# Patient Record
Sex: MALE | Race: WHITE | NOT HISPANIC OR LATINO | URBAN - METROPOLITAN AREA
[De-identification: names, ages, dates, MRNs, and addresses within clinical notes are randomized per-mention and may not be internally consistent; named-entity substitution may affect disease eponyms.]

---

## 2017-05-15 ENCOUNTER — RECORDS - HEALTHEAST (OUTPATIENT)
Dept: LAB | Facility: CLINIC | Age: 67
End: 2017-05-15

## 2017-05-15 LAB
CHOLEST SERPL-MCNC: 109 MG/DL
FASTING STATUS PATIENT QL REPORTED: ABNORMAL
HDLC SERPL-MCNC: 31 MG/DL
LDLC SERPL CALC-MCNC: 46 MG/DL
TRIGL SERPL-MCNC: 158 MG/DL

## 2018-08-27 ENCOUNTER — RECORDS - HEALTHEAST (OUTPATIENT)
Dept: LAB | Facility: CLINIC | Age: 68
End: 2018-08-27

## 2018-08-27 LAB
ALBUMIN SERPL-MCNC: 3.6 G/DL (ref 3.5–5)
ALP SERPL-CCNC: 75 U/L (ref 45–120)
ALT SERPL W P-5'-P-CCNC: 19 U/L (ref 0–45)
ANION GAP SERPL CALCULATED.3IONS-SCNC: 9 MMOL/L (ref 5–18)
AST SERPL W P-5'-P-CCNC: 19 U/L (ref 0–40)
BASOPHILS # BLD AUTO: 0.1 THOU/UL (ref 0–0.2)
BASOPHILS NFR BLD AUTO: 1 % (ref 0–2)
BILIRUB SERPL-MCNC: 0.6 MG/DL (ref 0–1)
BNP SERPL-MCNC: 1313 PG/ML (ref 0–65)
BUN SERPL-MCNC: 14 MG/DL (ref 8–22)
CALCIUM SERPL-MCNC: 10 MG/DL (ref 8.5–10.5)
CHLORIDE BLD-SCNC: 106 MMOL/L (ref 98–107)
CHOLEST SERPL-MCNC: 103 MG/DL
CO2 SERPL-SCNC: 25 MMOL/L (ref 22–31)
CREAT SERPL-MCNC: 0.85 MG/DL (ref 0.7–1.3)
EOSINOPHIL # BLD AUTO: 0.2 THOU/UL (ref 0–0.4)
EOSINOPHIL NFR BLD AUTO: 2 % (ref 0–6)
ERYTHROCYTE [DISTWIDTH] IN BLOOD BY AUTOMATED COUNT: 13.9 % (ref 11–14.5)
FASTING STATUS PATIENT QL REPORTED: ABNORMAL
GFR SERPL CREATININE-BSD FRML MDRD: >60 ML/MIN/1.73M2
GLUCOSE BLD-MCNC: 149 MG/DL (ref 70–125)
HCT VFR BLD AUTO: 41.4 % (ref 40–54)
HDLC SERPL-MCNC: 29 MG/DL
HGB BLD-MCNC: 13.6 G/DL (ref 14–18)
LDLC SERPL CALC-MCNC: 60 MG/DL
LYMPHOCYTES # BLD AUTO: 1.7 THOU/UL (ref 0.8–4.4)
LYMPHOCYTES NFR BLD AUTO: 24 % (ref 20–40)
MCH RBC QN AUTO: 30.9 PG (ref 27–34)
MCHC RBC AUTO-ENTMCNC: 32.9 G/DL (ref 32–36)
MCV RBC AUTO: 94 FL (ref 80–100)
MONOCYTES # BLD AUTO: 0.7 THOU/UL (ref 0–0.9)
MONOCYTES NFR BLD AUTO: 9 % (ref 2–10)
NEUTROPHILS # BLD AUTO: 4.7 THOU/UL (ref 2–7.7)
NEUTROPHILS NFR BLD AUTO: 64 % (ref 50–70)
PLATELET # BLD AUTO: 169 THOU/UL (ref 140–440)
PMV BLD AUTO: 14 FL (ref 8.5–12.5)
POTASSIUM BLD-SCNC: 4.7 MMOL/L (ref 3.5–5)
PROT SERPL-MCNC: 6.4 G/DL (ref 6–8)
RBC # BLD AUTO: 4.4 MILL/UL (ref 4.4–6.2)
SODIUM SERPL-SCNC: 140 MMOL/L (ref 136–145)
TRIGL SERPL-MCNC: 72 MG/DL
TSH SERPL DL<=0.005 MIU/L-ACNC: 2.41 UIU/ML (ref 0.3–5)
WBC: 7.3 THOU/UL (ref 4–11)

## 2018-10-05 ENCOUNTER — RECORDS - HEALTHEAST (OUTPATIENT)
Dept: LAB | Facility: CLINIC | Age: 68
End: 2018-10-05

## 2018-10-05 LAB
ANION GAP SERPL CALCULATED.3IONS-SCNC: 11 MMOL/L (ref 5–18)
BUN SERPL-MCNC: 8 MG/DL (ref 8–22)
CALCIUM SERPL-MCNC: 9.8 MG/DL (ref 8.5–10.5)
CHLORIDE BLD-SCNC: 102 MMOL/L (ref 98–107)
CO2 SERPL-SCNC: 26 MMOL/L (ref 22–31)
CREAT SERPL-MCNC: 0.86 MG/DL (ref 0.7–1.3)
GFR SERPL CREATININE-BSD FRML MDRD: >60 ML/MIN/1.73M2
GLUCOSE BLD-MCNC: 126 MG/DL (ref 70–125)
POTASSIUM BLD-SCNC: 4.6 MMOL/L (ref 3.5–5)
SODIUM SERPL-SCNC: 139 MMOL/L (ref 136–145)

## 2019-12-13 ENCOUNTER — RECORDS - HEALTHEAST (OUTPATIENT)
Dept: LAB | Facility: CLINIC | Age: 69
End: 2019-12-13

## 2019-12-13 LAB
ANION GAP SERPL CALCULATED.3IONS-SCNC: 10 MMOL/L (ref 5–18)
BUN SERPL-MCNC: 14 MG/DL (ref 8–28)
CALCIUM SERPL-MCNC: 9.7 MG/DL (ref 8.5–10.5)
CHLORIDE BLD-SCNC: 103 MMOL/L (ref 98–107)
CHOLEST SERPL-MCNC: 117 MG/DL
CO2 SERPL-SCNC: 25 MMOL/L (ref 22–31)
CREAT SERPL-MCNC: 1.22 MG/DL (ref 0.7–1.3)
FASTING STATUS PATIENT QL REPORTED: ABNORMAL
GFR SERPL CREATININE-BSD FRML MDRD: 59 ML/MIN/1.73M2
GLUCOSE BLD-MCNC: 209 MG/DL (ref 70–125)
HDLC SERPL-MCNC: 32 MG/DL
LDLC SERPL CALC-MCNC: 55 MG/DL
POTASSIUM BLD-SCNC: 5 MMOL/L (ref 3.5–5)
SODIUM SERPL-SCNC: 138 MMOL/L (ref 136–145)
TRIGL SERPL-MCNC: 148 MG/DL

## 2021-02-01 ENCOUNTER — RECORDS - HEALTHEAST (OUTPATIENT)
Dept: LAB | Facility: CLINIC | Age: 71
End: 2021-02-01

## 2021-02-01 LAB
ALBUMIN SERPL-MCNC: 3.9 G/DL (ref 3.5–5)
ALP SERPL-CCNC: 61 U/L (ref 45–120)
ALT SERPL W P-5'-P-CCNC: 14 U/L (ref 0–45)
ANION GAP SERPL CALCULATED.3IONS-SCNC: 10 MMOL/L (ref 5–18)
AST SERPL W P-5'-P-CCNC: 15 U/L (ref 0–40)
BILIRUB SERPL-MCNC: 0.4 MG/DL (ref 0–1)
BUN SERPL-MCNC: 17 MG/DL (ref 8–28)
CALCIUM SERPL-MCNC: 8.8 MG/DL (ref 8.5–10.5)
CHLORIDE BLD-SCNC: 103 MMOL/L (ref 98–107)
CHOLEST SERPL-MCNC: 104 MG/DL
CO2 SERPL-SCNC: 27 MMOL/L (ref 22–31)
CREAT SERPL-MCNC: 1.79 MG/DL (ref 0.7–1.3)
ERYTHROCYTE [DISTWIDTH] IN BLOOD BY AUTOMATED COUNT: 12.7 % (ref 11–14.5)
FASTING STATUS PATIENT QL REPORTED: ABNORMAL
GFR SERPL CREATININE-BSD FRML MDRD: 38 ML/MIN/1.73M2
GLUCOSE BLD-MCNC: 171 MG/DL (ref 70–125)
HCT VFR BLD AUTO: 34.3 % (ref 40–54)
HDLC SERPL-MCNC: 31 MG/DL
HGB BLD-MCNC: 11.8 G/DL (ref 14–18)
LDLC SERPL CALC-MCNC: 44 MG/DL
MCH RBC QN AUTO: 32.2 PG (ref 27–34)
MCHC RBC AUTO-ENTMCNC: 34.4 G/DL (ref 32–36)
MCV RBC AUTO: 94 FL (ref 80–100)
PLATELET # BLD AUTO: 152 THOU/UL (ref 140–440)
PMV BLD AUTO: 12.5 FL (ref 8.5–12.5)
POTASSIUM BLD-SCNC: 4.8 MMOL/L (ref 3.5–5)
PROT SERPL-MCNC: 6.7 G/DL (ref 6–8)
RBC # BLD AUTO: 3.66 MILL/UL (ref 4.4–6.2)
SODIUM SERPL-SCNC: 140 MMOL/L (ref 136–145)
TRIGL SERPL-MCNC: 144 MG/DL
WBC: 5.6 THOU/UL (ref 4–11)

## 2021-05-29 ENCOUNTER — RECORDS - HEALTHEAST (OUTPATIENT)
Dept: ADMINISTRATIVE | Facility: CLINIC | Age: 71
End: 2021-05-29

## 2021-05-30 ENCOUNTER — RECORDS - HEALTHEAST (OUTPATIENT)
Dept: ADMINISTRATIVE | Facility: CLINIC | Age: 71
End: 2021-05-30

## 2021-06-05 ENCOUNTER — RECORDS - HEALTHEAST (OUTPATIENT)
Dept: SCHEDULING | Facility: CLINIC | Age: 71
End: 2021-06-05

## 2021-06-05 DIAGNOSIS — R29.898 OTHER MUSCULOSKELETAL SYMPTOMS REFERABLE TO LIMBS: ICD-10-CM

## 2022-04-25 ENCOUNTER — LAB REQUISITION (OUTPATIENT)
Dept: LAB | Facility: CLINIC | Age: 72
End: 2022-04-25
Payer: COMMERCIAL

## 2022-04-25 DIAGNOSIS — E78.5 HYPERLIPIDEMIA, UNSPECIFIED: ICD-10-CM

## 2022-04-25 LAB
ALBUMIN SERPL-MCNC: 3.8 G/DL (ref 3.5–5)
ALP SERPL-CCNC: 62 U/L (ref 45–120)
ALT SERPL W P-5'-P-CCNC: 17 U/L (ref 0–45)
ANION GAP SERPL CALCULATED.3IONS-SCNC: 11 MMOL/L (ref 5–18)
AST SERPL W P-5'-P-CCNC: 17 U/L (ref 0–40)
BILIRUB SERPL-MCNC: 0.4 MG/DL (ref 0–1)
BUN SERPL-MCNC: 18 MG/DL (ref 8–28)
CALCIUM SERPL-MCNC: 8.9 MG/DL (ref 8.5–10.5)
CHLORIDE BLD-SCNC: 102 MMOL/L (ref 98–107)
CHOLEST SERPL-MCNC: 114 MG/DL
CO2 SERPL-SCNC: 25 MMOL/L (ref 22–31)
CREAT SERPL-MCNC: 2.07 MG/DL (ref 0.7–1.3)
GFR SERPL CREATININE-BSD FRML MDRD: 33 ML/MIN/1.73M2
GLUCOSE BLD-MCNC: 159 MG/DL (ref 70–125)
HDLC SERPL-MCNC: 29 MG/DL
LDLC SERPL CALC-MCNC: 45 MG/DL
POTASSIUM BLD-SCNC: 4.5 MMOL/L (ref 3.5–5)
PROT SERPL-MCNC: 6.5 G/DL (ref 6–8)
SODIUM SERPL-SCNC: 138 MMOL/L (ref 136–145)
TRIGL SERPL-MCNC: 201 MG/DL

## 2022-04-25 PROCEDURE — 80061 LIPID PANEL: CPT | Mod: ORL | Performed by: FAMILY MEDICINE

## 2022-04-25 PROCEDURE — 80053 COMPREHEN METABOLIC PANEL: CPT | Mod: ORL | Performed by: FAMILY MEDICINE

## 2023-07-21 ENCOUNTER — LAB REQUISITION (OUTPATIENT)
Dept: LAB | Facility: CLINIC | Age: 73
End: 2023-07-21
Payer: COMMERCIAL

## 2023-07-21 DIAGNOSIS — E78.5 HYPERLIPIDEMIA, UNSPECIFIED: ICD-10-CM

## 2023-07-21 DIAGNOSIS — E11.40 TYPE 2 DIABETES MELLITUS WITH DIABETIC NEUROPATHY, UNSPECIFIED (H): ICD-10-CM

## 2023-07-21 LAB
ALBUMIN SERPL BCG-MCNC: 4.2 G/DL (ref 3.5–5.2)
ALP SERPL-CCNC: 69 U/L (ref 40–129)
ALT SERPL W P-5'-P-CCNC: 16 U/L (ref 0–70)
ANION GAP SERPL CALCULATED.3IONS-SCNC: 15 MMOL/L (ref 7–15)
AST SERPL W P-5'-P-CCNC: 15 U/L (ref 0–45)
BILIRUB SERPL-MCNC: 0.3 MG/DL
BUN SERPL-MCNC: 22.1 MG/DL (ref 8–23)
CALCIUM SERPL-MCNC: 9.3 MG/DL (ref 8.8–10.2)
CHLORIDE SERPL-SCNC: 99 MMOL/L (ref 98–107)
CHOLEST SERPL-MCNC: 102 MG/DL
CREAT SERPL-MCNC: 1.88 MG/DL (ref 0.67–1.17)
CREAT UR-MCNC: 35.7 MG/DL
DEPRECATED HCO3 PLAS-SCNC: 21 MMOL/L (ref 22–29)
ERYTHROCYTE [DISTWIDTH] IN BLOOD BY AUTOMATED COUNT: 12.6 % (ref 10–15)
GFR SERPL CREATININE-BSD FRML MDRD: 37 ML/MIN/1.73M2
GLUCOSE SERPL-MCNC: 267 MG/DL (ref 70–99)
HCT VFR BLD AUTO: 37.1 % (ref 40–53)
HDLC SERPL-MCNC: 26 MG/DL
HGB BLD-MCNC: 12 G/DL (ref 13.3–17.7)
LDLC SERPL CALC-MCNC: 36 MG/DL
MCH RBC QN AUTO: 32.4 PG (ref 26.5–33)
MCHC RBC AUTO-ENTMCNC: 32.3 G/DL (ref 31.5–36.5)
MCV RBC AUTO: 100 FL (ref 78–100)
MICROALBUMIN UR-MCNC: <12 MG/L
MICROALBUMIN/CREAT UR: NORMAL MG/G{CREAT}
NONHDLC SERPL-MCNC: 76 MG/DL
PLATELET # BLD AUTO: 217 10E3/UL (ref 150–450)
POTASSIUM SERPL-SCNC: 5.4 MMOL/L (ref 3.4–5.3)
PROT SERPL-MCNC: 6.5 G/DL (ref 6.4–8.3)
RBC # BLD AUTO: 3.7 10E6/UL (ref 4.4–5.9)
SODIUM SERPL-SCNC: 135 MMOL/L (ref 136–145)
TRIGL SERPL-MCNC: 201 MG/DL
WBC # BLD AUTO: 7.6 10E3/UL (ref 4–11)

## 2023-07-21 PROCEDURE — 80053 COMPREHEN METABOLIC PANEL: CPT | Mod: ORL | Performed by: FAMILY MEDICINE

## 2023-07-21 PROCEDURE — 85027 COMPLETE CBC AUTOMATED: CPT | Mod: ORL | Performed by: FAMILY MEDICINE

## 2023-07-21 PROCEDURE — 80061 LIPID PANEL: CPT | Mod: ORL | Performed by: FAMILY MEDICINE

## 2023-07-21 PROCEDURE — 82570 ASSAY OF URINE CREATININE: CPT | Mod: ORL | Performed by: FAMILY MEDICINE

## 2024-07-18 ENCOUNTER — LAB REQUISITION (OUTPATIENT)
Dept: LAB | Facility: CLINIC | Age: 74
End: 2024-07-18
Payer: COMMERCIAL

## 2024-07-18 DIAGNOSIS — N18.32 CHRONIC KIDNEY DISEASE, STAGE 3B (H): ICD-10-CM

## 2024-07-18 DIAGNOSIS — E78.5 HYPERLIPIDEMIA, UNSPECIFIED: ICD-10-CM

## 2024-07-18 DIAGNOSIS — E11.42 TYPE 2 DIABETES MELLITUS WITH DIABETIC POLYNEUROPATHY (H): ICD-10-CM

## 2024-07-18 LAB
ALBUMIN SERPL BCG-MCNC: 3.9 G/DL (ref 3.5–5.2)
ALP SERPL-CCNC: 79 U/L (ref 40–150)
ALT SERPL W P-5'-P-CCNC: 15 U/L (ref 0–70)
ANION GAP SERPL CALCULATED.3IONS-SCNC: 11 MMOL/L (ref 7–15)
AST SERPL W P-5'-P-CCNC: 18 U/L (ref 0–45)
BILIRUB SERPL-MCNC: 0.4 MG/DL
BUN SERPL-MCNC: 23.2 MG/DL (ref 8–23)
CALCIUM SERPL-MCNC: 9 MG/DL (ref 8.8–10.4)
CHLORIDE SERPL-SCNC: 99 MMOL/L (ref 98–107)
CHOLEST SERPL-MCNC: 115 MG/DL
CREAT SERPL-MCNC: 2.4 MG/DL (ref 0.67–1.17)
CREAT UR-MCNC: 57.6 MG/DL
EGFRCR SERPLBLD CKD-EPI 2021: 27 ML/MIN/1.73M2
ERYTHROCYTE [DISTWIDTH] IN BLOOD BY AUTOMATED COUNT: 12.9 % (ref 10–15)
FASTING STATUS PATIENT QL REPORTED: ABNORMAL
FASTING STATUS PATIENT QL REPORTED: ABNORMAL
GLUCOSE SERPL-MCNC: 338 MG/DL (ref 70–99)
HCO3 SERPL-SCNC: 24 MMOL/L (ref 22–29)
HCT VFR BLD AUTO: 37.9 % (ref 40–53)
HDLC SERPL-MCNC: 26 MG/DL
HGB BLD-MCNC: 12.9 G/DL (ref 13.3–17.7)
LDLC SERPL CALC-MCNC: 51 MG/DL
MCH RBC QN AUTO: 32.3 PG (ref 26.5–33)
MCHC RBC AUTO-ENTMCNC: 34 G/DL (ref 31.5–36.5)
MCV RBC AUTO: 95 FL (ref 78–100)
MICROALBUMIN UR-MCNC: <12 MG/L
MICROALBUMIN/CREAT UR: NORMAL MG/G{CREAT}
NONHDLC SERPL-MCNC: 89 MG/DL
PLATELET # BLD AUTO: 200 10E3/UL (ref 150–450)
POTASSIUM SERPL-SCNC: 4.7 MMOL/L (ref 3.4–5.3)
PROT SERPL-MCNC: 6.8 G/DL (ref 6.4–8.3)
RBC # BLD AUTO: 3.99 10E6/UL (ref 4.4–5.9)
SODIUM SERPL-SCNC: 134 MMOL/L (ref 135–145)
TRIGL SERPL-MCNC: 188 MG/DL
WBC # BLD AUTO: 5.1 10E3/UL (ref 4–11)

## 2024-07-18 PROCEDURE — 85027 COMPLETE CBC AUTOMATED: CPT | Mod: ORL | Performed by: FAMILY MEDICINE

## 2024-07-18 PROCEDURE — 80061 LIPID PANEL: CPT | Mod: ORL | Performed by: FAMILY MEDICINE

## 2024-07-18 PROCEDURE — 80053 COMPREHEN METABOLIC PANEL: CPT | Mod: ORL | Performed by: FAMILY MEDICINE

## 2024-07-18 PROCEDURE — 82043 UR ALBUMIN QUANTITATIVE: CPT | Mod: ORL | Performed by: FAMILY MEDICINE

## 2024-08-05 ENCOUNTER — OFFICE VISIT (OUTPATIENT)
Dept: PHARMACY | Facility: PHYSICIAN GROUP | Age: 74
End: 2024-08-05
Payer: COMMERCIAL

## 2024-08-05 DIAGNOSIS — Z79.4 TYPE 2 DIABETES MELLITUS WITH HYPERGLYCEMIA, WITH LONG-TERM CURRENT USE OF INSULIN (H): Primary | ICD-10-CM

## 2024-08-05 DIAGNOSIS — E11.65 TYPE 2 DIABETES MELLITUS WITH HYPERGLYCEMIA, WITH LONG-TERM CURRENT USE OF INSULIN (H): Primary | ICD-10-CM

## 2024-08-05 DIAGNOSIS — I50.32 CHRONIC DIASTOLIC HEART FAILURE (H): ICD-10-CM

## 2024-08-05 DIAGNOSIS — I10 ESSENTIAL HYPERTENSION: ICD-10-CM

## 2024-08-05 DIAGNOSIS — E78.2 MIXED HYPERLIPIDEMIA: ICD-10-CM

## 2024-08-05 DIAGNOSIS — N18.32 STAGE 3B CHRONIC KIDNEY DISEASE (H): ICD-10-CM

## 2024-08-05 DIAGNOSIS — G62.9 NEUROPATHY: ICD-10-CM

## 2024-08-05 PROCEDURE — 99607 MTMS BY PHARM ADDL 15 MIN: CPT | Performed by: PHARMACIST

## 2024-08-05 PROCEDURE — 99605 MTMS BY PHARM NP 15 MIN: CPT | Performed by: PHARMACIST

## 2024-08-05 NOTE — PATIENT INSTRUCTIONS
Recommendations from today's MTM visit:                                                       Continue Lantus insulin 50 units once a day in the evening  Continue taking Januvia (pink pill) 25 mg 1 tablet daily for now. We will stop this when you start the new once a week shot.   Sent order for once a week shot, Trulicity 0.75 mg injection once a week to your pharmacy. Wait to start this until our next visit.     This medication helps decreases appetite and slows how you absorb sugar into your blood.  It is generally very effective in lowering blood sugar and helping with weight loss. There are some heart and kidney protective benefits long-term.     Your insurance may require a prior authorization so check in with your pharmacy in about a week to see if it has been approved.     Tips to help it work best for you:  - Eat slow  - Start with 1/2 of what you normally eat and see how you feel, if still hungry after 20 minutes go back for more  - Try to avoid high fat, fried, greasy foods  - Drink plenty of water and get fiber in your diet  - Do injection at night before bedtime  - Make sure to eat protein    Some details I like to point out:   - Take once a week, on the same day  - You can take with or without food. Recommend taking before bedtime.  - If you miss a dose, take the missed dose as soon as possible within 4 days (96 hours) after the missed dose. If more than 4 days have passed, skip the missed dose and administer the next dose on the regularly scheduled day.  - Can be injected under the skin of your abdomen, thigh, or upper arm. Most people use their abdomen.   - Keep unused pens in the refrigerator. If needed, each single-dose pen can be stored unrefrigerated at temperatures not to exceed 30 C (86 F) for up to 21 days. I recommend taking pen out of the refrigerator 15-30 minutes before giving the injection.   - Most common side effects are include nausea, vomiting, diarrhea, stomach (abdominal) pain, and  constipation. Theses side effects usually improve after your body gets used to the medicine in a couple weeks. But if you experience abdominal pain that is severe and will not go away please call your doctor or go to Urgent Care/ Emergency Room.       Follow-up: Return in 2 weeks (on 8/19/2024) for with me, in person at 10:00 AM.    Diabetes Goals:    Home Monitoring of Blood Sugars:  Morning before eating  mg/dL   2 hours after a meal less than 180 mg/dL.    Hemoglobin A1C: Less than 8%. Yours is 12.1%    Blood Pressure: Less than 130/80mmHg.     Cholesterol: You are taking simvastatin to help decrease the risk of heart disease.    Things you can do to help lower your blood sugars:    Diet: 3 meals and 1-2 snacks per day with 45-60 grams of carbohydrates per meal and 15-30 grams of carbohydrates per snack. Try to fill your plate at least half-full with vegetables, fill one-quarter full with lean meats or protein, and also make sure you get at least some carbohydrate with every meal.    Exercise: 30 minutes per day of anything that will increase your heart rate and make you break a sweat! Gardening, walking, cleaning the house, changing the oil in your car, etc. If you feel like 30 minutes per day is too much, start small. Even lifting canned foods or working your arms with a resistance band in front of the TV can help.    It was great speaking with you today.  I value your experience and would be very thankful for your time in providing feedback in our clinic survey. In the next few days, you may receive an email or text message from [employer] with a link to a survey related to your clinical pharmacist.    To schedule another MTM appointment, please call [phone].    My Clinical Pharmacist's contact information:                                                      Please feel free to contact me with any questions or concerns you have.      Ivette Rodriguez, PharmD, BCACP  Medication Therapy Management  Pharmacist  [facility]  [phone]

## 2024-08-05 NOTE — LETTER
August 9, 2024  Eric Cordoba  [address] [address]    Dear Mr. Cordoba, [facility]     Thank you for talking with me on Aug 5, 2024 about your health and medications. As a follow-up to our conversation, I have included two documents:      Your Recommended To-Do List has steps you should take to get the best results from your medications.  Your Medication List will help you keep track of your medications and how to take them.    If you want to talk about these documents, please call Ivette Rodriguez PharmD at phone: [phone], Monday-Friday 8-4:30pm.    I look forward to working with you and your doctors to make sure your medications work well for you.    Sincerely,  Ivette Rodriguez, Abelardo  [facility] Pharmacist, [facility]

## 2024-08-05 NOTE — PROGRESS NOTES
Medication Therapy Management (MTM) Encounter    ASSESSMENT:                            Medication Adherence/Access:   May have difficulty with adherence to multiple daily injections. Encouraged adherence to daily Lantus injection.   _  Diabetes   A1c is not at goal of <8%.   Home blood sugars are not at goal of post-prandial <180 mg/dL. He would benefit from continuous glucose monitoring to get a better understanding of his fasting and post-prandial readings to help adjust medications and diet changes.   Given renal function, medication options are limited. Discussed starting weekly GLP-1 agonist vs mealtime insulin. May have difficulty with frequent daily injections and weekly GLP-1 may be a good initial option to help with post-prandial readings. Januvia should be stopped when GLP-1 agonist started. Likely will need to increase basal insulin dose as well but prefer to have fasting glucose numbers before making this change. Will have close follow-up to monitor glucose and adjust regimen. SGLT-2 inhibitor would be beneficial given ckd and heart failure but not advised to start at current renal function and likely wouldn't have much benefit for blood sugar lowering at current eGFR.   Education provided today on:  Healthy Eating, Monitoring, and Taking Medication  _  Hypertension , Chronic Kidney Disease  Blood pressure is at goal of <130/80 mmHg. Medications appropriately adjusted for renal function.    _  Heart Failure   Stable.  Patient is on appropriate ACE/ARB. Patient is on appropriate beta blocker: Carvedilol (Coreg). Patient pulse is > 50.. Current weight is stable and diuretic dose seems to be appropriate.   _  Hyperlipidemia   Stable. LDL is <70 mg/dL.   _  Neuropathy:   Stable.     PLAN:                            Start using Freestyle Manuel 3 continuous glucose monitor to check your blood sugar. Change sensor every 14 days.   Continue Lantus insulin 50 units once a day in the evening  Continue taking  Januvia (pink pill) 25 mg 1 tablet daily for now. We will stop this when you start the new once a week shot.   Sent order for once a week shot, Trulicity 0.75 mg injection once a week to your pharmacy. Wait to start this until our next visit.     This medication helps decreases appetite and slows how you absorb sugar into your blood.  It is generally very effective in lowering blood sugar and helping with weight loss. There are some heart and kidney protective benefits long-term.     Your insurance may require a prior authorization so check in with your pharmacy in about a week to see if it has been approved.     Tips to help it work best for you:  - Eat slow  - Start with 1/2 of what you normally eat and see how you feel, if still hungry after 20 minutes go back for more  - Try to avoid high fat, fried, greasy foods  - Drink plenty of water and get fiber in your diet  - Do injection at night before bedtime  - Make sure to eat protein    Some details I like to point out:   - Take once a week, on the same day  - You can take with or without food. Recommend taking before bedtime.  - If you miss a dose, take the missed dose as soon as possible within 4 days (96 hours) after the missed dose. If more than 4 days have passed, skip the missed dose and administer the next dose on the regularly scheduled day.  - Can be injected under the skin of your abdomen, thigh, or upper arm. Most people use their abdomen.   - Keep unused pens in the refrigerator. If needed, each single-dose pen can be stored unrefrigerated at temperatures not to exceed 30 C (86 F) for up to 21 days. I recommend taking pen out of the refrigerator 15-30 minutes before giving the injection.   - Most common side effects are include nausea, vomiting, diarrhea, stomach (abdominal) pain, and constipation. Theses side effects usually improve after your body gets used to the medicine in a couple weeks. But if you experience abdominal pain that is severe and will  not go away please call your doctor or go to Urgent Care/ Emergency Room.       Follow-up: Return in 2 weeks (on 2024) for with me, in person at 10:00 AM.    SUBJECTIVE/OBJECTIVE:                          Eric Cordoba is a 74 year old male seen for an initial visit. He was referred to me from Dr. Elmore. Patient was accompanied by friend.     Reason for visit: Uncontrolled diabetes.    Allergies/ADRs: Reviewed in chart  Past Medical History: Reviewed in chart  Tobacco: He reports that he has quit smoking. His smoking use included cigarettes. He has never used smokeless tobacco.  Alcohol: none    Medication Adherence/Access:   Patient uses pill box(es).  Patient takes medications 2 time(s) per day.   Per patient, he is good about remembering to take his pills everyday but admits he misses insuln dose maybe 1 time per week.     Diabetes     Lantus 50 units once daily in the evening  Januvia 25 mg 1 tablet once daily  Aspirin 81mg daily  Patient is not experiencing side effects. His blood sugars have been higher since stopping metformin. He does forget to take insulin dose maybe once a week. He does think a once a week medicine would be easier for him to use than trying to do an insulin shot before each meal. He would prefer to try this first.   Medication History:  - metformin- stopped due to kidney function  Current diabetes symptoms: numbness/tingling    Diet/Exercise: he is not watching carbs but does avoid sugar, he does think something that would help appetite may be helpful for him  Blood sugar monitorin time(s) daily, after eating; He is interested in using continuous glucose monitor  After dinner- [phone]   A1c: 2024- 12.1%     Eye exam is not discussed today  Foot exam is up to date    Hypertension , Chronic Kidney Disease    Losartan 50 mg 1 tablet once daily  Carvedilol 12.5 mg 1 tablet twice a day   Furosemide 20 mg 2 tablets daily  Patient reports no current medication side  effects  Patient does not self-monitor blood pressure.         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     Patient reports these HF symptoms: none, he denies any edema. His did stop potassium supplement after his last visit with provider because he has been taking less furosemide.     Patient does not self-monitor blood pressure.   Patient is mostly following a low sodium diet and is avoiding alcohol.       Hyperlipidemia     Simvastatin 40 mg daily  Patient reports no significant myalgias or other side effects.       Neuropathy:   Gabapentin 300 mg 2 capsules morning and 1 capsule evening  He does feel like this is helpful. No issues with grogginess.     Today's Vitals: /61 (BP Location: Left arm, Patient Position: Sitting, Cuff Size: Adult Large)   ----------------      I spent 60 minutes with this patient today. All changes were made via collaborative practice agreement with Abner Elmore MD. A copy of the visit note was provided to the patient's provider(s).    A summary of these recommendations was given to the patient and was mailed to the patient.    Ivette Rodriguez, PharmD, BCACP  Medication Therapy Management Pharmacist  [facility]  [phone]           Medication Therapy Recommendations  Type 2 diabetes mellitus with hyperglycemia, with long-term current use of insulin (H)    Current Medication: JANUVIA 25 MG tablet   Rationale: More effective medication available - Ineffective medication - Effectiveness   Recommendation: Change Medication - Trulicity 0.75 MG/0.5ML Sopn   Status: Accepted per CPA          Current Medication: LANTUS SOLOSTAR 100 UNIT/ML soln   Rationale: Medication requires monitoring - Needs additional monitoring   Recommendation: Self-Monitoring - FreeStyle Manuel 3  Sensor Misc   Status: Accepted per CPA

## 2024-08-05 NOTE — LETTER
"Recommended To-Do List      Prepared on: Aug 5, 2024       You can get the best results from your medications by completing the items on this \"To-Do List.\"      Bring your To-Do List when you go to your doctor. And, share it with your family or caregivers.    My To-Do List:  What we talked about: What I should do:   A medication that is not working    Change the medication you are taking from Januvia to Trulicity.  Continue taking Januvia (pink pill) 25 mg 1 tablet daily for now. We will stop this when you start the new once a week shot.   Sent order for once a week shot, Trulicity 0.75 mg injection once a week to your pharmacy. Wait to start this until our next visit.           What we talked about: What I should do:   Needing additional monitoring    Start using Freestyle Manuel 3 continuous glucose monitor to check your blood sugar. Change sensor every 14 days.           What we talked about: What I should do:                     "

## 2024-08-05 NOTE — LETTER
_  Medication List        Prepared on: Aug 5, 2024     Bring your Medication List when you go to the doctor, hospital, or   emergency room. And, share it with your family or caregivers.     Note any changes to how you take your medications.  Cross out medications when you no longer use them.    Medication How I take it Why I use it Prescriber   aspirin (ASA) 81 MG chewable tablet Take 81 mg by mouth daily Reduce Risk of Heart Event or Stroke Abner Elmore MD   carvedilol (COREG) 12.5 MG tablet Take 12.5 mg by mouth 2 times daily Cardiac Failure; High Blood Pressure Disorder Abner Elmore MD   dulaglutide (TRULICITY) 0.75 MG/0.5ML pen [START AFTER 8/19/2024] Inject 0.75 mg subcutaneously every 7 days Type 2 Diabetes Abner Elmore MD   furosemide (LASIX) 20 MG tablet Take 40 mg by mouth daily Edema; Cardiac Failure Abner Elmore MD   gabapentin (NEURONTIN) 300 MG capsule Take 600 mg by mouth every morning And 300 mg evening Neuropathic Pain Abner Elmore MD   JANUVIA 25 MG tablet  [STOP AFTER 8/19/2024] Take 25 mg by mouth daily Type 2 Diabetes Abner Elmore MD   LANTUS SOLOSTAR 100 UNIT/ML soln Inject 50 Units subcutaneously daily Type 2 Diabetes Abner Elmore MD   losartan (COZAAR) 50 MG tablet Take 25 mg by mouth daily High Blood Pressure Disorder; Heart Failure Abner Elmore MD   simvastatin (ZOCOR) 40 MG tablet Take 40 mg by mouth daily High Cholesterol, Lower Risk of Heart Event or Stroke Abner Elmore MD         Add new medications, over-the-counter drugs, herbals, vitamins, or  minerals in the blank rows below.    Medication How I take it Why I use it Prescriber                                      Allergies:      - Lisinopril - Cough  - Propoxyphene - Unknown, Hives        Side effects I have had:      Not on File        Other Information:              My notes and questions:

## 2024-08-07 VITALS — DIASTOLIC BLOOD PRESSURE: 61 MMHG | SYSTOLIC BLOOD PRESSURE: 129 MMHG

## 2024-08-09 RX ORDER — SIMVASTATIN 40 MG
40 TABLET ORAL DAILY
COMMUNITY
Start: 2024-03-15

## 2024-08-09 RX ORDER — LOSARTAN POTASSIUM 50 MG/1
25 TABLET ORAL DAILY
COMMUNITY
Start: 2024-04-01

## 2024-08-09 RX ORDER — GABAPENTIN 300 MG/1
600 CAPSULE ORAL EVERY MORNING
COMMUNITY
Start: 2024-05-02

## 2024-08-09 RX ORDER — INSULIN GLARGINE 100 [IU]/ML
48 INJECTION, SOLUTION SUBCUTANEOUS DAILY
COMMUNITY
Start: 2024-04-26

## 2024-08-09 RX ORDER — FUROSEMIDE 20 MG
40 TABLET ORAL DAILY
COMMUNITY
Start: 2024-03-30

## 2024-08-09 RX ORDER — SITAGLIPTIN 25 MG/1
25 TABLET, FILM COATED ORAL DAILY
COMMUNITY
Start: 2024-04-15 | End: 2024-08-19

## 2024-08-09 RX ORDER — CARVEDILOL 12.5 MG/1
12.5 TABLET ORAL 2 TIMES DAILY
COMMUNITY
Start: 2024-05-09

## 2024-08-09 RX ORDER — ASPIRIN 81 MG/1
81 TABLET, CHEWABLE ORAL DAILY
COMMUNITY

## 2024-08-19 ENCOUNTER — OFFICE VISIT (OUTPATIENT)
Dept: PHARMACY | Facility: PHYSICIAN GROUP | Age: 74
End: 2024-08-19
Payer: COMMERCIAL

## 2024-08-19 DIAGNOSIS — Z79.4 TYPE 2 DIABETES MELLITUS WITH HYPERGLYCEMIA, WITH LONG-TERM CURRENT USE OF INSULIN (H): Primary | ICD-10-CM

## 2024-08-19 DIAGNOSIS — E11.65 TYPE 2 DIABETES MELLITUS WITH HYPERGLYCEMIA, WITH LONG-TERM CURRENT USE OF INSULIN (H): Primary | ICD-10-CM

## 2024-08-19 PROCEDURE — 99606 MTMS BY PHARM EST 15 MIN: CPT | Performed by: PHARMACIST

## 2024-08-19 PROCEDURE — 99607 MTMS BY PHARM ADDL 15 MIN: CPT | Performed by: PHARMACIST

## 2024-08-19 NOTE — PATIENT INSTRUCTIONS
Recommendations from today's MTM visit:                                                       Stop Januvia (pink pill) starting tomorrow  Take first Trulicity shot tomorrow at bedtime. Use this once a week on Tuesdays.   Decrease Lantus insulin to 45 units in the evening.     Follow-up: Return in 3 weeks (on 9/9/2024) for with me, using a phone visit at 11:30 AM .    It was great speaking with you today.  I value your experience and would be very thankful for your time in providing feedback in our clinic survey. In the next few days, you may receive an email or text message from [employer] with a link to a survey related to your clinical pharmacist.    To schedule another MTM appointment, please call [phone].    My Clinical Pharmacist's contact information:                                                      Please feel free to contact me with any questions or concerns you have.      Ivette Rodriguez, PharmD, BCACP  Medication Therapy Management Pharmacist  [facility]  [phone]

## 2024-08-19 NOTE — PROGRESS NOTES
Medication Therapy Management (MTM) Encounter    ASSESSMENT:                            Medication Adherence/Access: No issues identified  _  Diabetes:   A1c is not at goal of <8%.   Patient is not meeting goal of > 50% time in target with continuous glucose monitoring.   He would benefit from stopping Januvia and starting Trulicity.   Given a couple of lows overnight will decrease basal insulin dose while starting Trulicity to reduce risk of lows and will follow closely to adjust based on glucose readings.   ________________________  PLAN:                            Stop Januvia (pink pill) starting tomorrow  Take first Trulicity shot tomorrow at bedtime. Use this once a week on Tuesdays.   Decrease Lantus insulin to 45 units in the evening.     Follow-up: Return in 3 weeks (on 9/9/2024) for with me, using a phone visit at 11:30 AM .    SUBJECTIVE/OBJECTIVE:                          Eric Cordoba is a 74 year old male seen for a follow-up visit.     Reason for visit: Diabetes follow-up.    Allergies/ADRs: Reviewed in chart  Past Medical History: Reviewed in chart  Tobacco: He reports that he has quit smoking. His smoking use included cigarettes. He has never used smokeless tobacco.  Alcohol: none    Medication Adherence/Access:   Patient uses pill box(es).  Patient takes medications 2 time(s) per day.   Per patient, he is good about remembering to take his pills everyday but admits he misses insuln dose maybe 1 time per week.     Diabetes   Lantus 50 units once daily in the evening  Januvia 25 mg 1 tablet once daily  Trulicity 0.75 mg once a week- NOT STARTED YET  Aspirin 81mg daily  Patient is not experiencing side effects. He has been using Manuel 3 CGM the past 2 weeks and it has been going well. He did have alarm go off 2 times overnight due to being too low. He still has highs during the day after eating. He did get the Trulicity pen and brings it with to visit. He is ready to start this. He did take his Januvia  dose today already.   Medication History:  - metformin- stopped due to kidney function  Current diabetes symptoms: numbness/tingling    Diet/Exercise: he is not watching carbs but does avoid sugar, he does think something that would help appetite may be helpful for him, he has been better about getting more protein  Blood sugar monitoring: continuous glucose monitor- in [facility] uli Jackson 3 (phone) Continuous Glucose Monitoring Results:   Report Period: 08/06/2024 - 08/19/2024 (14 days)  Generated: 08/19/2024  % Time CGM Active: 96%  Glucose Statistics and Targets  Average Glucose: 208 mg/dL  Glucose Management Indicator (GMI): 8.3%  Glucose Variability (%CV): 27.3%  Target Range: 70 - 180 mg/dL  Time in Ranges  Very High: >250 mg/dL --- 21%  High: 181 - 250 mg/dL --- 47%  Target Range: 70 - 180 mg/dL --- 32%  Low: 54 - 69 mg/dL --- 0%  Very Low: <54 mg/dL --- 0%    A1c: 7/18/2024- 12.1%     Eye exam in the last 12 months? No  Foot exam: up to date      Today's Vitals: There were no vitals taken for this visit.  ----------------      I spent 25 minutes with this patient today. All changes were made via collaborative practice agreement with Abner Elmore MD. A copy of the visit note was provided to the patient's provider(s).    A summary of these recommendations was given to the patient.    Ivette Rodriguez, PharmD, BCACP  Medication Therapy Management Pharmacist  [facility]  [phone]           Medication Therapy Recommendations  No medication therapy recommendations to display

## 2024-09-09 ENCOUNTER — VIRTUAL VISIT (OUTPATIENT)
Dept: PHARMACY | Facility: PHYSICIAN GROUP | Age: 74
End: 2024-09-09
Payer: COMMERCIAL

## 2024-09-09 DIAGNOSIS — Z79.4 TYPE 2 DIABETES MELLITUS WITH HYPERGLYCEMIA, WITH LONG-TERM CURRENT USE OF INSULIN (H): Primary | ICD-10-CM

## 2024-09-09 DIAGNOSIS — E11.65 TYPE 2 DIABETES MELLITUS WITH HYPERGLYCEMIA, WITH LONG-TERM CURRENT USE OF INSULIN (H): Primary | ICD-10-CM

## 2024-09-09 PROCEDURE — 99606 MTMS BY PHARM EST 15 MIN: CPT | Mod: 93 | Performed by: PHARMACIST

## 2024-09-09 NOTE — PROGRESS NOTES
Medication Therapy Management (MTM) Encounter    ASSESSMENT:                            Medication Adherence/Access: No issues identified  _  Diabetes:   A1c is not at goal of <8%.   Patient is not meeting goal of > 50% time in target with continuous glucose monitoring.   He would benefit from continuing to titrate Trulicity dose.     PLAN:                            Finish last dose of Trulicity 0.75 mg tomorrow. Then, next Tuesday increase to Trulicity 1.5 mg once a week.   Continue Lantus 45 units once a day at bedtime    Follow-up: Return in 4 weeks (on 10/7/2024) for with me, using a phone visit at 11:30 AM .    SUBJECTIVE/OBJECTIVE:                          Eric Cordoba is a 74 year old male seen for a follow-up visit.       Reason for visit: Diabetes follow-up.    Allergies/ADRs: Reviewed in chart  Past Medical History: Reviewed in chart  Tobacco: He reports that he has quit smoking. His smoking use included cigarettes. He has never used smokeless tobacco.  Alcohol: none    Medication Adherence/Access:   Patient uses pill box(es).  Patient takes medications 2 time(s) per day.   Per patient, he is good about remembering to take his pills everyday but admits he misses insuln dose maybe 1 time per week.     Diabetes   Lantus 45 units once daily in the evening  Trulicity 0.75 mg once a week (Tuesdays)   Aspirin 81mg daily  Patient is not experiencing side effects. He has done 3 doses of 0.75 mg Trulicity so far. He did decrease Lantus dose slightly when starting it. He thinks he maybe missed Lantus 1 day a week. He has not missed any Trulicity doses. He has not noticed any GI issues or feeling full faster. He has one more pen of current dose and is open to increasing dose after this because his daytime blood sugars are still high.   Medication History:  - metformin- stopped due to kidney function  Current diabetes symptoms: numbness/tingling    Diet/Exercise: he is not watching carbs but does avoid sugar, he does  think something that would help appetite may be helpful for him, he has been better about getting more protein  Blood sugar monitoring: continuous glucose monitor- in [place] as Italo Jackson 3 (phone) Continuous Glucose Monitoring Results:   Name: Italo López  YOB: 1950  Report Period: 08/13/2024 - 09/09/2024 (28 days)  Generated: 09/09/2024  % Time CGM Active: 99%  Glucose Statistics and Targets  Average Glucose: 239 mg/dL  Glucose Management Indicator (GMI): 9.0%  Glucose Variability (%CV): 27.9%  Target Range: 70 - 180 mg/dL  Time in Ranges  Very High: >250 mg/dL --- 44%  High: 181 - 250 mg/dL --- 36%  Target Range: 70 - 180 mg/dL --- 20%  Low: 54 - 69 mg/dL --- 0%  Very Low: <54 mg/dL --- 0%        A1c: 7/18/2024- 12.1%     Eye exam in the last 12 months? No  Foot exam: up to date      Today's Vitals: There were no vitals taken for this visit.  ----------------      I spent 5 minutes with this patient today. All changes were made via collaborative practice agreement with Abner Elmore MD. A copy of the visit note was provided to the patient's provider(s).    A summary of these recommendations was declined by the patient.    Ivette Rordiguez, PharmD, BCACP  Medication Therapy Management Pharmacist  [facility]  [phone]      Telemedicine Visit Details  Type of service:  Telephone visit  Start Time: 11:32 AM  End Time:  11:37 AM     Medication Therapy Recommendations  Type 2 diabetes mellitus with hyperglycemia, with long-term current use of insulin (H)    Current Medication: dulaglutide (TRULICITY) 0.75 MG/0.5ML pen (Discontinued)   Rationale: Dose too low - Dosage too low - Effectiveness   Recommendation: Increase Dose - Trulicity 1.5 MG/0.5ML Sopn   Status: Accepted per CPA

## 2024-09-09 NOTE — PATIENT INSTRUCTIONS
Recommendations from today's MTM visit:                                                       Finish last dose of Trulicity 0.75 mg tomorrow. Then, next Tuesday increase to Trulicity 1.5 mg once a week.   Continue Lantus 45 units once a day at bedtime    Follow-up: Return in 4 weeks (on 10/7/2024) for with me, using a phone visit at 11:30 AM .    It was great speaking with you today.  I value your experience and would be very thankful for your time in providing feedback in our clinic survey. In the next few days, you may receive an email or text message from [employer] with a link to a survey related to your clinical pharmacist.    To schedule another MTM appointment, please call [phone].    My Clinical Pharmacist's contact information:                                                      Please feel free to contact me with any questions or concerns you have.      Ivette Rodriguez, PharmD, BCACP  Medication Therapy Management Pharmacist  [facility]  [phone]

## 2024-10-07 ENCOUNTER — VIRTUAL VISIT (OUTPATIENT)
Dept: PHARMACY | Facility: PHYSICIAN GROUP | Age: 74
End: 2024-10-07
Payer: COMMERCIAL

## 2024-10-07 DIAGNOSIS — E11.65 TYPE 2 DIABETES MELLITUS WITH HYPERGLYCEMIA, WITH LONG-TERM CURRENT USE OF INSULIN (H): Primary | ICD-10-CM

## 2024-10-07 DIAGNOSIS — Z79.4 TYPE 2 DIABETES MELLITUS WITH HYPERGLYCEMIA, WITH LONG-TERM CURRENT USE OF INSULIN (H): Primary | ICD-10-CM

## 2024-10-07 PROCEDURE — 99606 MTMS BY PHARM EST 15 MIN: CPT | Mod: 93 | Performed by: PHARMACIST

## 2024-10-07 NOTE — PATIENT INSTRUCTIONS
Recommendations from today's MTM visit:                                                       Increase Trulicity to 3 mg once a week.  Increase Lantus insulin to 48 units every night.    Follow-up: Return in 24 days (on 10/31/2024) for with me, using a phone visit at 11:30 AM.    It was great speaking with you today.  I value your experience and would be very thankful for your time in providing feedback in our clinic survey. In the next few days, you may receive an email or text message from [employer] with a link to a survey related to your clinical pharmacist.    To schedule another MTM appointment, please call [phone].    My Clinical Pharmacist's contact information:                                                      Please feel free to contact me with any questions or concerns you have.      Ivette Rodriguez, PharmD, BCACP  Medication Therapy Management Pharmacist  [facility]  [phone]

## 2024-10-07 NOTE — PROGRESS NOTES
Medication Therapy Management (MTM) Encounter    ASSESSMENT:                            Medication Adherence/Access: No issues identified  _  Diabetes:   A1c is not at goal of <8%.   Patient is not meeting goal of > 50% time in target with continuous glucose monitoring.   He would benefit from increasing Trulicity and Lantus doses.   ______________  PLAN:                            Increase Trulicity to 3 mg once a week.  Increase Lantus insulin to 48 units every night.    Follow-up: Return in 24 days (on 10/31/2024) for with me, using a phone visit at 11:30 AM.    SUBJECTIVE/OBJECTIVE:                          Eric Cordoba is a 74 year old male seen for a follow-up visit.       Reason for visit: Diabetes follow-up.    Allergies/ADRs: Reviewed in chart  Past Medical History: Reviewed in chart  Tobacco: He reports that he has quit smoking. His smoking use included cigarettes. He has never used smokeless tobacco.  Alcohol: none    Medication Adherence/Access:   Patient uses pill box(es).  Patient takes medications 2 time(s) per day.   Per patient, he is good about remembering to take his pills everyday but admits he misses insuln dose maybe 1 time per week.     Diabetes   Lantus 45 units once daily in the evening  Trulicity 1.5 mg once a week (Tuesdays)   Aspirin 81mg daily  Patient is not experiencing side effects. He has done 4 weeks of 1.5 mg Trulicity dose. He continues on 45 units of Lantus at night. He continues to have high blood sugar readings in the morning and after he eats. He is open to increasing his Trulicity and Lantus doses today.   Medication History:  - metformin- stopped due to kidney function  Current diabetes symptoms: numbness/tingling    Diet/Exercise: he is not watching carbs but does avoid sugar, he does think something that would help appetite may be helpful for him, he has been better about getting more protein    A1c: 7/18/2024- 12.1%     Blood sugar monitoring: Continuous Glucose Monitor      Manuel 3 Continuous Glucose Monitoring Results:   Name: Italo López  YOB: 1950  Report Period: 09/10/2024 - 10/07/2024 (28 days)  Generated: 10/07/2024  % Time CGM Active: 98%  Glucose Statistics and Targets  Average Glucose: 257 mg/dL  Glucose Management Indicator (GMI): 9.5%  Glucose Variability (%CV): 25.8%  Target Range: 70 - 180 mg/dL  Time in Ranges  Very High: >250 mg/dL --- 55%  High: 181 - 250 mg/dL --- 32%  Target Range: 70 - 180 mg/dL --- 13%  Low: 54 - 69 mg/dL --- 0%  Very Low: <54 mg/dL --- 0%      Eye exam in the last 12 months? No  Foot exam: up to date    Today's Vitals: There were no vitals taken for this visit.  ----------------      I spent 6 minutes with this patient today. All changes were made via collaborative practice agreement with Abner Elmore MD. A copy of the visit note was provided to the patient's provider(s).    A summary of these recommendations was declined by the patient.    Ivette Rodriguez, PharmD, BCACP  Medication Therapy Management Pharmacist  [facility]  [phone]      Telemedicine Visit Details  The patient's medications can be safely assessed via a telemedicine encounter.  Type of service:  Telephone visit  Originating Location (pt. Location): Home    Distant Location (provider location):  On-site  Start Time:  11:36 AM  End Time: 11:42 AM     Medication Therapy Recommendations  Type 2 diabetes mellitus with hyperglycemia, with long-term current use of insulin (H)    Current Medication: LANTUS SOLOSTAR 100 UNIT/ML soln   Rationale: Dose too low - Dosage too low - Effectiveness   Recommendation: Increase Dose   Status: Accepted per CPA          Current Medication: dulaglutide (TRULICITY) 1.5 MG/0.5ML pen (Discontinued)   Rationale: Dose too low - Dosage too low - Effectiveness   Recommendation: Increase Dose - Trulicity 3 MG/0.5ML Sopn   Status: Accepted per CPA

## 2024-10-31 ENCOUNTER — VIRTUAL VISIT (OUTPATIENT)
Dept: PHARMACY | Facility: PHYSICIAN GROUP | Age: 74
End: 2024-10-31
Payer: COMMERCIAL

## 2024-10-31 DIAGNOSIS — Z79.4 TYPE 2 DIABETES MELLITUS WITH HYPERGLYCEMIA, WITH LONG-TERM CURRENT USE OF INSULIN (H): Primary | ICD-10-CM

## 2024-10-31 DIAGNOSIS — E11.65 TYPE 2 DIABETES MELLITUS WITH HYPERGLYCEMIA, WITH LONG-TERM CURRENT USE OF INSULIN (H): Primary | ICD-10-CM

## 2024-10-31 PROCEDURE — 99606 MTMS BY PHARM EST 15 MIN: CPT | Mod: 93 | Performed by: PHARMACIST

## 2024-10-31 NOTE — PATIENT INSTRUCTIONS
Recommendations from today's MTM visit:                                                         Next Tuesday (11/5) use TWO Trulicity 1.5 mg pens on the same day. Then  and switch to the 3 mg Trulicity pen once a week.   Continue Lantus 48 units every evening    Follow-up: Return in 3 weeks (on 11/21/2024) for diabetes medication management, with me, using a phone visit at 11:30 AM .    It was great speaking with you today.  I value your experience and would be very thankful for your time in providing feedback in our clinic survey. In the next few days, you may receive an email or text message from [employer] with a link to a survey related to your clinical pharmacist.    To schedule another MTM appointment, please call [phone].    My Clinical Pharmacist's contact information:                                                      Please feel free to contact me with any questions or concerns you have.      Ivette Rodriguez, PharmD, BCACP  Medication Therapy Management Pharmacist  [facility]  [phone]

## 2024-10-31 NOTE — PROGRESS NOTES
Medication Therapy Management (MTM) Encounter    ASSESSMENT:                            Medication Adherence/Access: No issues identified.  _  Diabetes  A1c is not at goal of <8% but it did improve by 2% from previous.   Patient is not meeting goal of > 50% time in target with continuous glucose monitoring.   He would benefit from increasing Trulicity as previously recommended. He could give two injections of 1.5 mg pens to use up current supply then start using 3 mg pens. Continue current basal insulin dose for now.  __________________________  PLAN:                            Next Tuesday (11/5) use TWO Trulicity 1.5 mg pens on the same day. Then  and switch to the 3 mg Trulicity pen once a week.   Continue Lantus 48 units every evening    Follow-up: Return in 3 weeks (on 11/21/2024) for diabetes medication management, with me, using a phone visit at 11:30 AM .    SUBJECTIVE/OBJECTIVE:                          Eric Cordoba is a 74 year old male seen for a follow-up visit.       Reason for visit: Diabetes follow-up.    Allergies/ADRs: Reviewed in chart  Past Medical History: Reviewed in chart  Tobacco: He reports that he has quit smoking. His smoking use included cigarettes. He has never used smokeless tobacco.  Alcohol: none    Medication Adherence/Access:   Patient uses pill box(es).  Patient takes medications 2 time(s) per day.   Per patient, he is good about remembering to take his pills everyday but admits he misses insuln dose maybe 1 time per week. Denies missing any insulin doses this week.     Diabetes   Lantus 48 units once daily in the evening  Trulicity 1.5 mg once a week (Tuesdays) - not increased yet  Aspirin 81mg daily  He has not increased Trulicity to 3 mg yet because he still had 1.5 mg pens at home. He has 2 of the 1.5 mg pens left. He is okay with giving two shots of this on the same day next week. He has increased Lantus to 48 units daily.   Patient is not experiencing side effects.    Medication History:  - metformin- stopped due to kidney function  Current diabetes symptoms: numbness/tingling    Diet/Exercise: he is not watching carbs but does avoid sugar, he does think something that would help appetite may be helpful for him, he has been better about getting more protein    A1c: 10/21/2024- 10% (down from 12.1%)     Blood sugar monitoring: Continuous Glucose Monitor   Manuel 3 Continuous Glucose Monitoring Results:   Name: Italo López  YOB: 1950  Report Period: 10/04/2024 - 10/31/2024 (28 days)  Generated: 10/31/2024  % Time CGM Active: 98%  Glucose Statistics and Targets  Average Glucose: 291 mg/dL  Glucose Management Indicator (GMI): 10.3%  Glucose Variability (%CV): 23.3%  Target Range: 70 - 180 mg/dL  Time in Ranges  Very High: >250 mg/dL --- 71%  High: 181 - 250 mg/dL --- 23%  Target Range: 70 - 180 mg/dL --- 6%  Low: 54 - 69 mg/dL --- 0%  Very Low: <54 mg/dL --- 0%    Eye exam in the last 12 months? No  Foot exam: up to date    Today's Vitals: There were no vitals taken for this visit.  ----------------      I spent 5 minutes with this patient today. All changes were made via collaborative practice agreement with Abner Elmore MD. A copy of the visit note was provided to the patient's provider(s).    A summary of these recommendations was mailed to the patient.    Ivette Rodriguez, PharmD, BCACP  Medication Therapy Management Pharmacist  [facility]  [phone]      Telemedicine Visit Details  The patient's medications can be safely assessed via a telemedicine encounter.  Type of service:  Telephone visit  Originating Location (pt. Location): Home    Distant Location (provider location):  On-site  Start Time:  11:43 PM   End Time:  11:48 PM     Medication Therapy Recommendations  Type 2 diabetes mellitus with hyperglycemia, with long-term current use of insulin (H)   1 Current Medication: Dulaglutide (TRULICITY) 3 MG/0.5ML SOPN   Current Medication Sig:  Inject 3 mg subcutaneously every 7 days.   Rationale: Does not understand instructions - Adherence - Adherence   Recommendation: Provide Education   Status: Patient Agreed - Adherence/Education   Identified Date: 10/31/2024 Completed Date: 10/31/2024

## 2024-11-21 ENCOUNTER — VIRTUAL VISIT (OUTPATIENT)
Dept: PHARMACY | Facility: PHYSICIAN GROUP | Age: 74
End: 2024-11-21
Payer: COMMERCIAL

## 2024-11-21 DIAGNOSIS — Z79.4 TYPE 2 DIABETES MELLITUS WITH HYPERGLYCEMIA, WITH LONG-TERM CURRENT USE OF INSULIN (H): Primary | ICD-10-CM

## 2024-11-21 DIAGNOSIS — E11.65 TYPE 2 DIABETES MELLITUS WITH HYPERGLYCEMIA, WITH LONG-TERM CURRENT USE OF INSULIN (H): Primary | ICD-10-CM

## 2024-11-21 RX ORDER — DULAGLUTIDE 4.5 MG/.5ML
4.5 INJECTION, SOLUTION SUBCUTANEOUS
COMMUNITY

## 2024-11-21 NOTE — PATIENT INSTRUCTIONS
Recommendations from today's MTM visit:                                                       Trulicity- continue 3 mg weekly for 2 more weeks, then increase to 4.5 mg once a week. Sent order for 4.5 mg pens to pharmacy.     Lantus insulin- continue 48 units every evening. Put your needle in your pillbox to help you remember and keep track of what days you miss the dose.     Sent refill of Manuel sensors to pharmacy    Try to have just 1 pumpkin bar for breakfast and add something with protein    Follow-up: Return in 4 weeks (on 12/19/2024) for diabetes medication management, with me, using a phone visit at 1:00 pm .    It was great speaking with you today.  I value your experience and would be very thankful for your time in providing feedback in our clinic survey. In the next few days, you may receive an email or text message from [employer] with a link to a survey related to your clinical pharmacist.    To schedule another MTM appointment, please call [phone].    My Clinical Pharmacist's contact information:                                                      Please feel free to contact me with any questions or concerns you have.      Ivette Rodriguez, PharmD, BCACP  Medication Therapy Management Pharmacist  [facility]  [phone]

## 2024-11-21 NOTE — PROGRESS NOTES
Medication Therapy Management (MTM) Encounter    ASSESSMENT:                            Medication Adherence/Access:   Encouraged patient to put insulin needle into his pillbox to help him remember to take dose and keep track of how many doses he misses.   _  Diabetes  A1c is not at goal of <8%.   Patient is not meeting goal of > 50% time in target with continuous glucose monitoring but is slowly improving.  Tolerating Trulicity dose increase well and he would benefit from continuing to increase dose further the next month. Encouraged him to put insulin needle in pillbox to help with remembering dose. May need to decrease Lantus dose or move dose to morning if he starts having more low readings overnight.   ____________________  PLAN:                            Trulicity- continue 3 mg weekly for 2 more weeks, then increase to 4.5 mg once a week. Sent order for 4.5 mg pens to pharmacy.   Lantus insulin- continue 48 units every evening. Put your needle in your pillbox to help you remember and keep track of what days you miss the dose.   Sent refill of Manuel sensors to pharmacy  Try to have just 1 pumpkin bar for breakfast and add something with protein    Follow-up: Return in 4 weeks (on 12/19/2024) for diabetes medication management, with me, using a phone visit at 1:00 pm .    SUBJECTIVE/OBJECTIVE:                          Eric Cordoba is a 74 year old male seen for a follow-up visit.       Reason for visit: Diabetes follow-up.    Allergies/ADRs: Reviewed in chart  Past Medical History: Reviewed in chart  Tobacco: He reports that he has quit smoking. His smoking use included cigarettes. He has never used smokeless tobacco.  Alcohol: none    Medication Adherence/Access:   Patient uses pill box(es).  Patient takes medications 2 time(s) per day.   Per patient, he is good about remembering to take his pills everyday but misses insulin doses at times. Estimates missing 2 doses the last week and 1 dose last  week.    Diabetes   Lantus 48 units once daily in the evening  Trulicity 3 mg once a week (Tuesdays)  Aspirin 81mg daily  He has done 3 weeks of the 3 mg Trulicity dose- he used two of 1.5 mg pens first week then started using 3 mg pens. No issues with GI or other side effects. He does notice that he is not as hungry which is a good thing for him. He estimates he missed 2 doses of insulin this week and 1 dose last week. He does a pillbox for his other medications and is open to putting pen needle in there. His blood sugars last week were much better and the only thing he can think of that was different was having an imitation crab salad every day for lunch. He did have one night of low blood sugar and CGM alarm woke him up. This has not happened since. He has 2 pens of 3 mg Trulicity left. He also needs refill of Manuel sensors.  Medication History:  - metformin- stopped due to kidney function  Current diabetes symptoms: numbness/tingling    Diet/Exercise: less appetite with higher Trulicity dose, he is not watching carbs but tries to avoid a lot of excess sugar, his breakfast routine is not the best- always has 2 pumpkin bars and coffee, he is not willing to give this up completely, he is open to trying to cut back to 1 bar and adding something else, he has been better about getting more protein  A1c: 10/21/2024- 10% (down from 12.1%)     Blood sugar monitoring: Continuous Glucose Monitor   Manuel 3 Continuous Glucose Monitoring Results:   Name: Italo López  YOB: 1950  Report Period: 11/08/2024 - 11/21/2024 (14 days)  Generated: 11/21/2024  % Time CGM Active: 97%  Glucose Statistics and Targets  Average Glucose: 244 mg/dL  Glucose Management Indicator (GMI): 9.1%  Glucose Variability (%CV): 29.3%  Target Range: 70 - 180 mg/dL  Time in Ranges  Very High: >250 mg/dL --- 47%  High: 181 - 250 mg/dL --- 33%  Target Range: 70 - 180 mg/dL --- 20%  Low: 54 - 69 mg/dL --- 0%  Very Low: <54 mg/dL --- 0%    Eye  exam in the last 12 months? No  Foot exam: up to date        Today's Vitals: There were no vitals taken for this visit.  ----------------      I spent 11 minutes with this patient today. All changes were made via collaborative practice agreement with Abner Elmore MD. A copy of the visit note was provided to the patient's provider(s).    A summary of these recommendations was mailed to the patient.    Ivette Rodriguez, PharmD, BCACP  Medication Therapy Management Pharmacist  [facility]  [phone]      Telemedicine Visit Details  The patient's medications can be safely assessed via a telemedicine encounter.  Type of service:  Telephone visit  Originating Location (pt. Location): Home    Distant Location (provider location):  On-site  Start Time:  11:42 AM  End Time:  11:53 AM     Medication Therapy Recommendations  Type 2 diabetes mellitus with hyperglycemia, with long-term current use of insulin (H)   1 Current Medication: Dulaglutide (TRULICITY) 3 MG/0.5ML SOPN   Current Medication Sig: Inject 3 mg subcutaneously every 7 days.   Rationale: Dose too low - Dosage too low - Effectiveness   Recommendation: Increase Dose - Trulicity 4.5 MG/0.5ML Soaj   Status: Accepted per CPA   Identified Date: 11/21/2024 Completed Date: 11/21/2024

## 2024-12-19 ENCOUNTER — VIRTUAL VISIT (OUTPATIENT)
Dept: PHARMACY | Facility: PHYSICIAN GROUP | Age: 74
End: 2024-12-19
Payer: COMMERCIAL

## 2024-12-19 DIAGNOSIS — Z79.4 TYPE 2 DIABETES MELLITUS WITH HYPERGLYCEMIA, WITH LONG-TERM CURRENT USE OF INSULIN (H): Primary | ICD-10-CM

## 2024-12-19 DIAGNOSIS — E11.65 TYPE 2 DIABETES MELLITUS WITH HYPERGLYCEMIA, WITH LONG-TERM CURRENT USE OF INSULIN (H): Primary | ICD-10-CM

## 2024-12-19 NOTE — PROGRESS NOTES
Medication Therapy Management (MTM) Encounter    ASSESSMENT:                            Medication Adherence/Access  See below regarding Trulicity doses provided by pharmacy. May be an issue with supply of 4.5 mg Trulicity pens and will verify this with pharmacy before due for next refill.  _  Diabetes  A1c is not at goal of <8%.   Patient is not meeting goal of > 50% time in target with continuous glucose monitoring but is improving.  Blood sugar increased this week due to using lower Trulicity dose than he should be taking. Pharmacy provided him with separate 3 mg and 1.5 mg Trulicity strengths for unknown reason. Provided education to use one pen of each once a week for full 4.5 mg dose.   ______________________  PLAN:                            Today (12/19) give dose of Trulicity 3 mg pen  Next Tuesday, take one shot of Trulicity 3 mg pen and one shot of Trulicity 1.5 mg pen on the same day for the next 3 weeks  Then, pharmacy should fill the Trulicity 4.5 mg pen for you to use 1 pen once a week  Continue same dose of Lantus insulin 48 units once daily     Follow-up: Return in 19 days (on 1/7/2025) for diabetes medication management, with me, using a phone visit at 2:30 PM .    SUBJECTIVE/OBJECTIVE:                          Eric Cordoba is a 74 year old male seen for a follow-up visit.       Reason for visit: Diabetes follow-up.    Allergies/ADRs: Reviewed in chart  Past Medical History: Reviewed in chart  Tobacco: He reports that he has quit smoking. His smoking use included cigarettes. He has never used smokeless tobacco.  Alcohol: none    Medication Adherence/Access:   Patient uses pill box(es).  Patient takes medications 2 time(s) per day.   Per patient, he is good about remembering to take his pills everyday but misses insulin doses at times.     Diabetes   Lantus 48 units once daily in the evening  Trulicity 4.5 mg once a week (Tuesdays)- took only 1.5 mg this past week  Aspirin 81mg daily    His readings  were improving until the past week when they spiked. The patient has been on Trulicity, a once-a-week for managing blood sugar levels. There seems to have been a mix-up at the pharmacy with the dosage of Trulicity and he received both a box of 3 mg pens and a box of 1.5 mg pens. Verified today that the dose he gave this past Tuesday was just one of the 1.5 mg pens. Prior to this he had been taking 3 mg weekly and the plan was for him to increase to 4.5 mg weekly when he was out of the 3 mg pens.   He has not reported any side effects from the medication. The patient also takes insulin, with a current dosage of 48 units.     Medication History:  - metformin- stopped due to kidney function  Current diabetes symptoms: numbness/tingling    Diet/Exercise: less appetite with higher Trulicity dose, he is not watching carbs but tries to avoid a lot of excess sugar, his breakfast routine is not the best- always has 2 pumpkin bars and coffee, he is not willing to give this up completely, he is open to trying to cut back to 1 bar and adding something else, he has been better about getting more protein  A1c: 10/21/2024- 10% (down from 12.1%)     Blood sugar monitoring: Continuous Glucose Monitor   Manuel 3 Continuous Glucose Monitoring Results:   Name: Italo López  YOB: 1950  Report Period: 11/22/2024 - 12/19/2024 (28 days)  Generated: 12/19/2024  % Time CGM Active: 99%  Glucose Statistics and Targets  Average Glucose: 228 mg/dL  Glucose Management Indicator (GMI): 8.8%  Glucose Variability (%CV): 31.3%  Target Range: 70 - 180 mg/dL  Time in Ranges  Very High: >250 mg/dL --- 34%  High: 181 - 250 mg/dL --- 41%  Target Range: 70 - 180 mg/dL --- 25%  Low: 54 - 69 mg/dL --- 0%  Very Low: <54 mg/dL --- 0%    Eye exam in the last 12 months? No  Foot exam: up to date        Today's Vitals: There were no vitals taken for this visit.  ----------------      I spent 8 minutes with this patient today. All changes were made  via collaborative practice agreement with Abner Elmore MD.     A summary of these recommendations was mailed to the patient.    Ivette Rodriguez, PharmD, BCACP  Medication Therapy Management Pharmacist  [facility]  [phone]      Telemedicine Visit Details  The patient's medications can be safely assessed via a telemedicine encounter.  Type of service:  Telephone visit  Originating Location (pt. Location): Home    Distant Location (provider location):  On-site  Start Time:  1:02 PM  End Time:  1:10 PM      Medication Therapy Recommendations  Type 2 diabetes mellitus with hyperglycemia, with long-term current use of insulin (H)   1 Current Medication: Dulaglutide (TRULICITY) 4.5 MG/0.5ML SOAJ   Current Medication Sig: Inject 4.5 mg subcutaneously every 7 days.   Rationale: Does not understand instructions - Adherence - Adherence   Recommendation: Provide Education   Status: Patient Agreed - Adherence/Education   Identified Date: 12/19/2024 Completed Date: 12/19/2024

## 2024-12-19 NOTE — PATIENT INSTRUCTIONS
Recommendations from today's MTM visit:                                                       Today (12/19) give dose of Trulicity 3 mg pen    Next Tuesday, take one shot of Trulicity 3 mg pen and one shot of Trulicity 1.5 mg pen on the same day for the next 3 weeks    Then, pharmacy should fill the Trulicity 4.5 mg pen for you to use 1 pen once a week    Continue same dose of Lantus insulin 48 units once daily     Follow-up: Return in 19 days (on 1/7/2025) for diabetes medication management, with me, using a phone visit at 2:30 PM .    It was great speaking with you today.  I value your experience and would be very thankful for your time in providing feedback in our clinic survey. In the next few days, you may receive an email or text message from [employer] with a link to a survey related to your clinical pharmacist.    To schedule another MTM appointment, please call [phone].    My Clinical Pharmacist's contact information:                                                      Please feel free to contact me with any questions or concerns you have.      Ivette Rodriguez, PharmD, BCACP  Medication Therapy Management Pharmacist  [facility]  [phone]

## 2024-12-31 ENCOUNTER — LAB REQUISITION (OUTPATIENT)
Dept: LAB | Facility: CLINIC | Age: 74
End: 2024-12-31
Payer: COMMERCIAL

## 2024-12-31 DIAGNOSIS — R55 SYNCOPE AND COLLAPSE: ICD-10-CM

## 2024-12-31 LAB
ALBUMIN SERPL BCG-MCNC: 3.9 G/DL (ref 3.5–5.2)
ALP SERPL-CCNC: 99 U/L (ref 40–150)
ALT SERPL W P-5'-P-CCNC: 17 U/L (ref 0–70)
ANION GAP SERPL CALCULATED.3IONS-SCNC: 14 MMOL/L (ref 7–15)
AST SERPL W P-5'-P-CCNC: 18 U/L (ref 0–45)
BILIRUB SERPL-MCNC: 0.4 MG/DL
BUN SERPL-MCNC: 20.7 MG/DL (ref 8–23)
CALCIUM SERPL-MCNC: 9.2 MG/DL (ref 8.8–10.4)
CHLORIDE SERPL-SCNC: 100 MMOL/L (ref 98–107)
CREAT SERPL-MCNC: 2.08 MG/DL (ref 0.67–1.17)
EGFRCR SERPLBLD CKD-EPI 2021: 33 ML/MIN/1.73M2
GLUCOSE SERPL-MCNC: 179 MG/DL (ref 70–99)
HCO3 SERPL-SCNC: 26 MMOL/L (ref 22–29)
POTASSIUM SERPL-SCNC: 3.8 MMOL/L (ref 3.4–5.3)
PROT SERPL-MCNC: 6.7 G/DL (ref 6.4–8.3)
SODIUM SERPL-SCNC: 140 MMOL/L (ref 135–145)

## 2025-01-07 ENCOUNTER — HOSPITAL ENCOUNTER (EMERGENCY)
Facility: HOSPITAL | Age: 75
Discharge: HOME | End: 2025-01-07
Attending: EMERGENCY MEDICINE
Payer: COMMERCIAL

## 2025-01-07 ENCOUNTER — APPOINTMENT (OUTPATIENT)
Dept: RADIOLOGY | Facility: HOSPITAL | Age: 75
End: 2025-01-07
Attending: EMERGENCY MEDICINE
Payer: COMMERCIAL

## 2025-01-07 VITALS
BODY MASS INDEX: 31.67 KG/M2 | DIASTOLIC BLOOD PRESSURE: 79 MMHG | TEMPERATURE: 97.8 F | WEIGHT: 209 LBS | OXYGEN SATURATION: 98 % | HEART RATE: 76 BPM | RESPIRATION RATE: 16 BRPM | HEIGHT: 68 IN | SYSTOLIC BLOOD PRESSURE: 161 MMHG

## 2025-01-07 DIAGNOSIS — I51.3 MURAL THROMBUS OF LEFT VENTRICLE: ICD-10-CM

## 2025-01-07 LAB
ANION GAP SERPL CALCULATED.3IONS-SCNC: 10 MMOL/L (ref 7–15)
APTT PPP: 26 SECONDS (ref 22–38)
BASOPHILS # BLD AUTO: 0.1 10E3/UL (ref 0–0.2)
BASOPHILS NFR BLD AUTO: 1 %
BUN SERPL-MCNC: 25.2 MG/DL (ref 8–23)
CALCIUM SERPL-MCNC: 9.1 MG/DL (ref 8.8–10.4)
CHLORIDE SERPL-SCNC: 104 MMOL/L (ref 98–107)
CREAT SERPL-MCNC: 2.05 MG/DL (ref 0.67–1.17)
EGFRCR SERPLBLD CKD-EPI 2021: 33 ML/MIN/1.73M2
EOSINOPHIL # BLD AUTO: 0.3 10E3/UL (ref 0–0.7)
EOSINOPHIL NFR BLD AUTO: 4 %
ERYTHROCYTE [DISTWIDTH] IN BLOOD BY AUTOMATED COUNT: 12.6 % (ref 10–15)
GLUCOSE SERPL-MCNC: 161 MG/DL (ref 70–99)
HCO3 SERPL-SCNC: 26 MMOL/L (ref 22–29)
HCT VFR BLD AUTO: 40.4 % (ref 40–53)
HGB BLD-MCNC: 14 G/DL (ref 13.3–17.7)
HOLD SPECIMEN: NORMAL
IMM GRANULOCYTES # BLD: 0 10E3/UL
IMM GRANULOCYTES NFR BLD: 1 %
INR PPP: 1.02 (ref 0.85–1.15)
LYMPHOCYTES # BLD AUTO: 2.2 10E3/UL (ref 0.8–5.3)
LYMPHOCYTES NFR BLD AUTO: 27 %
MAGNESIUM SERPL-MCNC: 2.2 MG/DL (ref 1.7–2.3)
MCH RBC QN AUTO: 32.4 PG (ref 26.5–33)
MCHC RBC AUTO-ENTMCNC: 34.7 G/DL (ref 31.5–36.5)
MCV RBC AUTO: 94 FL (ref 78–100)
MONOCYTES # BLD AUTO: 0.7 10E3/UL (ref 0–1.3)
MONOCYTES NFR BLD AUTO: 9 %
NEUTROPHILS # BLD AUTO: 4.8 10E3/UL (ref 1.6–8.3)
NEUTROPHILS NFR BLD AUTO: 59 %
NRBC # BLD AUTO: 0 10E3/UL
NRBC BLD AUTO-RTO: 0 /100
NT-PROBNP SERPL-MCNC: 2144 PG/ML (ref 0–900)
PLATELET # BLD AUTO: 177 10E3/UL (ref 150–450)
POTASSIUM SERPL-SCNC: 4.4 MMOL/L (ref 3.4–5.3)
RBC # BLD AUTO: 4.32 10E6/UL (ref 4.4–5.9)
SODIUM SERPL-SCNC: 140 MMOL/L (ref 135–145)
WBC # BLD AUTO: 8.2 10E3/UL (ref 4–11)

## 2025-01-07 PROCEDURE — 93005 ELECTROCARDIOGRAM TRACING: CPT | Performed by: EMERGENCY MEDICINE

## 2025-01-07 PROCEDURE — 71046 X-RAY EXAM CHEST 2 VIEWS: CPT

## 2025-01-07 PROCEDURE — 83880 ASSAY OF NATRIURETIC PEPTIDE: CPT | Performed by: EMERGENCY MEDICINE

## 2025-01-07 PROCEDURE — 85610 PROTHROMBIN TIME: CPT | Performed by: EMERGENCY MEDICINE

## 2025-01-07 PROCEDURE — 99285 EMERGENCY DEPT VISIT HI MDM: CPT | Mod: 25

## 2025-01-07 PROCEDURE — 85014 HEMATOCRIT: CPT | Performed by: EMERGENCY MEDICINE

## 2025-01-07 PROCEDURE — 83735 ASSAY OF MAGNESIUM: CPT | Performed by: EMERGENCY MEDICINE

## 2025-01-07 PROCEDURE — 82310 ASSAY OF CALCIUM: CPT | Performed by: EMERGENCY MEDICINE

## 2025-01-07 PROCEDURE — 36415 COLL VENOUS BLD VENIPUNCTURE: CPT | Performed by: EMERGENCY MEDICINE

## 2025-01-07 PROCEDURE — 85004 AUTOMATED DIFF WBC COUNT: CPT | Performed by: EMERGENCY MEDICINE

## 2025-01-07 PROCEDURE — 80048 BASIC METABOLIC PNL TOTAL CA: CPT | Performed by: EMERGENCY MEDICINE

## 2025-01-07 PROCEDURE — 85730 THROMBOPLASTIN TIME PARTIAL: CPT | Performed by: EMERGENCY MEDICINE

## 2025-01-07 ASSESSMENT — COLUMBIA-SUICIDE SEVERITY RATING SCALE - C-SSRS
1. IN THE PAST MONTH, HAVE YOU WISHED YOU WERE DEAD OR WISHED YOU COULD GO TO SLEEP AND NOT WAKE UP?: NO
6. HAVE YOU EVER DONE ANYTHING, STARTED TO DO ANYTHING, OR PREPARED TO DO ANYTHING TO END YOUR LIFE?: NO
2. HAVE YOU ACTUALLY HAD ANY THOUGHTS OF KILLING YOURSELF IN THE PAST MONTH?: NO

## 2025-01-07 ASSESSMENT — ACTIVITIES OF DAILY LIVING (ADL)
ADLS_ACUITY_SCORE: 41
ADLS_ACUITY_SCORE: 41

## 2025-01-07 NOTE — ED TRIAGE NOTES
"Patient was told to come to the ED today \" stated was at [facility] \" and did echocardiogram and was told \" mobile clot left ventricle \" .  Patient stated fainted last week. HX:  mini stroke      Triage Assessment (Adult)       Row Name 01/07/25 1413          Triage Assessment    Airway WDL WDL        Respiratory WDL    Respiratory WDL WDL        Skin Circulation/Temperature WDL    Skin Circulation/Temperature WDL WDL        Cardiac WDL    Cardiac WDL WDL        Peripheral/Neurovascular WDL    Peripheral Neurovascular WDL WDL        Cognitive/Neuro/Behavioral WDL    Cognitive/Neuro/Behavioral WDL WDL                     "

## 2025-01-07 NOTE — ED NOTES
Pt instructed to come in by PMD , had ECHO that showed possible blood clot in heart. Denies sx, states had fainting episode last week.

## 2025-01-07 NOTE — ED PROVIDER NOTES
EMERGENCY DEPARTMENT ENCOUNTER      NAME: Eric Cordoba  AGE: 74 year old male  YOB: 1950  MRN: [id number]  EVALUATION DATE & TIME: No admission date for patient encounter.    PCP: Abner Elmore    ED PROVIDER: Moni Chauhan M.D.      Chief Complaint   Patient presents with    posible clot    cardiac evaluation     FINAL IMPRESSION:  1. Mural thrombus of left ventricle      ED COURSE & MEDICAL DECISION MAKING:    Pertinent Labs & Imaging studies reviewed. (See chart for details)  ED Course as of 01/07/25 2134 Tue Jan 07, 2025   1278 Patient is a very pleasant 74-year-old male who comes in today for a evaluation after he had a syncopal episode 9 days ago.  He denies passing out but does not really remember what happened9.  As part of the workup after that Dr. Thompson within [facility] at [place] ordered an echocardiogram and that was read today to find a probable remote clot in the left ventricle with an EF of 35 to 40% and a wall motion abnormality patient has a history of a mini stroke a few years ago but denies any recent strokelike symptoms.  He does report fatigue but that is nothing new.  His daughter says his blood pressure was low the day he passed out and now that he is been drinking more fluids he has had no further issues.  He is not on any blood thinners.  We will check blood work and get a chest x-ray on the patient.  I am going to see if we can get a copy of the echocardiogram and I will discuss the case with cardiology for further recommendations.  Certainly with lower EF and syncopal episode and possible clot in the left ventricle that can be quite concerning but patient is otherwise asymptomatic at this time.   1528 I discussed the case with Dr. Del Cid with our cardiology group.  He was able to look back and see echocardiogram from over 10 years ago where he had the wall motion abnormality and the reduced ejection fraction.  He says the thrombus is probably old but  at this point we would just anticoagulate the patient and he can follow-up with cardiology either through [facility] or through our clinic.  I will discuss this with the patient and we can get him discharged.   1529 I discussed the results with the patient and Family.  I think if his labs look okay he can discharge home.  My concern with the labs is pretty low at this point since he is not having any acute symptoms and we have found evidence that a lot of this is probably old.  I will send a prescription to his pharmacy for Eliquis.   1614 Creatinine is at the patient's baseline.  BNP is elevated at 2009 on an old 1 to compare to.  He is in a follow-up in a few days and has no symptoms of congestive heart failure at this time.  Given his known ejection fraction is no surprise that his BNP was little bit elevated.  I think we can work getting the patient discharged home.       Medical Decision Making    History:  Supplemental history from: Patient's daughter  External Record(s) reviewed: I reviewed patient's visit from 8/5/2024 with the pharmacist.  At that time his A1c was not at goal and his home blood sugars are not at goal.  They recommended continuous glucose monitoring to get a better understanding of his fasting and postprandial readings to help adjust medications and diet changes.  Patient has a history of heart failure and is on appropriate ACE/ARB and on appropriate beta-blocker.  Patient was started on Trulicity 0.75 mg injection once a week and continued Januvia until they started Trulicity.  They were continuing Lantus insulin 50 units once a day in the evening.  See echocardiogram read in picture form at the bottom of this note.    Work Up:  Emergent/Severe conditions considered and evaluated for: ACS, pericarditis, myocarditis, pneumothorax, pneumonia, esophageal rupture, Electrolyte abnormality, hyperglycemia, hypoglycemia, acidosis, anemia, thrombocytopenia, renal failure, dehydration  I independently  "reviewed and interpreted EKG and chest x-ray which showed no pneumothorax. See full radiology report for all details.  In addition to work up documented, I considered the following work up: None  Medications given that require intensive monitoring for toxicity: None    External consultation:  Discussion of management with another provider: Cardiology    Complicating factors:  Care impacted by chronic illness: Hypertension, chronic kidney disease, heart failure, hyperlipidemia, neuropathy, diabetes    Disposition considerations: Consider admission to the hospital but after discussion with cardiology we felt the patient could successfully discharge home with anticoagulation  Prescriptions considered/prescribed: Eliquis        At the conclusion of the encounter I discussed  the results of all of the tests and the disposition with patient.   All questions were answered.  The patient acknowledged understanding and was involved in the decision making regarding the overall care plan.      I discussed with patient the utility, limitations and findings of the exam/interventions/studies done during this visit as well as the list of differential diagnosis and symptoms to monitor/return to ER for.  Additional verbal discharge instructions were provided.     MEDICATIONS GIVEN IN THE EMERGENCY:  Medications - No data to display    NEW PRESCRIPTIONS STARTED AT TODAY'S ER VISIT  Discharge Medication List as of 1/7/2025  4:57 PM        START taking these medications    Details   apixaban ANTICOAGULANT (ELIQUIS ANTICOAGULANT) 5 MG tablet Take 1 tablet (5 mg) by mouth 2 times daily., Disp-60 tablet, R-0, E-Prescribe                =================================================================    HPI    Triage Note: Patient was told to come to the ED today \" stated was at [facility] \" and did echocardiogram and was told \" mobile clot left ventricle \" .  Patient stated fainted last week. HX:  mini stroke      Triage Assessment " (Adult)       Row Name 01/07/25 1413          Triage Assessment    Airway WDL WDL        Respiratory WDL    Respiratory WDL WDL        Skin Circulation/Temperature WDL    Skin Circulation/Temperature WDL WDL        Cardiac WDL    Cardiac WDL WDL        Peripheral/Neurovascular WDL    Peripheral Neurovascular WDL WDL        Cognitive/Neuro/Behavioral WDL    Cognitive/Neuro/Behavioral WDL WDL                       Eric Cordoba is a 74 year old male who presents for evaluation of an abnormal echocardiogram that showed a probable remote clot of the left ventricle with an EF of 35 to 40% and a resting wall motion abnormality in the left ventricle.    PAST MEDICAL HISTORY:  No past medical history on file.    PAST SURGICAL HISTORY:  No past surgical history on file.    CURRENT MEDICATIONS:    No current facility-administered medications for this encounter.    Current Outpatient Medications:     apixaban ANTICOAGULANT (ELIQUIS ANTICOAGULANT) 5 MG tablet, Take 1 tablet (5 mg) by mouth 2 times daily., Disp: 60 tablet, Rfl: 0    aspirin (ASA) 81 MG chewable tablet, Take 81 mg by mouth daily, Disp: , Rfl:     carvedilol (COREG) 12.5 MG tablet, Take 12.5 mg by mouth 2 times daily, Disp: , Rfl:     Dulaglutide (TRULICITY) 4.5 MG/0.5ML SOAJ, Inject 4.5 mg subcutaneously every 7 days., Disp: , Rfl:     furosemide (LASIX) 20 MG tablet, Take 40 mg by mouth daily, Disp: , Rfl:     gabapentin (NEURONTIN) 300 MG capsule, Take 600 mg by mouth every morning And 300 mg evening, Disp: , Rfl:     LANTUS SOLOSTAR 100 UNIT/ML soln, Inject 48 Units subcutaneously daily., Disp: , Rfl:     losartan (COZAAR) 50 MG tablet, Take 25 mg by mouth daily, Disp: , Rfl:     simvastatin (ZOCOR) 40 MG tablet, Take 40 mg by mouth daily, Disp: , Rfl:     ALLERGIES:  Allergies   Allergen Reactions    Lisinopril Cough    Propoxyphene Unknown and Hives       FAMILY HISTORY:  No family history on file.    SOCIAL HISTORY:   Social History     Socioeconomic  "History    Marital status:    Tobacco Use    Smoking status: Former     Types: Cigarettes    Smokeless tobacco: Never   Substance and Sexual Activity    Alcohol use: Not Currently     Social Drivers of Health      Received from [facility]    Financial Resource Strain    Received from [facility]    Social Connections       PHYSICAL EXAM    VITAL SIGNS: BP (!) 161/79   Pulse 76   Temp 97.8  F (36.6  C) (Oral)   Resp 16   Ht 1.727 m (5' 8\")   Wt 94.8 kg (209 lb)   SpO2 98%   BMI 31.78 kg/m     GENERAL: Awake, alert, answering questions appropriately, no acute distress  SPEECH:  Easy to understand speech, Normal volume and hugh  PULMONARY: No respiratory distress, Lungs clear to auscultation bilaterally  CARDIOVASCULAR: Regular rate and rhythm, Distal pulses present and normal.  ABDOMINAL: Soft, Nondistended, Nontender, No rebound or guarding, No palpable masses  EXTREMITIES: No lower extremity edema.  PSYCH: Normal mood and affect     LAB:  All pertinent labs reviewed and interpreted.  Results for orders placed or performed during the hospital encounter of 01/07/25   XR Chest 2 Views    Impression    IMPRESSION:     Lungs are clear. No evidence of pneumonia. No pleural effusions or pneumothorax. Normal pulmonary vascularity. Nonenlarged cardiac silhouette.   Result Value Ref Range    INR 1.02 0.85 - 1.15   Partial thromboplastin time   Result Value Ref Range    aPTT 26 22 - 38 Seconds   Basic metabolic panel   Result Value Ref Range    Sodium 140 135 - 145 mmol/L    Potassium 4.4 3.4 - 5.3 mmol/L    Chloride 104 98 - 107 mmol/L    Carbon Dioxide (CO2) 26 22 - 29 mmol/L    Anion Gap 10 7 - 15 mmol/L    Urea Nitrogen 25.2 (H) 8.0 - 23.0 mg/dL    Creatinine 2.05 (H) 0.67 - 1.17 mg/dL    GFR Estimate 33 (L) >60 mL/min/1.73m2    Calcium 9.1 8.8 - 10.4 mg/dL    Glucose 161 (H) 70 - 99 mg/dL   Result Value Ref Range    Magnesium 2.2 1.7 - 2.3 " mg/dL   Nt probnp inpatient (BNP)   Result Value Ref Range    N terminal Pro BNP Inpatient 2,144 (H) 0 - 900 pg/mL   Extra Red Top Tube   Result Value Ref Range    Hold Specimen JIC    CBC with platelets and differential   Result Value Ref Range    WBC Count 8.2 4.0 - 11.0 10e3/uL    RBC Count 4.32 (L) 4.40 - 5.90 10e6/uL    Hemoglobin 14.0 13.3 - 17.7 g/dL    Hematocrit 40.4 40.0 - 53.0 %    MCV 94 78 - 100 fL    MCH 32.4 26.5 - 33.0 pg    MCHC 34.7 31.5 - 36.5 g/dL    RDW 12.6 10.0 - 15.0 %    Platelet Count 177 150 - 450 10e3/uL    % Neutrophils 59 %    % Lymphocytes 27 %    % Monocytes 9 %    % Eosinophils 4 %    % Basophils 1 %    % Immature Granulocytes 1 %    NRBCs per 100 WBC 0 <1 /100    Absolute Neutrophils 4.8 1.6 - 8.3 10e3/uL    Absolute Lymphocytes 2.2 0.8 - 5.3 10e3/uL    Absolute Monocytes 0.7 0.0 - 1.3 10e3/uL    Absolute Eosinophils 0.3 0.0 - 0.7 10e3/uL    Absolute Basophils 0.1 0.0 - 0.2 10e3/uL    Absolute Immature Granulocytes 0.0 <=0.4 10e3/uL    Absolute NRBCs 0.0 10e3/uL       RADIOLOGY:  XR Chest 2 Views   Final Result   IMPRESSION:       Lungs are clear. No evidence of pneumonia. No pleural effusions or pneumothorax. Normal pulmonary vascularity. Nonenlarged cardiac silhouette.            EKG:    Date and time: January 7, 2025 at 1417  Rate: 80 bpm  Rhythm: Normal sinus rhythm  SD interval: 194 ms  QRS interval: 118 ms  QT/QTc: 424/489 ms  ST changes or T wave changes: No acute ST or T wave abnormalities  Change from prior ECG: No significant change from prior  I have independently reviewed and interpreted this EKG.     Moni Chauhan M.D.  Emergency Medicine  [facility] EMERGENCY DEPARTMENT  [address] [id number] [phone]  Dept: [phone]           Moni Chauhan MD  01/07/25 6843

## 2025-01-07 NOTE — DISCHARGE INSTRUCTIONS
You were seen in the Emergency Department today for evaluation of an abnormal echocardiogram.  Your lab work showed no cause of your symptoms. Your imaging studies showed no significant cause of your symptoms.  We were able to find an old echocardiogram that shows that the decreased ejection fraction and wall motion abnormalities are not new.  The thrombus is likely also old but because you have it we should put you on blood thinners.  You were prescribed Eliquis. You should take all medications as prescribed.  Follow up with cardiology as an outpatient for further recommendations and management.  Return if you have new or worsening symptoms.

## 2025-01-08 LAB
ATRIAL RATE - MUSE: 80 BPM
DIASTOLIC BLOOD PRESSURE - MUSE: NORMAL MMHG
INTERPRETATION ECG - MUSE: NORMAL
P AXIS - MUSE: 71 DEGREES
PR INTERVAL - MUSE: 194 MS
QRS DURATION - MUSE: 118 MS
QT - MUSE: 424 MS
QTC - MUSE: 489 MS
R AXIS - MUSE: 69 DEGREES
SYSTOLIC BLOOD PRESSURE - MUSE: NORMAL MMHG
T AXIS - MUSE: 103 DEGREES
VENTRICULAR RATE- MUSE: 80 BPM

## 2025-01-13 ENCOUNTER — OFFICE VISIT (OUTPATIENT)
Dept: CARDIOLOGY | Facility: CLINIC | Age: 75
End: 2025-01-13
Attending: EMERGENCY MEDICINE
Payer: COMMERCIAL

## 2025-01-13 VITALS
RESPIRATION RATE: 16 BRPM | WEIGHT: 208 LBS | DIASTOLIC BLOOD PRESSURE: 80 MMHG | HEIGHT: 69 IN | SYSTOLIC BLOOD PRESSURE: 148 MMHG | BODY MASS INDEX: 30.81 KG/M2 | HEART RATE: 64 BPM

## 2025-01-13 DIAGNOSIS — I25.118 CORONARY ARTERY DISEASE OF NATIVE ARTERY OF NATIVE HEART WITH STABLE ANGINA PECTORIS: ICD-10-CM

## 2025-01-13 DIAGNOSIS — I51.3 MURAL THROMBUS OF LEFT VENTRICLE: ICD-10-CM

## 2025-01-13 DIAGNOSIS — I42.9 SECONDARY CARDIOMYOPATHY (H): Primary | ICD-10-CM

## 2025-01-13 DIAGNOSIS — I10 BENIGN ESSENTIAL HYPERTENSION: ICD-10-CM

## 2025-01-13 PROCEDURE — 99204 OFFICE O/P NEW MOD 45 MIN: CPT | Performed by: INTERNAL MEDICINE

## 2025-01-13 PROCEDURE — G2211 COMPLEX E/M VISIT ADD ON: HCPCS | Performed by: INTERNAL MEDICINE

## 2025-01-13 NOTE — LETTER
1/13/2025    Abner Elmore MD  [address] [place]  [place] [address]    RE: Eric Cordoba       Dear Colleague,     I had the pleasure of seeing Eric Cordoba in the [facility].    [facility]   [address]  [place] [address]   [email or web address]   OFFICE: [phone]     CARDIOLOGY CLINIC NOTE     Thank you, Abner Mishra, for asking the [facility] team to see Mr. 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 LVEF recently 35-40% with anterior and apical infarct in LAD distribution.   Coronary artery disease with history of a seemingly large, but silent, MI involving the LAD. Currently with stable angina (VALENZUELA).   Left ventricular apical thrombus - recently started on eliquis.  History of CVA - with residual memory impairment (no recollection of his heart history and no memory of discussing his heart history with Dr. Alves in 2021 as documented in Dr. Alves's note).  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 Very poorly controlled.  Overall he admits to little to no motivation to reduce consumption of sweets.     Plan:  Continue eliquis. Can discontinue aspirin  Continue carvedilol, losartan, and simvastatin. With home blood pressure around 100 mmHg, I will not increase his GDMT.   Cardiac MRI with contrast and stress to evaluate infarct vs ischemia and size of thrombus.   Follow up in about 3 months.    The longitudinal plan of care for the diagnosis(es)/condition(s) as documented were addressed during this visit. Due to the added complexity in care, I will continue to support Eric in the subsequent management and with ongoing continuity of care.         History of Present Illness    Mr. Eric Cordoba is a 74 year old male who presents for evaluation of a newly identified left ventricular apical thrombus.     Mr. Cordoba has had an ischemic cardiomyopathy that was first identified in 2017 that was due presumably to a silent MI. His LVEF on diagnosis was 25%. Due to presence of infarct without ischemia on stress testing and lack of anginal symptoms revascularization was not performed. His EF increased to 45-50% by 2020. He was last seen by cardiology in 2021. He recently had an echo performed that showed a decline in his LVEF and an apical thrombus was identified.     He reports mild VALENZUELA but most of his activity is limited by neuropathy in his feet and bilateral knee pain. He denies chest pain. His diet is loaded with sweets and his diabetes is poorly controlled. He has no recollection or knowledge of his heart history and does not recall any prior visits with cardiology in the past.       Other than noted above, Mr. Cordoba denies any chest pain/pressure/tightness, shortness of breath at rest or with exertion, light headedness/dizziness, pre-syncope, syncope, lower extremity swelling, palpitations, paroxysmal nocturnal dyspnea (PND), or orthopnea.     Cardiac Problems and Cardiac Diagnostics     Most Recent Cardiac testing:  ECG dated 1/7/25 (personaly reviewed and interpreted): sinus rhythm with anteroseptal infarct    ECHO (report reviewed):   TTE at [facility] 1/7/25  Technically difficult study due to patient imaging characteristics.  Normal resting left ventricular size, LV systolic function is moderately reduced with an estimated EF 35 to 40%.  Resting regional wall motion abnormalities as outlined.  Grade 1 diastolic dysfunction.  Increased E/E' suggesting increased LV filling pressures  Mild left atrial dilation.  Aortic valve sclerosis.  No evidence of aortic stenosis or insufficiency.  Mitral annular calcification.  Thickened mitral leaflets.  Mild to moderate mitral stenosis; mean  transvalvular gradient 5.0 mmHg.  Mild mitral regurgitation.  Structurally and functionally unremarkable tricuspid and pulmonic valves.  IVC findings suggest right atrial pressure of 0 to 5 mmHg  Probable mobile thrombus identified in left ventricular apex not fully visualized.  Occasional ectopic beats incidentally noted during echocardiographic imaging.  Results called to the ordering provider    TTE 10/19/2020  CONCLUSIONS:   1. Normal left ventricular size with mildly reduced systolic performance with visually estimated ejection fraction of 45-50%.   2.  Old anterior apical infarction with thinning is present, consistent with LAD disease.   3.  Normal right ventricular size and systolic performance.   4.  Mild senile aortic valve sclerosis without stenosis.   5.  Mild mitral annular calcification with nonspecific leaflet thickening with no stenosis and trace insufficiency.   6.  Mild diastolic dysfunction.   7.  Mild left atrial enlargement.        Medications  Allergies   Current Outpatient Medications   Medication Sig Dispense Refill     apixaban ANTICOAGULANT (ELIQUIS ANTICOAGULANT) 5 MG tablet Take 1 tablet (5 mg) by mouth 2 times daily. 180 tablet 3     carvedilol (COREG) 12.5 MG tablet Take 12.5 mg by mouth 2 times daily       Dulaglutide (TRULICITY) 4.5 MG/0.5ML SOAJ Inject 4.5 mg subcutaneously every 7 days.       furosemide (LASIX) 20 MG tablet Take 40 mg by mouth daily       gabapentin (NEURONTIN) 300 MG capsule Take 600 mg by mouth every morning And 300 mg evening       LANTUS SOLOSTAR 100 UNIT/ML soln Inject 48 Units subcutaneously daily.       losartan (COZAAR) 50 MG tablet Take 50 mg by mouth daily.       simvastatin (ZOCOR) 40 MG tablet Take 40 mg by mouth daily        Allergies   Allergen Reactions     Lisinopril Cough     Propoxyphene Unknown and Hives        Physical Examination Review of Systems   Vitals: BP (!) 148/80 (BP Location: Left arm, Patient Position: Sitting, Cuff Size: Adult Large)   " Pulse 64   Resp 16   Ht 1.753 m (5' 9\")   Wt 94.3 kg (208 lb)   BMI 30.72 kg/m    BMI= Body mass index is 30.72 kg/m .  Wt Readings from Last 3 Encounters:   01/13/25 94.3 kg (208 lb)   01/07/25 94.8 kg (209 lb)     General: pleasant male. No acute distress.   Neck: No JVD  Lungs: clear to auscultation  COR: normal rate, regular rhythm, No murmurs, rubs, or gallops  Extrem: No edema        Please refer above for cardiac ROS details.       Past History     Past Medical History  CAD  DMII  CKD  Hypertension  CVA    Past Surgical History  History reviewed. No pertinent surgical history.    Family History: father had an MI at age 51.    Social History:   Social History     Socioeconomic History     Marital status:      Spouse name: Not on file     Number of children: Not on file     Years of education: Not on file     Highest education level: Not on file   Occupational History     Not on file   Tobacco Use     Smoking status: Former     Types: Cigarettes     Smokeless tobacco: Never   Substance and Sexual Activity     Alcohol use: Not Currently     Drug use: Not on file     Sexual activity: Not on file   Other Topics Concern     Not on file   Social History Narrative     Not on file     Social Drivers of Health     Financial Resource Strain: High Risk (12/30/2021)    Received from [email or web address]    Financial Resource Strain      Difficulty of Paying Living Expenses: Not on file      Difficulty of Paying Living Expenses: Not on file   Food Insecurity: Not on file   Transportation Needs: Not on file   Physical Activity: Not on file   Stress: Not on file   Social Connections: Unknown (12/30/2021)    Received from [email or web address]    Social Connections      Frequency of Communication with Friends and Family: Not on file   Interpersonal Safety: Not on file   Housing Stability: Not on file            Lab Results    Chemistry/lipid CBC Cardiac " Enzymes/BNP/TSH/INR   Lab Results   Component Value Date    CHOL 115 07/18/2024    HDL 26 (L) 07/18/2024    TRIG 188 (H) 07/18/2024    BUN 25.2 (H) 01/07/2025     01/07/2025    CO2 26 01/07/2025    Lab Results   Component Value Date    WBC 8.2 01/07/2025    HGB 14.0 01/07/2025    HCT 40.4 01/07/2025    MCV 94 01/07/2025     01/07/2025    Lab Results   Component Value Date    BNP 1,313 (H) 08/27/2018    TSH 2.41 08/27/2018    INR 1.02 01/07/2025                Thank you for allowing me to participate in the care of your patient.      Sincerely,     Omega Sumner MD     [facility]  cc:   Moni Chauhan MD  [address] [place] [address]

## 2025-01-13 NOTE — PROGRESS NOTES
[facility]   [address]  [place] [address]   [email or web address]   OFFICE: [phone]     CARDIOLOGY CLINIC NOTE     Thank you, Abner Mishra, for asking the [facility] team to see Mr. 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 LVEF recently 35-40% with anterior and apical infarct in LAD distribution.   Coronary artery disease with history of a seemingly large, but silent, MI involving the LAD. Currently with stable angina (VALENZUELA).   Left ventricular apical thrombus - recently started on eliquis.  History of CVA - with residual memory impairment (no recollection of his heart history and no memory of discussing his heart history with Dr. Alves in 2021 as documented in Dr. Alves's note).  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 Very poorly controlled.  Overall he admits to little to no motivation to reduce consumption of sweets.     Plan:  Continue eliquis. Can discontinue aspirin  Continue carvedilol, losartan, and simvastatin. With home blood pressure around 100 mmHg, I will not increase his GDMT.   Cardiac MRI with contrast and stress to evaluate infarct vs ischemia and size of thrombus.   Follow up in about 3 months.    The longitudinal plan of care for the diagnosis(es)/condition(s) as documented were addressed during this visit. Due to the added complexity in care, I will continue to support Eric in the subsequent management and with ongoing continuity of care.         History of Present Illness   Mr. Eric Cordoba is a 74 year old male who presents for evaluation of a newly identified left ventricular apical thrombus.     Mr. Cordoba has had an ischemic cardiomyopathy that was first identified in 2017 that was  due presumably to a silent MI. His LVEF on diagnosis was 25%. Due to presence of infarct without ischemia on stress testing and lack of anginal symptoms revascularization was not performed. His EF increased to 45-50% by 2020. He was last seen by cardiology in 2021. He recently had an echo performed that showed a decline in his LVEF and an apical thrombus was identified.     He reports mild VALENZUELA but most of his activity is limited by neuropathy in his feet and bilateral knee pain. He denies chest pain. His diet is loaded with sweets and his diabetes is poorly controlled. He has no recollection or knowledge of his heart history and does not recall any prior visits with cardiology in the past.       Other than noted above, Mr. Cordoba denies any chest pain/pressure/tightness, shortness of breath at rest or with exertion, light headedness/dizziness, pre-syncope, syncope, lower extremity swelling, palpitations, paroxysmal nocturnal dyspnea (PND), or orthopnea.     Cardiac Problems and Cardiac Diagnostics     Most Recent Cardiac testing:  ECG dated 1/7/25 (personaly reviewed and interpreted): sinus rhythm with anteroseptal infarct    ECHO (report reviewed):   TTE at [facility] 1/7/25  Technically difficult study due to patient imaging characteristics.  Normal resting left ventricular size, LV systolic function is moderately reduced with an estimated EF 35 to 40%.  Resting regional wall motion abnormalities as outlined.  Grade 1 diastolic dysfunction.  Increased E/E' suggesting increased LV filling pressures  Mild left atrial dilation.  Aortic valve sclerosis.  No evidence of aortic stenosis or insufficiency.  Mitral annular calcification.  Thickened mitral leaflets.  Mild to moderate mitral stenosis; mean transvalvular gradient 5.0 mmHg.  Mild mitral regurgitation.  Structurally and functionally unremarkable tricuspid and pulmonic valves.  IVC findings suggest right atrial pressure of 0 to 5 mmHg  Probable mobile thrombus  "identified in left ventricular apex not fully visualized.  Occasional ectopic beats incidentally noted during echocardiographic imaging.  Results called to the ordering provider    TTE 10/19/2020  CONCLUSIONS:   1. Normal left ventricular size with mildly reduced systolic performance with visually estimated ejection fraction of 45-50%.   2.  Old anterior apical infarction with thinning is present, consistent with LAD disease.   3.  Normal right ventricular size and systolic performance.   4.  Mild senile aortic valve sclerosis without stenosis.   5.  Mild mitral annular calcification with nonspecific leaflet thickening with no stenosis and trace insufficiency.   6.  Mild diastolic dysfunction.   7.  Mild left atrial enlargement.        Medications  Allergies   Current Outpatient Medications   Medication Sig Dispense Refill    apixaban ANTICOAGULANT (ELIQUIS ANTICOAGULANT) 5 MG tablet Take 1 tablet (5 mg) by mouth 2 times daily. 180 tablet 3    carvedilol (COREG) 12.5 MG tablet Take 12.5 mg by mouth 2 times daily      Dulaglutide (TRULICITY) 4.5 MG/0.5ML SOAJ Inject 4.5 mg subcutaneously every 7 days.      furosemide (LASIX) 20 MG tablet Take 40 mg by mouth daily      gabapentin (NEURONTIN) 300 MG capsule Take 600 mg by mouth every morning And 300 mg evening      LANTUS SOLOSTAR 100 UNIT/ML soln Inject 48 Units subcutaneously daily.      losartan (COZAAR) 50 MG tablet Take 50 mg by mouth daily.      simvastatin (ZOCOR) 40 MG tablet Take 40 mg by mouth daily        Allergies   Allergen Reactions    Lisinopril Cough    Propoxyphene Unknown and Hives        Physical Examination Review of Systems   Vitals: BP (!) 148/80 (BP Location: Left arm, Patient Position: Sitting, Cuff Size: Adult Large)   Pulse 64   Resp 16   Ht 1.753 m (5' 9\")   Wt 94.3 kg (208 lb)   BMI 30.72 kg/m    BMI= Body mass index is 30.72 kg/m .  Wt Readings from Last 3 Encounters:   01/13/25 94.3 kg (208 lb)   01/07/25 94.8 kg (209 lb)     General: " pleasant male. No acute distress.   Neck: No JVD  Lungs: clear to auscultation  COR: normal rate, regular rhythm, No murmurs, rubs, or gallops  Extrem: No edema        Please refer above for cardiac ROS details.       Past History     Past Medical History  CAD  DMII  CKD  Hypertension  CVA    Past Surgical History  History reviewed. No pertinent surgical history.    Family History: father had an MI at age 51.    Social History:   Social History     Socioeconomic History    Marital status:      Spouse name: Not on file    Number of children: Not on file    Years of education: Not on file    Highest education level: Not on file   Occupational History    Not on file   Tobacco Use    Smoking status: Former     Types: Cigarettes    Smokeless tobacco: Never   Substance and Sexual Activity    Alcohol use: Not Currently    Drug use: Not on file    Sexual activity: Not on file   Other Topics Concern    Not on file   Social History Narrative    Not on file     Social Drivers of Health     Financial Resource Strain: High Risk (12/30/2021)    Received from [facility]    Financial Resource Strain     Difficulty of Paying Living Expenses: Not on file     Difficulty of Paying Living Expenses: Not on file   Food Insecurity: Not on file   Transportation Needs: Not on file   Physical Activity: Not on file   Stress: Not on file   Social Connections: Unknown (12/30/2021)    Received from [facility]    Social Connections     Frequency of Communication with Friends and Family: Not on file   Interpersonal Safety: Not on file   Housing Stability: Not on file            Lab Results    Chemistry/lipid CBC Cardiac Enzymes/BNP/TSH/INR   Lab Results   Component Value Date    CHOL 115 07/18/2024    HDL 26 (L) 07/18/2024    TRIG 188 (H) 07/18/2024    BUN 25.2 (H) 01/07/2025     01/07/2025    CO2 26 01/07/2025    Lab Results   Component Value Date    WBC 8.2  01/07/2025    HGB 14.0 01/07/2025    HCT 40.4 01/07/2025    MCV 94 01/07/2025     01/07/2025    Lab Results   Component Value Date    BNP 1,313 (H) 08/27/2018    TSH 2.41 08/27/2018    INR 1.02 01/07/2025

## 2025-01-13 NOTE — PATIENT INSTRUCTIONS
It was a pleasure to meet with you today.      Below is a summary of your visit.   Continue to take the eliquis.  You can stop aspirin  Schedule an MRI of your heart to look at the heart function.  Follow up in about 3 months.    We will call you to inform you of your test or procedure results within 3 business days of the test being performed.  If you do not hear from our office with the test results within 1 week please do not hesitate to call asking for these results.     Please do not hesitate to call the [facility] with any questions or concerns at [phone]. You can also reach my nurse, Pretty, at [phone].    Sincerely,

## 2025-02-11 ENCOUNTER — HOSPITAL ENCOUNTER (OUTPATIENT)
Dept: MRI IMAGING | Facility: HOSPITAL | Age: 75
Discharge: HOME | End: 2025-02-11
Attending: INTERNAL MEDICINE
Payer: COMMERCIAL

## 2025-02-11 VITALS — HEART RATE: 88 BPM | DIASTOLIC BLOOD PRESSURE: 73 MMHG | SYSTOLIC BLOOD PRESSURE: 148 MMHG | OXYGEN SATURATION: 92 %

## 2025-02-11 DIAGNOSIS — I51.3 MURAL THROMBUS OF LEFT VENTRICLE: ICD-10-CM

## 2025-02-11 DIAGNOSIS — I42.9 SECONDARY CARDIOMYOPATHY (H): ICD-10-CM

## 2025-02-11 LAB
ATRIAL RATE - MUSE: 81 BPM
ATRIAL RATE - MUSE: 82 BPM
DIASTOLIC BLOOD PRESSURE - MUSE: NORMAL MMHG
DIASTOLIC BLOOD PRESSURE - MUSE: NORMAL MMHG
INTERPRETATION ECG - MUSE: NORMAL
INTERPRETATION ECG - MUSE: NORMAL
P AXIS - MUSE: 64 DEGREES
P AXIS - MUSE: 75 DEGREES
PR INTERVAL - MUSE: 180 MS
PR INTERVAL - MUSE: 182 MS
QRS DURATION - MUSE: 120 MS
QRS DURATION - MUSE: 120 MS
QT - MUSE: 410 MS
QT - MUSE: 428 MS
QTC - MUSE: 479 MS
QTC - MUSE: 497 MS
R AXIS - MUSE: 60 DEGREES
R AXIS - MUSE: 70 DEGREES
SYSTOLIC BLOOD PRESSURE - MUSE: NORMAL MMHG
SYSTOLIC BLOOD PRESSURE - MUSE: NORMAL MMHG
T AXIS - MUSE: 95 DEGREES
T AXIS - MUSE: 99 DEGREES
VENTRICULAR RATE- MUSE: 81 BPM
VENTRICULAR RATE- MUSE: 82 BPM

## 2025-02-11 PROCEDURE — 75563 CARD MRI W/STRESS IMG & DYE: CPT

## 2025-02-11 PROCEDURE — 250N000011 HC RX IP 250 OP 636: Performed by: INTERNAL MEDICINE

## 2025-02-11 PROCEDURE — 93005 ELECTROCARDIOGRAM TRACING: CPT

## 2025-02-11 PROCEDURE — 255N000002 HC RX 255 OP 636: Performed by: INTERNAL MEDICINE

## 2025-02-11 PROCEDURE — 93016 CV STRESS TEST SUPVJ ONLY: CPT | Performed by: INTERNAL MEDICINE

## 2025-02-11 PROCEDURE — 75563 CARD MRI W/STRESS IMG & DYE: CPT | Mod: 26 | Performed by: INTERNAL MEDICINE

## 2025-02-11 PROCEDURE — 999N000122 MR MYOCARDIUM  OVERREAD

## 2025-02-11 PROCEDURE — 93018 CV STRESS TEST I&R ONLY: CPT | Performed by: INTERNAL MEDICINE

## 2025-02-11 PROCEDURE — A9585 GADOBUTROL INJECTION: HCPCS | Performed by: INTERNAL MEDICINE

## 2025-02-11 RX ORDER — ALBUTEROL SULFATE 0.83 MG/ML
2.5 SOLUTION RESPIRATORY (INHALATION)
Status: DISCONTINUED | OUTPATIENT
Start: 2025-02-11 | End: 2025-02-11 | Stop reason: HOSPADM

## 2025-02-11 RX ORDER — REGADENOSON 0.08 MG/ML
0.4 INJECTION, SOLUTION INTRAVENOUS ONCE
Status: COMPLETED | OUTPATIENT
Start: 2025-02-11 | End: 2025-02-11

## 2025-02-11 RX ORDER — AMINOPHYLLINE 25 MG/ML
50-100 INJECTION, SOLUTION INTRAVENOUS
Status: DISCONTINUED | OUTPATIENT
Start: 2025-02-11 | End: 2025-02-12 | Stop reason: HOSPADM

## 2025-02-11 RX ORDER — CAFFEINE 200 MG
200 TABLET ORAL
Status: DISCONTINUED | OUTPATIENT
Start: 2025-02-11 | End: 2025-02-11 | Stop reason: HOSPADM

## 2025-02-11 RX ORDER — GADOBUTROL 604.72 MG/ML
20 INJECTION INTRAVENOUS ONCE
Status: COMPLETED | OUTPATIENT
Start: 2025-02-11 | End: 2025-02-11

## 2025-02-11 RX ORDER — CAFFEINE CITRATE 20 MG/ML
60 SOLUTION INTRAVENOUS
Status: DISCONTINUED | OUTPATIENT
Start: 2025-02-11 | End: 2025-02-11 | Stop reason: HOSPADM

## 2025-02-11 RX ADMIN — GADOBUTROL 20 ML: 604.72 INJECTION INTRAVENOUS at 10:52

## 2025-02-11 RX ADMIN — REGADENOSON 0.4 MG: 0.08 INJECTION, SOLUTION INTRAVENOUS at 10:45

## 2025-02-13 ENCOUNTER — VIRTUAL VISIT (OUTPATIENT)
Dept: PHARMACY | Facility: PHYSICIAN GROUP | Age: 75
End: 2025-02-13
Payer: COMMERCIAL

## 2025-02-13 DIAGNOSIS — E11.65 TYPE 2 DIABETES MELLITUS WITH HYPERGLYCEMIA, WITH LONG-TERM CURRENT USE OF INSULIN (H): Primary | ICD-10-CM

## 2025-02-13 DIAGNOSIS — I51.3 LEFT VENTRICULAR THROMBOSIS: ICD-10-CM

## 2025-02-13 DIAGNOSIS — N18.32 STAGE 3B CHRONIC KIDNEY DISEASE (H): ICD-10-CM

## 2025-02-13 DIAGNOSIS — I50.32 CHRONIC DIASTOLIC HEART FAILURE (H): ICD-10-CM

## 2025-02-13 DIAGNOSIS — E78.2 MIXED HYPERLIPIDEMIA: ICD-10-CM

## 2025-02-13 DIAGNOSIS — G62.9 NEUROPATHY: ICD-10-CM

## 2025-02-13 DIAGNOSIS — I10 ESSENTIAL HYPERTENSION: ICD-10-CM

## 2025-02-13 DIAGNOSIS — Z79.4 TYPE 2 DIABETES MELLITUS WITH HYPERGLYCEMIA, WITH LONG-TERM CURRENT USE OF INSULIN (H): Primary | ICD-10-CM

## 2025-02-13 PROCEDURE — 99605 MTMS BY PHARM NP 15 MIN: CPT | Mod: 93 | Performed by: PHARMACIST

## 2025-02-13 NOTE — LETTER
"Recommended To-Do List      Prepared on: Feb 13, 2025       You can get the best results from your medications by completing the items on this \"To-Do List.\"      Bring your To-Do List when you go to your doctor. And, share it with your family or caregivers.    My To-Do List:  What we talked about: What I should do:   A medication that is not working    Change the medication you are taking from Trulicity to Mounjaro      - Finish one more week of Trulicity 4.5 mg.     - Then, switch to Mounjaro 5 mg once a week.           What we talked about: What I should do:    What my medicines are for, how to know if my medicines are working, made sure my medicines are safe for me and reviewed how to take my medicines.      Take my medicines every day                "

## 2025-02-13 NOTE — LETTER
February 18, 2025  Eric Cordoba  [address] [address]    Dear Mr. Cordoba, [facility]     Thank you for talking with me on Feb 13, 2025 about your health and medications. As a follow-up to our conversation, I have included two documents:      Your Recommended To-Do List has steps you should take to get the best results from your medications.  Your Medication List will help you keep track of your medications and how to take them.    If you want to talk about these documents, please call Ivette Rodriguez PharmD at phone: [phone], Monday-Friday 8-4:30pm.    I look forward to working with you and your doctors to make sure your medications work well for you.    Sincerely,  Ivette Rodriguez, Abelardo  [facility] Pharmacist, [facility]

## 2025-02-13 NOTE — LETTER
_  Medication List        Prepared on: Feb 13, 2025     Bring your Medication List when you go to the doctor, hospital, or   emergency room. And, share it with your family or caregivers.     Note any changes to how you take your medications.  Cross out medications when you no longer use them.    Medication How I take it Why I use it Prescriber   apixaban ANTICOAGULANT (ELIQUIS ANTICOAGULANT) 5 MG tablet Take 1 tablet (5 mg) by mouth 2 times daily. [place]ural thrombus of left ventricle (H) Omega Sumner MD   carvedilol (COREG) 12.5 MG tablet Take 12.5 mg by mouth 2 times daily Cardiac Failure; High Blood Pressure Abner Elmore MD   furosemide (LASIX) 20 MG tablet Take 40 mg by mouth daily Edema; Cardiac Failure Abner Elmore MD   gabapentin (NEURONTIN) 300 MG capsule Take 600 mg by mouth every morning And 300 mg evening Neuropathic Pain Abner Elmore MD   LANTUS SOLOSTAR 100 UNIT/ML soln Inject 48 Units subcutaneously daily. Type 2 Diabetes Abner Elmore MD   losartan (COZAAR) 50 MG tablet Take 50 mg by mouth daily. High Blood Pressure; Heart Failure Abner Elmore MD   MOUNJARO 5 MG/0.5ML SOAJ Inject 5 mg subcutaneously every 7 days. Type 2 Diabetes Abner Elmore MD   simvastatin (ZOCOR) 40 MG tablet Take 40 mg by mouth daily High Cholesterol, Lower Risk of Heart Event or Stroke Abner Elmore MD         Add new medications, over-the-counter drugs, herbals, vitamins, or  minerals in the blank rows below.    Medication How I take it Why I use it Prescriber                                      Allergies:      - Lisinopril - Cough  - Propoxyphene - Unknown, Hives        Side effects I have had:      Not on File        Other Information:              My notes and questions:

## 2025-02-13 NOTE — PROGRESS NOTES
Medication Therapy Management (MTM) Encounter    ASSESSMENT:                            Medication Adherence/Access:   Good adherence to oral medications. Encouraged adherence to daily insulin injection.   _  Diabetes  A1c is not at goal of <8%.   Home blood sugar not at goal of time in range >50% with continuous glucose monitoring.   Patient missed one insulin dose. Tolerating current Trulicity dose without much impact on diet control. More potent GLP-1 agonist may be more effective.   Patient would benefit from:  - Discontinue Trulicity 4.5 mg weekly  - Start Mounjaro 5 mg weekly  - Continue Lantus 48 units daily  - Contact pharmacy to cancel Trulicity, submit Mounjaro prescription  - Educated patient on medication change and administration  _  Left ventricular thrombus  Tolerating Eliquis start. Stopped aspirin as directed.   Patient would benefit from:  - Continue Eliquis 5 mg twice daily  - Await MRI results  - Follow up with cardiologist  _  Hypertension  Heart Failure  Chronic Kidney Disease  Stable. Blood pressure at last clinic visit was not at goal of <130/80 mmHg.   Patient would benefit from:  - Home blood pressure monitoring  - Consider increasing losartan dose if remains elevated  _  Neuropathy  Stable. Current dose is appropriate for renal function. Continue to monitor and if fall risk increases consider decreasing dose.   _____________________  PLAN:                            - Finish one more week of Trulicity 4.5 mg.     - Then, switch to Mounjaro 5 mg once a week.     - Continue Lantus 48 units once daily     - I will handle cancellation of current Trulicity order and replace with Mounjaro at your pharmacy.    Follow-up: Return in 19 days (on 3/4/2025) for diabetes medication management, with me, using a phone visit at 2:00 PM .    SUBJECTIVE/OBJECTIVE:                          Eric Cordoba is a 74 year old male seen for a follow-up visit.       Reason for visit: Diabetes follow-up, medication  review.    Allergies/ADRs: Reviewed in chart  Past Medical History: Reviewed in chart  Tobacco: He reports that he has quit smoking. His smoking use included cigarettes. He has never used smokeless tobacco.  Alcohol: none    Medication Adherence/Access:   Patient uses pill box(es).  Patient takes medications 2 time(s) per day.   Per patient, he is good about remembering to take his pills everyday but misses insulin doses at times.     Diabetes   Lantus 48 units once daily  Trulicity 4.5 mg once weekly   Patient may have missed one dose of Trulicity last week. He has not noticed any side effects with higher Trulicity doses. He hasn't had much change in his appetite with this.   Diet/Exercise: less meat, more crab salad and fish. He still consumes sweets. He has not made any big diet changes and isn't motivated to do so.   Medication History:  - metformin- stopped due to kidney function  Manuel 3 Continuous Glucose Monitoring Results:   Name: Italo López  YOB: 1950  Report Period: 01/31/2025 - 02/13/2025 (14 days)  Generated: 02/13/2025  Time CGM Active: 93%  Glucose Statistics and Targets  Average Glucose: 202 mg/dL  Glucose Management Indicator (GMI): 8.1%  Glucose Variability (%CV): 28.1%  Target Range: 70 - 180 mg/dL  Time in Ranges  Very High: >250 mg/dL --- 20%  High: 181 - 250 mg/dL --- 42%  Target Range: 70 - 180 mg/dL --- 38%  Low: 54 - 69 mg/dL --- 0%  Very Low: <54 mg/dL --- 0%    Left ventricular thrombus  Eliquis 5 mg twice a day  Recently diagnosed with blood clot in left ventricle and started on Eliquis.   Aspirin discontinued due to starting Eliquis.  He denies any signs or symptoms of significant bruising or bleeding. MRI performed Tuesday, results pending.     Hypertension  Heart Failure  Chronic Kidney Disease  Beta Blocker: Carvedilol 12.5 mg 1 tablet twice a day   ACEi/ARB/ARNI: Losartan 50 mg 1 tablet once daily  Diuretic(s): Furosemide 20 mg 2 tablets daily  Follows with  cardiology. Not experiencing any medication side effect. He does not self-monitor blood pressure. He is not certain if he is taking a potassium supplement.     Hyperlipidemia  Simvastatin 40 mg daily  Patient reports no significant myalgias or other side effects.    Neuropathy  Gabapentin 300 mg 2 capsules morning and 1 capsule evening  No issues reported. He does feel like it is helpful.       Today's Vitals: There were no vitals taken for this visit.  ----------------  Post Discharge Medication Reconciliation Status: discharge medications reconciled and changed, per note/orders.    I spent 9 minutes with this patient today. All changes were made via collaborative practice agreement with Abner Elmore MD.       Patient consented to the use of [employer] to record and transcribe notes during this visit.    A summary of these recommendations was mailed to the patient.    Ivette Rodriguez, PharmD, [facility]  Medication Therapy Management Pharmacist  [facility] [place]  [phone]      Telemedicine Visit Details  The patient's medications can be safely assessed via a telemedicine encounter.  Type of service:  Telephone visit  Originating Location (pt. Location): Home    Distant Location (provider location):  On-site  Start Time:  1:02 PM  End Time:  1:11 PM     Medication Therapy Recommendations  Type 2 diabetes mellitus with hyperglycemia, with long-term current use of insulin (H)   1 Current Medication: Dulaglutide (TRULICITY) 4.5 MG/0.5ML SOAJ (Discontinued)   Current Medication Sig: Inject 4.5 mg subcutaneously every 7 days.   Rationale: More effective medication available - Ineffective medication - Effectiveness   Recommendation: Change Medication - Mounjaro 5 MG/0.5ML Soaj   Status: Accepted per CPA   Identified Date: 2/13/2025 Completed Date: 2/13/2025

## 2025-02-14 ENCOUNTER — TELEPHONE (OUTPATIENT)
Dept: CARDIOLOGY | Facility: CLINIC | Age: 75
End: 2025-02-14
Payer: COMMERCIAL

## 2025-02-14 NOTE — TELEPHONE ENCOUNTER
[facility]    Phone Message    May a detailed message be left on voicemail: yes     Reason for Call: Requesting Results     Name/type of test: MR Myocardium Overread  Date of test: 02/11/2025  Was test done at a location other than [facility] (Please fill in the location if not [facility])?: No    Patient and friend Veronique called wanting to speak with someone on the care team to go over results from  again. Please call Veronique back at [phone] to further discuss.    Action Taken: Other: Cardiology    Travel Screening: Not Applicable     Thank you!  [facility]

## 2025-02-15 ENCOUNTER — HEALTH MAINTENANCE LETTER (OUTPATIENT)
Age: 75
End: 2025-02-15

## 2025-02-17 ENCOUNTER — PREP FOR PROCEDURE (OUTPATIENT)
Dept: CARDIOLOGY | Facility: CLINIC | Age: 75
End: 2025-02-17
Payer: COMMERCIAL

## 2025-02-17 ENCOUNTER — MYC MEDICAL ADVICE (OUTPATIENT)
Dept: CARDIOLOGY | Facility: CLINIC | Age: 75
End: 2025-02-17
Payer: COMMERCIAL

## 2025-02-17 DIAGNOSIS — R94.39 ABNORMAL STRESS TEST: Primary | ICD-10-CM

## 2025-02-17 DIAGNOSIS — I51.9 DECREASED LEFT VENTRICULAR FUNCTION: ICD-10-CM

## 2025-02-17 DIAGNOSIS — I42.9 SECONDARY CARDIOMYOPATHY (H): ICD-10-CM

## 2025-02-17 DIAGNOSIS — I51.3 MURAL THROMBUS OF LEFT VENTRICLE: ICD-10-CM

## 2025-02-17 DIAGNOSIS — I51.9 DECREASED LEFT VENTRICULAR SYSTOLIC FUNCTION: ICD-10-CM

## 2025-02-17 DIAGNOSIS — I25.118 CORONARY ARTERY DISEASE OF NATIVE ARTERY OF NATIVE HEART WITH STABLE ANGINA PECTORIS: ICD-10-CM

## 2025-02-17 RX ORDER — DEXTROSE MONOHYDRATE 25 G/50ML
25-50 INJECTION, SOLUTION INTRAVENOUS
OUTPATIENT
Start: 2025-02-17

## 2025-02-17 RX ORDER — SODIUM CHLORIDE 9 MG/ML
INJECTION, SOLUTION INTRAVENOUS CONTINUOUS
OUTPATIENT
Start: 2025-02-17

## 2025-02-17 RX ORDER — LIDOCAINE 40 MG/G
CREAM TOPICAL
OUTPATIENT
Start: 2025-02-17

## 2025-02-17 RX ORDER — ASPIRIN 325 MG
325 TABLET ORAL ONCE
OUTPATIENT
Start: 2025-02-17 | End: 2025-02-17

## 2025-02-17 RX ORDER — FENTANYL CITRATE 50 UG/ML
25 INJECTION, SOLUTION INTRAMUSCULAR; INTRAVENOUS
OUTPATIENT
Start: 2025-02-17

## 2025-02-17 RX ORDER — ASPIRIN 81 MG/1
243 TABLET, CHEWABLE ORAL ONCE
OUTPATIENT
Start: 2025-02-17

## 2025-02-17 RX ORDER — NICOTINE POLACRILEX 4 MG
15-30 LOZENGE BUCCAL
OUTPATIENT
Start: 2025-02-17

## 2025-02-18 ENCOUNTER — LAB REQUISITION (OUTPATIENT)
Dept: LAB | Facility: CLINIC | Age: 75
End: 2025-02-18
Payer: COMMERCIAL

## 2025-02-18 DIAGNOSIS — Z01.818 ENCOUNTER FOR OTHER PREPROCEDURAL EXAMINATION: ICD-10-CM

## 2025-02-18 LAB
ANION GAP SERPL CALCULATED.3IONS-SCNC: 14 MMOL/L (ref 7–15)
BUN SERPL-MCNC: 23.8 MG/DL (ref 8–23)
CALCIUM SERPL-MCNC: 9.2 MG/DL (ref 8.8–10.4)
CHLORIDE SERPL-SCNC: 99 MMOL/L (ref 98–107)
CREAT SERPL-MCNC: 1.89 MG/DL (ref 0.67–1.17)
EGFRCR SERPLBLD CKD-EPI 2021: 37 ML/MIN/1.73M2
GLUCOSE SERPL-MCNC: 360 MG/DL (ref 70–99)
HCO3 SERPL-SCNC: 25 MMOL/L (ref 22–29)
POTASSIUM SERPL-SCNC: 3.8 MMOL/L (ref 3.4–5.3)
SODIUM SERPL-SCNC: 138 MMOL/L (ref 135–145)

## 2025-02-18 RX ORDER — TIRZEPATIDE 5 MG/.5ML
5 INJECTION, SOLUTION SUBCUTANEOUS
COMMUNITY
Start: 2025-02-18

## 2025-02-18 NOTE — PATIENT INSTRUCTIONS
Recommendations from today's MTM visit:                                                       - Finish one more week of Trulicity 4.5 mg.     - Then, switch to Mounjaro 5 mg once a week.     - Continue Lantus insulin 48 units once daily     - I will handle cancellation of current Trulicity order and replace with Mounjaro at your pharmacy.    Follow-up: Return in 19 days (on 3/4/2025) for diabetes medication management, with me, using a phone visit at 2:00 PM .    It was great speaking with you today.  I value your experience and would be very thankful for your time in providing feedback in our clinic survey. In the next few days, you may receive an email or text message from [employer] with a link to a survey related to your clinical pharmacist.    To schedule another MTM appointment, please call [phone].    My Clinical Pharmacist's contact information:                                                      Please feel free to contact me with any questions or concerns you have.      Ivette Rodriguez, PharmD, BCACP  Medication Therapy Management Pharmacist  [facility]  [phone]

## 2025-02-24 ENCOUNTER — APPOINTMENT (OUTPATIENT)
Dept: ULTRASOUND IMAGING | Facility: HOSPITAL | Age: 75
End: 2025-02-24
Attending: STUDENT IN AN ORGANIZED HEALTH CARE EDUCATION/TRAINING PROGRAM
Payer: COMMERCIAL

## 2025-02-24 ENCOUNTER — APPOINTMENT (OUTPATIENT)
Dept: CT IMAGING | Facility: HOSPITAL | Age: 75
End: 2025-02-24
Attending: STUDENT IN AN ORGANIZED HEALTH CARE EDUCATION/TRAINING PROGRAM
Payer: COMMERCIAL

## 2025-02-24 ENCOUNTER — HOSPITAL ENCOUNTER (OUTPATIENT)
Facility: HOSPITAL | Age: 75
Discharge: HOME | End: 2025-02-24
Attending: INTERNAL MEDICINE | Admitting: INTERNAL MEDICINE
Payer: COMMERCIAL

## 2025-02-24 VITALS
RESPIRATION RATE: 14 BRPM | WEIGHT: 209 LBS | HEART RATE: 89 BPM | BODY MASS INDEX: 30.96 KG/M2 | OXYGEN SATURATION: 95 % | TEMPERATURE: 97.9 F | SYSTOLIC BLOOD PRESSURE: 174 MMHG | HEIGHT: 69 IN | DIASTOLIC BLOOD PRESSURE: 77 MMHG

## 2025-02-24 DIAGNOSIS — I25.118 CORONARY ARTERY DISEASE OF NATIVE ARTERY OF NATIVE HEART WITH STABLE ANGINA PECTORIS: ICD-10-CM

## 2025-02-24 DIAGNOSIS — I42.9 SECONDARY CARDIOMYOPATHY (H): ICD-10-CM

## 2025-02-24 DIAGNOSIS — I51.3 MURAL THROMBUS OF LEFT VENTRICLE: ICD-10-CM

## 2025-02-24 DIAGNOSIS — I51.9 DECREASED LEFT VENTRICULAR FUNCTION: ICD-10-CM

## 2025-02-24 DIAGNOSIS — N18.32 STAGE 3B CHRONIC KIDNEY DISEASE (H): Primary | ICD-10-CM

## 2025-02-24 DIAGNOSIS — R94.39 ABNORMAL STRESS TEST: ICD-10-CM

## 2025-02-24 PROBLEM — Z98.890 STATUS POST CORONARY ANGIOGRAM: Status: ACTIVE | Noted: 2025-02-24

## 2025-02-24 LAB
ABO + RH BLD: NORMAL
ANION GAP SERPL CALCULATED.3IONS-SCNC: 11 MMOL/L (ref 7–15)
ATRIAL RATE - MUSE: 95 BPM
BLD GP AB SCN SERPL QL: NEGATIVE
BUN SERPL-MCNC: 24.6 MG/DL (ref 8–23)
CALCIUM SERPL-MCNC: 9.5 MG/DL (ref 8.8–10.4)
CHLORIDE SERPL-SCNC: 102 MMOL/L (ref 98–107)
CHOLEST SERPL-MCNC: 99 MG/DL
CREAT SERPL-MCNC: 1.83 MG/DL (ref 0.67–1.17)
DIASTOLIC BLOOD PRESSURE - MUSE: NORMAL MMHG
EGFRCR SERPLBLD CKD-EPI 2021: 38 ML/MIN/1.73M2
ERYTHROCYTE [DISTWIDTH] IN BLOOD BY AUTOMATED COUNT: 12.7 % (ref 10–15)
FASTING STATUS PATIENT QL REPORTED: YES
FASTING STATUS PATIENT QL REPORTED: YES
GLUCOSE SERPL-MCNC: 215 MG/DL (ref 70–99)
HCO3 SERPL-SCNC: 26 MMOL/L (ref 22–29)
HCT VFR BLD AUTO: 37.9 % (ref 40–53)
HDLC SERPL-MCNC: 32 MG/DL
HGB BLD-MCNC: 13.4 G/DL (ref 13.3–17.7)
INTERPRETATION ECG - MUSE: NORMAL
LDLC SERPL CALC-MCNC: 46 MG/DL
MCH RBC QN AUTO: 31.8 PG (ref 26.5–33)
MCHC RBC AUTO-ENTMCNC: 35.4 G/DL (ref 31.5–36.5)
MCV RBC AUTO: 90 FL (ref 78–100)
NONHDLC SERPL-MCNC: 67 MG/DL
P AXIS - MUSE: 64 DEGREES
PLATELET # BLD AUTO: 208 10E3/UL (ref 150–450)
POTASSIUM SERPL-SCNC: 4.2 MMOL/L (ref 3.4–5.3)
PR INTERVAL - MUSE: 156 MS
QRS DURATION - MUSE: 118 MS
QT - MUSE: 408 MS
QTC - MUSE: 512 MS
R AXIS - MUSE: 55 DEGREES
RBC # BLD AUTO: 4.22 10E6/UL (ref 4.4–5.9)
SODIUM SERPL-SCNC: 139 MMOL/L (ref 135–145)
SPECIMEN EXP DATE BLD: NORMAL
SYSTOLIC BLOOD PRESSURE - MUSE: NORMAL MMHG
T AXIS - MUSE: 102 DEGREES
TRIGL SERPL-MCNC: 103 MG/DL
VENTRICULAR RATE- MUSE: 95 BPM
WBC # BLD AUTO: 8.3 10E3/UL (ref 4–11)

## 2025-02-24 PROCEDURE — C1894 INTRO/SHEATH, NON-LASER: HCPCS | Performed by: INTERNAL MEDICINE

## 2025-02-24 PROCEDURE — 999N000054 HC STATISTIC EKG NON-CHARGEABLE

## 2025-02-24 PROCEDURE — 93010 ELECTROCARDIOGRAM REPORT: CPT | Performed by: INTERNAL MEDICINE

## 2025-02-24 PROCEDURE — 36415 COLL VENOUS BLD VENIPUNCTURE: CPT | Performed by: INTERNAL MEDICINE

## 2025-02-24 PROCEDURE — 272N000001 HC OR GENERAL SUPPLY STERILE: Performed by: INTERNAL MEDICINE

## 2025-02-24 PROCEDURE — 85048 AUTOMATED LEUKOCYTE COUNT: CPT | Performed by: INTERNAL MEDICINE

## 2025-02-24 PROCEDURE — 93005 ELECTROCARDIOGRAM TRACING: CPT

## 2025-02-24 PROCEDURE — 99152 MOD SED SAME PHYS/QHP 5/>YRS: CPT | Performed by: INTERNAL MEDICINE

## 2025-02-24 PROCEDURE — 250N000013 HC RX MED GY IP 250 OP 250 PS 637: Performed by: INTERNAL MEDICINE

## 2025-02-24 PROCEDURE — 86900 BLOOD TYPING SEROLOGIC ABO: CPT | Performed by: INTERNAL MEDICINE

## 2025-02-24 PROCEDURE — 93458 L HRT ARTERY/VENTRICLE ANGIO: CPT | Mod: 26 | Performed by: INTERNAL MEDICINE

## 2025-02-24 PROCEDURE — 93880 EXTRACRANIAL BILAT STUDY: CPT

## 2025-02-24 PROCEDURE — 250N000013 HC RX MED GY IP 250 OP 250 PS 637: Performed by: NURSE PRACTITIONER

## 2025-02-24 PROCEDURE — 255N000002 HC RX 255 OP 636: Performed by: INTERNAL MEDICINE

## 2025-02-24 PROCEDURE — 93458 L HRT ARTERY/VENTRICLE ANGIO: CPT | Performed by: INTERNAL MEDICINE

## 2025-02-24 PROCEDURE — 80048 BASIC METABOLIC PNL TOTAL CA: CPT | Performed by: INTERNAL MEDICINE

## 2025-02-24 PROCEDURE — 82435 ASSAY OF BLOOD CHLORIDE: CPT | Performed by: INTERNAL MEDICINE

## 2025-02-24 PROCEDURE — 258N000003 HC RX IP 258 OP 636: Performed by: INTERNAL MEDICINE

## 2025-02-24 PROCEDURE — C1769 GUIDE WIRE: HCPCS | Performed by: INTERNAL MEDICINE

## 2025-02-24 PROCEDURE — 82565 ASSAY OF CREATININE: CPT | Performed by: INTERNAL MEDICINE

## 2025-02-24 PROCEDURE — 85014 HEMATOCRIT: CPT | Performed by: INTERNAL MEDICINE

## 2025-02-24 PROCEDURE — 71250 CT THORAX DX C-: CPT

## 2025-02-24 PROCEDURE — 80061 LIPID PANEL: CPT | Performed by: INTERNAL MEDICINE

## 2025-02-24 PROCEDURE — 250N000011 HC RX IP 250 OP 636: Performed by: INTERNAL MEDICINE

## 2025-02-24 RX ORDER — NICOTINE POLACRILEX 4 MG
15-30 LOZENGE BUCCAL
Status: DISCONTINUED | OUTPATIENT
Start: 2025-02-24 | End: 2025-02-24 | Stop reason: HOSPADM

## 2025-02-24 RX ORDER — LIDOCAINE 40 MG/G
CREAM TOPICAL
Status: DISCONTINUED | OUTPATIENT
Start: 2025-02-24 | End: 2025-02-24 | Stop reason: HOSPADM

## 2025-02-24 RX ORDER — SODIUM CHLORIDE 9 MG/ML
INJECTION, SOLUTION INTRAVENOUS CONTINUOUS
Status: DISCONTINUED | OUTPATIENT
Start: 2025-02-24 | End: 2025-02-24 | Stop reason: HOSPADM

## 2025-02-24 RX ORDER — HEPARIN SODIUM 1000 [USP'U]/ML
INJECTION, SOLUTION INTRAVENOUS; SUBCUTANEOUS
Status: DISCONTINUED | OUTPATIENT
Start: 2025-02-24 | End: 2025-02-24 | Stop reason: HOSPADM

## 2025-02-24 RX ORDER — ASPIRIN 81 MG/1
243 TABLET, CHEWABLE ORAL ONCE
Status: COMPLETED | OUTPATIENT
Start: 2025-02-24 | End: 2025-02-24

## 2025-02-24 RX ORDER — OXYCODONE HYDROCHLORIDE 5 MG/1
5 TABLET ORAL EVERY 4 HOURS PRN
Status: DISCONTINUED | OUTPATIENT
Start: 2025-02-24 | End: 2025-02-24 | Stop reason: HOSPADM

## 2025-02-24 RX ORDER — ATROPINE SULFATE 0.1 MG/ML
0.5 INJECTION INTRAVENOUS
Status: DISCONTINUED | OUTPATIENT
Start: 2025-02-24 | End: 2025-02-24 | Stop reason: HOSPADM

## 2025-02-24 RX ORDER — NALOXONE HYDROCHLORIDE 0.4 MG/ML
0.2 INJECTION, SOLUTION INTRAMUSCULAR; INTRAVENOUS; SUBCUTANEOUS
Status: DISCONTINUED | OUTPATIENT
Start: 2025-02-24 | End: 2025-02-24 | Stop reason: HOSPADM

## 2025-02-24 RX ORDER — FENTANYL CITRATE 50 UG/ML
INJECTION, SOLUTION INTRAMUSCULAR; INTRAVENOUS
Status: DISCONTINUED | OUTPATIENT
Start: 2025-02-24 | End: 2025-02-24 | Stop reason: HOSPADM

## 2025-02-24 RX ORDER — DIAZEPAM 5 MG/1
5 TABLET ORAL ONCE
Status: COMPLETED | OUTPATIENT
Start: 2025-02-24 | End: 2025-02-24

## 2025-02-24 RX ORDER — FENTANYL CITRATE 50 UG/ML
25 INJECTION, SOLUTION INTRAMUSCULAR; INTRAVENOUS
Status: DISCONTINUED | OUTPATIENT
Start: 2025-02-24 | End: 2025-02-24 | Stop reason: HOSPADM

## 2025-02-24 RX ORDER — NITROGLYCERIN 5 MG/ML
VIAL (ML) INTRAVENOUS
Status: DISCONTINUED | OUTPATIENT
Start: 2025-02-24 | End: 2025-02-24 | Stop reason: HOSPADM

## 2025-02-24 RX ORDER — IODIXANOL 320 MG/ML
INJECTION, SOLUTION INTRAVASCULAR
Status: DISCONTINUED | OUTPATIENT
Start: 2025-02-24 | End: 2025-02-24 | Stop reason: HOSPADM

## 2025-02-24 RX ORDER — NALOXONE HYDROCHLORIDE 0.4 MG/ML
0.4 INJECTION, SOLUTION INTRAMUSCULAR; INTRAVENOUS; SUBCUTANEOUS
Status: DISCONTINUED | OUTPATIENT
Start: 2025-02-24 | End: 2025-02-24 | Stop reason: HOSPADM

## 2025-02-24 RX ORDER — ACETAMINOPHEN 325 MG/1
650 TABLET ORAL EVERY 4 HOURS PRN
Status: DISCONTINUED | OUTPATIENT
Start: 2025-02-24 | End: 2025-02-24 | Stop reason: HOSPADM

## 2025-02-24 RX ORDER — DEXTROSE MONOHYDRATE 25 G/50ML
25-50 INJECTION, SOLUTION INTRAVENOUS
Status: DISCONTINUED | OUTPATIENT
Start: 2025-02-24 | End: 2025-02-24 | Stop reason: HOSPADM

## 2025-02-24 RX ORDER — FLUMAZENIL 0.1 MG/ML
0.2 INJECTION, SOLUTION INTRAVENOUS
Status: DISCONTINUED | OUTPATIENT
Start: 2025-02-24 | End: 2025-02-24 | Stop reason: HOSPADM

## 2025-02-24 RX ORDER — ASPIRIN 325 MG
325 TABLET ORAL ONCE
Status: COMPLETED | OUTPATIENT
Start: 2025-02-24 | End: 2025-02-24

## 2025-02-24 RX ORDER — OXYCODONE HYDROCHLORIDE 5 MG/1
10 TABLET ORAL EVERY 4 HOURS PRN
Status: DISCONTINUED | OUTPATIENT
Start: 2025-02-24 | End: 2025-02-24 | Stop reason: HOSPADM

## 2025-02-24 RX ADMIN — DIAZEPAM 5 MG: 5 TABLET ORAL at 10:09

## 2025-02-24 RX ADMIN — ASPIRIN 325 MG: 325 TABLET ORAL at 10:09

## 2025-02-24 RX ADMIN — SODIUM CHLORIDE: 0.9 INJECTION, SOLUTION INTRAVENOUS at 09:48

## 2025-02-24 ASSESSMENT — ACTIVITIES OF DAILY LIVING (ADL)
ADLS_ACUITY_SCORE: 41

## 2025-02-24 ASSESSMENT — EJECTION FRACTION: EF_VALUE: .21

## 2025-02-24 NOTE — PROGRESS NOTES
Patient discharged home with his daughter and friend in stable condition. Patient, daughter and friend verbalize understanding of discharge instructions and follow up appointment. No questions or concerns at time of discharge.

## 2025-02-24 NOTE — INTERVAL H&P NOTE
"I have reviewed the surgical (or preoperative) H&P that is linked to this encounter, and examined the patient. There are no significant changes    Clinical Conditions Present on Arrival:  Clinically Significant Risk Factors Present on Admission                 # Drug Induced Coagulation Defect: home medication list includes an anticoagulant medication       # Obesity: Estimated body mass index is 30.86 kg/m  as calculated from the following:    Height as of this encounter: 1.753 m (5' 9\").    Weight as of this encounter: 94.8 kg (209 lb).       "

## 2025-02-24 NOTE — DISCHARGE INSTRUCTIONS

## 2025-02-24 NOTE — PRE-PROCEDURE
GENERAL PRE-PROCEDURE:   Procedure:  Coronary angiogram with possible PCI, left heart catheterization  Date/Time:  2/24/2025 9:34 AM    Written consent obtained?: Yes    Risks and benefits: Risks, benefits and alternatives were discussed    Consent given by:  Patient  Patient states understanding of procedure being performed: Yes    Patient's understanding of procedure matches consent: Yes    Procedure consent matches procedure scheduled: Yes    Expected level of sedation:  Moderate  Appropriately NPO:  Yes  ASA Class:  3 (CAD with hx of MI, LV apical thrombus; on NOAC, 2/2 cardiomyopathy, ICM; EF 35-40%, HTN, CKD Stage IIIb, DM Type II, hx of CVA)  Mallampati  :  Grade 2- soft palate, base of uvula, tonsillar pillars, and portion of posterior pharyngeal wall visible  Lungs:  Lungs clear with good breath sounds bilaterally  Heart:  Other (comment)  Heart exam comment:  Occasional extra-systoles  History & Physical reviewed:  History and physical reviewed and updates made (see comment)  H&P Comments:  Clinically Significant Risk Factors Present on Admission    Cardiovascular : CAD with hx of MI, LV apical thrombus; on NOAC, 2/2 cardiomyopathy, ICM; EF 35-40%, HTN, DM Type II    Fluid & Electrolyte Disorders : Not present on admission    Gastroenterology : Not present on admission    Hematology/Oncology : Not present on admission    Nephrology : CKD Stage IIIb; stable, will minimize contrast, IV hydration per protocol    Neurology : hx of CVA    Pulmonology : Not present on admission    Systemic : Not present on admission    Statement of review:  I have reviewed the lab findings, diagnostic data, medications, and the plan for sedation

## 2025-02-25 ENCOUNTER — TELEPHONE (OUTPATIENT)
Dept: CARDIOLOGY | Facility: CLINIC | Age: 75
End: 2025-02-25
Payer: COMMERCIAL

## 2025-02-27 ENCOUNTER — TELEPHONE (OUTPATIENT)
Dept: CARDIOLOGY | Facility: CLINIC | Age: 75
End: 2025-02-27
Payer: COMMERCIAL

## 2025-02-27 NOTE — TELEPHONE ENCOUNTER
Requested images x 2. Spoke with someone and they transferred me to another person, where I had to leave message

## 2025-03-03 ENCOUNTER — OFFICE VISIT (OUTPATIENT)
Dept: CARDIOLOGY | Facility: CLINIC | Age: 75
End: 2025-03-03
Payer: COMMERCIAL

## 2025-03-03 VITALS
WEIGHT: 207 LBS | SYSTOLIC BLOOD PRESSURE: 134 MMHG | DIASTOLIC BLOOD PRESSURE: 77 MMHG | BODY MASS INDEX: 30.57 KG/M2 | RESPIRATION RATE: 14 BRPM | HEART RATE: 75 BPM

## 2025-03-03 DIAGNOSIS — R06.02 SHORTNESS OF BREATH: ICD-10-CM

## 2025-03-03 DIAGNOSIS — I25.118 CORONARY ARTERY DISEASE OF NATIVE ARTERY OF NATIVE HEART WITH STABLE ANGINA PECTORIS: ICD-10-CM

## 2025-03-03 PROCEDURE — 99204 OFFICE O/P NEW MOD 45 MIN: CPT | Performed by: STUDENT IN AN ORGANIZED HEALTH CARE EDUCATION/TRAINING PROGRAM

## 2025-03-03 PROCEDURE — 3078F DIAST BP <80 MM HG: CPT | Performed by: STUDENT IN AN ORGANIZED HEALTH CARE EDUCATION/TRAINING PROGRAM

## 2025-03-03 PROCEDURE — 3075F SYST BP GE 130 - 139MM HG: CPT | Performed by: STUDENT IN AN ORGANIZED HEALTH CARE EDUCATION/TRAINING PROGRAM

## 2025-03-03 NOTE — LETTER
3/3/2025    Abner Elmore MD  [address] [place]  [place] [address]    RE: Eric Cordoba       Dear Colleague,     I had the pleasure of seeing Eric Cordoba in the [facility].  Dear Colleagues,     We had the pleasure of seeing Eric Cordoba today in our Cardiac Surgery Clinic at [facility]. As you know he is a pleasant 74-year-old man with a history of CKD III (Cr 1.8), DM2 (A1c 11-12, poorly controlled, on insulin with polyneuropathy), CVA (memory impairment), HTN, HFrEF (EF 35-40%), MI, and LV apical thrombus (on Eliquis) who presents for evaluation for severe multivessel coronary artery disease.    In terms of symptoms he reports shortness of breath with exertion; however his activity is severely limited by neuropathy. We also discussed his poor diabetes control and he acknowledged that he is unable to reduce his intake of sweets.     Coronary angiogram revealed severe multivessel coronary artery disease notable for chronic occlusion of the proximal to mid LAD, subtotal occlusion of the proximal to mid left circumflex, and chronic occlusion of the proximal right coronary artery.     Of note TTE on 1/7/25 revealed EF 35-40%, mild-moderate mitral stenosis, mild mitral regurgitation, and mobile thrombus in the LV. He was then started on Eliquis for the LV thrombus. Cardiac MRI on 2/11/25 revealed viable myocardium with LVEF 31%, RVEF 68%, and no evidence of apical thrombus.      ROS: 10 point review of systems was performed and negative except as described above.     Past Medical History:   Past Medical History:   Diagnosis Date     Diabetes mellitus, type 2 (H)      History of CVA (cerebrovascular accident)      Hypertension      Nutritional and metabolic cardiomyopathies (H)         Past Surgical History:  Past Surgical History:   Procedure Laterality Date     CV CORONARY ANGIOGRAM N/A 2/24/2025    Procedure: Coronary Angiogram;  Surgeon: Bautista Durand MD;   Location: [facility] CV     CV LEFT HEART CATH N/A 2/24/2025    Procedure: Left Heart Catheterization;  Surgeon: Bautista Durand MD;  Location: [facility] CV         Family History: Father - MI     Social History: Remote smoking, denies alcohol. Retired.      Medications:   Current Outpatient Medications   Medication Sig Dispense Refill     apixaban ANTICOAGULANT (ELIQUIS ANTICOAGULANT) 5 MG tablet Take 1 tablet (5 mg) by mouth 2 times daily. 180 tablet 3     carvedilol (COREG) 12.5 MG tablet Take 12.5 mg by mouth 2 times daily       furosemide (LASIX) 20 MG tablet Take 40 mg by mouth daily       gabapentin (NEURONTIN) 300 MG capsule Take 600 mg by mouth every morning And 300 mg evening       LANTUS SOLOSTAR 100 UNIT/ML soln Inject 48 Units subcutaneously daily.       losartan (COZAAR) 50 MG tablet Take 50 mg by mouth daily.       MOUNJARO 5 MG/0.5ML SOAJ Inject 5 mg subcutaneously every 7 days.       simvastatin (ZOCOR) 40 MG tablet Take 40 mg by mouth daily       No current facility-administered medications for this visit.        Allergies:   Allergies   Allergen Reactions     Lisinopril Cough     Propoxyphene Unknown and Hives        Physical Exam:  /77 (BP Location: Right arm, Patient Position: Sitting, Cuff Size: Adult Large)   Pulse 75   Resp 14   Wt 93.9 kg (207 lb)   BMI 30.57 kg/m    Alert, pleasant, in no acute distress  Sclera anicteric  Neck supple JVD flat  Trachea midline, breathing comfortably on room air  No prior chest incisions  Breathing unlabored on room air  Regular rate and rhythm  Abdomen soft, non-tender  Extremities warm, no edema      Labs  CBC RESULTS:   Recent Labs   Lab Test 02/24/25  0930   WBC 8.3   RBC 4.22*   HGB 13.4   HCT 37.9*   MCV 90   MCH 31.8   MCHC 35.4   RDW 12.7         Last Comprehensive Metabolic Panel:  Lab Results   Component Value Date     02/24/2025    POTASSIUM 4.2 02/24/2025    CHLORIDE 102 02/24/2025    CO2 26 02/24/2025     ANIONGAP 11 02/24/2025     (H) 02/24/2025    BUN 24.6 (H) 02/24/2025    CR 1.83 (H) 02/24/2025    GFRESTIMATED 38 (L) 02/24/2025    GAYATHRI 9.5 02/24/2025        Imaging: All imaging studies were reviewed and interpreted by me.    TTE 1/7/25:  EF 35-40%, mild-moderate mitral stenosis with MG 5, mitral annular calcification, mild mitral regurgitation, mobile thrombus in the left ventricle.     Cardiac MRI 2/11/25:  1.  Pharmacological Regadenoson stress cardiac MRI is positive for inducible diffuse severe myocardial  ischemia (nearly entire viable myocardium).  Likely representing multi-vessel obstructive CAD.   2.  No Lexiscan induced ECG changes.   3.  Moderately enlarged left ventricular size with severe reduction in systolic function. The quantified  left ventricular ejection fraction is 31%.  There is severe hypokinesis to akinesis of the basal to apical  inferior and lateral wall segments of the left ventricle.  There is akinesis of the entire LV apex.  Severe  hypokinesis of the mid to distal anterior and mid to distal septal LV wall segments.    4.  There is non-transmural infarction of the basal to apical inferior and basal to apical lateral wall  segments of the left ventricle representing likely viable myocardium.  There transmural infarction of the  entire LV apex representing non-viable myocardium.   Non-transmural mid to distal anterior and mid to  distal septal infarction <25% of myocardial thickness representing viable myocardium.   5.  No evidence of LV apical thrombus.     6.  Normal right ventricular size and systolic function.  RVEF: 68%.  7.  Mitral valve is mild-moderately thickened. There is trivial mitral regurgitation.  No significant  mitral stenosis (MVAp: 2.6 cm2).     Cardiac catheterization 2/24/25:  Coronary Angiography:  LEFT MAIN: Short vessel with mild to moderate disease.  LEFT ANTERIOR DESCENDING: Normal caliber vessel that gives rise to a large diagonal branch in the proximal  portion is chronically occluded in the proximal to midportion.  The LAD fills distally via left to left collaterals.  Ostial to proximal diagonal branch has a 70% stenosis followed by moderate disease distally.  LEFT CIRCUMFLEX: Normal caliber vessel that gives rise to a large OM1 branch and terminates into a medium OM 2 branch distally.  Proximal to mid left circumflex subtotally occluded after the takeoff of the large OM branch and fills distally via left to left collaterals.  The large OM branch has a 70 to 80% calcific proximal stenosis followed by mild disease distally.  RIGHT CORONARY ARTERY: Dominant vessel that is occluded proximally and fills distally via left-to-right collaterals.    Carotid US 2/24/25:  1.  On the right there is a mild amount of atheromatous plaque.  Peak systolic velocities in the ICA are 77 cm/s which correspond to the less than 50% stenosis range based on NASCET criteria.  2.  On the left there is a moderate amount of atheromatous plaque.  Peak systolic velocities in the ICA are 195 cm/s which correspond to the 50-69% stenosis range based on NASCET criteria.  3.  Antegrade flow within the vertebral arteries bilaterally.      In summary Eric Cordoba is a 74-year-old man with a history of CKD III (Cr 1.8), DM2 (A1c 11-12, poorly controlled, on insulin with polyneuropathy), CVA (memory impairment), HTN, HFrEF (EF 35-40%), MI, and LV apical thrombus (on Eliquis, may have resolved) who presents for evaluation for severe multivessel coronary artery disease. Given his comorbidities his STS calculated risk score for isolated CABG is 3.9% risk of mortality and 18.6% risk of major morbidity and mortality.      I recommend surgical revascularization with coronary artery bypass grafting for symptomatic and survival benefit with LIMA and SVG for conduit. Given his chronic total occlusion of LAD, Lcx, and RCA along with reduced EF 30-35% I recommend IABP placement prior to surgery. Of note he also  has mild-moderate mitral stenosis and mild mitral regurgitation. I will most likely plan to leave the mitral valve alone but should the mitral stenosis worsen on intraoperative MANUEL then we may also need to replace the mitral valve (tissue). We will also use intraoperative MANUEL to evaluate for presence of left ventricular thrombus. If there is significant thrombus present we may need to delay surgery due to risk of embolization. I described the technical details of the operation, as well as the expected postoperative course and recovery to the patient. I also discussed the risks, benefits, and alternatives to the procedure. The risks include but are not limited to bleeding, infection, stroke, heart or graft failure with myocardial infarction, dysrhythmia, need for pacemaker, respiratory failure, kidney or liver injury, bowel or limb ischemia, need for reoperation, and death. The patient understands these risks and would like to proceed with surgery.      The following preoperative workup should be completed prior to surgery: lower extremity vein mapping      Thank you very much for this referral,    Alice Shahid MD       Thank you for allowing me to participate in the care of your patient.      Sincerely,     Alice Shahid MD     [facility]  cc:   Bautista Durand MD  [address] [address]

## 2025-03-03 NOTE — PATIENT INSTRUCTIONS
You were seen today in the [facility] Cardiovascular Surgery Clinic    Surgery with Dr. Shahid will be scheduled and as well as pre-admission testing and vein mapping.    Please call NEFTALI Gusman surgery coordinator with any questions.  Thank you.    Uzma Dwyer RN  Cardiovascular Surgery  Phone [phone]  Fax [phone]

## 2025-03-04 ENCOUNTER — VIRTUAL VISIT (OUTPATIENT)
Dept: PHARMACY | Facility: PHYSICIAN GROUP | Age: 75
End: 2025-03-04
Payer: COMMERCIAL

## 2025-03-04 ENCOUNTER — PREP FOR PROCEDURE (OUTPATIENT)
Dept: CARDIOLOGY | Facility: CLINIC | Age: 75
End: 2025-03-04
Payer: COMMERCIAL

## 2025-03-04 ENCOUNTER — TELEPHONE (OUTPATIENT)
Dept: CARDIOLOGY | Facility: CLINIC | Age: 75
End: 2025-03-04
Payer: COMMERCIAL

## 2025-03-04 DIAGNOSIS — Z79.4 TYPE 2 DIABETES MELLITUS WITH HYPERGLYCEMIA, WITH LONG-TERM CURRENT USE OF INSULIN (H): Primary | ICD-10-CM

## 2025-03-04 DIAGNOSIS — E11.65 TYPE 2 DIABETES MELLITUS WITH HYPERGLYCEMIA, WITH LONG-TERM CURRENT USE OF INSULIN (H): Primary | ICD-10-CM

## 2025-03-04 DIAGNOSIS — I25.10 CAD (CORONARY ARTERY DISEASE): Primary | ICD-10-CM

## 2025-03-04 PROCEDURE — 1111F DSCHRG MED/CURRENT MED MERGE: CPT | Mod: 93 | Performed by: PHARMACIST

## 2025-03-04 PROCEDURE — 99606 MTMS BY PHARM EST 15 MIN: CPT | Mod: 93 | Performed by: PHARMACIST

## 2025-03-04 NOTE — PROGRESS NOTES
Medication Therapy Management (MTM) Encounter    ASSESSMENT:                            Medication Adherence/Access: No issues identified.  _  Diabetes  A1c is not at goal of <8%.   Time in range is not at goal of >50% with continuous glucose monitoring.   Poorly controlled with elevated morning blood glucose. Post-prandial readings do appear improved since switching to Mounjaro.   Current regimen: Lantus 48 units daily, Mounjaro 5 mg weekly.  Patient reports reduced appetite but struggles with sweet cravings.  Patient would benefit from:  - Increase Lantus to 50 units daily  - Continue Mounjaro 5 mg weekly. Likely will increase dose after upcoming procedure.   - Hold Mounjaro one week prior to procedure (March 17th)  - Resume Mounjaro day after procedure  - Encouraged to focus on better diet choices prior to upcoming procedure  _____________________  PLAN:                            - Increase your daily Lantus insulin to 50 units daily    - Continue using Mounjaro 5 mg once a week until the week before your procedure. Hold off on taking it the week before and resume the day after the procedure.    - Try to avoid eating sweets before your procedure.     Follow-Up: Phone visit on March 25th at 2:00 PM to check on your post-procedure status and discuss increasing Mounjaro dose    SUBJECTIVE/OBJECTIVE:                          Eric Cordoba is a 74 year old male seen for a follow-up visit.       Reason for visit: Diabetes follow-up.    Allergies/ADRs: Reviewed in chart  Past Medical History: Reviewed in chart  Tobacco: He reports that he has quit smoking. His smoking use included cigarettes. He has never used smokeless tobacco.  Alcohol: none    Medication Adherence/Access:   Patient uses pill box(es).  Patient takes medications 2 time(s) per day.   Per patient, he is good about remembering to take his pills everyday but misses insulin doses at times. Denies missing any doses since last visit.     Diabetes  - Mounjaro  5 mg weekly (taken twice so far)  - Lantus 48 units daily  Switched from Trulicity to Mounjaro after last visit.   Patient scheduled for CABG procedure on March 17th. Instructed to lower blood glucose levels pre-procedure.  Effects noted since starting Mounjaro:  - Decreased appetite and portion sizes  - No significant reduction in sweet cravings  Blood glucose: Morning levels inconsistent, alternating between acceptable and elevated.  Diet:  Smaller portions; sometimes only 2-3 portions of imitation crab salad per meal  Difficulty avoiding sweets  Manuel 3 Continuous Glucose Monitor Report:   Name: Italo López  YOB: 1950  Report Period: 02/26/2025 - 03/04/2025 (7 days)  Generated: 03/04/2025  Time CGM Active: 65%  Glucose Statistics and Targets  Average Glucose: 194 mg/dL  Glucose Management Indicator (GMI): N/A  Glucose Variability (%CV): 25.9%  Target Range: 70 - 180 mg/dL  Time in Ranges  Very High: >250 mg/dL --- 13%  High: 181 - 250 mg/dL --- 47%  Target Range: 70 - 180 mg/dL --- 40%  Low: 54 - 69 mg/dL --- 0%  Very Low: <54 mg/dL --- 0%    Today's Vitals: There were no vitals taken for this visit.  ----------------  Post Discharge Medication Reconciliation Status: discharge medications reconciled and changed, per note/orders.    I spent 6 minutes with this patient today. All changes were made via collaborative practice agreement with Abner Elmore MD.     A summary of these recommendations was sent via [employer].    Ivette Rodriguez, PharmD, BCACP  Medication Therapy Management Pharmacist  [facility]  [phone]      Telemedicine Visit Details  The patient's medications can be safely assessed via a telemedicine encounter.  Type of service:  Telephone visit  Originating Location (pt. Location): Home    Distant Location (provider location):  On-site  Start Time:  2:10 PM  End Time:  2:17 P     Medication Therapy Recommendations  Type 2 diabetes mellitus with hyperglycemia, with  long-term current use of insulin (H)   1 Current Medication: LANTUS SOLOSTAR 100 UNIT/ML soln   Current Medication Sig: Inject 50 Units subcutaneously daily.   Rationale: Dose too low - Dosage too low - Effectiveness   Recommendation: Increase Dose   Status: Accepted per CPA   Identified Date: 3/4/2025 Completed Date: 3/4/2025

## 2025-03-04 NOTE — TELEPHONE ENCOUNTER
Per task, pt needs to schedule surgery with Dr. Shahid. Talked with pt and pt would like to schedule something after 3/16. Scheduled pt for 3/17. Will call if anything changes

## 2025-03-05 ENCOUNTER — TELEPHONE (OUTPATIENT)
Dept: CARDIOLOGY | Facility: CLINIC | Age: 75
End: 2025-03-05
Payer: COMMERCIAL

## 2025-03-05 DIAGNOSIS — R79.89 OTHER SPECIFIED ABNORMAL FINDINGS OF BLOOD CHEMISTRY: ICD-10-CM

## 2025-03-05 DIAGNOSIS — I99.8 OTHER DISORDER OF CIRCULATORY SYSTEM: ICD-10-CM

## 2025-03-05 DIAGNOSIS — I25.118 CORONARY ARTERY DISEASE OF NATIVE ARTERY OF NATIVE HEART WITH STABLE ANGINA PECTORIS: Primary | ICD-10-CM

## 2025-03-05 RX ORDER — PHENYLEPHRINE HCL IN 0.9% NACL 50MG/250ML
.1-6 PLASTIC BAG, INJECTION (ML) INTRAVENOUS CONTINUOUS
OUTPATIENT
Start: 2025-03-05

## 2025-03-05 RX ORDER — CEFAZOLIN SODIUM 2 G/50ML
2 SOLUTION INTRAVENOUS
OUTPATIENT
Start: 2025-03-05

## 2025-03-05 RX ORDER — CHLORHEXIDINE GLUCONATE ORAL RINSE 1.2 MG/ML
10 SOLUTION DENTAL ONCE
OUTPATIENT
Start: 2025-03-05 | End: 2025-03-05

## 2025-03-05 RX ORDER — CEFAZOLIN SODIUM 2 G/50ML
2 SOLUTION INTRAVENOUS SEE ADMIN INSTRUCTIONS
OUTPATIENT
Start: 2025-03-05

## 2025-03-05 RX ORDER — SODIUM CHLORIDE, SODIUM GLUCONATE, SODIUM ACETATE, POTASSIUM CHLORIDE AND MAGNESIUM CHLORIDE 526; 502; 368; 37; 30 MG/100ML; MG/100ML; MG/100ML; MG/100ML; MG/100ML
1000 INJECTION, SOLUTION INTRAVENOUS
OUTPATIENT
Start: 2025-03-05 | End: 2025-03-05

## 2025-03-05 RX ORDER — ASPIRIN 81 MG/1
162 TABLET, CHEWABLE ORAL
OUTPATIENT
Start: 2025-03-05

## 2025-03-05 RX ORDER — ASPIRIN 81 MG/1
81 TABLET, CHEWABLE ORAL
OUTPATIENT
Start: 2025-03-05

## 2025-03-05 RX ORDER — DEXMEDETOMIDINE HYDROCHLORIDE 4 UG/ML
0.2-1.2 INJECTION, SOLUTION INTRAVENOUS CONTINUOUS
OUTPATIENT
Start: 2025-03-05

## 2025-03-05 RX ORDER — LIDOCAINE 40 MG/G
CREAM TOPICAL
OUTPATIENT
Start: 2025-03-05

## 2025-03-05 RX ORDER — EPINEPHRINE IN 0.9 % SOD CHLOR 5 MG/250ML
.01-.3 PLASTIC BAG, INJECTION (ML) INTRAVENOUS CONTINUOUS
OUTPATIENT
Start: 2025-03-05

## 2025-03-05 RX ORDER — HEPARIN SOD,PORCINE/0.9 % NACL 30K/1000ML
INTRAVENOUS SOLUTION INTRAVENOUS
OUTPATIENT
Start: 2025-03-05

## 2025-03-05 NOTE — PROGRESS NOTES
Dear Colleagues,     We had the pleasure of seeing Eric Cordoba today in our Cardiac Surgery Clinic at [facility]. As you know he is a pleasant 74-year-old man with a history of CKD III (Cr 1.8), DM2 (A1c 11-12, poorly controlled, on insulin with polyneuropathy), CVA (memory impairment), HTN, HFrEF (EF 35-40%), MI, and LV apical thrombus (on Eliquis) who presents for evaluation for severe multivessel coronary artery disease.    In terms of symptoms he reports shortness of breath with exertion; however his activity is severely limited by neuropathy. We also discussed his poor diabetes control and he acknowledged that he is unable to reduce his intake of sweets.     Coronary angiogram revealed severe multivessel coronary artery disease notable for chronic occlusion of the proximal to mid LAD, subtotal occlusion of the proximal to mid left circumflex, and chronic occlusion of the proximal right coronary artery.     Of note TTE on 1/7/25 revealed EF 35-40%, mild-moderate mitral stenosis, mild mitral regurgitation, and mobile thrombus in the LV. He was then started on Eliquis for the LV thrombus. Cardiac MRI on 2/11/25 revealed viable myocardium with LVEF 31%, RVEF 68%, and no evidence of apical thrombus.      ROS: 10 point review of systems was performed and negative except as described above.     Past Medical History:   Past Medical History:   Diagnosis Date    Diabetes mellitus, type 2 (H)     History of CVA (cerebrovascular accident)     Hypertension     Nutritional and metabolic cardiomyopathies (H)         Past Surgical History:  Past Surgical History:   Procedure Laterality Date    CV CORONARY ANGIOGRAM N/A 2/24/2025    Procedure: Coronary Angiogram;  Surgeon: Bautista Durand MD;  Location: [facility] CATH LAB CV    CV LEFT HEART CATH N/A 2/24/2025    Procedure: Left Heart Catheterization;  Surgeon: Bautista Durand MD;  Location: [facility] CATH LAB CV         Family History: Father - MI     Social  History: Remote smoking, denies alcohol. Retired.      Medications:   Current Outpatient Medications   Medication Sig Dispense Refill    apixaban ANTICOAGULANT (ELIQUIS ANTICOAGULANT) 5 MG tablet Take 1 tablet (5 mg) by mouth 2 times daily. 180 tablet 3    carvedilol (COREG) 12.5 MG tablet Take 12.5 mg by mouth 2 times daily      furosemide (LASIX) 20 MG tablet Take 40 mg by mouth daily      gabapentin (NEURONTIN) 300 MG capsule Take 600 mg by mouth every morning And 300 mg evening      LANTUS SOLOSTAR 100 UNIT/ML soln Inject 48 Units subcutaneously daily.      losartan (COZAAR) 50 MG tablet Take 50 mg by mouth daily.      MOUNJARO 5 MG/0.5ML SOAJ Inject 5 mg subcutaneously every 7 days.      simvastatin (ZOCOR) 40 MG tablet Take 40 mg by mouth daily       No current facility-administered medications for this visit.        Allergies:   Allergies   Allergen Reactions    Lisinopril Cough    Propoxyphene Unknown and Hives        Physical Exam:  /77 (BP Location: Right arm, Patient Position: Sitting, Cuff Size: Adult Large)   Pulse 75   Resp 14   Wt 93.9 kg (207 lb)   BMI 30.57 kg/m    Alert, pleasant, in no acute distress  Sclera anicteric  Neck supple JVD flat  Trachea midline, breathing comfortably on room air  No prior chest incisions  Breathing unlabored on room air  Regular rate and rhythm  Abdomen soft, non-tender  Extremities warm, no edema      Labs  CBC RESULTS:   Recent Labs   Lab Test 02/24/25  0930   WBC 8.3   RBC 4.22*   HGB 13.4   HCT 37.9*   MCV 90   MCH 31.8   MCHC 35.4   RDW 12.7         Last Comprehensive Metabolic Panel:  Lab Results   Component Value Date     02/24/2025    POTASSIUM 4.2 02/24/2025    CHLORIDE 102 02/24/2025    CO2 26 02/24/2025    ANIONGAP 11 02/24/2025     (H) 02/24/2025    BUN 24.6 (H) 02/24/2025    CR 1.83 (H) 02/24/2025    GFRESTIMATED 38 (L) 02/24/2025    GAYATHRI 9.5 02/24/2025        Imaging: All imaging studies were reviewed and interpreted by  me.    TTE 1/7/25:  EF 35-40%, mild-moderate mitral stenosis with MG 5, mitral annular calcification, mild mitral regurgitation, mobile thrombus in the left ventricle.     Cardiac MRI 2/11/25:  1.  Pharmacological Regadenoson stress cardiac MRI is positive for inducible diffuse severe myocardial  ischemia (nearly entire viable myocardium).  Likely representing multi-vessel obstructive CAD.   2.  No Lexiscan induced ECG changes.   3.  Moderately enlarged left ventricular size with severe reduction in systolic function. The quantified  left ventricular ejection fraction is 31%.  There is severe hypokinesis to akinesis of the basal to apical  inferior and lateral wall segments of the left ventricle.  There is akinesis of the entire LV apex.  Severe  hypokinesis of the mid to distal anterior and mid to distal septal LV wall segments.    4.  There is non-transmural infarction of the basal to apical inferior and basal to apical lateral wall  segments of the left ventricle representing likely viable myocardium.  There transmural infarction of the  entire LV apex representing non-viable myocardium.   Non-transmural mid to distal anterior and mid to  distal septal infarction <25% of myocardial thickness representing viable myocardium.   5.  No evidence of LV apical thrombus.     6.  Normal right ventricular size and systolic function.  RVEF: 68%.  7.  Mitral valve is mild-moderately thickened. There is trivial mitral regurgitation.  No significant  mitral stenosis (MVAp: 2.6 cm2).     Cardiac catheterization 2/24/25:  Coronary Angiography:  LEFT MAIN: Short vessel with mild to moderate disease.  LEFT ANTERIOR DESCENDING: Normal caliber vessel that gives rise to a large diagonal branch in the proximal portion is chronically occluded in the proximal to midportion.  The LAD fills distally via left to left collaterals.  Ostial to proximal diagonal branch has a 70% stenosis followed by moderate disease distally.  LEFT CIRCUMFLEX:  Normal caliber vessel that gives rise to a large OM1 branch and terminates into a medium OM 2 branch distally.  Proximal to mid left circumflex subtotally occluded after the takeoff of the large OM branch and fills distally via left to left collaterals.  The large OM branch has a 70 to 80% calcific proximal stenosis followed by mild disease distally.  RIGHT CORONARY ARTERY: Dominant vessel that is occluded proximally and fills distally via left-to-right collaterals.    Carotid US 2/24/25:  1.  On the right there is a mild amount of atheromatous plaque.  Peak systolic velocities in the ICA are 77 cm/s which correspond to the less than 50% stenosis range based on NASCET criteria.  2.  On the left there is a moderate amount of atheromatous plaque.  Peak systolic velocities in the ICA are 195 cm/s which correspond to the 50-69% stenosis range based on NASCET criteria.  3.  Antegrade flow within the vertebral arteries bilaterally.      In summary Eric Cordoba is a 74-year-old man with a history of CKD III (Cr 1.8), DM2 (A1c 11-12, poorly controlled, on insulin with polyneuropathy), CVA (memory impairment), HTN, HFrEF (EF 35-40%), MI, and LV apical thrombus (on Eliquis, may have resolved) who presents for evaluation for severe multivessel coronary artery disease. Given his comorbidities his STS calculated risk score for isolated CABG is 3.9% risk of mortality and 18.6% risk of major morbidity and mortality.      I recommend surgical revascularization with coronary artery bypass grafting for symptomatic and survival benefit with LIMA and SVG for conduit. Given his chronic total occlusion of LAD, Lcx, and RCA along with reduced EF 30-35% I recommend IABP placement prior to surgery. Of note he also has mild-moderate mitral stenosis and mild mitral regurgitation. I will most likely plan to leave the mitral valve alone but should the mitral stenosis worsen on intraoperative MANUEL then we may also need to replace the mitral valve  (tissue). We will also use intraoperative MANUEL to evaluate for presence of left ventricular thrombus. If there is significant thrombus present we may need to delay surgery due to risk of embolization. I described the technical details of the operation, as well as the expected postoperative course and recovery to the patient. I also discussed the risks, benefits, and alternatives to the procedure. The risks include but are not limited to bleeding, infection, stroke, heart or graft failure with myocardial infarction, dysrhythmia, need for pacemaker, respiratory failure, kidney or liver injury, bowel or limb ischemia, need for reoperation, and death. The patient understands these risks and would like to proceed with surgery.      The following preoperative workup should be completed prior to surgery: lower extremity vein mapping      Thank you very much for this referral,    Alice Shahid MD

## 2025-03-05 NOTE — H&P (VIEW-ONLY)
Dear Colleagues,     We had the pleasure of seeing Eric Cordoba today in our Cardiac Surgery Clinic at [facility]. As you know he is a pleasant 74-year-old man with a history of CKD III (Cr 1.8), DM2 (A1c 11-12, poorly controlled, on insulin with polyneuropathy), CVA (memory impairment), HTN, HFrEF (EF 35-40%), MI, and LV apical thrombus (on Eliquis) who presents for evaluation for severe multivessel coronary artery disease.    In terms of symptoms he reports shortness of breath with exertion; however his activity is severely limited by neuropathy. We also discussed his poor diabetes control and he acknowledged that he is unable to reduce his intake of sweets.     Coronary angiogram revealed severe multivessel coronary artery disease notable for chronic occlusion of the proximal to mid LAD, subtotal occlusion of the proximal to mid left circumflex, and chronic occlusion of the proximal right coronary artery.     Of note TTE on 1/7/25 revealed EF 35-40%, mild-moderate mitral stenosis, mild mitral regurgitation, and mobile thrombus in the LV. He was then started on Eliquis for the LV thrombus. Cardiac MRI on 2/11/25 revealed viable myocardium with LVEF 31%, RVEF 68%, and no evidence of apical thrombus.      ROS: 10 point review of systems was performed and negative except as described above.     Past Medical History:   Past Medical History:   Diagnosis Date    Diabetes mellitus, type 2 (H)     History of CVA (cerebrovascular accident)     Hypertension     Nutritional and metabolic cardiomyopathies (H)         Past Surgical History:  Past Surgical History:   Procedure Laterality Date    CV CORONARY ANGIOGRAM N/A 2/24/2025    Procedure: Coronary Angiogram;  Surgeon: Bautista Durand MD;  Location: [facility] CATH LAB CV    CV LEFT HEART CATH N/A 2/24/2025    Procedure: Left Heart Catheterization;  Surgeon: Bautista Durand MD;  Location: [facility] CATH LAB CV         Family History: Father - MI     Social  History: Remote smoking, denies alcohol. Retired.      Medications:   Current Outpatient Medications   Medication Sig Dispense Refill    apixaban ANTICOAGULANT (ELIQUIS ANTICOAGULANT) 5 MG tablet Take 1 tablet (5 mg) by mouth 2 times daily. 180 tablet 3    carvedilol (COREG) 12.5 MG tablet Take 12.5 mg by mouth 2 times daily      furosemide (LASIX) 20 MG tablet Take 40 mg by mouth daily      gabapentin (NEURONTIN) 300 MG capsule Take 600 mg by mouth every morning And 300 mg evening      LANTUS SOLOSTAR 100 UNIT/ML soln Inject 48 Units subcutaneously daily.      losartan (COZAAR) 50 MG tablet Take 50 mg by mouth daily.      MOUNJARO 5 MG/0.5ML SOAJ Inject 5 mg subcutaneously every 7 days.      simvastatin (ZOCOR) 40 MG tablet Take 40 mg by mouth daily       No current facility-administered medications for this visit.        Allergies:   Allergies   Allergen Reactions    Lisinopril Cough    Propoxyphene Unknown and Hives        Physical Exam:  /77 (BP Location: Right arm, Patient Position: Sitting, Cuff Size: Adult Large)   Pulse 75   Resp 14   Wt 93.9 kg (207 lb)   BMI 30.57 kg/m    Alert, pleasant, in no acute distress  Sclera anicteric  Neck supple JVD flat  Trachea midline, breathing comfortably on room air  No prior chest incisions  Breathing unlabored on room air  Regular rate and rhythm  Abdomen soft, non-tender  Extremities warm, no edema      Labs  CBC RESULTS:   Recent Labs   Lab Test 02/24/25  0930   WBC 8.3   RBC 4.22*   HGB 13.4   HCT 37.9*   MCV 90   MCH 31.8   MCHC 35.4   RDW 12.7         Last Comprehensive Metabolic Panel:  Lab Results   Component Value Date     02/24/2025    POTASSIUM 4.2 02/24/2025    CHLORIDE 102 02/24/2025    CO2 26 02/24/2025    ANIONGAP 11 02/24/2025     (H) 02/24/2025    BUN 24.6 (H) 02/24/2025    CR 1.83 (H) 02/24/2025    GFRESTIMATED 38 (L) 02/24/2025    GAYATHRI 9.5 02/24/2025        Imaging: All imaging studies were reviewed and interpreted by  me.    TTE 1/7/25:  EF 35-40%, mild-moderate mitral stenosis with MG 5, mitral annular calcification, mild mitral regurgitation, mobile thrombus in the left ventricle.     Cardiac MRI 2/11/25:  1.  Pharmacological Regadenoson stress cardiac MRI is positive for inducible diffuse severe myocardial  ischemia (nearly entire viable myocardium).  Likely representing multi-vessel obstructive CAD.   2.  No Lexiscan induced ECG changes.   3.  Moderately enlarged left ventricular size with severe reduction in systolic function. The quantified  left ventricular ejection fraction is 31%.  There is severe hypokinesis to akinesis of the basal to apical  inferior and lateral wall segments of the left ventricle.  There is akinesis of the entire LV apex.  Severe  hypokinesis of the mid to distal anterior and mid to distal septal LV wall segments.    4.  There is non-transmural infarction of the basal to apical inferior and basal to apical lateral wall  segments of the left ventricle representing likely viable myocardium.  There transmural infarction of the  entire LV apex representing non-viable myocardium.   Non-transmural mid to distal anterior and mid to  distal septal infarction <25% of myocardial thickness representing viable myocardium.   5.  No evidence of LV apical thrombus.     6.  Normal right ventricular size and systolic function.  RVEF: 68%.  7.  Mitral valve is mild-moderately thickened. There is trivial mitral regurgitation.  No significant  mitral stenosis (MVAp: 2.6 cm2).     Cardiac catheterization 2/24/25:  Coronary Angiography:  LEFT MAIN: Short vessel with mild to moderate disease.  LEFT ANTERIOR DESCENDING: Normal caliber vessel that gives rise to a large diagonal branch in the proximal portion is chronically occluded in the proximal to midportion.  The LAD fills distally via left to left collaterals.  Ostial to proximal diagonal branch has a 70% stenosis followed by moderate disease distally.  LEFT CIRCUMFLEX:  Normal caliber vessel that gives rise to a large OM1 branch and terminates into a medium OM 2 branch distally.  Proximal to mid left circumflex subtotally occluded after the takeoff of the large OM branch and fills distally via left to left collaterals.  The large OM branch has a 70 to 80% calcific proximal stenosis followed by mild disease distally.  RIGHT CORONARY ARTERY: Dominant vessel that is occluded proximally and fills distally via left-to-right collaterals.    Carotid US 2/24/25:  1.  On the right there is a mild amount of atheromatous plaque.  Peak systolic velocities in the ICA are 77 cm/s which correspond to the less than 50% stenosis range based on NASCET criteria.  2.  On the left there is a moderate amount of atheromatous plaque.  Peak systolic velocities in the ICA are 195 cm/s which correspond to the 50-69% stenosis range based on NASCET criteria.  3.  Antegrade flow within the vertebral arteries bilaterally.      In summary Eric Cordoba is a 74-year-old man with a history of CKD III (Cr 1.8), DM2 (A1c 11-12, poorly controlled, on insulin with polyneuropathy), CVA (memory impairment), HTN, HFrEF (EF 35-40%), MI, and LV apical thrombus (on Eliquis, may have resolved) who presents for evaluation for severe multivessel coronary artery disease. Given his comorbidities his STS calculated risk score for isolated CABG is 3.9% risk of mortality and 18.6% risk of major morbidity and mortality.      I recommend surgical revascularization with coronary artery bypass grafting for symptomatic and survival benefit with LIMA and SVG for conduit. Given his chronic total occlusion of LAD, Lcx, and RCA along with reduced EF 30-35% I recommend IABP placement prior to surgery. Of note he also has mild-moderate mitral stenosis and mild mitral regurgitation. I will most likely plan to leave the mitral valve alone but should the mitral stenosis worsen on intraoperative MANUEL then we may also need to replace the mitral valve  (tissue). We will also use intraoperative MANUEL to evaluate for presence of left ventricular thrombus. If there is significant thrombus present we may need to delay surgery due to risk of embolization. I described the technical details of the operation, as well as the expected postoperative course and recovery to the patient. I also discussed the risks, benefits, and alternatives to the procedure. The risks include but are not limited to bleeding, infection, stroke, heart or graft failure with myocardial infarction, dysrhythmia, need for pacemaker, respiratory failure, kidney or liver injury, bowel or limb ischemia, need for reoperation, and death. The patient understands these risks and would like to proceed with surgery.      The following preoperative workup should be completed prior to surgery: lower extremity vein mapping      Thank you very much for this referral,    Alice Shahid MD

## 2025-03-12 NOTE — PATIENT INSTRUCTIONS
Recommendations from today's MTM visit:                                                       - Increase your daily Lantus insulin to 50 units daily     - Continue using Mounjaro 5 mg once a week until the week before your procedure. Hold off on taking it the week before and resume the day after the procedure.     - Try to avoid eating sweets before your procedure.      Follow-Up: Phone visit on March 25th at 2:00 PM to check on your post-procedure status and discuss increasing Mounjaro dose    It was great speaking with you today.  I value your experience and would be very thankful for your time in providing feedback in our clinic survey. In the next few days, you may receive an email or text message from [employer] with a link to a survey related to your clinical pharmacist.    To schedule another MTM appointment, please call [phone].    My Clinical Pharmacist's contact information:                                                      Please feel free to contact me with any questions or concerns you have.      Ivette Rodriguez, PharmD, BCACP  Medication Therapy Management Pharmacist  [facility]  [phone]

## 2025-03-13 ENCOUNTER — HOSPITAL ENCOUNTER (OUTPATIENT)
Dept: SURGERY | Facility: HOSPITAL | Age: 75
Discharge: HOME | End: 2025-03-13
Attending: STUDENT IN AN ORGANIZED HEALTH CARE EDUCATION/TRAINING PROGRAM
Payer: COMMERCIAL

## 2025-03-13 ENCOUNTER — HOSPITAL ENCOUNTER (OUTPATIENT)
Dept: ULTRASOUND IMAGING | Facility: HOSPITAL | Age: 75
Discharge: HOME | End: 2025-03-13
Attending: STUDENT IN AN ORGANIZED HEALTH CARE EDUCATION/TRAINING PROGRAM
Payer: COMMERCIAL

## 2025-03-13 VITALS
BODY MASS INDEX: 30.13 KG/M2 | SYSTOLIC BLOOD PRESSURE: 117 MMHG | WEIGHT: 204 LBS | TEMPERATURE: 97.7 F | OXYGEN SATURATION: 98 % | HEART RATE: 70 BPM | DIASTOLIC BLOOD PRESSURE: 63 MMHG

## 2025-03-13 DIAGNOSIS — R06.02 SHORTNESS OF BREATH: ICD-10-CM

## 2025-03-13 DIAGNOSIS — R79.89 OTHER SPECIFIED ABNORMAL FINDINGS OF BLOOD CHEMISTRY: ICD-10-CM

## 2025-03-13 DIAGNOSIS — I25.118 CORONARY ARTERY DISEASE OF NATIVE ARTERY OF NATIVE HEART WITH STABLE ANGINA PECTORIS: ICD-10-CM

## 2025-03-13 DIAGNOSIS — I99.8 OTHER DISORDER OF CIRCULATORY SYSTEM: ICD-10-CM

## 2025-03-13 LAB
ABO + RH BLD: NORMAL
ALBUMIN UR-MCNC: NEGATIVE MG/DL
APPEARANCE UR: CLEAR
APTT PPP: 28 SECONDS (ref 22–38)
BILIRUB UR QL STRIP: NEGATIVE
BLD GP AB SCN SERPL QL: NEGATIVE
COLOR UR AUTO: ABNORMAL
EST. AVERAGE GLUCOSE BLD GHB EST-MCNC: 203 MG/DL
GLUCOSE UR STRIP-MCNC: NEGATIVE MG/DL
HBA1C MFR BLD: 8.7 %
HGB UR QL STRIP: NEGATIVE
HYALINE CASTS: 12 /LPF
INR PPP: 1.07 (ref 0.85–1.15)
KETONES UR STRIP-MCNC: NEGATIVE MG/DL
LEUKOCYTE ESTERASE UR QL STRIP: NEGATIVE
MAGNESIUM SERPL-MCNC: 2.3 MG/DL (ref 1.7–2.3)
NITRATE UR QL: NEGATIVE
PH UR STRIP: 5 [PH] (ref 5–7)
PREALB SERPL-MCNC: 21.6 MG/DL (ref 20–40)
RBC URINE: <1 /HPF
SP GR UR STRIP: 1.01 (ref 1–1.03)
SPECIMEN EXP DATE BLD: NORMAL
SQUAMOUS EPITHELIAL: <1 /HPF
UROBILINOGEN UR STRIP-MCNC: <2 MG/DL
WBC URINE: 1 /HPF

## 2025-03-13 PROCEDURE — 93970 EXTREMITY STUDY: CPT

## 2025-03-13 PROCEDURE — 81001 URINALYSIS AUTO W/SCOPE: CPT | Performed by: STUDENT IN AN ORGANIZED HEALTH CARE EDUCATION/TRAINING PROGRAM

## 2025-03-13 PROCEDURE — 36415 COLL VENOUS BLD VENIPUNCTURE: CPT | Performed by: STUDENT IN AN ORGANIZED HEALTH CARE EDUCATION/TRAINING PROGRAM

## 2025-03-13 PROCEDURE — 85610 PROTHROMBIN TIME: CPT | Performed by: STUDENT IN AN ORGANIZED HEALTH CARE EDUCATION/TRAINING PROGRAM

## 2025-03-13 PROCEDURE — 85730 THROMBOPLASTIN TIME PARTIAL: CPT | Performed by: STUDENT IN AN ORGANIZED HEALTH CARE EDUCATION/TRAINING PROGRAM

## 2025-03-13 PROCEDURE — 84134 ASSAY OF PREALBUMIN: CPT | Performed by: STUDENT IN AN ORGANIZED HEALTH CARE EDUCATION/TRAINING PROGRAM

## 2025-03-13 PROCEDURE — 83735 ASSAY OF MAGNESIUM: CPT | Performed by: STUDENT IN AN ORGANIZED HEALTH CARE EDUCATION/TRAINING PROGRAM

## 2025-03-13 PROCEDURE — 86850 RBC ANTIBODY SCREEN: CPT | Performed by: STUDENT IN AN ORGANIZED HEALTH CARE EDUCATION/TRAINING PROGRAM

## 2025-03-13 PROCEDURE — 86900 BLOOD TYPING SEROLOGIC ABO: CPT | Performed by: STUDENT IN AN ORGANIZED HEALTH CARE EDUCATION/TRAINING PROGRAM

## 2025-03-13 PROCEDURE — 83036 HEMOGLOBIN GLYCOSYLATED A1C: CPT | Performed by: STUDENT IN AN ORGANIZED HEALTH CARE EDUCATION/TRAINING PROGRAM

## 2025-03-13 RX ORDER — DIPHENHYDRAMINE HCL, IBUPROFEN 25; 200 MG/1; MG/1
3 CAPSULE, LIQUID FILLED ORAL AT BEDTIME
Status: ON HOLD | COMMUNITY
End: 2025-03-26

## 2025-03-13 RX ORDER — GABAPENTIN 300 MG/1
300 CAPSULE ORAL AT BEDTIME
Status: ON HOLD | COMMUNITY
End: 2025-03-26

## 2025-03-13 NOTE — PROGRESS NOTES
SPIRITUAL HEALTH SERVICES Progress Note  [facility], [facility]    Saw pt Eric RAMSES Beryl per patient request at his pre-surgical appointment as he anticipates heart surgery next week.     Patient/Family Understanding of Illness and Goals of Care - Eric shared about the events leading up to this time and anticipating the surgery in the coming days.     Distress and Loss - Ongoing health challenges and anticipating surgery.     Strengths, Coping, and Resources - Friend Veronique present offering support.     Meaning, Beliefs, and Spirituality - He shared about his Faith background; no current louann community identified at this time. He welcomed a prayer of blessing and follow up visits after his surgery and his recovery time.       Yovani Loyola MDiv, [facility]  /Manager Spiritual Health Services  [phone]       Spiritual Health Services is available 24/7 for emergent requests and consults, either by paging the on-call  or by entering an ASAP/STAT consult in [facility], which will also page the on-call .

## 2025-03-13 NOTE — PHARMACY-ADMISSION MEDICATION HISTORY
Pharmacist Admission Medication History    Admission medication history is complete. The information provided in this note is only as accurate as the sources available at the time of the update.    Information Source(s): Patient via in-person    Pertinent Information: Patient has been holding mounjaro since Tuesday 3/4.     Changes made to PTA medication list:  Added: Advil PM   Deleted: None  Changed: Gabapentin 600 mg in AM and 300 mg in PM to 300 mg in AM and 600 mg in PM     Allergies reviewed with patient and updates made in EHR: yes    Medication History Completed By: HAYDEN BARFIELD RPH 3/13/2025 9:32 AM    PTA Med List   Medication Sig Last Dose/Taking    apixaban ANTICOAGULANT (ELIQUIS ANTICOAGULANT) 5 MG tablet Take 1 tablet (5 mg) by mouth 2 times daily. 3/13/2025 Morning    carvedilol (COREG) 12.5 MG tablet Take 12.5 mg by mouth 2 times daily 3/13/2025 Morning    furosemide (LASIX) 20 MG tablet Take 40 mg by mouth daily 3/13/2025 Morning    gabapentin (NEURONTIN) 300 MG capsule Take 300 mg by mouth every morning. Along with a 600 mg dose every evening 3/13/2025 Morning    Ibuprofen-diphenhydrAMINE HCl (ADVIL PM) 200-25 MG CAPS Take 3 capsules by mouth at bedtime. 3/12/2025 Evening    LANTUS SOLOSTAR 100 UNIT/ML soln Inject 50 Units subcutaneously every evening. 3/12/2025 Evening    losartan (COZAAR) 50 MG tablet Take 50 mg by mouth daily. 3/13/2025 Morning    MOUNJARO 5 MG/0.5ML SOAJ Inject 5 mg subcutaneously every 7 days. Past Month    simvastatin (ZOCOR) 40 MG tablet Take 40 mg by mouth daily 3/13/2025 Morning

## 2025-03-14 ENCOUNTER — ANESTHESIA EVENT (OUTPATIENT)
Dept: SURGERY | Facility: HOSPITAL | Age: 75
End: 2025-03-14
Payer: COMMERCIAL

## 2025-03-14 NOTE — ANESTHESIA PREPROCEDURE EVALUATION
Anesthesia Pre-Procedure Evaluation    Patient: Eric Cordoba   MRN: [id number] : 1950        Procedure : Procedure(s):  CORONARY ARTERY BYPASS GRAFT, INTERNAL MAMMARY ARTERY HARVEST, ENDOSCOPIC VESSEL PROCUREMENT  ECHOCARDIOGRAM, TRANSESOPHAGEAL, INTRAOPERATIVE          Past Medical History:   Diagnosis Date     Diabetes mellitus, type 2 (H)      History of CVA (cerebrovascular accident)      Hypertension      Nutritional and metabolic cardiomyopathies (H)       Past Surgical History:   Procedure Laterality Date     CV CORONARY ANGIOGRAM N/A 2025    Procedure: Coronary Angiogram;  Surgeon: Bautista Durand MD;  Location: [facility] CV     CV LEFT HEART CATH N/A 2025    Procedure: Left Heart Catheterization;  Surgeon: Bautista Durand MD;  Location: [facility] CV      Allergies   Allergen Reactions     Lisinopril Cough     Propoxyphene Unknown and Hives      Social History     Tobacco Use     Smoking status: Former     Types: Cigarettes     Smokeless tobacco: Never   Substance Use Topics     Alcohol use: Not Currently      Wt Readings from Last 1 Encounters:   25 92.5 kg (204 lb)        Anesthesia Evaluation            ROS/MED HX  ENT/Pulmonary:       Neurologic:     (+)          CVA,    TIA,                  Cardiovascular:     (+) Dyslipidemia hypertension- -  CAD angina-  - -                                      METS/Exercise Tolerance:     Hematologic:       Musculoskeletal:       GI/Hepatic:       Renal/Genitourinary:     (+) renal disease, type: CRI,            Endo:     (+)  type II DM,                    Psychiatric/Substance Use:       Infectious Disease:       Malignancy:       Other:            Physical Exam    Airway        Mallampati: III   TM distance: < 3 FB   Neck ROM: full     Respiratory Devices and Support         Dental       (+) Modest Abnormalities - crowns, retainers, 1 or 2 missing teeth      Cardiovascular   cardiovascular exam normal       "    Pulmonary   pulmonary exam normal            OUTSIDE LABS:  CBC:   Lab Results   Component Value Date    WBC 8.3 02/24/2025    WBC 8.2 01/07/2025    HGB 13.4 02/24/2025    HGB 14.0 01/07/2025    HCT 37.9 (L) 02/24/2025    HCT 40.4 01/07/2025     02/24/2025     01/07/2025     BMP:   Lab Results   Component Value Date     02/24/2025     02/18/2025    POTASSIUM 4.2 02/24/2025    POTASSIUM 3.8 02/18/2025    CHLORIDE 102 02/24/2025    CHLORIDE 99 02/18/2025    CO2 26 02/24/2025    CO2 25 02/18/2025    BUN 24.6 (H) 02/24/2025    BUN 23.8 (H) 02/18/2025    CR 1.83 (H) 02/24/2025    CR 1.89 (H) 02/18/2025     (H) 02/24/2025     (H) 02/18/2025     COAGS:   Lab Results   Component Value Date    PTT 28 03/13/2025    INR 1.07 03/13/2025     POC: No results found for: \"BGM\", \"HCG\", \"HCGS\"  HEPATIC:   Lab Results   Component Value Date    ALBUMIN 3.9 12/31/2024    PROTTOTAL 6.7 12/31/2024    ALT 17 12/31/2024    AST 18 12/31/2024    ALKPHOS 99 12/31/2024    BILITOTAL 0.4 12/31/2024     OTHER:   Lab Results   Component Value Date    A1C 8.7 (H) 03/13/2025    GAYATHRI 9.5 02/24/2025    MAG 2.3 03/13/2025    TSH 2.41 08/27/2018       Anesthesia Plan    ASA Status:  4       Anesthesia Type: General.     - Airway: ETT   Induction: Intravenous, RSI.   Maintenance: Balanced.   Techniques and Equipment:     - Lines/Monitors: Arterial Line, Central Line, PAC, MANUEL     Consents    Anesthesia Plan(s) and associated risks, benefits, and realistic alternatives discussed. Questions answered and patient/representative(s) expressed understanding.     - Discussed: Risks, Benefits and Alternatives for BOTH SEDATION and the PROCEDURE were discussed     - Discussed with:  Patient      - Extended Intubation/Ventilatory Support Discussed: Yes.      - Patient is DNR/DNI Status: No     Use of blood products discussed: Yes.     - Discussed with: Patient.     - Consented: consented to blood products     Postoperative " "Care       PONV prophylaxis: Ondansetron (or other 5HT-3), Promethazine or metoclopramide     Comments:               Scooby Anderson MD    I have reviewed the pertinent notes and labs in the chart from the past 30 days and (re)examined the patient.  Any updates or changes from those notes are reflected in this note.    Clinically Significant Risk Factors Present on Admission                            # DMII: A1C = 8.7 % (Ref range: <5.7 %) within past 6 months    # Obesity: Estimated body mass index is 30.13 kg/m  as calculated from the following:    Height as of 2/24/25: 1.753 m (5' 9\").    Weight as of 3/13/25: 92.5 kg (204 lb).                "

## 2025-03-15 ENCOUNTER — HEALTH MAINTENANCE LETTER (OUTPATIENT)
Age: 75
End: 2025-03-15

## 2025-03-17 ENCOUNTER — ANESTHESIA (OUTPATIENT)
Dept: SURGERY | Facility: HOSPITAL | Age: 75
End: 2025-03-17
Payer: COMMERCIAL

## 2025-03-17 ENCOUNTER — HOSPITAL ENCOUNTER (INPATIENT)
Facility: HOSPITAL | Age: 75
DRG: 235 | End: 2025-03-17
Attending: INTERNAL MEDICINE | Admitting: STUDENT IN AN ORGANIZED HEALTH CARE EDUCATION/TRAINING PROGRAM
Payer: COMMERCIAL

## 2025-03-17 ENCOUNTER — APPOINTMENT (OUTPATIENT)
Dept: RADIOLOGY | Facility: HOSPITAL | Age: 75
DRG: 235 | End: 2025-03-17
Attending: PHYSICIAN ASSISTANT
Payer: COMMERCIAL

## 2025-03-17 DIAGNOSIS — Z79.4 TYPE 2 DIABETES MELLITUS WITH KETOACIDOSIS WITHOUT COMA, WITH LONG-TERM CURRENT USE OF INSULIN (H): ICD-10-CM

## 2025-03-17 DIAGNOSIS — E11.10 TYPE 2 DIABETES MELLITUS WITH KETOACIDOSIS WITHOUT COMA, WITH LONG-TERM CURRENT USE OF INSULIN (H): ICD-10-CM

## 2025-03-17 DIAGNOSIS — I25.10 CAD (CORONARY ARTERY DISEASE): ICD-10-CM

## 2025-03-17 DIAGNOSIS — I25.118 CORONARY ARTERY DISEASE OF NATIVE ARTERY OF NATIVE HEART WITH STABLE ANGINA PECTORIS: ICD-10-CM

## 2025-03-17 DIAGNOSIS — R41.0 DELIRIUM: ICD-10-CM

## 2025-03-17 DIAGNOSIS — Z95.1 S/P CABG (CORONARY ARTERY BYPASS GRAFT): Primary | ICD-10-CM

## 2025-03-17 DIAGNOSIS — G31.84 MCI (MILD COGNITIVE IMPAIRMENT): ICD-10-CM

## 2025-03-17 DIAGNOSIS — N18.32 STAGE 3B CHRONIC KIDNEY DISEASE (H): ICD-10-CM

## 2025-03-17 DIAGNOSIS — I42.9 SECONDARY CARDIOMYOPATHY (H): ICD-10-CM

## 2025-03-17 DIAGNOSIS — I25.10 CORONARY ARTERY DISEASE INVOLVING NATIVE CORONARY ARTERY OF NATIVE HEART, UNSPECIFIED WHETHER ANGINA PRESENT: ICD-10-CM

## 2025-03-17 LAB
ALBUMIN SERPL BCG-MCNC: 3.6 G/DL (ref 3.5–5.2)
ALP SERPL-CCNC: 68 U/L (ref 40–150)
ALT SERPL W P-5'-P-CCNC: 13 U/L (ref 0–70)
ANION GAP SERPL CALCULATED.3IONS-SCNC: 11 MMOL/L (ref 7–15)
APTT PPP: 43 SECONDS (ref 22–38)
APTT PPP: 48 SECONDS (ref 22–38)
AST SERPL W P-5'-P-CCNC: 38 U/L (ref 0–45)
ATRIAL RATE - MUSE: 86 BPM
BASE EXCESS BLDA CALC-SCNC: -0.8 MMOL/L (ref -3–3)
BASE EXCESS BLDA CALC-SCNC: -1.1 MMOL/L (ref -3–3)
BASE EXCESS BLDA CALC-SCNC: -1.4 MMOL/L (ref -3–3)
BASE EXCESS BLDA CALC-SCNC: -2 MMOL/L (ref -3–3)
BASE EXCESS BLDA CALC-SCNC: -2.2 MMOL/L (ref -3–3)
BASE EXCESS BLDA CALC-SCNC: -2.4 MMOL/L (ref -3–3)
BASE EXCESS BLDA CALC-SCNC: -3 MMOL/L (ref -3–3)
BASE EXCESS BLDA CALC-SCNC: -4 MMOL/L (ref -3–3)
BASE EXCESS BLDV CALC-SCNC: -0.9 MMOL/L (ref -3–3)
BILIRUB SERPL-MCNC: 0.6 MG/DL
BLD PROD TYP BPU: NORMAL
BLD PROD TYP BPU: NORMAL
BLOOD COMPONENT TYPE: NORMAL
BLOOD COMPONENT TYPE: NORMAL
BUN SERPL-MCNC: 25.9 MG/DL (ref 8–23)
CA-I BLD-MCNC: 4.1 MG/DL (ref 4.4–5.2)
CA-I BLD-MCNC: 4.1 MG/DL (ref 4.4–5.2)
CA-I BLD-MCNC: 4.2 MG/DL (ref 4.4–5.2)
CA-I BLD-MCNC: 4.4 MG/DL (ref 4.4–5.2)
CA-I BLD-MCNC: 4.4 MG/DL (ref 4.4–5.2)
CA-I BLD-MCNC: 4.5 MG/DL (ref 4.4–5.2)
CA-I BLD-MCNC: 4.5 MG/DL (ref 4.4–5.2)
CA-I BLD-MCNC: 5 MG/DL (ref 4.4–5.2)
CALCIUM SERPL-MCNC: 8.5 MG/DL (ref 8.8–10.4)
CHLORIDE SERPL-SCNC: 111 MMOL/L (ref 98–107)
CODING SYSTEM: NORMAL
CODING SYSTEM: NORMAL
CPB POCT: NO
CREAT SERPL-MCNC: 1.94 MG/DL (ref 0.67–1.17)
CROSSMATCH: NORMAL
CROSSMATCH: NORMAL
DIASTOLIC BLOOD PRESSURE - MUSE: NORMAL MMHG
EGFRCR SERPLBLD CKD-EPI 2021: 35 ML/MIN/1.73M2
ERYTHROCYTE [DISTWIDTH] IN BLOOD BY AUTOMATED COUNT: 12.3 % (ref 10–15)
ERYTHROCYTE [DISTWIDTH] IN BLOOD BY AUTOMATED COUNT: 12.4 % (ref 10–15)
FIBRINOGEN PPP-MCNC: 241 MG/DL (ref 170–510)
GLUCOSE BLD-MCNC: 113 MG/DL (ref 70–99)
GLUCOSE BLD-MCNC: 115 MG/DL (ref 70–99)
GLUCOSE BLD-MCNC: 119 MG/DL (ref 70–99)
GLUCOSE BLD-MCNC: 121 MG/DL (ref 70–99)
GLUCOSE BLD-MCNC: 121 MG/DL (ref 70–99)
GLUCOSE BLD-MCNC: 137 MG/DL (ref 70–99)
GLUCOSE BLD-MCNC: 140 MG/DL (ref 70–99)
GLUCOSE BLDC GLUCOMTR-MCNC: 136 MG/DL (ref 70–99)
GLUCOSE BLDC GLUCOMTR-MCNC: 137 MG/DL (ref 70–99)
GLUCOSE BLDC GLUCOMTR-MCNC: 140 MG/DL (ref 70–99)
GLUCOSE BLDC GLUCOMTR-MCNC: 143 MG/DL (ref 70–99)
GLUCOSE BLDC GLUCOMTR-MCNC: 167 MG/DL (ref 70–99)
GLUCOSE BLDC GLUCOMTR-MCNC: 172 MG/DL (ref 70–99)
GLUCOSE BLDC GLUCOMTR-MCNC: 174 MG/DL (ref 70–99)
GLUCOSE BLDC GLUCOMTR-MCNC: 177 MG/DL (ref 70–99)
GLUCOSE SERPL-MCNC: 149 MG/DL (ref 70–99)
HCO3 BLD-SCNC: 21 MMOL/L (ref 21–28)
HCO3 BLDA-SCNC: 21 MMOL/L (ref 21–28)
HCO3 BLDA-SCNC: 22 MMOL/L (ref 21–28)
HCO3 BLDA-SCNC: 22 MMOL/L (ref 21–28)
HCO3 BLDA-SCNC: 23 MMOL/L (ref 21–28)
HCO3 BLDA-SCNC: 23 MMOL/L (ref 21–28)
HCO3 BLDA-SCNC: 24 MMOL/L (ref 21–28)
HCO3 BLDA-SCNC: 24 MMOL/L (ref 21–28)
HCO3 BLDV-SCNC: 24 MMOL/L (ref 21–28)
HCO3 SERPL-SCNC: 22 MMOL/L (ref 22–29)
HCT VFR BLD AUTO: 27.8 % (ref 40–53)
HCT VFR BLD AUTO: 31.3 % (ref 40–53)
HCT VFR BLD CALC: 29 %
HGB BLD-MCNC: 10.1 G/DL (ref 13.3–17.7)
HGB BLD-MCNC: 10.1 G/DL (ref 13.3–17.7)
HGB BLD-MCNC: 10.2 G/DL (ref 13.3–17.7)
HGB BLD-MCNC: 10.9 G/DL (ref 13.3–17.7)
HGB BLD-MCNC: 11.9 G/DL (ref 13.3–17.7)
HGB BLD-MCNC: 12.3 G/DL (ref 13.3–17.7)
HGB BLD-MCNC: 9.6 G/DL (ref 13.3–17.7)
HGB BLD-MCNC: 9.8 G/DL (ref 13.3–17.7)
HGB BLD-MCNC: 9.8 G/DL (ref 13.3–17.7)
HGB BLD-MCNC: 9.9 G/DL (ref 13.3–17.7)
INR PPP: 1.31 (ref 0.85–1.15)
INR PPP: 1.65 (ref 0.85–1.15)
INTERPRETATION ECG - MUSE: NORMAL
ISSUE DATE AND TIME: NORMAL
ISSUE DATE AND TIME: NORMAL
LACTATE BLD-SCNC: 0.6 MMOL/L (ref 0.7–2)
LACTATE BLD-SCNC: 0.6 MMOL/L (ref 0.7–2)
LACTATE BLD-SCNC: 0.7 MMOL/L (ref 0.7–2)
LACTATE BLD-SCNC: 0.9 MMOL/L (ref 0.7–2)
LACTATE BLD-SCNC: 0.9 MMOL/L (ref 0.7–2)
LACTATE BLD-SCNC: 1.4 MMOL/L (ref 0.7–2)
LACTATE BLD-SCNC: 1.4 MMOL/L (ref 0.7–2)
LACTATE SERPL-SCNC: 1.4 MMOL/L (ref 0.7–2)
MAGNESIUM SERPL-MCNC: 2.8 MG/DL (ref 1.7–2.3)
MCH RBC QN AUTO: 31.8 PG (ref 26.5–33)
MCH RBC QN AUTO: 32.6 PG (ref 26.5–33)
MCHC RBC AUTO-ENTMCNC: 34.8 G/DL (ref 31.5–36.5)
MCHC RBC AUTO-ENTMCNC: 35.3 G/DL (ref 31.5–36.5)
MCV RBC AUTO: 91 FL (ref 78–100)
MCV RBC AUTO: 92 FL (ref 78–100)
O2/TOTAL GAS SETTING VFR VENT: 30 %
OXYHGB MFR BLDA: 97 % (ref 92–100)
OXYHGB MFR BLDA: 99 % (ref 92–100)
OXYHGB MFR BLDV: 85 % (ref 70–75)
P AXIS - MUSE: 112 DEGREES
PCO2 BLD: 33 MM HG (ref 35–45)
PCO2 BLDA: 32 MM HG (ref 35–45)
PCO2 BLDA: 32 MM HG (ref 35–45)
PCO2 BLDA: 37 MM HG (ref 35–45)
PCO2 BLDA: 38 MM HG (ref 35–45)
PCO2 BLDA: 39 MM HG (ref 35–45)
PCO2 BLDA: 39 MM HG (ref 35–45)
PCO2 BLDA: 47 MM HG (ref 35–45)
PCO2 BLDV: 51 MM HG (ref 40–50)
PEEP: 5 CM H2O
PH BLD: 7.42 [PH] (ref 7.35–7.45)
PH BLDA: 7.29 [PH] (ref 7.35–7.45)
PH BLDA: 7.38 [PH] (ref 7.35–7.45)
PH BLDA: 7.4 [PH] (ref 7.35–7.45)
PH BLDA: 7.43 [PH] (ref 7.35–7.45)
PH BLDA: 7.44 [PH] (ref 7.35–7.45)
PH BLDV: 7.31 [PH] (ref 7.32–7.43)
PHOSPHATE SERPL-MCNC: 2.2 MG/DL (ref 2.5–4.5)
PLATELET # BLD AUTO: 110 10E3/UL (ref 150–450)
PLATELET # BLD AUTO: 132 10E3/UL (ref 150–450)
PO2 BLD: 100 MM HG (ref 80–105)
PO2 BLDA: 100 MM HG (ref 80–105)
PO2 BLDA: 151 MM HG (ref 80–105)
PO2 BLDA: 221 MM HG (ref 80–105)
PO2 BLDA: 247 MM HG (ref 80–105)
PO2 BLDA: 293 MM HG (ref 80–105)
PO2 BLDA: 378 MM HG (ref 80–105)
PO2 BLDA: 98 MM HG (ref 80–105)
PO2 BLDV: 55 MM HG (ref 25–47)
POTASSIUM BLD-SCNC: 3.7 MMOL/L (ref 3.4–5.3)
POTASSIUM BLD-SCNC: 3.8 MMOL/L (ref 3.4–5.3)
POTASSIUM BLD-SCNC: 3.9 MMOL/L (ref 3.4–5.3)
POTASSIUM BLD-SCNC: 4 MMOL/L (ref 3.4–5.3)
POTASSIUM BLD-SCNC: 4.3 MMOL/L (ref 3.4–5.3)
POTASSIUM BLD-SCNC: 4.4 MMOL/L (ref 3.4–5.3)
POTASSIUM BLD-SCNC: 5 MMOL/L (ref 3.4–5.3)
POTASSIUM BLD-SCNC: 5.1 MMOL/L (ref 3.4–5.3)
POTASSIUM SERPL-SCNC: 4.2 MMOL/L (ref 3.4–5.3)
POTASSIUM SERPL-SCNC: 4.2 MMOL/L (ref 3.4–5.3)
PR INTERVAL - MUSE: 148 MS
PROT SERPL-MCNC: 5.3 G/DL (ref 6.4–8.3)
QRS DURATION - MUSE: 124 MS
QT - MUSE: 418 MS
QTC - MUSE: 500 MS
R AXIS - MUSE: 67 DEGREES
RBC # BLD AUTO: 3.01 10E6/UL (ref 4.4–5.9)
RBC # BLD AUTO: 3.43 10E6/UL (ref 4.4–5.9)
SAO2 % BLDA: 100 % (ref 95–96)
SAO2 % BLDA: 98 % (ref 95–96)
SAO2 % BLDA: 98.8 % (ref 95–96)
SAO2 % BLDA: 99 % (ref 95–96)
SAO2 % BLDA: 99 % (ref 95–96)
SAO2 % BLDV: 87 % (ref 70–75)
SODIUM BLD-SCNC: 139 MMOL/L (ref 135–145)
SODIUM BLD-SCNC: 140 MMOL/L (ref 135–145)
SODIUM BLD-SCNC: 141 MMOL/L (ref 135–145)
SODIUM BLD-SCNC: 142 MMOL/L (ref 135–145)
SODIUM BLD-SCNC: 143 MMOL/L (ref 135–145)
SODIUM BLD-SCNC: 144 MMOL/L (ref 135–145)
SODIUM SERPL-SCNC: 144 MMOL/L (ref 135–145)
SYSTOLIC BLOOD PRESSURE - MUSE: NORMAL MMHG
T AXIS - MUSE: 90 DEGREES
UNIT ABO/RH: NORMAL
UNIT ABO/RH: NORMAL
UNIT NUMBER: NORMAL
UNIT NUMBER: NORMAL
UNIT STATUS: NORMAL
UNIT STATUS: NORMAL
UNIT TYPE ISBT: 600
UNIT TYPE ISBT: 600
VENTRICULAR RATE- MUSE: 86 BPM
WBC # BLD AUTO: 12.6 10E3/UL (ref 4–11)
WBC # BLD AUTO: 9.3 10E3/UL (ref 4–11)

## 2025-03-17 PROCEDURE — 250N000013 HC RX MED GY IP 250 OP 250 PS 637: Performed by: NURSE PRACTITIONER

## 2025-03-17 PROCEDURE — 250N000011 HC RX IP 250 OP 636: Performed by: NURSE ANESTHETIST, CERTIFIED REGISTERED

## 2025-03-17 PROCEDURE — 272N000004 HC RX 272: Performed by: STUDENT IN AN ORGANIZED HEALTH CARE EDUCATION/TRAINING PROGRAM

## 2025-03-17 PROCEDURE — 82805 BLOOD GASES W/O2 SATURATION: CPT | Performed by: PHYSICIAN ASSISTANT

## 2025-03-17 PROCEDURE — 83605 ASSAY OF LACTIC ACID: CPT | Performed by: PHYSICIAN ASSISTANT

## 2025-03-17 PROCEDURE — 02100Z9 BYPASS CORONARY ARTERY, ONE ARTERY FROM LEFT INTERNAL MAMMARY, OPEN APPROACH: ICD-10-PCS | Performed by: STUDENT IN AN ORGANIZED HEALTH CARE EDUCATION/TRAINING PROGRAM

## 2025-03-17 PROCEDURE — 99291 CRITICAL CARE FIRST HOUR: CPT | Performed by: NURSE PRACTITIONER

## 2025-03-17 PROCEDURE — C1898 LEAD, PMKR, OTHER THAN TRANS: HCPCS | Performed by: STUDENT IN AN ORGANIZED HEALTH CARE EDUCATION/TRAINING PROGRAM

## 2025-03-17 PROCEDURE — 272N000001 HC OR GENERAL SUPPLY STERILE: Performed by: INTERNAL MEDICINE

## 2025-03-17 PROCEDURE — 33533 CABG ARTERIAL SINGLE: CPT | Performed by: STUDENT IN AN ORGANIZED HEALTH CARE EDUCATION/TRAINING PROGRAM

## 2025-03-17 PROCEDURE — 82330 ASSAY OF CALCIUM: CPT | Performed by: PHYSICIAN ASSISTANT

## 2025-03-17 PROCEDURE — 250N000009 HC RX 250: Performed by: INTERNAL MEDICINE

## 2025-03-17 PROCEDURE — P9045 ALBUMIN (HUMAN), 5%, 250 ML: HCPCS | Performed by: NURSE ANESTHETIST, CERTIFIED REGISTERED

## 2025-03-17 PROCEDURE — 250N000012 HC RX MED GY IP 250 OP 636 PS 637: Performed by: STUDENT IN AN ORGANIZED HEALTH CARE EDUCATION/TRAINING PROGRAM

## 2025-03-17 PROCEDURE — 250N000009 HC RX 250: Performed by: PHYSICIAN ASSISTANT

## 2025-03-17 PROCEDURE — 85730 THROMBOPLASTIN TIME PARTIAL: CPT | Performed by: NURSE ANESTHETIST, CERTIFIED REGISTERED

## 2025-03-17 PROCEDURE — 258N000003 HC RX IP 258 OP 636: Performed by: STUDENT IN AN ORGANIZED HEALTH CARE EDUCATION/TRAINING PROGRAM

## 2025-03-17 PROCEDURE — 86923 COMPATIBILITY TEST ELECTRIC: CPT | Performed by: STUDENT IN AN ORGANIZED HEALTH CARE EDUCATION/TRAINING PROGRAM

## 2025-03-17 PROCEDURE — 272N000002 HC OR SUPPLY OTHER OPNP: Performed by: STUDENT IN AN ORGANIZED HEALTH CARE EDUCATION/TRAINING PROGRAM

## 2025-03-17 PROCEDURE — 33508 ENDOSCOPIC VEIN HARVEST: CPT | Mod: XU | Performed by: STUDENT IN AN ORGANIZED HEALTH CARE EDUCATION/TRAINING PROGRAM

## 2025-03-17 PROCEDURE — 85730 THROMBOPLASTIN TIME PARTIAL: CPT | Performed by: PHYSICIAN ASSISTANT

## 2025-03-17 PROCEDURE — 5A1221Z PERFORMANCE OF CARDIAC OUTPUT, CONTINUOUS: ICD-10-PCS | Performed by: STUDENT IN AN ORGANIZED HEALTH CARE EDUCATION/TRAINING PROGRAM

## 2025-03-17 PROCEDURE — P9045 ALBUMIN (HUMAN), 5%, 250 ML: HCPCS | Mod: JZ | Performed by: STUDENT IN AN ORGANIZED HEALTH CARE EDUCATION/TRAINING PROGRAM

## 2025-03-17 PROCEDURE — P9016 RBC LEUKOCYTES REDUCED: HCPCS | Performed by: STUDENT IN AN ORGANIZED HEALTH CARE EDUCATION/TRAINING PROGRAM

## 2025-03-17 PROCEDURE — 250N000013 HC RX MED GY IP 250 OP 250 PS 637: Performed by: STUDENT IN AN ORGANIZED HEALTH CARE EDUCATION/TRAINING PROGRAM

## 2025-03-17 PROCEDURE — 85027 COMPLETE CBC AUTOMATED: CPT | Performed by: NURSE ANESTHETIST, CERTIFIED REGISTERED

## 2025-03-17 PROCEDURE — 84132 ASSAY OF SERUM POTASSIUM: CPT

## 2025-03-17 PROCEDURE — 94002 VENT MGMT INPAT INIT DAY: CPT

## 2025-03-17 PROCEDURE — 5A02210 ASSISTANCE WITH CARDIAC OUTPUT USING BALLOON PUMP, CONTINUOUS: ICD-10-PCS | Performed by: INTERNAL MEDICINE

## 2025-03-17 PROCEDURE — 258N000003 HC RX IP 258 OP 636: Performed by: PHYSICIAN ASSISTANT

## 2025-03-17 PROCEDURE — C1725 CATH, TRANSLUMIN NON-LASER: HCPCS | Performed by: INTERNAL MEDICINE

## 2025-03-17 PROCEDURE — 200N000001 HC R&B ICU

## 2025-03-17 PROCEDURE — 999N000055 HC STATISTIC END TITIAL CO2 MONITORING

## 2025-03-17 PROCEDURE — 33967 INSERT I-AORT PERCUT DEVICE: CPT | Performed by: INTERNAL MEDICINE

## 2025-03-17 PROCEDURE — 99152 MOD SED SAME PHYS/QHP 5/>YRS: CPT | Performed by: INTERNAL MEDICINE

## 2025-03-17 PROCEDURE — 250N000013 HC RX MED GY IP 250 OP 250 PS 637: Performed by: INTERNAL MEDICINE

## 2025-03-17 PROCEDURE — 85610 PROTHROMBIN TIME: CPT | Performed by: NURSE ANESTHETIST, CERTIFIED REGISTERED

## 2025-03-17 PROCEDURE — 258N000003 HC RX IP 258 OP 636: Performed by: ANESTHESIOLOGY

## 2025-03-17 PROCEDURE — 021209W BYPASS CORONARY ARTERY, THREE ARTERIES FROM AORTA WITH AUTOLOGOUS VENOUS TISSUE, OPEN APPROACH: ICD-10-PCS | Performed by: STUDENT IN AN ORGANIZED HEALTH CARE EDUCATION/TRAINING PROGRAM

## 2025-03-17 PROCEDURE — 83735 ASSAY OF MAGNESIUM: CPT | Performed by: PHYSICIAN ASSISTANT

## 2025-03-17 PROCEDURE — 999N000141 HC STATISTIC PRE-PROCEDURE NURSING ASSESSMENT: Performed by: STUDENT IN AN ORGANIZED HEALTH CARE EDUCATION/TRAINING PROGRAM

## 2025-03-17 PROCEDURE — 84132 ASSAY OF SERUM POTASSIUM: CPT | Performed by: PHYSICIAN ASSISTANT

## 2025-03-17 PROCEDURE — C1729 CATH, DRAINAGE: HCPCS | Performed by: STUDENT IN AN ORGANIZED HEALTH CARE EDUCATION/TRAINING PROGRAM

## 2025-03-17 PROCEDURE — 84100 ASSAY OF PHOSPHORUS: CPT | Performed by: PHYSICIAN ASSISTANT

## 2025-03-17 PROCEDURE — 999N000009 HC STATISTIC AIRWAY CARE

## 2025-03-17 PROCEDURE — 999N000156 HC STATISTIC RCP CONSULT EA 30 MIN

## 2025-03-17 PROCEDURE — 93005 ELECTROCARDIOGRAM TRACING: CPT

## 2025-03-17 PROCEDURE — 76984 DX INTRAOP THORACIC AORTA US: CPT | Mod: 26 | Performed by: STUDENT IN AN ORGANIZED HEALTH CARE EDUCATION/TRAINING PROGRAM

## 2025-03-17 PROCEDURE — 999N000065 XR CHEST PORT 1 VIEW

## 2025-03-17 PROCEDURE — 82803 BLOOD GASES ANY COMBINATION: CPT

## 2025-03-17 PROCEDURE — 999N000157 HC STATISTIC RCP TIME EA 10 MIN

## 2025-03-17 PROCEDURE — 82330 ASSAY OF CALCIUM: CPT

## 2025-03-17 PROCEDURE — 250N000011 HC RX IP 250 OP 636: Mod: JZ | Performed by: STUDENT IN AN ORGANIZED HEALTH CARE EDUCATION/TRAINING PROGRAM

## 2025-03-17 PROCEDURE — 250N000013 HC RX MED GY IP 250 OP 250 PS 637: Performed by: PHYSICIAN ASSISTANT

## 2025-03-17 PROCEDURE — C1894 INTRO/SHEATH, NON-LASER: HCPCS | Performed by: INTERNAL MEDICINE

## 2025-03-17 PROCEDURE — 272N000202 HC AEROBIKA WITH MANOMETER

## 2025-03-17 PROCEDURE — 272N000001 HC OR GENERAL SUPPLY STERILE: Performed by: STUDENT IN AN ORGANIZED HEALTH CARE EDUCATION/TRAINING PROGRAM

## 2025-03-17 PROCEDURE — 82040 ASSAY OF SERUM ALBUMIN: CPT | Performed by: PHYSICIAN ASSISTANT

## 2025-03-17 PROCEDURE — 370N000017 HC ANESTHESIA TECHNICAL FEE, PER MIN: Performed by: STUDENT IN AN ORGANIZED HEALTH CARE EDUCATION/TRAINING PROGRAM

## 2025-03-17 PROCEDURE — 360N000079 HC SURGERY LEVEL 6, PER MIN: Performed by: STUDENT IN AN ORGANIZED HEALTH CARE EDUCATION/TRAINING PROGRAM

## 2025-03-17 PROCEDURE — 85384 FIBRINOGEN ACTIVITY: CPT | Performed by: NURSE ANESTHETIST, CERTIFIED REGISTERED

## 2025-03-17 PROCEDURE — 85014 HEMATOCRIT: CPT | Performed by: PHYSICIAN ASSISTANT

## 2025-03-17 PROCEDURE — 84295 ASSAY OF SERUM SODIUM: CPT

## 2025-03-17 PROCEDURE — 06BQ4ZZ EXCISION OF LEFT SAPHENOUS VEIN, PERCUTANEOUS ENDOSCOPIC APPROACH: ICD-10-PCS | Performed by: STUDENT IN AN ORGANIZED HEALTH CARE EDUCATION/TRAINING PROGRAM

## 2025-03-17 PROCEDURE — 250N000009 HC RX 250: Performed by: STUDENT IN AN ORGANIZED HEALTH CARE EDUCATION/TRAINING PROGRAM

## 2025-03-17 PROCEDURE — 250N000011 HC RX IP 250 OP 636: Performed by: INTERNAL MEDICINE

## 2025-03-17 PROCEDURE — 410N000004: Performed by: STUDENT IN AN ORGANIZED HEALTH CARE EDUCATION/TRAINING PROGRAM

## 2025-03-17 PROCEDURE — 999N000253 HC STATISTIC WEANING TRIALS

## 2025-03-17 PROCEDURE — 250N000011 HC RX IP 250 OP 636: Performed by: STUDENT IN AN ORGANIZED HEALTH CARE EDUCATION/TRAINING PROGRAM

## 2025-03-17 PROCEDURE — 410N000003 HC PER-PERFUSION 1ST 30 MIN: Performed by: STUDENT IN AN ORGANIZED HEALTH CARE EDUCATION/TRAINING PROGRAM

## 2025-03-17 PROCEDURE — 999N000259 HC STATISTIC EXTUBATION

## 2025-03-17 PROCEDURE — 85610 PROTHROMBIN TIME: CPT | Performed by: PHYSICIAN ASSISTANT

## 2025-03-17 PROCEDURE — 250N000011 HC RX IP 250 OP 636: Performed by: PHYSICIAN ASSISTANT

## 2025-03-17 PROCEDURE — 250N000025 HC SEVOFLURANE, PER MIN: Performed by: STUDENT IN AN ORGANIZED HEALTH CARE EDUCATION/TRAINING PROGRAM

## 2025-03-17 PROCEDURE — 33519 CABG ARTERY-VEIN THREE: CPT | Performed by: STUDENT IN AN ORGANIZED HEALTH CARE EDUCATION/TRAINING PROGRAM

## 2025-03-17 PROCEDURE — 250N000009 HC RX 250: Performed by: NURSE ANESTHETIST, CERTIFIED REGISTERED

## 2025-03-17 PROCEDURE — 258N000003 HC RX IP 258 OP 636: Performed by: NURSE ANESTHETIST, CERTIFIED REGISTERED

## 2025-03-17 RX ORDER — LIDOCAINE HYDROCHLORIDE 10 MG/ML
INJECTION, SOLUTION INFILTRATION; PERINEURAL PRN
Status: DISCONTINUED | OUTPATIENT
Start: 2025-03-17 | End: 2025-03-17

## 2025-03-17 RX ORDER — PROCHLORPERAZINE MALEATE 5 MG/1
5 TABLET ORAL EVERY 6 HOURS PRN
Status: DISCONTINUED | OUTPATIENT
Start: 2025-03-17 | End: 2025-03-26 | Stop reason: HOSPADM

## 2025-03-17 RX ORDER — CHLORHEXIDINE GLUCONATE ORAL RINSE 1.2 MG/ML
15 SOLUTION DENTAL EVERY 12 HOURS
Status: DISCONTINUED | OUTPATIENT
Start: 2025-03-17 | End: 2025-03-18

## 2025-03-17 RX ORDER — PANTOPRAZOLE SODIUM 40 MG/1
40 TABLET, DELAYED RELEASE ORAL
Status: DISCONTINUED | OUTPATIENT
Start: 2025-03-18 | End: 2025-03-26 | Stop reason: HOSPADM

## 2025-03-17 RX ORDER — LIDOCAINE 40 MG/G
CREAM TOPICAL
Status: DISCONTINUED | OUTPATIENT
Start: 2025-03-17 | End: 2025-03-17 | Stop reason: HOSPADM

## 2025-03-17 RX ORDER — CEFAZOLIN SODIUM/WATER 2 G/20 ML
2 SYRINGE (ML) INTRAVENOUS EVERY 8 HOURS
Status: DISCONTINUED | OUTPATIENT
Start: 2025-03-17 | End: 2025-03-17

## 2025-03-17 RX ORDER — DEXMEDETOMIDINE HYDROCHLORIDE 4 UG/ML
.2-1.2 INJECTION, SOLUTION INTRAVENOUS CONTINUOUS
Status: DISCONTINUED | OUTPATIENT
Start: 2025-03-17 | End: 2025-03-17

## 2025-03-17 RX ORDER — DEXTROSE MONOHYDRATE 25 G/50ML
25-50 INJECTION, SOLUTION INTRAVENOUS
Status: DISCONTINUED | OUTPATIENT
Start: 2025-03-17 | End: 2025-03-23

## 2025-03-17 RX ORDER — CALCIUM GLUCONATE 20 MG/ML
2 INJECTION, SOLUTION INTRAVENOUS
Status: ACTIVE | OUTPATIENT
Start: 2025-03-17 | End: 2025-03-25

## 2025-03-17 RX ORDER — POLYETHYLENE GLYCOL 3350 17 G/17G
17 POWDER, FOR SOLUTION ORAL DAILY
Status: DISCONTINUED | OUTPATIENT
Start: 2025-03-18 | End: 2025-03-20

## 2025-03-17 RX ORDER — PROPOFOL 10 MG/ML
INJECTION, EMULSION INTRAVENOUS PRN
Status: DISCONTINUED | OUTPATIENT
Start: 2025-03-17 | End: 2025-03-17

## 2025-03-17 RX ORDER — FENTANYL CITRATE 50 UG/ML
INJECTION, SOLUTION INTRAMUSCULAR; INTRAVENOUS
Status: DISCONTINUED | OUTPATIENT
Start: 2025-03-17 | End: 2025-03-17 | Stop reason: HOSPADM

## 2025-03-17 RX ORDER — ASPIRIN 81 MG/1
162 TABLET, CHEWABLE ORAL DAILY
Status: DISCONTINUED | OUTPATIENT
Start: 2025-03-18 | End: 2025-03-18

## 2025-03-17 RX ORDER — NICOTINE POLACRILEX 4 MG
15-30 LOZENGE BUCCAL
Status: DISCONTINUED | OUTPATIENT
Start: 2025-03-17 | End: 2025-03-17

## 2025-03-17 RX ORDER — CEFAZOLIN SODIUM/WATER 2 G/20 ML
SYRINGE (ML) INTRAVENOUS
Status: DISCONTINUED
Start: 2025-03-17 | End: 2025-03-17 | Stop reason: HOSPADM

## 2025-03-17 RX ORDER — HEPARIN SOD,PORCINE/0.9 % NACL 30K/1000ML
INTRAVENOUS SOLUTION INTRAVENOUS
Status: DISCONTINUED | OUTPATIENT
Start: 2025-03-17 | End: 2025-03-17 | Stop reason: HOSPADM

## 2025-03-17 RX ORDER — LIDOCAINE 4 G/G
1-2 PATCH TOPICAL EVERY 24 HOURS
Status: DISCONTINUED | OUTPATIENT
Start: 2025-03-17 | End: 2025-03-26 | Stop reason: HOSPADM

## 2025-03-17 RX ORDER — CEFAZOLIN SODIUM/WATER 2 G/20 ML
2 SYRINGE (ML) INTRAVENOUS
Status: DISCONTINUED | OUTPATIENT
Start: 2025-03-17 | End: 2025-03-17 | Stop reason: HOSPADM

## 2025-03-17 RX ORDER — SODIUM CHLORIDE, SODIUM GLUCONATE, SODIUM ACETATE, POTASSIUM CHLORIDE AND MAGNESIUM CHLORIDE 526; 502; 368; 37; 30 MG/100ML; MG/100ML; MG/100ML; MG/100ML; MG/100ML
1000 INJECTION, SOLUTION INTRAVENOUS
Status: DISCONTINUED | OUTPATIENT
Start: 2025-03-17 | End: 2025-03-17

## 2025-03-17 RX ORDER — ASPIRIN 81 MG/1
162 TABLET, CHEWABLE ORAL ONCE
Status: COMPLETED | OUTPATIENT
Start: 2025-03-17 | End: 2025-03-17

## 2025-03-17 RX ORDER — SODIUM CHLORIDE 9 MG/ML
INJECTION, SOLUTION INTRAVENOUS CONTINUOUS
Status: DISCONTINUED | OUTPATIENT
Start: 2025-03-17 | End: 2025-03-18

## 2025-03-17 RX ORDER — ASPIRIN 300 MG/1
300 SUPPOSITORY RECTAL DAILY
Status: DISCONTINUED | OUTPATIENT
Start: 2025-03-18 | End: 2025-03-18

## 2025-03-17 RX ORDER — HEPARIN SOD,PORCINE/0.9 % NACL 30K/1000ML
INTRAVENOUS SOLUTION INTRAVENOUS
Status: DISCONTINUED | OUTPATIENT
Start: 2025-03-17 | End: 2025-03-17

## 2025-03-17 RX ORDER — HEPARIN SODIUM 1000 [USP'U]/ML
INJECTION, SOLUTION INTRAVENOUS; SUBCUTANEOUS PRN
Status: DISCONTINUED | OUTPATIENT
Start: 2025-03-17 | End: 2025-03-17

## 2025-03-17 RX ORDER — SODIUM CHLORIDE, SODIUM LACTATE, POTASSIUM CHLORIDE, CALCIUM CHLORIDE 600; 310; 30; 20 MG/100ML; MG/100ML; MG/100ML; MG/100ML
INJECTION, SOLUTION INTRAVENOUS CONTINUOUS PRN
Status: DISCONTINUED | OUTPATIENT
Start: 2025-03-17 | End: 2025-03-17

## 2025-03-17 RX ORDER — PHENYLEPHRINE HCL IN 0.9% NACL 50MG/250ML
.1-4 PLASTIC BAG, INJECTION (ML) INTRAVENOUS CONTINUOUS
Status: DISCONTINUED | OUTPATIENT
Start: 2025-03-17 | End: 2025-03-18

## 2025-03-17 RX ORDER — NALOXONE HYDROCHLORIDE 0.4 MG/ML
0.2 INJECTION, SOLUTION INTRAMUSCULAR; INTRAVENOUS; SUBCUTANEOUS
Status: DISCONTINUED | OUTPATIENT
Start: 2025-03-17 | End: 2025-03-26 | Stop reason: HOSPADM

## 2025-03-17 RX ORDER — ONDANSETRON 2 MG/ML
4 INJECTION INTRAMUSCULAR; INTRAVENOUS EVERY 6 HOURS PRN
Status: DISCONTINUED | OUTPATIENT
Start: 2025-03-17 | End: 2025-03-26 | Stop reason: HOSPADM

## 2025-03-17 RX ORDER — ASPIRIN 81 MG/1
162 TABLET, CHEWABLE ORAL
Status: COMPLETED | OUTPATIENT
Start: 2025-03-17 | End: 2025-03-17

## 2025-03-17 RX ORDER — CALCIUM GLUCONATE 20 MG/ML
1 INJECTION, SOLUTION INTRAVENOUS
Status: DISPENSED | OUTPATIENT
Start: 2025-03-17 | End: 2025-03-25

## 2025-03-17 RX ORDER — ACETAMINOPHEN 325 MG/1
975 TABLET ORAL EVERY 8 HOURS
Status: DISCONTINUED | OUTPATIENT
Start: 2025-03-17 | End: 2025-03-26 | Stop reason: HOSPADM

## 2025-03-17 RX ORDER — MAGNESIUM HYDROXIDE 1200 MG/15ML
LIQUID ORAL PRN
Status: DISCONTINUED | OUTPATIENT
Start: 2025-03-17 | End: 2025-03-17

## 2025-03-17 RX ORDER — SODIUM CHLORIDE 9 MG/ML
INJECTION, SOLUTION INTRAVENOUS CONTINUOUS
Status: DISCONTINUED | OUTPATIENT
Start: 2025-03-17 | End: 2025-03-17

## 2025-03-17 RX ORDER — ACETAMINOPHEN 325 MG/1
975 TABLET ORAL EVERY 8 HOURS
Status: DISCONTINUED | OUTPATIENT
Start: 2025-03-17 | End: 2025-03-17

## 2025-03-17 RX ORDER — HEPARIN SODIUM 5000 [USP'U]/.5ML
5000 INJECTION, SOLUTION INTRAVENOUS; SUBCUTANEOUS EVERY 8 HOURS
Status: DISCONTINUED | OUTPATIENT
Start: 2025-03-18 | End: 2025-03-26 | Stop reason: HOSPADM

## 2025-03-17 RX ORDER — ONDANSETRON 2 MG/ML
INJECTION INTRAMUSCULAR; INTRAVENOUS PRN
Status: DISCONTINUED | OUTPATIENT
Start: 2025-03-17 | End: 2025-03-17

## 2025-03-17 RX ORDER — DEXTROSE MONOHYDRATE 25 G/50ML
25-50 INJECTION, SOLUTION INTRAVENOUS
Status: DISCONTINUED | OUTPATIENT
Start: 2025-03-17 | End: 2025-03-17

## 2025-03-17 RX ORDER — PROTAMINE SULFATE 10 MG/ML
INJECTION, SOLUTION INTRAVENOUS PRN
Status: DISCONTINUED | OUTPATIENT
Start: 2025-03-17 | End: 2025-03-17

## 2025-03-17 RX ORDER — SODIUM CHLORIDE 9 MG/ML
INJECTION, SOLUTION INTRAVENOUS CONTINUOUS PRN
Status: DISCONTINUED | OUTPATIENT
Start: 2025-03-17 | End: 2025-03-17

## 2025-03-17 RX ORDER — DIAZEPAM 5 MG/1
5 TABLET ORAL ONCE
Status: DISCONTINUED | OUTPATIENT
Start: 2025-03-17 | End: 2025-03-18

## 2025-03-17 RX ORDER — GABAPENTIN 300 MG/1
300 CAPSULE ORAL AT BEDTIME
Status: DISCONTINUED | OUTPATIENT
Start: 2025-03-17 | End: 2025-03-24

## 2025-03-17 RX ORDER — SIMVASTATIN 40 MG
40 TABLET ORAL DAILY
Status: DISCONTINUED | OUTPATIENT
Start: 2025-03-18 | End: 2025-03-20

## 2025-03-17 RX ORDER — HYDRALAZINE HYDROCHLORIDE 20 MG/ML
10 INJECTION INTRAMUSCULAR; INTRAVENOUS EVERY 30 MIN PRN
Status: DISCONTINUED | OUTPATIENT
Start: 2025-03-17 | End: 2025-03-26 | Stop reason: HOSPADM

## 2025-03-17 RX ORDER — NOREPINEPHRINE BITARTRATE 0.02 MG/ML
INJECTION, SOLUTION INTRAVENOUS CONTINUOUS PRN
Status: DISCONTINUED | OUTPATIENT
Start: 2025-03-17 | End: 2025-03-17

## 2025-03-17 RX ORDER — CEFAZOLIN SODIUM/WATER 2 G/20 ML
2 SYRINGE (ML) INTRAVENOUS SEE ADMIN INSTRUCTIONS
Status: DISCONTINUED | OUTPATIENT
Start: 2025-03-17 | End: 2025-03-17 | Stop reason: HOSPADM

## 2025-03-17 RX ORDER — ASPIRIN 300 MG/1
300 SUPPOSITORY RECTAL ONCE
Status: COMPLETED | OUTPATIENT
Start: 2025-03-17 | End: 2025-03-17

## 2025-03-17 RX ORDER — ONDANSETRON 4 MG/1
4 TABLET, ORALLY DISINTEGRATING ORAL EVERY 6 HOURS PRN
Status: DISCONTINUED | OUTPATIENT
Start: 2025-03-17 | End: 2025-03-26 | Stop reason: HOSPADM

## 2025-03-17 RX ORDER — ACETAMINOPHEN 325 MG/1
650 TABLET ORAL EVERY 4 HOURS PRN
Status: DISCONTINUED | OUTPATIENT
Start: 2025-03-17 | End: 2025-03-26 | Stop reason: HOSPADM

## 2025-03-17 RX ORDER — NICOTINE POLACRILEX 4 MG
15-30 LOZENGE BUCCAL
Status: DISCONTINUED | OUTPATIENT
Start: 2025-03-17 | End: 2025-03-23

## 2025-03-17 RX ORDER — PHENYLEPHRINE HCL IN 0.9% NACL 50MG/250ML
.1-6 PLASTIC BAG, INJECTION (ML) INTRAVENOUS CONTINUOUS
Status: DISCONTINUED | OUTPATIENT
Start: 2025-03-17 | End: 2025-03-17 | Stop reason: HOSPADM

## 2025-03-17 RX ORDER — OXYCODONE HYDROCHLORIDE 5 MG/1
5 TABLET ORAL EVERY 4 HOURS PRN
Status: DISCONTINUED | OUTPATIENT
Start: 2025-03-17 | End: 2025-03-21

## 2025-03-17 RX ORDER — VANCOMYCIN HYDROCHLORIDE 1 G/20ML
INJECTION, POWDER, LYOPHILIZED, FOR SOLUTION INTRAVENOUS PRN
Status: DISCONTINUED | OUTPATIENT
Start: 2025-03-17 | End: 2025-03-17 | Stop reason: HOSPADM

## 2025-03-17 RX ORDER — SODIUM CHLORIDE, SODIUM LACTATE, POTASSIUM CHLORIDE, CALCIUM CHLORIDE 600; 310; 30; 20 MG/100ML; MG/100ML; MG/100ML; MG/100ML
INJECTION, SOLUTION INTRAVENOUS CONTINUOUS
Status: DISCONTINUED | OUTPATIENT
Start: 2025-03-17 | End: 2025-03-17 | Stop reason: HOSPADM

## 2025-03-17 RX ORDER — METHADONE HYDROCHLORIDE 10 MG/ML
INJECTION, SOLUTION INTRAMUSCULAR; INTRAVENOUS; SUBCUTANEOUS
Status: DISCONTINUED
Start: 2025-03-17 | End: 2025-03-17 | Stop reason: HOSPADM

## 2025-03-17 RX ORDER — ASPIRIN 81 MG/1
81 TABLET, CHEWABLE ORAL
Status: COMPLETED | OUTPATIENT
Start: 2025-03-17 | End: 2025-03-17

## 2025-03-17 RX ORDER — INDOMETHACIN 25 MG/1
CAPSULE ORAL PRN
Status: DISCONTINUED | OUTPATIENT
Start: 2025-03-17 | End: 2025-03-17

## 2025-03-17 RX ORDER — GABAPENTIN 300 MG/1
600 CAPSULE ORAL EVERY MORNING
Status: DISCONTINUED | OUTPATIENT
Start: 2025-03-18 | End: 2025-03-24

## 2025-03-17 RX ORDER — CEFAZOLIN SODIUM 2 G/50ML
2 SOLUTION INTRAVENOUS EVERY 8 HOURS
Status: COMPLETED | OUTPATIENT
Start: 2025-03-17 | End: 2025-03-18

## 2025-03-17 RX ORDER — AMOXICILLIN 250 MG
1 CAPSULE ORAL 2 TIMES DAILY
Status: DISCONTINUED | OUTPATIENT
Start: 2025-03-17 | End: 2025-03-26 | Stop reason: HOSPADM

## 2025-03-17 RX ORDER — PHENYLEPHRINE HCL IN 0.9% NACL 50MG/250ML
.1-6 PLASTIC BAG, INJECTION (ML) INTRAVENOUS CONTINUOUS
Status: DISCONTINUED | OUTPATIENT
Start: 2025-03-17 | End: 2025-03-17

## 2025-03-17 RX ORDER — METHADONE HYDROCHLORIDE 10 MG/ML
INJECTION, SOLUTION INTRAMUSCULAR; INTRAVENOUS; SUBCUTANEOUS PRN
Status: DISCONTINUED | OUTPATIENT
Start: 2025-03-17 | End: 2025-03-17

## 2025-03-17 RX ORDER — DEXMEDETOMIDINE HYDROCHLORIDE 4 UG/ML
.2-1.2 INJECTION, SOLUTION INTRAVENOUS CONTINUOUS
Status: DISCONTINUED | OUTPATIENT
Start: 2025-03-17 | End: 2025-03-17 | Stop reason: HOSPADM

## 2025-03-17 RX ORDER — ACETAMINOPHEN 650 MG/1
650 SUPPOSITORY RECTAL EVERY 8 HOURS
Status: DISCONTINUED | OUTPATIENT
Start: 2025-03-17 | End: 2025-03-18

## 2025-03-17 RX ORDER — NALOXONE HYDROCHLORIDE 0.4 MG/ML
0.4 INJECTION, SOLUTION INTRAMUSCULAR; INTRAVENOUS; SUBCUTANEOUS
Status: DISCONTINUED | OUTPATIENT
Start: 2025-03-17 | End: 2025-03-26 | Stop reason: HOSPADM

## 2025-03-17 RX ORDER — FENTANYL CITRATE 50 UG/ML
INJECTION, SOLUTION INTRAMUSCULAR; INTRAVENOUS PRN
Status: DISCONTINUED | OUTPATIENT
Start: 2025-03-17 | End: 2025-03-17

## 2025-03-17 RX ORDER — SODIUM CHLORIDE, SODIUM GLUCONATE, SODIUM ACETATE, POTASSIUM CHLORIDE AND MAGNESIUM CHLORIDE 526; 502; 368; 37; 30 MG/100ML; MG/100ML; MG/100ML; MG/100ML; MG/100ML
1000 INJECTION, SOLUTION INTRAVENOUS
Status: ACTIVE | OUTPATIENT
Start: 2025-03-17 | End: 2025-03-17

## 2025-03-17 RX ORDER — BISACODYL 10 MG
10 SUPPOSITORY, RECTAL RECTAL DAILY PRN
Status: DISCONTINUED | OUTPATIENT
Start: 2025-03-17 | End: 2025-03-24

## 2025-03-17 RX ORDER — NITROGLYCERIN 10 MG/100ML
INJECTION INTRAVENOUS PRN
Status: DISCONTINUED | OUTPATIENT
Start: 2025-03-17 | End: 2025-03-17

## 2025-03-17 RX ORDER — CEFAZOLIN SODIUM/WATER 2 G/20 ML
2 SYRINGE (ML) INTRAVENOUS
Status: COMPLETED | OUTPATIENT
Start: 2025-03-17 | End: 2025-03-17

## 2025-03-17 RX ORDER — CHLORHEXIDINE GLUCONATE ORAL RINSE 1.2 MG/ML
10 SOLUTION DENTAL ONCE
Status: COMPLETED | OUTPATIENT
Start: 2025-03-17 | End: 2025-03-17

## 2025-03-17 RX ORDER — DEXMEDETOMIDINE HYDROCHLORIDE 4 UG/ML
.1-1.2 INJECTION, SOLUTION INTRAVENOUS CONTINUOUS
Status: DISCONTINUED | OUTPATIENT
Start: 2025-03-17 | End: 2025-03-18

## 2025-03-17 RX ADMIN — DEXMEDETOMIDINE HYDROCHLORIDE 0.2 MCG/KG/HR: 400 INJECTION INTRAVENOUS at 09:14

## 2025-03-17 RX ADMIN — CHLORHEXIDINE GLUCONATE 15 ML: 1.2 SOLUTION ORAL at 20:05

## 2025-03-17 RX ADMIN — HYDROMORPHONE HYDROCHLORIDE 0.2 MG: 1 INJECTION, SOLUTION INTRAMUSCULAR; INTRAVENOUS; SUBCUTANEOUS at 15:40

## 2025-03-17 RX ADMIN — PHENYLEPHRINE HYDROCHLORIDE 100 MCG: 10 INJECTION INTRAVENOUS at 10:42

## 2025-03-17 RX ADMIN — CALCIUM GLUCONATE 1 G: 20 INJECTION, SOLUTION INTRAVENOUS at 20:05

## 2025-03-17 RX ADMIN — POTASSIUM PHOSPHATE, MONOBASIC POTASSIUM PHOSPHATE, DIBASIC 15 MMOL: 224; 236 INJECTION, SOLUTION, CONCENTRATE INTRAVENOUS at 17:19

## 2025-03-17 RX ADMIN — ALBUMIN HUMAN: 0.05 INJECTION, SOLUTION INTRAVENOUS at 13:15

## 2025-03-17 RX ADMIN — ASPIRIN 81 MG CHEWABLE TABLET 162 MG: 81 TABLET CHEWABLE at 06:30

## 2025-03-17 RX ADMIN — NITROGLYCERIN 20 MCG: 10 INJECTION INTRAVENOUS at 13:10

## 2025-03-17 RX ADMIN — NOREPINEPHRINE BITARTRATE 0.02 MCG/KG/MIN: 0.02 INJECTION, SOLUTION INTRAVENOUS at 08:43

## 2025-03-17 RX ADMIN — PHENYLEPHRINE HYDROCHLORIDE 100 MCG: 10 INJECTION INTRAVENOUS at 10:36

## 2025-03-17 RX ADMIN — SENNOSIDES AND DOCUSATE SODIUM 1 TABLET: 50; 8.6 TABLET ORAL at 22:32

## 2025-03-17 RX ADMIN — ALBUMIN HUMAN 12.5 G: 0.05 INJECTION, SOLUTION INTRAVENOUS at 21:46

## 2025-03-17 RX ADMIN — HYDROMORPHONE HYDROCHLORIDE 0.2 MG: 1 INJECTION, SOLUTION INTRAMUSCULAR; INTRAVENOUS; SUBCUTANEOUS at 20:43

## 2025-03-17 RX ADMIN — NOREPINEPHRINE BITARTRATE 4 MCG: 1 INJECTION, SOLUTION, CONCENTRATE INTRAVENOUS at 10:30

## 2025-03-17 RX ADMIN — EPINEPHRINE 0.05 MCG/KG/MIN: 1 INJECTION INTRAMUSCULAR; INTRAVENOUS; SUBCUTANEOUS at 12:30

## 2025-03-17 RX ADMIN — PROTAMINE SULFATE 25 MG: 10 INJECTION, SOLUTION INTRAVENOUS at 13:42

## 2025-03-17 RX ADMIN — ACETAMINOPHEN 975 MG: 325 TABLET ORAL at 22:31

## 2025-03-17 RX ADMIN — SODIUM CHLORIDE, SODIUM LACTATE, POTASSIUM CHLORIDE, AND CALCIUM CHLORIDE: .6; .31; .03; .02 INJECTION, SOLUTION INTRAVENOUS at 08:14

## 2025-03-17 RX ADMIN — METOPROLOL TARTRATE 12.5 MG: 25 TABLET, FILM COATED ORAL at 06:30

## 2025-03-17 RX ADMIN — SODIUM BICARBONATE 25 MEQ: 84 INJECTION, SOLUTION INTRAVENOUS at 13:15

## 2025-03-17 RX ADMIN — NITROGLYCERIN 20 MCG: 10 INJECTION INTRAVENOUS at 10:35

## 2025-03-17 RX ADMIN — NOREPINEPHRINE BITARTRATE 4 MCG: 1 INJECTION, SOLUTION, CONCENTRATE INTRAVENOUS at 10:03

## 2025-03-17 RX ADMIN — ROCURONIUM 10 MG: 50 INJECTION, SOLUTION INTRAVENOUS at 12:47

## 2025-03-17 RX ADMIN — INSULIN HUMAN 2 UNITS/HR: 1 INJECTION, SOLUTION INTRAVENOUS at 12:46

## 2025-03-17 RX ADMIN — SODIUM BICARBONATE 25 MEQ: 84 INJECTION, SOLUTION INTRAVENOUS at 13:27

## 2025-03-17 RX ADMIN — ONDANSETRON 4 MG: 2 INJECTION INTRAMUSCULAR; INTRAVENOUS at 14:47

## 2025-03-17 RX ADMIN — NITROGLYCERIN 60 MCG: 10 INJECTION INTRAVENOUS at 12:58

## 2025-03-17 RX ADMIN — PROPOFOL 50 MG: 10 INJECTION, EMULSION INTRAVENOUS at 08:22

## 2025-03-17 RX ADMIN — HYDROMORPHONE HYDROCHLORIDE 1 MG: 1 INJECTION, SOLUTION INTRAMUSCULAR; INTRAVENOUS; SUBCUTANEOUS at 13:29

## 2025-03-17 RX ADMIN — DEXMEDETOMIDINE HYDROCHLORIDE 0.6 MCG/KG/HR: 400 INJECTION INTRAVENOUS at 16:58

## 2025-03-17 RX ADMIN — PROTAMINE SULFATE 250 MG: 10 INJECTION, SOLUTION INTRAVENOUS at 13:02

## 2025-03-17 RX ADMIN — AMINOCAPROIC ACID 5 G: 250 INJECTION, SOLUTION INTRAVENOUS at 08:47

## 2025-03-17 RX ADMIN — Medication 2 G: at 12:35

## 2025-03-17 RX ADMIN — PHENYLEPHRINE HYDROCHLORIDE 200 MCG: 10 INJECTION INTRAVENOUS at 08:16

## 2025-03-17 RX ADMIN — ROCURONIUM 50 MG: 50 INJECTION, SOLUTION INTRAVENOUS at 09:07

## 2025-03-17 RX ADMIN — ALBUMIN HUMAN 12.5 G: 0.05 INJECTION, SOLUTION INTRAVENOUS at 20:26

## 2025-03-17 RX ADMIN — SODIUM CHLORIDE, SODIUM LACTATE, POTASSIUM CHLORIDE, AND CALCIUM CHLORIDE 250 ML: .6; .31; .03; .02 INJECTION, SOLUTION INTRAVENOUS at 17:26

## 2025-03-17 RX ADMIN — Medication 10 MG: at 09:15

## 2025-03-17 RX ADMIN — PROPOFOL 150 MG: 10 INJECTION, EMULSION INTRAVENOUS at 08:19

## 2025-03-17 RX ADMIN — ALBUMIN HUMAN: 0.05 INJECTION, SOLUTION INTRAVENOUS at 14:06

## 2025-03-17 RX ADMIN — LIDOCAINE HYDROCHLORIDE 5 ML: 10 INJECTION, SOLUTION INFILTRATION; PERINEURAL at 08:18

## 2025-03-17 RX ADMIN — FENTANYL CITRATE 100 MCG: 50 INJECTION, SOLUTION INTRAMUSCULAR; INTRAVENOUS at 08:16

## 2025-03-17 RX ADMIN — PHENYLEPHRINE HYDROCHLORIDE 100 MCG: 10 INJECTION INTRAVENOUS at 10:38

## 2025-03-17 RX ADMIN — AMINOCAPROIC ACID 1 G/HR: 250 INJECTION, SOLUTION INTRAVENOUS at 09:36

## 2025-03-17 RX ADMIN — CEFAZOLIN SODIUM 2 G: 2 SOLUTION INTRAVENOUS at 21:30

## 2025-03-17 RX ADMIN — SODIUM CHLORIDE: 9 INJECTION, SOLUTION INTRAVENOUS at 08:47

## 2025-03-17 RX ADMIN — ROCURONIUM 30 MG: 50 INJECTION, SOLUTION INTRAVENOUS at 10:47

## 2025-03-17 RX ADMIN — SODIUM CHLORIDE, SODIUM LACTATE, POTASSIUM CHLORIDE, AND CALCIUM CHLORIDE 250 ML: .6; .31; .03; .02 INJECTION, SOLUTION INTRAVENOUS at 15:33

## 2025-03-17 RX ADMIN — NOREPINEPHRINE BITARTRATE 2 MCG: 1 INJECTION, SOLUTION, CONCENTRATE INTRAVENOUS at 10:20

## 2025-03-17 RX ADMIN — NOREPINEPHRINE BITARTRATE 4 MCG: 1 INJECTION, SOLUTION, CONCENTRATE INTRAVENOUS at 09:59

## 2025-03-17 RX ADMIN — NICARDIPINE HYDROCHLORIDE 2.5 MG/HR: 0.2 INJECTION, SOLUTION INTRAVENOUS at 13:23

## 2025-03-17 RX ADMIN — NOREPINEPHRINE BITARTRATE 4 MCG: 1 INJECTION, SOLUTION, CONCENTRATE INTRAVENOUS at 08:42

## 2025-03-17 RX ADMIN — NITROGLYCERIN 20 MCG: 10 INJECTION INTRAVENOUS at 12:59

## 2025-03-17 RX ADMIN — DESMOPRESSIN ACETATE 28.44 MCG: 4 INJECTION, SOLUTION INTRAVENOUS; SUBCUTANEOUS at 13:53

## 2025-03-17 RX ADMIN — SUGAMMADEX 400 MG: 100 INJECTION, SOLUTION INTRAVENOUS at 14:47

## 2025-03-17 RX ADMIN — Medication 2 G: at 08:45

## 2025-03-17 RX ADMIN — Medication 10 MG: at 08:22

## 2025-03-17 RX ADMIN — PHENYLEPHRINE HYDROCHLORIDE 200 MCG: 10 INJECTION INTRAVENOUS at 08:19

## 2025-03-17 RX ADMIN — LIDOCAINE 1 PATCH: 4 PATCH TOPICAL at 20:05

## 2025-03-17 RX ADMIN — CHLORHEXIDINE GLUCONATE 10 ML: 1.2 SOLUTION ORAL at 06:30

## 2025-03-17 RX ADMIN — NOREPINEPHRINE BITARTRATE 4 MCG: 1 INJECTION, SOLUTION, CONCENTRATE INTRAVENOUS at 10:32

## 2025-03-17 RX ADMIN — PHENYLEPHRINE HYDROCHLORIDE 100 MCG: 10 INJECTION INTRAVENOUS at 10:40

## 2025-03-17 RX ADMIN — SODIUM CHLORIDE, SODIUM LACTATE, POTASSIUM CHLORIDE, AND CALCIUM CHLORIDE 250 ML: .6; .31; .03; .02 INJECTION, SOLUTION INTRAVENOUS at 16:02

## 2025-03-17 RX ADMIN — SODIUM CHLORIDE, SODIUM LACTATE, POTASSIUM CHLORIDE, AND CALCIUM CHLORIDE 250 ML: .6; .31; .03; .02 INJECTION, SOLUTION INTRAVENOUS at 15:42

## 2025-03-17 RX ADMIN — SODIUM CHLORIDE: 9 INJECTION, SOLUTION INTRAVENOUS at 08:14

## 2025-03-17 RX ADMIN — SODIUM CHLORIDE: 0.9 INJECTION, SOLUTION INTRAVENOUS at 16:01

## 2025-03-17 RX ADMIN — SODIUM CHLORIDE: 9 INJECTION, SOLUTION INTRAVENOUS at 08:55

## 2025-03-17 RX ADMIN — ROCURONIUM 100 MG: 50 INJECTION, SOLUTION INTRAVENOUS at 08:18

## 2025-03-17 RX ADMIN — ASPIRIN 300 MG: 300 SUPPOSITORY RECTAL at 15:55

## 2025-03-17 RX ADMIN — SODIUM CHLORIDE, SODIUM LACTATE, POTASSIUM CHLORIDE, AND CALCIUM CHLORIDE: .6; .31; .03; .02 INJECTION, SOLUTION INTRAVENOUS at 06:50

## 2025-03-17 RX ADMIN — HEPARIN SODIUM 32000 UNITS: 1000 INJECTION, SOLUTION INTRAVENOUS; SUBCUTANEOUS at 10:17

## 2025-03-17 RX ADMIN — NOREPINEPHRINE BITARTRATE 4 MCG: 1 INJECTION, SOLUTION, CONCENTRATE INTRAVENOUS at 08:32

## 2025-03-17 RX ADMIN — GABAPENTIN 300 MG: 300 CAPSULE ORAL at 22:31

## 2025-03-17 ASSESSMENT — ACTIVITIES OF DAILY LIVING (ADL)
ADLS_ACUITY_SCORE: 42
ADLS_ACUITY_SCORE: 42
ADLS_ACUITY_SCORE: 59
ADLS_ACUITY_SCORE: 59
ADLS_ACUITY_SCORE: 42
ADLS_ACUITY_SCORE: 55
ADLS_ACUITY_SCORE: 42
ADLS_ACUITY_SCORE: 55
ADLS_ACUITY_SCORE: 42
ADLS_ACUITY_SCORE: 42
ADLS_ACUITY_SCORE: 55
ADLS_ACUITY_SCORE: 42
ADLS_ACUITY_SCORE: 55
ADLS_ACUITY_SCORE: 59

## 2025-03-17 NOTE — ANESTHESIA PROCEDURE NOTES
Arterial Line Procedure Note    Pre-Procedure   Staff -        Anesthesiologist:  Scooby Anderson MD       Performed By: anesthesiologist       Location: pre-op       Pre-Anesthestic Checklist: patient identified, IV checked, risks and benefits discussed, informed consent, monitors and equipment checked, pre-op evaluation and at physician/surgeon's request  Timeout:       Correct Patient: Yes        Correct Procedure: Yes        Correct Site: Yes        Correct Position: Yes   Line Placement:   This line was placed Post Induction  Procedure   Procedure: arterial line       Laterality: right       Insertion Site: radial.  Sterile Prep        Standard elements of sterile barrier followed       Skin prep: Chloraprep  Insertion/Injection        Technique: Seldinger Technique        Catheter Type/Size: 20 G, 12 cm  Narrative         Secured by: other       Tegaderm dressing used.       Complications: None apparent,        Arterial waveform: Yes        IBP within 10% of NIBP: Yes

## 2025-03-17 NOTE — PROGRESS NOTES
SPIRITUAL HEALTH SERVICES Progress Note  [facility], ICU    Saw pt Eric's friend, Veronique, in ICU waiting room. She shared update on Eric's surgery, stating that it went very well. She was very happy and wanted to share the update. She welcomes follow up visits and is appreciative of care received.     Plan of Care - A  will continue to visit as able or per request by patient/family/staff.      Yovani Loyola MDiv, [facility]  /Manager [facility]  [phone]       Spiritual Health Services is available 24/7 for emergent requests and consults, either by paging the on-call  or by entering an ASAP/STAT consult in [employer], which will also page the on-call .

## 2025-03-17 NOTE — ANESTHESIA CARE TRANSFER NOTE
Patient: Eric Cordoba    Procedure: Procedure(s):  CORONARY ARTERY BYPASS GRAFT TIMES FOUR, LEFT INTERNAL MAMMARY ARTERY HARVEST, LEFT LEG ENDOSCOPIC SAPHENOUS VEIN PROCUREMENT, EPIAORTIC ULTRASOUND, ANESTHESIA TRANSESOPHAGEAL ECHOCARDIOGRAM       Diagnosis: CAD (coronary artery disease) [I25.10]  Diagnosis Additional Information: No value filed.    Anesthesia Type:   General     Note:    Oropharynx: endotracheal tube in place and oral airway in place  Level of consciousness: intubated/sedated/ on ventilator.  Patient oxygen source: intubated/sedated/ on ventilator.    Independent Airway: airway patency satisfactory and stable (intubated/sedated/ on ventilator)  Dentition: dentition unchanged  Vital Signs Stable: post-procedure vital signs reviewed and stable  Report to RN Given: handoff report given  Patient transferred to: ICU    ICU Handoff: Call for PAUSE to initiate/utilize ICU HANDOFF, Identified Patient, Identified Responsible Provider, Reviewed the Pertinent Medical History, Discussed Surgical Course, Reviewed Intra-OP Anesthesia Management and Issues during Anesthesia, Set Expectations for Post Procedure Period and Allowed Opportunity for Questions and Acknowledgement of Understanding      Vitals:  Vitals Value Taken Time   /42    Temp     Pulse 84 03/17/25 1447   Resp 16    SpO2 100 % 03/17/25 1447   Vitals shown include unfiled device data.    Electronically Signed By: KYMBERLY Rosales CRNA  March 17, 2025  2:48 PM

## 2025-03-17 NOTE — PRE-PROCEDURE
GENERAL PRE-PROCEDURE:   Procedure:  IABP insertion  Date/Time:  3/17/2025 7:10 AM    Written consent obtained?: Yes    Risks and benefits: Risks, benefits and alternatives were discussed    Consent given by:  Patient  Patient states understanding of procedure being performed: Yes    Patient's understanding of procedure matches consent: Yes    Procedure consent matches procedure scheduled: Yes    Expected level of sedation:  Moderate  Appropriately NPO:  Yes  ASA Class:  4 (severe multi-vessel CAD, hx of MI, LV apical thrombus; on NOAC, 2/2 cardiomyopathy, ICM; EF 35-40%, HTN, CKD Stage IIIb, DM Type II, hx of CVA)  Mallampati  :  Grade 1- soft palate, uvula, tonsillar pillars, and posterior pharyngeal wall visible  Lungs:  Lungs clear with good breath sounds bilaterally  Heart:  Normal heart sounds and rate  History & Physical reviewed:  History and physical reviewed and updates made (see comment)  H&P Comments:  Clinically Significant Risk Factors Present on Admission    Cardiovascular : severe multi-vessel CAD, hx of MI, LV apical thrombus; on NOAC, 2/2 cardiomyopathy, ICM; EF 35-40%, HTN, DM Type II    Fluid & Electrolyte Disorders : Not present on admission    Gastroenterology : Not present on admission    Hematology/Oncology : Not present on admission    Nephrology : CKD Stage IIIb; stable, will minimize contrast administration, IV fluids per protocol    Neurology : hx of CVA    Pulmonology : Not present on admission    Systemic : Not present on admission  Statement of review:  I have reviewed the lab findings, diagnostic data, medications, and the plan for sedation

## 2025-03-17 NOTE — ANESTHESIA PROCEDURE NOTES
Airway       Patient location during procedure: OR       Procedure Start/Stop Times: 3/17/2025 8:21 AM  Staff -        Anesthesiologist:  Scooby Anderson MD       CRNA: Telma Corrales APRN CRNA       Performed By: CRNAIndications and Patient Condition       Indications for airway management: rashaad-procedural       Induction type:intravenous       Mask difficulty assessment: 2 - vent by mask + OA or adjuvant +/- NMBA    Final Airway Details       Final airway type: endotracheal airway       Successful airway: Single subglottic suction  Endotracheal Airway Details        ETT size (mm): 8.0       Cuffed: yes       Cuff volume (mL): 7       Successful intubation technique: direct laryngoscopy       DL Blade Type: Mack 2       Grade View of Cords: 1       Adjucts: stylet       Position: Right       Measured from: gums/teeth       Secured at (cm): 22       Bite block used: None    Post intubation assessment        Placement verified by: capnometry, equal breath sounds and chest rise        Number of attempts at approach: 1       Secured with: tape       Ease of procedure: easy       Dentition: Intact and Unchanged    Medication(s) Administered   Medication Administration Time: 3/17/2025 8:21 AM

## 2025-03-17 NOTE — ANESTHESIA PROCEDURE NOTES
Central Line/PA Catheter Placement    Pre-Procedure   Staff -        Anesthesiologist:  Scooby Anderson MD       Performed By: anesthesiologist       Location: OR       Pre-Anesthestic Checklist: patient identified, IV checked, site marked, risks and benefits discussed, informed consent, monitors and equipment checked, pre-op evaluation and at physician/surgeon's request  Timeout:       Correct Patient: Yes        Correct Procedure: Yes        Correct Site: Yes        Correct Position: Yes        Correct Laterality: Yes   Line Placement:   This line was placed Post Induction    Procedure   Procedure: central line       Laterality: right       Insertion Site: internal jugular.       Patient Position: Trendelenburg  Sterile Prep        All elements of maximal sterile barrier technique followed       Patient Prep/Sterile Barriers: draped, hand hygiene, gloves , hat , mask , draped, gown, sterile gel and probe cover       Skin prep: Chloraprep  Insertion/Injection        1. Ultrasound was used to evaluate the access site.       2. Vein evaluated via ultrasound for patency/adequacy.       3. Using real-time ultrasound the needle/catheter was observed entering the artery/vein.       4. Permanent image was captured and entered into the patient's record.       5. The visualized structures were anatomically normal.       6. There were no apparent abnormal pathologic findings.       Introducer Type: 9 Fr, 2-lumen MAC        Type: PA/CVC with Introducer       Number of Lumens: quad lumen       PA Catheter Type: CCO         Appropriate RV, RA and PA waveforms noted:  Yes  Narrative         Secured by: suture       Tegaderm and Biopatch dressing used.       Complications: None apparent,        blood aspirated from all lumens,        Verification method: Placement to be verified post-op       Tip termination: The catheter tip was threaded to reside in the superior vena cava subject to post surgical x-ray

## 2025-03-17 NOTE — ANESTHESIA PROCEDURE NOTES
Perioperative MANUEL Procedure Note    Staff -        Anesthesiologist:  Scooby Anderson MD       Performed By: anesthesiologist      MANUEL Probe Insertion    Probe Status PRE Insertion: NO obvious damage  Probe type:  Adult 3D  Bite block used:   Molar  Insertion Technique: Easy, no oropharyngeal manipulation  Insertion complications: None obvious  Billing Report:MANUEL report by Anesthesiologist (See Separate Report note)  Probe Status POST Removal: NO obvious damage    MANUEL Report  General Procedure Information  Images for this study have been archived.    Post Intervention Findings  Procedure(s) performed:  CABG.  Regional wall motion:. Surgeon(s) notified of all postintervention findings: Yes.             Post Intervention comments: Improved LV contractility,  Ef 50-55%. Rest of examination unchanged.    Echocardiogram Comments  Echocardiogram comments: Multiplane MANUEL probe introduced per surgeon's request to rule out findings that could change the course of the operation. Hypokinetic dilated LV, EF 25-30% with multiple RWMA, severe MAC, MS with mean gradient of 2mm HG at the HR 68 at SR and IABP, mild to moderate MR at . Tricuspid AV wit aortic sclerosis no aortic stenosis, mild eccentric AI, between the base of the NCC and LCC. No clot in LV apex, LILLY or LA. NO ASD or PFO, lipomatous hypertrophy and anerysm of the IAS. Atherosclerotic plaques in descneding thoracic, distal arch and ascending aorta. IABP in the descending thoracic aorta, tip 1 cm distal to the LSA take off..

## 2025-03-17 NOTE — CONSULTS
PULMONARY / CRITICAL CARE CONSULTATION NOTE      Consultation - Pulmonary/Critical Care Medicine  Eric Cordoba,  1950, MRN [id number]  Date / Time of Admission:  3/17/2025  5:10 AM    Admitting Dx: CAD (coronary artery disease) [I25.10]  CAD (coronary artery disease) [I25.10]  Coronary artery disease of native artery of native heart with stable angina pectoris [I25.118]    PCP: Abner Elmore, [phone]  Consulting physician:  John Sen MD   Code status:  Full Code       Extended Emergency Contact Information  Primary Emergency Contact: Veronique Owens  Address:  [address] [place], [place]  Mobile Phone: [phone]  Relation: Friend  Secondary Emergency Contact: Vincent Tay  Address: [address], [place]  Mobile Phone: [phone]  Relation: Friend       ID:  Eric Cordoba is a 74 year old male with CAD, ICM with EF 35-40%, htn. Underwent CABG x 4 and placement of IABP today.         ASSESSMENT & PLAN BY SYSTEM   Systems to Assess:   Pulmonary: Post op vent management; no underlying lung disease documented.   Wean supplemental O2 as tolerated; goal O2 sat > 92%.  HOB > 30 degrees to limit aspiration risk.    Check ABG and adjust support as indicated; avoid acidotic state.   Check CXR  Once hemodynamically stable, plan to proceed to wake, wean, and extubate with goal < 6-hours.  Goal extubation by   Cardiovascular: CAD s/p CABG x 4; ICM with EF ~ 40%, preop IABP placed this morning. Hx htn.   Cardiac monitoring.   SBP goal > 90 mmHg, MAP goal > 65 mmHg.    Goal CI 2-3   IABP @ 1:1. Plan to wean and remove tomorrow AM.   Vasoactive gtts per CV-surgery.   Temporary pacing wires present; will use in setting of symptomatic arrhythmia.   Currently paced @ back up  Underlying rhythm: Sinus  Neurological: sedation for vent synchrony and comfort. Previous CVA, now with memory loss  Neuro checks per ICU protocol.   Sedate with precedex until  ready to wean and extubate.   Pain control: PRN  Goal RASS 0 to -1   GI/: no acute issues   NPO until extubated and fully awake.   Riojas catheter in place for accurate measurement of I/O.   GI prophylaxis:  Omeprazole  Renal: no acute issues; hx CKD   Monitor I/O's.  Electrolyte repletion PRN.  Avoid/limit nephrotoxic agents.   IVFs: per CV-surgery  Heme/Coag: Acute blood loss anemia; coagulopathy secondary to pump run.   Monitor H/H.   Transfuse per CV-surgery.   Monitor chest tube output hourly; notify CV-surgery for excessive output or abrupt stop in output.   DVT prophylaxis:  SubQ heparin  Infectious disease:   General precautions.   Remove lines and drains once no longer required.   Endocrine: hyperglycemia   RHI gtt per ICU protocol.   Musculoskeletal:   Bedrest; initiate mobility protocol.     Lines: A-line, Day# 1, Central line, Day# 1 or [place]oni, Day# 1      Code Status:  FULL CODE     Thank you for this consultation.  Please call if any questions or concerns.        This patient is considered critically ill and requires ICU level of care due to immediate post CV-surgical procedure. High risk for acute hemodynamic collapse and death.     Total Critical Care time, not including separate billable procedure time:  31 minutes.    Rocio Jaimes, CNP  [facility] Pulmonary/Critical Care      Chief Complaint chief complaint       HPI    Eric Cordoba is a 74 year old male with CAD, htn, ICM 35-40%, CKD3, DM2, Neuropathy, previous CVA with memory loss. Underwent CABG x 4 with Dr. Shahid. Preop EF showed decrease to 25-30%, so sent for IABP placement prior to OR. Surgery reported as straight forward. Easy intubation and no difficulty ventilating during case. Received 20mg IV methadone. No difficulty going on pump nor coming off. Came off pump on epi; improved EF at end of case to 50-55%. EBL 225mL; transfused with 170mL Cell Saver and received DDAVP and additional protamine. Arrived to ICU on small dose  of cardene and epi; sedated on precedex and on full vent support.   Direct sign out received at the bedside from Dr. Anderson.          Review of Systems   Review of systems not obtained due to patient factors.     Active Problem List   Patient Active Problem List    Diagnosis Date Noted    CAD (coronary artery disease) 03/17/2025     Priority: Medium    Coronary artery disease of native artery of native heart with stable angina pectoris 03/17/2025     Priority: Medium    Secondary cardiomyopathy (H) 02/24/2025     Priority: Medium    Abnormal stress test 02/24/2025     Priority: Medium    Status post coronary angiogram 02/24/2025     Priority: Medium    Decreased left ventricular function 02/24/2025     Priority: Medium    Mural thrombus of left ventricle 02/24/2025     Priority: Medium    Coronary artery disease involving native coronary artery of native heart, unspecified whether angina present 02/24/2025     Priority: Medium    Stage 3b chronic kidney disease (H) 02/24/2025     Priority: Medium         Medical/Surgical History   Past Medical History:   Diagnosis Date    Diabetes mellitus, type 2 (H)     History of CVA (cerebrovascular accident)     Hypertension     Nutritional and metabolic cardiomyopathies (H)      Past Surgical History:   Procedure Laterality Date    CV CORONARY ANGIOGRAM N/A 2/24/2025    Procedure: Coronary Angiogram;  Surgeon: Bautista Durand MD;  Location: [facility] CATH LAB CV    CV LEFT HEART CATH N/A 2/24/2025    Procedure: Left Heart Catheterization;  Surgeon: Bautista Durand MD;  Location: [facility] CATH LAB CV         Allergies   Allergies   Allergen Reactions    Lisinopril Cough    Propoxyphene Unknown and Hives         Medications:  OUTpatient medications   Prior to Admission medications    Medication Sig Start Date End Date Taking? Authorizing Provider   apixaban ANTICOAGULANT (ELIQUIS ANTICOAGULANT) 5 MG tablet Take 1 tablet (5 mg) by mouth 2 times daily. 1/13/25  Yes Taisha  MD Omega   carvedilol (COREG) 12.5 MG tablet Take 12.5 mg by mouth 2 times daily 5/9/24  Yes Abner Elmore MD   furosemide (LASIX) 20 MG tablet Take 40 mg by mouth daily 3/30/24  Yes Abner Elmore MD   gabapentin (NEURONTIN) 300 MG capsule Take 300 mg by mouth at bedtime.   Yes Unknown, Entered By History   gabapentin (NEURONTIN) 300 MG capsule Take 600 mg by mouth every morning. 5/2/24  Yes Abner Elmore MD   Ibuprofen-diphenhydrAMINE HCl (ADVIL PM) 200-25 MG CAPS Take 3 capsules by mouth at bedtime.   Yes Unknown, Entered By History   LANTUS SOLOSTAR 100 UNIT/ML soln Inject 50 Units subcutaneously every evening. 4/26/24  Yes Abner Elmore MD   losartan (COZAAR) 50 MG tablet Take 50 mg by mouth daily. 4/1/24  Yes Abner Elmore MD   MOUNJARO 5 MG/0.5ML SOAJ Inject 5 mg subcutaneously every 7 days. 2/18/25  Yes Abner Elmore MD   simvastatin (ZOCOR) 40 MG tablet Take 40 mg by mouth daily 3/15/24  Yes Abner Elmore MD           Medications:  INpatient medications   Current Facility-Administered Medications   Medication Dose Route Frequency Provider Last Rate Last Admin    acetaminophen (TYLENOL) Suppository 650 mg  650 mg Rectal Q8H Zayra Raya PA-C        acetaminophen (TYLENOL) tablet 975 mg  975 mg Oral Q8H John Sen MD        [Held by provider] apixaban ANTICOAGULANT (ELIQUIS) tablet 5 mg  5 mg Oral BID Zayra Raya PA-C        aspirin (ASA) chewable tablet 162 mg  162 mg Oral or NG Tube Once Zayra Raya PA-C        Or    aspirin (ASA) Suppository 300 mg  300 mg Rectal Once Zayra Raya PA-C        [START ON 3/18/2025] aspirin (ASA) chewable tablet 162 mg  162 mg Oral or NG Tube Daily Zayra Raya PA-C        Or    [START ON 3/18/2025] aspirin (ASA) Suppository 300 mg  300 mg Rectal Daily Zayra Raya PA-C        ceFAZolin (ANCEF) 2 g in dextrose 50 mL intermittent infusion  2 g Intravenous Q8H Zayra Raya  MARIE Dobbs        chlorhexidine (PERIDEX) 0.12 % solution 15 mL  15 mL Mouth/Throat Q12H Rocio Jaimes APRN CNP        diazepam (VALIUM) tablet 5 mg  5 mg Oral Once Nabila Gates CNP        gabapentin (NEURONTIN) capsule 300 mg  300 mg Oral At Bedtime Zayra Raya PA-C        [START ON 3/18/2025] gabapentin (NEURONTIN) capsule 600 mg  600 mg Oral QAM Zayra Raya PA-C        [START ON 3/18/2025] heparin ANTICOAGULANT injection 5,000 Units  5,000 Units Subcutaneous Q8H Zayra Raya PA-C        [Held by provider] insulin glargine (LANTUS PEN) injection 50 Units  50 Units Subcutaneous QPM Zayra Raya PA-C        Lidocaine (LIDOCARE) 4 % Patch 1-2 patch  1-2 patch Transdermal Q24H Zayra Raya PA-C        [START ON 3/18/2025] pantoprazole (PROTONIX) 2 mg/mL suspension 40 mg  40 mg Oral or NG Tube QAM AC Zayra Raya PA-C        Or    [START ON 3/18/2025] pantoprazole (PROTONIX) EC tablet 40 mg  40 mg Oral QAM AC Zayra Raya PA-C        [START ON 3/18/2025] polyethylene glycol (MIRALAX) Packet 17 g  17 g Oral or Feeding Tube Daily Zayra Raya PA-C        senna-docusate (SENOKOT-S/PERICOLACE) 8.6-50 MG per tablet 1 tablet  1 tablet Oral BID Zayra Raya PA-C        [START ON 3/18/2025] simvastatin (ZOCOR) tablet 40 mg  40 mg Oral or Feeding Tube Daily Zayra Raya PA-C               Family History  Social History   Reviewed  History reviewed. No pertinent family history.  Reviewed  Social History     Socioeconomic History    Marital status:      Spouse name: Not on file    Number of children: Not on file    Years of education: Not on file    Highest education level: Not on file   Occupational History    Not on file   Tobacco Use    Smoking status: Former     Types: Cigarettes    Smokeless tobacco: Never   Substance and Sexual Activity    Alcohol use: Not Currently    Drug use: Not on  file    Sexual activity: Not on file   Other Topics Concern    Not on file   Social History Narrative    Not on file     Social Drivers of Health     Financial Resource Strain: High Risk (12/30/2021)    [facility]    Financial Resource Strain     Difficulty of Paying Living Expenses: Not on file     Difficulty of Paying Living Expenses: Not on file   Food Insecurity: Not on file   Transportation Needs: Not on file   Physical Activity: Not on file   Stress: Not on file   Social Connections: Unknown (12/30/2021)    Received from [facility]    Social Connections     Frequency of Communication with Friends and Family: Not on file   Interpersonal Safety: Low Risk  (3/17/2025)    Interpersonal Safety     Do you feel physically and emotionally safe where you currently live?: Yes     Within the past 12 months, have you been hit, slapped, kicked or otherwise physically hurt by someone?: No     Within the past 12 months, have you been humiliated or emotionally abused in other ways by your partner or ex-partner?: No   Housing Stability: Not on file      Psychosocial Needs  Social History     Social History Narrative    Not on file     Additional psychosocial needs reviewed per nursing assessment.      Physical Exam   VITALS:  BP (!) 144/69 (BP Location: Right arm)   Pulse 79   Temp 98  F (36.7  C)   Resp 14   Wt 94.8 kg (209 lb)   SpO2 100%   BMI 30.86 kg/m    [unfilled]    PHYSICAL EXAM:   GEN: intubated and sedated  HEENT:  OETT in place; AT/NC  NECK: Supple.  RIJ introducer with PA-C in place.   PULM:  unlabored on full vent; lungs are clear and equal. No wheeze.   CVS:   RRR S1S2; IABP at 1:1. Chest tubes x 3 with sanguinous output.   ABDOMEN:   soft ntnd  EXTREMITIES/SKIN:    visible skin intact.   NEURO:  .  sedated    I&O:    Intake/Output Summary (Last 24 hours) at 3/17/2025 1515  Last data filed at 3/17/2025 1447  Gross per 24  hour   Intake 2440 ml   Output 700 ml   Net 1740 ml        Pertinent Labs   Lab Results: personally reviewed.      CBC:  Recent Labs   Lab 03/17/25  1502 03/17/25  1340 03/17/25  1309 03/17/25  1305   WBC 12.6*  --   --  9.3   HGB 10.9*  9.9* 10.2*   < > 9.8*   HCT 31.3*  29*  --   --  27.8*   *  --   --  110*    < > = values in this interval not displayed.     Chemistry:  Recent Labs   Lab 03/17/25  1502 03/17/25  1340 03/17/25  1309    144 142     Coags:  Lab Results   Component Value Date    INR 1.65 (H) 03/17/2025    INR 1.07 03/13/2025    INR 1.02 01/07/2025        Microbiology:  none       Radiology:  Chest X-Ray: post op film pending

## 2025-03-17 NOTE — PHARMACY-ADMISSION MEDICATION HISTORY
Pharmacist Admission Medication History    Admission medication history is complete. The information provided in this note is only as accurate as the sources available at the time of the update.    Information Source(s): Patient, Clinic records, and CareEverywhere/SureScripts via in-person    Changes made to PTA medication list:  Added: None  Deleted: None  Changed: gabapentin     Allergies reviewed with patient and updates made in EHR: yes    Medication History Completed By: Lauren Marks RPH 3/17/2025 7:29 AM    PTA Med List   Medication Sig Note Last Dose/Taking    apixaban ANTICOAGULANT (ELIQUIS ANTICOAGULANT) 5 MG tablet Take 1 tablet (5 mg) by mouth 2 times daily.  3/13/2025 Morning    carvedilol (COREG) 12.5 MG tablet Take 12.5 mg by mouth 2 times daily  3/16/2025 Evening    furosemide (LASIX) 20 MG tablet Take 40 mg by mouth daily  3/16/2025 Morning    gabapentin (NEURONTIN) 300 MG capsule Take 300 mg by mouth at bedtime.  3/16/2025 Bedtime    gabapentin (NEURONTIN) 300 MG capsule Take 600 mg by mouth every morning.  3/16/2025 Morning    Ibuprofen-diphenhydrAMINE HCl (ADVIL PM) 200-25 MG CAPS Take 3 capsules by mouth at bedtime.  3/16/2025 Bedtime    LANTUS SOLOSTAR 100 UNIT/ML soln Inject 50 Units subcutaneously every evening. 3/17/2025: 24 units evening prior to surgery 3/16/2025 Bedtime    losartan (COZAAR) 50 MG tablet Take 50 mg by mouth daily.  3/16/2025 Morning    MOUNJARO 5 MG/0.5ML SOAJ Inject 5 mg subcutaneously every 7 days.  3/4/2025 Morning    simvastatin (ZOCOR) 40 MG tablet Take 40 mg by mouth daily  3/16/2025 Morning

## 2025-03-17 NOTE — INTERVAL H&P NOTE
"I have reviewed the surgical (or preoperative) H&P that is linked to this encounter, and examined the patient. There are no significant changes    Clinical Conditions Present on Arrival:  Clinically Significant Risk Factors Present on Admission                 # Drug Induced Coagulation Defect: home medication list includes an anticoagulant medication       # DMII: A1C = 8.7 % (Ref range: <5.7 %) within past 6 months  # Obesity: Estimated body mass index is 30.86 kg/m  as calculated from the following:    Height as of 2/24/25: 1.753 m (5' 9\").    Weight as of this encounter: 94.8 kg (209 lb).       "

## 2025-03-17 NOTE — PROGRESS NOTES
[facility] - ICU Admission Note       Dual Skin Assessment:     Patient transferred from OR/PACU to ICU.      Comprehensive skin inspection completed by myself and Janie PURDY RN.      Abnormal skin assessment findings: No      If yes above, LDA initiated for skin breakdown/non-blanchable erythema:  No     Provider notified: N/A     WOC consult order obtained: N/A

## 2025-03-17 NOTE — PROGRESS NOTES
The patient was seen and examined by me. There have been no major interval changes to his health since he was last seen. We plan to perform CABG after IABP placement.  The risks and benefits of surgery were discussed and the patient understands that the risks for this procedure include: bleeding, infection, stroke, sternal dehiscence, myocardial infarction, arrhythmias, the need for a pacemaker, prolonged ventilation, pneumonia, liver/renal failure, aortic dissection, and death. They accept these risks and are agreeable with the plan of proceeding with surgery.

## 2025-03-18 ENCOUNTER — APPOINTMENT (OUTPATIENT)
Dept: RADIOLOGY | Facility: HOSPITAL | Age: 75
DRG: 235 | End: 2025-03-18
Attending: PHYSICIAN ASSISTANT
Payer: COMMERCIAL

## 2025-03-18 DIAGNOSIS — Z95.1 S/P CABG (CORONARY ARTERY BYPASS GRAFT): Primary | ICD-10-CM

## 2025-03-18 LAB
ALBUMIN SERPL BCG-MCNC: 3.7 G/DL (ref 3.5–5.2)
ALBUMIN SERPL BCG-MCNC: 3.9 G/DL (ref 3.5–5.2)
ALP SERPL-CCNC: 56 U/L (ref 40–150)
ALP SERPL-CCNC: 59 U/L (ref 40–150)
ALT SERPL W P-5'-P-CCNC: 11 U/L (ref 0–70)
ALT SERPL W P-5'-P-CCNC: 9 U/L (ref 0–70)
ANION GAP SERPL CALCULATED.3IONS-SCNC: 8 MMOL/L (ref 7–15)
ANION GAP SERPL CALCULATED.3IONS-SCNC: 8 MMOL/L (ref 7–15)
APTT PPP: 34 SECONDS (ref 22–38)
AST SERPL W P-5'-P-CCNC: 33 U/L (ref 0–45)
AST SERPL W P-5'-P-CCNC: 35 U/L (ref 0–45)
ATRIAL RATE - MUSE: 83 BPM
BASE EXCESS BLDV CALC-SCNC: -1 MMOL/L (ref -3–3)
BILIRUB SERPL-MCNC: 0.5 MG/DL
BILIRUB SERPL-MCNC: 0.6 MG/DL
BUN SERPL-MCNC: 26.6 MG/DL (ref 8–23)
BUN SERPL-MCNC: 26.6 MG/DL (ref 8–23)
CA-I BLD-MCNC: 4.6 MG/DL (ref 4.4–5.2)
CA-I BLD-MCNC: 4.6 MG/DL (ref 4.4–5.2)
CALCIUM SERPL-MCNC: 8.4 MG/DL (ref 8.8–10.4)
CALCIUM SERPL-MCNC: 8.5 MG/DL (ref 8.8–10.4)
CHLORIDE SERPL-SCNC: 113 MMOL/L (ref 98–107)
CHLORIDE SERPL-SCNC: 115 MMOL/L (ref 98–107)
CREAT SERPL-MCNC: 1.99 MG/DL (ref 0.67–1.17)
CREAT SERPL-MCNC: 2 MG/DL (ref 0.67–1.17)
DIASTOLIC BLOOD PRESSURE - MUSE: NORMAL MMHG
EGFRCR SERPLBLD CKD-EPI 2021: 34 ML/MIN/1.73M2
EGFRCR SERPLBLD CKD-EPI 2021: 34 ML/MIN/1.73M2
ERYTHROCYTE [DISTWIDTH] IN BLOOD BY AUTOMATED COUNT: 12.7 % (ref 10–15)
ERYTHROCYTE [DISTWIDTH] IN BLOOD BY AUTOMATED COUNT: 13.2 % (ref 10–15)
FIBRINOGEN PPP-MCNC: 294 MG/DL (ref 170–510)
GLUCOSE BLDC GLUCOMTR-MCNC: 106 MG/DL (ref 70–99)
GLUCOSE BLDC GLUCOMTR-MCNC: 107 MG/DL (ref 70–99)
GLUCOSE BLDC GLUCOMTR-MCNC: 109 MG/DL (ref 70–99)
GLUCOSE BLDC GLUCOMTR-MCNC: 116 MG/DL (ref 70–99)
GLUCOSE BLDC GLUCOMTR-MCNC: 120 MG/DL (ref 70–99)
GLUCOSE BLDC GLUCOMTR-MCNC: 129 MG/DL (ref 70–99)
GLUCOSE BLDC GLUCOMTR-MCNC: 130 MG/DL (ref 70–99)
GLUCOSE BLDC GLUCOMTR-MCNC: 152 MG/DL (ref 70–99)
GLUCOSE BLDC GLUCOMTR-MCNC: 195 MG/DL (ref 70–99)
GLUCOSE BLDC GLUCOMTR-MCNC: 269 MG/DL (ref 70–99)
GLUCOSE BLDC GLUCOMTR-MCNC: 82 MG/DL (ref 70–99)
GLUCOSE SERPL-MCNC: 122 MG/DL (ref 70–99)
GLUCOSE SERPL-MCNC: 149 MG/DL (ref 70–99)
HCO3 BLDV-SCNC: 25 MMOL/L (ref 21–28)
HCO3 SERPL-SCNC: 23 MMOL/L (ref 22–29)
HCO3 SERPL-SCNC: 23 MMOL/L (ref 22–29)
HCT VFR BLD AUTO: 27.5 % (ref 40–53)
HCT VFR BLD AUTO: 29.3 % (ref 40–53)
HGB BLD-MCNC: 10 G/DL (ref 13.3–17.7)
HGB BLD-MCNC: 9.3 G/DL (ref 13.3–17.7)
INR PPP: 1.15 (ref 0.85–1.15)
INTERPRETATION ECG - MUSE: NORMAL
MAGNESIUM SERPL-MCNC: 2.3 MG/DL (ref 1.7–2.3)
MCH RBC QN AUTO: 32.1 PG (ref 26.5–33)
MCH RBC QN AUTO: 32.4 PG (ref 26.5–33)
MCHC RBC AUTO-ENTMCNC: 33.8 G/DL (ref 31.5–36.5)
MCHC RBC AUTO-ENTMCNC: 34.1 G/DL (ref 31.5–36.5)
MCV RBC AUTO: 95 FL (ref 78–100)
MCV RBC AUTO: 95 FL (ref 78–100)
O2/TOTAL GAS SETTING VFR VENT: 28 %
OXYHGB MFR BLDV: 61 % (ref 70–75)
P AXIS - MUSE: 18 DEGREES
PCO2 BLDV: 47 MM HG (ref 40–50)
PH BLDV: 7.33 [PH] (ref 7.32–7.43)
PHOSPHATE SERPL-MCNC: 3.3 MG/DL (ref 2.5–4.5)
PLATELET # BLD AUTO: 95 10E3/UL (ref 150–450)
PLATELET # BLD AUTO: 96 10E3/UL (ref 150–450)
PO2 BLDV: 33 MM HG (ref 25–47)
POTASSIUM SERPL-SCNC: 4.9 MMOL/L (ref 3.4–5.3)
POTASSIUM SERPL-SCNC: 5 MMOL/L (ref 3.4–5.3)
PR INTERVAL - MUSE: 146 MS
PROT SERPL-MCNC: 5.3 G/DL (ref 6.4–8.3)
PROT SERPL-MCNC: 5.5 G/DL (ref 6.4–8.3)
QRS DURATION - MUSE: 140 MS
QT - MUSE: 412 MS
QTC - MUSE: 484 MS
R AXIS - MUSE: 41 DEGREES
RBC # BLD AUTO: 2.9 10E6/UL (ref 4.4–5.9)
RBC # BLD AUTO: 3.09 10E6/UL (ref 4.4–5.9)
SAO2 % BLDV: 62.8 % (ref 70–75)
SODIUM SERPL-SCNC: 144 MMOL/L (ref 135–145)
SODIUM SERPL-SCNC: 146 MMOL/L (ref 135–145)
SYSTOLIC BLOOD PRESSURE - MUSE: NORMAL MMHG
T AXIS - MUSE: 77 DEGREES
VENTRICULAR RATE- MUSE: 83 BPM
WBC # BLD AUTO: 11.2 10E3/UL (ref 4–11)
WBC # BLD AUTO: 16.2 10E3/UL (ref 4–11)

## 2025-03-18 PROCEDURE — 71045 X-RAY EXAM CHEST 1 VIEW: CPT

## 2025-03-18 PROCEDURE — 85730 THROMBOPLASTIN TIME PARTIAL: CPT | Performed by: STUDENT IN AN ORGANIZED HEALTH CARE EDUCATION/TRAINING PROGRAM

## 2025-03-18 PROCEDURE — 82330 ASSAY OF CALCIUM: CPT | Performed by: PHYSICIAN ASSISTANT

## 2025-03-18 PROCEDURE — 85027 COMPLETE CBC AUTOMATED: CPT

## 2025-03-18 PROCEDURE — 93005 ELECTROCARDIOGRAM TRACING: CPT

## 2025-03-18 PROCEDURE — 94799 UNLISTED PULMONARY SVC/PX: CPT

## 2025-03-18 PROCEDURE — 250N000012 HC RX MED GY IP 250 OP 636 PS 637

## 2025-03-18 PROCEDURE — 85610 PROTHROMBIN TIME: CPT | Performed by: STUDENT IN AN ORGANIZED HEALTH CARE EDUCATION/TRAINING PROGRAM

## 2025-03-18 PROCEDURE — 83735 ASSAY OF MAGNESIUM: CPT | Performed by: PHYSICIAN ASSISTANT

## 2025-03-18 PROCEDURE — 250N000013 HC RX MED GY IP 250 OP 250 PS 637: Performed by: PHYSICIAN ASSISTANT

## 2025-03-18 PROCEDURE — 250N000011 HC RX IP 250 OP 636

## 2025-03-18 PROCEDURE — P9045 ALBUMIN (HUMAN), 5%, 250 ML: HCPCS | Mod: JZ | Performed by: STUDENT IN AN ORGANIZED HEALTH CARE EDUCATION/TRAINING PROGRAM

## 2025-03-18 PROCEDURE — 250N000011 HC RX IP 250 OP 636: Performed by: PHYSICIAN ASSISTANT

## 2025-03-18 PROCEDURE — 85384 FIBRINOGEN ACTIVITY: CPT | Performed by: STUDENT IN AN ORGANIZED HEALTH CARE EDUCATION/TRAINING PROGRAM

## 2025-03-18 PROCEDURE — 250N000013 HC RX MED GY IP 250 OP 250 PS 637: Performed by: INTERNAL MEDICINE

## 2025-03-18 PROCEDURE — 999N000157 HC STATISTIC RCP TIME EA 10 MIN

## 2025-03-18 PROCEDURE — 84155 ASSAY OF PROTEIN SERUM: CPT | Performed by: STUDENT IN AN ORGANIZED HEALTH CARE EDUCATION/TRAINING PROGRAM

## 2025-03-18 PROCEDURE — 82330 ASSAY OF CALCIUM: CPT | Performed by: STUDENT IN AN ORGANIZED HEALTH CARE EDUCATION/TRAINING PROGRAM

## 2025-03-18 PROCEDURE — 250N000013 HC RX MED GY IP 250 OP 250 PS 637

## 2025-03-18 PROCEDURE — 82805 BLOOD GASES W/O2 SATURATION: CPT | Performed by: STUDENT IN AN ORGANIZED HEALTH CARE EDUCATION/TRAINING PROGRAM

## 2025-03-18 PROCEDURE — 84155 ASSAY OF PROTEIN SERUM: CPT | Performed by: PHYSICIAN ASSISTANT

## 2025-03-18 PROCEDURE — 84100 ASSAY OF PHOSPHORUS: CPT | Performed by: PHYSICIAN ASSISTANT

## 2025-03-18 PROCEDURE — 200N000001 HC R&B ICU

## 2025-03-18 PROCEDURE — 82310 ASSAY OF CALCIUM: CPT | Performed by: PHYSICIAN ASSISTANT

## 2025-03-18 PROCEDURE — 84460 ALANINE AMINO (ALT) (SGPT): CPT | Performed by: STUDENT IN AN ORGANIZED HEALTH CARE EDUCATION/TRAINING PROGRAM

## 2025-03-18 PROCEDURE — 93005 ELECTROCARDIOGRAM TRACING: CPT | Performed by: STUDENT IN AN ORGANIZED HEALTH CARE EDUCATION/TRAINING PROGRAM

## 2025-03-18 PROCEDURE — 250N000011 HC RX IP 250 OP 636: Mod: JZ | Performed by: STUDENT IN AN ORGANIZED HEALTH CARE EDUCATION/TRAINING PROGRAM

## 2025-03-18 PROCEDURE — 85014 HEMATOCRIT: CPT | Performed by: PHYSICIAN ASSISTANT

## 2025-03-18 RX ORDER — ASPIRIN 81 MG/1
81 TABLET, CHEWABLE ORAL DAILY
Status: DISCONTINUED | OUTPATIENT
Start: 2025-03-19 | End: 2025-03-20

## 2025-03-18 RX ORDER — CEFAZOLIN SODIUM 2 G/50ML
2 SOLUTION INTRAVENOUS EVERY 8 HOURS
Status: CANCELLED | OUTPATIENT
Start: 2025-03-18 | End: 2025-03-19

## 2025-03-18 RX ORDER — FUROSEMIDE 10 MG/ML
20 INJECTION INTRAMUSCULAR; INTRAVENOUS ONCE
Status: COMPLETED | OUTPATIENT
Start: 2025-03-18 | End: 2025-03-18

## 2025-03-18 RX ORDER — CLOPIDOGREL BISULFATE 75 MG/1
75 TABLET ORAL DAILY
Status: DISCONTINUED | OUTPATIENT
Start: 2025-03-18 | End: 2025-03-26 | Stop reason: HOSPADM

## 2025-03-18 RX ORDER — HYDROMORPHONE HCL IN WATER/PF 6 MG/30 ML
0.2 PATIENT CONTROLLED ANALGESIA SYRINGE INTRAVENOUS EVERY 4 HOURS PRN
Status: DISCONTINUED | OUTPATIENT
Start: 2025-03-18 | End: 2025-03-20

## 2025-03-18 RX ORDER — METOPROLOL TARTRATE 25 MG/1
25 TABLET, FILM COATED ORAL 2 TIMES DAILY
Status: DISCONTINUED | OUTPATIENT
Start: 2025-03-18 | End: 2025-03-19

## 2025-03-18 RX ORDER — ASPIRIN 300 MG/1
300 SUPPOSITORY RECTAL DAILY
Status: DISCONTINUED | OUTPATIENT
Start: 2025-03-19 | End: 2025-03-18

## 2025-03-18 RX ORDER — HYDROMORPHONE HCL IN WATER/PF 6 MG/30 ML
0.1 PATIENT CONTROLLED ANALGESIA SYRINGE INTRAVENOUS EVERY 4 HOURS PRN
Status: DISCONTINUED | OUTPATIENT
Start: 2025-03-18 | End: 2025-03-20

## 2025-03-18 RX ADMIN — HEPARIN SODIUM 5000 UNITS: 5000 INJECTION, SOLUTION INTRAVENOUS; SUBCUTANEOUS at 21:08

## 2025-03-18 RX ADMIN — GABAPENTIN 300 MG: 300 CAPSULE ORAL at 21:08

## 2025-03-18 RX ADMIN — METOPROLOL TARTRATE 25 MG: 25 TABLET, FILM COATED ORAL at 20:13

## 2025-03-18 RX ADMIN — INSULIN ASPART 2 UNITS: 100 INJECTION, SOLUTION INTRAVENOUS; SUBCUTANEOUS at 16:47

## 2025-03-18 RX ADMIN — HYDROMORPHONE HYDROCHLORIDE 0.2 MG: 0.2 INJECTION, SOLUTION INTRAMUSCULAR; INTRAVENOUS; SUBCUTANEOUS at 16:25

## 2025-03-18 RX ADMIN — GABAPENTIN 600 MG: 300 CAPSULE ORAL at 10:44

## 2025-03-18 RX ADMIN — POLYETHYLENE GLYCOL 3350 17 G: 17 POWDER, FOR SOLUTION ORAL at 09:35

## 2025-03-18 RX ADMIN — HYDRALAZINE HYDROCHLORIDE 10 MG: 20 INJECTION INTRAMUSCULAR; INTRAVENOUS at 18:26

## 2025-03-18 RX ADMIN — HYDROMORPHONE HYDROCHLORIDE 0.2 MG: 1 INJECTION, SOLUTION INTRAMUSCULAR; INTRAVENOUS; SUBCUTANEOUS at 05:19

## 2025-03-18 RX ADMIN — SENNOSIDES AND DOCUSATE SODIUM 1 TABLET: 50; 8.6 TABLET ORAL at 10:44

## 2025-03-18 RX ADMIN — METOPROLOL TARTRATE 12.5 MG: 25 TABLET, FILM COATED ORAL at 16:49

## 2025-03-18 RX ADMIN — ONDANSETRON 4 MG: 2 INJECTION, SOLUTION INTRAMUSCULAR; INTRAVENOUS at 14:07

## 2025-03-18 RX ADMIN — CLOPIDOGREL BISULFATE 75 MG: 75 TABLET, FILM COATED ORAL at 14:10

## 2025-03-18 RX ADMIN — HYDRALAZINE HYDROCHLORIDE 10 MG: 20 INJECTION INTRAMUSCULAR; INTRAVENOUS at 20:12

## 2025-03-18 RX ADMIN — PANTOPRAZOLE SODIUM 40 MG: 40 TABLET, DELAYED RELEASE ORAL at 09:34

## 2025-03-18 RX ADMIN — ACETAMINOPHEN 975 MG: 325 TABLET ORAL at 14:10

## 2025-03-18 RX ADMIN — SIMVASTATIN 40 MG: 40 TABLET, FILM COATED ORAL at 10:44

## 2025-03-18 RX ADMIN — FUROSEMIDE 20 MG: 10 INJECTION, SOLUTION INTRAMUSCULAR; INTRAVENOUS at 12:23

## 2025-03-18 RX ADMIN — LIDOCAINE 1 PATCH: 4 PATCH TOPICAL at 20:13

## 2025-03-18 RX ADMIN — PROCHLORPERAZINE EDISYLATE 5 MG: 5 INJECTION INTRAMUSCULAR; INTRAVENOUS at 21:36

## 2025-03-18 RX ADMIN — CEFAZOLIN SODIUM 2 G: 2 SOLUTION INTRAVENOUS at 04:30

## 2025-03-18 RX ADMIN — HYDROMORPHONE HYDROCHLORIDE 0.2 MG: 1 INJECTION, SOLUTION INTRAMUSCULAR; INTRAVENOUS; SUBCUTANEOUS at 08:53

## 2025-03-18 RX ADMIN — HYDRALAZINE HYDROCHLORIDE 10 MG: 20 INJECTION INTRAMUSCULAR; INTRAVENOUS at 13:28

## 2025-03-18 RX ADMIN — ONDANSETRON 4 MG: 2 INJECTION, SOLUTION INTRAMUSCULAR; INTRAVENOUS at 20:13

## 2025-03-18 RX ADMIN — HYDRALAZINE HYDROCHLORIDE 10 MG: 20 INJECTION INTRAMUSCULAR; INTRAVENOUS at 21:40

## 2025-03-18 RX ADMIN — CEFAZOLIN SODIUM 2 G: 2 SOLUTION INTRAVENOUS at 12:01

## 2025-03-18 RX ADMIN — HYDROMORPHONE HYDROCHLORIDE 0.2 MG: 0.2 INJECTION, SOLUTION INTRAMUSCULAR; INTRAVENOUS; SUBCUTANEOUS at 21:08

## 2025-03-18 RX ADMIN — ASPIRIN 81 MG CHEWABLE TABLET 162 MG: 81 TABLET CHEWABLE at 09:34

## 2025-03-18 RX ADMIN — HYDRALAZINE HYDROCHLORIDE 10 MG: 20 INJECTION INTRAMUSCULAR; INTRAVENOUS at 22:33

## 2025-03-18 RX ADMIN — HEPARIN SODIUM 5000 UNITS: 5000 INJECTION, SOLUTION INTRAVENOUS; SUBCUTANEOUS at 14:10

## 2025-03-18 RX ADMIN — ACETAMINOPHEN 975 MG: 325 TABLET ORAL at 06:33

## 2025-03-18 RX ADMIN — FUROSEMIDE 20 MG: 10 INJECTION, SOLUTION INTRAMUSCULAR; INTRAVENOUS at 18:36

## 2025-03-18 RX ADMIN — ALBUMIN HUMAN 12.5 G: 0.05 INJECTION, SOLUTION INTRAVENOUS at 04:30

## 2025-03-18 RX ADMIN — OXYCODONE HYDROCHLORIDE 5 MG: 5 TABLET ORAL at 18:42

## 2025-03-18 RX ADMIN — OXYCODONE HYDROCHLORIDE 5 MG: 5 TABLET ORAL at 14:05

## 2025-03-18 RX ADMIN — HYDRALAZINE HYDROCHLORIDE 10 MG: 20 INJECTION INTRAMUSCULAR; INTRAVENOUS at 14:07

## 2025-03-18 RX ADMIN — HYDROMORPHONE HYDROCHLORIDE 0.2 MG: 0.2 INJECTION, SOLUTION INTRAMUSCULAR; INTRAVENOUS; SUBCUTANEOUS at 12:12

## 2025-03-18 ASSESSMENT — ACTIVITIES OF DAILY LIVING (ADL)
ADLS_ACUITY_SCORE: 61
ADLS_ACUITY_SCORE: 61
ADLS_ACUITY_SCORE: 63
ADLS_ACUITY_SCORE: 61
ADLS_ACUITY_SCORE: 71
ADLS_ACUITY_SCORE: 61
ADLS_ACUITY_SCORE: 63
ADLS_ACUITY_SCORE: 61
ADLS_ACUITY_SCORE: 67
DEPENDENT_IADLS:: INDEPENDENT
ADLS_ACUITY_SCORE: 67
ADLS_ACUITY_SCORE: 63
ADLS_ACUITY_SCORE: 61
ADLS_ACUITY_SCORE: 67
ADLS_ACUITY_SCORE: 61
ADLS_ACUITY_SCORE: 67
ADLS_ACUITY_SCORE: 61
ADLS_ACUITY_SCORE: 67
ADLS_ACUITY_SCORE: 61

## 2025-03-18 NOTE — ADDENDUM NOTE
Addendum  created 03/18/25 1304 by Scooby Anderson MD    Clinical Note Signed, Intraprocedure Blocks edited

## 2025-03-18 NOTE — TREATMENT PLAN
RCAT Treatment Plan    Patient Score: 10   Patient Acuity: 4    Clinical Indication for Therapy: atelectasis    Therapy Ordered: Flutter valve , I.S.     Assessment Summary: Patient seen on 2L 97% spo2. Patient achieved 1000 on IS and good effort on flutter valve. RT will continue to follow.    Nabila Shannon, RT  3/17/2025

## 2025-03-18 NOTE — PROGRESS NOTES
Patient placed on mechanical ventilator in ICU at 14:40. Settings: VC/AC 18/530/+5/100%. RR adjusted to 16 after ABG results.  8.0 cuffed ETT located 22 at teeth/gums.     Pt weaned 5/+5 30% x 3. With the final trial lasting 67 minutes. Cuff leak present prior to extubation. Extubated at 21:32 to 2L NC with capnography per MD request.  B/s coarse + diminished. Cough moderately strong and productive. Pt able to verbalize name after extubation.     Yahir Jones, RT

## 2025-03-18 NOTE — ANESTHESIA POSTPROCEDURE EVALUATION
Patient: Eric Cordoba    Procedure: Procedure(s):  CORONARY ARTERY BYPASS GRAFT TIMES FOUR, LEFT INTERNAL MAMMARY ARTERY HARVEST, LEFT LEG ENDOSCOPIC SAPHENOUS VEIN PROCUREMENT, EPIAORTIC ULTRASOUND, ANESTHESIA TRANSESOPHAGEAL ECHOCARDIOGRAM       Anesthesia Type:  General    Note:  Disposition: ICU            ICU Sign Out: Anesthesiologist/ICU physician sign out WAS performed   Postop Pain Control: Uneventful            Sign Out: Well controlled pain   PONV: No   Neuro/Psych: Uneventful            Sign Out: Acceptable/Baseline neuro status   Airway/Respiratory: Uneventful            Sign Out: Acceptable/Baseline resp. status   CV/Hemodynamics: Uneventful            Sign Out: Acceptable CV status; No obvious hypovolemia; No obvious fluid overload   Other NRE: NONE   DID A NON-ROUTINE EVENT OCCUR? No           Last vitals:  Vitals:    03/18/25 0800 03/18/25 0815 03/18/25 0830   BP:      Pulse: 79 79 78   Resp:      Temp: 37  C (98.6  F)     SpO2: 96% 95% 97%       Electronically Signed By: Scooby Anderson MD  March 18, 2025  9:04 AM

## 2025-03-18 NOTE — PLAN OF CARE
Goal Outcome Evaluation:      Plan of Care Reviewed With: patient, family    Overall Patient Progress: improvingOverall Patient Progress: improving    Outcome Evaluation: Pt very anxious.  C/o not being able to breath.  Lungs clear, takes shallow breaths.  states painful to breath.  Oxygen @1L sats 94-95%. given hydralazine,zofran, and oxycodone.  Pt repositioned and appears to be resting more comfortable at this time.  right groin intact with bandaid.  no bruising or swelling

## 2025-03-18 NOTE — CONSULTS
Care Management Initial Consult    General Information  Assessment completed with: Patient,    Type of CM/SW Visit: Initial Assessment    Primary Care Provider verified and updated as needed: Yes   Readmission within the last 30 days: no previous admission in last 30 days         Advance Care Planning: Advance Care Planning Reviewed:  (no HCD)          Communication Assessment  Patient's communication style: spoken language (English or Bilingual)             Cognitive  Cognitive/Neuro/Behavioral: .WDL except, orientation  Level of Consciousness: alert  Arousal Level: opens eyes spontaneously  Orientation: disoriented to, time (says he doesnt keep track of month/date)  Mood/Behavior: calm, cooperative  Best Language: 0 - No aphasia  Speech: clear    Living Environment:   People in home: friend(s)  Veronique  Current living Arrangements: house      Able to return to prior arrangements: yes       Family/Social Support:  Care provided by: self  Provides care for: no one     Support system: Friend          Description of Support System: Supportive, Involved         Current Resources:   Patient receiving home care services: No        Community Resources: None  Equipment currently used at home: cane, straight  Supplies currently used at home: None    Employment/Financial:  Employment Status: retired        Financial Concerns:             Does the patient's insurance plan have a 3 day qualifying hospital stay waiver?  Yes     Which insurance plan 3 day waiver is available? Alternative insurance waiver    Will the waiver be used for post-acute placement? Undetermined at this time    Lifestyle & Psychosocial Needs:  Social Drivers of Health     Food Insecurity: Not on file   Depression: Not at risk (1/13/2025)    PHQ-2     PHQ-2 Score: 0   Housing Stability: Not on file   Tobacco Use: Medium Risk (3/14/2025)    Patient History     Smoking Tobacco Use: Former     Smokeless Tobacco Use: Never     Passive Exposure: Not on file    Financial Resource Strain: High Risk (12/30/2021)    Received from [facility]    Financial Resource Strain     Difficulty of Paying Living Expenses: Not on file     Difficulty of Paying Living Expenses: Not on file   Alcohol Use: Not on file   Transportation Needs: Not on file   Physical Activity: Not on file   Interpersonal Safety: Low Risk  (3/17/2025)    Interpersonal Safety     Do you feel physically and emotionally safe where you currently live?: Yes     Within the past 12 months, have you been hit, slapped, kicked or otherwise physically hurt by someone?: No     Within the past 12 months, have you been humiliated or emotionally abused in other ways by your partner or ex-partner?: No   Stress: Not on file   Social Connections: Unknown (12/30/2021)    Received from [facility]    Social Connections     Frequency of Communication with Friends and Family: Not on file   Health Literacy: Not on file       Functional Status:  Prior to admission patient needed assistance:   Dependent ADLs:: Ambulation-cane  Dependent IADLs:: Independent        Discussed  Partnership in Safe Discharge Planning  document with patient/family: No    Additional Information:    Assessment completed with patient. Patient reports he lives in his house with his friend Veronique. He is independent with ADLs/IADLs, ambulates with a cane and has no services in community.  He states Veronique is primary contact. Transportation at discharge TBD.      Next Steps:     Follow for progression and recommendations.    Lien Tapia RN

## 2025-03-18 NOTE — PROCEDURES
Brief CV Surgery Note        IABP removed at 0905. Confirmed helium tubing was disconnected prior to removal per IABP tech. Brief free bleeding allowed, followed by 20 minutes of manual pressure. No increase in pressor needs following removal. Posterior tibial and dorsalis pedis pulses intact by doppler and obliterated w/ pressure. Femstop applied w/ 112 mmHg pressure and should be allowed to deflate on its own. Flat bedrest x6 hours post removal (approx 1505), discussed w/ RN. CBC recheck this PM (ordered). No immediate complications.     Audra Giang PA-C  [facility] Cardiothoracic Surgery  Pager: [phone]  March 18, 2025

## 2025-03-18 NOTE — PROGRESS NOTES
"CVTS Daily Progress Note   POD#1  s/p CABG x4 (LIMA>LAD, rSVG>RCA, rSVG>OM1, rSVG>diag), LLE EVH, and preop IABP placement  Attending: Kleber  LOS: 1    SUBJECTIVE/INTERVAL EVENTS:    Patient arrived to ICU from OR yesterday afternoon. He was subsequently extubated and is weaning from pressors. CVP 12, CI 2.5, . No acute events overnight.. Patient progressing well. Maintaining oxygen saturations on nasal cannula. Normotensive off pressors at this time, IABP removed this AM without complication . Ambulating with therapy  deferred for now d/t bedrest afterIABP removal . Pain well controlled. - BM / - flatus. Tolerating diet without nausea although does endorse a globus sensation. UOP adequate. Chest tube output appropriate. Hgb 9.3. Patient denies new chest pain, shortness of breath, abdominal pain, calf pain, nausea. All questions answered to patient's satisfaction..     OBJECTIVE:  Temp:  [96.1  F (35.6  C)-99.1  F (37.3  C)] 98.8  F (37.1  C)  Pulse:  [68-91] 76  Resp:  [12-18] 16  BP: (124-132)/(71-97) 124/71  MAP:  [52 mmHg-102 mmHg] 73 mmHg  Arterial Line BP: ()/(33-57) 119/46  FiO2 (%):  [30 %-100 %] 30 %  SpO2:  [91 %-100 %] 96 %  Vitals:    03/17/25 0515 03/18/25 0620   Weight: 94.8 kg (209 lb) 98.8 kg (217 lb 14.4 oz)       Clinically Significant Risk Factors         # Hypernatremia: Highest Na = 146 mmol/L in last 2 days, will monitor as appropriate  # Hyperchloremia: Highest Cl = 115 mmol/L in last 2 days, will monitor as appropriate      # Hypocalcemia: Lowest Ca = 8.4 mg/dL in last 2 days, will monitor and replace as appropriate       # Thrombocytopenia: Lowest platelets = 95 in last 2 days, will monitor for bleeding             # DMII: A1C = 8.7 % (Ref range: <5.7 %) within past 6 months, PRESENT ON ADMISSION  # Obesity: Estimated body mass index is 32.18 kg/m  as calculated from the following:    Height as of 2/24/25: 1.753 m (5' 9\").    Weight as of this encounter: 98.8 kg (217 lb 14.4 " oz)., PRESENT ON ADMISSION      # History of CABG: noted on surgical history               Current Medications:    Scheduled Meds:  Current Facility-Administered Medications   Medication Dose Route Frequency Provider Last Rate Last Admin    acetaminophen (TYLENOL) Suppository 650 mg  650 mg Rectal Q8H Zayra Raya PA-C        acetaminophen (TYLENOL) tablet 975 mg  975 mg Oral Q8H John Sen MD   975 mg at 03/18/25 0633    [Held by provider] apixaban ANTICOAGULANT (ELIQUIS) tablet 5 mg  5 mg Oral BID Zayra Raya PA-C        aspirin (ASA) chewable tablet 162 mg  162 mg Oral or NG Tube Daily Zayra Raya PA-C   162 mg at 03/18/25 0934    Or    aspirin (ASA) Suppository 300 mg  300 mg Rectal Daily Zayra Raya PA-C        ceFAZolin (ANCEF) 2 g in dextrose 50 mL intermittent infusion  2 g Intravenous Q8H Zayra Raya PA-C   2 g at 03/18/25 0430    diazepam (VALIUM) tablet 5 mg  5 mg Oral Once Nabila Gates, CNP        gabapentin (NEURONTIN) capsule 300 mg  300 mg Oral At Bedtime Zayra Raya PA-C   300 mg at 03/17/25 2231    gabapentin (NEURONTIN) capsule 600 mg  600 mg Oral QAM Zayra Raya PA-C   600 mg at 03/18/25 1044    heparin ANTICOAGULANT injection 5,000 Units  5,000 Units Subcutaneous Q8H Zayra Raya PA-C        insulin aspart (NovoLOG) injection (RAPID ACTING)  1-7 Units Subcutaneous TID AC Prabha Giang PA-C        insulin aspart (NovoLOG) injection (RAPID ACTING)  1-5 Units Subcutaneous At Bedtime Prabha Giang PA-C        [Held by provider] insulin glargine (LANTUS PEN) injection 50 Units  50 Units Subcutaneous QPM Zayra Raya PA-C        Lidocaine (LIDOCARE) 4 % Patch 1-2 patch  1-2 patch Transdermal Q24H Zayra Raya PA-C   1 patch at 03/17/25 2005    pantoprazole (PROTONIX) 2 mg/mL suspension 40 mg  40 mg Oral or NG Tube QA AC Zayra Raya PA-C         Or    pantoprazole (PROTONIX) EC tablet 40 mg  40 mg Oral QAM AC Zayra Raya PA-C   40 mg at 03/18/25 0934    polyethylene glycol (MIRALAX) Packet 17 g  17 g Oral or Feeding Tube Daily Zayra Raya PA-C   17 g at 03/18/25 0935    senna-docusate (SENOKOT-S/PERICOLACE) 8.6-50 MG per tablet 1 tablet  1 tablet Oral BID Zayra aRya PA-C   1 tablet at 03/18/25 1044    simvastatin (ZOCOR) tablet 40 mg  40 mg Oral or Feeding Tube Daily Zayra Raya PA-C   40 mg at 03/18/25 1044     Continuous Infusions:  Current Facility-Administered Medications   Medication Dose Route Frequency Provider Last Rate Last Admin    EPINEPHrine (ADRENALIN) 5 mg in sodium chloride 0.9 % 250 mL infusion CENTRAL  0.01-0.1 mcg/kg/min Intravenous Continuous Zayra Raya PA-C   Paused at 03/18/25 0957     PRN Meds:.  Current Facility-Administered Medications   Medication Dose Route Frequency Provider Last Rate Last Admin    acetaminophen (TYLENOL) tablet 650 mg  650 mg Oral Q4H PRN Nabila Gates CNP        albumin human 5 % injection 12.5 g  12.5 g Intravenous 4x Daily PRN Alice Shahid MD   12.5 g at 03/18/25 0430    bisacodyl (DULCOLAX) suppository 10 mg  10 mg Rectal Daily PRN Zayra Raya PA-C        calcium gluconate 1 g in 50 mL in sodium chloride intermittent infusion  1 g Intravenous Once PRN Zayra Raya PA-C   1 g at 03/17/25 2005    calcium gluconate 2 g in  mL intermittent infusion  2 g Intravenous Once PRN Zayra Raya PA-C        calcium gluconate 3 g in sodium chloride 0.9 % 100 mL intermittent infusion  3 g Intravenous Once PRN Zayra Raya PA-C        glucose gel 15-30 g  15-30 g Oral Q15 Min PRN Zayra Raya PA-C        Or    dextrose 50 % injection 25-50 mL  25-50 mL Intravenous Q15 Min PRN Zayra Raya PA-C        Or    glucagon injection 1 mg  1 mg Subcutaneous Q15 Min PRN Zayra Raya  MARIE Dobbs        hydrALAZINE (APRESOLINE) injection 10 mg  10 mg Intravenous Q30 Min PRN Zayra Raya PA-C        HYDROmorphone (DILAUDID) injection 0.1 mg  0.1 mg Intravenous Q4H PRN Alice Shahid MD        Or    HYDROmorphone (DILAUDID) injection 0.2 mg  0.2 mg Intravenous Q4H PRN Alice Shahid MD        lactated ringers BOLUS 250 mL  250 mL Intravenous Q15 Min PRN Zayra Raya PA-C   250 mL at 03/17/25 1726    magnesium hydroxide (MILK OF MAGNESIA) suspension 30 mL  30 mL Oral Daily PRN Zayra Raya PA-C        naloxone (NARCAN) injection 0.2 mg  0.2 mg Intravenous Q2 Min PRN John Sen MD        Or    naloxone (NARCAN) injection 0.4 mg  0.4 mg Intravenous Q2 Min PRN John Sen MD        Or    naloxone (NARCAN) injection 0.2 mg  0.2 mg Intramuscular Q2 Min PRN John Sen MD        Or    naloxone (NARCAN) injection 0.4 mg  0.4 mg Intramuscular Q2 Min PRN John Sen MD        ondansetron (ZOFRAN ODT) ODT tab 4 mg  4 mg Oral Q6H PRN Zayra Raya PA-C        Or    ondansetron (ZOFRAN) injection 4 mg  4 mg Intravenous Q6H PRN Zayra Raya PA-C        oxyCODONE IR (ROXICODONE) half-tab 2.5 mg  2.5 mg Oral Q4H PRN Zayra Raya PA-C        Or    oxyCODONE (ROXICODONE) tablet 5 mg  5 mg Oral Q4H PRN Zayra Raya PA-C        prochlorperazine (COMPAZINE) injection 5 mg  5 mg Intravenous Q6H PRN Zayra Raya PA-C        Or    prochlorperazine (COMPAZINE) tablet 5 mg  5 mg Oral Q6H PRN Zayra Raya PA-C           Cardiographics:    Telemetry monitoring demonstrates sinus rhythm with rates in the 70s per my personal review.    Imaging:  Results for orders placed or performed during the hospital encounter of 03/17/25   XR Chest Port 1 View    Impression    IMPRESSION: Endotracheal tube terminates 3.5 cm above the hakan. Right heart catheter terminates over the central right pulmonary artery.  Bilateral chest tubes and mediastinal drains. Clip versus balloon pump marker at the level of the AP window. Lung   volumes are low with bibasilar discoid atelectasis. No CHF or pneumothorax. No acute bony abnormalities..   XR Chest Port 1 View    Impression    IMPRESSION:   1.  Bilateral pleural-based drains with trace pleural effusions and no discernible pneumothorax.  2.  Minimal bibasilar atelectasis, otherwise clear lungs.  3.  Cardiomegaly with likely CABG related surgical clips.  4.  Right IJ [place]-Toni catheter tip in proximal right pulmonary artery.       Labs, personally reviewed.  Hemoglobin   Date Value Ref Range Status   03/18/2025 9.3 (L) 13.3 - 17.7 g/dL Final   03/17/2025 10.9 (L) 13.3 - 17.7 g/dL Final   03/17/2025 9.8 (L) 13.3 - 17.7 g/dL Final     Hemoglobin POCT   Date Value Ref Range Status   03/17/2025 9.9 (L) 13.3 - 17.7 g/dL Final   03/17/2025 10.2 (L) 13.3 - 17.7 g/dL Final   03/17/2025 10.1 (L) 13.3 - 17.7 g/dL Final     WBC Count   Date Value Ref Range Status   03/18/2025 11.2 (H) 4.0 - 11.0 10e3/uL Final   03/17/2025 12.6 (H) 4.0 - 11.0 10e3/uL Final   03/17/2025 9.3 4.0 - 11.0 10e3/uL Final     Platelet Count   Date Value Ref Range Status   03/18/2025 95 (L) 150 - 450 10e3/uL Final   03/17/2025 132 (L) 150 - 450 10e3/uL Final   03/17/2025 110 (L) 150 - 450 10e3/uL Final     Creatinine   Date Value Ref Range Status   03/18/2025 2.00 (H) 0.67 - 1.17 mg/dL Final   03/18/2025 1.99 (H) 0.67 - 1.17 mg/dL Final   03/17/2025 1.94 (H) 0.67 - 1.17 mg/dL Final     Potassium   Date Value Ref Range Status   03/18/2025 5.0 3.4 - 5.3 mmol/L Final   03/18/2025 4.9 3.4 - 5.3 mmol/L Final   03/17/2025 4.2 3.4 - 5.3 mmol/L Final   03/17/2025 4.2 3.4 - 5.3 mmol/L Final   04/25/2022 4.5 3.5 - 5.0 mmol/L Final   02/01/2021 4.8 3.5 - 5.0 mmol/L Final   12/13/2019 5.0 3.5 - 5.0 mmol/L Final     Potassium POCT   Date Value Ref Range Status   03/17/2025 4.0 3.4 - 5.3 mmol/L Final   03/17/2025 3.9 3.4 - 5.3 mmol/L  Final   03/17/2025 4.3 3.4 - 5.3 mmol/L Final     Magnesium   Date Value Ref Range Status   03/18/2025 2.3 1.7 - 2.3 mg/dL Final   03/17/2025 2.8 (H) 1.7 - 2.3 mg/dL Final   03/13/2025 2.3 1.7 - 2.3 mg/dL Final          I/O:  I/O last 3 completed shifts:  In: 5969.71 [I.V.:3579.71; Other:140; IV Piggyback:1000]  Out: 2224 [Urine:1732; Chest Tube:492]       Physical Exam:    General: Patient seen in bed conversant/pleasant and NAD  HEENT: NICK, no sclera icterus, moist mucosa, nasal cannula in place  CV: RRR on monitor. 2+ peripheral pulses in all extremities. Mild edema.   Pulm: Non-labored effort on nasal cannula. Chest tubes in place, no air leak. Incision C/D/I.  Abd: Soft, NT, ND  : Riojas with lisa urine  Ext: Mild pedal edema, SCDs in place, warm, distal pulses intact, Femstop in place after IABP removal  Neuro: A&Ox3, COPELAND, and CN grossly intact      ASSESSMENT/PLAN:    Eric Cordoba is a 74 year old male with a history of CAD, LV mural thrombus, CKD 3b, DM2, HTN, h/o CVA, and ICM who is s/p CABG x4 , LLE EVH, and preop IABP placement.    Active Problems:    Coronary artery disease involving native coronary artery of native heart, unspecified whether angina present    CAD (coronary artery disease)    Coronary artery disease of native artery of native heart with stable angina pectoris        NEURO:   Acute postoperative pain  H/o CVA, PTA issue  - Scheduled Tylenol/lidocaine patches and PRN Tylenol/oxycodone/dilaudid for pain  - PTA gabapentin    CV:   HTN//HLD, PTA issues  CAD // ICM s/p CABG  Preop IABP, resolved.  - Pre-op EF 35-40%  - Normotensive  - Patient off pressors recently.  - IABP removed POD#1, see procedure note.    - Low dose coreg (PTA HF regimen) to start tonight if remaining hemodynamically stable off pressors  - ASA 81 mg  - Plavix 75 mg qday to be maintained x1 year postop per surgeon preference for graft patency. ASA should be 81 mg for this duration. When plavix is stopped, ASA should  be increased to 162 mg qday indefinitely.    -  Simvastatin 40mg daily  - Chest tubes/TPW to remain today  - New baseline echo after CT/TPW removed and prior to discharge.  - Consider HF follow up  - Hold PTA losartan    PULM:   Postoperative need for supplemental O2  - Extubated POD#0  - Maintaining oxygen saturations on nasal cannula - wean as able  - Encourage pulmonary toilet    FEN/GI:  - Diet: Clears, ADAT to Cardiac  - Continue electrolyte replacement protocol  - Bowel regimen, LBM preop    RENAL:  CKD3b, PTA issue  - borderline UOP/hr. Continue to monitor closely.  - Cr 2.00 (baseline ~ 1.8-2.0)  - Riojas to remain in for close monitoring of I/O and during period of diuresis/relative immobility - plan for removal POD2  - Diuresis PRN and pending weaning from pressors (PTA lasix 40 mg PO qd), will give 20 mg IV lasix once given borderline UOP    HEME:  Acute blood loss anemia post-op  LV mural thrombus, PTA issue  - Hold PTA eliquis (LV thrombus) for now although not visualized on cardiac MR 2/11/2025 or intra-op MANUEL so will discuss with surgeon   - Hgb 9.3, no bleeding concerns. Hep SQ, ASA  - Recheck CBC this PM after IABP removal    ID:  Reactive leukocytosis  - Shanae op ppx complete, afebrile. No concerns for infection  - WBC 11.6 and downtrending    ENDO:   - HbA1c 8.7%  - BG goal < 180 to promote optimal healing  - PTA on lantus 50u qPM and mounjaro 5mg q7 days - held  - Transition to sliding scale insulin    PPx:   - DVT: SCDs, SQ heparin TID, ambulation   - GI: Protonix 40mg PO daily    DISPO:   - Continue critical care in ICU pending remaining hemodynamically stable off pressors, consider transfer OOU later this PM if off pressors and no bleeding concerns when off bedrest  - PT/OT recs at discharge: pending  - Medically Ready for Discharge: Anticipated in 5+ Days        Patient discussed with Dr. Shahid.        Audra Giang PA-C  [facility] Cardiothoracic Surgery  Vocera or Secure Chat  March 18, 2025

## 2025-03-18 NOTE — BRIEF OP NOTE
[facility]    Brief Operative Note    Pre-operative diagnosis: CAD (coronary artery disease) [I25.10]  Post-operative diagnosis Same as pre-operative diagnosis    Procedure: CABG x4 with LIMA to LAD and rSVG to distal RCA, OM1, and diagonal, endoscopic harvest of left saphenous vein.     Surgeon: Surgeons and Role:     * Alice Shahid MD - Primary     * Prabha Giang PA-C - Assisting  - Kisha Graves NP  Anesthesia: General   Estimated Blood Loss: 225cc    Drains: 2 mediastinal chest tubes, bilateral pleural arslan drains  Specimens: * No specimens in log *  Findings:   Severely calcified coronary arteries, no OM 2 target, OM1 intramyocardial, LAD diffusely calcified throughout its length. EF improved from 25% to 50% at end of case. NSR with VVI backup .  Complications: None.  Implants: * No implants in log *

## 2025-03-18 NOTE — PLAN OF CARE
[facility] - ICU    RN Progress Note:            Pertinent Assessments:      Please refer to flowsheet rows for full assessment     Hemodynamics, IABP           Key Events - This Shift:       Uneventful overnight. Pt extubated last evening outside of fast track goal due to apnea and limited mobility due to IABP in groin. @ L nc with sats mid 90s. Lungs clear, 1000 on IS. Low dose epi  0.01 mcgs , will wean off as able. Riojas and CT outputs adequate. IABP 1:1 ratio with 100% augmentation. Doppleable pulses, Site C/D/I.     RN Managed Protocols Ordered:  Yes  Protocols:Potassium, Magnesium, and Phosphorus  PRN'S:iCal  Protocols Status: In Progress                Barriers to Discharge / Downgrade:     IABP, hemodynamic lines      CT EXTUBATION FAST TRACK:    [facility]     FastTrack Candidate: Yes, Extubation Goal Time ( 6 Hours ) 2035     Patient Status: Extubated at 2130. Not able to meet fast track goal due to apneic spells.       Problem: Comorbidity Management  Goal: Blood Glucose Levels Within Targeted Range  Outcome: Progressing  Intervention: Monitor and Manage Glycemia  Recent Flowsheet Documentation  Taken 3/18/2025 0000 by Harriett Godinez, RN  Medication Review/Management: medications reviewed    Problem: Cardiovascular Surgery  Goal: Effective Cardiac Function  Outcome: Progressing     Problem: Cardiovascular Surgery  Goal: Optimal Cerebral Tissue Perfusion  Outcome: Progressing  Intervention: Protect and Optimize Cerebral Perfusion  Recent Flowsheet Documentation  Taken 3/18/2025 0200 by Harriett Godinez, RN  Head of Bed (HOB) Positioning: HOB at 15 degrees    Problem: Cardiovascular Surgery  Goal: Fluid and Electrolyte Balance  Outcome: Progressing     Goal Outcome Evaluation:      Plan of Care Reviewed With: patient    Overall Patient Progress: improvingOverall Patient Progress: improving    Outcome Evaluation: Post op course  uneventful. IABP in R groin with plans to rmove in a.m

## 2025-03-18 NOTE — PROGRESS NOTES
Pulm/CC  3/18/2025  8:00 AM     Chart reviewed . Stable night in ICU.   Plan to remove IABP this morning per CV-surgery.   Our service will sign off.     Rocio Jaimes CNP  [facility] Pulmonary/Critical Care

## 2025-03-19 ENCOUNTER — APPOINTMENT (OUTPATIENT)
Dept: OCCUPATIONAL THERAPY | Facility: HOSPITAL | Age: 75
DRG: 235 | End: 2025-03-19
Attending: NURSE PRACTITIONER
Payer: COMMERCIAL

## 2025-03-19 ENCOUNTER — APPOINTMENT (OUTPATIENT)
Dept: SPEECH THERAPY | Facility: HOSPITAL | Age: 75
DRG: 235 | End: 2025-03-19
Attending: STUDENT IN AN ORGANIZED HEALTH CARE EDUCATION/TRAINING PROGRAM
Payer: COMMERCIAL

## 2025-03-19 PROBLEM — I25.10 CAD (CORONARY ARTERY DISEASE): Status: RESOLVED | Noted: 2025-03-17 | Resolved: 2025-03-19

## 2025-03-19 LAB
ALBUMIN SERPL BCG-MCNC: 3.9 G/DL (ref 3.5–5.2)
ALP SERPL-CCNC: 71 U/L (ref 40–150)
ALT SERPL W P-5'-P-CCNC: 9 U/L (ref 0–70)
ANION GAP SERPL CALCULATED.3IONS-SCNC: 10 MMOL/L (ref 7–15)
ANION GAP SERPL CALCULATED.3IONS-SCNC: 12 MMOL/L (ref 7–15)
ANION GAP SERPL CALCULATED.3IONS-SCNC: 12 MMOL/L (ref 7–15)
ANION GAP SERPL CALCULATED.3IONS-SCNC: 13 MMOL/L (ref 7–15)
ANION GAP SERPL CALCULATED.3IONS-SCNC: 27 MMOL/L (ref 7–15)
AST SERPL W P-5'-P-CCNC: 33 U/L (ref 0–45)
ATRIAL RATE - MUSE: 42 BPM
B-OH-BUTYR SERPL-SCNC: 0.3 MMOL/L
B-OH-BUTYR SERPL-SCNC: 3.27 MMOL/L
BASE EXCESS BLDA CALC-SCNC: -4.4 MMOL/L (ref -3–3)
BASE EXCESS BLDA CALC-SCNC: -8.7 MMOL/L (ref -3–3)
BASE EXCESS BLDV CALC-SCNC: -10 MMOL/L (ref -3–3)
BILIRUB DIRECT SERPL-MCNC: 0.37 MG/DL (ref 0–0.3)
BILIRUB SERPL-MCNC: 0.7 MG/DL
BUN SERPL-MCNC: 41.9 MG/DL (ref 8–23)
BUN SERPL-MCNC: 53.8 MG/DL (ref 8–23)
BUN SERPL-MCNC: 54.3 MG/DL (ref 8–23)
BUN SERPL-MCNC: 58 MG/DL (ref 8–23)
BUN SERPL-MCNC: 60 MG/DL (ref 8–23)
CA-I BLD-MCNC: 4.6 MG/DL (ref 4.4–5.2)
CALCIUM SERPL-MCNC: 8.4 MG/DL (ref 8.8–10.4)
CALCIUM SERPL-MCNC: 8.5 MG/DL (ref 8.8–10.4)
CALCIUM SERPL-MCNC: 8.7 MG/DL (ref 8.8–10.4)
CALCIUM SERPL-MCNC: 8.8 MG/DL (ref 8.8–10.4)
CALCIUM SERPL-MCNC: 8.9 MG/DL (ref 8.8–10.4)
CHLORIDE SERPL-SCNC: 103 MMOL/L (ref 98–107)
CHLORIDE SERPL-SCNC: 107 MMOL/L (ref 98–107)
CHLORIDE SERPL-SCNC: 108 MMOL/L (ref 98–107)
CHLORIDE SERPL-SCNC: 108 MMOL/L (ref 98–107)
CHLORIDE SERPL-SCNC: 109 MMOL/L (ref 98–107)
CREAT SERPL-MCNC: 2.19 MG/DL (ref 0.67–1.17)
CREAT SERPL-MCNC: 2.4 MG/DL (ref 0.67–1.17)
CREAT SERPL-MCNC: 2.42 MG/DL (ref 0.67–1.17)
CREAT SERPL-MCNC: 2.67 MG/DL (ref 0.67–1.17)
CREAT SERPL-MCNC: 2.69 MG/DL (ref 0.67–1.17)
DIASTOLIC BLOOD PRESSURE - MUSE: NORMAL MMHG
EGFRCR SERPLBLD CKD-EPI 2021: 24 ML/MIN/1.73M2
EGFRCR SERPLBLD CKD-EPI 2021: 24 ML/MIN/1.73M2
EGFRCR SERPLBLD CKD-EPI 2021: 27 ML/MIN/1.73M2
EGFRCR SERPLBLD CKD-EPI 2021: 27 ML/MIN/1.73M2
EGFRCR SERPLBLD CKD-EPI 2021: 31 ML/MIN/1.73M2
ERYTHROCYTE [DISTWIDTH] IN BLOOD BY AUTOMATED COUNT: 13.3 % (ref 10–15)
GLUCOSE BLDC GLUCOMTR-MCNC: 128 MG/DL (ref 70–99)
GLUCOSE BLDC GLUCOMTR-MCNC: 139 MG/DL (ref 70–99)
GLUCOSE BLDC GLUCOMTR-MCNC: 166 MG/DL (ref 70–99)
GLUCOSE BLDC GLUCOMTR-MCNC: 186 MG/DL (ref 70–99)
GLUCOSE BLDC GLUCOMTR-MCNC: 212 MG/DL (ref 70–99)
GLUCOSE BLDC GLUCOMTR-MCNC: 252 MG/DL (ref 70–99)
GLUCOSE BLDC GLUCOMTR-MCNC: 303 MG/DL (ref 70–99)
GLUCOSE BLDC GLUCOMTR-MCNC: 349 MG/DL (ref 70–99)
GLUCOSE BLDC GLUCOMTR-MCNC: 364 MG/DL (ref 70–99)
GLUCOSE BLDC GLUCOMTR-MCNC: 395 MG/DL (ref 70–99)
GLUCOSE SERPL-MCNC: 167 MG/DL (ref 70–99)
GLUCOSE SERPL-MCNC: 180 MG/DL (ref 70–99)
GLUCOSE SERPL-MCNC: 195 MG/DL (ref 70–99)
GLUCOSE SERPL-MCNC: 288 MG/DL (ref 70–99)
GLUCOSE SERPL-MCNC: 388 MG/DL (ref 70–99)
HCO3 BLD-SCNC: 17 MMOL/L (ref 21–28)
HCO3 BLD-SCNC: 21 MMOL/L (ref 21–28)
HCO3 BLDV-SCNC: 17 MMOL/L (ref 21–28)
HCO3 SERPL-SCNC: 12 MMOL/L (ref 22–29)
HCO3 SERPL-SCNC: 19 MMOL/L (ref 22–29)
HCO3 SERPL-SCNC: 20 MMOL/L (ref 22–29)
HCO3 SERPL-SCNC: 20 MMOL/L (ref 22–29)
HCO3 SERPL-SCNC: 21 MMOL/L (ref 22–29)
HCT VFR BLD AUTO: 33.7 % (ref 40–53)
HGB BLD-MCNC: 10.9 G/DL (ref 13.3–17.7)
HOLD SPECIMEN: NORMAL
HOLD SPECIMEN: NORMAL
INTERPRETATION ECG - MUSE: NORMAL
LACTATE SERPL-SCNC: 1.4 MMOL/L (ref 0.7–2)
LACTATE SERPL-SCNC: 1.9 MMOL/L (ref 0.7–2)
LACTATE SERPL-SCNC: 2.6 MMOL/L (ref 0.7–2)
LACTATE SERPL-SCNC: 2.9 MMOL/L (ref 0.7–2)
LACTATE SERPL-SCNC: 4 MMOL/L (ref 0.7–2)
LACTATE SERPL-SCNC: 4 MMOL/L (ref 0.7–2)
MAGNESIUM SERPL-MCNC: 2.3 MG/DL (ref 1.7–2.3)
MCH RBC QN AUTO: 31.6 PG (ref 26.5–33)
MCHC RBC AUTO-ENTMCNC: 32.3 G/DL (ref 31.5–36.5)
MCV RBC AUTO: 98 FL (ref 78–100)
O2/TOTAL GAS SETTING VFR VENT: 21 %
O2/TOTAL GAS SETTING VFR VENT: 21 %
O2/TOTAL GAS SETTING VFR VENT: 28 %
OXYHGB MFR BLDA: 93 % (ref 92–100)
OXYHGB MFR BLDA: 95 % (ref 92–100)
OXYHGB MFR BLDV: 68 % (ref 70–75)
P AXIS - MUSE: NORMAL DEGREES
PCO2 BLD: 34 MM HG (ref 35–45)
PCO2 BLD: 37 MM HG (ref 35–45)
PCO2 BLDV: 38 MM HG (ref 40–50)
PH BLD: 7.3 [PH] (ref 7.35–7.45)
PH BLD: 7.36 [PH] (ref 7.35–7.45)
PH BLDV: 7.25 [PH] (ref 7.32–7.43)
PHOSPHATE SERPL-MCNC: 5.3 MG/DL (ref 2.5–4.5)
PLATELET # BLD AUTO: 109 10E3/UL (ref 150–450)
PO2 BLD: 74 MM HG (ref 80–105)
PO2 BLD: 80 MM HG (ref 80–105)
PO2 BLDV: 41 MM HG (ref 25–47)
POTASSIUM SERPL-SCNC: 4.2 MMOL/L (ref 3.4–5.3)
POTASSIUM SERPL-SCNC: 4.5 MMOL/L (ref 3.4–5.3)
POTASSIUM SERPL-SCNC: 4.9 MMOL/L (ref 3.4–5.3)
POTASSIUM SERPL-SCNC: 5 MMOL/L (ref 3.4–5.3)
POTASSIUM SERPL-SCNC: 5.1 MMOL/L (ref 3.4–5.3)
POTASSIUM SERPL-SCNC: 5.1 MMOL/L (ref 3.4–5.3)
POTASSIUM SERPL-SCNC: ABNORMAL MMOL/L
PR INTERVAL - MUSE: NORMAL MS
PROT SERPL-MCNC: 6.3 G/DL (ref 6.4–8.3)
QRS DURATION - MUSE: 148 MS
QT - MUSE: 446 MS
QTC - MUSE: 495 MS
R AXIS - MUSE: 39 DEGREES
RBC # BLD AUTO: 3.45 10E6/UL (ref 4.4–5.9)
SAO2 % BLDA: 94.5 % (ref 95–96)
SAO2 % BLDA: 96.3 % (ref 95–96)
SAO2 % BLDV: 68.7 % (ref 70–75)
SODIUM SERPL-SCNC: 139 MMOL/L (ref 135–145)
SODIUM SERPL-SCNC: 140 MMOL/L (ref 135–145)
SODIUM SERPL-SCNC: 142 MMOL/L (ref 135–145)
SYSTOLIC BLOOD PRESSURE - MUSE: NORMAL MMHG
T AXIS - MUSE: 53 DEGREES
VENTRICULAR RATE- MUSE: 74 BPM
WBC # BLD AUTO: 18.5 10E3/UL (ref 4–11)

## 2025-03-19 PROCEDURE — 84132 ASSAY OF SERUM POTASSIUM: CPT | Performed by: NURSE PRACTITIONER

## 2025-03-19 PROCEDURE — 84132 ASSAY OF SERUM POTASSIUM: CPT | Performed by: STUDENT IN AN ORGANIZED HEALTH CARE EDUCATION/TRAINING PROGRAM

## 2025-03-19 PROCEDURE — 97535 SELF CARE MNGMENT TRAINING: CPT | Mod: GO

## 2025-03-19 PROCEDURE — 36415 COLL VENOUS BLD VENIPUNCTURE: CPT | Performed by: STUDENT IN AN ORGANIZED HEALTH CARE EDUCATION/TRAINING PROGRAM

## 2025-03-19 PROCEDURE — 94799 UNLISTED PULMONARY SVC/PX: CPT

## 2025-03-19 PROCEDURE — 83735 ASSAY OF MAGNESIUM: CPT | Performed by: STUDENT IN AN ORGANIZED HEALTH CARE EDUCATION/TRAINING PROGRAM

## 2025-03-19 PROCEDURE — 258N000002 HC RX IP 258 OP 250: Performed by: NURSE PRACTITIONER

## 2025-03-19 PROCEDURE — 250N000011 HC RX IP 250 OP 636: Performed by: PHYSICIAN ASSISTANT

## 2025-03-19 PROCEDURE — 97166 OT EVAL MOD COMPLEX 45 MIN: CPT | Mod: GO

## 2025-03-19 PROCEDURE — 80048 BASIC METABOLIC PNL TOTAL CA: CPT

## 2025-03-19 PROCEDURE — 250N000011 HC RX IP 250 OP 636: Mod: JZ

## 2025-03-19 PROCEDURE — 250N000009 HC RX 250

## 2025-03-19 PROCEDURE — 85027 COMPLETE CBC AUTOMATED: CPT

## 2025-03-19 PROCEDURE — 250N000013 HC RX MED GY IP 250 OP 250 PS 637: Performed by: INTERNAL MEDICINE

## 2025-03-19 PROCEDURE — 250N000011 HC RX IP 250 OP 636: Mod: JZ | Performed by: STUDENT IN AN ORGANIZED HEALTH CARE EDUCATION/TRAINING PROGRAM

## 2025-03-19 PROCEDURE — 82805 BLOOD GASES W/O2 SATURATION: CPT

## 2025-03-19 PROCEDURE — 200N000001 HC R&B ICU

## 2025-03-19 PROCEDURE — 999N000157 HC STATISTIC RCP TIME EA 10 MIN

## 2025-03-19 PROCEDURE — 999N000156 HC STATISTIC RCP CONSULT EA 30 MIN

## 2025-03-19 PROCEDURE — 36600 WITHDRAWAL OF ARTERIAL BLOOD: CPT

## 2025-03-19 PROCEDURE — 92610 EVALUATE SWALLOWING FUNCTION: CPT | Mod: GN

## 2025-03-19 PROCEDURE — 82010 KETONE BODYS QUAN: CPT | Performed by: NURSE PRACTITIONER

## 2025-03-19 PROCEDURE — 83605 ASSAY OF LACTIC ACID: CPT

## 2025-03-19 PROCEDURE — 36415 COLL VENOUS BLD VENIPUNCTURE: CPT | Performed by: NURSE PRACTITIONER

## 2025-03-19 PROCEDURE — 82310 ASSAY OF CALCIUM: CPT

## 2025-03-19 PROCEDURE — 36415 COLL VENOUS BLD VENIPUNCTURE: CPT

## 2025-03-19 PROCEDURE — 99233 SBSQ HOSP IP/OBS HIGH 50: CPT | Performed by: NURSE PRACTITIONER

## 2025-03-19 PROCEDURE — 250N000013 HC RX MED GY IP 250 OP 250 PS 637

## 2025-03-19 PROCEDURE — 250N000013 HC RX MED GY IP 250 OP 250 PS 637: Performed by: PHYSICIAN ASSISTANT

## 2025-03-19 PROCEDURE — 80048 BASIC METABOLIC PNL TOTAL CA: CPT | Performed by: NURSE PRACTITIONER

## 2025-03-19 PROCEDURE — 84100 ASSAY OF PHOSPHORUS: CPT | Performed by: STUDENT IN AN ORGANIZED HEALTH CARE EDUCATION/TRAINING PROGRAM

## 2025-03-19 PROCEDURE — 82330 ASSAY OF CALCIUM: CPT | Performed by: PHYSICIAN ASSISTANT

## 2025-03-19 PROCEDURE — 83605 ASSAY OF LACTIC ACID: CPT | Performed by: NURSE PRACTITIONER

## 2025-03-19 PROCEDURE — 272N000272 HC CONTINUOUS NEBULIZER MICRO PUMP

## 2025-03-19 PROCEDURE — 82248 BILIRUBIN DIRECT: CPT

## 2025-03-19 RX ORDER — METOPROLOL TARTRATE 25 MG/1
50 TABLET, FILM COATED ORAL 2 TIMES DAILY
Status: DISCONTINUED | OUTPATIENT
Start: 2025-03-19 | End: 2025-03-20

## 2025-03-19 RX ORDER — NICOTINE POLACRILEX 4 MG
15-30 LOZENGE BUCCAL
Status: CANCELLED | OUTPATIENT
Start: 2025-03-19

## 2025-03-19 RX ORDER — DEXTROSE MONOHYDRATE, SODIUM CHLORIDE, AND POTASSIUM CHLORIDE 50; 1.49; 4.5 G/1000ML; G/1000ML; G/1000ML
INJECTION, SOLUTION INTRAVENOUS CONTINUOUS
Status: CANCELLED | OUTPATIENT
Start: 2025-03-19

## 2025-03-19 RX ORDER — SODIUM CHLORIDE 450 MG/100ML
INJECTION, SOLUTION INTRAVENOUS CONTINUOUS
Status: DISCONTINUED | OUTPATIENT
Start: 2025-03-19 | End: 2025-03-20

## 2025-03-19 RX ORDER — DEXTROSE MONOHYDRATE 100 MG/ML
INJECTION, SOLUTION INTRAVENOUS CONTINUOUS PRN
Status: DISCONTINUED | OUTPATIENT
Start: 2025-03-19 | End: 2025-03-20

## 2025-03-19 RX ORDER — DEXTROSE MONOHYDRATE 25 G/50ML
25-50 INJECTION, SOLUTION INTRAVENOUS
Status: CANCELLED | OUTPATIENT
Start: 2025-03-19

## 2025-03-19 RX ORDER — SODIUM CHLORIDE AND POTASSIUM CHLORIDE 150; 450 MG/100ML; MG/100ML
INJECTION, SOLUTION INTRAVENOUS CONTINUOUS
Status: CANCELLED | OUTPATIENT
Start: 2025-03-19

## 2025-03-19 RX ORDER — BUMETANIDE 0.25 MG/ML
1 INJECTION, SOLUTION INTRAMUSCULAR; INTRAVENOUS
Status: DISCONTINUED | OUTPATIENT
Start: 2025-03-19 | End: 2025-03-19

## 2025-03-19 RX ADMIN — SODIUM CHLORIDE: 4.5 INJECTION, SOLUTION INTRAVENOUS at 13:18

## 2025-03-19 RX ADMIN — METOPROLOL TARTRATE 12.5 MG: 25 TABLET, FILM COATED ORAL at 02:35

## 2025-03-19 RX ADMIN — ACETAMINOPHEN 975 MG: 325 TABLET ORAL at 21:37

## 2025-03-19 RX ADMIN — HEPARIN SODIUM 5000 UNITS: 5000 INJECTION, SOLUTION INTRAVENOUS; SUBCUTANEOUS at 13:23

## 2025-03-19 RX ADMIN — INSULIN HUMAN 10 UNITS/HR: 1 INJECTION, SOLUTION INTRAVENOUS at 13:23

## 2025-03-19 RX ADMIN — METOPROLOL TARTRATE 50 MG: 25 TABLET, FILM COATED ORAL at 20:25

## 2025-03-19 RX ADMIN — PANTOPRAZOLE SODIUM 40 MG: 40 TABLET, DELAYED RELEASE ORAL at 08:14

## 2025-03-19 RX ADMIN — BUMETANIDE 1 MG: 0.25 INJECTION INTRAMUSCULAR; INTRAVENOUS at 11:03

## 2025-03-19 RX ADMIN — METOPROLOL TARTRATE 50 MG: 25 TABLET, FILM COATED ORAL at 08:13

## 2025-03-19 RX ADMIN — HEPARIN SODIUM 5000 UNITS: 5000 INJECTION, SOLUTION INTRAVENOUS; SUBCUTANEOUS at 05:23

## 2025-03-19 RX ADMIN — OXYCODONE HYDROCHLORIDE 5 MG: 5 TABLET ORAL at 19:39

## 2025-03-19 RX ADMIN — OXYCODONE HYDROCHLORIDE 5 MG: 5 TABLET ORAL at 02:35

## 2025-03-19 RX ADMIN — GABAPENTIN 300 MG: 300 CAPSULE ORAL at 21:38

## 2025-03-19 RX ADMIN — HYDRALAZINE HYDROCHLORIDE 10 MG: 20 INJECTION INTRAMUSCULAR; INTRAVENOUS at 00:27

## 2025-03-19 RX ADMIN — HYDROMORPHONE HYDROCHLORIDE 0.2 MG: 0.2 INJECTION, SOLUTION INTRAMUSCULAR; INTRAVENOUS; SUBCUTANEOUS at 00:27

## 2025-03-19 RX ADMIN — SIMVASTATIN 40 MG: 40 TABLET, FILM COATED ORAL at 08:14

## 2025-03-19 RX ADMIN — GABAPENTIN 600 MG: 300 CAPSULE ORAL at 08:12

## 2025-03-19 RX ADMIN — ASPIRIN 81 MG CHEWABLE TABLET 81 MG: 81 TABLET CHEWABLE at 08:12

## 2025-03-19 RX ADMIN — INSULIN HUMAN 5.5 UNITS/HR: 1 INJECTION, SOLUTION INTRAVENOUS at 08:33

## 2025-03-19 RX ADMIN — CLOPIDOGREL BISULFATE 75 MG: 75 TABLET, FILM COATED ORAL at 08:24

## 2025-03-19 RX ADMIN — OXYCODONE HYDROCHLORIDE 5 MG: 5 TABLET ORAL at 08:12

## 2025-03-19 RX ADMIN — HYDRALAZINE HYDROCHLORIDE 10 MG: 20 INJECTION INTRAMUSCULAR; INTRAVENOUS at 05:23

## 2025-03-19 RX ADMIN — HEPARIN SODIUM 5000 UNITS: 5000 INJECTION, SOLUTION INTRAVENOUS; SUBCUTANEOUS at 21:38

## 2025-03-19 ASSESSMENT — ACTIVITIES OF DAILY LIVING (ADL)
ADLS_ACUITY_SCORE: 79
ADLS_ACUITY_SCORE: 77
ADLS_ACUITY_SCORE: 73
ADLS_ACUITY_SCORE: 73
ADLS_ACUITY_SCORE: 77
ADLS_ACUITY_SCORE: 73
ADLS_ACUITY_SCORE: 77
ADLS_ACUITY_SCORE: 77
ADLS_ACUITY_SCORE: 75
ADLS_ACUITY_SCORE: 78
ADLS_ACUITY_SCORE: 80
ADLS_ACUITY_SCORE: 73
ADLS_ACUITY_SCORE: 75
ADLS_ACUITY_SCORE: 77
ADLS_ACUITY_SCORE: 77
ADLS_ACUITY_SCORE: 73
ADLS_ACUITY_SCORE: 80
ADLS_ACUITY_SCORE: 77
ADLS_ACUITY_SCORE: 82
ADLS_ACUITY_SCORE: 78
ADLS_ACUITY_SCORE: 78
ADLS_ACUITY_SCORE: 75
ADLS_ACUITY_SCORE: 82

## 2025-03-19 NOTE — PLAN OF CARE
Goal Outcome Evaluation:      Plan of Care Reviewed With: patient          Outcome Evaluation: Zero sleep. Had Loose BM 5x. Restless, increased activity, wanting to get out of bed multiple times. Stood and Marched twice then up on the recliner then wanted to get up almost every 30  minutes.Hard to redirect, Gets angry and impulsive. Sometimes had a hard time to express self, Aphasia? Slow to answer questions. Also refusing to take pills. Offered water, would only take two sips. Might benefit from video swallow study since he was complaining of unable to swallow yesterday per report.    [facility] - ICU    RN Progress Note:            Pertinent Assessments:      Please refer to flowsheet rows for full assessment                Key Events - This Shift:       Escalating behavior. Please refer to above notes.     RN Managed Protocols Ordered:  Yes  Protocols:Potassium, Magnesium, and Phosphorus  PRN'S:iCal  Protocols Status: Reviewed with Oncoming RN                Barriers to Discharge / Downgrade:     May downgrade with 1:1 NA.           Problem: Delirium  Goal: Improved Attention and Thought Clarity  Outcome: Progressing     Problem: Adult Inpatient Plan of Care  Goal: Readiness for Transition of Care  Outcome: Progressing     Problem: Adult Inpatient Plan of Care  Goal: Optimal Comfort and Wellbeing  Outcome: Progressing     Problem: Delirium  Goal: Improved Sleep  Outcome: Progressing     Problem: Cardiovascular Surgery  Goal: Improved Activity Tolerance  Outcome: Progressing     Problem: Cardiovascular Surgery  Goal: Absence of Bleeding  Outcome: Progressing  Intervention: Monitor and Manage Bleeding  Recent Flowsheet Documentation  Taken 3/19/2025 0400 by Radha Mitchell, RN  Bleeding Management: dressing monitored  Taken 3/19/2025 0000 by Radha Mitchell, RN  Bleeding Management: dressing monitored

## 2025-03-19 NOTE — CONSULTS
Critical Care   3/19/2025  2:25 PM       See progress note dated from today.     Rocio Jaimes, CNP  [facility] Pulmonary/Critical Care

## 2025-03-19 NOTE — PROGRESS NOTES
"Speech-Language Pathology: Clinical Swallow Evaluation     03/19/25 0800   Appointment Info   Signing Clinician's Name / Credentials (SLP) CECILY Siegel Student   Student Supervision Direct supervision provided   General Information   Onset of Illness/Injury or Date of Surgery 03/17/25   Referring Physician Alice Shahid MD   Pertinent History of Current Problem Per EMR: \"74-year-old man with a history of CKD III (Cr 1.8), DM2 (A1c 11-12, poorly controlled, on insulin with polyneuropathy), CVA (memory impairment), HTN, HFrEF (EF 35-40%), MI, and LV apical thrombus (on Eliquis) who presents for evaluation for severe multivessel coronary artery disease.     In terms of symptoms he reports shortness of breath with exertion; however his activity is severely limited by neuropathy. We also discussed his poor diabetes control and he acknowledged that he is unable to reduce his intake of sweets.      Coronary angiogram revealed severe multivessel coronary artery disease notable for chronic occlusion of the proximal to mid LAD, subtotal occlusion of the proximal to mid left circumflex, and chronic occlusion of the proximal right coronary artery.      Of note TTE on 1/7/25 revealed EF 35-40%, mild-moderate mitral stenosis, mild mitral regurgitation, and mobile thrombus in the LV. He was then started on Eliquis for the LV thrombus. Cardiac MRI on 2/11/25 revealed viable myocardium with LVEF 31%, RVEF 68%, and no evidence of apical thrombus.\"   General Observations Pt. upright sitting in chair. Low participation. Stated he did not want to try any food.   Type of Evaluation   Type of Evaluation Swallow Evaluation   Oral Motor   Oral Musculature generally intact   Structural Abnormalities none present   Mucosal Quality good   Dentition (Oral Motor)   Dentition (Oral Motor) adequate dentition   Facial Symmetry (Oral Motor)   Facial Symmetry (Oral Motor) WNL   Lip Function (Oral Motor)   Lip Range of Motion (Oral Motor) WNL "   Lip Strength (Oral Motor) WNL   Comment, Lip Function (Oral Motor) Assessed with single sips of liquid. Unable to formally assess due to limited Pt. participation during evaluation and refusal with snack.   Tongue Function (Oral Motor)   Tongue Strength (Oral Motor) WNL   Tongue Coordination/Speed (Oral Motor) WNL   Tongue ROM (Oral Motor) WNL   Comment, Tongue Function (Oral Motor) Assessed only with single sips of liquid. Unable to formally assess due to Pt. refusal of snack and low participation.   Jaw Function (Oral Motor)   Jaw Function (Oral Motor) WNL   Cough/Swallow/Gag Reflex (Oral Motor)   Volitional Throat Clear/Cough (Oral Motor) WNL   Volitional Swallow (Oral Motor) WNL   Vocal Quality/Secretion Management (Oral Motor)   Vocal Quality (Oral Motor) WFL   Secretion Management (Oral Motor) WNL   General Swallowing Observations   Past History of Dysphagia None per EMR   Current Diet/Method of Nutritional Intake (General Swallowing Observations, NIS) thin liquids (level 0);regular diet   Swallowing Evaluation Clinical swallow evaluation   Clinical Swallow Evaluation   Feeding Assistance no assistance needed   Clinical Swallow Evaluation Textures Trialed thin liquids   Clinical Swallow Eval: Thin Liquid Texture Trial   Mode of Presentation, Thin Liquids cup;straw;self-fed   Volume of Liquid or Food Presented 2 oz   Oral Phase of Swallow WFL   Pharyngeal Phase of Swallow intact   Diagnostic Statement No s/s of aspiration. Limited observations due to low Pt. participation.   Swallowing Recommendations   Diet Consistency Recommendations thin liquids (level 0);regular diet   Mode of Delivery Recommendations bolus size, small;slow rate of intake   Monitoring/Assistance Required (Eating/Swallowing) stop eating activities when fatigue is present;monitor for cough or change in vocal quality with intake   Recommended Feeding/Eating Techniques (Swallow Eval) maintain upright sitting position for eating   Comment,  Swallowing Recommendations No overt s/s of aspiration with small amount of water. Patient refused trialing solids but overall motor function appeared intact. Patient alert and cognitively intact w/ no concerns reported with current diet.   Clinical Impression   Criteria for Skilled Therapeutic Interventions Met (SLP Eval) Yes, treatment indicated   Risks & Benefits of therapy have been explained evaluation/treatment results reviewed;patient;participants included   Clinical Impression Comments Limited Clinical Swallow Evaluation completed. Patient had no s/s of aspiration with thin liquids, refused solids. Oral motor function was WFL. Mastication was not observed due to limited patient participation. Hyolaryngeal elevation appears present upon visualization and palpitation. No concerns with current diet have been reported by RN and Pt. is not showing s/s of aspiration, though he refused breakfast this morning. Due to oral motor function appearing intact, high alertness, and cognitively intact, okay to continue current diet of thin liquids and regular textures utilizing strategies of maintaining upright position, small bolus amounts, and slow rate of intake. SLP to follow for further assessment.   SLP Total Evaluation Time   Eval: oral/pharyngeal swallow function, clinical swallow Minutes (24288) 10   SLP Goals   Therapy Frequency (SLP Eval) 5 times/week   SLP Predicted Duration/Target Date for Goal Attainment 03/26/25   SLP Goals Swallow   SLP: Safely tolerate diet without signs/symptoms of aspiration Regular diet;Thin liquids;Independently   SLP Discharge Planning   SLP Plan Wed; 0/5: further assess at bedside, meal if able   SLP Rationale for DC Rec Anticipated goals met prior to DC   SLP Brief overview of current status  Recommend diet of thin liquids and okay for regular textures as tolerated with strategies of maintaining upright position, small bolus amounts, and slow rate of intake. SLP to follow for further  acute needs.   SLP Time and Intention   Total Session Time (sum of timed and untimed services) 10   Psychosocial Support   Trust Relationship/Rapport care explained;choices provided;questions answered;questions encouraged;reassurance provided;thoughts/feelings acknowledged

## 2025-03-19 NOTE — PLAN OF CARE
Goal Outcome Evaluation:      Plan of Care Reviewed With: patient    Overall Patient Progress: improvingOverall Patient Progress: improving    Outcome Evaluation: Pt up in chair, to BSC, speech eval.  Pt restless at times, Lungs decreased, refuss IS or to take deep breaths.  given oxy for pain.  sats on room air94% ABG's done and labs drawn.  Insulin gtt initiated.  Chest tubes marked, grajeda patent.1:1 sitter at bedside

## 2025-03-19 NOTE — PROGRESS NOTES
"CVTS Daily Progress Note   POD#2  s/p CABG x4 (LIMA>LAD, rSVG>RCA, rSVG>OM1, rSVG>diag), LLE EVH, and preop IABP placement  Attending: Kleber  LOS: 2    SUBJECTIVE/INTERVAL EVENTS:    Patient with intermittent agitation overnight,  this AM, blood ketone beta-hydroxybutyrate positive, lactate 6.0>>4.0 - patient in DKA . Patient progressing well. Maintaining oxygen saturations on nasal cannula. Normotensive off pressors at this time, IABP removed POD#1 . Ambulating with therapy  to chair . Pain well controlled. - BM / - flatus. Tolerating diet without nausea although no appetite and NPO now w/ DKA. UOP poor. Chest tube output appropriate. Hgb stable. Patient denies new chest pain, shortness of breath, abdominal pain, calf pain, nausea. All questions answered to patient's satisfaction.     OBJECTIVE:  Temp:  [97.6  F (36.4  C)-99.2  F (37.3  C)] 97.9  F (36.6  C)  Pulse:  [68-88] 72  Resp:  [8-28] 8  BP: ()/(52-91) 98/53  MAP:  [69 mmHg-97 mmHg] 76 mmHg  Arterial Line BP: (121-159)/(42-59) 138/45  SpO2:  [91 %-97 %] 95 %  Vitals:    03/17/25 0515 03/18/25 0620 03/19/25 0100   Weight: 94.8 kg (209 lb) 98.8 kg (217 lb 14.4 oz) 98 kg (216 lb 0.8 oz)       Clinically Significant Risk Factors         # Hypernatremia: Highest Na = 146 mmol/L in last 2 days, will monitor as appropriate  # Hyperchloremia: Highest Cl = 115 mmol/L in last 2 days, will monitor as appropriate      # Hypocalcemia: Lowest Ca = 8.4 mg/dL in last 2 days, will monitor and replace as appropriate    # Anion Gap Metabolic Acidosis: Highest Anion Gap = 27 mmol/L in last 2 days, will monitor and treat as appropriate    # Thrombocytopenia: Lowest platelets = 95 in last 2 days, will monitor for bleeding             # DMII: A1C = 8.7 % (Ref range: <5.7 %) within past 6 months, PRESENT ON ADMISSION  # Obesity: Estimated body mass index is 31.91 kg/m  as calculated from the following:    Height as of 2/24/25: 1.753 m (5' 9\").    Weight as of this " encounter: 98 kg (216 lb 0.8 oz)., PRESENT ON ADMISSION      # History of CABG: noted on surgical history               Current Medications:    Scheduled Meds:  Current Facility-Administered Medications   Medication Dose Route Frequency Provider Last Rate Last Admin    acetaminophen (TYLENOL) tablet 975 mg  975 mg Oral Q8H John Sen MD   975 mg at 03/18/25 1410    [Held by provider] apixaban ANTICOAGULANT (ELIQUIS) tablet 5 mg  5 mg Oral BID Zayra Raya PA-C        aspirin (ASA) chewable tablet 81 mg  81 mg Oral or NG Tube Daily Prabha Giang PA-C   81 mg at 03/19/25 0812    clopidogrel (PLAVIX) tablet 75 mg  75 mg Oral Daily Prabha Giang PA-C   75 mg at 03/19/25 0824    gabapentin (NEURONTIN) capsule 300 mg  300 mg Oral At Bedtime Zayra Raya PA-C   300 mg at 03/18/25 2108    gabapentin (NEURONTIN) capsule 600 mg  600 mg Oral QAM Zayra Raya PA-C   600 mg at 03/19/25 0812    heparin ANTICOAGULANT injection 5,000 Units  5,000 Units Subcutaneous Q8H Zayra Raya PA-C   5,000 Units at 03/19/25 0523    [Held by provider] insulin glargine (LANTUS PEN) injection 50 Units  50 Units Subcutaneous QPM Zayra Raya PA-C        Lidocaine (LIDOCARE) 4 % Patch 1-2 patch  1-2 patch Transdermal Q24H Zayra Raya PA-C   1 patch at 03/18/25 2013    metoprolol tartrate (LOPRESSOR) tablet 50 mg  50 mg Oral BID Prabha Giang PA-C   50 mg at 03/19/25 0813    pantoprazole (PROTONIX) 2 mg/mL suspension 40 mg  40 mg Oral or NG Tube QAM AC Zayra Raya PA-C        Or    pantoprazole (PROTONIX) EC tablet 40 mg  40 mg Oral QAM AC Zayra Raya PA-C   40 mg at 03/19/25 0814    polyethylene glycol (MIRALAX) Packet 17 g  17 g Oral or Feeding Tube Daily Zayra Raya PA-C   17 g at 03/18/25 0935    senna-docusate (SENOKOT-S/PERICOLACE) 8.6-50 MG per tablet 1 tablet  1 tablet Oral BID Zayra Raya PA-C    1 tablet at 03/18/25 1044    simvastatin (ZOCOR) tablet 40 mg  40 mg Oral or Feeding Tube Daily Zayra Raya PA-C   40 mg at 03/19/25 0814     Continuous Infusions:  Current Facility-Administered Medications   Medication Dose Route Frequency Provider Last Rate Last Admin    dextrose 10% infusion   Intravenous Continuous PRN Prabha Giang PA-C        EPINEPHrine (ADRENALIN) 5 mg in sodium chloride 0.9 % 250 mL infusion CENTRAL  0.01-0.1 mcg/kg/min Intravenous Continuous Zayra Raya PA-C   Paused at 03/18/25 0957    insulin regular (MYXREDLIN) 1 unit/mL infusion  0-24 Units/hr Intravenous Continuous Prabha Giang PA-C 13 mL/hr at 03/19/25 1138 13 Units/hr at 03/19/25 1138     PRN Meds:.  Current Facility-Administered Medications   Medication Dose Route Frequency Provider Last Rate Last Admin    acetaminophen (TYLENOL) tablet 650 mg  650 mg Oral Q4H PRN Nabila Gates CNP        albumin human 5 % injection 12.5 g  12.5 g Intravenous 4x Daily PRN Alice Shahid MD   12.5 g at 03/18/25 0430    bisacodyl (DULCOLAX) suppository 10 mg  10 mg Rectal Daily PRN Zayra Raya PA-C        calcium gluconate 1 g in 50 mL in sodium chloride intermittent infusion  1 g Intravenous Once PRN Zayra Raya PA-C   1 g at 03/17/25 2005    calcium gluconate 2 g in  mL intermittent infusion  2 g Intravenous Once PRN Zayra Raya PA-C        calcium gluconate 3 g in sodium chloride 0.9 % 100 mL intermittent infusion  3 g Intravenous Once PRN Zayra Raya PA-C        dextrose 10% infusion   Intravenous Continuous PRN Prabha Giang PA-C        glucose gel 15-30 g  15-30 g Oral Q15 Min PRN Zayra Raya PA-C        Or    dextrose 50 % injection 25-50 mL  25-50 mL Intravenous Q15 Min PRN Zayra Raya PA-C        Or    glucagon injection 1 mg  1 mg Subcutaneous Q15 Min PRN Zayra Raya PA-C        hydrALAZINE  (APRESOLINE) injection 10 mg  10 mg Intravenous Q30 Min PRN Zayra Raya PA-C   10 mg at 03/19/25 0523    HYDROmorphone (DILAUDID) injection 0.1 mg  0.1 mg Intravenous Q4H PRN Alice Shahid MD        Or    HYDROmorphone (DILAUDID) injection 0.2 mg  0.2 mg Intravenous Q4H PRN Alice Shahid MD   0.2 mg at 03/19/25 0027    lactated ringers BOLUS 250 mL  250 mL Intravenous Q15 Min PRN Zayra Raya PA-C   250 mL at 03/17/25 1726    magnesium hydroxide (MILK OF MAGNESIA) suspension 30 mL  30 mL Oral Daily PRN Zayra Raya PA-C        naloxone (NARCAN) injection 0.2 mg  0.2 mg Intravenous Q2 Min PRN John Sen MD        Or    naloxone (NARCAN) injection 0.4 mg  0.4 mg Intravenous Q2 Min PRN John Sen MD        Or    naloxone (NARCAN) injection 0.2 mg  0.2 mg Intramuscular Q2 Min PRN John Sen MD        Or    naloxone (NARCAN) injection 0.4 mg  0.4 mg Intramuscular Q2 Min PRN John Sen MD        ondansetron (ZOFRAN ODT) ODT tab 4 mg  4 mg Oral Q6H PRN Zayra Raya PA-C        Or    ondansetron (ZOFRAN) injection 4 mg  4 mg Intravenous Q6H PRN Zayra Raya PA-C   4 mg at 03/18/25 2013    oxyCODONE IR (ROXICODONE) half-tab 2.5 mg  2.5 mg Oral Q4H PRN Zayra Raya PA-C        Or    oxyCODONE (ROXICODONE) tablet 5 mg  5 mg Oral Q4H PRN Zayra Raya PA-C   5 mg at 03/19/25 0813    prochlorperazine (COMPAZINE) injection 5 mg  5 mg Intravenous Q6H PRN Zayra Raya PA-C   5 mg at 03/18/25 2136    Or    prochlorperazine (COMPAZINE) tablet 5 mg  5 mg Oral Q6H PRN Zayra Raya PA-C           Cardiographics:    Telemetry monitoring demonstrates sinus rhythm with rates in the 70s per my personal review.    Imaging:  Results for orders placed or performed during the hospital encounter of 03/17/25   XR Chest Port 1 View    Impression    IMPRESSION: Endotracheal tube terminates 3.5 cm above the hakan. Right  heart catheter terminates over the central right pulmonary artery. Bilateral chest tubes and mediastinal drains. Clip versus balloon pump marker at the level of the AP window. Lung   volumes are low with bibasilar discoid atelectasis. No CHF or pneumothorax. No acute bony abnormalities..   XR Chest Port 1 View    Impression    IMPRESSION:   1.  Bilateral pleural-based drains with trace pleural effusions and no discernible pneumothorax.  2.  Minimal bibasilar atelectasis, otherwise clear lungs.  3.  Cardiomegaly with likely CABG related surgical clips.  4.  Right IJ [place] catheter tip in proximal right pulmonary artery.       Labs, personally reviewed.  Hemoglobin   Date Value Ref Range Status   03/19/2025 10.9 (L) 13.3 - 17.7 g/dL Final   03/18/2025 10.0 (L) 13.3 - 17.7 g/dL Final   03/18/2025 9.3 (L) 13.3 - 17.7 g/dL Final     WBC Count   Date Value Ref Range Status   03/19/2025 18.5 (H) 4.0 - 11.0 10e3/uL Final   03/18/2025 16.2 (H) 4.0 - 11.0 10e3/uL Final   03/18/2025 11.2 (H) 4.0 - 11.0 10e3/uL Final     Platelet Count   Date Value Ref Range Status   03/19/2025 109 (L) 150 - 450 10e3/uL Final   03/18/2025 96 (L) 150 - 450 10e3/uL Final   03/18/2025 95 (L) 150 - 450 10e3/uL Final     Creatinine   Date Value Ref Range Status   03/19/2025 2.19 (H) 0.67 - 1.17 mg/dL Final   03/18/2025 2.00 (H) 0.67 - 1.17 mg/dL Final   03/18/2025 1.99 (H) 0.67 - 1.17 mg/dL Final     Potassium   Date Value Ref Range Status   03/19/2025 5.1 3.4 - 5.3 mmol/L Final   03/19/2025 5.1 3.4 - 5.3 mmol/L Final   03/18/2025 5.0 3.4 - 5.3 mmol/L Final   04/25/2022 4.5 3.5 - 5.0 mmol/L Final   02/01/2021 4.8 3.5 - 5.0 mmol/L Final   12/13/2019 5.0 3.5 - 5.0 mmol/L Final     Potassium POCT   Date Value Ref Range Status   03/17/2025 4.0 3.4 - 5.3 mmol/L Final   03/17/2025 3.9 3.4 - 5.3 mmol/L Final   03/17/2025 4.3 3.4 - 5.3 mmol/L Final     Magnesium   Date Value Ref Range Status   03/19/2025 2.3 1.7 - 2.3 mg/dL Final   03/18/2025 2.3 1.7 -  2.3 mg/dL Final   03/17/2025 2.8 (H) 1.7 - 2.3 mg/dL Final          I/O:  I/O last 3 completed shifts:  In: 651.01 [P.O.:300; I.V.:351.01]  Out: 2749 [Urine:2165; Chest Tube:584]       Physical Exam:    General: Patient seen up in chair conversant/pleasant and NAD  HEENT: NICK, no sclera icterus, moist mucosa, nasal cannula in place  CV: RRR on monitor. 2+ peripheral pulses in all extremities. Mild edema.   Pulm: Non-labored effort on nasal cannula. Chest tubes in place, no air leak. Incision C/D/I.  Abd: Soft, NT, ND  : Riojas with lisa urine  Ext: Mild pedal edema, SCDs in place, warm, distal pulses intact, Femstop in place after IABP removal  Neuro: A&Ox3, COPELAND, and CN grossly intact      ASSESSMENT/PLAN:    Eric Cordoba is a 74 year old male with a history of CAD, LV mural thrombus, CKD 3b, DM2, HTN, h/o CVA, and ICM who is s/p CABG x4 , LLE EVH, and preop IABP placement.    Principal Problem:    Coronary artery disease involving native coronary artery of native heart, unspecified whether angina present  Active Problems:    CAD (coronary artery disease)    Coronary artery disease of native artery of native heart with stable angina pectoris        NEURO:   Acute postoperative pain  H/o CVA, PTA issue  - Scheduled Tylenol/lidocaine patches and PRN Tylenol/oxycodone/dilaudid for pain  - PTA gabapentin    CV:   HTN//HLD, PTA issues  CAD // ICM s/p CABG  Preop IABP, resolved.  - Pre-op EF 35-40%  - Normotensive  - Patient off pressors recently.  - IABP removed POD#1, see procedure note.    - Low dose coreg (PTA HF regimen) to start tonight if remaining hemodynamically stable off pressors  - ASA 81 mg  - Plavix 75 mg qday to be maintained x1 year postop per surgeon preference for graft patency. ASA should be 81 mg for this duration. When plavix is stopped, ASA should be increased to 162 mg qday indefinitely.    -  Simvastatin 40mg daily  - Chest tubes/TPW to remain today  - New baseline echo after CT/TPW removed  and prior to discharge.  - Consider HF follow up  - Hold PTA losartan    PULM:   Postoperative need for supplemental O2  - Extubated POD#0  - Maintaining oxygen saturations on nasal cannula - wean as able  - Encourage pulmonary toilet    FEN/GI:  - Diet: Clears, ADAT to Cardiac  - Continue electrolyte replacement protocol  - Bowel regimen, LBM preop    RENAL:  CKD3b, PTA issue  - borderline UOP/hr. Continue to monitor closely.  - Cr 2.19 (2.00) (baseline ~ 1.8-2.0)  - Riojas to remain in for close monitoring of I/O and during period of diuresis/relative immobility - plan for removal POD2  - Holding diuresis given DKA (PTA lasix 40 mg PO qd)    HEME:  Acute blood loss anemia post-op  LV mural thrombus, PTA issue  - Hold PTA eliquis (LV thrombus) for now although not visualized on cardiac MR 2/11/2025 or intra-op MANUEL so will discuss with surgeon   - Hgb stable, no bleeding concerns. Hep SQ, ASA    ID:  Reactive leukocytosis  - Shanae op ppx complete, afebrile. No concerns for infection  - WBC 11.6 and downtrending    ENDO:   DM2  Diabetic Ketoacidosis, ongoing  - HbA1c 8.7%  - BG goal < 180 to promote optimal healing  - PTA on lantus 50u qPM and mounjaro 5mg q7 days - held  - Critical care consult for DKA, appreciate   - Initial blood ketones 3.27, lactic 6.0 with iCa++ this AM, now down to 4.0   - DKA protocol insulin gtt, labs, and fluid    PPx:   - DVT: SCDs, SQ heparin TID, ambulation   - GI: Protonix 40mg PO daily    DISPO:   - Continue critical care in ICU pending DKA resolution  - PT/OT recs at discharge: TCU  - Medically Ready for Discharge: Anticipated in 5+ Days        Patient discussed with Dr. Shahid and CORTNEY Devi (critical care).        Audra Giang PA-C  [facility] Cardiothoracic Surgery  Vocera or Secure Chat  March 19, 2025

## 2025-03-19 NOTE — PROGRESS NOTES
03/19/25 1015   Appointment Info   Signing Clinician's Name / Credentials (OT) Paige Frommelt OTD OTR/L   Living Environment   People in Home friend(s)   Current Living Arrangements house   Living Environment Comments Pt unable to provide home set up during this session   Self-Care   Activity/Exercise/Self-Care Comment Pt seems to be unreliable historian - reports ind with all ADLs and friend/roommate Veronique assists with household tasks and manages his medications   General Information   Onset of Illness/Injury or Date of Surgery 03/17/25   Referring Physician Alice Shahid MD   Patient/Family Therapy Goal Statement (OT) None stated   Additional Occupational Profile Info/Pertinent History of Current Problem Eric Cordoba is a 74 year old male with CAD, ICM with EF 35-40%, htn. Underwent CABG x 4 and placement of IABP   Existing Precautions/Restrictions cardiac;sternal   Limitations/Impairments safety/cognitive   Cognitive Status Examination   Orientation Status person;place   Affect/Mental Status (Cognitive) confused;agitated;anxious   Follows Commands follows one-step commands;25-49% accuracy   Memory Deficit other (see comments)  (Prior CVA with reported memory deficits)   Visual Perception   Visual Impairment/Limitations corrective lenses full-time   Posture   Posture not impaired   Range of Motion Comprehensive   General Range of Motion bilateral upper extremity ROM WNL   Comment, General Range of Motion Not formally assessed   Strength Comprehensive (MMT)   Comment, General Manual Muscle Testing (MMT) Assessment Not formally assessed   Bed Mobility   Bed Mobility supine-sit;sit-supine   Sit-Supine [place] (Bed Mobility) maximum assist (25% patient effort);2 person assist   Transfers   Transfers sit-stand transfer;toilet transfer   Sit-Stand Transfer   Sit-Stand [place] (Transfers) moderate assist (50% patient effort);2 person assist   Toilet Transfer   [place] Level (Toilet Transfer) moderate  assist (50% patient effort);2 person assist   Balance   Balance Assessment standing dynamic balance   Standing Balance: Dynamic moderate assist   Activities of Daily Living   BADL Assessment/Intervention lower body dressing;toileting   Lower Body Dressing Assessment/Training   [place] Level (Lower Body Dressing) maximum assist (25% patient effort)   Toileting   [place] Level (Toileting) maximum assist (25% patient effort)   Clinical Impression   Criteria for Skilled Therapeutic Interventions Met (OT) Yes, treatment indicated   OT Diagnosis Decreased ind with ADLs and safety   Influenced by the following impairments S/p CABG   OT Problem List-Impairments impacting ADL pain;post-surgical precautions;mobility;balance;activity tolerance impaired;cognition;strength   Assessment of Occupational Performance 3-5 Performance Deficits   Identified Performance Deficits bed mobility, transfers, ADLs, cognition, endurance   Planned Therapy Interventions (OT) ADL retraining;bed mobility training;progressive activity/exercise;risk factor education;transfer training;strengthening;home program guidelines;cognition;balance training   Clinical Decision Making Complexity (OT) detailed assessment/moderate complexity   Risk & Benefits of therapy have been explained evaluation/treatment results reviewed;care plan/treatment goals reviewed;risks/benefits reviewed;current/potential barriers reviewed;patient   OT Total Evaluation Time   OT Eval, Moderate Complexity Minutes (88953) 10   OT Goals   Therapy Frequency (OT) 2 times/day   OT Predicted Duration/Target Date for Goal Attainment 03/26/25   OT Goals Cardiac Phase 1;Cognition   OT: Cognitive Patient/caregiver will verbalize understanding of cognitive assessment results/recommendations as needed for safe discharge planning   OT: Understanding of cardiac education to maximize quality of life, condition management, and health outcomes Patient;Caregiver;Verbalize   OT: Perform  aerobic activity with stable cardiovascular response intermittent;10 minutes;ambulation   OT: Functional/aerobic ambulation tolerance with stable cardiovascular response in order to return to home and community environment Modified independent;Rolling walker;200 feet   OT: Navigation of stairs simulating home set up with stable cardiovascular response in order to return to home and community environment   (Check if stairs)   Self-Care/Home Management   Self-Care/Home Mgmt/ADL, Compensatory, Meal Prep Minutes (62210) 8   Symptoms Noted During/After Treatment (Meal Preparation/Planning Training) behavioral stress signs;fatigue   Treatment Detail/Skilled Intervention Additional STS from chair - pt agitated and declining any exercise or marching in place. Attempting to reach for rolling table to stabilize self. Increased education and cueing for sternal precautions with minimal follow through. Max A x 1 transfer to bed. Min A lateral scooting at bedside. Returned to supine max A x2.   OT Discharge Planning   OT Plan Trial 2x/day - get more clarity of PLOF, transfers and mobility as able, impulsive, cognitive assessment?   OT Discharge Recommendation (DC Rec) Transitional Care Facility   OT Rationale for DC Rec Pt currently requires Ax2 for safety, confused and agitated, unclear of level of assist at home   OT Brief overview of current status Mod A x2 transfers, not following commands well   OT Total Distance Amb During Session (feet) 3   Total Session Time   Timed Code Treatment Minutes 8   Total Session Time (sum of timed and untimed services) 18

## 2025-03-19 NOTE — PLAN OF CARE
Goal Outcome Evaluation:      Plan of Care Reviewed With: patient    Overall Patient Progress: improvingOverall Patient Progress: improving    Outcome Evaluation: Pt very anxious.  C/o not being able to breath.  Lungs clear, takes shallow breaths.  states painful to breath.  Oxygen @1L sats 94-95%. given hydralazine,zofran, and oxycodone.  Pt repositioned and appears to be resting more comfortable at this time.  right groin intact with bandaid.  no bruising or swelling

## 2025-03-19 NOTE — PROGRESS NOTES
CLINICAL NUTRITION SERVICES NOTE    Received consult to provide s/p cv surgery diet education.  Pt is POD # 4 CABG x 4  Diet is NPO now due to DKA.  Did tolerate clear liquids post extubation. Will plan to provide education when more appropriate.

## 2025-03-20 ENCOUNTER — APPOINTMENT (OUTPATIENT)
Dept: OCCUPATIONAL THERAPY | Facility: HOSPITAL | Age: 75
DRG: 235 | End: 2025-03-20
Attending: INTERNAL MEDICINE
Payer: COMMERCIAL

## 2025-03-20 ENCOUNTER — APPOINTMENT (OUTPATIENT)
Dept: RADIOLOGY | Facility: HOSPITAL | Age: 75
DRG: 235 | End: 2025-03-20
Payer: COMMERCIAL

## 2025-03-20 ENCOUNTER — APPOINTMENT (OUTPATIENT)
Dept: PHYSICAL THERAPY | Facility: HOSPITAL | Age: 75
DRG: 235 | End: 2025-03-20
Attending: PHYSICIAN ASSISTANT
Payer: COMMERCIAL

## 2025-03-20 LAB
ALBUMIN SERPL BCG-MCNC: 3.3 G/DL (ref 3.5–5.2)
ALBUMIN UR-MCNC: NEGATIVE MG/DL
ALP SERPL-CCNC: 62 U/L (ref 40–150)
ALT SERPL W P-5'-P-CCNC: <5 U/L (ref 0–70)
ANION GAP SERPL CALCULATED.3IONS-SCNC: 12 MMOL/L (ref 7–15)
ANION GAP SERPL CALCULATED.3IONS-SCNC: 14 MMOL/L (ref 7–15)
ANION GAP SERPL CALCULATED.3IONS-SCNC: 14 MMOL/L (ref 7–15)
ANION GAP SERPL CALCULATED.3IONS-SCNC: 9 MMOL/L (ref 7–15)
ANION GAP SERPL CALCULATED.3IONS-SCNC: 9 MMOL/L (ref 7–15)
APPEARANCE UR: CLEAR
AST SERPL W P-5'-P-CCNC: 20 U/L (ref 0–45)
BACTERIA #/AREA URNS HPF: ABNORMAL /HPF
BACTERIA SPT CULT: NORMAL
BILIRUB SERPL-MCNC: 0.4 MG/DL
BILIRUB UR QL STRIP: NEGATIVE
BUN SERPL-MCNC: 61.2 MG/DL (ref 8–23)
BUN SERPL-MCNC: 64.7 MG/DL (ref 8–23)
BUN SERPL-MCNC: 64.9 MG/DL (ref 8–23)
BUN SERPL-MCNC: 67.2 MG/DL (ref 8–23)
BUN SERPL-MCNC: 68.6 MG/DL (ref 8–23)
CA-I BLD-MCNC: 4.6 MG/DL (ref 4.4–5.2)
CALCIUM SERPL-MCNC: 8.3 MG/DL (ref 8.8–10.4)
CALCIUM SERPL-MCNC: 8.3 MG/DL (ref 8.8–10.4)
CALCIUM SERPL-MCNC: 8.4 MG/DL (ref 8.8–10.4)
CALCIUM SERPL-MCNC: 8.8 MG/DL (ref 8.8–10.4)
CALCIUM SERPL-MCNC: 9 MG/DL (ref 8.8–10.4)
CHLORIDE SERPL-SCNC: 106 MMOL/L (ref 98–107)
CHLORIDE SERPL-SCNC: 107 MMOL/L (ref 98–107)
CHLORIDE SERPL-SCNC: 107 MMOL/L (ref 98–107)
CHLORIDE SERPL-SCNC: 108 MMOL/L (ref 98–107)
CHLORIDE SERPL-SCNC: 108 MMOL/L (ref 98–107)
COLOR UR AUTO: ABNORMAL
CREAT SERPL-MCNC: 2.58 MG/DL (ref 0.67–1.17)
CREAT SERPL-MCNC: 2.66 MG/DL (ref 0.67–1.17)
CREAT SERPL-MCNC: 2.68 MG/DL (ref 0.67–1.17)
CREAT SERPL-MCNC: 2.78 MG/DL (ref 0.67–1.17)
CREAT SERPL-MCNC: 2.83 MG/DL (ref 0.67–1.17)
EGFRCR SERPLBLD CKD-EPI 2021: 23 ML/MIN/1.73M2
EGFRCR SERPLBLD CKD-EPI 2021: 23 ML/MIN/1.73M2
EGFRCR SERPLBLD CKD-EPI 2021: 24 ML/MIN/1.73M2
EGFRCR SERPLBLD CKD-EPI 2021: 24 ML/MIN/1.73M2
EGFRCR SERPLBLD CKD-EPI 2021: 25 ML/MIN/1.73M2
ERYTHROCYTE [DISTWIDTH] IN BLOOD BY AUTOMATED COUNT: 13.3 % (ref 10–15)
GLUCOSE BLDC GLUCOMTR-MCNC: 182 MG/DL (ref 70–99)
GLUCOSE BLDC GLUCOMTR-MCNC: 201 MG/DL (ref 70–99)
GLUCOSE BLDC GLUCOMTR-MCNC: 204 MG/DL (ref 70–99)
GLUCOSE BLDC GLUCOMTR-MCNC: 209 MG/DL (ref 70–99)
GLUCOSE BLDC GLUCOMTR-MCNC: 235 MG/DL (ref 70–99)
GLUCOSE BLDC GLUCOMTR-MCNC: 250 MG/DL (ref 70–99)
GLUCOSE SERPL-MCNC: 191 MG/DL (ref 70–99)
GLUCOSE SERPL-MCNC: 213 MG/DL (ref 70–99)
GLUCOSE SERPL-MCNC: 235 MG/DL (ref 70–99)
GLUCOSE SERPL-MCNC: 241 MG/DL (ref 70–99)
GLUCOSE SERPL-MCNC: 264 MG/DL (ref 70–99)
GLUCOSE UR STRIP-MCNC: NEGATIVE MG/DL
GRAM STAIN RESULT: NORMAL
HCO3 SERPL-SCNC: 20 MMOL/L (ref 22–29)
HCO3 SERPL-SCNC: 21 MMOL/L (ref 22–29)
HCO3 SERPL-SCNC: 21 MMOL/L (ref 22–29)
HCO3 SERPL-SCNC: 24 MMOL/L (ref 22–29)
HCO3 SERPL-SCNC: 25 MMOL/L (ref 22–29)
HCT VFR BLD AUTO: 30.2 % (ref 40–53)
HGB BLD-MCNC: 9.9 G/DL (ref 13.3–17.7)
HGB UR QL STRIP: ABNORMAL
HOLD SPECIMEN: NORMAL
KETONES UR STRIP-MCNC: NEGATIVE MG/DL
LACTATE SERPL-SCNC: 1.1 MMOL/L (ref 0.7–2)
LACTATE SERPL-SCNC: 1.1 MMOL/L (ref 0.7–2)
LACTATE SERPL-SCNC: 1.2 MMOL/L (ref 0.7–2)
LACTATE SERPL-SCNC: 1.6 MMOL/L (ref 0.7–2)
LEUKOCYTE ESTERASE UR QL STRIP: NEGATIVE
MAGNESIUM SERPL-MCNC: 2.4 MG/DL (ref 1.7–2.3)
MCH RBC QN AUTO: 31.6 PG (ref 26.5–33)
MCHC RBC AUTO-ENTMCNC: 32.8 G/DL (ref 31.5–36.5)
MCV RBC AUTO: 97 FL (ref 78–100)
NITRATE UR QL: NEGATIVE
PH UR STRIP: 5 [PH] (ref 5–7)
PHOSPHATE SERPL-MCNC: 3.6 MG/DL (ref 2.5–4.5)
PLATELET # BLD AUTO: 123 10E3/UL (ref 150–450)
POTASSIUM SERPL-SCNC: 4.3 MMOL/L (ref 3.4–5.3)
POTASSIUM SERPL-SCNC: 4.5 MMOL/L (ref 3.4–5.3)
POTASSIUM SERPL-SCNC: 4.8 MMOL/L (ref 3.4–5.3)
PROT SERPL-MCNC: 5.6 G/DL (ref 6.4–8.3)
RBC # BLD AUTO: 3.13 10E6/UL (ref 4.4–5.9)
RBC URINE: <=2 /HPF
SODIUM SERPL-SCNC: 140 MMOL/L (ref 135–145)
SODIUM SERPL-SCNC: 140 MMOL/L (ref 135–145)
SODIUM SERPL-SCNC: 141 MMOL/L (ref 135–145)
SODIUM SERPL-SCNC: 142 MMOL/L (ref 135–145)
SODIUM SERPL-SCNC: 142 MMOL/L (ref 135–145)
SP GR UR STRIP: 1.01 (ref 1–1.03)
SQUAMOUS EPITHELIAL: <=1 /HPF
UROBILINOGEN UR STRIP-MCNC: NORMAL MG/DL
WBC # BLD AUTO: 19.8 10E3/UL (ref 4–11)
WBC URINE: <=5 /HPF

## 2025-03-20 PROCEDURE — 250N000011 HC RX IP 250 OP 636: Mod: JZ | Performed by: STUDENT IN AN ORGANIZED HEALTH CARE EDUCATION/TRAINING PROGRAM

## 2025-03-20 PROCEDURE — 87040 BLOOD CULTURE FOR BACTERIA: CPT

## 2025-03-20 PROCEDURE — 272N000452 HC KIT SHRLOCK 5FR POWER PICC TRIPLE LUMEN

## 2025-03-20 PROCEDURE — 250N000011 HC RX IP 250 OP 636: Performed by: PHYSICIAN ASSISTANT

## 2025-03-20 PROCEDURE — 200N000001 HC R&B ICU

## 2025-03-20 PROCEDURE — 250N000013 HC RX MED GY IP 250 OP 250 PS 637: Performed by: PHYSICIAN ASSISTANT

## 2025-03-20 PROCEDURE — 81001 URINALYSIS AUTO W/SCOPE: CPT

## 2025-03-20 PROCEDURE — 250N000009 HC RX 250

## 2025-03-20 PROCEDURE — 84100 ASSAY OF PHOSPHORUS: CPT | Performed by: STUDENT IN AN ORGANIZED HEALTH CARE EDUCATION/TRAINING PROGRAM

## 2025-03-20 PROCEDURE — 250N000011 HC RX IP 250 OP 636: Mod: JZ

## 2025-03-20 PROCEDURE — 99222 1ST HOSP IP/OBS MODERATE 55: CPT | Performed by: STUDENT IN AN ORGANIZED HEALTH CARE EDUCATION/TRAINING PROGRAM

## 2025-03-20 PROCEDURE — 97535 SELF CARE MNGMENT TRAINING: CPT | Mod: GO

## 2025-03-20 PROCEDURE — 82330 ASSAY OF CALCIUM: CPT | Performed by: PHYSICIAN ASSISTANT

## 2025-03-20 PROCEDURE — 36415 COLL VENOUS BLD VENIPUNCTURE: CPT | Performed by: NURSE PRACTITIONER

## 2025-03-20 PROCEDURE — 250N000013 HC RX MED GY IP 250 OP 250 PS 637

## 2025-03-20 PROCEDURE — P9047 ALBUMIN (HUMAN), 25%, 50ML: HCPCS

## 2025-03-20 PROCEDURE — 999N000157 HC STATISTIC RCP TIME EA 10 MIN

## 2025-03-20 PROCEDURE — 94799 UNLISTED PULMONARY SVC/PX: CPT

## 2025-03-20 PROCEDURE — 80048 BASIC METABOLIC PNL TOTAL CA: CPT

## 2025-03-20 PROCEDURE — 97162 PT EVAL MOD COMPLEX 30 MIN: CPT | Mod: GP

## 2025-03-20 PROCEDURE — 85027 COMPLETE CBC AUTOMATED: CPT

## 2025-03-20 PROCEDURE — 80053 COMPREHEN METABOLIC PANEL: CPT | Performed by: NURSE PRACTITIONER

## 2025-03-20 PROCEDURE — 250N000013 HC RX MED GY IP 250 OP 250 PS 637: Performed by: INTERNAL MEDICINE

## 2025-03-20 PROCEDURE — 97530 THERAPEUTIC ACTIVITIES: CPT | Mod: GP

## 2025-03-20 PROCEDURE — 36569 INSJ PICC 5 YR+ W/O IMAGING: CPT

## 2025-03-20 PROCEDURE — 87070 CULTURE OTHR SPECIMN AEROBIC: CPT

## 2025-03-20 PROCEDURE — 71045 X-RAY EXAM CHEST 1 VIEW: CPT

## 2025-03-20 PROCEDURE — 83605 ASSAY OF LACTIC ACID: CPT

## 2025-03-20 PROCEDURE — 250N000012 HC RX MED GY IP 250 OP 636 PS 637

## 2025-03-20 PROCEDURE — 83735 ASSAY OF MAGNESIUM: CPT | Performed by: STUDENT IN AN ORGANIZED HEALTH CARE EDUCATION/TRAINING PROGRAM

## 2025-03-20 PROCEDURE — 272N000278 HC DEVICE 5FR SECURACATH

## 2025-03-20 PROCEDURE — 83605 ASSAY OF LACTIC ACID: CPT | Performed by: NURSE PRACTITIONER

## 2025-03-20 RX ORDER — CARVEDILOL 12.5 MG/1
12.5 TABLET ORAL 2 TIMES DAILY
Status: DISCONTINUED | OUTPATIENT
Start: 2025-03-20 | End: 2025-03-20

## 2025-03-20 RX ORDER — CARVEDILOL 3.12 MG/1
6.25 TABLET ORAL 2 TIMES DAILY
Status: DISCONTINUED | OUTPATIENT
Start: 2025-03-20 | End: 2025-03-20

## 2025-03-20 RX ORDER — ASPIRIN 81 MG/1
81 TABLET, CHEWABLE ORAL DAILY
Status: DISCONTINUED | OUTPATIENT
Start: 2025-03-21 | End: 2025-03-26 | Stop reason: HOSPADM

## 2025-03-20 RX ORDER — LIDOCAINE 40 MG/G
CREAM TOPICAL
Status: DISCONTINUED | OUTPATIENT
Start: 2025-03-20 | End: 2025-03-20

## 2025-03-20 RX ORDER — CARVEDILOL 12.5 MG/1
12.5 TABLET ORAL 2 TIMES DAILY
Status: DISCONTINUED | OUTPATIENT
Start: 2025-03-20 | End: 2025-03-21

## 2025-03-20 RX ORDER — SIMVASTATIN 10 MG
40 TABLET ORAL DAILY
Status: DISCONTINUED | OUTPATIENT
Start: 2025-03-21 | End: 2025-03-26 | Stop reason: HOSPADM

## 2025-03-20 RX ORDER — BUMETANIDE 0.25 MG/ML
1 INJECTION, SOLUTION INTRAMUSCULAR; INTRAVENOUS ONCE
Status: COMPLETED | OUTPATIENT
Start: 2025-03-20 | End: 2025-03-20

## 2025-03-20 RX ORDER — ALBUMIN (HUMAN) 12.5 G/50ML
25 SOLUTION INTRAVENOUS ONCE
Status: COMPLETED | OUTPATIENT
Start: 2025-03-20 | End: 2025-03-20

## 2025-03-20 RX ORDER — POLYETHYLENE GLYCOL 3350 17 G/17G
17 POWDER, FOR SOLUTION ORAL DAILY
Status: DISCONTINUED | OUTPATIENT
Start: 2025-03-21 | End: 2025-03-26 | Stop reason: HOSPADM

## 2025-03-20 RX ADMIN — OXYCODONE HYDROCHLORIDE 5 MG: 5 TABLET ORAL at 21:02

## 2025-03-20 RX ADMIN — ACETAMINOPHEN 975 MG: 325 TABLET ORAL at 13:48

## 2025-03-20 RX ADMIN — ASPIRIN 81 MG CHEWABLE TABLET 81 MG: 81 TABLET CHEWABLE at 07:39

## 2025-03-20 RX ADMIN — CLOPIDOGREL BISULFATE 75 MG: 75 TABLET, FILM COATED ORAL at 07:40

## 2025-03-20 RX ADMIN — HYDRALAZINE HYDROCHLORIDE 10 MG: 20 INJECTION INTRAMUSCULAR; INTRAVENOUS at 15:09

## 2025-03-20 RX ADMIN — LIDOCAINE 1 PATCH: 4 PATCH TOPICAL at 21:02

## 2025-03-20 RX ADMIN — SIMVASTATIN 40 MG: 40 TABLET, FILM COATED ORAL at 07:41

## 2025-03-20 RX ADMIN — ALBUMIN HUMAN 25 G: 0.25 SOLUTION INTRAVENOUS at 09:32

## 2025-03-20 RX ADMIN — INSULIN GLARGINE 20 UNITS: 100 INJECTION, SOLUTION SUBCUTANEOUS at 09:40

## 2025-03-20 RX ADMIN — SENNOSIDES AND DOCUSATE SODIUM 1 TABLET: 50; 8.6 TABLET ORAL at 21:01

## 2025-03-20 RX ADMIN — METOPROLOL TARTRATE 50 MG: 25 TABLET, FILM COATED ORAL at 07:37

## 2025-03-20 RX ADMIN — OXYCODONE HYDROCHLORIDE 5 MG: 5 TABLET ORAL at 07:37

## 2025-03-20 RX ADMIN — HEPARIN SODIUM 5000 UNITS: 5000 INJECTION, SOLUTION INTRAVENOUS; SUBCUTANEOUS at 13:52

## 2025-03-20 RX ADMIN — PANTOPRAZOLE SODIUM 40 MG: 40 TABLET, DELAYED RELEASE ORAL at 07:39

## 2025-03-20 RX ADMIN — GABAPENTIN 600 MG: 300 CAPSULE ORAL at 07:38

## 2025-03-20 RX ADMIN — ACETAMINOPHEN 975 MG: 325 TABLET ORAL at 21:01

## 2025-03-20 RX ADMIN — HYDROMORPHONE HYDROCHLORIDE 0.2 MG: 0.2 INJECTION, SOLUTION INTRAMUSCULAR; INTRAVENOUS; SUBCUTANEOUS at 05:46

## 2025-03-20 RX ADMIN — HEPARIN SODIUM 5000 UNITS: 5000 INJECTION, SOLUTION INTRAVENOUS; SUBCUTANEOUS at 05:39

## 2025-03-20 RX ADMIN — HEPARIN SODIUM 5000 UNITS: 5000 INJECTION, SOLUTION INTRAVENOUS; SUBCUTANEOUS at 21:02

## 2025-03-20 RX ADMIN — OXYCODONE HYDROCHLORIDE 5 MG: 5 TABLET ORAL at 16:12

## 2025-03-20 RX ADMIN — CARVEDILOL 12.5 MG: 12.5 TABLET, FILM COATED ORAL at 21:01

## 2025-03-20 RX ADMIN — GABAPENTIN 300 MG: 300 CAPSULE ORAL at 21:01

## 2025-03-20 RX ADMIN — LIDOCAINE HYDROCHLORIDE 2 ML: 10 INJECTION, SOLUTION EPIDURAL; INFILTRATION; INTRACAUDAL; PERINEURAL at 08:20

## 2025-03-20 RX ADMIN — ACETAMINOPHEN 975 MG: 325 TABLET ORAL at 05:38

## 2025-03-20 RX ADMIN — BUMETANIDE 1 MG: 0.25 INJECTION INTRAMUSCULAR; INTRAVENOUS at 09:32

## 2025-03-20 ASSESSMENT — ACTIVITIES OF DAILY LIVING (ADL)
ADLS_ACUITY_SCORE: 85
ADLS_ACUITY_SCORE: 73
ADLS_ACUITY_SCORE: 86
ADLS_ACUITY_SCORE: 73
ADLS_ACUITY_SCORE: 86
ADLS_ACUITY_SCORE: 73
ADLS_ACUITY_SCORE: 73
ADLS_ACUITY_SCORE: 86
ADLS_ACUITY_SCORE: 73
ADLS_ACUITY_SCORE: 73
ADLS_ACUITY_SCORE: 85
ADLS_ACUITY_SCORE: 73
ADLS_ACUITY_SCORE: 73
ADLS_ACUITY_SCORE: 86
ADLS_ACUITY_SCORE: 73
ADLS_ACUITY_SCORE: 78
ADLS_ACUITY_SCORE: 86
ADLS_ACUITY_SCORE: 73
ADLS_ACUITY_SCORE: 73

## 2025-03-20 NOTE — PLAN OF CARE
Goal Outcome Evaluation:      Plan of Care Reviewed With: patient    Overall Patient Progress: improvingOverall Patient Progress: improving    Outcome Evaluation: Flucuating mentation and behavior.  Cooperative at this time, forgetful to time.  1:1 nursing assistant at bedside.  PICC placed due to difficult lab draws.  Low u/o CVsurgery aware.  Chest tubes remain, vent. wire capped, wound vac patent

## 2025-03-20 NOTE — PROGRESS NOTES
Care Management Follow Up    Length of Stay (days): 3    Expected Discharge Date: 03/25/2025    Anticipated Discharge Plan:   TBD    Transportation: TBD    PT Recommendations: Transitional Care Facility  OT Recommendations:  Transitional Care Facility     Barriers to Discharge: medical stability, post procedure care and monitoring, chest tubes, mentation, IV medications    Prior Living Situation:  Patient reports he lives in his house with his friend Veronique. He is independent with ADLs/IADLs, ambulates with a cane and has no services in community. He states Veronique is primary contact. Transportation at discharge TBD.     Discussed  Partnership in Safe Discharge Planning  document with patient/family: No     Handoff Completed: No, handoff not indicated or clinically appropriate    Patient/Spokesperson Updated: 3/18/25    Additional Information:  Medical:  Patient admitted for elective CABG on 03/17/2025. Pmhx is significant for CKD stage III, type 2 diabetes mellitus, history of CVA, hypertension, HFrEF, left ventricular apical thrombus.  Patient had been in ICU recovering from procedure since 3/17.  Postoperative course was complicated by development of DKA and JESS.     CT Surgery following      3/20/25:  Patient is not medically stable to initiate discharge planning.          Next Steps:   Follow for progression and recommendations.        Lien Tapia RN

## 2025-03-20 NOTE — PLAN OF CARE
Problem: Adult Inpatient Plan of Care  Goal: Plan of Care Review  Description: The Plan of Care Review/Shift note should be completed every shift.  The Outcome Evaluation is a brief statement about your assessment that the patient is improving, declining, or no change.  This information will be displayed automatically on your shift  note.  Outcome: Progressing  Flowsheets (Taken 3/19/2025 2159)  Plan of Care Reviewed With: patient  Goal: Optimal Comfort and Wellbeing  Outcome: Progressing  Intervention: Provide Person-Centered Care  Recent Flowsheet Documentation  Taken 3/19/2025 2000 by Pia Sharpe RN  Trust Relationship/Rapport: reassurance provided  Taken 3/19/2025 1600 by Pia Sharpe RN  Trust Relationship/Rapport: reassurance provided     Problem: Delirium  Goal: Improved Attention and Thought Clarity  Outcome: Progressing     Problem: Risk for Delirium  Goal: Improved Attention and Thought Clarity  Outcome: Progressing     Problem: Adult Inpatient Plan of Care  Goal: Optimal Comfort and Wellbeing  Outcome: Progressing  Intervention: Provide Person-Centered Care  Recent Flowsheet Documentation  Taken 3/19/2025 2000 by Pia Sharpe RN  Trust Relationship/Rapport: reassurance provided  Taken 3/19/2025 1600 by Pia Sharpe RN  Trust Relationship/Rapport: reassurance provided     Problem: Delirium  Goal: Improved Attention and Thought Clarity  Outcome: Progressing   Goal Outcome Evaluation:      Plan of Care Reviewed With: patient      [facility] - ICU    RN Progress Note:            Pertinent Assessments:      Please refer to flowsheet rows for full assessment     1:1 aid           Key Events - This Shift:     Post op cab care DKA protocol , insulin gtt transitioned off .  Labs are all normalizing , pt is subdued , calm 9,800 mg (actual weight):1 aid at the bed side       RN Managed Protocols Ordered:  Yes  Protocols:Potassium  PRN'  Protocols Status: In Progress                 Barriers to Discharge / Downgrade:     DKA , labs          Point of Contact Update:  Yes  Name:daughter  Phone Number:    Summary of Conversation: at the bed side updated

## 2025-03-20 NOTE — CONSULTS
CLINICAL NUTRITION SERVICES NOTE    Received consult to provide s/p cv surgery diet education.    Diet: Clear liquids, no sugary juices.  Pt said he tried some water.  He is very drowsy - not keeping eyes open.   Pt is POD # 3 CABG x 4.  DKA is better. . FSB, 204 lactic acid 1.2 wdl  Left handout Heart Healthy Nutrition Therapy on tray table.  Will plan to follow up when appropriate for education.  Will monitor diet advancement and oral intake.

## 2025-03-20 NOTE — PLAN OF CARE
Goal Outcome Evaluation:      Plan of Care Reviewed With: patient    Overall Patient Progress: improvingOverall Patient Progress: improving    Outcome Evaluation: Fluctuating orientation and mood. VSS. Increase edema. Hard stick. Updated PRAKASH Armando. Ordered for PICC placement [facility] - ICU    RN Progress Note:            Pertinent Assessments:      Please refer to flowsheet rows for full assessment             Key Events - This Shift:            RN Managed Protocols Ordered:  Yes  Protocols:Potassium, Magnesium, and Phosphorus  PRN'S:iCal  Protocols Status: Laboratory Results Pending                Barriers to Discharge / Downgrade:     Guarded         Problem: Delirium  Goal: Improved Attention and Thought Clarity  Outcome: Progressing     Problem: Delirium  Goal: Improved Sleep  Outcome: Progressing     Problem: Comorbidity Management  Goal: Blood Glucose Levels Within Targeted Range  Outcome: Progressing  Intervention: Monitor and Manage Glycemia  Recent Flowsheet Documentation  Taken 3/20/2025 0400 by Radha Mitchell RN  Medication Review/Management:   medications reviewed   high-risk medications identified  Taken 3/20/2025 0000 by Radha Mitchell RN  Medication Review/Management:   medications reviewed   high-risk medications identified

## 2025-03-20 NOTE — PROGRESS NOTES
03/20/25 1330   Appointment Info   Signing Clinician's Name / Credentials (PT) Mari Reyes DPT   Living Environment   People in Home friend(s)   Current Living Arrangements house   Home Accessibility stairs to enter home;stairs within home   Number of Stairs, Main Entrance 3;10  (split level, 5 stairs)   Self-Care   Usual Activity Tolerance good   Current Activity Tolerance fair   Activity/Exercise/Self-Care Comment friend and dtr work daily, unable to help pt at home following d/c except evenings   General Information   Onset of Illness/Injury or Date of Surgery 03/17/25   Referring Physician Nabila Gates, CNP   Patient/Family Therapy Goals Statement (PT) none stated   Pertinent History of Current Problem (include personal factors and/or comorbidities that impact the POC) Eric Cordoba is a 74 year old male admitted on 3/17/2025. He was admitted for elective CABG on 03/17/2025. Pmhx is significant for CKD stage III, type 2 diabetes mellitus, history of CVA, hypertension, HFrEF, left ventricular apical thrombus.  Patient had been in ICU recovering from procedure since 3/17.  Postoperative course was complicated by development of DKA and JESS.  [place] consulted to assist with management of medical comorbidities.   Existing Precautions/Restrictions sternal;fall   Cognition   Cognitive Status Comments lethargic, inc confusion, slower to process   Pain Assessment   Patient Currently in Pain   (does not report pain, just fatigue)   Integumentary/Edema   Integumentary/Edema Comments wound vac over sternal incision   Range of Motion (ROM)   Range of Motion ROM is WFL   ROM Comment BLEs   Strength (Manual Muscle Testing)   Strength (Manual Muscle Testing) Deficits observed during functional mobility   Bed Mobility   Comment, (Bed Mobility) not tested, up in chair before and after PT   Transfers   Transfers sit-stand transfer   Sit-Stand Transfer   Sit-Stand [place] (Transfers) maximum assist (25%  patient effort);2 person assist   Assistive Device (Sit-Stand Transfers)   (heart pillow)   Comment, (Sit-Stand Transfer) inc assist into standing d/t weakness and poor technique   Gait/Stairs (Locomotion)   Comment, (Gait/Stairs) deferred at this time, pt unsteady with standing   Balance   Balance Comments static standing x 1 minute with modA x 2, heavy L lean   Clinical Impression   Criteria for Skilled Therapeutic Intervention Yes, treatment indicated   PT Diagnosis (PT) impaired functional mobility, gait abnormality   Influenced by the following impairments decreased strength, decreased endurance   Functional limitations due to impairments gait, transfers, bed mob   Clinical Presentation (PT Evaluation Complexity) evolving   Clinical Presentation Rationale pt presents as medically diagnosed   Clinical Decision Making (Complexity) moderate complexity   Planned Therapy Interventions (PT) balance training;bed mobility training;gait training;home exercise program;neuromuscular re-education;patient/family education;strengthening;transfer training;stair training   Risk & Benefits of therapy have been explained evaluation/treatment results reviewed;patient   PT Total Evaluation Time   PT Eval, Moderate Complexity Minutes (46451) 10   Physical Therapy Goals   PT Frequency 5x/week   PT Predicted Duration/Target Date for Goal Attainment 03/27/25   PT Goals Bed Mobility;Transfers;Gait   PT: Bed Mobility Minimal assist;Supine to/from sit;Within precautions   PT: Transfers Minimal assist;Sit to/from stand;Bed to/from chair;Assistive device;Within precautions   PT: Gait Minimal assist;Assistive device;Rolling walker;10 feet;Within precautions   Interventions   Interventions Quick Adds Therapeutic Activity   Therapeutic Activity   Therapeutic Activities: dynamic activities to improve functional performance Minutes (04062) 15   Symptoms Noted During/After Treatment Fatigue   Treatment Detail/Skilled Intervention additional sit  <> stand x 2 with mod-maxA x 2 on belt. Cues for leaning forward, tucking feet back. inc assist into first part of stand. L lateral lean and slight LOB. Static standing x 1 minute x 2 reps with mod-maxA x 1-2, heavy L lean, unable to maintain midline posture. Able to take 2 small steps to R with maxA x 1-2 for stability with inc v/c for technique and safety. Up in chair after PT, 1:1 in room. Eva sling under pt   PT Discharge Planning   PT Plan A x 2, sit <> stand, to chair as able   PT Discharge Recommendation (DC Rec) Transitional Care Facility   PT Rationale for DC Rec A x 2 for standing from recliner, heavy assist with static standing   PT Brief overview of current status sit <> stand x 3 reps with mod-maxA x 2. Static standing x 3 reps with modA x 2, heavy L lean   PT Total Distance Amb During Session (feet) 0   PT Equipment Needed at Discharge walker, rolling  (4WW)   Physical Therapy Time and Intention   Timed Code Treatment Minutes 15   Total Session Time (sum of timed and untimed services) 25

## 2025-03-20 NOTE — PROGRESS NOTES
"CVTS Daily Progress Note   POD#3  s/p CABG x4 (LIMA>LAD, rSVG>RCA, rSVG>OM1, rSVG>diag), LLE EVH, and preop IABP placement  Attending: Kleber  LOS: 3    SUBJECTIVE/INTERVAL EVENTS:    No acute events overnight. DKA resolved yesterday PM and mentation much improved today. Patient progressing well, does have JESS. Maintaining oxygen saturations on nasal cannula. Normotensive, IABP out POD#1. Ambulating with therapy  to chair . Pain well controlled. +BM. Tolerating diet without nausea although no appetite and remains NPO with DKA. UOP poor. Chest tube output appropriate. Hgb stable. Patient denies new chest pain, shortness of breath, abdominal pain, calf pain, nausea. All questions answered to patient's satisfaction.     OBJECTIVE:  Temp:  [97.4  F (36.3  C)-98.5  F (36.9  C)] 98.5  F (36.9  C)  Pulse:  [72-82] 80  Resp:  [7-26] 11  BP: ()/(51-69) 153/69  SpO2:  [88 %-99 %] 99 %  Vitals:    03/17/25 0515 03/18/25 0620 03/19/25 0100 03/20/25 0400   Weight: 94.8 kg (209 lb) 98.8 kg (217 lb 14.4 oz) 98 kg (216 lb 0.8 oz) 98.8 kg (217 lb 13 oz)       Clinically Significant Risk Factors          # Hyperchloremia: Highest Cl = 109 mmol/L in last 2 days, will monitor as appropriate      # Hypocalcemia: Lowest Ca = 8.3 mg/dL in last 2 days, will monitor and replace as appropriate    # Anion Gap Metabolic Acidosis: Highest Anion Gap = 27 mmol/L in last 2 days, will monitor and treat as appropriate    # Thrombocytopenia: Lowest platelets = 96 in last 2 days, will monitor for bleeding  # Acute Kidney Injury, unspecified: based on a >150% or 0.3 mg/dL increase in last creatinine compared to past 90 day average, will monitor renal function            # DMII: A1C = 8.7 % (Ref range: <5.7 %) within past 6 months, PRESENT ON ADMISSION  # Obesity: Estimated body mass index is 32.17 kg/m  as calculated from the following:    Height as of 2/24/25: 1.753 m (5' 9\").    Weight as of this encounter: 98.8 kg (217 lb 13 oz)., PRESENT ON " ADMISSION      # History of CABG: noted on surgical history               Current Medications:    Scheduled Meds:  Current Facility-Administered Medications   Medication Dose Route Frequency Provider Last Rate Last Admin    acetaminophen (TYLENOL) tablet 975 mg  975 mg Oral Q8H John Sen MD   975 mg at 03/20/25 0538    [Held by provider] apixaban ANTICOAGULANT (ELIQUIS) tablet 5 mg  5 mg Oral BID Zayra Raya PA-C        aspirin (ASA) chewable tablet 81 mg  81 mg Oral or NG Tube Daily Prabha Giang PA-C   81 mg at 03/20/25 0739    clopidogrel (PLAVIX) tablet 75 mg  75 mg Oral Daily Prabha Giang PA-C   75 mg at 03/20/25 0740    gabapentin (NEURONTIN) capsule 300 mg  300 mg Oral At Bedtime Zayra Raya PA-C   300 mg at 03/19/25 2138    gabapentin (NEURONTIN) capsule 600 mg  600 mg Oral QAM Zayra Raya PA-C   600 mg at 03/20/25 0738    heparin ANTICOAGULANT injection 5,000 Units  5,000 Units Subcutaneous Q8H Zayra Raya PA-C   5,000 Units at 03/20/25 0539    insulin aspart (NovoLOG) injection (RAPID ACTING)  1-12 Units Subcutaneous Q4H Rocio Jaimes APRN CNP   3 Units at 03/20/25 0743    [Held by provider] insulin glargine (LANTUS PEN) injection 50 Units  50 Units Subcutaneous QPM Zayra Raya PA-C        Lidocaine (LIDOCARE) 4 % Patch 1-2 patch  1-2 patch Transdermal Q24H Zayra Raya PA-C   1 patch at 03/18/25 2013    metoprolol tartrate (LOPRESSOR) tablet 50 mg  50 mg Oral BID Prabha Giang PA-C   50 mg at 03/20/25 0737    pantoprazole (PROTONIX) 2 mg/mL suspension 40 mg  40 mg Oral or NG Tube QAM AC Zayra Raya PA-C        Or    pantoprazole (PROTONIX) EC tablet 40 mg  40 mg Oral QAM AC Zayra Raya PA-C   40 mg at 03/20/25 0739    polyethylene glycol (MIRALAX) Packet 17 g  17 g Oral or Feeding Tube Daily Zayra Raya PA-C   17 g at 03/18/25 0935    senna-docusate  (SENOKOT-S/PERICOLACE) 8.6-50 MG per tablet 1 tablet  1 tablet Oral BID Zayra Raya PA-C   1 tablet at 03/18/25 1044    simvastatin (ZOCOR) tablet 40 mg  40 mg Oral or Feeding Tube Daily Zayra Raya PA-C   40 mg at 03/20/25 0741     Continuous Infusions:  Current Facility-Administered Medications   Medication Dose Route Frequency Provider Last Rate Last Admin    dextrose 10% infusion   Intravenous Continuous PRN Prabha Giang PA-C        EPINEPHrine (ADRENALIN) 5 mg in sodium chloride 0.9 % 250 mL infusion CENTRAL  0.01-0.1 mcg/kg/min Intravenous Continuous Zayra Raya PA-C   Paused at 03/18/25 0957    insulin regular (MYXREDLIN) 1 unit/mL infusion  0-24 Units/hr Intravenous Continuous Prabha Giang PA-C   Stopped at 03/19/25 1541    Med Instruction - Transition from IV Insulin Infusion to Sub-Q Insulin   Does not apply Continuous PRN Rocio Jaimes APRN CNP         PRN Meds:.  Current Facility-Administered Medications   Medication Dose Route Frequency Provider Last Rate Last Admin    acetaminophen (TYLENOL) tablet 650 mg  650 mg Oral Q4H PRN Nabila Gates CNP        albumin human 5 % injection 12.5 g  12.5 g Intravenous 4x Daily PRN Alice Shahid MD   12.5 g at 03/18/25 0430    bisacodyl (DULCOLAX) suppository 10 mg  10 mg Rectal Daily PRN Zayra Raya PA-C        calcium gluconate 1 g in 50 mL in sodium chloride intermittent infusion  1 g Intravenous Once PRN Zayra Raya PA-C   1 g at 03/17/25 2005    calcium gluconate 2 g in  mL intermittent infusion  2 g Intravenous Once PRN Zayra Raya PA-C        calcium gluconate 3 g in sodium chloride 0.9 % 100 mL intermittent infusion  3 g Intravenous Once PRN Zayra Raya PA-C        dextrose 10% infusion   Intravenous Continuous PRN Prabha Giang PA-C        glucose gel 15-30 g  15-30 g Oral Q15 Min PRN Zayra Raya Dilia, PA-C        Or     dextrose 50 % injection 25-50 mL  25-50 mL Intravenous Q15 Min PRN Zayra Raya PA-C        Or    glucagon injection 1 mg  1 mg Subcutaneous Q15 Min PRN Zayra Raya PA-C        hydrALAZINE (APRESOLINE) injection 10 mg  10 mg Intravenous Q30 Min PRN Zayra Raya PA-C   10 mg at 03/19/25 0523    HYDROmorphone (DILAUDID) injection 0.1 mg  0.1 mg Intravenous Q4H PRN Alice Shahid MD        Or    HYDROmorphone (DILAUDID) injection 0.2 mg  0.2 mg Intravenous Q4H PRN Alice Shahid MD   0.2 mg at 03/20/25 0546    lactated ringers BOLUS 250 mL  250 mL Intravenous Q15 Min PRN Zayra Raya PA-C   250 mL at 03/17/25 1726    lidocaine (LMX4) cream   Topical Q1H PRN Prabha Giang PA-C        lidocaine 1 % 0.1-5 mL  0.1-5 mL Other Q1H PRN Prabha Giang PA-C        magnesium hydroxide (MILK OF MAGNESIA) suspension 30 mL  30 mL Oral Daily PRN Zayra Raya PA-C        Med Instruction - Transition from IV Insulin Infusion to Sub-Q Insulin   Does not apply Continuous PRN Rocio Jaimes APRN CNP        naloxone (NARCAN) injection 0.2 mg  0.2 mg Intravenous Q2 Min PRN John Sen MD        Or    naloxone (NARCAN) injection 0.4 mg  0.4 mg Intravenous Q2 Min PRN John Sen MD        Or    naloxone (NARCAN) injection 0.2 mg  0.2 mg Intramuscular Q2 Min PRN John Sen MD        Or    naloxone (NARCAN) injection 0.4 mg  0.4 mg Intramuscular Q2 Min PRN John Sen MD        ondansetron (ZOFRAN ODT) ODT tab 4 mg  4 mg Oral Q6H PRN Zayra Raya PA-C        Or    ondansetron (ZOFRAN) injection 4 mg  4 mg Intravenous Q6H PRN Zayra Raya PA-C   4 mg at 03/18/25 2013    oxyCODONE IR (ROXICODONE) half-tab 2.5 mg  2.5 mg Oral Q4H PRN Zayra Raya PA-C        Or    oxyCODONE (ROXICODONE) tablet 5 mg  5 mg Oral Q4H PRN Zayra Raya PA-C   5 mg at 03/20/25 0737    prochlorperazine (COMPAZINE) injection 5 mg   5 mg Intravenous Q6H PRN Zayra Raya PA-C   5 mg at 03/18/25 2136    Or    prochlorperazine (COMPAZINE) tablet 5 mg  5 mg Oral Q6H PRN Zayra Raya PA-C        sodium chloride (PF) 0.9% PF flush 10-40 mL  10-40 mL Intracatheter Once PRN Prabha Giang PA-C           Cardiographics:    Telemetry monitoring demonstrates sinus rhythm with rates in the 80s per my personal review.    Imaging:  Results for orders placed or performed during the hospital encounter of 03/17/25   XR Chest Port 1 View    Impression    IMPRESSION: Endotracheal tube terminates 3.5 cm above the hakan. Right heart catheter terminates over the central right pulmonary artery. Bilateral chest tubes and mediastinal drains. Clip versus balloon pump marker at the level of the AP window. Lung   volumes are low with bibasilar discoid atelectasis. No CHF or pneumothorax. No acute bony abnormalities..   XR Chest Port 1 View    Impression    IMPRESSION:   1.  Bilateral pleural-based drains with trace pleural effusions and no discernible pneumothorax.  2.  Minimal bibasilar atelectasis, otherwise clear lungs.  3.  Cardiomegaly with likely CABG related surgical clips.  4.  Right IJ Lufkin-Toni catheter tip in proximal right pulmonary artery.       Labs, personally reviewed.  Hemoglobin   Date Value Ref Range Status   03/19/2025 10.9 (L) 13.3 - 17.7 g/dL Final   03/18/2025 10.0 (L) 13.3 - 17.7 g/dL Final   03/18/2025 9.3 (L) 13.3 - 17.7 g/dL Final     WBC Count   Date Value Ref Range Status   03/19/2025 18.5 (H) 4.0 - 11.0 10e3/uL Final   03/18/2025 16.2 (H) 4.0 - 11.0 10e3/uL Final   03/18/2025 11.2 (H) 4.0 - 11.0 10e3/uL Final     Platelet Count   Date Value Ref Range Status   03/19/2025 109 (L) 150 - 450 10e3/uL Final   03/18/2025 96 (L) 150 - 450 10e3/uL Final   03/18/2025 95 (L) 150 - 450 10e3/uL Final     Creatinine   Date Value Ref Range Status   03/20/2025 2.78 (H) 0.67 - 1.17 mg/dL Final   03/19/2025 2.69 (H) 0.67 - 1.17  mg/dL Final   03/19/2025 2.67 (H) 0.67 - 1.17 mg/dL Final     Potassium   Date Value Ref Range Status   03/20/2025 4.8 3.4 - 5.3 mmol/L Final   03/19/2025 4.9 3.4 - 5.3 mmol/L Final   03/19/2025 5.0 3.4 - 5.3 mmol/L Final   04/25/2022 4.5 3.5 - 5.0 mmol/L Final   02/01/2021 4.8 3.5 - 5.0 mmol/L Final   12/13/2019 5.0 3.5 - 5.0 mmol/L Final     Potassium POCT   Date Value Ref Range Status   03/17/2025 4.0 3.4 - 5.3 mmol/L Final   03/17/2025 3.9 3.4 - 5.3 mmol/L Final   03/17/2025 4.3 3.4 - 5.3 mmol/L Final     Magnesium   Date Value Ref Range Status   03/19/2025 2.3 1.7 - 2.3 mg/dL Final   03/18/2025 2.3 1.7 - 2.3 mg/dL Final   03/17/2025 2.8 (H) 1.7 - 2.3 mg/dL Final          I/O:  I/O last 3 completed shifts:  In: 328.01 [P.O.:270; I.V.:58.01]  Out: 1240 [Urine:775; Chest Tube:465]       Physical Exam:    General: Patient seen up in chair conversant/pleasant and NAD  HEENT: NICK, no sclera icterus, moist mucosa, nasal cannula in place  CV: RRR on monitor. 2+ peripheral pulses in all extremities. Mild edema.   Pulm: Non-labored effort on nasal cannula. Chest tubes in place, no air leak. Incision C/D/I.  Abd: Soft, NT, ND  : Riojas with lisa urine  Ext: Mild pedal edema, SCDs in place, warm, distal pulses intact, Femstop in place after IABP removal  Neuro: A&Ox3, COPELAND, and CN grossly intact      ASSESSMENT/PLAN:    Eric Cordoba is a 74 year old male with a history of CAD, LV mural thrombus, CKD 3b, DM2, HTN, h/o CVA, and ICM who is s/p CABG x4 , LLE EVH, and preop IABP placement.    Principal Problem:    Coronary artery disease involving native coronary artery of native heart, unspecified whether angina present  Active Problems:    CAD (coronary artery disease)    Coronary artery disease of native artery of native heart with stable angina pectoris      NEURO:   Acute postoperative pain  H/o CVA, PTA issue  - Scheduled Tylenol/lidocaine patches and PRN Tylenol/oxycodone for pain  - PTA gabapentin    CV:    HTN//HLD, PTA issues  CAD // ICM s/p CABG  Preop IABP, resolved.  - Pre-op EF 35-40%  - Normotensive  - Patient off pressors recently.  - IABP removed POD#1, see procedure note.    - Coreg 6.25 mg BID (PTA HF regimen)   - ASA 81 mg  - Plavix 75 mg qday to be maintained x1 year postop per surgeon preference for graft patency. ASA should be 81 mg for this duration. When plavix is stopped, ASA should be increased to 162 mg qday indefinitely.    -  Simvastatin 40mg daily  - Chest tubes/TPW to remain today  - New baseline echo after CT/TPW removed and prior to discharge.  - Consider HF follow up  - Hold PTA losartan given renal function    PULM:   Postoperative need for supplemental O2  - Extubated POD#0  - Maintaining oxygen saturations on nasal cannula - wean as able  - Encourage pulmonary toilet    FEN/GI:  - Diet: Clears, ADAT to Cardiac - NPO yesterday with DKA, advanced to clears this AM  - Continue electrolyte replacement protocol  - Bowel regimen, LBM preop    RENAL:  JESS on CKD3b, PTA issue  - borderline UOP/hr. Continue to monitor closely.  - Cr this AM pending PICC placement (2.78) (baseline ~ 1.8-2.0)  - Riojas to remain in for close monitoring of I/O given low UOP. Reevaluate for removal daily  - Will give concentrated albumin and bumex 1 mg once to balance heart failure and hypervolemia with need for hydration in s/o recent DKA and JESS     HEME:  Acute blood loss anemia post-op  LV mural thrombus, PTA issue  - Hold PTA eliquis (LV thrombus) for now although not visualized on cardiac MR 2/11/2025 or intra-op MANUEL so will discuss with surgeon   - Hgb stable, no bleeding concerns. Hep SQ, ASA    ID:  Reactive leukocytosis  - Shanae op ppx complete, afebrile. No concerns for infection  - WBC 18.5 yesterday AM, likely reactive. No evidence for infection at this time and DKA resolved. Recheck this AM pending PICC placement    ENDO:   DM2  Diabetic Ketoacidosis, resolved.  - HbA1c 8.7%  - BG goal < 180 to promote  optimal healing  - PTA on lantus 50u qPM and mounjaro 5mg q7 days - held  - Critical care consult for DKA, signed off.   - Initial blood ketones 3.27, lactic 6.0 -- blood ketones and lactic now WNL   - Transitioned off insulin gtt per ICU on 3/19 PM with high dose SSI   - [facility] consult this AM for assistance with DM management s/p DKA.   - Gave 20u lantus this AM to cover until [facility] sees. Q4 high dose SSI ordered per ICU - will monitor closely and adjust PRN until [facility] sees.    Ppx / MISC:   - DVT: SCDs, SQ heparin TID, ambulation   - GI: Protonix 40mg PO daily  - LINES: PICC placed 3/20 given phlebotomy unable to draw blood and patient refusing additional attempts    DISPO:   - Continue critical care in ICU for today  - PT/OT recs at discharge: TCU  - Medically Ready for Discharge: Anticipated in 5+ Days        Patient discussed with Dr. Silva and ICU. Will discuss with Dr. Shahid when available.        Audra Giang PA-C  [facility] Cardiothoracic Surgery  Vocera or Secure Chat  March 20, 2025

## 2025-03-20 NOTE — PROGRESS NOTES
SPIRITUAL HEALTH SERVICES Note     Saw pt Eric Cordoba and administered Shinto sacrament of anointing for the healing of the sick.    Fr. Gagandeep Cedeno

## 2025-03-20 NOTE — PLAN OF CARE
Problem: Adult Inpatient Plan of Care  Goal: Plan of Care Review  Description: The Plan of Care Review/Shift note should be completed every shift.  The Outcome Evaluation is a brief statement about your assessment that the patient is improving, declining, or no change.  This information will be displayed automatically on your shift  note.  3/20/2025 1656 by Vu Dye RN  Outcome: Progressing  3/20/2025 1656 by uV Dye RN  Outcome: Progressing   Goal Outcome Evaluation:       [facility] - ICU    RN Progress Note:            Pertinent Assessments:      Please refer to flowsheet rows for full assessment     Confused but cooperative with cares. C/o incisional chest pain PRN oxycodone was given with effective result. Poor appetite. Chest tube patent with dark red drainage and grajeda with good urine out put.            Key Events - This Shift:       Continue on 1:1 for safety. Friend at bedside attempted to feed him but very poor appetite.      RN Managed Protocols Ordered:  Yes  Protocols:Potassium, Magnesium, and Phosphorus  PRN'S:iCal  Protocols Status: In Progress                Barriers to Discharge / Downgrade:     Per provider.

## 2025-03-20 NOTE — CONSULTS
[facility]  Consult Note - Hospitalist Service  Date of Admission:  3/17/2025  Consult Requested by: Prabha Gaing PA-C   Reason for Consult: s/p CABG. DKA yesterday. Assistance with DM management now that out of DKA;     Assessment & Plan   Eric Cordoba is a 74 year old male admitted on 3/17/2025. He was admitted for elective CABG on 03/17/2025. Pmhx is significant for CKD stage III, type 2 diabetes mellitus, history of CVA, hypertension, HFrEF, left ventricular apical thrombus.  Patient had been in ICU recovering from procedure since 3/17.  Postoperative course was complicated by development of DKA and JESS.  [facility] consulted to assist with management of medical comorbidities.    Type 2 diabetes mellitus, poorly controlled  DKA  - s/p DKA protocol.  Anion gap has closed and patient started on clear liquid diet  -Did receive 20 units of Lantus subcutaneous this morning  -High intensity insulin sliding scale  -Accu-Cheks  -Hypoglycemia protocol  -Recent A1c is 8.7  -His home regimen includes Lantus 50 units subcutaneous daily and Mounjaro once weekly    JESS on CKD  -Baseline creatinine appears to be around 1.8.  Creatinine slightly trending up to 2.8  -Avoid nephrotoxic medication  -Diuretics per CV surgery team  -Keep Riojas catheter for now  -Strict input and output, monitor BMP    CAD s/p CABG on 03/17  Postop management per CV surgery team  Chest tube management per CV surgery team  Currently tolerating clear liquid diet           Clinically Significant Risk Factors          # Hyperchloremia: Highest Cl = 109 mmol/L in last 2 days, will monitor as appropriate      # Hypocalcemia: Lowest Ca = 8.3 mg/dL in last 2 days, will monitor and replace as appropriate    # Anion Gap Metabolic Acidosis: Highest Anion Gap = 27 mmol/L in last 2 days, will monitor and treat as appropriate    # Thrombocytopenia: Lowest platelets = 96 in last 2 days, will monitor for bleeding  # Acute Kidney Injury,  "unspecified: based on a >150% or 0.3 mg/dL increase in last creatinine compared to past 90 day average, will monitor renal function            # DMII: A1C = 8.7 % (Ref range: <5.7 %) within past 6 months, PRESENT ON ADMISSION  # Obesity: Estimated body mass index is 32.17 kg/m  as calculated from the following:    Height as of 2/24/25: 1.753 m (5' 9\").    Weight as of this encounter: 98.8 kg (217 lb 13 oz)., PRESENT ON ADMISSION      # History of CABG: noted on surgical history       Kev Dejesus MD  Hospitalist Service  Securely message with [employer] (more info)  Text page via [facility] Paging/Directory   ______________________________________________________________________    Past Medical History    Past Medical History:   Diagnosis Date    Diabetes mellitus, type 2 (H)     History of CVA (cerebrovascular accident)     Hypertension     Nutritional and metabolic cardiomyopathies (H)        Past Surgical History   Past Surgical History:   Procedure Laterality Date    CORONARY ARTERY BYPASS GRAFT, WITH ENDOSCOPIC VESSEL PROCUREMENT N/A 3/17/2025    Procedure: CORONARY ARTERY BYPASS GRAFT TIMES FOUR, LEFT INTERNAL MAMMARY ARTERY HARVEST, LEFT LEG ENDOSCOPIC SAPHENOUS VEIN PROCUREMENT,;  Surgeon: lAice Shahid MD;  Location: [facility] OR    CV CORONARY ANGIOGRAM N/A 2/24/2025    Procedure: Coronary Angiogram;  Surgeon: Bautista Durand MD;  Location: [facility] CATH LAB CV    CV INTRA AORTIC BALLOON N/A 3/17/2025    Procedure: Intra aortic Balloon Pump Insertion;  Surgeon: Bautista Durand MD;  Location: [facility] CATH LAB CV    CV LEFT HEART CATH N/A 2/24/2025    Procedure: Left Heart Catheterization;  Surgeon: Bautista Durand MD;  Location: [facility] LAB CV    TRANSESOPHAGEAL ECHOCARDIOGRAM INTRAOPERATIVE  3/17/2025    Procedure: EPIAORTIC ULTRASOUND, ANESTHESIA TRANSESOPHAGEAL ECHOCARDIOGRAM;  Surgeon: Alice Shahid MD;  Location: [facility] OR       Medications   I have reviewed this patient's " current medications       Review of Systems    The 10 point Review of Systems is negative other than noted in the HPI or here.      Physical Exam   Vital Signs: Temp: 98.5  F (36.9  C) Temp src: Oral BP: (!) 153/69 Pulse: 80   Resp: 11 SpO2: 99 % O2 Device: Nasal cannula Oxygen Delivery: 2 LPM  Weight: 217 lbs 13.03 oz    General Appearance: Acutely sick looking  Respiratory: Good air entry bilaterally, surgical dressing on anterior chest  Cardiovascular: S1 and S2 heard  GI: Soft abdomen, no tenderness, normoactive bowel sounds  Skin: Intact and warm      Medical Decision Making       40 MINUTES SPENT BY ME on the date of service doing chart review, history, exam, documentation & further activities per the note.      Data

## 2025-03-20 NOTE — PROCEDURES
"PICC Line Insertion Procedure Note  Pt. Name: Eric Cordoba  MRN:        [id number]    Procedure: Insertion of a  dual Lumen  5 fr  Bard SOLO (valved) Power PICC, Lot number [id number]    Indications: Vascular access    Contraindications: None    Procedure Details     Patient identified with 2 identifiers and \"Time Out\" conducted.  .     Central line insertion bundle followed: hand hygiene performed prior to procedure, site cleansed with cholraprep, hat, mask, sterile gloves, sterile gown worn, patient draped with maximum barrier head to toe drape, sterile field maintained.    The vein was assessed and found to be compressible and of adequate size.     Lidocaine 1% 2 ml administered sq to the insertion site. A 5 Fr PICC was inserted into the basilic vein of the right arm with ultrasound guidance. 1 attempt(s) required to access vein.   Catheter threaded without difficulty. Good blood return noted.    Modified Seldinger Technique used for insertion.    The 8 sharps that are included in the PICC insertion kit were accounted for and disposed of in the sharps container prior to breakdown of the sterile field.    Catheter secured with Securecath, biopatch and Tegaderm dressing applied.    Findings:    Total catheter length  42 cm, with 2 cm exposed. Mid upper arm circumference is 29 cm. Catheter was flushed with 30 cc NS. Patient  tolerated procedure well.    Tip placement verified by 3CG technology. Tip placement in the SVC/RA junction.    CLABSI prevention brochure left at bedside.    Patient's primary RN notified PICC is ready for use.      Comments:    This patient's catheter is secured with SecurAcath instead of a traditional suture or adhesive based securement. This is a subcutaneous anchor securement system (aka RODERICK or SecurAcath) that holds the catheter just below the skin with nitinol feet and a friction fit  around the catheter.  It can stay with the catheter until the catheter is removed.    Do not open, " "change or remove the SecurAcath.  SecurAcath may be disinfected during a dressing change, but do not open or remove it.  SecurAcath acts like a hinge - lift, clean 360 degrees around, let dry and apply new dressing.  SecurAcath is compatible with all dressings and antiseptic agents.  Ensure the wings of the catheter and SecurAcath are completely under the boarder of the dressing.  SecurAcath is MRI safe to 3T, see IFU for details.  A Statlock is not necessary when SecurAcath is present.   Patients can go to MRI with SecurAcath device in place  When the catheter is indicated for removal, enter \"Nursing to Consult for Vascular Access Care\" order in mVisum, watch the removal video on SharePoint, or call for training if you have not removed before. 24 hour SecurAcath Clinical Education and Support  [phone]         Deejay Goode PICC RN  Vascular Access - [facility]      "

## 2025-03-21 ENCOUNTER — APPOINTMENT (OUTPATIENT)
Dept: SPEECH THERAPY | Facility: HOSPITAL | Age: 75
DRG: 235 | End: 2025-03-21
Attending: INTERNAL MEDICINE
Payer: COMMERCIAL

## 2025-03-21 ENCOUNTER — APPOINTMENT (OUTPATIENT)
Dept: OCCUPATIONAL THERAPY | Facility: HOSPITAL | Age: 75
DRG: 235 | End: 2025-03-21
Attending: INTERNAL MEDICINE
Payer: COMMERCIAL

## 2025-03-21 ENCOUNTER — APPOINTMENT (OUTPATIENT)
Dept: RADIOLOGY | Facility: HOSPITAL | Age: 75
DRG: 235 | End: 2025-03-21
Payer: COMMERCIAL

## 2025-03-21 ENCOUNTER — APPOINTMENT (OUTPATIENT)
Dept: PHYSICAL THERAPY | Facility: HOSPITAL | Age: 75
DRG: 235 | End: 2025-03-21
Attending: INTERNAL MEDICINE
Payer: COMMERCIAL

## 2025-03-21 VITALS
WEIGHT: 217.81 LBS | SYSTOLIC BLOOD PRESSURE: 124 MMHG | BODY MASS INDEX: 32.17 KG/M2 | RESPIRATION RATE: 10 BRPM | DIASTOLIC BLOOD PRESSURE: 58 MMHG | OXYGEN SATURATION: 93 % | HEART RATE: 84 BPM | TEMPERATURE: 98.2 F

## 2025-03-21 LAB
ANION GAP SERPL CALCULATED.3IONS-SCNC: 11 MMOL/L (ref 7–15)
ANION GAP SERPL CALCULATED.3IONS-SCNC: 9 MMOL/L (ref 7–15)
BUN SERPL-MCNC: 63.4 MG/DL (ref 8–23)
BUN SERPL-MCNC: 64.3 MG/DL (ref 8–23)
CA-I BLD-MCNC: 4.9 MG/DL (ref 4.4–5.2)
CALCIUM SERPL-MCNC: 8.3 MG/DL (ref 8.8–10.4)
CALCIUM SERPL-MCNC: 8.6 MG/DL (ref 8.8–10.4)
CHLORIDE SERPL-SCNC: 108 MMOL/L (ref 98–107)
CHLORIDE SERPL-SCNC: 110 MMOL/L (ref 98–107)
CREAT SERPL-MCNC: 2.25 MG/DL (ref 0.67–1.17)
CREAT SERPL-MCNC: 2.6 MG/DL (ref 0.67–1.17)
EGFRCR SERPLBLD CKD-EPI 2021: 25 ML/MIN/1.73M2
EGFRCR SERPLBLD CKD-EPI 2021: 30 ML/MIN/1.73M2
ERYTHROCYTE [DISTWIDTH] IN BLOOD BY AUTOMATED COUNT: 13 % (ref 10–15)
GLUCOSE BLDC GLUCOMTR-MCNC: 109 MG/DL (ref 70–99)
GLUCOSE BLDC GLUCOMTR-MCNC: 125 MG/DL (ref 70–99)
GLUCOSE BLDC GLUCOMTR-MCNC: 144 MG/DL (ref 70–99)
GLUCOSE BLDC GLUCOMTR-MCNC: 145 MG/DL (ref 70–99)
GLUCOSE BLDC GLUCOMTR-MCNC: 169 MG/DL (ref 70–99)
GLUCOSE BLDC GLUCOMTR-MCNC: 169 MG/DL (ref 70–99)
GLUCOSE BLDC GLUCOMTR-MCNC: 180 MG/DL (ref 70–99)
GLUCOSE BLDC GLUCOMTR-MCNC: 186 MG/DL (ref 70–99)
GLUCOSE BLDC GLUCOMTR-MCNC: 202 MG/DL (ref 70–99)
GLUCOSE BLDC GLUCOMTR-MCNC: 214 MG/DL (ref 70–99)
GLUCOSE BLDC GLUCOMTR-MCNC: 217 MG/DL (ref 70–99)
GLUCOSE BLDC GLUCOMTR-MCNC: 225 MG/DL (ref 70–99)
GLUCOSE BLDC GLUCOMTR-MCNC: 250 MG/DL (ref 70–99)
GLUCOSE SERPL-MCNC: 217 MG/DL (ref 70–99)
GLUCOSE SERPL-MCNC: 231 MG/DL (ref 70–99)
HCO3 SERPL-SCNC: 23 MMOL/L (ref 22–29)
HCO3 SERPL-SCNC: 24 MMOL/L (ref 22–29)
HCT VFR BLD AUTO: 29.3 % (ref 40–53)
HGB BLD-MCNC: 9.9 G/DL (ref 13.3–17.7)
MAGNESIUM SERPL-MCNC: 2.3 MG/DL (ref 1.7–2.3)
MCH RBC QN AUTO: 32.1 PG (ref 26.5–33)
MCHC RBC AUTO-ENTMCNC: 33.8 G/DL (ref 31.5–36.5)
MCV RBC AUTO: 95 FL (ref 78–100)
PHOSPHATE SERPL-MCNC: 3.1 MG/DL (ref 2.5–4.5)
PLATELET # BLD AUTO: 137 10E3/UL (ref 150–450)
POTASSIUM SERPL-SCNC: 4.1 MMOL/L (ref 3.4–5.3)
POTASSIUM SERPL-SCNC: 4.3 MMOL/L (ref 3.4–5.3)
RBC # BLD AUTO: 3.08 10E6/UL (ref 4.4–5.9)
SODIUM SERPL-SCNC: 142 MMOL/L (ref 135–145)
SODIUM SERPL-SCNC: 143 MMOL/L (ref 135–145)
WBC # BLD AUTO: 12.8 10E3/UL (ref 4–11)

## 2025-03-21 PROCEDURE — 82330 ASSAY OF CALCIUM: CPT | Performed by: PHYSICIAN ASSISTANT

## 2025-03-21 PROCEDURE — 250N000009 HC RX 250

## 2025-03-21 PROCEDURE — 200N000001 HC R&B ICU

## 2025-03-21 PROCEDURE — 97535 SELF CARE MNGMENT TRAINING: CPT | Mod: GO

## 2025-03-21 PROCEDURE — 83735 ASSAY OF MAGNESIUM: CPT

## 2025-03-21 PROCEDURE — 97530 THERAPEUTIC ACTIVITIES: CPT | Mod: GP

## 2025-03-21 PROCEDURE — 71045 X-RAY EXAM CHEST 1 VIEW: CPT | Mod: 76

## 2025-03-21 PROCEDURE — 999N000157 HC STATISTIC RCP TIME EA 10 MIN

## 2025-03-21 PROCEDURE — 92526 ORAL FUNCTION THERAPY: CPT | Mod: GN

## 2025-03-21 PROCEDURE — 250N000011 HC RX IP 250 OP 636: Performed by: PHYSICIAN ASSISTANT

## 2025-03-21 PROCEDURE — 94799 UNLISTED PULMONARY SVC/PX: CPT

## 2025-03-21 PROCEDURE — 250N000013 HC RX MED GY IP 250 OP 250 PS 637

## 2025-03-21 PROCEDURE — 250N000012 HC RX MED GY IP 250 OP 636 PS 637: Performed by: STUDENT IN AN ORGANIZED HEALTH CARE EDUCATION/TRAINING PROGRAM

## 2025-03-21 PROCEDURE — 250N000013 HC RX MED GY IP 250 OP 250 PS 637: Performed by: PHYSICIAN ASSISTANT

## 2025-03-21 PROCEDURE — 80048 BASIC METABOLIC PNL TOTAL CA: CPT

## 2025-03-21 PROCEDURE — 84100 ASSAY OF PHOSPHORUS: CPT

## 2025-03-21 PROCEDURE — 85027 COMPLETE CBC AUTOMATED: CPT

## 2025-03-21 PROCEDURE — 250N000013 HC RX MED GY IP 250 OP 250 PS 637: Performed by: INTERNAL MEDICINE

## 2025-03-21 PROCEDURE — 99233 SBSQ HOSP IP/OBS HIGH 50: CPT | Performed by: STUDENT IN AN ORGANIZED HEALTH CARE EDUCATION/TRAINING PROGRAM

## 2025-03-21 PROCEDURE — 71045 X-RAY EXAM CHEST 1 VIEW: CPT

## 2025-03-21 PROCEDURE — 250N000013 HC RX MED GY IP 250 OP 250 PS 637: Performed by: NURSE PRACTITIONER

## 2025-03-21 RX ORDER — DEXTROSE MONOHYDRATE 100 MG/ML
INJECTION, SOLUTION INTRAVENOUS CONTINUOUS PRN
Status: DISCONTINUED | OUTPATIENT
Start: 2025-03-21 | End: 2025-03-26 | Stop reason: HOSPADM

## 2025-03-21 RX ORDER — CARVEDILOL 12.5 MG/1
12.5 TABLET ORAL ONCE
Status: COMPLETED | OUTPATIENT
Start: 2025-03-21 | End: 2025-03-21

## 2025-03-21 RX ORDER — CARVEDILOL 12.5 MG/1
25 TABLET ORAL 2 TIMES DAILY
Status: DISCONTINUED | OUTPATIENT
Start: 2025-03-21 | End: 2025-03-24

## 2025-03-21 RX ADMIN — GABAPENTIN 600 MG: 300 CAPSULE ORAL at 08:17

## 2025-03-21 RX ADMIN — HYDRALAZINE HYDROCHLORIDE 10 MG: 20 INJECTION INTRAMUSCULAR; INTRAVENOUS at 08:21

## 2025-03-21 RX ADMIN — OXYCODONE HYDROCHLORIDE 5 MG: 5 TABLET ORAL at 01:16

## 2025-03-21 RX ADMIN — ACETAMINOPHEN 975 MG: 325 TABLET ORAL at 22:07

## 2025-03-21 RX ADMIN — INSULIN GLARGINE 30 UNITS: 100 INJECTION, SOLUTION SUBCUTANEOUS at 08:59

## 2025-03-21 RX ADMIN — OXYCODONE HYDROCHLORIDE 5 MG: 5 TABLET ORAL at 08:17

## 2025-03-21 RX ADMIN — CLOPIDOGREL BISULFATE 75 MG: 75 TABLET, FILM COATED ORAL at 08:16

## 2025-03-21 RX ADMIN — POLYETHYLENE GLYCOL 3350 17 G: 17 POWDER, FOR SOLUTION ORAL at 08:18

## 2025-03-21 RX ADMIN — SENNOSIDES AND DOCUSATE SODIUM 1 TABLET: 50; 8.6 TABLET ORAL at 08:17

## 2025-03-21 RX ADMIN — HEPARIN SODIUM 5000 UNITS: 5000 INJECTION, SOLUTION INTRAVENOUS; SUBCUTANEOUS at 13:10

## 2025-03-21 RX ADMIN — PANTOPRAZOLE SODIUM 40 MG: 40 TABLET, DELAYED RELEASE ORAL at 08:17

## 2025-03-21 RX ADMIN — SENNOSIDES AND DOCUSATE SODIUM 1 TABLET: 50; 8.6 TABLET ORAL at 21:11

## 2025-03-21 RX ADMIN — HEPARIN SODIUM 5000 UNITS: 5000 INJECTION, SOLUTION INTRAVENOUS; SUBCUTANEOUS at 22:06

## 2025-03-21 RX ADMIN — ACETAMINOPHEN 650 MG: 325 TABLET ORAL at 03:09

## 2025-03-21 RX ADMIN — CARVEDILOL 12.5 MG: 12.5 TABLET, FILM COATED ORAL at 09:46

## 2025-03-21 RX ADMIN — INSULIN HUMAN 2 UNITS/HR: 1 INJECTION, SOLUTION INTRAVENOUS at 14:33

## 2025-03-21 RX ADMIN — HYDRALAZINE HYDROCHLORIDE 10 MG: 20 INJECTION INTRAMUSCULAR; INTRAVENOUS at 04:30

## 2025-03-21 RX ADMIN — CARVEDILOL 12.5 MG: 12.5 TABLET, FILM COATED ORAL at 08:17

## 2025-03-21 RX ADMIN — ONDANSETRON 4 MG: 2 INJECTION, SOLUTION INTRAMUSCULAR; INTRAVENOUS at 08:21

## 2025-03-21 RX ADMIN — ASPIRIN 81 MG CHEWABLE TABLET 81 MG: 81 TABLET CHEWABLE at 08:17

## 2025-03-21 RX ADMIN — CARVEDILOL 25 MG: 12.5 TABLET, FILM COATED ORAL at 21:11

## 2025-03-21 RX ADMIN — ACETAMINOPHEN 975 MG: 325 TABLET ORAL at 13:11

## 2025-03-21 RX ADMIN — HYDRALAZINE HYDROCHLORIDE 10 MG: 20 INJECTION INTRAMUSCULAR; INTRAVENOUS at 06:14

## 2025-03-21 RX ADMIN — ACETAMINOPHEN 975 MG: 325 TABLET ORAL at 05:33

## 2025-03-21 RX ADMIN — LIDOCAINE 1 PATCH: 4 PATCH TOPICAL at 22:05

## 2025-03-21 RX ADMIN — HEPARIN SODIUM 5000 UNITS: 5000 INJECTION, SOLUTION INTRAVENOUS; SUBCUTANEOUS at 05:34

## 2025-03-21 RX ADMIN — SIMVASTATIN 40 MG: 10 TABLET, FILM COATED ORAL at 08:16

## 2025-03-21 RX ADMIN — PROCHLORPERAZINE EDISYLATE 5 MG: 5 INJECTION INTRAMUSCULAR; INTRAVENOUS at 09:12

## 2025-03-21 ASSESSMENT — ACTIVITIES OF DAILY LIVING (ADL)
ADLS_ACUITY_SCORE: 80
ADLS_ACUITY_SCORE: 86
ADLS_ACUITY_SCORE: 80
ADLS_ACUITY_SCORE: 80
ADLS_ACUITY_SCORE: 86
ADLS_ACUITY_SCORE: 86
ADLS_ACUITY_SCORE: 80
ADLS_ACUITY_SCORE: 86
ADLS_ACUITY_SCORE: 86
ADLS_ACUITY_SCORE: 80
ADLS_ACUITY_SCORE: 80
ADLS_ACUITY_SCORE: 86
ADLS_ACUITY_SCORE: 80
ADLS_ACUITY_SCORE: 86

## 2025-03-21 NOTE — PROGRESS NOTES
Care Management Follow Up    Length of Stay (days): 4    Expected Discharge Date: 03/25/2025      PT Recommendations: Transitional Care Facility  OT Recommendations:  Transitional Care Facility     Barriers to Discharge: medical stability, post procedure care and monitoring,  mentation, IV medications     Prior Living Situation:  Patient reports he lives in his house with his friend Veronique. He is independent with ADLs/IADLs, ambulates with a cane and has no services in community. He states Veronique is primary contact. Transportation at discharge TBD.      Discussed  Partnership in Safe Discharge Planning  document with patient/family: No      Handoff Completed: No, handoff not indicated or clinically appropriate     Patient/Spokesperson Updated: 3/18/25     Additional Information:  Medical:  Patient admitted for elective CABG on 03/17/2025. Pmhx is significant for CKD stage III, type 2 diabetes mellitus, history of CVA, hypertension, HFrEF, left ventricular apical thrombus.  Patient had been in ICU recovering from procedure since 3/17.  Postoperative course was complicated by development of DKA and JESS.      CT Surgery following        3/21/25:  Patient is not medically stable to initiate discharge planning.    Monitoring for improvement in mentation, ability to participate with therapy post chest tube removal.            Next Steps:   Follow for progression and recommendations.      Lien Tapia RN

## 2025-03-21 NOTE — PROGRESS NOTES
CLINICAL NUTRITION SERVICES NOTE    Reviewed chart.  Diet: 45 gm cho, Low Sat Fat Ma < 2400 mg  Intake: Poor  FSB, 202, 250  Receives sliding scale insulin, Lantus 30 units before breakfast.  Intervention: Glucerna at 2 PM for pt to try.

## 2025-03-21 NOTE — PROGRESS NOTES
CVTS BRIEF PROGRESS NOTE    Ventricular TPW pulled without sustained ectopy or arrhythmia.    After a brief period of observation and no sudden increase in chest tube output or change in output character,  mediastinal, left pleural, and right pleural chest tubes pulled without immediate complication.    - Occlusive dressing must remain in place for 24 hrs, reinforce prn.   - After 24 hours, dressing should be removed and incision sites should be left open to air.   - Ok to cover with bandaid if draining.  - Post-pull CXR ordered, will follow for result    Audra Giang PA-C  [facility] Cardiothoracic Surgery  Vocera or Secure Chat  March 21, 2025

## 2025-03-21 NOTE — PLAN OF CARE
Goal Outcome Evaluation:    [facility] - ICU    RN Progress Note:            Pertinent Assessments:      Please refer to flowsheet rows for full assessment     On chart           Key Events - This Shift:       Patient is in intermittent confusion, still I pain, oxycodone given PRN, Hydralazine given for BP,      RN Managed Protocols Ordered:  Yes  Protocols:Potassium, Magnesium, and Phosphorus  PRN'S:iCal  Protocols Status: Endorsed to Oncoming RN                Barriers to Discharge / Downgrade:     Might be downgraded                 Plan of Care Reviewed With: patient    Overall Patient Progress: improvingOverall Patient Progress: improving    Outcome Evaluation: VSS, Oxycodone given for ain , BS stable,      Problem: Adult Inpatient Plan of Care  Goal: Plan of Care Review  Description: The Plan of Care Review/Shift note should be completed every shift.  The Outcome Evaluation is a brief statement about your assessment that the patient is improving, declining, or no change.  This information will be displayed automatically on your shift  note.  3/21/2025 0746 by Mireille Ramirez RN  Outcome: Progressing  3/21/2025 0745 by Mireille Ramirez RN  Outcome: Progressing  Flowsheets (Taken 3/21/2025 0745)  Plan of Care Reviewed With: patient  Overall Patient Progress: improving  3/21/2025 0743 by Mireille Ramirez RN  Outcome: Progressing  Flowsheets (Taken 3/21/2025 0743)  Outcome Evaluation: VSS, Oxycodone given for ain , BS stable,  Plan of Care Reviewed With: patient  Overall Patient Progress: improving  3/21/2025 0742 by Mireille Ramirez RN  Outcome: Progressing  3/21/2025 0742 by Mireille Ramirez RN  Flowsheets (Taken 3/21/2025 0742)  Plan of Care Reviewed With: patient  Overall Patient Progress: improving  3/21/2025 0734 by Mireille Ramirez RN  Outcome: Progressing  Flowsheets (Taken 3/21/2025 0734)  Outcome Evaluation: the patient in stable condition, VSS, on MV Vt 380, RR 16, Fio2 24%  "and peep 5, patient has good UOP, pt has coarse lung sounds  Plan of Care Reviewed With: patient  Overall Patient Progress: improving  Goal: Patient-Specific Goal (Individualized)  Description: You can add care plan individualizations to a care plan. Examples of Individualization might be:  \"Parent requests to be called daily at 9am for status\", \"I have a hard time hearing out of my right ear\", or \"Do not touch me to wake me up as it startles  me\".  3/21/2025 0746 by Mireille Ramirez RN  Outcome: Progressing  3/21/2025 0745 by Mireille Ramirez RN  Outcome: Progressing  3/21/2025 0743 by Mireille Ramirez RN  Outcome: Progressing  3/21/2025 0742 by Mireille Ramirez RN  Outcome: Progressing  3/21/2025 0734 by Mireille Ramirez RN  Outcome: Progressing  Goal: Absence of Hospital-Acquired Illness or Injury  3/21/2025 0746 by Mireille Ramirez RN  Outcome: Progressing  3/21/2025 0745 by Mireille Ramirez RN  Outcome: Progressing  3/21/2025 0743 by Mireille Ramirez RN  Outcome: Progressing  3/21/2025 0742 by Mireille Ramirez RN  Outcome: Progressing  3/21/2025 0734 by Mireille Ramirez RN  Outcome: Progressing  Intervention: Identify and Manage Fall Risk  Recent Flowsheet Documentation  Taken 3/21/2025 0000 by Mireille Ramirez RN  Safety Promotion/Fall Prevention:   activity supervised   lighting adjusted   room door open   room near nurse's station   supervised activity   safety round/check completed   room organization consistent   toileting scheduled   treat reversible contributory factors   treat underlying cause  Taken 3/20/2025 2000 by Mireille Ramirez RN  Safety Promotion/Fall Prevention:   activity supervised   lighting adjusted   room door open   room near nurse's station   supervised activity   safety round/check completed   room organization consistent   toileting scheduled   treat reversible contributory factors   treat underlying cause  Intervention: Prevent Skin Injury  Recent Flowsheet Documentation  Taken " 3/21/2025 0400 by Mireille Rmairez RN  Body Position:   turned   left  Taken 3/21/2025 0200 by Mireille Ramirez RN  Body Position:   right   turned  Taken 3/21/2025 0000 by Mireille Ramirez RN  Body Position:   turned   left  Taken 3/20/2025 2200 by Mireille Ramirez RN  Body Position:   right   turned   weight shifting  Taken 3/20/2025 2000 by Mireille Ramirez RN  Body Position:   turned   left  Intervention: Prevent and Manage VTE (Venous Thromboembolism) Risk  Recent Flowsheet Documentation  Taken 3/21/2025 0000 by Mireille Ramirez RN  VTE Prevention/Management: SCDs on (sequential compression devices)  Taken 3/20/2025 2000 by Mireille Ramirez RN  VTE Prevention/Management: SCDs on (sequential compression devices)  Goal: Optimal Comfort and Wellbeing  3/21/2025 0746 by Mireille Ramirez RN  Outcome: Progressing  3/21/2025 0745 by Mireille Ramirez RN  Outcome: Progressing  3/21/2025 0743 by Mireille Ramirez RN  Outcome: Progressing  3/21/2025 0742 by Mireille Ramirez RN  Outcome: Progressing  3/21/2025 0734 by Mireille Ramirez RN  Outcome: Progressing  Intervention: Provide Person-Centered Care  Recent Flowsheet Documentation  Taken 3/21/2025 0000 by Mireille Ramirez RN  Trust Relationship/Rapport:   care explained   choices provided   emotional support provided   empathic listening provided   questions answered   questions encouraged   reassurance provided   thoughts/feelings acknowledged  Taken 3/20/2025 2000 by Mireille Ramirez RN  Trust Relationship/Rapport:   care explained   choices provided   emotional support provided   empathic listening provided   questions answered   questions encouraged   reassurance provided   thoughts/feelings acknowledged  Goal: Readiness for Transition of Care  3/21/2025 0746 by Mireille Ramirez RN  Outcome: Progressing  3/21/2025 0745 by Mireille Ramirez RN  Outcome: Progressing  3/21/2025 0743 by Mireille Ramirez RN  Outcome: Progressing  3/21/2025 0742 by Mireille Ramirez  RN  Outcome: Progressing  3/21/2025 0734 by Mireille Ramirez, RN  Outcome: Progressing

## 2025-03-21 NOTE — PROGRESS NOTES
"CVTS Daily Progress Note   POD#4  s/p CABG x4 (LIMA>LAD, rSVG>RCA, rSVG>OM1, rSVG>diag), LLE EVH, and preop IABP placement  Attending: Kleber  LOS: 4    SUBJECTIVE/INTERVAL EVENTS:    No acute events overnight. Remains intermittently confused and agitated although improved overall, no longer requiring 1:1. Patient progressing well overall at this point. Maintaining oxygen saturations on room air. Normotensive, IABP out POD#1. Ambulating with therapy to chair. Pain well controlled. +BM. Tolerating diet without nausea although poor appetite, reports being very thirsty. UOP  improved with bumex yesterday, likely will need low dose lasix (PTA med) . Chest tube output appropriate. Hgb stable. Patient denies new chest pain, shortness of breath, abdominal pain, calf pain, nausea. All questions answered to patient's satisfaction.     OBJECTIVE:  Temp:  [97.6  F (36.4  C)-98.4  F (36.9  C)] 97.6  F (36.4  C)  Pulse:  [79-98] 98  Resp:  [7-49] 24  BP: (106-163)/(51-73) 136/66  SpO2:  [92 %-99 %] 94 %  Vitals:    03/17/25 0515 03/18/25 0620 03/19/25 0100 03/20/25 0400   Weight: 94.8 kg (209 lb) 98.8 kg (217 lb 14.4 oz) 98 kg (216 lb 0.8 oz) 98.8 kg (217 lb 13 oz)    03/21/25 0500   Weight: 94.5 kg (208 lb 5.4 oz)       Clinically Significant Risk Factors          # Hyperchloremia: Highest Cl = 110 mmol/L in last 2 days, will monitor as appropriate      # Hypocalcemia: Lowest Ca = 8.3 mg/dL in last 2 days, will monitor and replace as appropriate     # Hypoalbuminemia: Lowest albumin = 3.3 g/dL at 3/20/2025  8:58 AM, will monitor as appropriate   # Thrombocytopenia: Lowest platelets = 123 in last 2 days, will monitor for bleeding             # DMII: A1C = 8.7 % (Ref range: <5.7 %) within past 6 months   # Obesity: Estimated body mass index is 30.77 kg/m  as calculated from the following:    Height as of 2/24/25: 1.753 m (5' 9\").    Weight as of this encounter: 94.5 kg (208 lb 5.4 oz).       # History of CABG: noted on surgical " history               Current Medications:    Scheduled Meds:  Current Facility-Administered Medications   Medication Dose Route Frequency Provider Last Rate Last Admin    acetaminophen (TYLENOL) tablet 975 mg  975 mg Oral Q8H John Sen MD   975 mg at 03/21/25 0533    [Held by provider] apixaban ANTICOAGULANT (ELIQUIS) tablet 5 mg  5 mg Oral BID Zayra Raya PA-C        aspirin (ASA) chewable tablet 81 mg  81 mg Oral Daily Prabha Giang PA-C   81 mg at 03/21/25 0817    carvedilol (COREG) tablet 12.5 mg  12.5 mg Oral BID Prabha Giang PA-C   12.5 mg at 03/21/25 0817    clopidogrel (PLAVIX) tablet 75 mg  75 mg Oral Daily Prabha Giang PA-C   75 mg at 03/21/25 0816    gabapentin (NEURONTIN) capsule 300 mg  300 mg Oral At Bedtime Zayra Raya PA-C   300 mg at 03/20/25 2101    gabapentin (NEURONTIN) capsule 600 mg  600 mg Oral QAM Zayra Raya PA-C   600 mg at 03/21/25 0817    heparin ANTICOAGULANT injection 5,000 Units  5,000 Units Subcutaneous Q8H Zayra Raya PA-C   5,000 Units at 03/21/25 0534    insulin aspart (NovoLOG) injection (RAPID ACTING)  1-12 Units Subcutaneous Q4H Rocio Jaimes APRN CNP   3 Units at 03/21/25 0817    insulin glargine (LANTUS PEN) injection 30 Units  30 Units Subcutaneous QAM AC Kev Dejesus MD   30 Units at 03/21/25 0859    [Held by provider] insulin glargine (LANTUS PEN) injection 50 Units  50 Units Subcutaneous QPM Zayra Raya PA-C        Lidocaine (LIDOCARE) 4 % Patch 1-2 patch  1-2 patch Transdermal Q24H Zayra Raya PA-C   1 patch at 03/20/25 2102    pantoprazole (PROTONIX) EC tablet 40 mg  40 mg Oral QAM AC Zayra Raya PA-C   40 mg at 03/21/25 0817    polyethylene glycol (MIRALAX) Packet 17 g  17 g Oral Daily Prabha Giang PA-C   17 g at 03/21/25 0818    senna-docusate (SENOKOT-S/PERICOLACE) 8.6-50 MG per tablet 1 tablet  1 tablet Oral BID Dorota  Zayra Dobbs PA-C   1 tablet at 03/21/25 0817    simvastatin (ZOCOR) tablet 40 mg  40 mg Oral Daily Prabha Giang PA-C   40 mg at 03/21/25 0816    sodium chloride (PF) 0.9% PF flush 10-40 mL  10-40 mL Intracatheter Q7 Days Prabha Giang PA-C   10 mL at 03/20/25 0933    sodium chloride (PF) 0.9% PF flush 10-40 mL  10-40 mL Intracatheter Daily Prabha Giang PA-C   10 mL at 03/21/25 0818     Continuous Infusions:  Current Facility-Administered Medications   Medication Dose Route Frequency Provider Last Rate Last Admin     PRN Meds:.  Current Facility-Administered Medications   Medication Dose Route Frequency Provider Last Rate Last Admin    acetaminophen (TYLENOL) tablet 650 mg  650 mg Oral Q4H PRN Nabila Gates, CNP   650 mg at 03/21/25 0309    bisacodyl (DULCOLAX) suppository 10 mg  10 mg Rectal Daily PRN Zayra Raya PA-C        calcium gluconate 1 g in 50 mL in sodium chloride intermittent infusion  1 g Intravenous Once PRN Zayra Raya PA-C   1 g at 03/17/25 2005    calcium gluconate 2 g in  mL intermittent infusion  2 g Intravenous Once PRN Zayra Raya PA-C        calcium gluconate 3 g in sodium chloride 0.9 % 100 mL intermittent infusion  3 g Intravenous Once PRN Zayra Raya PA-C        glucose gel 15-30 g  15-30 g Oral Q15 Min PRN Zayra Raya PA-C        Or    dextrose 50 % injection 25-50 mL  25-50 mL Intravenous Q15 Min PRN Zayra Raya PA-C        Or    glucagon injection 1 mg  1 mg Subcutaneous Q15 Min PRN Zayra Raya PA-C        hydrALAZINE (APRESOLINE) injection 10 mg  10 mg Intravenous Q30 Min PRN Zayra Raya PA-C   10 mg at 03/21/25 0821    magnesium hydroxide (MILK OF MAGNESIA) suspension 30 mL  30 mL Oral Daily PRN Zayra Raya PA-C        naloxone (NARCAN) injection 0.2 mg  0.2 mg Intravenous Q2 Min CECILN John Sen MD        Or    naloxone (NARCAN)  injection 0.4 mg  0.4 mg Intravenous Q2 Min PRN John Sen MD        Or    naloxone (NARCAN) injection 0.2 mg  0.2 mg Intramuscular Q2 Min PRN John Sen MD        Or    naloxone (NARCAN) injection 0.4 mg  0.4 mg Intramuscular Q2 Min PRN John Sen MD        ondansetron (ZOFRAN ODT) ODT tab 4 mg  4 mg Oral Q6H PRN Zayra Raya PA-C        Or    ondansetron (ZOFRAN) injection 4 mg  4 mg Intravenous Q6H PRN Zayra Raya PA-C   4 mg at 03/21/25 0821    oxyCODONE IR (ROXICODONE) half-tab 2.5 mg  2.5 mg Oral Q4H PRN Zayra Raya PA-C        Or    oxyCODONE (ROXICODONE) tablet 5 mg  5 mg Oral Q4H PRN Zayra Raya PA-C   5 mg at 03/21/25 0817    prochlorperazine (COMPAZINE) injection 5 mg  5 mg Intravenous Q6H PRN Zayra Raya PA-C   5 mg at 03/21/25 0912    Or    prochlorperazine (COMPAZINE) tablet 5 mg  5 mg Oral Q6H PRN Zayra Raya PA-C        sodium chloride (PF) 0.9% PF flush 10-20 mL  10-20 mL Intracatheter q1 min prn Prabha Giang PA-C        sodium chloride (PF) 0.9% PF flush 10-40 mL  10-40 mL Intracatheter Once PRN Prabha Giang PA-C           Cardiographics:    Telemetry monitoring demonstrates sinus rhythm with rates in the 80-90s per my personal review.    Imaging:  Results for orders placed or performed during the hospital encounter of 03/17/25   XR Chest Port 1 View    Impression    IMPRESSION: Endotracheal tube terminates 3.5 cm above the hakan. Right heart catheter terminates over the central right pulmonary artery. Bilateral chest tubes and mediastinal drains. Clip versus balloon pump marker at the level of the AP window. Lung   volumes are low with bibasilar discoid atelectasis. No CHF or pneumothorax. No acute bony abnormalities..   XR Chest Port 1 View    Impression    IMPRESSION:   1.  Bilateral pleural-based drains with trace pleural effusions and no discernible pneumothorax.  2.  Minimal  bibasilar atelectasis, otherwise clear lungs.  3.  Cardiomegaly with likely CABG related surgical clips.  4.  Right IJ Edward-Toni catheter tip in proximal right pulmonary artery.       Labs, personally reviewed.  Hemoglobin   Date Value Ref Range Status   03/21/2025 9.9 (L) 13.3 - 17.7 g/dL Final   03/20/2025 9.9 (L) 13.3 - 17.7 g/dL Final   03/19/2025 10.9 (L) 13.3 - 17.7 g/dL Final     WBC Count   Date Value Ref Range Status   03/21/2025 12.8 (H) 4.0 - 11.0 10e3/uL Final   03/20/2025 19.8 (H) 4.0 - 11.0 10e3/uL Final   03/19/2025 18.5 (H) 4.0 - 11.0 10e3/uL Final     Platelet Count   Date Value Ref Range Status   03/21/2025 137 (L) 150 - 450 10e3/uL Final   03/20/2025 123 (L) 150 - 450 10e3/uL Final   03/19/2025 109 (L) 150 - 450 10e3/uL Final     Creatinine   Date Value Ref Range Status   03/21/2025 2.60 (H) 0.67 - 1.17 mg/dL Final   03/20/2025 2.66 (H) 0.67 - 1.17 mg/dL Final   03/20/2025 2.58 (H) 0.67 - 1.17 mg/dL Final     Potassium   Date Value Ref Range Status   03/21/2025 4.3 3.4 - 5.3 mmol/L Final   03/20/2025 4.3 3.4 - 5.3 mmol/L Final   03/20/2025 4.5 3.4 - 5.3 mmol/L Final   04/25/2022 4.5 3.5 - 5.0 mmol/L Final   02/01/2021 4.8 3.5 - 5.0 mmol/L Final   12/13/2019 5.0 3.5 - 5.0 mmol/L Final     Potassium POCT   Date Value Ref Range Status   03/17/2025 4.0 3.4 - 5.3 mmol/L Final   03/17/2025 3.9 3.4 - 5.3 mmol/L Final   03/17/2025 4.3 3.4 - 5.3 mmol/L Final     Magnesium   Date Value Ref Range Status   03/21/2025 2.3 1.7 - 2.3 mg/dL Final   03/20/2025 2.4 (H) 1.7 - 2.3 mg/dL Final   03/19/2025 2.3 1.7 - 2.3 mg/dL Final          I/O:  I/O last 3 completed shifts:  In: 340 [P.O.:240]  Out: 2570 [Urine:2240; Chest Tube:330]       Physical Exam:    General: Patient seen up in chair conversant/pleasant and NAD  HEENT: NICK, no sclera icterus, moist mucosa, no O2 device in place  CV: RRR on monitor. 2+ peripheral pulses in all extremities. Mild edema.   Pulm: Non-labored effort on room air. Chest tubes in  place, no air leak. Incision C/D/I.  Abd: Soft, NT, ND  : Riojas with lsia urine  Ext: Mild pedal edema, SCDs in place, warm, distal pulses intact, Femstop in place after IABP removal  Neuro: A&Ox3, COPELAND, and CN grossly intact      ASSESSMENT/PLAN:    Eric Cordoba is a 74 year old male with a history of CAD, LV mural thrombus, CKD 3b, DM2, HTN, h/o CVA, and ICM who is s/p CABG x4 , LLE EVH, and preop IABP placement.    Principal Problem:    Coronary artery disease involving native coronary artery of native heart, unspecified whether angina present  Active Problems:    CAD (coronary artery disease)    Coronary artery disease of native artery of native heart with stable angina pectoris      NEURO:   Acute postoperative pain  H/o CVA, PTA issue  Postoperative delirium  - Scheduled Tylenol/lidocaine patches and PRN Tylenol/oxycodone for pain  - PTA gabapentin  - Delirium precautions  - Avoid deliriogenic medications and narcotics as able    CV:   HTN//HLD, PTA issues  CAD // ICM s/p CABG  Preop IABP, resolved.  - Pre-op EF 35-40%  - IABP removed POD#1  - Coreg 25 mg, may decrease if restarting PTA losartan when renal function improves  - ASA 81 mg // Plavix 75 mg qday per surgeon preference for graft patency  -  Simvastatin 40mg daily  - Chest tubes/TPW possibly to be removed today pending output this AM  - New baseline echo after CT/TPW removed and prior to discharge.  - [facility] IP consult placed for HF follow up  - Hold PTA losartan given JESS    PULM:   - Extubated POD#0  - Maintaining oxygen saturations on room air  - Encourage pulmonary toilet    FEN/GI:  - Diet: Clears, ADAT to Cardiac - low consistent carb  - Continue electrolyte replacement protocol  - Bowel regimen, LBM 3/20    RENAL:  JESS on CKD3b, PTA issue  - borderline UOP/hr. Continue to monitor closely.  - Cr 2.60 (2.78) (baseline ~ 1.8-2.0)  - Riojas to be removed today  - Consider one time 20 mg iv lasix in hopes of drying up so chest tubes can be  removed (40 mg PO lasix PTA)    HEME:  LV mural thrombus, PTA issue  - Hold PTA eliquis (LV thrombus) for now although not visualized on cardiac MR 2/11/2025 or intra-op MANUEL so will discuss with surgeon   - Hgb stable, no bleeding concerns. Hep SQ, ASA    ID:  Reactive leukocytosis  - Shanae op ppx complete, afebrile. No concerns for infection  - WBC improving    ENDO:   DM2  Diabetic Ketoacidosis, resolved.  - HbA1c 8.7%  - BG goal < 180 to promote optimal healing  - PTA on lantus 50u qPM and mounjaro 5mg q7 days - held  - Critical care consult for DKA 3/18, signed off.  - [facility] consult for assistance with DM management s/p DKA.   - 30u lantus qAM  - High dose SSI q4    Ppx / MISC:   - DVT: SCDs, SQ heparin TID, ambulation   - GI: Protonix 40mg PO daily  - LINES: PICC placed 3/20 given phlebotomy unable to draw blood and patient refusing additional attempts    DISPO:   - Continue critical care in ICU  - PT/OT recs at discharge: TCU  - Medically Ready for Discharge: Anticipated in 2-4 Days        Patient discussed with Dr. Clark. Will discuss with Dr. Shahid when available.        Audra Giang PA-C  [facility] Cardiothoracic Surgery  Vocera or Secure Chat  March 21, 2025

## 2025-03-21 NOTE — PROGRESS NOTES
[facility]    Medicine Progress Note - Hospitalist Service    Date of Admission:  3/17/2025    Assessment & Plan   Eric Cordoba is a 74 year old male admitted on 3/17/2025. He was admitted for elective CABG on 03/17/2025. Pmhx is significant for CKD stage III, type 2 diabetes mellitus, history of CVA, hypertension, HFrEF, left ventricular apical thrombus.  Patient was in ICU recovering from procedure since 3/17.  Postoperative course was complicated by development of DKA and JESS.  [facility] consulted to assist with management of medical comorbidities.    Type 2 diabetes mellitus, poorly controlled  DKA  - s/p DKA protocol.  Anion gap has closed and patient started on clear liquid diet  -Did receive 20 units of Lantus subcutaneous this morning  -High intensity insulin sliding scale  -Accu-Cheks  -Hypoglycemia protocol  -Recent A1c is 8.7  -His home regimen includes Lantus 50 units subcutaneous daily and Mounjaro once weekly  -placed back on insulin gtt. to keep blood sugar between 100 and 150  -Plan to resume Lantus at 40 units subcutaneously once insulin gtt. is discontinued.    JESS on CKD  -Baseline creatinine appears to be around 1.8.  Creatinine slightly trending up to 2.8  -Avoid nephrotoxic medication  -Diuretics per CV surgery team  -TOV today   -Strict input and output, monitor BMP    CAD s/p CABG on 03/17  Postop management per CV surgery team  Chest tube management per CV surgery team  Currently tolerating clear liquid diet              Diet: Combination Diet Regular Diet; Low Consistent Carb (45 g CHO per Meal) Diet; Low Saturated Fat Na <2400mg Diet    DVT Prophylaxis: Heparin SQ  Riojas Catheter: PRESENT, indication: ICU only: hourly urine output needed for patient care  Lines: PRESENT      PICC 03/20/25 Double Lumen Right Brachial vein lateral-Site Assessment: WDL      Cardiac Monitoring: ACTIVE order. Indication: ICU  Code Status: Full Code      Clinically Significant Risk Factors  "         # Hyperchloremia: Highest Cl = 110 mmol/L in last 2 days, will monitor as appropriate      # Hypocalcemia: Lowest Ca = 8.3 mg/dL in last 2 days, will monitor and replace as appropriate     # Hypoalbuminemia: Lowest albumin = 3.3 g/dL at 3/20/2025  8:58 AM, will monitor as appropriate   # Thrombocytopenia: Lowest platelets = 123 in last 2 days, will monitor for bleeding             # DMII: A1C = 8.7 % (Ref range: <5.7 %) within past 6 months   # Obesity: Estimated body mass index is 30.77 kg/m  as calculated from the following:    Height as of 2/24/25: 1.753 m (5' 9\").    Weight as of this encounter: 94.5 kg (208 lb 5.4 oz).       # History of CABG: noted on surgical history       Social Drivers of Health    Tobacco Use: Medium Risk (3/14/2025)    Patient History     Smoking Tobacco Use: Former     Smokeless Tobacco Use: Never    Received from [employer]    Financial Resource Strain    Received from [employer]    Social Connections          Disposition Plan     Medically Ready for Discharge: Anticipated in 2-4 Days         Kev Dejesus MD  Hospitalist Service  [facility]  Securely message with [employer] (more info)  Text page via [employer] Paging/Directory   ______________________________________________________________________    Interval History   Patient still confused and sleepy.  Plan to remove chest tube today in the afternoon.  Also plan for T OV today.  Insulin gtt. has been initiated to get a tighter control of blood sugar.  Case discussed with RN    Physical Exam   Vital Signs: Temp: 97.6  F (36.4  C) Temp src: Axillary BP: 136/66 Pulse: 98   Resp: 24 SpO2: 94 % O2 Device: None (Room air)    Weight: 208 lbs 5.36 oz    General Appearance:  Acutely sick looking  Respiratory: Good air entry bilaterally, surgical dressing on anterior chest  Cardiovascular: S1 and S2 heard  GI: Soft abdomen, no tenderness, " normoactive bowel sounds  Skin: Intact and warm          Medical Decision Making       40 MINUTES SPENT BY ME on the date of service doing chart review, history, exam, documentation & further activities per the note.      Data

## 2025-03-22 ENCOUNTER — APPOINTMENT (OUTPATIENT)
Dept: MRI IMAGING | Facility: HOSPITAL | Age: 75
DRG: 235 | End: 2025-03-22
Attending: STUDENT IN AN ORGANIZED HEALTH CARE EDUCATION/TRAINING PROGRAM
Payer: COMMERCIAL

## 2025-03-22 ENCOUNTER — APPOINTMENT (OUTPATIENT)
Dept: RADIOLOGY | Facility: HOSPITAL | Age: 75
DRG: 235 | End: 2025-03-22
Attending: STUDENT IN AN ORGANIZED HEALTH CARE EDUCATION/TRAINING PROGRAM
Payer: COMMERCIAL

## 2025-03-22 ENCOUNTER — APPOINTMENT (OUTPATIENT)
Dept: CT IMAGING | Facility: HOSPITAL | Age: 75
DRG: 235 | End: 2025-03-22
Attending: STUDENT IN AN ORGANIZED HEALTH CARE EDUCATION/TRAINING PROGRAM
Payer: COMMERCIAL

## 2025-03-22 ENCOUNTER — APPOINTMENT (OUTPATIENT)
Dept: OCCUPATIONAL THERAPY | Facility: HOSPITAL | Age: 75
DRG: 235 | End: 2025-03-22
Attending: INTERNAL MEDICINE
Payer: COMMERCIAL

## 2025-03-22 ENCOUNTER — APPOINTMENT (OUTPATIENT)
Dept: PHYSICAL THERAPY | Facility: HOSPITAL | Age: 75
DRG: 235 | End: 2025-03-22
Attending: INTERNAL MEDICINE
Payer: COMMERCIAL

## 2025-03-22 LAB
ALBUMIN UR-MCNC: NEGATIVE MG/DL
AMMONIA PLAS-SCNC: 14 UMOL/L (ref 16–60)
ANION GAP SERPL CALCULATED.3IONS-SCNC: 9 MMOL/L (ref 7–15)
APPEARANCE UR: CLEAR
BILIRUB UR QL STRIP: NEGATIVE
BUN SERPL-MCNC: 62.8 MG/DL (ref 8–23)
CA-I BLD-MCNC: 4.7 MG/DL (ref 4.4–5.2)
CALCIUM SERPL-MCNC: 8.4 MG/DL (ref 8.8–10.4)
CHLORIDE SERPL-SCNC: 108 MMOL/L (ref 98–107)
COLOR UR AUTO: COLORLESS
CREAT SERPL-MCNC: 2.2 MG/DL (ref 0.67–1.17)
EGFRCR SERPLBLD CKD-EPI 2021: 30 ML/MIN/1.73M2
ERYTHROCYTE [DISTWIDTH] IN BLOOD BY AUTOMATED COUNT: 13.1 % (ref 10–15)
GLUCOSE BLDC GLUCOMTR-MCNC: 104 MG/DL (ref 70–99)
GLUCOSE BLDC GLUCOMTR-MCNC: 120 MG/DL (ref 70–99)
GLUCOSE BLDC GLUCOMTR-MCNC: 127 MG/DL (ref 70–99)
GLUCOSE BLDC GLUCOMTR-MCNC: 135 MG/DL (ref 70–99)
GLUCOSE BLDC GLUCOMTR-MCNC: 138 MG/DL (ref 70–99)
GLUCOSE BLDC GLUCOMTR-MCNC: 165 MG/DL (ref 70–99)
GLUCOSE BLDC GLUCOMTR-MCNC: 166 MG/DL (ref 70–99)
GLUCOSE BLDC GLUCOMTR-MCNC: 171 MG/DL (ref 70–99)
GLUCOSE BLDC GLUCOMTR-MCNC: 186 MG/DL (ref 70–99)
GLUCOSE BLDC GLUCOMTR-MCNC: 91 MG/DL (ref 70–99)
GLUCOSE BLDC GLUCOMTR-MCNC: 97 MG/DL (ref 70–99)
GLUCOSE SERPL-MCNC: 123 MG/DL (ref 70–99)
GLUCOSE UR STRIP-MCNC: NEGATIVE MG/DL
HCO3 SERPL-SCNC: 24 MMOL/L (ref 22–29)
HCT VFR BLD AUTO: 30.6 % (ref 40–53)
HGB BLD-MCNC: 10.2 G/DL (ref 13.3–17.7)
HGB UR QL STRIP: NEGATIVE
HOLD SPECIMEN: NORMAL
HYALINE CASTS: 1 /LPF
KETONES UR STRIP-MCNC: NEGATIVE MG/DL
LEUKOCYTE ESTERASE UR QL STRIP: NEGATIVE
MAGNESIUM SERPL-MCNC: 2.3 MG/DL (ref 1.7–2.3)
MCH RBC QN AUTO: 31.8 PG (ref 26.5–33)
MCHC RBC AUTO-ENTMCNC: 33.3 G/DL (ref 31.5–36.5)
MCV RBC AUTO: 95 FL (ref 78–100)
NITRATE UR QL: NEGATIVE
PH UR STRIP: 5 [PH] (ref 5–7)
PHOSPHATE SERPL-MCNC: 2.7 MG/DL (ref 2.5–4.5)
PLATELET # BLD AUTO: 147 10E3/UL (ref 150–450)
POTASSIUM SERPL-SCNC: 4 MMOL/L (ref 3.4–5.3)
RBC # BLD AUTO: 3.21 10E6/UL (ref 4.4–5.9)
RBC URINE: 1 /HPF
SODIUM SERPL-SCNC: 141 MMOL/L (ref 135–145)
SP GR UR STRIP: 1.01 (ref 1–1.03)
UROBILINOGEN UR STRIP-MCNC: <2 MG/DL
WBC # BLD AUTO: 10.2 10E3/UL (ref 4–11)
WBC URINE: 1 /HPF

## 2025-03-22 PROCEDURE — 250N000013 HC RX MED GY IP 250 OP 250 PS 637: Performed by: STUDENT IN AN ORGANIZED HEALTH CARE EDUCATION/TRAINING PROGRAM

## 2025-03-22 PROCEDURE — 70450 CT HEAD/BRAIN W/O DYE: CPT

## 2025-03-22 PROCEDURE — 250N000011 HC RX IP 250 OP 636: Performed by: PHYSICIAN ASSISTANT

## 2025-03-22 PROCEDURE — 99232 SBSQ HOSP IP/OBS MODERATE 35: CPT | Performed by: STUDENT IN AN ORGANIZED HEALTH CARE EDUCATION/TRAINING PROGRAM

## 2025-03-22 PROCEDURE — 250N000011 HC RX IP 250 OP 636: Performed by: NURSE PRACTITIONER

## 2025-03-22 PROCEDURE — 84100 ASSAY OF PHOSPHORUS: CPT

## 2025-03-22 PROCEDURE — 82140 ASSAY OF AMMONIA: CPT | Performed by: NURSE PRACTITIONER

## 2025-03-22 PROCEDURE — 97530 THERAPEUTIC ACTIVITIES: CPT | Mod: GO

## 2025-03-22 PROCEDURE — 97535 SELF CARE MNGMENT TRAINING: CPT | Mod: GO

## 2025-03-22 PROCEDURE — 83735 ASSAY OF MAGNESIUM: CPT

## 2025-03-22 PROCEDURE — 200N000001 HC R&B ICU

## 2025-03-22 PROCEDURE — 250N000013 HC RX MED GY IP 250 OP 250 PS 637

## 2025-03-22 PROCEDURE — 250N000013 HC RX MED GY IP 250 OP 250 PS 637: Performed by: PHYSICIAN ASSISTANT

## 2025-03-22 PROCEDURE — 80048 BASIC METABOLIC PNL TOTAL CA: CPT

## 2025-03-22 PROCEDURE — 71045 X-RAY EXAM CHEST 1 VIEW: CPT

## 2025-03-22 PROCEDURE — 81001 URINALYSIS AUTO W/SCOPE: CPT | Performed by: NURSE PRACTITIONER

## 2025-03-22 PROCEDURE — 85027 COMPLETE CBC AUTOMATED: CPT | Performed by: STUDENT IN AN ORGANIZED HEALTH CARE EDUCATION/TRAINING PROGRAM

## 2025-03-22 PROCEDURE — 72148 MRI LUMBAR SPINE W/O DYE: CPT

## 2025-03-22 PROCEDURE — 250N000013 HC RX MED GY IP 250 OP 250 PS 637: Performed by: INTERNAL MEDICINE

## 2025-03-22 PROCEDURE — 82330 ASSAY OF CALCIUM: CPT | Performed by: PHYSICIAN ASSISTANT

## 2025-03-22 PROCEDURE — 999N000156 HC STATISTIC RCP CONSULT EA 30 MIN

## 2025-03-22 PROCEDURE — 97530 THERAPEUTIC ACTIVITIES: CPT | Mod: GP

## 2025-03-22 RX ORDER — HYDROXYZINE HYDROCHLORIDE 25 MG/1
25 TABLET, FILM COATED ORAL ONCE
Status: COMPLETED | OUTPATIENT
Start: 2025-03-22 | End: 2025-03-22

## 2025-03-22 RX ORDER — FUROSEMIDE 10 MG/ML
20 INJECTION INTRAMUSCULAR; INTRAVENOUS 2 TIMES DAILY
Status: DISCONTINUED | OUTPATIENT
Start: 2025-03-22 | End: 2025-03-24 | Stop reason: DRUGHIGH

## 2025-03-22 RX ADMIN — LIDOCAINE 1 PATCH: 4 PATCH TOPICAL at 22:41

## 2025-03-22 RX ADMIN — POLYETHYLENE GLYCOL 3350 17 G: 17 POWDER, FOR SOLUTION ORAL at 09:02

## 2025-03-22 RX ADMIN — ACETAMINOPHEN 975 MG: 325 TABLET ORAL at 06:40

## 2025-03-22 RX ADMIN — HYDROXYZINE HYDROCHLORIDE 25 MG: 25 TABLET ORAL at 02:06

## 2025-03-22 RX ADMIN — SENNOSIDES AND DOCUSATE SODIUM 1 TABLET: 50; 8.6 TABLET ORAL at 09:02

## 2025-03-22 RX ADMIN — PANTOPRAZOLE SODIUM 40 MG: 40 TABLET, DELAYED RELEASE ORAL at 09:02

## 2025-03-22 RX ADMIN — POTASSIUM & SODIUM PHOSPHATES POWDER PACK 280-160-250 MG 1 PACKET: 280-160-250 PACK at 09:02

## 2025-03-22 RX ADMIN — ACETAMINOPHEN 975 MG: 325 TABLET ORAL at 22:36

## 2025-03-22 RX ADMIN — CLOPIDOGREL BISULFATE 75 MG: 75 TABLET, FILM COATED ORAL at 09:02

## 2025-03-22 RX ADMIN — ASPIRIN 81 MG CHEWABLE TABLET 81 MG: 81 TABLET CHEWABLE at 09:02

## 2025-03-22 RX ADMIN — HEPARIN SODIUM 5000 UNITS: 5000 INJECTION, SOLUTION INTRAVENOUS; SUBCUTANEOUS at 22:34

## 2025-03-22 RX ADMIN — ACETAMINOPHEN 975 MG: 325 TABLET ORAL at 14:23

## 2025-03-22 RX ADMIN — ONDANSETRON 4 MG: 2 INJECTION, SOLUTION INTRAMUSCULAR; INTRAVENOUS at 21:09

## 2025-03-22 RX ADMIN — FUROSEMIDE 20 MG: 10 INJECTION, SOLUTION INTRAMUSCULAR; INTRAVENOUS at 22:31

## 2025-03-22 RX ADMIN — HEPARIN SODIUM 5000 UNITS: 5000 INJECTION, SOLUTION INTRAVENOUS; SUBCUTANEOUS at 06:41

## 2025-03-22 RX ADMIN — FUROSEMIDE 20 MG: 10 INJECTION, SOLUTION INTRAMUSCULAR; INTRAVENOUS at 09:43

## 2025-03-22 RX ADMIN — CARVEDILOL 25 MG: 12.5 TABLET, FILM COATED ORAL at 09:02

## 2025-03-22 RX ADMIN — POTASSIUM & SODIUM PHOSPHATES POWDER PACK 280-160-250 MG 1 PACKET: 280-160-250 PACK at 06:40

## 2025-03-22 RX ADMIN — SIMVASTATIN 40 MG: 10 TABLET, FILM COATED ORAL at 09:02

## 2025-03-22 RX ADMIN — HEPARIN SODIUM 5000 UNITS: 5000 INJECTION, SOLUTION INTRAVENOUS; SUBCUTANEOUS at 14:25

## 2025-03-22 RX ADMIN — OXYCODONE HYDROCHLORIDE 2.5 MG: 5 TABLET ORAL at 00:33

## 2025-03-22 RX ADMIN — CARVEDILOL 25 MG: 12.5 TABLET, FILM COATED ORAL at 22:35

## 2025-03-22 RX ADMIN — SENNOSIDES AND DOCUSATE SODIUM 1 TABLET: 50; 8.6 TABLET ORAL at 22:36

## 2025-03-22 ASSESSMENT — ACTIVITIES OF DAILY LIVING (ADL)
ADLS_ACUITY_SCORE: 80
ADLS_ACUITY_SCORE: 82
ADLS_ACUITY_SCORE: 75
ADLS_ACUITY_SCORE: 80
ADLS_ACUITY_SCORE: 82
ADLS_ACUITY_SCORE: 78
ADLS_ACUITY_SCORE: 82
ADLS_ACUITY_SCORE: 74
ADLS_ACUITY_SCORE: 82
ADLS_ACUITY_SCORE: 82
ADLS_ACUITY_SCORE: 76
ADLS_ACUITY_SCORE: 80
ADLS_ACUITY_SCORE: 76
ADLS_ACUITY_SCORE: 78
ADLS_ACUITY_SCORE: 75
ADLS_ACUITY_SCORE: 76
ADLS_ACUITY_SCORE: 80
ADLS_ACUITY_SCORE: 82
ADLS_ACUITY_SCORE: 76
ADLS_ACUITY_SCORE: 82

## 2025-03-22 NOTE — TREATMENT PLAN
Respiratory Treatment Plan     Patient Score: 10  Patient Acuity: 4    Clinical Indication for Therapy: CVTS    Therapy Ordered:   Broncho-Pulmonary Hygiene: Flutter valve QID - encourage pt to perform 10 reps Q1H while awake  Volume Expansion: Incentive spirometry QID - encourage pt to perform 10 reps Q1H while awake      History:   -Smoking History: former         Assessment Summary: Patient is POD#5 CABGx4. Currently on room air and chest tubes have been removed. Breath sounds clear diminished.   He is working with therapy and up to the chair.  He achieves 2000 on his IS and does his flutter valve with appropriate technique.     Will continue to follow to provide reminds as he is confused at times.     Reviewed the importance of pulmonary hygiene and provided encouragement.       Narda Jennings, RT  3/23/2025

## 2025-03-22 NOTE — PLAN OF CARE
"Goal Outcome Evaluation:      Plan of Care Reviewed With: patient    Overall Patient Progress: improvingOverall Patient Progress: improving    Outcome Evaluation: Vital signs stable. Frequently anxious.    [facility] - ICU    RN Progress Note:            Pertinent Assessments:      Please refer to flowsheet rows for full assessment     - Pt frequently calling out \"help me, please\" even while staff are in the process of helping.   - Sinus rhythm with frequent PACs           Key Events - This Shift:       - Around 0100 stated he could not breathe. Breath sounds equal, vital signs stable, SpO2 97%. Chest xray obtained with no pneumothorax. Resident paged to assess. One time dose of hydroxyzine administered, pt slept for several hours afterwards.     RN Managed Protocols Ordered:  Yes  Protocols:Potassium, Magnesium, and Phosphorus  PRN'S:iCal  Protocols Status: In Progress                Barriers to Discharge / Downgrade:     - Insulin drip               "

## 2025-03-22 NOTE — PLAN OF CARE
Goal Outcome Evaluation:      Plan of Care Reviewed With: patient, family    Overall Patient Progress: no changeOverall Patient Progress: no change    Outcome Evaluation: Continued poor appetite, limited mobility due to weakness, low BP this afternoon after being in the chair. Patient C/O feeling dizzy.    [facility] - ICU    RN Progress Note:            Pertinent Assessments:      Please refer to flowsheet rows for full assessment     Patient still doesn't want to eat, even with encouragement he leans towards cold sugary food. Patient did like the supplement drinks his family bought but was not low sugar.     Patient disoriented to time, didn't know his birthday and the current years was 82 or 74. Patient knew all his family that visited.            Key Events - This Shift:       Low BP this afternoon while in chair, resolved once laid in bed.     RN Managed Protocols Ordered:  Yes  Protocols:Potassium, Magnesium, and Phosphorus  PRN'S:iCal  Protocols Status: Reviewed with Oncoming RN                Barriers to Discharge / Downgrade:     Stable can be downgraded out ICU.         Point of Contact Update: YES-OR-NO: Yes  Son and DIL were here this morning, a friend visited and other daughter here this evening.

## 2025-03-22 NOTE — PROGRESS NOTES
"CVTS Daily Progress Note   POD#5  s/p CABG x4 (LIMA>LAD, rSVG>RCA, rSVG>OM1, rSVG>diag), LLE EVH, and preop IABP placement  Attending: Kleber  LOS: 5    SUBJECTIVE/INTERVAL EVENTS:  Per report some left sided weakness upon transfer from bed to chair   Wt at admit weight  Remains intermittently confused and agitated although improved overall, no longer requiring 1:1. Patient progressing well overall at this point. Maintaining oxygen saturations on room air. Normotensive, IABP out POD#1. Ambulating with therapy to chair. Pain well controlled. +BM. Tolerating diet without nausea although poor appetite, reports being very thirsty. UOP  improved with bumex yesterday, likely will need low dose lasix (PTA med) . Chest tube output appropriate. Hgb stable. Patient denies new chest pain, shortness of breath, abdominal pain, calf pain, nausea.      OBJECTIVE:  Temp:  [96.5  F (35.8  C)-98.2  F (36.8  C)] 96.7  F (35.9  C)  Pulse:  [73-87] 83  Resp:  [10-30] 18  BP: ()/(51-67) 115/66  SpO2:  [95 %-98 %] 97 %  Vitals:    03/18/25 0620 03/19/25 0100 03/20/25 0400 03/21/25 0500   Weight: 98.8 kg (217 lb 14.4 oz) 98 kg (216 lb 0.8 oz) 98.8 kg (217 lb 13 oz) 94.5 kg (208 lb 5.4 oz)    03/22/25 0218   Weight: 94.7 kg (208 lb 12.8 oz)       Clinically Significant Risk Factors          # Hyperchloremia: Highest Cl = 110 mmol/L in last 2 days, will monitor as appropriate          # Hypoalbuminemia: Lowest albumin = 3.3 g/dL at 3/20/2025  8:58 AM, will monitor as appropriate               # DMII: A1C = 8.7 % (Ref range: <5.7 %) within past 6 months   # Obesity: Estimated body mass index is 30.83 kg/m  as calculated from the following:    Height as of 2/24/25: 1.753 m (5' 9\").    Weight as of this encounter: 94.7 kg (208 lb 12.8 oz).       # History of CABG: noted on surgical history           Current Medications:    Scheduled Meds:  Current Facility-Administered Medications   Medication Dose Route Frequency Provider Last Rate Last " Admin    acetaminophen (TYLENOL) tablet 975 mg  975 mg Oral Q8H John Sen MD   975 mg at 03/22/25 0640    [Held by provider] apixaban ANTICOAGULANT (ELIQUIS) tablet 5 mg  5 mg Oral BID Zayra Raya PA-C        aspirin (ASA) chewable tablet 81 mg  81 mg Oral Daily Prabha Giang PA-C   81 mg at 03/22/25 0902    carvedilol (COREG) tablet 25 mg  25 mg Oral BID Prabha Giang PA-C   25 mg at 03/22/25 0902    clopidogrel (PLAVIX) tablet 75 mg  75 mg Oral Daily Prabha Giang PA-C   75 mg at 03/22/25 0902    furosemide (LASIX) injection 20 mg  20 mg Intravenous BID Kisha Graves APRN CNP   20 mg at 03/22/25 0943    [Held by provider] gabapentin (NEURONTIN) capsule 300 mg  300 mg Oral At Bedtime Zayra Raya PA-C   300 mg at 03/20/25 2101    [Held by provider] gabapentin (NEURONTIN) capsule 600 mg  600 mg Oral QAM Zayra Raya PA-C   600 mg at 03/21/25 0817    heparin ANTICOAGULANT injection 5,000 Units  5,000 Units Subcutaneous Q8H Zayra Raya PA-C   5,000 Units at 03/22/25 0641    insulin aspart (NovoLOG) injection (RAPID ACTING)   Subcutaneous TID w/meals Kev Dejesus MD   2 Units at 03/22/25 0942    insulin glargine (LANTUS PEN) injection 30 Units  30 Units Subcutaneous QAM AC Kev Dejesus MD   30 Units at 03/22/25 0943    Lidocaine (LIDOCARE) 4 % Patch 1-2 patch  1-2 patch Transdermal Q24H Zayra Raya PA-C   1 patch at 03/21/25 2205    pantoprazole (PROTONIX) EC tablet 40 mg  40 mg Oral QAM AC Zayra Raya PA-C   40 mg at 03/22/25 0902    polyethylene glycol (MIRALAX) Packet 17 g  17 g Oral Daily Prabha Giang PA-C   17 g at 03/22/25 0902    senna-docusate (SENOKOT-S/PERICOLACE) 8.6-50 MG per tablet 1 tablet  1 tablet Oral BID Zayra Raya PA-C   1 tablet at 03/22/25 0902    simvastatin (ZOCOR) tablet 40 mg  40 mg Oral Daily Prabha Giang PA-C   40 mg at 03/22/25 0902     sodium chloride (PF) 0.9% PF flush 10-40 mL  10-40 mL Intracatheter Q7 Days Prabha Giang PA-C   10 mL at 03/20/25 0933    sodium chloride (PF) 0.9% PF flush 10-40 mL  10-40 mL Intracatheter Daily Prabha Giang PA-C   10 mL at 03/22/25 0903     Continuous Infusions:  Current Facility-Administered Medications   Medication Dose Route Frequency Provider Last Rate Last Admin    dextrose 10% infusion   Intravenous Continuous PRN Prabha Giang PA-C         PRN Meds:.  Current Facility-Administered Medications   Medication Dose Route Frequency Provider Last Rate Last Admin    acetaminophen (TYLENOL) tablet 650 mg  650 mg Oral Q4H PRN Nabila Gates CNP   650 mg at 03/21/25 0309    bisacodyl (DULCOLAX) suppository 10 mg  10 mg Rectal Daily PRN Zayra Raya PA-C        calcium gluconate 1 g in 50 mL in sodium chloride intermittent infusion  1 g Intravenous Once PRN Zayra Raya PA-C   1 g at 03/17/25 2005    calcium gluconate 2 g in  mL intermittent infusion  2 g Intravenous Once PRN Zayra Raya PA-C        calcium gluconate 3 g in sodium chloride 0.9 % 100 mL intermittent infusion  3 g Intravenous Once PRN Zayra Raya PA-C        dextrose 10% infusion   Intravenous Continuous PRN Prabha Giang PA-C        glucose gel 15-30 g  15-30 g Oral Q15 Min PRN Zayra Raya PA-C        Or    dextrose 50 % injection 25-50 mL  25-50 mL Intravenous Q15 Min PRN Zayra Raya PA-C        Or    glucagon injection 1 mg  1 mg Subcutaneous Q15 Min PRN Zayra Raya PA-C        hydrALAZINE (APRESOLINE) injection 10 mg  10 mg Intravenous Q30 Min PRN Zayra Raya PA-C   10 mg at 03/21/25 0821    magnesium hydroxide (MILK OF MAGNESIA) suspension 30 mL  30 mL Oral Daily PRN Zayra Raya PA-C        naloxone (NARCAN) injection 0.2 mg  0.2 mg Intravenous Q2 Min PRN John Sen MD        Or     naloxone (NARCAN) injection 0.4 mg  0.4 mg Intravenous Q2 Min PRN John Sen MD        Or    naloxone (NARCAN) injection 0.2 mg  0.2 mg Intramuscular Q2 Min PRN John Sen MD        Or    naloxone (NARCAN) injection 0.4 mg  0.4 mg Intramuscular Q2 Min PRN John Sen MD        ondansetron (ZOFRAN ODT) ODT tab 4 mg  4 mg Oral Q6H PRN Zayra Raya PA-C        Or    ondansetron (ZOFRAN) injection 4 mg  4 mg Intravenous Q6H PRN Zayra Raya PA-C   4 mg at 03/21/25 0821    oxyCODONE IR (ROXICODONE) half-tab 2.5 mg  2.5 mg Oral Q4H PRN Zayra Raya PA-C   2.5 mg at 03/22/25 0033    prochlorperazine (COMPAZINE) injection 5 mg  5 mg Intravenous Q6H PRN Zayra Raya PA-C   5 mg at 03/21/25 0912    Or    prochlorperazine (COMPAZINE) tablet 5 mg  5 mg Oral Q6H PRN Zayra Raya PA-C        sodium chloride (PF) 0.9% PF flush 10-20 mL  10-20 mL Intracatheter q1 min prn Prabha Giang PA-C        sodium chloride (PF) 0.9% PF flush 10-40 mL  10-40 mL Intracatheter Once PRN Prabha Giang PA-C           Cardiographics:    Telemetry monitoring demonstrates sinus rhythm with rates in the 80-90s per my personal review.    Imaging:  3/22      Results for orders placed or performed during the hospital encounter of 03/17/25   XR Chest Port 1 View    Impression    IMPRESSION: Endotracheal tube terminates 3.5 cm above the hakan. Right heart catheter terminates over the central right pulmonary artery. Bilateral chest tubes and mediastinal drains. Clip versus balloon pump marker at the level of the AP window. Lung   volumes are low with bibasilar discoid atelectasis. No CHF or pneumothorax. No acute bony abnormalities..   XR Chest Port 1 View    Impression    IMPRESSION:   1.  Bilateral pleural-based drains with trace pleural effusions and no discernible pneumothorax.  2.  Minimal bibasilar atelectasis, otherwise clear lungs.  3.  Cardiomegaly with  "likely CABG related surgical clips.  4.  Right IJ Worley-Toni catheter tip in proximal right pulmonary artery.       Labs, personally reviewed.  Hemoglobin   Date Value Ref Range Status   03/22/2025 10.2 (L) 13.3 - 17.7 g/dL Final   03/21/2025 9.9 (L) 13.3 - 17.7 g/dL Final   03/20/2025 9.9 (L) 13.3 - 17.7 g/dL Final     WBC Count   Date Value Ref Range Status   03/22/2025 10.2 4.0 - 11.0 10e3/uL Final   03/21/2025 12.8 (H) 4.0 - 11.0 10e3/uL Final   03/20/2025 19.8 (H) 4.0 - 11.0 10e3/uL Final     Platelet Count   Date Value Ref Range Status   03/22/2025 147 (L) 150 - 450 10e3/uL Final   03/21/2025 137 (L) 150 - 450 10e3/uL Final   03/20/2025 123 (L) 150 - 450 10e3/uL Final     Creatinine   Date Value Ref Range Status   03/22/2025 2.20 (H) 0.67 - 1.17 mg/dL Final   03/21/2025 2.25 (H) 0.67 - 1.17 mg/dL Final   03/21/2025 2.60 (H) 0.67 - 1.17 mg/dL Final     Potassium   Date Value Ref Range Status   03/22/2025 4.0 3.4 - 5.3 mmol/L Final   03/21/2025 4.1 3.4 - 5.3 mmol/L Final   03/21/2025 4.3 3.4 - 5.3 mmol/L Final   04/25/2022 4.5 3.5 - 5.0 mmol/L Final   02/01/2021 4.8 3.5 - 5.0 mmol/L Final   12/13/2019 5.0 3.5 - 5.0 mmol/L Final     Potassium POCT   Date Value Ref Range Status   03/17/2025 4.0 3.4 - 5.3 mmol/L Final   03/17/2025 3.9 3.4 - 5.3 mmol/L Final   03/17/2025 4.3 3.4 - 5.3 mmol/L Final     Magnesium   Date Value Ref Range Status   03/22/2025 2.3 1.7 - 2.3 mg/dL Final   03/21/2025 2.3 1.7 - 2.3 mg/dL Final   03/20/2025 2.4 (H) 1.7 - 2.3 mg/dL Final          I/O:  I/O last 3 completed shifts:  In: 936.85 [P.O.:920; I.V.:16.85]  Out: 1110 [Urine:1070; Chest Tube:40]       Physical Exam:    General: Patient seen in bed, resting. Per report, he yells out \"help me\"  HEENT: NICK, no sclera icterus, moist mucosa, no O2 device in place  CV: RRR on monitor. 2+ peripheral pulses in all extremities. Mild edema.   Pulm: Non-labored effort on room air.  Incision C/D/I.  Abd: Soft, NT, ND, +bm  : Riojas with lisa " urine  Ext: Mild pedal edema, SCDs in place, warm, distal pulses intact, Femstop in place after IABP removal  Neuro: A&Ox3, COPELAND, and CN grossly intact      ASSESSMENT/PLAN:    Eric Cordoba is a 74 year old male with a history of CAD, LV mural thrombus, CKD 3b, DM2, HTN, h/o CVA, and ICM who is s/p CABG x4 , LLE EVH, and preop IABP placement.    Principal Problem:    Coronary artery disease involving native coronary artery of native heart, unspecified whether angina present  Active Problems:    CAD (coronary artery disease)    Coronary artery disease of native artery of native heart with stable angina pectoris      NEURO:   Acute postoperative pain  H/o CVA, PTA issue  Postoperative delirium  - Scheduled Tylenol/lidocaine patches and PRN Tylenol/oxycodone for pain  - PTA gabapentin  - Delirium precautions  - Avoid deliriogenic medications and narcotics as able  - head CT pending     CV:   HTN//HLD, PTA issues  CAD // ICM s/p CABG  Preop IABP, resolved.  - Pre-op EF 35-40%  - IABP removed POD#1  - Coreg 25 mg, may decrease if restarting PTA losartan when renal function improves  - ASA 81 mg // Plavix 75 mg qday per surgeon preference for graft patency  -  Simvastatin 40mg daily  - New baseline echo after CT/TPW removed and prior to discharge.  - [facility] IP consult placed for HF follow up  - Hold PTA losartan given JESS    PULM:   - Extubated POD#0  - Maintaining oxygen saturations on room air  - Encourage pulmonary toilet    FEN/GI:  - Diet: Clears, ADAT to Cardiac - low consistent carb  - Continue electrolyte replacement protocol  - Bowel regimen, LBM 3/20    RENAL:  JESS on CKD3b, PTA issue  - borderline UOP/hr. Continue to monitor closely.  - Cr 2.20 (2.78) (baseline ~ 1.8-2.0)  - Riojas removed , bladder scan prn  - lasix 20 BID    HEME:  LV mural thrombus, PTA issue  - Hold PTA eliquis (LV thrombus) for now although not visualized on cardiac MR 2/11/2025 or intra-op MANUEL so will discuss with surgeon   - Bria  stable, no bleeding concerns. Hep SQ, ASA    ID:  Reactive leukocytosis  - Shanae op ppx complete, afebrile. No concerns for infection  - Leukocytosis resolved     ENDO:   DM2  Diabetic Ketoacidosis, resolved.  - HbA1c 8.7%  - BG goal < 180 to promote optimal healing  - PTA on lantus 50u qPM and mounjaro 5mg q7 days - held  - Critical care consult for DKA 3/18, signed off.  - [facility] consult for assistance with DM management s/p DKA.   - 30u lantus qAM  - High dose SSI q4    Ppx / MISC:   - DVT: SCDs, SQ heparin TID, ambulation   - GI: Protonix 40mg PO daily  - LINES: PICC placed 3/20 given phlebotomy unable to draw blood and patient refusing additional attempts    DISPO:   - Continue critical care in ICU  - PT/OT recs at discharge: TCU  - Medically Ready for Discharge: following delirium, diureses and nutrition status.          Patient discussed with Dr. Blake.         Kisha Graves, [facility]   Cardiothoracic Surgery  Office: [phone]

## 2025-03-22 NOTE — PROGRESS NOTES
[facility]    Medicine Progress Note - Hospitalist Service    Date of Admission:  3/17/2025    Assessment & Plan   Eric Cordoba is a 74 year old male admitted on 3/17/2025. He was admitted for elective CABG on 03/17/2025. Pmhx is significant for CKD stage III, type 2 diabetes mellitus, history of CVA, hypertension, HFrEF, left ventricular apical thrombus.  Patient was in ICU recovering from procedure since 3/17.  Postoperative course was complicated by development of DKA and JESS.  [facility] [place] assist with management of medical comorbidities.    Type 2 diabetes mellitus, poorly controlled  DKA  S/P DKA protocol.  Anion gap has closed and patient started on clear liquid diet  Did receive 20 units of Lantus subcutaneous this morning  High intensity insulin sliding scale  Accu-Checks  Hypoglycemia protocol  Recent A1c is 8.7  His home regimen includes Lantus 50 units subcutaneous daily and Mounjaro once weekly  Start Lantus 35 units subcutaneously after  insulin gtt. is discontinued.    JESS on CKD  Baseline creatinine appears to be around 1.8.   2.8-->2.2  Avoid nephrotoxic medication  Diuretics per CV surgery team  Strict input and output, monitor BMP    CAD s/p CABG on 03/17  Postop management per CV surgery team  Chest tube management per CV surgery team  Currently tolerating clear liquid diet      Acute encephalopathy  -Likely multifactorial including residual anesthesia effect, possible underlying cognitive impairment  -Delirium precaution.  Is off one-to-one observation now  -Ammonia is not elevated.  UA is unremarkable,   -CT head: Reported with subcentimeter focus of hypoattenuation in the left frontal lobe.  This could reflect a small age-indeterminate infarction.  -Will get brain MRI  -Neurology consultation  -Continue supportive measures  - per his NOK he has been noted to be forgetful recently           Diet: Combination Diet Regular Diet; Low Consistent Carb (45 g CHO per  "Meal) Diet; Low Saturated Fat Na <2400mg Diet  Snacks/Supplements Adult: Glucerna; Between Meals    DVT Prophylaxis: Heparin SQ  Riojas Catheter: Not present  Lines: PRESENT      PICC 03/20/25 Double Lumen Right Brachial vein lateral-Site Assessment: WDL      Cardiac Monitoring: ACTIVE order. Indication: ICU  Code Status: Full Code      Clinically Significant Risk Factors          # Hyperchloremia: Highest Cl = 110 mmol/L in last 2 days, will monitor as appropriate          # Hypoalbuminemia: Lowest albumin = 3.3 g/dL at 3/20/2025  8:58 AM, will monitor as appropriate               # DMII: A1C = 8.7 % (Ref range: <5.7 %) within past 6 months   # Obesity: Estimated body mass index is 30.83 kg/m  as calculated from the following:    Height as of 2/24/25: 1.753 m (5' 9\").    Weight as of this encounter: 94.7 kg (208 lb 12.8 oz).       # History of CABG: noted on surgical history       Social Drivers of Health    Tobacco Use: Medium Risk (3/14/2025)    Patient History     Smoking Tobacco Use: Former     Smokeless Tobacco Use: Never    Received from [employer]    Financial Resource Strain    Received from [employer]    Social Connections          Disposition Plan     Medically Ready for Discharge: Anticipated in 2-4 Days             Kev Dejesus MD  Hospitalist Service  [facility]  Securely message with [employer] (more info)  Text page via [employer] Paging/Directory   ______________________________________________________________________    Interval History   Sitting in chair at the bedside.  Appears to be confused and sleepy.  He is oriented to place but not to time and person. Did pass TOV yesterday.  Will switch insulin gtt. to long acting subcutaneous injections.  Case discussed with RN.  Also discussed with his friend over the phone.    Physical Exam   Vital Signs: Temp: (!) 96.7  F (35.9  C) Temp src: Axillary BP: 115/66 " Pulse: 83   Resp: 18 SpO2: 97 % O2 Device: None (Room air)    Weight: 208 lbs 12.8 oz    General Appearance:  Acutely sick looking  Respiratory: Good air entry bilaterally, surgical dressing on anterior chest  Cardiovascular: S1 and S2 heard  GI: Soft abdomen, no tenderness, normoactive bowel sounds  Skin: Intact and warm       Medical Decision Making       40 MINUTES SPENT BY ME on the date of service doing chart review, history, exam, documentation & further activities per the note.      Data

## 2025-03-22 NOTE — PROGRESS NOTES
Patient transported to CT with no issues. Patients son Eric and Daughter in law visiting and keeping him awake.

## 2025-03-23 ENCOUNTER — APPOINTMENT (OUTPATIENT)
Dept: PHYSICAL THERAPY | Facility: HOSPITAL | Age: 75
DRG: 235 | End: 2025-03-23
Attending: INTERNAL MEDICINE
Payer: COMMERCIAL

## 2025-03-23 ENCOUNTER — APPOINTMENT (OUTPATIENT)
Dept: OCCUPATIONAL THERAPY | Facility: HOSPITAL | Age: 75
DRG: 235 | End: 2025-03-23
Attending: INTERNAL MEDICINE
Payer: COMMERCIAL

## 2025-03-23 ENCOUNTER — APPOINTMENT (OUTPATIENT)
Dept: SPEECH THERAPY | Facility: HOSPITAL | Age: 75
DRG: 235 | End: 2025-03-23
Attending: INTERNAL MEDICINE
Payer: COMMERCIAL

## 2025-03-23 LAB
ANION GAP SERPL CALCULATED.3IONS-SCNC: 7 MMOL/L (ref 7–15)
BUN SERPL-MCNC: 60.1 MG/DL (ref 8–23)
CA-I BLD-MCNC: 4.8 MG/DL (ref 4.4–5.2)
CALCIUM SERPL-MCNC: 8.3 MG/DL (ref 8.8–10.4)
CHLORIDE SERPL-SCNC: 109 MMOL/L (ref 98–107)
CREAT SERPL-MCNC: 2.17 MG/DL (ref 0.67–1.17)
EGFRCR SERPLBLD CKD-EPI 2021: 31 ML/MIN/1.73M2
ERYTHROCYTE [DISTWIDTH] IN BLOOD BY AUTOMATED COUNT: 13 % (ref 10–15)
GLUCOSE BLDC GLUCOMTR-MCNC: 186 MG/DL (ref 70–99)
GLUCOSE BLDC GLUCOMTR-MCNC: 231 MG/DL (ref 70–99)
GLUCOSE BLDC GLUCOMTR-MCNC: 234 MG/DL (ref 70–99)
GLUCOSE BLDC GLUCOMTR-MCNC: 243 MG/DL (ref 70–99)
GLUCOSE SERPL-MCNC: 181 MG/DL (ref 70–99)
HCO3 SERPL-SCNC: 27 MMOL/L (ref 22–29)
HCT VFR BLD AUTO: 24.7 % (ref 40–53)
HGB BLD-MCNC: 8.6 G/DL (ref 13.3–17.7)
MAGNESIUM SERPL-MCNC: 2.2 MG/DL (ref 1.7–2.3)
MCH RBC QN AUTO: 32.7 PG (ref 26.5–33)
MCHC RBC AUTO-ENTMCNC: 34.8 G/DL (ref 31.5–36.5)
MCV RBC AUTO: 94 FL (ref 78–100)
PHOSPHATE SERPL-MCNC: 3.7 MG/DL (ref 2.5–4.5)
PLATELET # BLD AUTO: 116 10E3/UL (ref 150–450)
POTASSIUM SERPL-SCNC: 3.8 MMOL/L (ref 3.4–5.3)
RBC # BLD AUTO: 2.63 10E6/UL (ref 4.4–5.9)
SODIUM SERPL-SCNC: 143 MMOL/L (ref 135–145)
TSH SERPL DL<=0.005 MIU/L-ACNC: 2.66 UIU/ML (ref 0.3–4.2)
WBC # BLD AUTO: 7.5 10E3/UL (ref 4–11)

## 2025-03-23 PROCEDURE — 999N000157 HC STATISTIC RCP TIME EA 10 MIN

## 2025-03-23 PROCEDURE — 97535 SELF CARE MNGMENT TRAINING: CPT | Mod: GO

## 2025-03-23 PROCEDURE — 999N000156 HC STATISTIC RCP CONSULT EA 30 MIN

## 2025-03-23 PROCEDURE — 250N000013 HC RX MED GY IP 250 OP 250 PS 637

## 2025-03-23 PROCEDURE — 250N000013 HC RX MED GY IP 250 OP 250 PS 637: Performed by: INTERNAL MEDICINE

## 2025-03-23 PROCEDURE — 94799 UNLISTED PULMONARY SVC/PX: CPT

## 2025-03-23 PROCEDURE — 120N000013 HC R&B IMCU

## 2025-03-23 PROCEDURE — 84100 ASSAY OF PHOSPHORUS: CPT | Performed by: STUDENT IN AN ORGANIZED HEALTH CARE EDUCATION/TRAINING PROGRAM

## 2025-03-23 PROCEDURE — 83735 ASSAY OF MAGNESIUM: CPT | Performed by: STUDENT IN AN ORGANIZED HEALTH CARE EDUCATION/TRAINING PROGRAM

## 2025-03-23 PROCEDURE — 82330 ASSAY OF CALCIUM: CPT | Performed by: PHYSICIAN ASSISTANT

## 2025-03-23 PROCEDURE — 250N000013 HC RX MED GY IP 250 OP 250 PS 637: Performed by: PHYSICIAN ASSISTANT

## 2025-03-23 PROCEDURE — 92526 ORAL FUNCTION THERAPY: CPT | Mod: GN

## 2025-03-23 PROCEDURE — 99233 SBSQ HOSP IP/OBS HIGH 50: CPT | Performed by: INTERNAL MEDICINE

## 2025-03-23 PROCEDURE — 85027 COMPLETE CBC AUTOMATED: CPT | Performed by: STUDENT IN AN ORGANIZED HEALTH CARE EDUCATION/TRAINING PROGRAM

## 2025-03-23 PROCEDURE — 250N000013 HC RX MED GY IP 250 OP 250 PS 637: Performed by: NURSE PRACTITIONER

## 2025-03-23 PROCEDURE — 250N000011 HC RX IP 250 OP 636: Performed by: PHYSICIAN ASSISTANT

## 2025-03-23 PROCEDURE — 97530 THERAPEUTIC ACTIVITIES: CPT | Mod: GP

## 2025-03-23 PROCEDURE — 97116 GAIT TRAINING THERAPY: CPT | Mod: GP

## 2025-03-23 PROCEDURE — 80048 BASIC METABOLIC PNL TOTAL CA: CPT

## 2025-03-23 PROCEDURE — 84443 ASSAY THYROID STIM HORMONE: CPT | Performed by: STUDENT IN AN ORGANIZED HEALTH CARE EDUCATION/TRAINING PROGRAM

## 2025-03-23 PROCEDURE — 250N000011 HC RX IP 250 OP 636: Performed by: NURSE PRACTITIONER

## 2025-03-23 PROCEDURE — 99222 1ST HOSP IP/OBS MODERATE 55: CPT | Mod: 24 | Performed by: STUDENT IN AN ORGANIZED HEALTH CARE EDUCATION/TRAINING PROGRAM

## 2025-03-23 RX ORDER — DEXTROSE MONOHYDRATE 25 G/50ML
25-50 INJECTION, SOLUTION INTRAVENOUS
Status: DISCONTINUED | OUTPATIENT
Start: 2025-03-23 | End: 2025-03-26 | Stop reason: HOSPADM

## 2025-03-23 RX ORDER — NICOTINE POLACRILEX 4 MG
15-30 LOZENGE BUCCAL
Status: DISCONTINUED | OUTPATIENT
Start: 2025-03-23 | End: 2025-03-26 | Stop reason: HOSPADM

## 2025-03-23 RX ORDER — POTASSIUM CHLORIDE 1500 MG/1
20 TABLET, EXTENDED RELEASE ORAL ONCE
Status: COMPLETED | OUTPATIENT
Start: 2025-03-23 | End: 2025-03-23

## 2025-03-23 RX ADMIN — POLYETHYLENE GLYCOL 3350 17 G: 17 POWDER, FOR SOLUTION ORAL at 08:04

## 2025-03-23 RX ADMIN — Medication 5 MG: at 00:21

## 2025-03-23 RX ADMIN — HEPARIN SODIUM 5000 UNITS: 5000 INJECTION, SOLUTION INTRAVENOUS; SUBCUTANEOUS at 05:46

## 2025-03-23 RX ADMIN — HEPARIN SODIUM 5000 UNITS: 5000 INJECTION, SOLUTION INTRAVENOUS; SUBCUTANEOUS at 21:35

## 2025-03-23 RX ADMIN — FUROSEMIDE 20 MG: 10 INJECTION, SOLUTION INTRAMUSCULAR; INTRAVENOUS at 20:15

## 2025-03-23 RX ADMIN — ACETAMINOPHEN 975 MG: 325 TABLET ORAL at 05:45

## 2025-03-23 RX ADMIN — SIMVASTATIN 40 MG: 10 TABLET, FILM COATED ORAL at 08:07

## 2025-03-23 RX ADMIN — ACETAMINOPHEN 975 MG: 325 TABLET ORAL at 14:56

## 2025-03-23 RX ADMIN — POTASSIUM CHLORIDE 20 MEQ: 1500 TABLET, EXTENDED RELEASE ORAL at 05:46

## 2025-03-23 RX ADMIN — FUROSEMIDE 20 MG: 10 INJECTION, SOLUTION INTRAMUSCULAR; INTRAVENOUS at 08:07

## 2025-03-23 RX ADMIN — HEPARIN SODIUM 5000 UNITS: 5000 INJECTION, SOLUTION INTRAVENOUS; SUBCUTANEOUS at 14:56

## 2025-03-23 RX ADMIN — SENNOSIDES AND DOCUSATE SODIUM 1 TABLET: 50; 8.6 TABLET ORAL at 08:04

## 2025-03-23 RX ADMIN — PANTOPRAZOLE SODIUM 40 MG: 40 TABLET, DELAYED RELEASE ORAL at 08:07

## 2025-03-23 RX ADMIN — ACETAMINOPHEN 975 MG: 325 TABLET ORAL at 21:34

## 2025-03-23 RX ADMIN — ASPIRIN 81 MG CHEWABLE TABLET 81 MG: 81 TABLET CHEWABLE at 08:04

## 2025-03-23 RX ADMIN — LIDOCAINE 2 PATCH: 4 PATCH TOPICAL at 20:13

## 2025-03-23 RX ADMIN — CARVEDILOL 25 MG: 12.5 TABLET, FILM COATED ORAL at 20:03

## 2025-03-23 RX ADMIN — CLOPIDOGREL BISULFATE 75 MG: 75 TABLET, FILM COATED ORAL at 08:04

## 2025-03-23 ASSESSMENT — ACTIVITIES OF DAILY LIVING (ADL)
ADLS_ACUITY_SCORE: 75
ADLS_ACUITY_SCORE: 77
ADLS_ACUITY_SCORE: 78
ADLS_ACUITY_SCORE: 77
ADLS_ACUITY_SCORE: 78
ADLS_ACUITY_SCORE: 77
ADLS_ACUITY_SCORE: 78
ADLS_ACUITY_SCORE: 75
ADLS_ACUITY_SCORE: 77
ADLS_ACUITY_SCORE: 78
ADLS_ACUITY_SCORE: 73
ADLS_ACUITY_SCORE: 78
ADLS_ACUITY_SCORE: 77
ADLS_ACUITY_SCORE: 77
ADLS_ACUITY_SCORE: 78
ADLS_ACUITY_SCORE: 73
ADLS_ACUITY_SCORE: 78

## 2025-03-23 NOTE — PROGRESS NOTES
Care Management Follow Up    Length of Stay (days): 6    Expected Discharge Date: 03/25/2025    Anticipated Discharge Plan:   Transitional care unit  Transportation: TBD    PT Recommendations: Transitional Care Facility  OT Recommendations:  Transitional Care Facility     Barriers to Discharge: medical stability, CT surgery is following    Prior Living Situation: house with friend(s)    Discussed  Partnership in Safe Discharge Planning  document with patient/family: No     Handoff Completed: No, handoff not indicated or clinically appropriate    Patient/Spokesperson Updated: Yes. Who? Patient and friend Veronique updated at bedside    Additional Information:  Chart reviewed- TCU recommended.   SW met with patient at bedside to introduce self, CM role. Friend Veronique was present, patient was awake and conversational. We discussed TCU recommendation-patient and Veronique agreeable to TCU. Transitional care briefly described and patient states understanding. SW provided TCU choice list to patient to review. Patient states his sister is currently residing in a nursing home in [place], unsure which facility.     Next Steps: Follow up for TCU choices and medical progression.     Jammie White, FRANCKSW

## 2025-03-23 NOTE — CONSULTS
[facility]  Neurology Consultation    Patient Name:  Eric Cordoba  MRN:  [id number]    :  1950  Date of Service:  2025  Primary care provider:  Abner Elmore      Neurology consultation service was asked to see Eric Cordoba by Dr. Dejesus to evaluate encephalopathy.    Chief Complaint:  encephalopathy     History of Present Illness:   Eric Cordoba is a 74 year old male with history of CKD stage III, DM 2, history of CVA, hypertension, HFrEF, left ventricular apical thrombus who was admitted 3/17 for an elective CABG.  His course was complicated by DKA and JESS and neurology was consulted 3/22 due to worsening encephalopathy.  His labs have progressed from creatinine of 1.83 on admission, to 2.2, lactate increased to 4.0.  Glucose increased to 395 on 3/19, and since then it has been between 100s up to 225.  Ammonia has been low/negative, white count Peaked at 18.5 on 3/19, urinalysis has been unremarkable, blood cultures and other infectious workup has been unremarkable.     I gathered collateral history from the patient's nurse that described he has had fluctuating periods of impaired consciousness.  He has been confused regarding day/night, stated the date of his surgery.  Today he has been more interactive social compared to other days she reported that family/close friends who have visited him here have described that they were concerned about some memory loss in the weeks and months leading up to this hospitalization.    ROS  A comprehensive ROS was performed and pertinent findings were included in HPI.     PMH  Past Medical History:   Diagnosis Date    Diabetes mellitus, type 2 (H)     History of CVA (cerebrovascular accident)     Hypertension     Nutritional and metabolic cardiomyopathies (H)      Past Surgical History:   Procedure Laterality Date    CORONARY ARTERY BYPASS GRAFT, WITH ENDOSCOPIC VESSEL PROCUREMENT N/A 3/17/2025    Procedure:  CORONARY ARTERY BYPASS GRAFT TIMES FOUR, LEFT INTERNAL MAMMARY ARTERY HARVEST, LEFT LEG ENDOSCOPIC SAPHENOUS VEIN PROCUREMENT,;  Surgeon: Alice Shahid MD;  [place] [facility] [place]    CV CORONARY ANGIOGRAM N/A 2/24/2025    Procedure: Coronary Angiogram;  Surgeon: Bautista Durand MD;  Location: [facility] CATH LAB CV    CV INTRA AORTIC BALLOON N/A 3/17/2025    Procedure: Intra aortic Balloon Pump Insertion;  Surgeon: Bautista Durand MD;  Location: [facility] CATH LAB CV    CV LEFT HEART CATH N/A 2/24/2025    Procedure: Left Heart Catheterization;  Surgeon: Bautista Durand MD;  Location: [facility] CV    PICC DOUBLE LUMEN PLACEMENT  3/20/2025    TRANSESOPHAGEAL ECHOCARDIOGRAM INTRAOPERATIVE  3/17/2025    Procedure: EPIAORTIC ULTRASOUND, ANESTHESIA TRANSESOPHAGEAL ECHOCARDIOGRAM;  Surgeon: Alice Shahid MD;  [place] [facility] [place]       Medications   I have personally reviewed the patient's medication list.     Allergies  I have personally reviewed the patient's allergy list.         Physical Examination   Vitals: /55   Pulse 83   Temp 97.9  F (36.6  C) (Oral)   Resp 19   Wt 94.7 kg (208 lb 12.8 oz)   SpO2 95%   BMI 30.83 kg/m    General: Lying in bed, NAD  Head: NC/AT  Eyes: no icterus, op pink and moist  Cardiac: RRR. Extremities warm, no edema.   Respiratory: non-labored on RA  GI: S/NT/ND  Skin: No rash or lesion on exposed skin  Psych: Mood pleasant, affect congruent  Neuro:  Mental status: Awake, alert.  He is oriented to month (had just been reminded of this by nursing staff), was not able to state the year, initially thought it was 1972, though he was able to choose the correct hearing out of multiple-choice testing.  He is able to show me 2 fingers with his right hand, has preserved left/right discrimination, able to follow two-step command.  He is not able to subtract 7 from 100, he is able to tell me the correct number of quarters in a dollar.  He has trouble coming up with the name  of the hospital, though he is able to do this independently for several minutes    Language is fluent and coherent with intact comprehension of complex commands, naming and repetition.  Cranial nerves: VFF, PERRL, conjugate gaze, EOMI, facial sensation intact, face symmetric, shoulder shrug strong, tongue/uvula midline, no dysarthria.   Motor: Normal bulk and tone. No abnormal movements. 5/5 strength bilaterally in deltoids, biceps, triceps, hand , hip flexors, hip extensors, knee flexion, knee extension, plantarflexion, dorsiflexion.   Reflexes: Normo-reflexic and symmetric biceps, brachioradialis, patellae, and achilles. Negative Raza, no clonus, toes down-going.  Sensory: Intact to light touch in proximal and distal aspects of all 4 extremities   Coordination: FNF without ataxia or dysmetria. Rapid alternating movements intact.   Gait: Not assessed    Investigations   I have personally reviewed pertinent labs, tests, and radiological imaging. Discussion of notable findings is included under Impression.     CT head 3/22/2024  IMPRESSION:  1.  Subcentimeter focus of hypoattenuation in the left frontal lobe as above. This is could reflect a small age-indeterminate infarction. Consider MRI for further evaluation, as clinically appropriate.  2.  Mild age-related change.    I reviewed labs including normal sodium, potassium, creatinine elevated to 2.17, A1c 8.7, hemoglobin 8.6, white count 7.5, platelets 160    Was patient transferred from outside hospital?   No    Impression  #JESS on CKD  #DKA, improved  #Coronary artery disease s/p CABG  #Metabolic encephalopathy secondary to above, improving  #Query hospital-acquired delirium    Mr. Cordoba was admitted for CABG, developed encephalopathy which could certainly have been secondary to worsening renal function, with uremia up to 60-note that his baseline BUN is roughly 25.  He may have some element of hospital-acquired delirium as well so I will continue to  recommend delirium precautions, avoiding opiate pain medications, avoiding other centrally acting medications.  There is some suggestion from family/nursing report that he has had some memory loss prior to coming into the hospital which could put him at increased risk of hospital-acquired delirium.  I have ordered B12, folate, TSH, and MRI brain to assess for any underlying condition that could put him at risk for cognitive impairment.     Given that he continues to improve his presentation may be more consistent with a toxic metabolic encephalopathy, in which case I would not recommend outpatient follow-up from the perspective of neurology/cognition.  We will continue to follow to ensure improvement.    Recommendations  -MRI Brain ordered-patient refused this this morning.  Based on the patient's exam, there is okay to defer MRI brain to the outpatient setting  - B12, folate, TSH ordered and pending  - Delirium precautions  - If patient continues to have improvement while hospitalized, could consider this metabolic encephalopathy and would not require outpatient neurology follow-up.  However, if symptoms continue, I would outpatient follow-up with neurology/neuropsychology.  - Neurology will continue to follow    Thank you for involving Neurology in the care of Eric Cordoba.  Please do not hesitate to call with questions    Sammy Uribe MD    Billed as a level 4 consultation due to patient presenting with an acute on chronic illness posing a severe threat to bodily function.  I have reviewed notes from the primary team, nursing notes.  And I have reviewed labs above including ALT, AST, BMP, CBC.

## 2025-03-23 NOTE — PLAN OF CARE
Goal Outcome Evaluation:      Plan of Care Reviewed With: patient    Overall Patient Progress: improvingOverall Patient Progress: improving    Patient downgraded from ICU, very confused on time this morning but improved and answered all questions appropriately in the afternoon. Appetite improved today and he is able to drink the supplements. Up in the chair twice, and commode twice. Finally had BM today passing lots of gas.     Encourage patient to keep eating and increase activity. Patient needs a shower but refused today.

## 2025-03-23 NOTE — PROGRESS NOTES
Pt didn't tolerate MRI machine. MD notified. Pt will need antianxiety meds before taking him down for MRI of head.

## 2025-03-23 NOTE — PROGRESS NOTES
"CVTS Daily Progress Note   POD#6  s/p CABG x4 (LIMA>LAD, rSVG>RCA, rSVG>OM1, rSVG>diag), LLE EVH, and preop IABP placement  Attending: Kleber  LOS: 6    SUBJECTIVE/INTERVAL EVENTS:  Head CT negative (reflect a small age-indeterminate infarction), HM getting MRI and consulted Neurology  Mentation, delirium/confusion improved. He does think it was night time this morning.   Wt below admit weight, -net 2760 since admit  Remains intermittently confused, no longer requiring 1:1. Patient progressing well overall at this point. Maintaining oxygen saturations on room air. Normotensive, IABP out POD#1. Ambulating with therapy to chair. Pain well controlled. +BM 3/21 . Tolerating diet without nausea although poor appetite, but is improving. UOP  improved with Lasix . Hgb stable. Patient denies new chest pain, shortness of breath, abdominal pain, calf pain, nausea.      OBJECTIVE:  Temp:  [97.5  F (36.4  C)-98.2  F (36.8  C)] 97.6  F (36.4  C)  Pulse:  [74-89] 76  Resp:  [10-26] 10  BP: ()/(44-66) 104/55  SpO2:  [93 %-97 %] 96 %  Vitals:    03/19/25 0100 03/20/25 0400 03/21/25 0500 03/22/25 0218   Weight: 98 kg (216 lb 0.8 oz) 98.8 kg (217 lb 13 oz) 94.5 kg (208 lb 5.4 oz) 94.7 kg (208 lb 12.8 oz)    03/23/25 0600   Weight: 93.8 kg (206 lb 12.8 oz)       Clinically Significant Risk Factors          # Hyperchloremia: Highest Cl = 109 mmol/L in last 2 days, will monitor as appropriate          # Hypoalbuminemia: Lowest albumin = 3.3 g/dL at 3/20/2025  8:58 AM, will monitor as appropriate   # Thrombocytopenia: Lowest platelets = 116 in last 2 days, will monitor for bleeding             # DMII: A1C = 8.7 % (Ref range: <5.7 %) within past 6 months   # Obesity: Estimated body mass index is 30.54 kg/m  as calculated from the following:    Height as of 2/24/25: 1.753 m (5' 9\").    Weight as of this encounter: 93.8 kg (206 lb 12.8 oz).       # History of CABG: noted on surgical history           Current Medications:    Scheduled " Meds:  Current Facility-Administered Medications   Medication Dose Route Frequency Provider Last Rate Last Admin    acetaminophen (TYLENOL) tablet 975 mg  975 mg Oral Q8H John Sen MD   975 mg at 03/23/25 0545    [Held by provider] apixaban ANTICOAGULANT (ELIQUIS) tablet 5 mg  5 mg Oral BID Zayra Raya PA-C        aspirin (ASA) chewable tablet 81 mg  81 mg Oral Daily Prabha Giang PA-C   81 mg at 03/23/25 0804    carvedilol (COREG) tablet 25 mg  25 mg Oral BID Kisha Graves APRN CNP   25 mg at 03/22/25 2235    clopidogrel (PLAVIX) tablet 75 mg  75 mg Oral Daily Prabha Giang PA-C   75 mg at 03/23/25 0804    furosemide (LASIX) injection 20 mg  20 mg Intravenous BID Kisha Graves APRN CNP   20 mg at 03/23/25 0807    [Held by provider] gabapentin (NEURONTIN) capsule 300 mg  300 mg Oral At Bedtime Zayra Raya PA-C   300 mg at 03/20/25 2101    [Held by provider] gabapentin (NEURONTIN) capsule 600 mg  600 mg Oral QAM Zayra Raya PA-C   600 mg at 03/21/25 0817    heparin ANTICOAGULANT injection 5,000 Units  5,000 Units Subcutaneous Q8H Zayra Raya PA-C   5,000 Units at 03/23/25 0546    insulin aspart (NovoLOG) injection (RAPID ACTING)  1-3 Units Subcutaneous TID Kev Sage MD   1 Units at 03/23/25 0751    insulin aspart (NovoLOG) injection (RAPID ACTING)  1-3 Units Subcutaneous At Bedtime Kev Dejesus MD        insulin aspart (NovoLOG) injection (RAPID ACTING)   Subcutaneous TID w/meals Kev Dejesus MD   7 Units at 03/23/25 0751    insulin glargine (LANTUS PEN) injection 35 Units  35 Units Subcutaneous QAM AC Kev Dejesus MD   35 Units at 03/23/25 0752    Lidocaine (LIDOCARE) 4 % Patch 1-2 patch  1-2 patch Transdermal Q24H Zayra Raya PA-C   1 patch at 03/22/25 2241    pantoprazole (PROTONIX) EC tablet 40 mg  40 mg Oral [facility] Zayra Raya PA-C   40 mg at 03/23/25 0807     polyethylene glycol (MIRALAX) Packet 17 g  17 g Oral Daily Prabha Giang PA-C   17 g at 03/23/25 0804    senna-docusate (SENOKOT-S/PERICOLACE) 8.6-50 MG per tablet 1 tablet  1 tablet Oral BID Zayra Raya PA-C   1 tablet at 03/23/25 0804    simvastatin (ZOCOR) tablet 40 mg  40 mg Oral Daily Prabha Giang PA-C   40 mg at 03/23/25 0807    sodium chloride (PF) 0.9% PF flush 10-40 mL  10-40 mL Intracatheter Q7 Days Prabha Giang PA-C   10 mL at 03/20/25 0933    sodium chloride (PF) 0.9% PF flush 10-40 mL  10-40 mL Intracatheter Daily Prabha Giang PA-C   20 mL at 03/23/25 0807     Continuous Infusions:  Current Facility-Administered Medications   Medication Dose Route Frequency Provider Last Rate Last Admin    dextrose 10% infusion   Intravenous Continuous PRN Prabha Giang PA-C         PRN Meds:.  Current Facility-Administered Medications   Medication Dose Route Frequency Provider Last Rate Last Admin    acetaminophen (TYLENOL) tablet 650 mg  650 mg Oral Q4H PRN Nabila Gates CNP   650 mg at 03/21/25 0309    bisacodyl (DULCOLAX) suppository 10 mg  10 mg Rectal Daily PRN Zayra Raya PA-C        calcium gluconate 1 g in 50 mL in sodium chloride intermittent infusion  1 g Intravenous Once PRN Zayra Raya PA-C   1 g at 03/17/25 2005    calcium gluconate 2 g in  mL intermittent infusion  2 g Intravenous Once PRN Zayra Raya PA-C        calcium gluconate 3 g in sodium chloride 0.9 % 100 mL intermittent infusion  3 g Intravenous Once PRN Zayra Raya PA-C        dextrose 10% infusion   Intravenous Continuous PRN Prabha Giang PA-C        glucose gel 15-30 g  15-30 g Oral Q15 Min PRN Zayra Raya PA-C        Or    dextrose 50 % injection 25-50 mL  25-50 mL Intravenous Q15 Min PRN Zayra Raya PA-C        Or    glucagon injection 1 mg  1 mg Subcutaneous Q15 Min PRN Zayra Raya  MARIE Dobbs        hydrALAZINE (APRESOLINE) injection 10 mg  10 mg Intravenous Q30 Min PRN Zayra Raya PA-C   10 mg at 03/21/25 0821    magnesium hydroxide (MILK OF MAGNESIA) suspension 30 mL  30 mL Oral Daily PRN Zayra Raya PA-C        melatonin tablet 5 mg  5 mg Oral At Bedtime PRN Susana Honeycutt MD   5 mg at 03/23/25 0021    naloxone (NARCAN) injection 0.2 mg  0.2 mg Intravenous Q2 Min PRN John Sen MD        Or    naloxone (NARCAN) injection 0.4 mg  0.4 mg Intravenous Q2 Min PRN John Sen MD        Or    naloxone (NARCAN) injection 0.2 mg  0.2 mg Intramuscular Q2 Min PRN John Sen MD        Or    naloxone (NARCAN) injection 0.4 mg  0.4 mg Intramuscular Q2 Min PRN John Sen MD        ondansetron (ZOFRAN ODT) ODT tab 4 mg  4 mg Oral Q6H PRN Zayra Raya PA-C        Or    ondansetron (ZOFRAN) injection 4 mg  4 mg Intravenous Q6H PRN Zayra Raya PA-C   4 mg at 03/22/25 2109    oxyCODONE IR (ROXICODONE) half-tab 2.5 mg  2.5 mg Oral Q4H PRN Zayra Raya PA-C   2.5 mg at 03/22/25 0033    prochlorperazine (COMPAZINE) injection 5 mg  5 mg Intravenous Q6H PRN Zayra Raya PA-C   5 mg at 03/21/25 0912    Or    prochlorperazine (COMPAZINE) tablet 5 mg  5 mg Oral Q6H PRN Zayra Raya PA-C        sodium chloride (PF) 0.9% PF flush 10-20 mL  10-20 mL Intracatheter q1 min prn Prabha Giang PA-C        sodium chloride (PF) 0.9% PF flush 10-40 mL  10-40 mL Intracatheter Once PRN Prabha Giang PA-C           Cardiographics:    Telemetry monitoring demonstrates sinus rhythm with rates in the 80-90s per my personal review.    Imaging:  3/22      Results for orders placed or performed during the hospital encounter of 03/17/25   XR Chest Port 1 View    Impression    IMPRESSION: Endotracheal tube terminates 3.5 cm above the hakan. Right heart catheter terminates over the central right pulmonary  artery. Bilateral chest tubes and mediastinal drains. Clip versus balloon pump marker at the level of the AP window. Lung   volumes are low with bibasilar discoid atelectasis. No CHF or pneumothorax. No acute bony abnormalities..   XR Chest Port 1 View    Impression    IMPRESSION:   1.  Bilateral pleural-based drains with trace pleural effusions and no discernible pneumothorax.  2.  Minimal bibasilar atelectasis, otherwise clear lungs.  3.  Cardiomegaly with likely CABG related surgical clips.  4.  Right IJ Paden-Toni catheter tip in proximal right pulmonary artery.       Labs, personally reviewed.  Hemoglobin   Date Value Ref Range Status   03/23/2025 8.6 (L) 13.3 - 17.7 g/dL Final   03/22/2025 10.2 (L) 13.3 - 17.7 g/dL Final   03/21/2025 9.9 (L) 13.3 - 17.7 g/dL Final     WBC Count   Date Value Ref Range Status   03/23/2025 7.5 4.0 - 11.0 10e3/uL Final   03/22/2025 10.2 4.0 - 11.0 10e3/uL Final   03/21/2025 12.8 (H) 4.0 - 11.0 10e3/uL Final     Platelet Count   Date Value Ref Range Status   03/23/2025 116 (L) 150 - 450 10e3/uL Final   03/22/2025 147 (L) 150 - 450 10e3/uL Final   03/21/2025 137 (L) 150 - 450 10e3/uL Final     Creatinine   Date Value Ref Range Status   03/23/2025 2.17 (H) 0.67 - 1.17 mg/dL Final   03/22/2025 2.20 (H) 0.67 - 1.17 mg/dL Final   03/21/2025 2.25 (H) 0.67 - 1.17 mg/dL Final     Potassium   Date Value Ref Range Status   03/23/2025 3.8 3.4 - 5.3 mmol/L Final   03/22/2025 4.0 3.4 - 5.3 mmol/L Final   03/21/2025 4.1 3.4 - 5.3 mmol/L Final   04/25/2022 4.5 3.5 - 5.0 mmol/L Final   02/01/2021 4.8 3.5 - 5.0 mmol/L Final   12/13/2019 5.0 3.5 - 5.0 mmol/L Final     Potassium POCT   Date Value Ref Range Status   03/17/2025 4.0 3.4 - 5.3 mmol/L Final   03/17/2025 3.9 3.4 - 5.3 mmol/L Final   03/17/2025 4.3 3.4 - 5.3 mmol/L Final     Magnesium   Date Value Ref Range Status   03/23/2025 2.2 1.7 - 2.3 mg/dL Final   03/22/2025 2.3 1.7 - 2.3 mg/dL Final   03/21/2025 2.3 1.7 - 2.3 mg/dL Final     "      I/O:  I/O last 3 completed shifts:  In: 1081.79 [P.O.:1080; I.V.:1.79]  Out: 2175 [Urine:2175]       Physical Exam:    General: Patient seen in bed, resting. Per report, he yells out \"help me\"  HEENT: NICK, no sclera icterus, moist mucosa, no O2 device in place  CV: RRR on monitor. 2+ peripheral pulses in all extremities. Mild edema.   Pulm: Non-labored effort on room air.  Incision C/D/I.  Abd: Soft, NT, ND, +bm  : lisa urine   Ext: Mild pedal edema, SCDs in place, warm, distal pulses intact, Femstop in place after IABP removal  Neuro: A&Ox3, COPELAND, and CN grossly intact      ASSESSMENT/PLAN:    Eric Cordoba is a 74 year old male with a history of CAD, LV mural thrombus, CKD 3b, DM2, HTN, h/o CVA, and ICM who is s/p CABG x4 , LLE EVH, and preop IABP placement.    Principal Problem:    Coronary artery disease involving native coronary artery of native heart, unspecified whether angina present  Active Problems:    CAD (coronary artery disease)    Coronary artery disease of native artery of native heart with stable angina pectoris      NEURO:   Acute postoperative pain  H/o CVA, PTA issue  Postoperative delirium  - Scheduled Tylenol/lidocaine patches and PRN Tylenol/oxycodone for pain  - PTA gabapentin  - Delirium precautions  - Avoid deliriogenic medications and narcotics as able  - head CT pending     CV:   HTN//HLD, PTA issues  CAD // ICM s/p CABG  Preop IABP, resolved.  - Pre-op EF 35-40%  - IABP removed POD#1  - Coreg 25 mg, may decrease if restarting PTA losartan when renal function improves  - ASA 81 mg // Plavix 75 mg qday per surgeon preference for graft patency  -  Simvastatin 40mg daily  - New baseline echo after CT/TPW removed and prior to discharge.  - [facility] IP consult placed for HF follow up  - Hold PTA losartan given JESS  - remove wound vac     PULM:   - Extubated POD#0  - Maintaining oxygen saturations on room air  - Encourage pulmonary toilet    FEN/GI:  - Diet: Clears, ADAT to Cardiac " - low consistent carb  - Continue electrolyte replacement protocol  - Bowel regimen, LBM 3/20    RENAL:  JESS on CKD3b, PTA issue  - borderline UOP/hr. Continue to monitor closely.  - Cr 2.15 (2.78) (baseline ~ 1.8-2.0)  - Riojas removed , bladder scan prn  - lasix 20 BID    HEME:  LV mural thrombus, PTA issue  - Hold PTA eliquis (LV thrombus) for now although not visualized on cardiac MR 2/11/2025 or intra-op MANUEL so will discuss with surgeon   - Hgb stable, no bleeding concerns. Hep SQ, ASA    ID:  Reactive leukocytosis  - Shanae op ppx complete, afebrile. No concerns for infection  - Leukocytosis resolved     ENDO:   DM2  Diabetic Ketoacidosis, resolved.  - HbA1c 8.7%  - BG goal < 180 to promote optimal healing  - PTA on lantus 50u qPM and mounjaro 5mg q7 days - held  - Critical care consult for DKA 3/18, signed off.  - [facility] consult for assistance with DM management s/p DKA.   - 35u lantus qAM  - High dose SSI q4    Ppx / MISC:   - DVT: SCDs, SQ heparin TID, ambulation   - GI: Protonix 40mg PO daily  - LINES: PICC placed 3/20 given phlebotomy unable to draw blood and patient refusing additional attempts    DISPO:   - Continue critical care in ICU  - PT/OT recs at discharge: TCU  - Medically Ready for Discharge: following delirium, DKA and kidney status         Patient discussed with Dr. Blake.         Kisha Graves, CNP  [facility]   Cardiothoracic Surgery  Office: [phone]

## 2025-03-23 NOTE — PLAN OF CARE
"[facility] - ICU    RN Progress Note:            Pertinent Assessments:      Please refer to flowsheet rows for full assessment     - Alert to self and place and knew that he had surgery. Forgetful at times but redirectable. Needs a lot of self encouragement to do regular activities. Frequent stating that \"I need help\" even when the staff is front of him and helping him already to reposition him in bed. Unable to wait even for a few seconds and Gets mad and irritated when he doesn't get what he right away.   - Vital signs stable. NSR with BBB. Room air. 02 Sats >90%. Wound Vac intact.         Key Events - This Shift:     - PRN melatonin given for sleep and helpful.  - Complained of back pain and wanted to be repositioned in bed frequently and helpful.    RN Managed Protocols Ordered:  Yes  Protocols:Potassium, Magnesium, and Phosphorus, Ionized calcium.  PRN'S: PRN melatonin  Protocols Status: Endorsed to Oncoming RN          Barriers to Discharge / Downgrade:     - Possible downgrade from ICU status.    Problem: Cardiovascular Surgery  Goal: Improved Activity Tolerance  Outcome: Progressing  Intervention: Optimize Tolerance for Activity  Goal: Optimal Coping with Heart Surgery  Intervention: Support Psychosocial Response to Surgery  Goal: Optimal Cerebral Tissue Perfusion  Intervention: Protect and Optimize Cerebral Perfusion  Goal: Blood Glucose Level Within Targeted Range  Outcome: Progressing  Goal: Anesthesia/Sedation Recovery  Intervention: Optimize Anesthesia Recovery  Goal: Acceptable Pain Control  Outcome: Progressing  Goal: Effective Urinary Elimination  Outcome: Progressing  Goal: Effective Oxygenation and Ventilation  Intervention: Promote Airway Secretion Clearance    Problem: Delirium  Goal: Optimal Coping  Intervention: Optimize Psychosocial Adjustment to Delirium  Goal: Improved Behavioral Control  Intervention: Prevent and Manage Agitation  Intervention: Minimize Safety " Risk  Goal: Improved Attention and Thought Clarity  Intervention: Maximize Cognitive Function  Goal: Improved Sleep  Outcome: Progressing

## 2025-03-23 NOTE — PROGRESS NOTES
[facility]    Medicine Progress Note - Hospitalist Service    Date of Admission:  3/17/2025    Assessment & Plan   Eric Cordoba is a 74 year old male admitted on 3/17/2025. He was admitted for elective CABG on 03/17/2025. Pmhx is significant for CKD stage III, type 2 diabetes mellitus, history of CVA, hypertension, HFrEF, left ventricular apical thrombus.  Patient was in ICU recovering from procedure since 3/17.  Postoperative course was complicated by development of DKA and JESS.  [facility] consulted to assist with management of medical comorbidities.      3/23 :      Some off and on confusion  Neurology consulted : MRI Brain ordered-patient refused this this morning.  Based on the patient's exam, there is okay to defer MRI brain to the outpatient setting  B12, folate, TSH ordered and pending  If patient continues to have improvement while hospitalized, could consider this metabolic encephalopathy and would not require outpatient neurology follow-up.     CT surgery following  Continue coreg, baby aspirin, plavix, statin  New baseline echo after CT/TPW removed and prior to discharge.   Holding  PTA eliquis (LV thrombus) for now although not visualized on cardiac MR 2/11/2025 or intra-op MANUEL - CT surgery to address    Creatinine stable at 2.17, on lasix 20 mg iv bid    Blood sugars stable in 170-240 range, no gap  On sliding scale insulin, insulin per carb counting,lantus  Changed sliding scale insulin to medium from low  On diabetic diet      Not medically ready for discharge at this time.  Multi day stay at this time  Needs placement        A/p :        Type 2 diabetes mellitus, poorly controlled  DKA  S/P DKA protocol.  Anion gap has closed and patient started on clear liquid diet  Did receive 20 units of Lantus subcutaneous this morning  High intensity insulin sliding scale  Accu-Checks  Hypoglycemia protocol  Recent A1c is 8.7  His home regimen includes Lantus 50 units subcutaneous daily and  "Mounjaro once weekly  Start Lantus 35 units subcutaneously after  insulin gtt. is discontinued.    JESS on CKD  Baseline creatinine appears to be around 1.8.   2.8-->2.2  Avoid nephrotoxic medication  Diuretics per CV surgery team  Strict input and output, monitor BMP    CAD s/p CABG on 03/17  Postop management per CV surgery team  Chest tube management per CV surgery team  Currently tolerating clear liquid diet      Acute encephalopathy  -Likely multifactorial including residual anesthesia effect, possible underlying cognitive impairment  -Delirium precaution.  Is off one-to-one observation now  -Ammonia is not elevated.  UA is unremarkable,   -CT head: Reported with subcentimeter focus of hypoattenuation in the left frontal lobe.  This could reflect a small age-indeterminate infarction.  -Will get brain MRI  -Neurology consultation  -Continue supportive measures  - per his NOK he has been noted to be forgetful recently           Diet: Combination Diet Regular Diet; Low Consistent Carb (45 g CHO per Meal) Diet; Low Saturated Fat Na <2400mg Diet  Snacks/Supplements Adult: Glucerna; Between Meals  Snacks/Supplements Adult: Glucerna; With Meals    DVT Prophylaxis: Heparin SQ  [place] Catheter: Not present  Lines: PRESENT      PICC 03/20/25 Double Lumen Right Brachial vein lateral-Site Assessment: WDL      Cardiac Monitoring: ACTIVE order. Indication: ICU  Code Status: Full Code      Clinically Significant Risk Factors          # Hyperchloremia: Highest Cl = 109 mmol/L in last 2 days, will monitor as appropriate          # Hypoalbuminemia: Lowest albumin = 3.3 g/dL at 3/20/2025  8:58 AM, will monitor as appropriate   # Thrombocytopenia: Lowest platelets = 116 in last 2 days, will monitor for bleeding             # DMII: A1C = 8.7 % (Ref range: <5.7 %) within past 6 months   # Obesity: Estimated body mass index is 30.54 kg/m  as calculated from the following:    Height as of 2/24/25: 1.753 m (5' 9\").    Weight as of this " encounter: 93.8 kg (206 lb 12.8 oz).       # History of CABG: noted on surgical history       Social Drivers of Health    Tobacco Use: Medium Risk (3/14/2025)    Patient History     Smoking Tobacco Use: Former     Smokeless Tobacco Use: Never    Received from [facility]    Financial Resource Strain    Received from [facility]    Social Connections          Disposition Plan     Medically Ready for Discharge: Anticipated in 2-4 Days             Gennaro Warner MD  Hospitalist Service  [facility]  Securely message with [employer] (more info)  Text page via [employer] Paging/Directory   ______________________________________________________________________        Physical Exam   Vital Signs: Temp: 98  F (36.7  C) Temp src: Oral BP: 138/61 Pulse: 81   Resp: 10 SpO2: 96 % O2 Device: None (Room air)    Weight: 206 lbs 12.8 oz    General Appearance:  Acutely sick looking  Respiratory: Good air entry bilaterally, surgical dressing on anterior chest  Cardiovascular: S1 and S2 heard  GI: Soft abdomen, no tenderness, normoactive bowel sounds  Skin: Intact and warm       Medical Decision Making       40 MINUTES SPENT BY ME on the date of service doing chart review, history, exam, documentation & further activities per the note.      Data

## 2025-03-24 ENCOUNTER — TELEPHONE (OUTPATIENT)
Dept: CARDIOLOGY | Facility: CLINIC | Age: 75
End: 2025-03-24
Payer: COMMERCIAL

## 2025-03-24 ENCOUNTER — APPOINTMENT (OUTPATIENT)
Dept: PHYSICAL THERAPY | Facility: HOSPITAL | Age: 75
DRG: 235 | End: 2025-03-24
Attending: INTERNAL MEDICINE
Payer: COMMERCIAL

## 2025-03-24 ENCOUNTER — APPOINTMENT (OUTPATIENT)
Dept: OCCUPATIONAL THERAPY | Facility: HOSPITAL | Age: 75
DRG: 235 | End: 2025-03-24
Attending: INTERNAL MEDICINE
Payer: COMMERCIAL

## 2025-03-24 ENCOUNTER — APPOINTMENT (OUTPATIENT)
Dept: SPEECH THERAPY | Facility: HOSPITAL | Age: 75
DRG: 235 | End: 2025-03-24
Attending: INTERNAL MEDICINE
Payer: COMMERCIAL

## 2025-03-24 DIAGNOSIS — I50.9 ACUTE DECOMPENSATED HEART FAILURE (H): Primary | ICD-10-CM

## 2025-03-24 LAB
ANION GAP SERPL CALCULATED.3IONS-SCNC: 10 MMOL/L (ref 7–15)
BUN SERPL-MCNC: 53.1 MG/DL (ref 8–23)
CALCIUM SERPL-MCNC: 8.6 MG/DL (ref 8.8–10.4)
CHLORIDE SERPL-SCNC: 108 MMOL/L (ref 98–107)
CREAT SERPL-MCNC: 2 MG/DL (ref 0.67–1.17)
EGFRCR SERPLBLD CKD-EPI 2021: 34 ML/MIN/1.73M2
FOLATE SERPL-MCNC: 7.7 NG/ML (ref 4.6–34.8)
GLUCOSE BLDC GLUCOMTR-MCNC: 170 MG/DL (ref 70–99)
GLUCOSE BLDC GLUCOMTR-MCNC: 172 MG/DL (ref 70–99)
GLUCOSE BLDC GLUCOMTR-MCNC: 191 MG/DL (ref 70–99)
GLUCOSE BLDC GLUCOMTR-MCNC: 250 MG/DL (ref 70–99)
GLUCOSE SERPL-MCNC: 202 MG/DL (ref 70–99)
HCO3 SERPL-SCNC: 25 MMOL/L (ref 22–29)
MAGNESIUM SERPL-MCNC: 2.1 MG/DL (ref 1.7–2.3)
PHOSPHATE SERPL-MCNC: 3.9 MG/DL (ref 2.5–4.5)
PLATELET # BLD AUTO: 118 10E3/UL (ref 150–450)
POTASSIUM SERPL-SCNC: 4.1 MMOL/L (ref 3.4–5.3)
SODIUM SERPL-SCNC: 143 MMOL/L (ref 135–145)
VIT B12 SERPL-MCNC: 182 PG/ML (ref 232–1245)

## 2025-03-24 PROCEDURE — 84100 ASSAY OF PHOSPHORUS: CPT | Performed by: PHYSICIAN ASSISTANT

## 2025-03-24 PROCEDURE — 97110 THERAPEUTIC EXERCISES: CPT | Mod: GO

## 2025-03-24 PROCEDURE — 94799 UNLISTED PULMONARY SVC/PX: CPT

## 2025-03-24 PROCEDURE — 99233 SBSQ HOSP IP/OBS HIGH 50: CPT | Mod: 24 | Performed by: STUDENT IN AN ORGANIZED HEALTH CARE EDUCATION/TRAINING PROGRAM

## 2025-03-24 PROCEDURE — 250N000011 HC RX IP 250 OP 636: Performed by: NURSE PRACTITIONER

## 2025-03-24 PROCEDURE — 250N000013 HC RX MED GY IP 250 OP 250 PS 637: Performed by: INTERNAL MEDICINE

## 2025-03-24 PROCEDURE — 82607 VITAMIN B-12: CPT | Performed by: STUDENT IN AN ORGANIZED HEALTH CARE EDUCATION/TRAINING PROGRAM

## 2025-03-24 PROCEDURE — 250N000011 HC RX IP 250 OP 636: Performed by: STUDENT IN AN ORGANIZED HEALTH CARE EDUCATION/TRAINING PROGRAM

## 2025-03-24 PROCEDURE — 82746 ASSAY OF FOLIC ACID SERUM: CPT | Performed by: STUDENT IN AN ORGANIZED HEALTH CARE EDUCATION/TRAINING PROGRAM

## 2025-03-24 PROCEDURE — 250N000013 HC RX MED GY IP 250 OP 250 PS 637: Performed by: NURSE PRACTITIONER

## 2025-03-24 PROCEDURE — 83735 ASSAY OF MAGNESIUM: CPT | Performed by: PHYSICIAN ASSISTANT

## 2025-03-24 PROCEDURE — 92526 ORAL FUNCTION THERAPY: CPT | Mod: GN

## 2025-03-24 PROCEDURE — 250N000011 HC RX IP 250 OP 636: Performed by: PHYSICIAN ASSISTANT

## 2025-03-24 PROCEDURE — 83921 ORGANIC ACID SINGLE QUANT: CPT | Performed by: STUDENT IN AN ORGANIZED HEALTH CARE EDUCATION/TRAINING PROGRAM

## 2025-03-24 PROCEDURE — 97535 SELF CARE MNGMENT TRAINING: CPT | Mod: GO

## 2025-03-24 PROCEDURE — 97530 THERAPEUTIC ACTIVITIES: CPT | Mod: GP

## 2025-03-24 PROCEDURE — 85049 AUTOMATED PLATELET COUNT: CPT | Performed by: PHYSICIAN ASSISTANT

## 2025-03-24 PROCEDURE — 250N000013 HC RX MED GY IP 250 OP 250 PS 637

## 2025-03-24 PROCEDURE — 99233 SBSQ HOSP IP/OBS HIGH 50: CPT | Performed by: INTERNAL MEDICINE

## 2025-03-24 PROCEDURE — 80048 BASIC METABOLIC PNL TOTAL CA: CPT

## 2025-03-24 PROCEDURE — 250N000013 HC RX MED GY IP 250 OP 250 PS 637: Performed by: PHYSICIAN ASSISTANT

## 2025-03-24 PROCEDURE — 999N000157 HC STATISTIC RCP TIME EA 10 MIN

## 2025-03-24 PROCEDURE — 210N000001 HC R&B IMCU HEART CARE

## 2025-03-24 RX ORDER — FUROSEMIDE 40 MG/1
40 TABLET ORAL DAILY
Status: DISCONTINUED | OUTPATIENT
Start: 2025-03-25 | End: 2025-03-26 | Stop reason: HOSPADM

## 2025-03-24 RX ORDER — NICOTINE POLACRILEX 4 MG
15-30 LOZENGE BUCCAL
Status: DISCONTINUED | OUTPATIENT
Start: 2025-03-24 | End: 2025-03-26 | Stop reason: HOSPADM

## 2025-03-24 RX ORDER — CARVEDILOL 12.5 MG/1
12.5 TABLET ORAL 2 TIMES DAILY
Status: DISCONTINUED | OUTPATIENT
Start: 2025-03-24 | End: 2025-03-26 | Stop reason: HOSPADM

## 2025-03-24 RX ORDER — DEXTROSE MONOHYDRATE 25 G/50ML
25-50 INJECTION, SOLUTION INTRAVENOUS
Status: DISCONTINUED | OUTPATIENT
Start: 2025-03-24 | End: 2025-03-26 | Stop reason: HOSPADM

## 2025-03-24 RX ORDER — CYANOCOBALAMIN 1000 UG/ML
1000 INJECTION, SOLUTION INTRAMUSCULAR; SUBCUTANEOUS
Status: DISCONTINUED | OUTPATIENT
Start: 2025-03-24 | End: 2025-03-26 | Stop reason: HOSPADM

## 2025-03-24 RX ADMIN — ACETAMINOPHEN 975 MG: 325 TABLET ORAL at 13:18

## 2025-03-24 RX ADMIN — CLOPIDOGREL BISULFATE 75 MG: 75 TABLET, FILM COATED ORAL at 08:12

## 2025-03-24 RX ADMIN — HEPARIN SODIUM 5000 UNITS: 5000 INJECTION, SOLUTION INTRAVENOUS; SUBCUTANEOUS at 21:01

## 2025-03-24 RX ADMIN — SIMVASTATIN 40 MG: 10 TABLET, FILM COATED ORAL at 08:12

## 2025-03-24 RX ADMIN — HEPARIN SODIUM 5000 UNITS: 5000 INJECTION, SOLUTION INTRAVENOUS; SUBCUTANEOUS at 05:16

## 2025-03-24 RX ADMIN — ASPIRIN 81 MG CHEWABLE TABLET 81 MG: 81 TABLET CHEWABLE at 08:12

## 2025-03-24 RX ADMIN — Medication 5 MG: at 00:27

## 2025-03-24 RX ADMIN — Medication 5 MG: at 23:49

## 2025-03-24 RX ADMIN — FUROSEMIDE 20 MG: 10 INJECTION, SOLUTION INTRAMUSCULAR; INTRAVENOUS at 08:10

## 2025-03-24 RX ADMIN — POLYETHYLENE GLYCOL 3350 17 G: 17 POWDER, FOR SOLUTION ORAL at 08:04

## 2025-03-24 RX ADMIN — ACETAMINOPHEN 975 MG: 325 TABLET ORAL at 05:16

## 2025-03-24 RX ADMIN — PANTOPRAZOLE SODIUM 40 MG: 40 TABLET, DELAYED RELEASE ORAL at 07:01

## 2025-03-24 RX ADMIN — CARVEDILOL 25 MG: 12.5 TABLET, FILM COATED ORAL at 08:12

## 2025-03-24 RX ADMIN — ACETAMINOPHEN 975 MG: 325 TABLET ORAL at 21:00

## 2025-03-24 RX ADMIN — LIDOCAINE 2 PATCH: 4 PATCH TOPICAL at 21:00

## 2025-03-24 RX ADMIN — ACETAMINOPHEN 650 MG: 325 TABLET ORAL at 23:50

## 2025-03-24 RX ADMIN — LOSARTAN POTASSIUM 12.5 MG: 25 TABLET, FILM COATED ORAL at 11:00

## 2025-03-24 RX ADMIN — CYANOCOBALAMIN 1000 MCG: 1000 INJECTION, SOLUTION INTRAMUSCULAR; SUBCUTANEOUS at 13:19

## 2025-03-24 RX ADMIN — HEPARIN SODIUM 5000 UNITS: 5000 INJECTION, SOLUTION INTRAVENOUS; SUBCUTANEOUS at 14:07

## 2025-03-24 RX ADMIN — SENNOSIDES AND DOCUSATE SODIUM 1 TABLET: 50; 8.6 TABLET ORAL at 08:12

## 2025-03-24 RX ADMIN — CARVEDILOL 12.5 MG: 12.5 TABLET, FILM COATED ORAL at 21:00

## 2025-03-24 RX ADMIN — SENNOSIDES AND DOCUSATE SODIUM 1 TABLET: 50; 8.6 TABLET ORAL at 21:00

## 2025-03-24 ASSESSMENT — ACTIVITIES OF DAILY LIVING (ADL)
ADLS_ACUITY_SCORE: 82
ADLS_ACUITY_SCORE: 78
ADLS_ACUITY_SCORE: 71
ADLS_ACUITY_SCORE: 71
ADLS_ACUITY_SCORE: 73
ADLS_ACUITY_SCORE: 71
ADLS_ACUITY_SCORE: 82
ADLS_ACUITY_SCORE: 71
ADLS_ACUITY_SCORE: 76
ADLS_ACUITY_SCORE: 82
ADLS_ACUITY_SCORE: 78
ADLS_ACUITY_SCORE: 71
ADLS_ACUITY_SCORE: 76
ADLS_ACUITY_SCORE: 71
ADLS_ACUITY_SCORE: 82
ADLS_ACUITY_SCORE: 71

## 2025-03-24 NOTE — PROGRESS NOTES
[facility]  Neurology Consultation - Progress Note    Patient Name:  Eric Cordoba  Date of Service:  March 24, 2025    Subjective:    No acute events, no further behavioral agitation or episodes of disorientation or delirium according to nursing staff.  Patient himself thinks that he slept well, though I am unable to corroborate this readily since he transferred from 1 unit to another overnight with fragmented nursing staff.    Objective:    Vitals: /56 (BP Location: Left arm)   Pulse 80   Temp 97.7  F (36.5  C) (Oral)   Resp 18   Wt 93.4 kg (205 lb 12.8 oz)   SpO2 96%   BMI 30.39 kg/m    General: Lying in bed, NAD  Head: Atraumatic, normocephalic   Cardiac: no lower extremity edema  Neurologic:  Mental status: Awake, alert.  He is oriented to month (had just been reminded of this by nursing staff), was not able to state the year, initially thought it was 1975, though he was able to choose the correct hearing out of multiple-choice testing.  He is able to show me 2 fingers with his right hand, has preserved left/right discrimination, able to follow two-step command.  He is not able to subtract 7 from 100, he is able to tell me the correct number of quarters in 1.75.  He is able to state the name of this hospital without prompting.    Cranial nerves: VFF, PERRL, conjugate gaze, EOMI, facial sensation intact, face symmetric, shoulder shrug strong, tongue/uvula midline, no dysarthria.   Motor: Normal bulk and tone. No abnormal movements. 5/5 strength bilaterally in deltoids, biceps, triceps, hand , hip flexors, hip extensors, knee flexion, knee extension, plantarflexion, dorsiflexion.   Reflexes: Normo-reflexic and symmetric biceps, brachioradialis, patellae, and achilles. Negative Raza, no clonus, toes down-going.  Sensory: Intact to light touch in proximal and distal aspects of all 4 extremities   Coordination: FNF without ataxia or dysmetria. Rapid  alternating movements intact.   Gait: Not assessed-had been ambulating with a walker earlier with physical therapy, no ataxia    Pertinent Investigations:    I have personally reviewed most recent and pertinent labs, tests, and radiological images.     CT Head 3/22/25  IMPRESSION:  1.  Subcentimeter focus of hypoattenuation in the left frontal lobe as above. This is could reflect a small age-indeterminate infarction. Consider MRI for further evaluation, as clinically appropriate.  2.  Mild age-related change.    B12- 182 3/23/25    Assessment  #JESS on CKD  #DKA, improved  #Coronary artery disease s/p CABG  #Metabolic encephalopathy secondary to above, improving  #Query hospital-acquired delirium  #B12 deficiency      Mr. Cordoba was admitted for CABG, developed encephalopathy which could certainly have been secondary to worsening renal function, with uremia up to 60-note that his baseline BUN is roughly 25. BUN has improved today to 53.  He may have some element of hospital-acquired delirium as well so I will continue to recommend delirium precautions, avoiding opiate pain medications, avoiding other centrally acting medications.  There is some suggestion from family/nursing report that he has had some memory loss prior to coming into the hospital which could put him at increased risk of hospital-acquired delirium.  I have ordered B12, folate, TSH, and MRI brain to assess for any underlying condition that could put him at risk for cognitive impairment.      My examination is stable compared to yesterday.  There is no report of behavioral agitation or episodic confusion overnight.  He is able to follow commands, though he remains disoriented to the year and requires multiple-choice options to choose the correct year.  Based on this, there is not more that I can offer here in the inpatient setting, especially since he has refused from the MRI brain.  I will recommend outpatient follow-up with neurology and  neuropsychology for further evaluation and management in the outpatient setting.    B12 was low at 282.  I have added on a methylmalonic acid to assess for functional B12 deficiency which can cause/worsen cognitive impairment.  I have also ordered a B12 injection to be given now, and recommend daily sublingual B12 supplementation.    Recommendations:   -MRI Brain ordered-patient refused yesterday.  Based on the patient's exam, there is okay to defer MRI brain to the outpatient setting  - B12, folate, TSH ordered and pending  - MMA pending   - Started B12 500-1000 mcg sublingual daily after IM injection   - Delirium precautions  - Recommend outpatient follow-up with neurology/neuropsychology (ordered) due to hospital acquired delirium, possible mild cognitive impairment  - Inpatient neurology will sign off at this time, please call if any new questions arise    Thank you for involving Neurology in the care of Eric Cordoba.  Please do not hesitate to call with questions.     Sammy Uribe MD     Billed as a level 3 service due to patient presenting with an acute illness posing a severe threat to bodily function.  I have reviewed notes from nursing, and cardiothoracic surgery.  And I have reviewed labs above including CBC, BMP, B12.

## 2025-03-24 NOTE — OP NOTE
DATE OF PROCEDURE: 3/17/2025    PREOPERATIVE DIAGNOSIS: Severe, multivessel coronary artery disease    POSTOPERATIVE DIAGNOSIS: Same    ATTENDING SURGEON:Karine Shahid MD    SURGICAL ASSISTANTS: Kisha Graves PA-C, Prabha Giang PA-C    PROCEDURES PERFORMED:    1) Ultrasound evaluation of lower extremity vein  2) Median sternotomy  3) Left internal mammary artery harvest  4) Endoscopic harvest of left saphenous vein   5) Epiaortic ultrasound of the ascending aorta  6) Placement on central cardiopulmonary bypass  7) Coronary artery bypass grafting x4 (in-situ left internal mammary artery to left anterior descending and separate reverse saphenous vein grafts to distal right coronary artery, first obtuse marginal, and diagonal coronary arteries)   8) Placement of temporary ventricular pacing wires  9) Transesophageal echocardiography    INDICATION:   The patient is a 74-year-old man with a history of CKD III (Cr 1.8), DM2 (A1c 11-12, poorly controlled, on insulin with polyneuropathy), CVA (memory impairment), HTN, HFrEF (EF 35-40%), MI, and LV apical thrombus (on Eliquis, may have resolved) who presents for evaluation for severe multivessel coronary artery disease. The remainder of the preoperative workup was performed without contraindication to surgery. The patient was counseled on and understood the potential risks, alternatives, and benefits associated with surgical revascularization and elected to proceed.     FINDINGS:  Severely calcified coronary arteries, no OM 2 target, OM1 intramyocardial, LAD diffusely calcified throughout its length. EF improved from 25% to 50% at end of case. NSR with VVI backup.    DESCRIPTION OF OPERATION:  A timeout procedure was performed confirming the correct patient, surgical site, and procedure. The patient underwent uneventful general endotracheal anesthesia and invasive hemodynamic monitoring lines were placed. The neck, chest, abdomen, groins, and legs were prepped and  draped in the usual sterile fashion.  Pre-procedure transesophageal echocardiography revealed EF 25%, no major valvular abnormalities, and no evidence of LV thrombus. Ultrasound evaluation of the lower extremity vein revealed adequate vein available for bypass grafting in the left lower extremity.    A median sternotomy was performed and the left internal mammary artery was harvested in a pedicled fashion while the left greater saphenous vein was harvested endoscopically. 300 units per kilogram of sodium heparin was administered. A sternal retractor was inserted and a pericardial well was created. Epiaortic ultrasound was utilized to evaluate the aorta for calcification and guide placement of the aortic cannulation site and cross-clamp. The patient was cannulated for cardiopulmonary bypass using the high ascending aorta for infusion and venous drainage was accomplished via a two-stage cannula in the right atrial appendage. A retrograde cardioplegia catheter was placed in the coronary sinus. An antegrade cardioplegia catheter was placed in the mid ascending aorta. When a satisfactory activating clotting time was confirmed, cardiopulmonary bypass was initiated, and the patient was cooled to 34 degrees. The aortic cross-clamp was applied and cardioplegic arrest was initiated with 1 liter of cold blood cardioplegia delivered antegrade and 300 milliliters delivered retrograde. Satisfactory diastolic arrest was achieved. Ice slush was used for topical hypothermia. Cardioplegia was re-dosed throughout the case with both antegrade and retrograde cardioplegia.    The right posterior descending coronary artery was identified but was totally calcified throughout its length. The distal right coronary artery was identified and was also calcified but a soft spot adequate for bypass grafting was identified. The vessel was approximately 1.7 millimeters in size and of calcified quality with soft plaque. A reverse saphenous vein  graft was anastomosed in a running, end-to-side fashion with 7-0 Prolene suture. The graft was gently distended and found to be widely patent and hemostatic. Cold blood cardioplegia was then delivered in an antegrade fashion and the graft was cut to size. A 4 millimeter punch was created in the ascending aorta and the proximal anastomosis performed in a running fashion with 6-0 Prolene suture while delivering continuous retrograde cardioplegia.      The second obtuse marginal was searched for but the only vessel identified was extremely small and not large enough for bypass grafting.    The first obtuse marginal coronary artery was identified and dove intramyocardially. The vessel was carefully exposed by dividing the overlying myocardium. The vessel was approximately 1.7 millimeters in size and of fair quality. A reverse saphenous vein graft was anastomosed in a running, end-to-side fashion with 7-0 Prolene suture. The graft was gently distended and found to be widely patent and hemostatic. Cold blood cardioplegia was then delivered in an antegrade fashion and the graft was cut to size. A 4 millimeter punch was created in the ascending aorta and the proximal anastomosis performed in a running fashion with 6-0 Prolene suture while delivering continuous retrograde cardioplegia.      The diagonal coronary artery was then identified and incised. The vessel was approximately 1.5 millimeters in size and had diffuse calcifications but was adequate for bypass grafting. A reverse saphenous vein graft was anastomosed in a running, end-to-side fashion with 7-0 Prolene suture. The graft was gently distended and found to be widely patent and hemostatic. Cold blood cardioplegia was then delivered in an antegrade fashion and the graft was cut to size. A 4 millimeter punch was created in the ascending aorta and the proximal anastomosis performed in a running fashion with 6-0 Prolene suture while delivering continuous retrograde  cardioplegia.      The left anterior descending coronary artery was then identified in the middle third and was approximately 1.5 millimeters in size and of poor quality, diffusely calcified throughout its length. The left internal mammary artery was brought into the field and spatulated. It had excellent flow. The anastomosis was performed in a running, end-to-side fashion with 7-0 Prolene suture. The temporary clamp on the internal mammary artery was released and excellent flow could be seen distally and the anastomosis appeared hemostatic.     A terminal dose of warm cardioplegia was delivered in retrograde fashion, de-airing maneurvers were performed, and the aortic cross-clamp was removed. Total aortic cross-clamp time was 116 minutes. The heart resumed normal sinus rhythm. A ventricular pacing wire was placed on the diaphragmatic surface of the heart. Ventilation was resumed, the patient was systemically warmed and he weaned from cardiopulmonary bypass on the first attempt. Post-procedure transesophageal echocardiography revealed improved LV function to EF roughly 50% and no new valvular abnormalities. Protamine was administered to reverse the anticoagulation and all cannulae for bypass were removed. Hemostasis was satisfactory. Total cardiopulmonary bypass time was 135 minutes. The fat within the superior mediastinum was closed over the aorta. Two mediastinal chest tubes and bilateral pleural arslan drains were placed. The sternum was closed with stainless steel wires and the remainder of the closure was routine.    The needles, instruments, and sponges were counted and correct at the end of the case. Patient was transferred back to ICU in stable condition.    The assistance of Kisha Graves PA-C and Prabha Giang PA-C was required in this case due to their specialized skill set and standard of care. Prabha assisted by performing endoscopic harvest of saphenous vein and lower extremity ultrasound  evaluation. Kisha assisted with vein harvest and prep, and first-assisted throughout the case.     Alice Shahid MD

## 2025-03-24 NOTE — PROGRESS NOTES
SPIRITUAL HEALTH SERVICES Progress Note    Saw pt Eric Cordoba and offered Christian sacrament of anointing for the healing of the sick.     Fr. Darryl Cedeno

## 2025-03-24 NOTE — PLAN OF CARE
Speech Language Therapy Discharge Summary    Reason for therapy discharge:    All goals and outcomes met, no further needs identified.    Progress towards therapy goal(s). See goals on Care Plan in Epic electronic health record for goal details.  Goals met    Therapy recommendation(s):    No further therapy is recommended.  Appetite still variable, but improving. Regular diet w/thin liquids, occasional throat clear but no other cough, throat clears, no change in vocal quality. At this time, patient has shown he can tolerate regular textures, thin liquids, although appetite and delirium may imact amounts of intake. There is no further skilled SLP interventions to offer at this time. If aspiration concerns arise, would recommend video swallow study

## 2025-03-24 NOTE — PLAN OF CARE
Problem: Adult Inpatient Plan of Care  Goal: Plan of Care Review    Outcome: Progressing   Goal Outcome Evaluation:    CABG #7    Pt arrived at unit at 0545 from ICU this morning via wheelchair. A/O x4 but intermittent confusion. Denies pain. VSS. Pt is up to chair with chair alarm on. Incisional sites intact, no bleeding.     Mag, K, and Phos protocol ran, recheck tomorrow AM.

## 2025-03-24 NOTE — PLAN OF CARE
Problem: Delirium  Goal: Improved Attention and Thought Clarity  Outcome: Progressing  Intervention: Maximize Cognitive Function  Recent Flowsheet Documentation  Taken 3/24/2025 0000 by Lor Quintero RN  Sensory Stimulation Regulation:   care clustered   lighting decreased   quiet environment promoted   television on   visual stimulation minimized  Reorientation Measures:   calendar in view   clock in view   familiar social contact encouraged   family pictures in room   glasses use encouraged     Problem: Comorbidity Management  Goal: Blood Pressure in Desired Range  Outcome: Progressing  Intervention: Maintain Blood Pressure Management  Recent Flowsheet Documentation  Taken 3/24/2025 0000 by Lor Quintero RN  Medication Review/Management: medications reviewed     Problem: Cardiovascular Surgery  Goal: Improved Activity Tolerance  Outcome: Progressing  Intervention: Optimize Tolerance for Activity  Recent Flowsheet Documentation  Taken 3/24/2025 0000 by Lor Quintero RN  Environmental Support:   calm environment promoted   distractions minimized   rest periods encouraged  Goal: Absence of Bleeding  Outcome: Progressing  Goal: Effective Cardiac Function  Outcome: Progressing  Goal: Fluid and Electrolyte Balance  Outcome: Progressing  Intervention: Monitor and Manage Fluid and Electrolyte Balance  Recent Flowsheet Documentation  Taken 3/24/2025 0000 by Lor Quintero RN  Fluid/Electrolyte Management: fluids provided     Problem: Risk for Delirium  Goal: Improved Attention and Thought Clarity  Outcome: Progressing  Intervention: Maximize Cognitive Function  Recent Flowsheet Documentation  Taken 3/24/2025 0000 by Lor Quintero RN  Sensory Stimulation Regulation:   care clustered   lighting decreased   quiet environment promoted   television on   visual stimulation minimized  Reorientation Measures:   calendar in view   clock in view   familiar social contact encouraged   family pictures in room    glasses use encouraged   Goal Outcome Evaluation:         Denies pain currently. Vitals stable. Pt complaining of trouble breathing, lungs clear, and oxygen saturation wnl. Pt assisted to recliner chair per pt request, states he feels better in chair. Melatonin given for sleep. Will continue to monitor.     Lor Quintero RN

## 2025-03-24 NOTE — PLAN OF CARE
Goal Outcome Evaluation:    sc  Patient Name: Eric Cordoba   MRN: [id number]   Date of Admission: 3/17/2025    Procedure: Procedure(s):  CORONARY ARTERY BYPASS GRAFT TIMES FOUR, LEFT INTERNAL MAMMARY ARTERY HARVEST, LEFT LEG ENDOSCOPIC SAPHENOUS VEIN PROCUREMENT,  EPIAORTIC ULTRASOUND, ANESTHESIA TRANSESOPHAGEAL ECHOCARDIOGRAM    Post Op day #:7    Subjective (Patient focus/Primary Problem for shift): No delirium, pain control and BM.          Pain Goal0 Pain Rating2           Pain Medication/ Regime effective to reduce patient painsched tylenol    Objective (Physical assessment):           Rhythm: normal sinus rhythm            Bowel Activity: no if Yes indicate when:           Bowel Medications: yes with prune juice.            Incision: healing well          Incentive Spirometry Q 1-2 hour when awake:  yes Volume: 2000 mL          Epicardial Pacing Wires:  no            Patient Activity:           Up to chair for meals: yes          Ambulation with RN x2 (Not including CR): yes           Shower Daily {YES/NO/[id number]          Nasal  Nozin BID AM/PM x 10 days: Pt refused.              Chest Tubes   Pleural: no                      Urinary Catheter: not applicable           Preventative WOC consult (need MD order): no       Assessment (Nursing primary shift focus): Did complain of constipation, sched BM meds given and prune juice offered. Refused shower, explained the risk of infection. Instead wipes provided by staff.    Plan (Patient Care Plan/focus): BM management, IS use, pain control and daily shower.      Dc Franco RN   3/24/2025   5:11 PM

## 2025-03-24 NOTE — PROGRESS NOTES
Care Management Follow Up    Length of Stay (days): 7    Expected Discharge Date: 03/25/2025     Concerns to be Addressed:     Care progression - discharge planning  Patient plan of care discussed at interdisciplinary rounds: Yes    Anticipated Discharge Disposition:  PT/OT rec Transitional care    Anticipated Discharge Services:  Transitional care  Anticipated Discharge DME:  NA    Patient/family educated on Medicare website which has current facility and service quality ratings:  Yes  Education Provided on the Discharge Plan:  Yes per team  Patient/Family in Agreement with the Plan:  Yes    Referrals Placed by CM/SW:  NA  Private pay costs discussed: Not applicable    Discussed  Partnership in Safe Discharge Planning  document with patient/family: No     Handoff Completed: No, handoff not indicated or clinically appropriate    Additional Information:  Met with patient at bedside to discuss PT/OT discharge rec for Transitional care.  Patient agreed. He will look through the list and give choices later.  Writer provided a list of local skilled nursing facilities [facility] (which includes the medicare.gov website) for patient and family to review.     Next Steps: RNCM to follow for medical progression, recommendations, and final discharge plan.     Eleni Massey RN     Met with patient at bedside to get TCU choices and he said he didn't have time yet. His sister is currently at a TCU and he needs to find out which one before making choices.

## 2025-03-24 NOTE — PROGRESS NOTES
"CVTS Daily Progress Note   POD#7  s/p CABG x4 (LIMA>LAD, rSVG>RCA, rSVG>OM1, rSVG>diag), LLE EVH, and preop IABP placement  Attending: Kleber  LOS: 7    SUBJECTIVE/INTERVAL EVENTS:  No acute events overnight. Mentation, delirium/confusion improved - A&Ox3 this AM. Patient progressing well overall at this point. Maintaining oxygen saturations on room air. Normotensive, IABP out POD#1. Ambulating with therapy to chair. Pain well controlled. +BM 3/21 . Tolerating diet without nausea although poor appetite, but is improving. UOP  improved with Lasix . Hgb stable. Patient denies new chest pain, shortness of breath, abdominal pain, calf pain, nausea.      OBJECTIVE:  Temp:  [97.4  F (36.3  C)-98  F (36.7  C)] 97.5  F (36.4  C)  Pulse:  [73-81] 73  Resp:  [10-21] 18  BP: (104-138)/(53-84) 130/61  SpO2:  [94 %-97 %] 96 %  Vitals:    03/20/25 0400 03/21/25 0500 03/22/25 0218 03/23/25 0600   Weight: 98.8 kg (217 lb 13 oz) 94.5 kg (208 lb 5.4 oz) 94.7 kg (208 lb 12.8 oz) 93.8 kg (206 lb 12.8 oz)    03/24/25 0552   Weight: 93.4 kg (205 lb 12.8 oz)       Clinically Significant Risk Factors          # Hyperchloremia: Highest Cl = 109 mmol/L in last 2 days, will monitor as appropriate          # Hypoalbuminemia: Lowest albumin = 3.3 g/dL at 3/20/2025  8:58 AM, will monitor as appropriate   # Thrombocytopenia: Lowest platelets = 116 in last 2 days, will monitor for bleeding             # DMII: A1C = 8.7 % (Ref range: <5.7 %) within past 6 months   # Obesity: Estimated body mass index is 30.39 kg/m  as calculated from the following:    Height as of 2/24/25: 1.753 m (5' 9\").    Weight as of this encounter: 93.4 kg (205 lb 12.8 oz).       # History of CABG: noted on surgical history           Current Medications:    Scheduled Meds:  Current Facility-Administered Medications   Medication Dose Route Frequency Provider Last Rate Last Admin    acetaminophen (TYLENOL) tablet 975 mg  975 mg Oral Q8H John Sen MD   975 mg at 03/24/25 " 0516    [Held by provider] apixaban ANTICOAGULANT (ELIQUIS) tablet 5 mg  5 mg Oral BID Zayra Raya PA-C        aspirin (ASA) chewable tablet 81 mg  81 mg Oral Daily Prabha Giang PA-C   81 mg at 03/24/25 0812    carvedilol (COREG) tablet 25 mg  25 mg Oral BID Kisha Graves APRN CNP   25 mg at 03/24/25 0812    clopidogrel (PLAVIX) tablet 75 mg  75 mg Oral Daily Prabha Giang PA-C   75 mg at 03/24/25 0812    furosemide (LASIX) injection 20 mg  20 mg Intravenous BID Kisha Graves APRN CNP   20 mg at 03/24/25 0810    [Held by provider] gabapentin (NEURONTIN) capsule 300 mg  300 mg Oral At Bedtime Zayra Raya PA-C   300 mg at 03/20/25 2101    [Held by provider] gabapentin (NEURONTIN) capsule 600 mg  600 mg Oral QAM Zayra Raya PA-C   600 mg at 03/21/25 0817    heparin ANTICOAGULANT injection 5,000 Units  5,000 Units Subcutaneous Q8H Zayra Raya PA-C   5,000 Units at 03/24/25 0516    insulin aspart (NovoLOG) injection (RAPID ACTING)  1-7 Units Subcutaneous TID  Gennaro Warner MD   1 Units at 03/24/25 0805    insulin aspart (NovoLOG) injection (RAPID ACTING)  1-5 Units Subcutaneous At Bedtime Gennaro Warner MD   1 Units at 03/23/25 2142    insulin aspart (NovoLOG) injection (RAPID ACTING)   Subcutaneous TID w/meals Kev Dejesus MD   9 Units at 03/24/25 0805    insulin glargine (LANTUS PEN) injection 35 Units  35 Units Subcutaneous QAM Kev Sage MD   35 Units at 03/24/25 0805    Lidocaine (LIDOCARE) 4 % Patch 1-2 patch  1-2 patch Transdermal Q24H Zayra Raya PA-C   2 patch at 03/23/25 2013    pantoprazole (PROTONIX) EC tablet 40 mg  40 mg Oral QAM Zayra Palma PA-C   40 mg at 03/24/25 0701    polyethylene glycol (MIRALAX) Packet 17 g  17 g Oral Daily Prabha Giang PA-C   17 g at 03/24/25 0804    senna-docusate (SENOKOT-S/PERICOLACE) 8.6-50 MG per tablet 1 tablet  1 tablet Oral BID  Zayra Raya PA-C   1 tablet at 03/24/25 0812    simvastatin (ZOCOR) tablet 40 mg  40 mg Oral Daily Prabha Giang PA-C   40 mg at 03/24/25 0812    sodium chloride (PF) 0.9% PF flush 10-40 mL  10-40 mL Intracatheter Q7 Days Prabha Giang PA-C   10 mL at 03/20/25 0933    sodium chloride (PF) 0.9% PF flush 10-40 mL  10-40 mL Intracatheter Daily Prabha Giang PA-C   10 mL at 03/24/25 0807     Continuous Infusions:  Current Facility-Administered Medications   Medication Dose Route Frequency Provider Last Rate Last Admin    dextrose 10% infusion   Intravenous Continuous PRN Prabha Giang PA-C         PRN Meds:.  Current Facility-Administered Medications   Medication Dose Route Frequency Provider Last Rate Last Admin    acetaminophen (TYLENOL) tablet 650 mg  650 mg Oral Q4H PRN Nabila Gates CNP   650 mg at 03/21/25 0309    bisacodyl (DULCOLAX) suppository 10 mg  10 mg Rectal Daily PRN Zayra Raya PA-C        calcium gluconate 1 g in 50 mL in sodium chloride intermittent infusion  1 g Intravenous Once PRN Zayra Raya PA-C   1 g at 03/17/25 2005    calcium gluconate 2 g in  mL intermittent infusion  2 g Intravenous Once PRN Zayra Raya PA-C        calcium gluconate 3 g in sodium chloride 0.9 % 100 mL intermittent infusion  3 g Intravenous Once PRN Zayra Raya PA-C        dextrose 10% infusion   Intravenous Continuous PRN Prabha Giang PA-C        glucose gel 15-30 g  15-30 g Oral Q15 Min PRN Gennaro Warner MD        Or    dextrose 50 % injection 25-50 mL  25-50 mL Intravenous Q15 Min PRN Gennaro Warner MD        Or    glucagon injection 1 mg  1 mg Subcutaneous Q15 Min PRN Gennaro Warner MD        hydrALAZINE (APRESOLINE) injection 10 mg  10 mg Intravenous Q30 Min PRN Zayra Raya PA-C   10 mg at 03/21/25 0821    magnesium hydroxide (MILK OF MAGNESIA) suspension 30 mL  30 mL Oral Daily PRN Dorota  Zayra Dobbs PA-C        melatonin tablet 5 mg  5 mg Oral At Bedtime PRN Susana Honeycutt MD   5 mg at 03/24/25 0027    naloxone (NARCAN) injection 0.2 mg  0.2 mg Intravenous Q2 Min PRN John Sen MD        Or    naloxone (NARCAN) injection 0.4 mg  0.4 mg Intravenous Q2 Min PRN John Sen MD        Or    naloxone (NARCAN) injection 0.2 mg  0.2 mg Intramuscular Q2 Min PRN John Sen MD        Or    naloxone (NARCAN) injection 0.4 mg  0.4 mg Intramuscular Q2 Min PRN John Sen MD        ondansetron (ZOFRAN ODT) ODT tab 4 mg  4 mg Oral Q6H PRN Zayra Raya PA-C        Or    ondansetron (ZOFRAN) injection 4 mg  4 mg Intravenous Q6H PRN Zayra Raya PA-C   4 mg at 03/22/25 2109    oxyCODONE IR (ROXICODONE) half-tab 2.5 mg  2.5 mg Oral Q4H PRN Zayra Raya PA-C   2.5 mg at 03/22/25 0033    prochlorperazine (COMPAZINE) injection 5 mg  5 mg Intravenous Q6H PRN Zayra Raya PA-C   5 mg at 03/21/25 0912    Or    prochlorperazine (COMPAZINE) tablet 5 mg  5 mg Oral Q6H PRN Zayra Raya PA-C        sodium chloride (PF) 0.9% PF flush 10-20 mL  10-20 mL Intracatheter q1 min prn Prabha Giang PA-C        sodium chloride (PF) 0.9% PF flush 10-40 mL  10-40 mL Intracatheter Once PRN Prabha Giang PA-C           Cardiographics:    Telemetry monitoring demonstrates sinus rhythm with rates in the 80-90s per my personal review.    Imaging:  3/22      Results for orders placed or performed during the hospital encounter of 03/17/25   XR Chest Port 1 View    Impression    IMPRESSION: Endotracheal tube terminates 3.5 cm above the hakan. Right heart catheter terminates over the central right pulmonary artery. Bilateral chest tubes and mediastinal drains. Clip versus balloon pump marker at the level of the AP window. Lung   volumes are low with bibasilar discoid atelectasis. No CHF or pneumothorax. No acute bony abnormalities..   XR Chest  Port 1 View    Impression    IMPRESSION:   1.  Bilateral pleural-based drains with trace pleural effusions and no discernible pneumothorax.  2.  Minimal bibasilar atelectasis, otherwise clear lungs.  3.  Cardiomegaly with likely CABG related surgical clips.  4.  Right IJ [place] catheter tip in proximal right pulmonary artery.       Labs, personally reviewed.  Hemoglobin   Date Value Ref Range Status   03/23/2025 8.6 (L) 13.3 - 17.7 g/dL Final   03/22/2025 10.2 (L) 13.3 - 17.7 g/dL Final   03/21/2025 9.9 (L) 13.3 - 17.7 g/dL Final     WBC Count   Date Value Ref Range Status   03/23/2025 7.5 4.0 - 11.0 10e3/uL Final   03/22/2025 10.2 4.0 - 11.0 10e3/uL Final   03/21/2025 12.8 (H) 4.0 - 11.0 10e3/uL Final     Platelet Count   Date Value Ref Range Status   03/24/2025 118 (L) 150 - 450 10e3/uL Final   03/23/2025 116 (L) 150 - 450 10e3/uL Final   03/22/2025 147 (L) 150 - 450 10e3/uL Final     Creatinine   Date Value Ref Range Status   03/24/2025 2.00 (H) 0.67 - 1.17 mg/dL Final   03/23/2025 2.17 (H) 0.67 - 1.17 mg/dL Final   03/22/2025 2.20 (H) 0.67 - 1.17 mg/dL Final     Potassium   Date Value Ref Range Status   03/24/2025 4.1 3.4 - 5.3 mmol/L Final   03/23/2025 3.8 3.4 - 5.3 mmol/L Final   03/22/2025 4.0 3.4 - 5.3 mmol/L Final   04/25/2022 4.5 3.5 - 5.0 mmol/L Final   02/01/2021 4.8 3.5 - 5.0 mmol/L Final   12/13/2019 5.0 3.5 - 5.0 mmol/L Final     Potassium POCT   Date Value Ref Range Status   03/17/2025 4.0 3.4 - 5.3 mmol/L Final   03/17/2025 3.9 3.4 - 5.3 mmol/L Final   03/17/2025 4.3 3.4 - 5.3 mmol/L Final     Magnesium   Date Value Ref Range Status   03/24/2025 2.1 1.7 - 2.3 mg/dL Final   03/23/2025 2.2 1.7 - 2.3 mg/dL Final   03/22/2025 2.3 1.7 - 2.3 mg/dL Final          I/O:  I/O last 3 completed shifts:  In: 1140 [P.O.:1140]  Out: 1900 [Urine:1900]       Physical Exam:    General: Patient seen in chair, A&Ox3. No acute distress.  HEENT: INCK, no sclera icterus, moist mucosa, no O2 device in place  CV: RRR on  monitor. 2+ peripheral pulses in all extremities. Mild edema.   Pulm: Non-labored effort on room air.  Incision C/D/I.  Abd: Soft, NT, ND, +bm  : lisa urine   Ext: Mild pedal edema, SCDs in place, warm, distal pulses intact, Femstop in place after IABP removal  Neuro: A&Ox3, COPELAND, and CN grossly intact      ASSESSMENT/PLAN:    Eric Cordoba is a 74 year old male with a history of CAD, LV mural thrombus, CKD 3b, DM2, HTN, h/o CVA, and ICM who is s/p CABG x4 , LLE EVH, and preop IABP placement.    Principal Problem:    Coronary artery disease involving native coronary artery of native heart, unspecified whether angina present  Active Problems:    CAD (coronary artery disease)    Coronary artery disease of native artery of native heart with stable angina pectoris      NEURO:   Acute postoperative pain  H/o CVA, PTA issue  Postoperative delirium  - Scheduled Tylenol/lidocaine patches and PRN Tylenol/oxycodone for pain  - PTA gabapentin  - Delirium precautions  - Avoid deliriogenic medications and narcotics as able  - Head CT w/o evidence of acute CVA, small age indeterminate infarct.   - Neuro consult per [facility]  - Ok to pursue MRI on outpt basis if symptoms persist, if not favor dx of metabolic encephalopathy    CV:   HTN//HLD, PTA issues  CAD // ICM s/p CABG  Preop IABP, resolved.  - Pre-op EF 35-40%  - IABP removed POD#1  - Coreg 12.5 mg, reduced with restarting losartan  - Losartan 12.5 mg daily to start today  - ASA 81 mg // Plavix 75 mg qday per surgeon preference for graft patency  -  Simvastatin 40mg daily  - New baseline echo after CT/TPW removed and prior to discharge. - likely tomorrow AM  - [facility] IP consult placed for HF follow up  - Wound vac removed 3/23    PULM:   - Extubated POD#0  - Maintaining oxygen saturations on room air  - Encourage pulmonary toilet    FEN/GI:  - Diet: Clears, ADAT to Cardiac - low consistent carb although got 9u carb count insulin this AM per chart review and during rounds  patient had breakfast of cookie, rice krispie, glucerna, and juice. Will discuss w/ RN.   - Continue electrolyte replacement protocol  - Bowel regimen, LBM 3/20    RENAL:  JESS on CKD3b, PTA issue  - borderline UOP/hr. Continue to monitor closely.  - Cr 2.00 (2.15) (baseline ~ 1.8-2.0)  - Restart home lasix dosing w/ 40 mg PO daily    HEME:  LV mural thrombus, PTA issue  - Hold PTA eliquis (LV thrombus) for now although not visualized on cardiac MR 2/11/2025 or intra-op MANUEL so will discuss with surgeon   - Hgb stable, no bleeding concerns. Hep SQ, ASA    ID:  - Shanae op ppx complete, afebrile. No concerns for infection    ENDO:   DM2  Diabetic Ketoacidosis, resolved.  - HbA1c 8.7%  - BG goal < 180 to promote optimal healing  - PTA on lantus 50u qPM and mounjaro 5mg q7 days - held  - Critical care consult for DKA 3/18, signed off.  - [facility] [place] with DM management s/p DKA.   - 35u lantus qAM  - Medium dose SSI q4 - consider increasing to high dose  - 1:10u carb count insulin    Ppx / MISC:   - DVT: SCDs, SQ heparin TID, ambulation   - GI: Protonix 40mg PO daily  - LINES: PICC placed 3/20 given phlebotomy unable to draw blood    DISPO:   - Continue general tele care (transferred POD#6)  - PT/OT recs at discharge: TCU  - Medically Ready for Discharge: Anticipated Tomorrow pending BG control, echo, tolerance of HF regimen        Patient discussed with Dr. Shahid.       Audra Giang PA-C  [facility] Cardiothoracic Surgery  Vocera or Secure Chat  March 24, 2025

## 2025-03-24 NOTE — PROGRESS NOTES
CLINICAL NUTRITION SERVICES - REASSESSMENT NOTE     RECOMMENDATIONS FOR MDs/PROVIDERS TO ORDER:    Registered Dietitian Interventions:  Change supplement to ensure max tid w/ meals for less CHO ( There is 20  Grams less CHO in ensure max compared to glucerna per serving)    Future/Additional Recommendations:  Monitor po intake, weight, labs     INFORMATION OBTAINED  Assessed patient in room.    Pt is not up to diet education today    CURRENT NUTRITION ORDERS  Diet: Low Consistent Carbohydrate, low saturated fat, < 2400 mg Ng  Snacks/Supplements: Glucerna QID      CURRENT INTAKE/TOLERANCE  Pt consumed 75% supper, 25% lunch and 100% breakfast on 3/23.  He states he is drinking all of the glucerna supplements (1449 Calories and 56 g protein).  Had 1-2 bites at lunch on 3/22     NEW FINDINGS  GI symptoms: Constipation, last BM 3/23/2025  Skin/wounds:  surgical abdominal wound, arm skin tear     Nutrition-relevant labs: Urea nitrogen 53.1 (H), Cr 2 (H), Glu 202 (H), vit B 12 182 (L)  Nutrition-relevant medications:  vit B 12, cyanocobalamin injection, lasix, novolog, lantus pen , pantoprazole, miralax, Senna-docusate  Weight: 93.4 Kg (205 lb 12.8 oz)  Wt Readings from Last 10 Encounters:   03/24/25 93.4 kg (205 lb 12.8 oz)   03/13/25 92.5 kg (204 lb)   03/03/25 93.9 kg (207 lb)   02/24/25 94.8 kg (209 lb)   01/13/25 94.3 kg (208 lb)   01/07/25 94.8 kg (209 lb)     ASSESSED NUTRITION NEEDS  Dosing Weight: 77.8 kg, based on adjusted wt  Estimated Energy Needs: 1946 +/- kcals/day ( 25+/- Ben/Kg )  Justification: Overweight  Estimated Protein Needs: 62-78 grams protein/day (0.8 - 1 grams of pro/kg)  Justification: CKD and Increased needs  Estimated Fluid Needs: 1946 mL/day (1 mL/kcal)  Justification: Maintenance    MALNUTRITION  % Intake: </= 50% for >/= 5 days (severe): 3/18-3/22/2025  % Weight Loss: Weight loss does not meet criteria   Subcutaneous Fat Loss: None observed  Muscle Loss: None observed  Fluid  Accumulation/Edema: Mild, 1+ to 2+  Malnutrition Diagnosis: Moderate malnutrition in the context of acute illness or injury  Malnutrition Present on Admission: No    NUTRITION DIAGNOSIS  Malnutrition (undernutrition) related to poor appetite as evidenced by inadequate oral intake < 50%>5 days and fluid accumulation    INTERVENTIONS  Medical food supplement therapy-Change supplements to ensure max and send tid instead of 4 x/day ( this will provide 18 g CHO ( in ensure max 3 times per day)) vs the 104 g CHO ( in the glucerna 4 x/day)  Encouraged po intake    GOALS  Patient to consume % of nutritionally adequate meal trays TID, or the equivalent with supplements/snacks.  Glu < 180     MONITORING/EVALUATION  Progress toward goals will be monitored and evaluated per policy.

## 2025-03-24 NOTE — PROGRESS NOTES
Pt transferred to [facility] via wheelchair accompanied by nurse and aide. Vitals stable at transfer. Report called to tele nurse before transfer. Belongings sent with pt.     Lor Quintero RN     none

## 2025-03-25 ENCOUNTER — APPOINTMENT (OUTPATIENT)
Dept: CARDIOLOGY | Facility: HOSPITAL | Age: 75
DRG: 235 | End: 2025-03-25
Payer: COMMERCIAL

## 2025-03-25 ENCOUNTER — APPOINTMENT (OUTPATIENT)
Dept: OCCUPATIONAL THERAPY | Facility: HOSPITAL | Age: 75
DRG: 235 | End: 2025-03-25
Attending: INTERNAL MEDICINE
Payer: COMMERCIAL

## 2025-03-25 LAB
ALBUMIN UR-MCNC: NEGATIVE MG/DL
ANION GAP SERPL CALCULATED.3IONS-SCNC: 10 MMOL/L (ref 7–15)
APPEARANCE UR: CLEAR
BACTERIA BLD CULT: NO GROWTH
BILIRUB UR QL STRIP: NEGATIVE
BUN SERPL-MCNC: 49.6 MG/DL (ref 8–23)
CA-I BLD-MCNC: 4.6 MG/DL (ref 4.4–5.2)
CALCIUM SERPL-MCNC: 8.7 MG/DL (ref 8.8–10.4)
CHLORIDE SERPL-SCNC: 105 MMOL/L (ref 98–107)
COLOR UR AUTO: YELLOW
CREAT SERPL-MCNC: 1.94 MG/DL (ref 0.67–1.17)
EGFRCR SERPLBLD CKD-EPI 2021: 35 ML/MIN/1.73M2
GLUCOSE BLDC GLUCOMTR-MCNC: 123 MG/DL (ref 70–99)
GLUCOSE BLDC GLUCOMTR-MCNC: 146 MG/DL (ref 70–99)
GLUCOSE BLDC GLUCOMTR-MCNC: 163 MG/DL (ref 70–99)
GLUCOSE BLDC GLUCOMTR-MCNC: 181 MG/DL (ref 70–99)
GLUCOSE BLDC GLUCOMTR-MCNC: 221 MG/DL (ref 70–99)
GLUCOSE SERPL-MCNC: 158 MG/DL (ref 70–99)
GLUCOSE UR STRIP-MCNC: NEGATIVE MG/DL
HCO3 SERPL-SCNC: 25 MMOL/L (ref 22–29)
HGB UR QL STRIP: NEGATIVE
KETONES UR STRIP-MCNC: NEGATIVE MG/DL
LEUKOCYTE ESTERASE UR QL STRIP: NEGATIVE
LVEF ECHO: NORMAL
Lab: NORMAL
MAGNESIUM SERPL-MCNC: 2.3 MG/DL (ref 1.7–2.3)
NITRATE UR QL: NEGATIVE
PERFORMING LABORATORY: NORMAL
PH UR STRIP: 5.5 [PH] (ref 5–7)
PHOSPHATE SERPL-MCNC: 3.8 MG/DL (ref 2.5–4.5)
POTASSIUM SERPL-SCNC: 4.2 MMOL/L (ref 3.4–5.3)
RBC URINE: 0 /HPF
SODIUM SERPL-SCNC: 140 MMOL/L (ref 135–145)
SP GR UR STRIP: 1.02 (ref 1–1.03)
SPECIMEN STATUS: NORMAL
TEST NAME: NORMAL
UROBILINOGEN UR STRIP-MCNC: 2 MG/DL
WBC URINE: <1 /HPF

## 2025-03-25 PROCEDURE — 94799 UNLISTED PULMONARY SVC/PX: CPT

## 2025-03-25 PROCEDURE — 80048 BASIC METABOLIC PNL TOTAL CA: CPT

## 2025-03-25 PROCEDURE — 255N000002 HC RX 255 OP 636

## 2025-03-25 PROCEDURE — 82310 ASSAY OF CALCIUM: CPT

## 2025-03-25 PROCEDURE — 250N000013 HC RX MED GY IP 250 OP 250 PS 637

## 2025-03-25 PROCEDURE — 84100 ASSAY OF PHOSPHORUS: CPT | Performed by: STUDENT IN AN ORGANIZED HEALTH CARE EDUCATION/TRAINING PROGRAM

## 2025-03-25 PROCEDURE — 97110 THERAPEUTIC EXERCISES: CPT | Mod: GO

## 2025-03-25 PROCEDURE — 250N000013 HC RX MED GY IP 250 OP 250 PS 637: Performed by: PHYSICIAN ASSISTANT

## 2025-03-25 PROCEDURE — 210N000001 HC R&B IMCU HEART CARE

## 2025-03-25 PROCEDURE — 99233 SBSQ HOSP IP/OBS HIGH 50: CPT | Performed by: INTERNAL MEDICINE

## 2025-03-25 PROCEDURE — 250N000013 HC RX MED GY IP 250 OP 250 PS 637: Performed by: INTERNAL MEDICINE

## 2025-03-25 PROCEDURE — 97535 SELF CARE MNGMENT TRAINING: CPT | Mod: GO

## 2025-03-25 PROCEDURE — 82330 ASSAY OF CALCIUM: CPT | Performed by: STUDENT IN AN ORGANIZED HEALTH CARE EDUCATION/TRAINING PROGRAM

## 2025-03-25 PROCEDURE — 999N000208 ECHOCARDIOGRAM LIMITED

## 2025-03-25 PROCEDURE — 83735 ASSAY OF MAGNESIUM: CPT | Performed by: STUDENT IN AN ORGANIZED HEALTH CARE EDUCATION/TRAINING PROGRAM

## 2025-03-25 PROCEDURE — 93325 DOPPLER ECHO COLOR FLOW MAPG: CPT | Mod: 26 | Performed by: INTERNAL MEDICINE

## 2025-03-25 PROCEDURE — 999N000157 HC STATISTIC RCP TIME EA 10 MIN

## 2025-03-25 PROCEDURE — 250N000011 HC RX IP 250 OP 636: Performed by: PHYSICIAN ASSISTANT

## 2025-03-25 PROCEDURE — 81001 URINALYSIS AUTO W/SCOPE: CPT

## 2025-03-25 PROCEDURE — 93308 TTE F-UP OR LMTD: CPT | Mod: 26 | Performed by: INTERNAL MEDICINE

## 2025-03-25 PROCEDURE — 250N000013 HC RX MED GY IP 250 OP 250 PS 637: Performed by: STUDENT IN AN ORGANIZED HEALTH CARE EDUCATION/TRAINING PROGRAM

## 2025-03-25 PROCEDURE — 93321 DOPPLER ECHO F-UP/LMTD STD: CPT | Mod: 26 | Performed by: INTERNAL MEDICINE

## 2025-03-25 RX ADMIN — SIMVASTATIN 40 MG: 10 TABLET, FILM COATED ORAL at 08:53

## 2025-03-25 RX ADMIN — HEPARIN SODIUM 5000 UNITS: 5000 INJECTION, SOLUTION INTRAVENOUS; SUBCUTANEOUS at 13:26

## 2025-03-25 RX ADMIN — CARVEDILOL 12.5 MG: 12.5 TABLET, FILM COATED ORAL at 21:03

## 2025-03-25 RX ADMIN — LIDOCAINE 2 PATCH: 4 PATCH TOPICAL at 20:59

## 2025-03-25 RX ADMIN — PERFLUTREN 2 ML: 6.52 INJECTION, SUSPENSION INTRAVENOUS at 09:02

## 2025-03-25 RX ADMIN — HEPARIN SODIUM 5000 UNITS: 5000 INJECTION, SOLUTION INTRAVENOUS; SUBCUTANEOUS at 06:12

## 2025-03-25 RX ADMIN — SENNOSIDES AND DOCUSATE SODIUM 1 TABLET: 50; 8.6 TABLET ORAL at 08:54

## 2025-03-25 RX ADMIN — ASPIRIN 81 MG CHEWABLE TABLET 81 MG: 81 TABLET CHEWABLE at 08:53

## 2025-03-25 RX ADMIN — HEPARIN SODIUM 5000 UNITS: 5000 INJECTION, SOLUTION INTRAVENOUS; SUBCUTANEOUS at 21:05

## 2025-03-25 RX ADMIN — FUROSEMIDE 40 MG: 40 TABLET ORAL at 08:53

## 2025-03-25 RX ADMIN — ACETAMINOPHEN 975 MG: 325 TABLET ORAL at 13:26

## 2025-03-25 RX ADMIN — PANTOPRAZOLE SODIUM 40 MG: 40 TABLET, DELAYED RELEASE ORAL at 06:12

## 2025-03-25 RX ADMIN — ACETAMINOPHEN 975 MG: 325 TABLET ORAL at 21:05

## 2025-03-25 RX ADMIN — Medication 500 MCG: at 09:51

## 2025-03-25 RX ADMIN — CLOPIDOGREL BISULFATE 75 MG: 75 TABLET, FILM COATED ORAL at 08:53

## 2025-03-25 RX ADMIN — ACETAMINOPHEN 975 MG: 325 TABLET ORAL at 06:10

## 2025-03-25 RX ADMIN — LOSARTAN POTASSIUM 12.5 MG: 25 TABLET, FILM COATED ORAL at 08:53

## 2025-03-25 RX ADMIN — CARVEDILOL 12.5 MG: 12.5 TABLET, FILM COATED ORAL at 08:53

## 2025-03-25 ASSESSMENT — ACTIVITIES OF DAILY LIVING (ADL)
ADLS_ACUITY_SCORE: 74
ADLS_ACUITY_SCORE: 71
ADLS_ACUITY_SCORE: 73
ADLS_ACUITY_SCORE: 71
ADLS_ACUITY_SCORE: 73
ADLS_ACUITY_SCORE: 68
ADLS_ACUITY_SCORE: 71
ADLS_ACUITY_SCORE: 70
ADLS_ACUITY_SCORE: 73
ADLS_ACUITY_SCORE: 74
ADLS_ACUITY_SCORE: 68
ADLS_ACUITY_SCORE: 71
ADLS_ACUITY_SCORE: 70
ADLS_ACUITY_SCORE: 70
ADLS_ACUITY_SCORE: 73
ADLS_ACUITY_SCORE: 71
ADLS_ACUITY_SCORE: 70
ADLS_ACUITY_SCORE: 71
ADLS_ACUITY_SCORE: 71
ADLS_ACUITY_SCORE: 73

## 2025-03-25 NOTE — PROGRESS NOTES
[facility]    Medicine Progress Note - Hospitalist Service    Date of Admission:  3/17/2025    Assessment & Plan   Eric Cordoba is a 74 year old male admitted on 3/17/2025. He was admitted for elective CABG on 03/17/2025. Pmhx is significant for CKD stage III, type 2 diabetes mellitus, history of CVA, hypertension, HFrEF, left ventricular apical thrombus.  Patient was in ICU recovering from procedure since 3/17.  Postoperative course was complicated by development of DKA and JESS.  [facility] consulted to assist with management of medical comorbidities.      3/23 :      Some off and on confusion  Neurology consulted : MRI Brain ordered-patient refused this this morning.  Based on the patient's exam, there is okay to defer MRI brain to the outpatient setting  B12, folate, TSH ordered and pending  If patient continues to have improvement while hospitalized, could consider this metabolic encephalopathy and would not require outpatient neurology follow-up.     CT surgery following  Continue coreg, baby aspirin, plavix, statin  New baseline echo after CT/TPW removed and prior to discharge.   Holding  PTA eliquis (LV thrombus) for now although not visualized on cardiac MR 2/11/2025 or intra-op MANUEL - CT surgery to address    Creatinine stable at 2.17, on lasix 20 mg iv bid    Blood sugars stable in 170-240 range, no gap  On sliding scale insulin, insulin per carb counting,lantus  Changed sliding scale insulin to medium from low  On diabetic diet        3/24 :      Some off and on confusion  Neurology consulted : MRI Brain ordered-patient refused.  Based on the patient's exam, there is okay to defer MRI brain to the outpatient setting  B12, folate, TSH ordered and pending  If patient continues to have improvement while hospitalized, could consider this metabolic encephalopathy and would not require outpatient neurology follow-up.     CT surgery following  Continue coreg, baby aspirin, plavix, statin  New  baseline echo after CT/TPW removed and prior to discharge.   Holding  PTA eliquis (LV thrombus) for now although not visualized on cardiac MR 2/11/2025 or intra-op MANUEL - CT surgery to address    Creatinine stable at 2.17--2.0, iv lasix changed to oral    Blood sugars stable in 170-240 range, no gap  On sliding scale insulin, insulin per carb counting,lantus  Changed sliding scale insulin to medium from low  On diabetic diet            3/25 :      Some off and on confusion  Neurology consulted : MRI Brain ordered-patient refused.  Based on the patient's exam, there is okay to defer MRI brain to the outpatient setting  B12, folate, TSH ordered and pending  If patient continues to have improvement while hospitalized, could consider this metabolic encephalopathy and would not require outpatient neurology follow-up.     CT surgery following  Continue coreg, baby aspirin, plavix, statin  New baseline echo after CT/TPW removed and prior to discharge.   Holding  PTA eliquis (LV thrombus) for now although not visualized on cardiac MR 2/11/2025 or intra-op MANUEL - CT surgery to address    Creatinine stable at 2.17--2.0, iv lasix changed to oral    Blood sugars stable in 170-240 range, no gap  On sliding scale insulin, insulin per carb counting,lantus  Changed sliding scale insulin to medium from low  On diabetic diet      Seems medically ready now  Awaiting placement          A/p :        Type 2 diabetes mellitus, poorly controlled  DKA  S/P DKA protocol.  Anion gap has closed and patient started on clear liquid diet  Did receive 20 units of Lantus subcutaneous this morning  High intensity insulin sliding scale  Accu-Checks  Hypoglycemia protocol  Recent A1c is 8.7  His home regimen includes Lantus 50 units subcutaneous daily and Mounjaro once weekly  Start Lantus 35 units subcutaneously after  insulin gtt. is discontinued.    JESS on CKD  Baseline creatinine appears to be around 1.8.   2.8-->2.2  Avoid nephrotoxic  "medication  Diuretics per CV surgery team  Strict input and output, monitor BMP    CAD s/p CABG on 03/17  Postop management per CV surgery team  Chest tube management per CV surgery team  Currently tolerating clear liquid diet      Acute encephalopathy  -Likely multifactorial including residual anesthesia effect, possible underlying cognitive impairment  -Delirium precaution.  Is off one-to-one observation now  -Ammonia is not elevated.  UA is unremarkable,   -CT head: Reported with subcentimeter focus of hypoattenuation in the left frontal lobe.  This could reflect a small age-indeterminate infarction.  -Will get brain MRI  -Neurology consultation  -Continue supportive measures  - per his NOK he has been noted to be forgetful recently           Diet: Combination Diet Regular Diet; Low Consistent Carb (45 g CHO per Meal) Diet; Low Saturated Fat Na <2400mg Diet  Snacks/Supplements Adult: Ensure Max Protein (bariatric); With Meals    DVT Prophylaxis: Heparin SQ  [place] Catheter: Not present  Lines: PRESENT      PICC 03/20/25 Double Lumen Right Brachial vein lateral-Site Assessment: WDL      Cardiac Monitoring: ACTIVE order. Indication: ICU  Code Status: Full Code      Clinically Significant Risk Factors          # Hyperchloremia: Highest Cl = 108 mmol/L in last 2 days, will monitor as appropriate          # Hypoalbuminemia: Lowest albumin = 3.3 g/dL at 3/20/2025  8:58 AM, will monitor as appropriate   # Thrombocytopenia: Lowest platelets = 118 in last 2 days, will monitor for bleeding    # Acute heart failure with reduced ejection fraction: last echo with EF <40% and receiving IV diuretics          # DMII: A1C = 8.7 % (Ref range: <5.7 %) within past 6 months   # Obesity: Estimated body mass index is 30.49 kg/m  as calculated from the following:    Height as of 2/24/25: 1.753 m (5' 9\").    Weight as of this encounter: 93.7 kg (206 lb 8 oz).   # Moderate Malnutrition: based on nutrition assessment and treatment provided " per dietitian's recommendations.     # History of CABG: noted on surgical history       Social Drivers of Health    Tobacco Use: Medium Risk (3/14/2025)    Patient History     Smoking Tobacco Use: Former     Smokeless Tobacco Use: Never    Received from [employer] [facility]    Social Connections          Disposition Plan     Medically Ready for Discharge: Anticipated in 2-4 Days             Gennaro Warner MD  Hospitalist Service  [facility]  Securely message with [employer] (more info)  Text page via [employer] Paging/Directory   ______________________________________________________________________        Physical Exam   Vital Signs: Temp: 97.6  F (36.4  C) Temp src: Oral BP: 120/61 Pulse: 73   Resp: 20 SpO2: 98 % O2 Device: None (Room air)    Weight: 206 lbs 8 oz    General Appearance:  Acutely sick looking  Respiratory: Good air entry bilaterally, surgical dressing on anterior chest  Cardiovascular: S1 and S2 heard  GI: Soft abdomen, no tenderness, normoactive bowel sounds  Skin: Intact and warm       Medical Decision Making       40 MINUTES SPENT BY ME on the date of service doing chart review, history, exam, documentation & further activities per the note.      Data

## 2025-03-25 NOTE — PLAN OF CARE
Goal Outcome Evaluation:    sc  Patient Name: Eric Cordoba   MRN: [id number]   Date of Admission: 3/17/2025    Procedure: Procedure(s):  CORONARY ARTERY BYPASS GRAFT TIMES FOUR, LEFT INTERNAL MAMMARY ARTERY HARVEST, LEFT LEG ENDOSCOPIC SAPHENOUS VEIN PROCUREMENT,  EPIAORTIC ULTRASOUND, ANESTHESIA TRANSESOPHAGEAL ECHOCARDIOGRAM    Post Op day #:8    Subjective (Patient focus/Primary Problem for shift): Ambulation          Pain Goal0 Pain Rating0           Pain Medication/ Regime effective to reduce patient painSched tylenol PO    Objective (Physical assessment):           Rhythm: normal sinus rhythm            Bowel Activity: no if Yes indicate when: yesterday          Bowel Medications: yes            Incision: healing well          Incentive Spirometry Q 1-2 hour when awake:  yes Volume: 2000 mL          Epicardial Pacing Wires:  no            Patient Activity:           Up to chair for meals: yes          Ambulation with RN x2 (Not including CR): no; refused to ambulate with staffs          Shower Daily {YES/NO/NA:[id number]          Nasal  Nozin BID AM/PM x 10 days: yes              Chest Tubes   Pleural: no          Preventative WOC consult (need MD order): no       Assessment (Nursing primary shift focus): Verbalized tiredness and lack of sleep.    Plan (Patient Care Plan/focus): Cluster cares, IS use, encouraged OOB activity and ambulation. Reinforced the need for daily shower.      Dc Franco RN   3/25/2025   1:40 PM

## 2025-03-25 NOTE — PROGRESS NOTES
"CVTS Daily Progress Note   POD#8  s/p CABG x4 (LIMA>LAD, rSVG>RCA, rSVG>OM1, rSVG>diag), LLE EVH, and preop IABP placement  Attending: Kleber  LOS: 8    SUBJECTIVE/INTERVAL EVENTS:  No acute events overnight. Mentation, delirium/confusion improved - A&Ox3 this AM. Patient progressing well overall at this point. Maintaining oxygen saturations on room air. Normotensive, IABP out POD#1. Ambulating with therapy to chair. Pain well controlled. +BM 3/21 . Tolerating diet without nausea although poor appetite, but is improving. UOP  improved with Lasix . Hgb stable. Patient denies new chest pain, shortness of breath, abdominal pain, calf pain, nausea.      OBJECTIVE:  Temp:  [97.2  F (36.2  C)-98.8  F (37.1  C)] 97.9  F (36.6  C)  Pulse:  [73-82] 74  Resp:  [18] 18  BP: (103-124)/(54-91) 108/57  SpO2:  [93 %-100 %] 98 %  Vitals:    03/21/25 0500 03/22/25 0218 03/23/25 0600 03/24/25 0552   Weight: 94.5 kg (208 lb 5.4 oz) 94.7 kg (208 lb 12.8 oz) 93.8 kg (206 lb 12.8 oz) 93.4 kg (205 lb 12.8 oz)    03/25/25 0454   Weight: 93.7 kg (206 lb 8 oz)       Clinically Significant Risk Factors          # Hyperchloremia: Highest Cl = 108 mmol/L in last 2 days, will monitor as appropriate          # Hypoalbuminemia: Lowest albumin = 3.3 g/dL at 3/20/2025  8:58 AM, will monitor as appropriate   # Thrombocytopenia: Lowest platelets = 118 in last 2 days, will monitor for bleeding    # Acute heart failure with reduced ejection fraction: last echo with EF <40% and receiving IV diuretics          # DMII: A1C = 8.7 % (Ref range: <5.7 %) within past 6 months   # Obesity: Estimated body mass index is 30.49 kg/m  as calculated from the following:    Height as of 2/24/25: 1.753 m (5' 9\").    Weight as of this encounter: 93.7 kg (206 lb 8 oz).   # Moderate Malnutrition: based on nutrition assessment and treatment provided per dietitian's recommendations.     # History of CABG: noted on surgical history           Current Medications:    Scheduled " Meds:  Current Facility-Administered Medications   Medication Dose Route Frequency Provider Last Rate Last Admin    acetaminophen (TYLENOL) tablet 975 mg  975 mg Oral Q8H John Sen MD   975 mg at 03/25/25 1326    [Held by provider] apixaban ANTICOAGULANT (ELIQUIS) tablet 5 mg  5 mg Oral BID Zayra Raya PA-C        aspirin (ASA) chewable tablet 81 mg  81 mg Oral Daily Prabha Giang PA-C   81 mg at 03/25/25 0853    carvedilol (COREG) tablet 12.5 mg  12.5 mg Oral BID Prabha Giang PA-C   12.5 mg at 03/25/25 0853    clopidogrel (PLAVIX) tablet 75 mg  75 mg Oral Daily Prabha Giang PA-C   75 mg at 03/25/25 0853    cyanocobalamin (VITAMIN B-12) sublingual tablet 500 mcg  500 mcg Sublingual Daily Sammy Uribe MD   500 mcg at 03/25/25 0951    cyanocobalamin injection 1,000 mcg  1,000 mcg Intramuscular Q30 Days Sammy Uribe MD   1,000 mcg at 03/24/25 1319    furosemide (LASIX) tablet 40 mg  40 mg Oral Daily Prabha Giang PA-C   40 mg at 03/25/25 0853    heparin ANTICOAGULANT injection 5,000 Units  5,000 Units Subcutaneous Q8H Zayra Raya PA-C   5,000 Units at 03/25/25 1326    insulin aspart (NovoLOG) injection (RAPID ACTING)  1-10 Units Subcutaneous TID AC Prabha Giang PA-C   4 Units at 03/25/25 1245    insulin aspart (NovoLOG) injection (RAPID ACTING)  1-7 Units Subcutaneous At Bedtime Prabha Giang PA-C        insulin aspart (NovoLOG) injection (RAPID ACTING)   Subcutaneous TID w/meals Kev Dejesus MD   5 Units at 03/25/25 0856    [START ON 3/26/2025] insulin glargine (LANTUS PEN) injection 40 Units  40 Units Subcutaneous QAM AC Prabha Giang PA-C        Lidocaine (LIDOCARE) 4 % Patch 1-2 patch  1-2 patch Transdermal Q24H Zayra Raya PA-C   2 patch at 03/24/25 2100    losartan (COZAAR) half-tab 12.5 mg  12.5 mg Oral Daily Prabha Giang PA-C   12.5 mg at 03/25/25 0853    pantoprazole  (PROTONIX) EC tablet 40 mg  40 mg Oral QAM AC Zayra Raya PA-C   40 mg at 03/25/25 0612    polyethylene glycol (MIRALAX) Packet 17 g  17 g Oral Daily Prabha Giang PA-C   17 g at 03/24/25 0804    senna-docusate (SENOKOT-S/PERICOLACE) 8.6-50 MG per tablet 1 tablet  1 tablet Oral BID Zayra Raya PA-C   1 tablet at 03/25/25 0854    simvastatin (ZOCOR) tablet 40 mg  40 mg Oral Daily Prabha Giang PA-C   40 mg at 03/25/25 0853    sodium chloride (PF) 0.9% PF flush 10-40 mL  10-40 mL Intracatheter Q7 Days Prabha Giang PA-C   10 mL at 03/20/25 0933    sodium chloride (PF) 0.9% PF flush 10-40 mL  10-40 mL Intracatheter Daily Prabha Giang PA-C   10 mL at 03/25/25 0855     Continuous Infusions:  Current Facility-Administered Medications   Medication Dose Route Frequency Provider Last Rate Last Admin    dextrose 10% infusion   Intravenous Continuous PRN Prabha Giang PA-C         PRN Meds:.  Current Facility-Administered Medications   Medication Dose Route Frequency Provider Last Rate Last Admin    acetaminophen (TYLENOL) tablet 650 mg  650 mg Oral Q4H PRN Nabila Gates C, CNP   650 mg at 03/24/25 2350    calcium gluconate 1 g in 50 mL in sodium chloride intermittent infusion  1 g Intravenous Once PRN Zayra Raya PA-C   1 g at 03/17/25 2005    calcium gluconate 2 g in  mL intermittent infusion  2 g Intravenous Once PRN Zayra Raya PA-C        calcium gluconate 3 g in sodium chloride 0.9 % 100 mL intermittent infusion  3 g Intravenous Once PRN Zayra Raya PA-C        dextrose 10% infusion   Intravenous Continuous PRN Prabha Giang PA-C        glucose gel 15-30 g  15-30 g Oral Q15 Min PRN Prabha Giang PA-C        Or    dextrose 50 % injection 25-50 mL  25-50 mL Intravenous Q15 Min PRN Prabha Giang PA-C        Or    glucagon injection 1 mg  1 mg Subcutaneous Q15 Min PRN Prabha Giang  MARIE SOSA        glucose gel 15-30 g  15-30 g Oral Q15 Min PRN Gennaro Warner MD        Or    dextrose 50 % injection 25-50 mL  25-50 mL Intravenous Q15 Min PRN Gennaro Warner MD        Or    glucagon injection 1 mg  1 mg Subcutaneous Q15 Min PRN Gennaro Warner MD        hydrALAZINE (APRESOLINE) injection 10 mg  10 mg Intravenous Q30 Min PRN Zayra Raya PA-C   10 mg at 03/21/25 0821    magnesium hydroxide (MILK OF MAGNESIA) suspension 30 mL  30 mL Oral Daily PRN Zayra Raya PA-C        melatonin tablet 5 mg  5 mg Oral At Bedtime PRN Susana Honeycutt MD   5 mg at 03/24/25 2349    naloxone (NARCAN) injection 0.2 mg  0.2 mg Intravenous Q2 Min PRN John Sen MD        Or    naloxone (NARCAN) injection 0.4 mg  0.4 mg Intravenous Q2 Min PRN John Sen MD        Or    naloxone (NARCAN) injection 0.2 mg  0.2 mg Intramuscular Q2 Min PRN John Sen MD        Or    naloxone (NARCAN) injection 0.4 mg  0.4 mg Intramuscular Q2 Min PRN John Sen MD        ondansetron (ZOFRAN ODT) ODT tab 4 mg  4 mg Oral Q6H PRN Zayra Raya PA-C        Or    ondansetron (ZOFRAN) injection 4 mg  4 mg Intravenous Q6H PRN Zayra Raya PA-C   4 mg at 03/22/25 2109    oxyCODONE IR (ROXICODONE) half-tab 2.5 mg  2.5 mg Oral Q4H PRN Zayra Raya PA-C   2.5 mg at 03/22/25 0033    prochlorperazine (COMPAZINE) injection 5 mg  5 mg Intravenous Q6H PRN Zayra Raya PA-C   5 mg at 03/21/25 0912    Or    prochlorperazine (COMPAZINE) tablet 5 mg  5 mg Oral Q6H PRN Zayra Raya PA-C        sodium chloride (PF) 0.9% PF flush 10-20 mL  10-20 mL Intracatheter q1 min prn Prabha Giang PA-C   10 mL at 03/24/25 1704    sodium chloride (PF) 0.9% PF flush 10-40 mL  10-40 mL Intracatheter Once PRN Prabha Giang PA-C           Cardiographics:    Telemetry monitoring demonstrates sinus rhythm with rates in the 80-90s per my personal  review.    Imaging:  3/22      Results for orders placed or performed during the hospital encounter of 03/17/25   XR Chest Port 1 View    Impression    IMPRESSION: Endotracheal tube terminates 3.5 cm above the hakan. Right heart catheter terminates over the central right pulmonary artery. Bilateral chest tubes and mediastinal drains. Clip versus balloon pump marker at the level of the AP window. Lung   volumes are low with bibasilar discoid atelectasis. No CHF or pneumothorax. No acute bony abnormalities..   XR Chest Port 1 View    Impression    IMPRESSION:   1.  Bilateral pleural-based drains with trace pleural effusions and no discernible pneumothorax.  2.  Minimal bibasilar atelectasis, otherwise clear lungs.  3.  Cardiomegaly with likely CABG related surgical clips.  4.  Right IJ [place] catheter tip in proximal right pulmonary artery.       Labs, personally reviewed.  Hemoglobin   Date Value Ref Range Status   03/23/2025 8.6 (L) 13.3 - 17.7 g/dL Final   03/22/2025 10.2 (L) 13.3 - 17.7 g/dL Final   03/21/2025 9.9 (L) 13.3 - 17.7 g/dL Final     WBC Count   Date Value Ref Range Status   03/23/2025 7.5 4.0 - 11.0 10e3/uL Final   03/22/2025 10.2 4.0 - 11.0 10e3/uL Final   03/21/2025 12.8 (H) 4.0 - 11.0 10e3/uL Final     Platelet Count   Date Value Ref Range Status   03/24/2025 118 (L) 150 - 450 10e3/uL Final   03/23/2025 116 (L) 150 - 450 10e3/uL Final   03/22/2025 147 (L) 150 - 450 10e3/uL Final     Creatinine   Date Value Ref Range Status   03/25/2025 1.94 (H) 0.67 - 1.17 mg/dL Final   03/24/2025 2.00 (H) 0.67 - 1.17 mg/dL Final   03/23/2025 2.17 (H) 0.67 - 1.17 mg/dL Final     Potassium   Date Value Ref Range Status   03/25/2025 4.2 3.4 - 5.3 mmol/L Final   03/24/2025 4.1 3.4 - 5.3 mmol/L Final   03/23/2025 3.8 3.4 - 5.3 mmol/L Final   04/25/2022 4.5 3.5 - 5.0 mmol/L Final   02/01/2021 4.8 3.5 - 5.0 mmol/L Final   12/13/2019 5.0 3.5 - 5.0 mmol/L Final     Potassium POCT   Date Value Ref Range Status    03/17/2025 4.0 3.4 - 5.3 mmol/L Final   03/17/2025 3.9 3.4 - 5.3 mmol/L Final   03/17/2025 4.3 3.4 - 5.3 mmol/L Final     Magnesium   Date Value Ref Range Status   03/25/2025 2.3 1.7 - 2.3 mg/dL Final   03/24/2025 2.1 1.7 - 2.3 mg/dL Final   03/23/2025 2.2 1.7 - 2.3 mg/dL Final          I/O:  I/O last 3 completed shifts:  In: 780 [P.O.:780]  Out: 1090 [Urine:1090]       Physical Exam:    General: Patient seen in chair, A&Ox3. No acute distress.  HEENT: NICK, no sclera icterus, moist mucosa, no O2 device in place  CV: RRR on monitor. 2+ peripheral pulses in all extremities. Mild edema.   Pulm: Non-labored effort on room air.  Incision C/D/I.  Abd: Soft, NT, ND, +bm  : lisa urine   Ext: Mild pedal edema, SCDs in place, warm, distal pulses intact, Femstop in place after IABP removal  Neuro: A&Ox3, COPELAND, and CN grossly intact      ASSESSMENT/PLAN:    Eric Cordoba is a 74 year old male with a history of CAD, LV mural thrombus, CKD 3b, DM2, HTN, h/o CVA, and ICM who is s/p CABG x4 , LLE EVH, and preop IABP placement.    Principal Problem:    Coronary artery disease involving native coronary artery of native heart, unspecified whether angina present  Active Problems:    CAD (coronary artery disease)    Coronary artery disease of native artery of native heart with stable angina pectoris      NEURO:   Acute postoperative pain  H/o CVA, PTA issue  Postoperative delirium  - Scheduled Tylenol/lidocaine patches and PRN Tylenol/oxycodone for pain  - PTA gabapentin  - Delirium precautions  - Avoid deliriogenic medications and narcotics as able  - Head CT w/o evidence of acute CVA, small age indeterminate infarct.   - Neuro [place] [facility] signed off.    CV:   HTN//HLD, PTA issues  CAD // ICM s/p CABG  Preop IABP, resolved.  - Pre-op EF 35-40%, postop 35-40% - no comment on LV thrombus, will discuss w/ cardiology  - Per discussion w/ patient's cardiologist Dr. Sumner, if LV thrombus on postop echo, would anticoagulate x3  months minimum w/ follow-up cardiac MR, if no LV thrombus, no need to restart AC  - IABP removed POD#1  - Coreg 12.5 mg  - Losartan 12.5 mg daily to start today  - ASA 81 mg // Plavix 75 mg qday per surgeon preference for graft patency  -  Simvastatin 40mg daily  - [facility] IP consult placed for HF follow up  - Wound vac removed 3/23    PULM:   - Extubated POD#0  - Maintaining oxygen saturations on room air  - Encourage pulmonary toilet    FEN/GI:  - Diet: Clears, ADAT to Cardiac - low consistent carb  - Continue electrolyte replacement protocol  - Bowel regimen, LBM 3/20    RENAL:  JESS on CKD3b, PTA issue  - borderline UOP/hr. Continue to monitor closely.  - Cr 2.00 (2.15) (baseline ~ 1.8-2.0)  - Restart home lasix dosing w/ 40 mg PO daily    HEME:  LV mural thrombus, PTA issue  - Hold PTA eliquis (LV thrombus) for now as above  - Hgb stable, no bleeding concerns. Hep SQ, ASA    ID:  - Shanae op ppx complete, afebrile. No concerns for infection    ENDO:   DM2  Diabetic Ketoacidosis, resolved.  - HbA1c 8.7%  - BG goal < 180 to promote optimal healing  - PTA on lantus 50u qPM and mounjaro 5mg q7 days - held  - Critical care consult for DKA 3/18, signed off.  - [facility] [place] for assistance with DM management s/p DKA.   - 40u lantus qAM  -  High dose SSI   - 1:10u carb count insulin    Ppx / MISC:   - DVT: SCDs, SQ heparin TID, ambulation   - GI: Protonix 40mg PO daily  - LINES: PICC placed 3/20 given phlebotomy unable to draw blood    DISPO:   - Continue general tele care (transferred POD#6)  - PT/OT recs at discharge: TCU  - Medically Ready for Discharge: Ready Now pending placement - accepted for TCU tomorrow.        Patient discussed with Dr. Shahid.       Audra Giang PA-C  [facility] Cardiothoracic Surgery  Vocera or Secure Chat  March 25, 2025

## 2025-03-25 NOTE — PROGRESS NOTES
SPIRITUAL HEALTH SERVICES Progress Note  [facility], P3    Saw pt Eric Cordoba per length of stay/follow up visit. Eric's friend, Veronique, also present.     Patient/Family Understanding of Illness and Goals of Care - Eric shared about his recovery from heart surgery, stating that things are going much better today compared to a couple days ago. He anticipated discharging to TCU in coming days prior to going home.     Distress and Loss - Ongoing health challenges. Eric was tearful at times seeing pictures of his family.     Strengths, Coping, and Resources - Eric has good relational support.     Meaning, Beliefs, and Spirituality - Patient comes from [employer] background and derives meaning, purpose, and comfort from louann. He is very appreciative of receiving communion while in the hospital.     Plan of Care - A  will continue to visit as able or per request by patient/family/staff.      Yovani Loyola MDiv, [facility]  /Manager Spiritual Health Services  [phone]       Spiritual Health Services is available 24/7 for emergent requests and consults, either by paging the on-call  or by entering an ASAP/STAT consult in [facility], which will also page the on-call .

## 2025-03-25 NOTE — PROGRESS NOTES
Care Management Follow Up    Length of Stay (days): 8    Expected Discharge Date: 03/25/2025    Anticipated Discharge Plan:   TCu    Transportation: TBD    PT Recommendations: Transitional Care Facility  OT Recommendations:  Transitional Care Facility     Barriers to Discharge: placement    Prior Living Situation: Patient reports he lives in his house with his friend Veronique. He is independent with ADLs/IADLs, ambulates with a cane and has no services in community. He states Veronique is primary contact. Transportation at discharge TBD.     Discussed  Partnership in Safe Discharge Planning  document with patient/family: No     Handoff Completed: No, handoff not indicated or clinically appropriate    Patient/Spokesperson Updated: Yes. Who? pt    Additional Information:  Patient admitted for elective CABG on 03/17/2025.     Per provider notes CT surgery following but pt will likely be ready to discharge today. CM went to pt room to discuss this along with the therapy recommendation for TCU. Pt was open for discussion and aware of the recommendation as previous CM has talked with him but told writer that he still needs to further discuss with family. We then discussed that he is close to being medically ready if not med ready today and I need to send referrals out as the TCU placement takes time. He then looked over the TCU list and told writer I can send to [facility] and [facility]. Writer sent referrals to these locations. Told pt I will follow up with him once I get a response from the TCUs.    Next Steps: follow up with TCUs     11:19 [facility] pt. Writer reached out to [facility] to see if pt will discharge today but they were in an emergency and had to call me back.    12:15 [facility] can no longer take this pt as they have to take one of their [facility] residents.     14:30 [facility] said pt can go tomorrow. Writer reached out to [facility] to see if they can take pt tomorrow.    15:30 went  to pt room to discuss more TCU choices and let him know about [facility]. Pt and SO, Veronique in the room and do not [facility], but are ok with [facility] (if not private room fee), and [facility]. New referrals sent to [facility] and [facility]. Also left message to check with [facility] on a bed for tomorrow.    Harriett Lawson RN

## 2025-03-25 NOTE — PROGRESS NOTES
[facility]    Medicine Progress Note - Hospitalist Service    Date of Admission:  3/17/2025    Assessment & Plan   Eric Cordoba is a 74 year old male admitted on 3/17/2025. He was admitted for elective CABG on 03/17/2025. Pmhx is significant for CKD stage III, type 2 diabetes mellitus, history of CVA, hypertension, HFrEF, left ventricular apical thrombus.  Patient was in ICU recovering from procedure since 3/17.  Postoperative course was complicated by development of DKA and JESS.  [facility] consulted to assist with management of medical comorbidities.      3/23 :      Some off and on confusion  Neurology consulted : MRI Brain ordered-patient refused this this morning.  Based on the patient's exam, there is okay to defer MRI brain to the outpatient setting  B12, folate, TSH ordered and pending  If patient continues to have improvement while hospitalized, could consider this metabolic encephalopathy and would not require outpatient neurology follow-up.     CT surgery following  Continue coreg, baby aspirin, plavix, statin  New baseline echo after CT/TPW removed and prior to discharge.   Holding  PTA eliquis (LV thrombus) for now although not visualized on cardiac MR 2/11/2025 or intra-op MANUEL - CT surgery to address    Creatinine stable at 2.17, on lasix 20 mg iv bid    Blood sugars stable in 170-240 range, no gap  On sliding scale insulin, insulin per carb counting,lantus  Changed sliding scale insulin to medium from low  On diabetic diet        3/24 :      Some off and on confusion  Neurology consulted : MRI Brain ordered-patient refused.  Based on the patient's exam, there is okay to defer MRI brain to the outpatient setting  B12, folate, TSH ordered and pending  If patient continues to have improvement while hospitalized, could consider this metabolic encephalopathy and would not require outpatient neurology follow-up.     CT surgery following  Continue coreg, baby aspirin, plavix, statin  New  baseline echo after CT/TPW removed and prior to discharge.   Holding  PTA eliquis (LV thrombus) for now although not visualized on cardiac MR 2/11/2025 or intra-op MANUEL - CT surgery to address    Creatinine stable at 2.17--2.0, iv lasix changed to oral    Blood sugars stable in 170-240 range, no gap  On sliding scale insulin, insulin per carb counting,lantus  Changed sliding scale insulin to medium from low  On diabetic diet      Likely discharge in am  Needs placement      A/p :        Type 2 diabetes mellitus, poorly controlled  DKA  S/P DKA protocol.  Anion gap has closed and patient started on clear liquid diet  Did receive 20 units of Lantus subcutaneous this morning  High intensity insulin sliding scale  Accu-Checks  Hypoglycemia protocol  Recent A1c is 8.7  His home regimen includes Lantus 50 units subcutaneous daily and Mounjaro once weekly  Start Lantus 35 units subcutaneously after  insulin gtt. is discontinued.    JESS on CKD  Baseline creatinine appears to be around 1.8.   2.8-->2.2  Avoid nephrotoxic medication  Diuretics per CV surgery team  Strict input and output, monitor BMP    CAD s/p CABG on 03/17  Postop management per CV surgery team  Chest tube management per CV surgery team  Currently tolerating clear liquid diet      Acute encephalopathy  -Likely multifactorial including residual anesthesia effect, possible underlying cognitive impairment  -Delirium precaution.  Is off one-to-one observation now  -Ammonia is not elevated.  UA is unremarkable,   -CT head: Reported with subcentimeter focus of hypoattenuation in the left frontal lobe.  This could reflect a small age-indeterminate infarction.  -Will get brain MRI  -Neurology consultation  -Continue supportive measures  - per his NOK he has been noted to be forgetful recently           Diet: Combination Diet Regular Diet; Low Consistent Carb (45 g CHO per Meal) Diet; Low Saturated Fat Na <2400mg Diet  Snacks/Supplements Adult: Ensure Max Protein  "(bariatric); With Meals    DVT Prophylaxis: Heparin SQ  [place] Catheter: Not present  Lines: PRESENT      PICC 03/20/25 Double Lumen Right Brachial vein lateral-Site Assessment: WDL      Cardiac Monitoring: ACTIVE order. Indication: ICU  Code Status: Full Code      Clinically Significant Risk Factors          # Hyperchloremia: Highest Cl = 109 mmol/L in last 2 days, will monitor as appropriate          # Hypoalbuminemia: Lowest albumin = 3.3 g/dL at 3/20/2025  8:58 AM, will monitor as appropriate   # Thrombocytopenia: Lowest platelets = 116 in last 2 days, will monitor for bleeding             # DMII: A1C = 8.7 % (Ref range: <5.7 %) within past 6 months   # Obesity: Estimated body mass index is 30.39 kg/m  as calculated from the following:    Height as of 2/24/25: 1.753 m (5' 9\").    Weight as of this encounter: 93.4 kg (205 lb 12.8 oz).   # Moderate Malnutrition: based on nutrition assessment and treatment provided per dietitian's recommendations.     # History of CABG: noted on surgical history       Social Drivers of Health    Tobacco Use: Medium Risk (3/14/2025)    Patient History     Smoking Tobacco Use: Former     Smokeless Tobacco Use: Never    [facility] [email or web address] [facility]    Social Connections          Disposition Plan     Medically Ready for Discharge: Anticipated in 2-4 Days             Gennaro Warner MD  Hospitalist Service  [facility]  Securely message with [employer] (more info)  Text page via [employer] Paging/Directory   ______________________________________________________________________        Physical Exam   Vital Signs: Temp: 97.2  F (36.2  C) Temp src: Oral BP: 121/59 Pulse: 80   Resp: 18 SpO2: 100 % O2 Device: None (Room air)    Weight: 205 lbs 12.8 oz    General Appearance:  Acutely sick looking  Respiratory: Good air entry bilaterally, surgical dressing on anterior chest  Cardiovascular: S1 and S2 heard  GI: Soft abdomen, no tenderness, " normoactive bowel sounds  Skin: Intact and warm       Medical Decision Making       40 MINUTES SPENT BY ME on the date of service doing chart review, history, exam, documentation & further activities per the note.      Data

## 2025-03-25 NOTE — PROGRESS NOTES
NUTRITION EDUCATION      REASON FOR ASSESSMENT:  To educate on heart healthy, consistent CHO diet    CURRENT DIET:  Consistent CHO ( 45 g CHO per meal) low saturated fat < 2400 mg Na    NUTRITION DIAGNOSIS:  Food- and nutrition-related knowledge deficit R/t CABG and DM as evidenced by consulted for heart healthy diet education and need for therapeutic diet    INTERVENTIONS:    Nutrition Prescription:  Consistent CHO, low saturated fat, low sodium    Implementation:      *  Nutrition Education (Content):   A)  Provided handout Heart healthy consistent CHO nutrition therapy   B)  Discussed foods containing CHO, portion sizes, reading labels, fats to avoid and fats to include, getting adequate fiber and low sodium      *  Nutrition Education (Application):   A)  Discussed current eating habits and recommended alternative food choices      *  Anticipate good compliance      *  Diet Education - refer to Education Flowsheet    Goals:      *  Patient will verbalize understanding of diet met      *  All of the above goals met during the education session    Follow Up/Monitoring:      *  Provided RD contact information for future questions      *  Recommended Out-Patient Nutrition Referral, if further diet instructions are needed

## 2025-03-25 NOTE — PLAN OF CARE
Problem: Comorbidity Management  Goal: Blood Pressure in Desired Range  Outcome: Progressing     Problem: Cardiovascular Surgery  Goal: Effective Cardiac Function  Outcome: Progressing     Problem: Cardiovascular Surgery  Goal: Blood Glucose Level Within Targeted Range  Outcome: Progressing     Problem: Cardiovascular Surgery  Goal: Acceptable Pain Control  Outcome: Progressing     Problem: Cardiovascular Surgery  Goal: Effective Oxygenation and Ventilation  Outcome: Progressing   Goal Outcome Evaluation:    Patient awake most of the shift. VSS on room air. Oriented x3 with intermittent confusion. Up frequently to use the bathroom. Voids in small amount frequently. Bladder scan 0 post void. Paged provider and received order to collect UA sample which also came back negative. Scheduled tylenol given for chest incision pain. Lidocaine patch x2 in place as well. Assist of 1 with walker and gait belt. NSR on cardiac monitor. K, mag, phos  and ionized calcium protocol. All lab values WNL, due for recheck tomorrow AM. NSR on cardiac monitor. Up in recliner chair by 0600.

## 2025-03-26 ENCOUNTER — MEDICAL CORRESPONDENCE (OUTPATIENT)
Dept: HEALTH INFORMATION MANAGEMENT | Facility: CLINIC | Age: 75
End: 2025-03-26
Payer: COMMERCIAL

## 2025-03-26 VITALS
RESPIRATION RATE: 18 BRPM | SYSTOLIC BLOOD PRESSURE: 135 MMHG | WEIGHT: 203.5 LBS | BODY MASS INDEX: 30.05 KG/M2 | HEART RATE: 74 BPM | OXYGEN SATURATION: 97 % | TEMPERATURE: 97.8 F | DIASTOLIC BLOOD PRESSURE: 64 MMHG

## 2025-03-26 LAB
ANION GAP SERPL CALCULATED.3IONS-SCNC: 6 MMOL/L (ref 7–15)
BUN SERPL-MCNC: 38.8 MG/DL (ref 8–23)
CALCIUM SERPL-MCNC: 8.6 MG/DL (ref 8.8–10.4)
CHLORIDE SERPL-SCNC: 105 MMOL/L (ref 98–107)
CREAT SERPL-MCNC: 1.84 MG/DL (ref 0.67–1.17)
EGFRCR SERPLBLD CKD-EPI 2021: 38 ML/MIN/1.73M2
GLUCOSE BLDC GLUCOMTR-MCNC: 120 MG/DL (ref 70–99)
GLUCOSE BLDC GLUCOMTR-MCNC: 127 MG/DL (ref 70–99)
GLUCOSE BLDC GLUCOMTR-MCNC: 154 MG/DL (ref 70–99)
GLUCOSE BLDC GLUCOMTR-MCNC: 165 MG/DL (ref 70–99)
GLUCOSE SERPL-MCNC: 106 MG/DL (ref 70–99)
HCO3 SERPL-SCNC: 27 MMOL/L (ref 22–29)
MAGNESIUM SERPL-MCNC: 2.3 MG/DL (ref 1.7–2.3)
PHOSPHATE SERPL-MCNC: 3.4 MG/DL (ref 2.5–4.5)
POTASSIUM SERPL-SCNC: 3.9 MMOL/L (ref 3.4–5.3)
SODIUM SERPL-SCNC: 138 MMOL/L (ref 135–145)

## 2025-03-26 PROCEDURE — 94799 UNLISTED PULMONARY SVC/PX: CPT

## 2025-03-26 PROCEDURE — 250N000013 HC RX MED GY IP 250 OP 250 PS 637: Performed by: STUDENT IN AN ORGANIZED HEALTH CARE EDUCATION/TRAINING PROGRAM

## 2025-03-26 PROCEDURE — 250N000013 HC RX MED GY IP 250 OP 250 PS 637: Performed by: INTERNAL MEDICINE

## 2025-03-26 PROCEDURE — 83735 ASSAY OF MAGNESIUM: CPT | Performed by: STUDENT IN AN ORGANIZED HEALTH CARE EDUCATION/TRAINING PROGRAM

## 2025-03-26 PROCEDURE — 250N000011 HC RX IP 250 OP 636: Performed by: PHYSICIAN ASSISTANT

## 2025-03-26 PROCEDURE — 250N000013 HC RX MED GY IP 250 OP 250 PS 637: Performed by: PHYSICIAN ASSISTANT

## 2025-03-26 PROCEDURE — 80048 BASIC METABOLIC PNL TOTAL CA: CPT

## 2025-03-26 PROCEDURE — 250N000013 HC RX MED GY IP 250 OP 250 PS 637

## 2025-03-26 PROCEDURE — 84100 ASSAY OF PHOSPHORUS: CPT | Performed by: STUDENT IN AN ORGANIZED HEALTH CARE EDUCATION/TRAINING PROGRAM

## 2025-03-26 PROCEDURE — 999N000157 HC STATISTIC RCP TIME EA 10 MIN

## 2025-03-26 RX ORDER — ACETAMINOPHEN 325 MG/1
650 TABLET ORAL EVERY 4 HOURS PRN
DISCHARGE
Start: 2025-03-26 | End: 2025-03-27

## 2025-03-26 RX ORDER — POLYETHYLENE GLYCOL 3350 17 G/17G
17 POWDER, FOR SOLUTION ORAL DAILY
DISCHARGE
Start: 2025-03-26

## 2025-03-26 RX ORDER — ASPIRIN 81 MG/1
81 TABLET, CHEWABLE ORAL DAILY
DISCHARGE
Start: 2025-03-26

## 2025-03-26 RX ORDER — INSULIN GLARGINE 100 [IU]/ML
35 INJECTION, SOLUTION SUBCUTANEOUS EVERY MORNING
DISCHARGE
Start: 2025-03-26

## 2025-03-26 RX ORDER — LIDOCAINE 4 G/G
1-2 PATCH TOPICAL EVERY 24 HOURS
DISCHARGE
Start: 2025-03-26

## 2025-03-26 RX ORDER — CLOPIDOGREL BISULFATE 75 MG/1
75 TABLET ORAL DAILY
DISCHARGE
Start: 2025-03-26

## 2025-03-26 RX ORDER — LOSARTAN POTASSIUM 25 MG/1
12.5 TABLET ORAL DAILY
DISCHARGE
Start: 2025-03-26

## 2025-03-26 RX ADMIN — LOSARTAN POTASSIUM 12.5 MG: 25 TABLET, FILM COATED ORAL at 08:24

## 2025-03-26 RX ADMIN — PANTOPRAZOLE SODIUM 40 MG: 40 TABLET, DELAYED RELEASE ORAL at 06:45

## 2025-03-26 RX ADMIN — CLOPIDOGREL BISULFATE 75 MG: 75 TABLET, FILM COATED ORAL at 08:23

## 2025-03-26 RX ADMIN — SIMVASTATIN 40 MG: 10 TABLET, FILM COATED ORAL at 08:23

## 2025-03-26 RX ADMIN — SENNOSIDES AND DOCUSATE SODIUM 1 TABLET: 50; 8.6 TABLET ORAL at 08:25

## 2025-03-26 RX ADMIN — ACETAMINOPHEN 975 MG: 325 TABLET ORAL at 05:08

## 2025-03-26 RX ADMIN — Medication 500 MCG: at 08:24

## 2025-03-26 RX ADMIN — FUROSEMIDE 40 MG: 40 TABLET ORAL at 08:23

## 2025-03-26 RX ADMIN — ASPIRIN 81 MG CHEWABLE TABLET 81 MG: 81 TABLET CHEWABLE at 08:23

## 2025-03-26 RX ADMIN — HEPARIN SODIUM 5000 UNITS: 5000 INJECTION, SOLUTION INTRAVENOUS; SUBCUTANEOUS at 05:12

## 2025-03-26 RX ADMIN — CARVEDILOL 12.5 MG: 12.5 TABLET, FILM COATED ORAL at 08:23

## 2025-03-26 ASSESSMENT — ACTIVITIES OF DAILY LIVING (ADL)
ADLS_ACUITY_SCORE: 68

## 2025-03-26 NOTE — DISCHARGE INSTRUCTIONS
AFTER YOU GO HOME FROM YOUR HEART SURGERY  (CABG on 3/17/2025 with Dr. Shahid)    You had a sternotomy, avoid lifting anything greater than ten pounds for 8 weeks after surgery, then 20 pounds for an additional 4 weeks.   Do not reach backwards or use arms to push out of chair.   Do not let people pull on your arms to assist with standing.   Avoid twisting or reaching too far across your body.    Do not sleep on your side for >12 weeks after surgery.  Avoid strenuous activities such as bowling, vacuuming, raking, shoveling, golf or tennis for 12 weeks after your surgery.   It is okay to resume sex if you feel comfortable in doing so. You may have to try different positions with your partner.    Splint your chest incision by hugging a pillow or bringing your arms across your chest when coughing or sneezing.     No driving for 4 weeks after surgery or while on pain medication.    Shower or wash your incisions daily with soap and water (or as instructed), then pat dry. Do not scrub at the incision, and do not let the water hit your chest directly until fully healed.  No baths or swimming for 3 months.   Cover chest tube sites with dry gauze until they stop draining, then leave open to air. It is normal for chest tube sites to drain yellowish/clear fluid for up to 2-3 weeks after surgery.   Watch for signs of infection: increased redness, tenderness, warmth or any drainage that appears infected (pus like) or is persistent.  Also a temperature > 100.5 F or chills. Call your surgeon or primary care provider's office immediately if you see any of these signs.  Remove any skin glue left on incisions after 10-14 days. This will not affect your incision and can speed up healing.    Exercise is very important in your recovery. Please follow the guidelines set up for you in your cardiac rehab classes at the hospital. If outpatient cardiac rehab was ordered for you, we highly recommend you participate. If you have problems  arranging your cardiac rehab, please call [phone] for all locations, with the exception of [place], please call [phone] and [place], please call [phone].    Avoid sitting for prolonged periods of time, try to walk every hour during the day. If you have a leg incision, elevate your leg often when you are not walking.    Take your blood pressure daily. If the top number is consistently <90 or >130 please call our office for further instructions.    Check your weight when you get home from the hospital and continue to check it daily for at least a month. If you notice a weight gain of 3 pounds overnight or 5 pounds in a week, call your surgeon or cardiologist.     Bowel activity may be slow after surgery. If necessary, you may take an over the counter laxative such as Milk of Magnesia or Miralax. You may have stool softeners prescribed (docusate sodium, Senokot). We recommend using stool softeners while using narcotics for pain (oxycodone/percocet, hydrocodone/vicodin, hydromorphone/dilaudid).      Wean OFF of narcotics (oxycodone, dilaudid, hydrocodone) as soon as possible. You should continue taking acetaminophen as long as you have any surgical pain as the first choice for pain control and add narcotics as necessary for pain to be tolerable.      DO NOT SMOKE. IF YOU NEED HELP QUITTING, PLEASE TALK WITH YOUR CARDIOLOGIST OR PRIMARY DOCTOR.    REGARDING PRESCRIPTION REFILLS.  If you need a refill on your pain medication contact us to discuss your pain and a possible one time refill.   All other medications will be adjusted, discontinued and re-filled by your primary care physician and/or your cardiologist as they were prior to your surgery. We have given you enough for one to three month with possibly one refill.    POST-OPERATIVE CLINIC VISITS  You have a follow up visit with CV[facility] Surgery Advance Care Practitioners at the [facility] on 4/8/2025 at 12:30pm.  You will then return to  the care of your primary provider and your cardiologist. Future medication refills should come from your PCP or Cardiologist.   You should see your primary care provider in 1-2 weeks after discharge from TCU. Call to schedule this appointment ASAP.  It is important to see your cardiologist about 4-6 weeks after discharge. You are currently scheduled on 4/25/2025 at 10:50am with Dr. Sumner.   Additional follow-up: Heart failure appointment with heart failure cardiology: 4/7/2025 at 11:10am with Jeanne Browning CNP  Diabetes education appointment: They will call you to schedule    If there is a need to return to see CT Surgery prior to your scheduled appointment, please call [phone]    SURGICAL QUESTIONS  Please call Uzma Dwyer RNCC at [phone] with surgical recovery and medication questions.  She will assist you with your needs and contact other surgery care team members as indicated.    On weekends or after hours, please call [phone] and ask the  to page the Cardiothoracic Surgery fellow on call.      Thank you,    Your Cardiothoracic Surgery Team

## 2025-03-26 NOTE — PROGRESS NOTES
Assessments: wound assessment, respiratory assessment, circulation assessment, cardiac assessment, perfusion assessment    Key events:  Patient did not want to take shower, but was willing to do wound cares and cleaning.  Patient using incentive spirometer Q2H x5.  Patient had female visitor who lives at resident, and was educated on wound cares.  Patient assist x1 with walker and gait belt.  Patient resting comfortably and vitals stable.    Protocols:none      Barriers to discharge: Patient needs to be more medically stable for discharge      Deepak Raza  Student RN

## 2025-03-26 NOTE — PLAN OF CARE
Problem: Comorbidity Management  Goal: Maintenance of Heart Failure Symptom Control  Outcome: Progressing     Problem: Comorbidity Management  Goal: Blood Pressure in Desired Range  Outcome: Progressing     Problem: Cardiovascular Surgery  Goal: Improved Activity Tolerance  Outcome: Progressing     Problem: Cardiovascular Surgery  Goal: Effective Cardiac Function  Outcome: Progressing     Problem: Cardiovascular Surgery  Goal: Acceptable Pain Control  Outcome: Progressing     Problem: Cardiovascular Surgery  Goal: Effective Oxygenation and Ventilation  Outcome: Progressing   Goal Outcome Evaluation:    Pt disoriented to time. Calm and cooperative with cares. VSS on room air. Scheduled tylenol given for chest incision pain rated 5/10. X2 lidocaine patches on bilateral chest as well. Rested between cares. Up with assist of 1 using walker at gait belt. Needs reinforcement on sternal precautions. K, mag, phos protocol. All lab values WNL. Due for recheck tomorrow AM. NSR on cardiac monitor. TCU at discharge pending bed availability.

## 2025-03-26 NOTE — PLAN OF CARE
"  Problem: Adult Inpatient Plan of Care  Goal: Plan of Care Review  Description: The Plan of Care Review/Shift note should be completed every shift.  The Outcome Evaluation is a brief statement about your assessment that the patient is improving, declining, or no change.  This information will be displayed automatically on your shift  note.  Outcome: Progressing  Goal: Patient-Specific Goal (Individualized)  Description: You can add care plan individualizations to a care plan. Examples of Individualization might be:  \"Parent requests to be called daily at 9am for status\", \"I have a hard time hearing out of my right ear\", or \"Do not touch me to wake me up as it startles  me\".  Outcome: Progressing  Goal: Absence of Hospital-Acquired Illness or Injury  Outcome: Progressing  Intervention: Identify and Manage Fall Risk  Recent Flowsheet Documentation  Taken 3/26/2025 1326 by Myrna Jonhson RN  Safety Promotion/Fall Prevention: activity supervised  Taken 3/26/2025 0836 by Myrna Johnson RN  Safety Promotion/Fall Prevention: activity supervised  Intervention: Prevent and Manage VTE (Venous Thromboembolism) Risk  Recent Flowsheet Documentation  Taken 3/26/2025 1326 by Myrna Johnson RN  VTE Prevention/Management: SCDs off (sequential compression devices)  Taken 3/26/2025 0836 by Myrna Johnson RN  VTE Prevention/Management: SCDs off (sequential compression devices)  Intervention: Prevent Infection  Recent Flowsheet Documentation  Taken 3/26/2025 1326 by Myrna Johnson RN  Infection Prevention: rest/sleep promoted  Taken 3/26/2025 0836 by Myrna Johnson RN  Infection Prevention: rest/sleep promoted  Goal: Optimal Comfort and Wellbeing  Outcome: Progressing  Intervention: Provide Person-Centered Care  Recent Flowsheet Documentation  Taken 3/26/2025 1326 by Myrna Johnson RN  Trust Relationship/Rapport: care explained  Taken 3/26/2025 0836 by Myrna Johnson RN  Trust " Relationship/Rapport: care explained  Goal: Readiness for Transition of Care  Outcome: Progressing     Problem: Delirium  Goal: Improved Attention and Thought Clarity  Outcome: Progressing  Intervention: Maximize Cognitive Function  Recent Flowsheet Documentation  Taken 3/26/2025 1326 by Myrna Johnson RN  Sensory Stimulation Regulation: care clustered  Reorientation Measures: clock in view  Taken 3/26/2025 0836 by Myrna Johnson RN  Sensory Stimulation Regulation: care clustered  Reorientation Measures: clock in view  Goal: Improved Sleep  Outcome: Progressing     Problem: Comorbidity Management  Goal: Blood Glucose Levels Within Targeted Range  Outcome: Progressing  Intervention: Monitor and Manage Glycemia  Recent Flowsheet Documentation  Taken 3/26/2025 1326 by Myrna Johnson RN  Medication Review/Management: medications reviewed  Taken 3/26/2025 0836 by Myrna Johnson RN  Medication Review/Management: medications reviewed  Goal: Maintenance of Heart Failure Symptom Control  Outcome: Progressing  Intervention: Maintain Heart Failure Management  Recent Flowsheet Documentation  Taken 3/26/2025 1326 by Myrna Johnson RN  Medication Review/Management: medications reviewed  Taken 3/26/2025 0836 by Myrna Johnson RN  Medication Review/Management: medications reviewed  Goal: Blood Pressure in Desired Range  Outcome: Progressing  Intervention: Maintain Blood Pressure Management  Recent Flowsheet Documentation  Taken 3/26/2025 1326 by Myrna Johnson RN  Medication Review/Management: medications reviewed  Taken 3/26/2025 0836 by Myrna Johnson RN  Medication Review/Management: medications reviewed     Problem: Cardiovascular Surgery  Goal: Improved Activity Tolerance  Outcome: Progressing  Intervention: Optimize Tolerance for Activity  Recent Flowsheet Documentation  Taken 3/26/2025 1326 by Myrna Johnson RN  Environmental Support: calm environment promoted  Taken  3/26/2025 0836 by Myrna Johnson RN  Environmental Support: calm environment promoted  Goal: Optimal Coping with Heart Surgery  Outcome: Progressing  Intervention: Support Psychosocial Response to Surgery  Recent Flowsheet Documentation  Taken 3/26/2025 1326 by Myrna Johnson RN  Supportive Measures: active listening utilized  Family/Support System Care: involvement promoted  Taken 3/26/2025 0836 by Myrna Johnson RN  Supportive Measures: active listening utilized  Family/Support System Care: involvement promoted  Goal: Absence of Bleeding  Outcome: Progressing  Goal: Effective Bowel Elimination  Outcome: Progressing  Goal: Effective Cardiac Function  Outcome: Progressing  Goal: Optimal Cerebral Tissue Perfusion  Outcome: Progressing  Intervention: Protect and Optimize Cerebral Perfusion  Recent Flowsheet Documentation  Taken 3/26/2025 1326 by Myrna Johnson RN  Sensory Stimulation Regulation: care clustered  Taken 3/26/2025 0836 by Myrna Johnson RN  Sensory Stimulation Regulation: care clustered  Goal: Fluid and Electrolyte Balance  Outcome: Progressing  Intervention: Monitor and Manage Fluid and Electrolyte Balance  Recent Flowsheet Documentation  Taken 3/26/2025 1326 by Myrna Johnson RN  Fluid/Electrolyte Management: fluids provided  Taken 3/26/2025 0836 by Myrna Johnson RN  Fluid/Electrolyte Management: fluids provided  Goal: Blood Glucose Level Within Targeted Range  Outcome: Progressing  Goal: Absence of Infection Signs and Symptoms  Outcome: Progressing  Intervention: Prevent or Manage Infection  Recent Flowsheet Documentation  Taken 3/26/2025 1326 by Myrna Johnson RN  Infection Prevention: rest/sleep promoted  Taken 3/26/2025 0836 by Myrna Johnson RN  Infection Prevention: rest/sleep promoted  Goal: Anesthesia/Sedation Recovery  Outcome: Progressing  Intervention: Optimize Anesthesia Recovery  Recent Flowsheet Documentation  Taken 3/26/2025 1326 by  Ovante, Myrna R, RN  Safety Promotion/Fall Prevention: activity supervised  Administration (IS): instruction provided, follow-up  Reorientation Measures: clock in view  Patient Tolerance (IS): good  Taken 3/26/2025 0836 by Myrna Johnson RN  Safety Promotion/Fall Prevention: activity supervised  Administration (IS): instruction provided, follow-up  Reorientation Measures: clock in view  Level Incentive Spirometer (mL): 2000  Number of Repetitions (IS): 5  Patient Tolerance (IS): good  Goal: Acceptable Pain Control  Outcome: Progressing  Goal: Nausea and Vomiting Relief  Outcome: Progressing  Goal: Effective Urinary Elimination  Outcome: Progressing  Goal: Effective Oxygenation and Ventilation  Outcome: Progressing  Intervention: Promote Airway Secretion Clearance  Recent Flowsheet Documentation  Taken 3/26/2025 1326 by Myrna Johnson RN  Administration (IS): instruction provided, follow-up  Cough And Deep Breathing: done with encouragement  Patient Tolerance (IS): good  Taken 3/26/2025 0836 by Myrna Johnson RN  Administration (IS): instruction provided, follow-up  Level Incentive Spirometer (mL): 2000  Cough And Deep Breathing: done with encouragement  Number of Repetitions (IS): 5  Patient Tolerance (IS): good     Problem: Risk for Delirium  Goal: Improved Attention and Thought Clarity  Outcome: Progressing  Intervention: Maximize Cognitive Function  Recent Flowsheet Documentation  Taken 3/26/2025 1326 by Myrna Johnson RN  Sensory Stimulation Regulation: care clustered  Reorientation Measures: clock in view  Taken 3/26/2025 0836 by Myrna Johnson RN  Sensory Stimulation Regulation: care clustered  Reorientation Measures: clock in view  Goal: Improved Sleep  Outcome: Progressing  Goal: Optimal Coping  Outcome: Progressing  Intervention: Optimize Psychosocial Adjustment to Delirium  Recent Flowsheet Documentation  Taken 3/26/2025 1326 by Myrna Johnson RN  Supportive  Measures: active listening utilized  Family/Support System Care: involvement promoted  Taken 3/26/2025 0836 by Myrna oJhnson RN  Supportive Measures: active listening utilized  Family/Support System Care: involvement promoted  Goal: Improved Behavioral Control  Outcome: Progressing  Intervention: Minimize Safety Risk  Recent Flowsheet Documentation  Taken 3/26/2025 1326 by Myrna Johnson RN  Enhanced Safety Measures: pain management  Trust Relationship/Rapport: care explained  Taken 3/26/2025 0836 by Myrna Johnson RN  Enhanced Safety Measures: pain management  Trust Relationship/Rapport: care explained   Goal Outcome Evaluation:       Patient is alert and able to let his needs be known but can be forgetful at times. SR on the monitor and VSS. Poor appetite, he had rice crispy treat for breakfast and refusing to eat lunch and refusing afternoon tylenol. Plan is to discharge to TCU pending authorization. Continue plan of care.

## 2025-03-26 NOTE — PROGRESS NOTES
Care Management Follow Up    Length of Stay (days): 9    Expected Discharge Date: 03/26/2025     Concerns to be Addressed:       Patient plan of care discussed at interdisciplinary rounds: Yes    Anticipated Discharge Disposition:     TCU           Anticipated Discharge Services:  TCU  Anticipated Discharge DME:  na    Patient/family educated on Medicare website which has current facility and service quality ratings: yes   Education Provided on the Discharge Plan:  yes  Patient/Family in Agreement with the Plan:  yes    Referrals Placed by CM/SW:    Private pay costs discussed: Not applicable    Discussed  Partnership in Safe Discharge Planning  document with patient/family: No     Handoff Completed: No, handoff not indicated or clinically appropriate    Additional Information:  Received an [employer] message that [facility] can take the patient. Message sent to Julia with admission to confirm a time for admit.Julia states she has to complete the insurance  auth.CM notified the patient. PAS completed. Patient will call friend to see if they can give him a ride.  9:48 AM   Patients friend Veronique called and does not believe she can transport the patient. CM to set up wheelchair ride  2:20 PM  Update sent to Cardioth[facility] surgery letting them know that we are still waiting on insurance auth from TCU. Attempted to update the patient but he was sleeping. Updated Charge RN and bedside RN  Next Steps: await notification about insurance auth    Becky Mcgill RN    Care Management Discharge Note    Discharge Date: 03/26/2025       Discharge Disposition:  [facility]    Discharge Services:  transportation    Discharge DME:  na    Discharge Transportation:  [facility]    Private pay costs discussed: transportation costs    Does the patient's insurance plan have a 3 day qualifying hospital stay waiver?  No    PAS Confirmation Code: [id number]  Patient/family educated on Medicare website which  has current facility and service quality ratings:  yes    Education Provided on the Discharge Plan:    Persons Notified of Discharge Plans: patient SO, charge RN, HUC and bedside RN  Patient/Family in Agreement with the Plan:  yes    Handoff Referral Completed: No, handoff not indicated or clinically appropriate    Additional Information:  Patient will discharge to [facility] transport  between 1715- 1800. Patient agrees with plan. PAS completed. Orders faxed    Becky Mcgill, RN

## 2025-03-26 NOTE — PLAN OF CARE
Problem: Cardiovascular Surgery  Goal: Improved Activity Tolerance  Outcome: Progressing  Goal: Optimal Coping with Heart Surgery  Outcome: Progressing  Intervention: Support Psychosocial Response to Surgery  Recent Flowsheet Documentation  Taken 3/25/2025 2100 by Cheryl Patel RN  Family/Support System Care:   involvement promoted   presence promoted  Taken 3/25/2025 1615 by Cheryl Patel RN  Family/Support System Care:   involvement promoted   presence promoted  Goal: Effective Bowel Elimination  Outcome: Progressing  Goal: Effective Cardiac Function  Outcome: Progressing  Goal: Optimal Cerebral Tissue Perfusion  Outcome: Progressing  Intervention: Protect and Optimize Cerebral Perfusion  Recent Flowsheet Documentation  Taken 3/25/2025 2100 by Cheryl Patel RN  Head of Bed (HOB) Positioning: HOB at 30 degrees  Taken 3/25/2025 1800 by Cheryl Patel RN  Head of Bed (HOB) Positioning: HOB at 30 degrees  Taken 3/25/2025 1615 by Cheryl Patel RN  Head of Bed (HOB) Positioning: HOB at 30 degrees  Goal: Fluid and Electrolyte Balance  Outcome: Progressing  Goal: Blood Glucose Level Within Targeted Range  Outcome: Progressing  Goal: Anesthesia/Sedation Recovery  Intervention: Optimize Anesthesia Recovery  Recent Flowsheet Documentation  Taken 3/25/2025 2100 by Cheryl Patel RN  Safety Promotion/Fall Prevention:   activity supervised   clutter free environment maintained   increased rounding and observation   increase visualization of patient   nonskid shoes/slippers when out of bed   mobility aid in reach   lighting adjusted   patient and family education   safety round/check completed  Reorientation Measures: clock in view  Taken 3/25/2025 1615 by Cheryl Patel RN  Safety Promotion/Fall Prevention:   activity supervised   clutter free environment maintained   increased rounding and observation   increase visualization of patient   nonskid  shoes/slippers when out of bed   mobility aid in reach   lighting adjusted   patient and family education   safety round/check completed  Reorientation Measures: clock in view  Goal: Effective Oxygenation and Ventilation  Intervention: Promote Airway Secretion Clearance  Recent Flowsheet Documentation  Taken 3/25/2025 2100 by Cheryl Patel RN  Cough And Deep Breathing: done with encouragement  Taken 3/25/2025 1615 by Cheryl Patel RN  Cough And Deep Breathing: done with encouragement   Goal Outcome Evaluation:       sc  Patient Name: Eric Cordoba   MRN: [id number]   Date of Admission: 3/17/2025    Procedure: Procedure(s):  CORONARY ARTERY BYPASS GRAFT TIMES FOUR, LEFT INTERNAL MAMMARY ARTERY HARVEST, LEFT LEG ENDOSCOPIC SAPHENOUS VEIN PROCUREMENT,  EPIAORTIC ULTRASOUND, ANESTHESIA TRANSESOPHAGEAL ECHOCARDIOGRAM    Post Op day #:8    Subjective (Patient focus/Primary Problem for shift): Pt feels warm in the room, he wants to get some rest          Pain Goal 0 Pain Rating 0           Pain Medication/ Regime effective to reduce patient pain scheduled Tylenol     Objective (Physical assessment):           Rhythm: normal sinus rhythm            Bowel Activity: no if Yes indicate when: none, just pas flatus          Bowel Medications: pt refused tonight            Incision: healing well          Incentive Spirometry Q 1-2 hour when awake:  yes Volume: 2000 ml          Epicardial Pacing Wires:  no            Patient Activity:           Up to chair for meals: pt refused          Ambulation with RN x2 (Not including CR): pt refused           Shower Daily pt refused          Nasal  Nozin BID AM/PM x 10 days: yes              Chest Tubes   Pleural: no Draining: no               Suction: no              Mediastinal: no Draining: no               Suction: no   Dressing Change Daily:no If No, why?GRIS                     Urinary Catheter: no           Preventative WOC consult (need MD order): no        Assessment (Nursing primary shift focus): Pt is alert and oriented x 3, denies pain, has minimal SOB with activity, lung sound clear, on RA. Pt refused to take a shower tonight. He also refused to ambulate in the hallway. Pt verbalized he wants to get some sleep tonight. He is sleeping in between cares, refused HS cares. Pt did not eat much, only about 10 %.Instructed pt on how to do incision care, he said his renter will be the one taking care of him. Possible discharge to TCU tomorrow, awaiting placement.    Plan (Patient Care Plan/focus): encourage to ambulate qid; encourage use of  IS and flutter valve while awake, provide rest periods in between cares.      Cheryl Patel RN   3/25/2025   9:29 PM

## 2025-03-26 NOTE — DISCHARGE SUMMARY
Cardiovascular Surgery Discharge Summary    Primary Care Physician:  Abner Elmore  Discharge Provider: Prabha Giang PA-C   Admission Date: 3/17/2025   Admission Diagnoses: CAD (coronary artery disease) [I25.10]  CAD (coronary artery disease) [I25.10]  Coronary artery disease of native artery of native heart with stable angina pectoris [I25.118]   Discharge Date: 3/26/2025   Disposition: TCU   Condition at Discharge: Good  Code Status: Full Code     Principal Diagnosis:   Coronary artery disease involving native coronary artery of native heart, unspecified whether angina present s/p CABG x4, LLE EVH, preop IABP    Discharge Diagnoses:    Active Problems:  Patient Active Problem List   Diagnosis    Secondary cardiomyopathy (H)    Abnormal stress test    Status post coronary angiogram    Decreased left ventricular function    Mural thrombus of left ventricle    Coronary artery disease involving native coronary artery of native heart, unspecified whether angina present    Stage 3b chronic kidney disease (H)    CAD (coronary artery disease)    Coronary artery disease of native artery of native heart with stable angina pectoris          Consult/s: Dietary, critical care medicine, cardiology, HMS, neurology      Surgery:   3/17/2025 with Dr. Shahid  PROCEDURES PERFORMED:    1) Ultrasound evaluation of lower extremity vein  2) Median sternotomy  3) Left internal mammary artery harvest  4) Endoscopic harvest of left saphenous vein   5) Epiaortic ultrasound of the ascending aorta  6) Placement on central cardiopulmonary bypass  7) Coronary artery bypass grafting x4 (in-situ left internal mammary artery to left anterior descending and separate reverse saphenous vein grafts to distal right coronary artery, first obtuse marginal, and diagonal coronary arteries)   8) Placement of temporary ventricular pacing wires  9) Transesophageal echocardiography    FINDINGS:  Severely calcified coronary arteries, no OM 2 target,  OM1 intramyocardial, LAD diffusely calcified throughout its length. EF improved from 25% to 50% at end of case. NSR with VVI backup.    Discharge Medications:        Review of your medicines        START taking        Dose / Directions   acetaminophen 325 MG tablet  Commonly known as: TYLENOL  Indication: Fever, Pain  Used for: S/P CABG (coronary artery bypass graft), Type 2 diabetes mellitus with ketoacidosis without coma, with long-term current use of insulin (H), Stage 3b chronic kidney disease (H)      Dose: 650 mg  Take 2 tablets (650 mg) by mouth every 4 hours as needed for mild pain or headaches.  Refills: 0     aspirin 81 MG chewable tablet  Commonly known as: ASA  Indication: Coronary Bypass Surgery  Used for: S/P CABG (coronary artery bypass graft), Type 2 diabetes mellitus with ketoacidosis without coma, with long-term current use of insulin (H), Stage 3b chronic kidney disease (H), Secondary cardiomyopathy (H)      Dose: 81 mg  Take 1 tablet (81 mg) by mouth daily. Continue for one year postop, then when stopping plavix, increase aspirin to 162 mg daily indefinitely.  Refills: 0     clopidogrel 75 MG tablet  Commonly known as: PLAVIX  Indication: Coronary Bypass Surgery  Used for: S/P CABG (coronary artery bypass graft), Type 2 diabetes mellitus with ketoacidosis without coma, with long-term current use of insulin (H), Stage 3b chronic kidney disease (H), Secondary cardiomyopathy (H)      Dose: 75 mg  Take 1 tablet (75 mg) by mouth daily. Continue for one year postop, then stop and increase aspirin to 162 mg daily indefinitely.  Refills: 0     cyanocobalamin 500 MCG Subl sublingual tablet  Commonly known as: VITAMIN B-12  Indication: Inadequate Vitamin B12  Used for: Delirium, MCI (mild cognitive impairment), Type 2 diabetes mellitus with ketoacidosis without coma, with long-term current use of insulin (H), Stage 3b chronic kidney disease (H)      Dose: 500 mcg  Place 1 tablet (500 mcg) under the tongue  daily.  Refills: 0     * insulin aspart 100 UNIT/ML pen  Commonly known as: NovoLOG PEN  Indication: Type 2 Diabetes  Used for: Type 2 diabetes mellitus with ketoacidosis without coma, with long-term current use of insulin (H), Stage 3b chronic kidney disease (H)      Dose: 1-5 Units  Inject 1-5 Units subcutaneously 3 times daily (with meals). DOSE: 1 units per 10 grams of carbohydrate. Only chart total amount of units given. Administering insulin within 5 minutes of the start of the meal is ideal. Administer insulin no more than 30 minutes after the start of the meal, unless directed otherwise by provider.   If pre-prandial glucose is less than 60 mg/dL, treat per hypoglycemia protocol prior to administration of mealtime insulin dose.  Refills: 0     * insulin aspart 100 UNIT/ML pen  Commonly known as: NovoLOG PEN  Indication: Type 2 Diabetes  Used for: Type 2 diabetes mellitus with ketoacidosis without coma, with long-term current use of insulin (H), Stage 3b chronic kidney disease (H)      Dose: 1-10 Units  Inject 1-10 Units subcutaneously 3 times daily (before meals). Correction Scale - HIGH INSULIN RESISTANCE DOSING   Do Not give Correction Insulin if Pre-Meal BG less than 140.   For Pre-Meal  - 164 give 1 unit.   For Pre-Meal  - 189 give 2 units.   For Pre-Meal  - 214 give 3 units.   For Pre-Meal  - 239 give 4 units.   For Pre-Meal  - 264 give 5 units.   For Pre-Meal  - 289 give 6 units.   For Pre-Meal  - 314 give 7 units.   For Pre-Meal  - 339 give 8 units.   For Pre-Meal  - 364 give 9 units.  For Pre-Meal BG greater than or equal to 365 give 10 units  To be given with prandial insulin, and based on pre-meal blood glucose. Administering insulin within 5 minutes of the start of the meal is ideal. Administer insulin no more than 30 minutes after the start of the meal, unless directed otherwise by provider.   Notify provider if glucose greater than or equal  to 350 mg/dL after administration of correction dose.  Refills: 0     * insulin aspart 100 UNIT/ML pen  Commonly known as: NovoLOG PEN  Indication: Type 2 Diabetes  Used for: Type 2 diabetes mellitus with ketoacidosis without coma, with long-term current use of insulin (H), Stage 3b chronic kidney disease (H)      Dose: 1-7 Units  Inject 1-7 Units subcutaneously at bedtime. HIGH INSULIN RESISTANCE DOSING   Do Not give Bedtime Correction Insulin if BG less than 200.   For  - 224 give 1 units.   For  - 249 give 2 units.   For  - 274 give 3 units.   For  - 299 give 4 units.   For  - 324 give 5 units.   For  - 349 give 6 units.   For BG greater than or equal to 350 give 7 units.   Notify provider if glucose greater than or equal to 350 mg/dL after administration of correction dose.  Refills: 0     Lidocaine 4 % Patch  Commonly known as: LIDOCARE  Indication: Pain Following an Operation  Used for: S/P CABG (coronary artery bypass graft), Type 2 diabetes mellitus with ketoacidosis without coma, with long-term current use of insulin (H), Stage 3b chronic kidney disease (H)      Dose: 1-2 patch  Place 1-2 patches over 12 hours onto the skin every 24 hours. To prevent lidocaine toxicity, patient should be patch free for 12 hrs daily.  Refills: 0     melatonin 5 MG tablet  Indication: Trouble Sleeping  Used for: S/P CABG (coronary artery bypass graft), Type 2 diabetes mellitus with ketoacidosis without coma, with long-term current use of insulin (H), Stage 3b chronic kidney disease (H)      Dose: 5 mg  Take 1 tablet (5 mg) by mouth nightly as needed for sleep.  Refills: 0     polyethylene glycol 17 GM/Dose powder  Commonly known as: MIRALAX  Indication: Constipation  Used for: S/P CABG (coronary artery bypass graft), Type 2 diabetes mellitus with ketoacidosis without coma, with long-term current use of insulin (H), Stage 3b chronic kidney disease (H)      Dose: 17 g  Take 17 g by mouth  daily.  Refills: 0           * This list has 3 medication(s) that are the same as other medications prescribed for you. Read the directions carefully, and ask your doctor or other care provider to review them with you.                CONTINUE these medicines which may have CHANGED, or have new prescriptions. If we are uncertain of the size of tablets/capsules you have at home, strength may be listed as something that might have changed.        Dose / Directions   Lantus SoloStar 100 UNIT/ML soln  Indication: Type 2 Diabetes  This may have changed:   how much to take  when to take this  Used for: Type 2 diabetes mellitus with ketoacidosis without coma, with long-term current use of insulin (H), Stage 3b chronic kidney disease (H)  Generic drug: insulin glargine      Dose: 35 Units  Inject 35 Units subcutaneously every morning.  Refills: 0     losartan 25 MG tablet  Commonly known as: COZAAR  Indication: High Blood Pressure, Heart Failure  This may have changed:   medication strength  how much to take  Used for: S/P CABG (coronary artery bypass graft), Type 2 diabetes mellitus with ketoacidosis without coma, with long-term current use of insulin (H), Stage 3b chronic kidney disease (H), Secondary cardiomyopathy (H)      Dose: 12.5 mg  Take 0.5 tablets (12.5 mg) by mouth daily.  Refills: 0            CONTINUE these medicines which have NOT CHANGED        Dose / Directions   carvedilol 12.5 MG tablet  Commonly known as: COREG  Indication: Cardiac Failure, High Blood Pressure      Dose: 12.5 mg  Take 12.5 mg by mouth 2 times daily  Refills: 0     furosemide 20 MG tablet  Commonly known as: LASIX  Indication: Edema, Cardiac Failure      Dose: 40 mg  Take 40 mg by mouth daily  Refills: 0     simvastatin 40 MG tablet  Commonly known as: ZOCOR  Indication: High Cholesterol, Lower Risk of Heart Event or Stroke      Dose: 40 mg  Take 40 mg by mouth daily  Refills: 0            STOP taking      Advil -25 MG Caps  Generic  "drug: Ibuprofen-diphenhydrAMINE HCl        apixaban ANTICOAGULANT 5 MG tablet  Commonly known as: ELIQUIS ANTICOAGULANT        gabapentin 300 MG capsule  Commonly known as: NEURONTIN        Mounjaro 5 MG/0.5ML Soaj auto-injector pen  Generic drug: tirzepatide                   Discharge Instructions:    Follow up appointment with Primary Care Physician: Abner Elmore within 7 days of discharge from TCU.  Follow up appointment with Specialist:    Follow with CV Surgery as scheduled.    Follow-up with cardiology as scheduled    Diet: Cardiac    Activity/Restrictions: As tolerated with sternal precautions in mind (see below). No driving for 4 weeks or while on pain medication.     - Shower and wash your incisions daily with soap and water. No tub baths/hot tubs for 4 weeks. An antibacterial soap such as Dial or Safeguard is recommended.    - Check your incisions every day. If you notice any redness, drainage, or anything unusual, please call the surgeons office.    - No driving for 4 weeks after surgery or while on pain medication     - Do not lift anything more than 10 pounds for 8 weeks after surgery. After 8 weeks, advance lifting gradually as tolerated.    - You may have watery drainage from your chest tube site for 2-3 weeks after surgery. Your may cover with a Band-Aid to protect your clothing. Remove the Band-Aid every day and wash the site.    - If you have a leg lesion, you may have swelling for 2-3 months. Elevate your leg any time you are not walking.    - If you feel any \"popping\" or \"clicking\" sensations in your chest, your arms are out too far or you are putting too much weight into arm movements. Do not reach over your head or out to the side to pull something. Do not do any arm exercises or use any exercise equipment that involves arm movement. If you feel your sternum moving, call the surgeon's office.    - Increase your daily activity as explained by Cardiac Rehab. You are encouraged to enroll in " an Outpatient Cardiac Rehab Program.    - No active sports using your upper arms for 3 months. This includes fishing, hunting, bowling, swimming, tennis or golf.    - No physical activity such as cutting the grass, raking, vacuuming, changing sheets on your bed, snow shoveling, or using a  for 3 months.    - Use incentive spirometer 6-8 times per day for 2 weeks.       Hospital Summary:   Eric Cordoba is a 74 year old male who was admitted to [facility] on 3/17/2025 for planned elective open heart surgery. He was referred to CV surgery for evaluation for possible surgical revascularization.     Patient was deemed an appropriate candidate for CABG, and was taken to the operating room on 3/17/2025 where patient underwent quadruple vessel coronary artery bypass grafting and endoscopic great saphenous vein harvest from the left lower extremity with preoperative IABP placement per cardiology. Surgery was uneventful and patient was brought to the ICU post-operatively. He was extubated on POD#0, IABP was removed on POD#1, and he was weaned from pressors. Patient was awake and alert, afebrile, and with stable vitals. Insulin drip was discontinued and he was transitioned to a sliding scale and was initially euglycemic. Unfortunately overnight, his blood glucose levels increased and he developed diabetic ketoacidosis with an anion gap of 27. This was treated with insulin drip and fluid and resolved. He was transferred to general telemetry status on POD#6 where patient has had return of bowel function, is maintaining oxygen saturations on room air, had their chest tubes removed, and has no complaints of chest pain or shortness of breath. On 03/26/25 (POD#8) patient was stable enough to be discharged to TCU.    Of note:  - Neurology was consulted per [facility] [place] d/t concern for left sided weakness with transfer. Head CT without evidence of acute CVA but did show small age indeterminate infarct. Per neurology, his  overall mental status was most c/w acute metabolic encephalopathy. He was found to have vit B12 deficiency which can worsen cognitive impairment so he was started on daily B12 and has neuro and neuropsychology referrals placed per neuro.  - Preop IABP which was removed w/o complication POD#1  - No LV thrombus on intra-op MANUEL or postop TTE, so per discussion with patient's primary cardiologist, PTA eliquis was discontinued.  - Discharging on ASA/plavix/statin/coreg/losartan.   - Postop echo w/ unchanged EF 35-40%. Has a referral placed for HF f/u.  - DKA POD#2, resolved. Will be sent on lantus 35u qAM, 1:10 carb count novolog and high dose sliding scale insulin w/ meals and at bedtime.  - Plavix 75 mg qday to be maintained x1 year postop per surgeon preference for graft patency. ASA should be 81 mg for this duration. When plavix is stopped, ASA should be increased to 162 mg qday indefinitely.   - Patient is below preop weight, but w/ low EF will be sent on his PTA dose of lasix but no K+ supplementation as he has not required any in several days. Patient should call clinic if they gain > 3lbs in one day or > 5lbs in one week.      Vital signs:  BP (!) 144/65 (BP Location: Left arm)   Pulse 71   Temp 97.5  F (36.4  C) (Oral)   Resp 18   Wt 92.3 kg (203 lb 8 oz)   SpO2 98%   BMI 30.05 kg/m     203 lbs 8 oz   Body mass index is 30.05 kg/m .       Physical Exam:    Pertinent exam findings on day of discharge include:  Gen: Seen up in chair. NAD. Pleasant and conversant.   CV: RRR on monitor. No edema.  Pulm: Non-labored breathing on room air.  Abd: Soft, non-tender, non-distended  Neuro: CNs grossly intact  Inc: C/D/I      Audra Giang PA-C  [facility] [email or web address]] Cardiothoracic Surgery  Pager: [phone]  March 26, 2025

## 2025-03-27 ENCOUNTER — DOCUMENTATION ONLY (OUTPATIENT)
Dept: GERIATRICS | Facility: CLINIC | Age: 75
End: 2025-03-27

## 2025-03-27 ENCOUNTER — MEDICAL CORRESPONDENCE (OUTPATIENT)
Dept: HEALTH INFORMATION MANAGEMENT | Facility: CLINIC | Age: 75
End: 2025-03-27

## 2025-03-27 ENCOUNTER — TRANSITIONAL CARE UNIT VISIT (OUTPATIENT)
Dept: GERIATRICS | Facility: CLINIC | Age: 75
End: 2025-03-27
Payer: COMMERCIAL

## 2025-03-27 VITALS
DIASTOLIC BLOOD PRESSURE: 58 MMHG | BODY MASS INDEX: 30.07 KG/M2 | TEMPERATURE: 98.8 F | OXYGEN SATURATION: 98 % | HEART RATE: 83 BPM | SYSTOLIC BLOOD PRESSURE: 122 MMHG | RESPIRATION RATE: 18 BRPM | HEIGHT: 69 IN | WEIGHT: 203 LBS

## 2025-03-27 DIAGNOSIS — Z95.1 S/P CABG X 4: ICD-10-CM

## 2025-03-27 DIAGNOSIS — R53.81 PHYSICAL DECONDITIONING: ICD-10-CM

## 2025-03-27 DIAGNOSIS — G47.00 INSOMNIA, UNSPECIFIED TYPE: ICD-10-CM

## 2025-03-27 DIAGNOSIS — R52 PAIN MANAGEMENT: Primary | ICD-10-CM

## 2025-03-27 LAB — MAYO MISC RESULT: ABNORMAL

## 2025-03-27 RX ORDER — ACETAMINOPHEN 500 MG
500-1000 TABLET ORAL 3 TIMES DAILY
COMMUNITY

## 2025-03-27 RX ORDER — AMOXICILLIN 250 MG
1 CAPSULE ORAL 2 TIMES DAILY
COMMUNITY

## 2025-03-27 RX ORDER — TRAZODONE HYDROCHLORIDE 50 MG/1
50 TABLET ORAL AT BEDTIME
COMMUNITY

## 2025-03-27 NOTE — LETTER
3/27/2025      Eric Cordoba  [address]  [place] [facility] [address]        [facility] GERIATRIC SERVICES  Chief Complaint   Patient presents with     [facility] F/U     [facility] Record Number:  [id number]  Place of Service where encounter took place:  [facility] ([facility]) [[id number]]  Code Status:Full Code    HISTORY:      HPI:  Eric Cordoba  is 74 year old (1950) undergoing physical and occupational therapy.  He is with past medical history type 2 diabetes, hypertension, history of CVA who is s/p CABG x4, LLE EVH     Today is seen to review vital signs, labs, routine visit and to establish care.  He rates incisional pain 5 out of 10, regular strength Tylenol switch to extra strength scheduled 3 times daily while awake, denied shortness of breath cough congestion constipation or diarrhea however he reports his bowels are slower than he would like them to be and senna S was added twice daily.  His midline incision and chest tube sites are all clean dry and intact open to air and scabbed over.  Also left medial leg incisions clean dry and intact and scabbed.  He normally wears a shan monitor however reported he took it off for surgery so he will get fingersticks ACHS.  He reports no relief with sleep from his melatonin this was discontinued and he was started on trazodone 50 mg at bedtime.  He does have neuropathy in his fingers and toes.  It appears his gabapentin was stopped in the hospital.  He tells writer he lives independently but has a roommate that lives downstairs.  He lost his wife approximately 5 years ago.    ALLERGIES:Lisinopril and Propoxyphene    PAST MEDICAL HISTORY:   Past Medical History:   Diagnosis Date     Diabetes mellitus, type 2 (H)      History of CVA (cerebrovascular accident)      Hypertension      Nutritional and metabolic cardiomyopathies (H)        PAST SURGICAL HISTORY:   has a past surgical history that includes Coronary Angiogram (N/A, 2/24/2025); Left Heart  Catheterization (N/A, 2/24/2025); Intra aortic Balloon Pump Insertion (N/A, 3/17/2025); PICC/Midline Placement (3/20/2025); Coronary Artery Bypass Graft, With Endoscopic Vessel Procurement (N/A, 3/17/2025); and Transesophageal echocardiogram intraoperative (3/17/2025).    FAMILY HISTORY: family history is not on file.    SOCIAL HISTORY:  reports that he has quit smoking. His smoking use included cigarettes. He has never used smokeless tobacco. He reports that he does not currently use alcohol.    ROS:  Constitutional: Negative for activity change, appetite change, fatigue and fever.   HENT: Negative for congestion.    Respiratory: Negative for cough, shortness of breath and wheezing.    Cardiovascular: Negative for chest pain and positive leg swelling.   Gastrointestinal: Negative for abdominal distention, abdominal pain, constipation, diarrhea and nausea.   Genitourinary: Negative for dysuria.   Musculoskeletal: Negative for arthralgia. Negative for back pain.  Missing the tip of his left index finger reported it was from her past printing injury  Skin: Negative for color change and wound.   Neurological: Negative for dizziness.   Psychiatric/Behavioral: Negative for agitation, behavioral problems and confusion.     Physical Exam:  Constitutional:       Appearance: Patient is well-developed.   HENT:      Head: Normocephalic.   Eyes:      Conjunctiva/sclera: Conjunctivae normal.   Neck:      Musculoskeletal: Normal range of motion.   Cardiovascular:      Rate and Rhythm: Normal rate and regular rhythm.      Heart sounds: Normal heart sounds. No murmur.   Pulmonary:      Effort: No respiratory distress.      Breath sounds: Normal breath sounds. No wheezing or rales.   Abdominal:      General: Bowel sounds are normal. There is no distension.      Palpations: Abdomen is soft.      Tenderness: There is no abdominal tenderness.   Musculoskeletal:       Normal range of motion.     Skin:General:        Skin is warm.   "Midline surgical incision along with chest tube sites clean dry and intact open to air.  Surgical incisions left medial leg clean dry and intact open to air  Neurological:         Mental Status: Patient is alert and oriented to person, place, and time.   Psychiatric:         Behavior: Behavior normal.     Vitals:/58   Pulse 83   Temp 98.8  F (37.1  C)   Resp 18   Ht 1.753 m (5' 9\")   Wt 92.1 kg (203 lb)   SpO2 98%   BMI 29.98 kg/m   and Body mass index is 29.98 kg/m .    Lab/Diagnostic data:   Recent Results (from the past 240 hours)   Venous Panel POCT    Collection Time: 03/17/25 11:12 AM   Result Value Ref Range    pH Venous POCT 7.31 (L) 7.32 - 7.43    pCO2 Venous POCT 51 (H) 40 - 50 mm Hg    pO2 Venous POCT 55 (H) 25 - 47 mm Hg    Bicarbonate Venous POCT 24 21 - 28 mmol/L    Sodium POCT 140 135 - 145 mmol/L    Potassium POCT 4.4 3.4 - 5.3 mmol/L    Hemoglobin POCT 9.6 (L) 13.3 - 17.7 g/dL    Oxyhemoglobin Venous POCT 85 (H) 70 - 75 %    Glucose Whole Blood POCT 115 (H) 70 - 99 mg/dL    Base Excess/Deficit (+/-) POCT -0.9 -3.0 - 3.0 mmol/L    Calcium, Ionized Whole Blood POCT 4.2 (L) 4.4 - 5.2 mg/dL    O2 Sat, Venous POCT 87 (H) 70 - 75 %    Lactic Acid POCT 0.6 (L) 0.7 - 2.0 mmol/L   Arterial Panel POCT    Collection Time: 03/17/25 12:01 PM   Result Value Ref Range    pH Arterial POCT 7.40 7.35 - 7.45    pCO2 Arterial POCT 39 35 - 45 mm Hg    pO2 Arterial POCT 378 (H) 80 - 105 mm Hg    Bicarbonate Arterial POCT 24 21 - 28 mmol/L    Sodium POCT 139 135 - 145 mmol/L    Potassium POCT 5.0 3.4 - 5.3 mmol/L    Hemoglobin POCT 9.8 (L) 13.3 - 17.7 g/dL    Glucose Whole Blood POCT 121 (H) 70 - 99 mg/dL    Calcium, Ionized Whole Blood POCT 4.1 (L) 4.4 - 5.2 mg/dL    Base Excess/Deficit (+/-) POCT -0.8 -3.0 - 3.0 mmol/L    O2 Sat, Arterial POCT 100 (H) 95 - 96 %    Lactic Acid POCT 0.7 0.7 - 2.0 mmol/L    Oxyhemoglobin Arterial POCT 99 92 - 100 %   Arterial Panel POCT    Collection Time: 03/17/25 12:44 PM "   Result Value Ref Range    pH Arterial POCT 7.40 7.35 - 7.45    pCO2 Arterial POCT 37 35 - 45 mm Hg    pO2 Arterial POCT 293 (H) 80 - 105 mm Hg    Bicarbonate Arterial POCT 23 21 - 28 mmol/L    Sodium POCT 140 135 - 145 mmol/L    Potassium POCT 5.1 3.4 - 5.3 mmol/L    Hemoglobin POCT 10.1 (L) 13.3 - 17.7 g/dL    Glucose Whole Blood POCT 140 (H) 70 - 99 mg/dL    Calcium, Ionized Whole Blood POCT 4.1 (L) 4.4 - 5.2 mg/dL    Base Excess/Deficit (+/-) POCT -1.4 -3.0 - 3.0 mmol/L    O2 Sat, Arterial POCT 100 (H) 95 - 96 %    Lactic Acid POCT 0.9 0.7 - 2.0 mmol/L    Oxyhemoglobin Arterial POCT 99 92 - 100 %   CBC with platelets    Collection Time: 03/17/25  1:05 PM   Result Value Ref Range    WBC Count 9.3 4.0 - 11.0 10e3/uL    RBC Count 3.01 (L) 4.40 - 5.90 10e6/uL    Hemoglobin 9.8 (L) 13.3 - 17.7 g/dL    Hematocrit 27.8 (L) 40.0 - 53.0 %    MCV 92 78 - 100 fL    MCH 32.6 26.5 - 33.0 pg    MCHC 35.3 31.5 - 36.5 g/dL    RDW 12.3 10.0 - 15.0 %    Platelet Count 110 (L) 150 - 450 10e3/uL   Fibrinogen    Collection Time: 03/17/25  1:05 PM   Result Value Ref Range    Fibrinogen Activity 241 170 - 510 mg/dL   INR    Collection Time: 03/17/25  1:05 PM   Result Value Ref Range    INR 1.65 (H) 0.85 - 1.15   PTT    Collection Time: 03/17/25  1:05 PM   Result Value Ref Range    aPTT 48 (H) 22 - 38 Seconds   Arterial Panel POCT    Collection Time: 03/17/25  1:09 PM   Result Value Ref Range    pH Arterial POCT 7.38 7.35 - 7.45    pCO2 Arterial POCT 39 35 - 45 mm Hg    pO2 Arterial POCT 98 80 - 105 mm Hg    Bicarbonate Arterial POCT 22 21 - 28 mmol/L    Sodium POCT 142 135 - 145 mmol/L    Potassium POCT 4.3 3.4 - 5.3 mmol/L    Hemoglobin POCT 10.1 (L) 13.3 - 17.7 g/dL    Glucose Whole Blood POCT 137 (H) 70 - 99 mg/dL    Calcium, Ionized Whole Blood POCT 5.0 4.4 - 5.2 mg/dL    Base Excess/Deficit (+/-) POCT -2.4 -3.0 - 3.0 mmol/L    O2 Sat, Arterial POCT 98 (H) 95 - 96 %    Lactic Acid POCT 1.4 0.7 - 2.0 mmol/L    Oxyhemoglobin  Arterial POCT 97 92 - 100 %   Arterial Panel POCT    Collection Time: 03/17/25  1:40 PM   Result Value Ref Range    pH Arterial POCT 7.40 7.35 - 7.45    pCO2 Arterial POCT 38 35 - 45 mm Hg    pO2 Arterial POCT 100 80 - 105 mm Hg    Bicarbonate Arterial POCT 24 21 - 28 mmol/L    Sodium POCT 144 135 - 145 mmol/L    Potassium POCT 3.9 3.4 - 5.3 mmol/L    Hemoglobin POCT 10.2 (L) 13.3 - 17.7 g/dL    Glucose Whole Blood POCT 121 (H) 70 - 99 mg/dL    Calcium, Ionized Whole Blood POCT 4.5 4.4 - 5.2 mg/dL    Base Excess/Deficit (+/-) POCT -1.1 -3.0 - 3.0 mmol/L    O2 Sat, Arterial POCT 99 (H) 95 - 96 %    Lactic Acid POCT 1.4 0.7 - 2.0 mmol/L    Oxyhemoglobin Arterial POCT 97 92 - 100 %   Glucose by meter    Collection Time: 03/17/25  3:01 PM   Result Value Ref Range    GLUCOSE BY METER POCT 140 (H) 70 - 99 mg/dL   INR    Collection Time: 03/17/25  3:02 PM   Result Value Ref Range    INR 1.31 (H) 0.85 - 1.15   Partial thromboplastin time    Collection Time: 03/17/25  3:02 PM   Result Value Ref Range    aPTT 43 (H) 22 - 38 Seconds   CBC with platelets    Collection Time: 03/17/25  3:02 PM   Result Value Ref Range    WBC Count 12.6 (H) 4.0 - 11.0 10e3/uL    RBC Count 3.43 (L) 4.40 - 5.90 10e6/uL    Hemoglobin 10.9 (L) 13.3 - 17.7 g/dL    Hematocrit 31.3 (L) 40.0 - 53.0 %    MCV 91 78 - 100 fL    MCH 31.8 26.5 - 33.0 pg    MCHC 34.8 31.5 - 36.5 g/dL    RDW 12.4 10.0 - 15.0 %    Platelet Count 132 (L) 150 - 450 10e3/uL   Comprehensive metabolic panel    Collection Time: 03/17/25  3:02 PM   Result Value Ref Range    Sodium 144 135 - 145 mmol/L    Potassium 4.2 3.4 - 5.3 mmol/L    Carbon Dioxide (CO2) 22 22 - 29 mmol/L    Anion Gap 11 7 - 15 mmol/L    Urea Nitrogen 25.9 (H) 8.0 - 23.0 mg/dL    Creatinine 1.94 (H) 0.67 - 1.17 mg/dL    GFR Estimate 35 (L) >60 mL/min/1.73m2    Calcium 8.5 (L) 8.8 - 10.4 mg/dL    Chloride 111 (H) 98 - 107 mmol/L    Glucose 149 (H) 70 - 99 mg/dL    Alkaline Phosphatase 68 40 - 150 U/L    AST 38 0 - 45  U/L    ALT 13 0 - 70 U/L    Protein Total 5.3 (L) 6.4 - 8.3 g/dL    Albumin 3.6 3.5 - 5.2 g/dL    Bilirubin Total 0.6 <=1.2 mg/dL   Lactic acid whole blood    Collection Time: 03/17/25  3:02 PM   Result Value Ref Range    Lactic Acid 1.4 0.7 - 2.0 mmol/L   Ionized Calcium    Collection Time: 03/17/25  3:02 PM   Result Value Ref Range    Calcium Ionized Whole Blood 4.4 4.4 - 5.2 mg/dL   Magnesium    Collection Time: 03/17/25  3:02 PM   Result Value Ref Range    Magnesium 2.8 (H) 1.7 - 2.3 mg/dL   Phosphorus    Collection Time: 03/17/25  3:02 PM   Result Value Ref Range    Phosphorus 2.2 (L) 2.5 - 4.5 mg/dL   Potassium Lab    Collection Time: 03/17/25  3:02 PM   Result Value Ref Range    Potassium 4.2 3.4 - 5.3 mmol/L   iStat Gases (Electrolytes) arterial POCT    Collection Time: 03/17/25  3:02 PM   Result Value Ref Range    CPB Applied No     Hematocrit POCT 29 (L) 40-53 % %    Bicarbonate Arterial POCT 22 21 - 28 mmol/L    Hemoglobin POCT 9.9 (L) 13.3 - 17.7 g/dL    Potassium POCT 4.0 3.4 - 5.3 mmol/L    Sodium POCT 143 135 - 145 mmol/L    pCO2 Arterial POCT 32 (L) 35 - 45 mm Hg    pH Arterial POCT 7.44 7.35 - 7.45    pO2 Arterial POCT 151 (H) 80 - 105 mm Hg    O2 Sat, Arterial POCT 99 (H) 95 - 96 %    Base Excess/Deficit (+/-) POCT -2.0 -3.0 - 3.0 mmol/L   ECG 12-LEAD WITH MUSE (LHE)    Collection Time: 03/17/25  3:26 PM   Result Value Ref Range    Systolic Blood Pressure  mmHg    Diastolic Blood Pressure  mmHg    Ventricular Rate 86 BPM    Atrial Rate 86 BPM    ID Interval 148 ms    QRS Duration 124 ms     ms    QTc 500 ms    P Axis 112 degrees    R AXIS 67 degrees    T Axis 90 degrees    Interpretation ECG       Sinus rhythm with occasional Premature ventricular complexes  Non-specific intra-ventricular conduction delay  Old inferior MI   Prolonged QTc   When compared with ECG of 24-Feb-2025 09:28,  No significant change was found  Confirmed by MOLINA NEVILLE MD LOC:JN ([id number]) on 3/17/2025 3:44:15 PM      Glucose by meter    Collection Time: 03/17/25  5:11 PM   Result Value Ref Range    GLUCOSE BY METER POCT 143 (H) 70 - 99 mg/dL   Glucose by meter    Collection Time: 03/17/25  7:04 PM   Result Value Ref Range    GLUCOSE BY METER POCT 172 (H) 70 - 99 mg/dL   Glucose by meter    Collection Time: 03/17/25  8:13 PM   Result Value Ref Range    GLUCOSE BY METER POCT 167 (H) 70 - 99 mg/dL   Blood gas arterial    Collection Time: 03/17/25  9:00 PM   Result Value Ref Range    pH Arterial 7.42 7.35 - 7.45    pCO2 Arterial 33 (L) 35 - 45 mm Hg    pO2 Arterial 100 80 - 105 mm Hg    FIO2 30     Bicarbonate Arterial 21 21 - 28 mmol/L    Base Excess/Deficit Arterial -3.0 -3.0 - 3.0 mmol/L    Oxyhemoglobin Arterial 97 92 - 100 %    O2 Sat, Arterial 98.8 (H) 95.0 - 96.0 %    Peep 5 cm H2O   Glucose by meter    Collection Time: 03/17/25  9:00 PM   Result Value Ref Range    GLUCOSE BY METER POCT 177 (H) 70 - 99 mg/dL   Glucose by meter    Collection Time: 03/17/25 10:19 PM   Result Value Ref Range    GLUCOSE BY METER POCT 137 (H) 70 - 99 mg/dL   Glucose by meter    Collection Time: 03/17/25 11:07 PM   Result Value Ref Range    GLUCOSE BY METER POCT 136 (H) 70 - 99 mg/dL   Comprehensive metabolic panel    Collection Time: 03/18/25 12:08 AM   Result Value Ref Range    Sodium 146 (H) 135 - 145 mmol/L    Potassium 4.9 3.4 - 5.3 mmol/L    Carbon Dioxide (CO2) 23 22 - 29 mmol/L    Anion Gap 8 7 - 15 mmol/L    Urea Nitrogen 26.6 (H) 8.0 - 23.0 mg/dL    Creatinine 1.99 (H) 0.67 - 1.17 mg/dL    GFR Estimate 34 (L) >60 mL/min/1.73m2    Calcium 8.5 (L) 8.8 - 10.4 mg/dL    Chloride 115 (H) 98 - 107 mmol/L    Glucose 149 (H) 70 - 99 mg/dL    Alkaline Phosphatase 59 40 - 150 U/L    AST 35 0 - 45 U/L    ALT 11 0 - 70 U/L    Protein Total 5.5 (L) 6.4 - 8.3 g/dL    Albumin 3.9 3.5 - 5.2 g/dL    Bilirubin Total 0.6 <=1.2 mg/dL   Ionized Calcium    Collection Time: 03/18/25 12:08 AM   Result Value Ref Range    Calcium Ionized Whole Blood 4.6 4.4 - 5.2  mg/dL   Glucose by meter    Collection Time: 03/18/25 12:10 AM   Result Value Ref Range    GLUCOSE BY METER POCT 152 (H) 70 - 99 mg/dL   Glucose by meter    Collection Time: 03/18/25  1:09 AM   Result Value Ref Range    GLUCOSE BY METER POCT 116 (H) 70 - 99 mg/dL   Glucose by meter    Collection Time: 03/18/25  2:17 AM   Result Value Ref Range    GLUCOSE BY METER POCT 107 (H) 70 - 99 mg/dL   Glucose by meter    Collection Time: 03/18/25  3:14 AM   Result Value Ref Range    GLUCOSE BY METER POCT 109 (H) 70 - 99 mg/dL   Blood gas venous    Collection Time: 03/18/25  4:25 AM   Result Value Ref Range    pH Venous 7.33 7.32 - 7.43    pCO2 Venous 47 40 - 50 mm Hg    pO2 Venous 33 25 - 47 mm Hg    Bicarbonate Venous 25 21 - 28 mmol/L    Base Excess/Deficit Venous -1.0 -3.0 - 3.0 mmol/L    FIO2 28     Oxyhemoglobin Venous 61 (L) 70 - 75 %    O2 Sat, Venous 62.8 (L) 70.0 - 75.0 %   CBC with platelets    Collection Time: 03/18/25  4:25 AM   Result Value Ref Range    WBC Count 11.2 (H) 4.0 - 11.0 10e3/uL    RBC Count 2.90 (L) 4.40 - 5.90 10e6/uL    Hemoglobin 9.3 (L) 13.3 - 17.7 g/dL    Hematocrit 27.5 (L) 40.0 - 53.0 %    MCV 95 78 - 100 fL    MCH 32.1 26.5 - 33.0 pg    MCHC 33.8 31.5 - 36.5 g/dL    RDW 12.7 10.0 - 15.0 %    Platelet Count 95 (L) 150 - 450 10e3/uL   Magnesium    Collection Time: 03/18/25  4:25 AM   Result Value Ref Range    Magnesium 2.3 1.7 - 2.3 mg/dL   Ionized Calcium    Collection Time: 03/18/25  4:25 AM   Result Value Ref Range    Calcium Ionized Whole Blood 4.6 4.4 - 5.2 mg/dL   Phosphorus    Collection Time: 03/18/25  4:25 AM   Result Value Ref Range    Phosphorus 3.3 2.5 - 4.5 mg/dL   Comprehensive metabolic panel    Collection Time: 03/18/25  4:25 AM   Result Value Ref Range    Sodium 144 135 - 145 mmol/L    Potassium 5.0 3.4 - 5.3 mmol/L    Carbon Dioxide (CO2) 23 22 - 29 mmol/L    Anion Gap 8 7 - 15 mmol/L    Urea Nitrogen 26.6 (H) 8.0 - 23.0 mg/dL    Creatinine 2.00 (H) 0.67 - 1.17 mg/dL    GFR  Estimate 34 (L) >60 mL/min/1.73m2    Calcium 8.4 (L) 8.8 - 10.4 mg/dL    Chloride 113 (H) 98 - 107 mmol/L    Glucose 122 (H) 70 - 99 mg/dL    Alkaline Phosphatase 56 40 - 150 U/L    AST 33 0 - 45 U/L    ALT 9 0 - 70 U/L    Protein Total 5.3 (L) 6.4 - 8.3 g/dL    Albumin 3.7 3.5 - 5.2 g/dL    Bilirubin Total 0.5 <=1.2 mg/dL   INR    Collection Time: 03/18/25  4:25 AM   Result Value Ref Range    INR 1.15 0.85 - 1.15   Partial thromboplastin time    Collection Time: 03/18/25  4:25 AM   Result Value Ref Range    aPTT 34 22 - 38 Seconds   Fibrinogen activity    Collection Time: 03/18/25  4:25 AM   Result Value Ref Range    Fibrinogen Activity 294 170 - 510 mg/dL   Glucose by meter    Collection Time: 03/18/25  4:31 AM   Result Value Ref Range    GLUCOSE BY METER POCT 120 (H) 70 - 99 mg/dL   ECG 12-LEAD WITH MUSE (LHE)    Collection Time: 03/18/25  4:41 AM   Result Value Ref Range    Systolic Blood Pressure  mmHg    Diastolic Blood Pressure  mmHg    Ventricular Rate 83 BPM    Atrial Rate 83 BPM    WA Interval 146 ms    QRS Duration 140 ms     ms    QTc 484 ms    P Axis 18 degrees    R AXIS 41 degrees    T Axis 77 degrees    Interpretation ECG       Sinus rhythm  Right bundle branch block  Inferior infarct , age undetermined  Submillimeter ST elevation in the inferior leads  Anteroseptal infarct , age undetermined  Abnormal ECG  When compared with ECG of 17-Mar-2025 15:26,  Premature ventricular complexes are no longer Present  Right bundle branch block has replaced Non-specific intra-ventricular conduction delay  Anteroseptal infarct is now Present  Inferior infarct is now Present  Confirmed by JAIME OCHOA MD LOC:JN ([id number]) on 3/18/2025 8:01:09 AM     Glucose by meter    Collection Time: 03/18/25  6:16 AM   Result Value Ref Range    GLUCOSE BY METER POCT 106 (H) 70 - 99 mg/dL   Glucose by meter    Collection Time: 03/18/25  7:57 AM   Result Value Ref Range    GLUCOSE BY METER POCT 82 70 - 99 mg/dL   Glucose by  meter    Collection Time: 03/18/25 10:00 AM   Result Value Ref Range    GLUCOSE BY METER POCT 129 (H) 70 - 99 mg/dL   Glucose by meter    Collection Time: 03/18/25 12:07 PM   Result Value Ref Range    GLUCOSE BY METER POCT 130 (H) 70 - 99 mg/dL   CBC with platelets    Collection Time: 03/18/25  3:23 PM   Result Value Ref Range    WBC Count 16.2 (H) 4.0 - 11.0 10e3/uL    RBC Count 3.09 (L) 4.40 - 5.90 10e6/uL    Hemoglobin 10.0 (L) 13.3 - 17.7 g/dL    Hematocrit 29.3 (L) 40.0 - 53.0 %    MCV 95 78 - 100 fL    MCH 32.4 26.5 - 33.0 pg    MCHC 34.1 31.5 - 36.5 g/dL    RDW 13.2 10.0 - 15.0 %    Platelet Count 96 (L) 150 - 450 10e3/uL   Glucose by meter    Collection Time: 03/18/25  4:23 PM   Result Value Ref Range    GLUCOSE BY METER POCT 195 (H) 70 - 99 mg/dL   ECG 12-LEAD WITH MUSE (LHE)    Collection Time: 03/18/25  5:24 PM   Result Value Ref Range    Systolic Blood Pressure  mmHg    Diastolic Blood Pressure  mmHg    Ventricular Rate 74 BPM    Atrial Rate 42 BPM    FL Interval  ms    QRS Duration 148 ms     ms    QTc 495 ms    P Axis  degrees    R AXIS 39 degrees    T Axis 53 degrees    Interpretation ECG       Wide QRS rhythm  Right bundle branch block  Cannot rule out Inferior infarct , age undetermined  Anteroseptal infarct , age undetermined  Abnormal ECG  When compared with ECG of 18-Mar-2025 04:41,  Wide QRS rhythm has replaced Sinus rhythm  Confirmed by JAIME OCHOA MD LOC:JN ([id number]) on 3/19/2025 4:03:35 AM     Glucose by meter    Collection Time: 03/18/25  8:18 PM   Result Value Ref Range    GLUCOSE BY METER POCT 269 (H) 70 - 99 mg/dL   Ionized Calcium    Collection Time: 03/19/25  4:26 AM   Result Value Ref Range    Calcium Ionized Whole Blood 4.6 4.4 - 5.2 mg/dL   Potassium    Collection Time: 03/19/25  4:26 AM   Result Value Ref Range    Potassium 5.1 3.4 - 5.3 mmol/L   Magnesium    Collection Time: 03/19/25  4:26 AM   Result Value Ref Range    Magnesium 2.3 1.7 - 2.3 mg/dL   Phosphorus     Collection Time: 03/19/25  4:26 AM   Result Value Ref Range    Phosphorus 5.3 (H) 2.5 - 4.5 mg/dL   Basic metabolic panel    Collection Time: 03/19/25  4:26 AM   Result Value Ref Range    Sodium 142 135 - 145 mmol/L    Potassium 5.1 3.4 - 5.3 mmol/L    Chloride 103 98 - 107 mmol/L    Carbon Dioxide (CO2) 12 (L) 22 - 29 mmol/L    Anion Gap 27 (H) 7 - 15 mmol/L    Urea Nitrogen 41.9 (H) 8.0 - 23.0 mg/dL    Creatinine 2.19 (H) 0.67 - 1.17 mg/dL    GFR Estimate 31 (L) >60 mL/min/1.73m2    Calcium 8.9 8.8 - 10.4 mg/dL    Glucose 388 (H) 70 - 99 mg/dL   Hepatic panel    Collection Time: 03/19/25  4:26 AM   Result Value Ref Range    Protein Total 6.3 (L) 6.4 - 8.3 g/dL    Albumin 3.9 3.5 - 5.2 g/dL    Bilirubin Total 0.7 <=1.2 mg/dL    Alkaline Phosphatase 71 40 - 150 U/L    AST 33 0 - 45 U/L    ALT 9 0 - 70 U/L    Bilirubin Direct 0.37 (H) 0.00 - 0.30 mg/dL   Ketone Beta-Hydroxybutyrate Quantitative    Collection Time: 03/19/25  4:26 AM   Result Value Ref Range    Ketone (Beta-Hydroxybutyrate) Quantitative 3.27 (HH) <=0.30 mmol/L   Glucose by meter    Collection Time: 03/19/25  8:03 AM   Result Value Ref Range    GLUCOSE BY METER POCT 395 (H) 70 - 99 mg/dL   Lactic acid whole blood    Collection Time: 03/19/25  8:48 AM   Result Value Ref Range    Lactic Acid 4.0 (HH) 0.7 - 2.0 mmol/L   Blood gas venous    Collection Time: 03/19/25  8:49 AM   Result Value Ref Range    pH Venous 7.25 (L) 7.32 - 7.43    pCO2 Venous 38 (L) 40 - 50 mm Hg    pO2 Venous 41 25 - 47 mm Hg    Bicarbonate Venous 17 (L) 21 - 28 mmol/L    Base Excess/Deficit Venous -10.0 (L) -3.0 - 3.0 mmol/L    FIO2 21     Oxyhemoglobin Venous 68 (L) 70 - 75 %    O2 Sat, Venous 68.7 (L) 70.0 - 75.0 %   CBC with platelets    Collection Time: 03/19/25  8:59 AM   Result Value Ref Range    WBC Count 18.5 (H) 4.0 - 11.0 10e3/uL    RBC Count 3.45 (L) 4.40 - 5.90 10e6/uL    Hemoglobin 10.9 (L) 13.3 - 17.7 g/dL    Hematocrit 33.7 (L) 40.0 - 53.0 %    MCV 98 78 - 100 fL     MCH 31.6 26.5 - 33.0 pg    MCHC 32.3 31.5 - 36.5 g/dL    RDW 13.3 10.0 - 15.0 %    Platelet Count 109 (L) 150 - 450 10e3/uL   Blood gas arterial    Collection Time: 03/19/25  9:19 AM   Result Value Ref Range    pH Arterial 7.30 (L) 7.35 - 7.45    pCO2 Arterial 34 (L) 35 - 45 mm Hg    pO2 Arterial 74 (L) 80 - 105 mm Hg    FIO2 21     Bicarbonate Arterial 17 (L) 21 - 28 mmol/L    Base Excess/Deficit Arterial -8.7 (L) -3.0 - 3.0 mmol/L    Oxyhemoglobin Arterial 93 92 - 100 %    O2 Sat, Arterial 94.5 (L) 95.0 - 96.0 %   Glucose by meter    Collection Time: 03/19/25  9:25 AM   Result Value Ref Range    GLUCOSE BY METER POCT 364 (H) 70 - 99 mg/dL   Glucose by meter    Collection Time: 03/19/25 10:28 AM   Result Value Ref Range    GLUCOSE BY METER POCT 349 (H) 70 - 99 mg/dL   Lactic acid whole blood    Collection Time: 03/19/25 10:56 AM   Result Value Ref Range    Lactic Acid 4.0 (HH) 0.7 - 2.0 mmol/L   Glucose by meter    Collection Time: 03/19/25 11:36 AM   Result Value Ref Range    GLUCOSE BY METER POCT 303 (H) 70 - 99 mg/dL   Glucose by meter    Collection Time: 03/19/25 12:39 PM   Result Value Ref Range    GLUCOSE BY METER POCT 252 (H) 70 - 99 mg/dL   Lactic acid whole blood    Collection Time: 03/19/25 12:44 PM   Result Value Ref Range    Lactic Acid 2.9 (H) 0.7 - 2.0 mmol/L   Basic metabolic panel    Collection Time: 03/19/25 12:44 PM   Result Value Ref Range    Sodium 140 135 - 145 mmol/L    Potassium 4.2 3.4 - 5.3 mmol/L    Chloride 108 (H) 98 - 107 mmol/L    Carbon Dioxide (CO2) 19 (L) 22 - 29 mmol/L    Anion Gap 13 7 - 15 mmol/L    Urea Nitrogen 53.8 (H) 8.0 - 23.0 mg/dL    Creatinine 2.40 (H) 0.67 - 1.17 mg/dL    GFR Estimate 27 (L) >60 mL/min/1.73m2    Calcium 8.8 8.8 - 10.4 mg/dL    Glucose 288 (H) 70 - 99 mg/dL   Ketone Beta-Hydroxybutyrate Quantitative    Collection Time: 03/19/25 12:44 PM   Result Value Ref Range    Ketone (Beta-Hydroxybutyrate) Quantitative 0.30 <=0.30 mmol/L   Extra Green Top (Lithium  Heparin) Tube    Collection Time: 03/19/25 12:44 PM   Result Value Ref Range    Hold Specimen JIC    Extra Purple Top Tube    Collection Time: 03/19/25 12:44 PM   Result Value Ref Range    Hold Specimen JIC    Glucose by meter    Collection Time: 03/19/25  1:30 PM   Result Value Ref Range    GLUCOSE BY METER POCT 212 (H) 70 - 99 mg/dL   Blood gas arterial    Collection Time: 03/19/25  2:24 PM   Result Value Ref Range    pH Arterial 7.36 7.35 - 7.45    pCO2 Arterial 37 35 - 45 mm Hg    pO2 Arterial 80 80 - 105 mm Hg    FIO2 28     Bicarbonate Arterial 21 21 - 28 mmol/L    Base Excess/Deficit Arterial -4.4 (L) -3.0 - 3.0 mmol/L    Oxyhemoglobin Arterial 95 92 - 100 %    O2 Sat, Arterial 96.3 (H) 95.0 - 96.0 %   Glucose by meter    Collection Time: 03/19/25  2:24 PM   Result Value Ref Range    GLUCOSE BY METER POCT 186 (H) 70 - 99 mg/dL   Lactic acid whole blood    Collection Time: 03/19/25  2:43 PM   Result Value Ref Range    Lactic Acid 2.6 (H) 0.7 - 2.0 mmol/L   Basic metabolic panel    Collection Time: 03/19/25  2:48 PM   Result Value Ref Range    Sodium 140 135 - 145 mmol/L    Potassium      Chloride 109 (H) 98 - 107 mmol/L    Carbon Dioxide (CO2) 21 (L) 22 - 29 mmol/L    Anion Gap 10 7 - 15 mmol/L    Urea Nitrogen 54.3 (H) 8.0 - 23.0 mg/dL    Creatinine 2.42 (H) 0.67 - 1.17 mg/dL    GFR Estimate 27 (L) >60 mL/min/1.73m2    Calcium 8.4 (L) 8.8 - 10.4 mg/dL    Glucose 180 (H) 70 - 99 mg/dL   Glucose by meter    Collection Time: 03/19/25  3:30 PM   Result Value Ref Range    GLUCOSE BY METER POCT 139 (H) 70 - 99 mg/dL   Glucose by meter    Collection Time: 03/19/25  4:02 PM   Result Value Ref Range    GLUCOSE BY METER POCT 128 (H) 70 - 99 mg/dL   Potassium    Collection Time: 03/19/25  4:20 PM   Result Value Ref Range    Potassium 4.5 3.4 - 5.3 mmol/L   Lactic acid whole blood    Collection Time: 03/19/25  7:11 PM   Result Value Ref Range    Lactic Acid 1.9 0.7 - 2.0 mmol/L   Basic metabolic panel    Collection Time:  03/19/25  7:11 PM   Result Value Ref Range    Sodium 140 135 - 145 mmol/L    Potassium 5.0 3.4 - 5.3 mmol/L    Chloride 108 (H) 98 - 107 mmol/L    Carbon Dioxide (CO2) 20 (L) 22 - 29 mmol/L    Anion Gap 12 7 - 15 mmol/L    Urea Nitrogen 58.0 (H) 8.0 - 23.0 mg/dL    Creatinine 2.67 (H) 0.67 - 1.17 mg/dL    GFR Estimate 24 (L) >60 mL/min/1.73m2    Calcium 8.7 (L) 8.8 - 10.4 mg/dL    Glucose 167 (H) 70 - 99 mg/dL   Glucose by meter    Collection Time: 03/19/25  7:59 PM   Result Value Ref Range    GLUCOSE BY METER POCT 166 (H) 70 - 99 mg/dL   Lactic acid whole blood    Collection Time: 03/19/25 10:17 PM   Result Value Ref Range    Lactic Acid 1.4 0.7 - 2.0 mmol/L   Basic metabolic panel    Collection Time: 03/19/25 10:17 PM   Result Value Ref Range    Sodium 139 135 - 145 mmol/L    Potassium 4.9 3.4 - 5.3 mmol/L    Chloride 107 98 - 107 mmol/L    Carbon Dioxide (CO2) 20 (L) 22 - 29 mmol/L    Anion Gap 12 7 - 15 mmol/L    Urea Nitrogen 60.0 (H) 8.0 - 23.0 mg/dL    Creatinine 2.69 (H) 0.67 - 1.17 mg/dL    GFR Estimate 24 (L) >60 mL/min/1.73m2    Calcium 8.5 (L) 8.8 - 10.4 mg/dL    Glucose 195 (H) 70 - 99 mg/dL   Glucose by meter    Collection Time: 03/20/25 12:03 AM   Result Value Ref Range    GLUCOSE BY METER POCT 182 (H) 70 - 99 mg/dL   Lactic acid whole blood    Collection Time: 03/20/25  1:16 AM   Result Value Ref Range    Lactic Acid 1.2 0.7 - 2.0 mmol/L   Basic metabolic panel    Collection Time: 03/20/25  1:16 AM   Result Value Ref Range    Sodium 140 135 - 145 mmol/L    Potassium 4.8 3.4 - 5.3 mmol/L    Chloride 107 98 - 107 mmol/L    Carbon Dioxide (CO2) 21 (L) 22 - 29 mmol/L    Anion Gap 12 7 - 15 mmol/L    Urea Nitrogen 61.2 (H) 8.0 - 23.0 mg/dL    Creatinine 2.78 (H) 0.67 - 1.17 mg/dL    GFR Estimate 23 (L) >60 mL/min/1.73m2    Calcium 8.3 (L) 8.8 - 10.4 mg/dL    Glucose 191 (H) 70 - 99 mg/dL   Glucose by meter    Collection Time: 03/20/25  4:12 AM   Result Value Ref Range    GLUCOSE BY METER POCT 209 (H) 70 -  99 mg/dL   Glucose by meter    Collection Time: 03/20/25  7:33 AM   Result Value Ref Range    GLUCOSE BY METER POCT 204 (H) 70 - 99 mg/dL   Lactic acid whole blood    Collection Time: 03/20/25  8:58 AM   Result Value Ref Range    Lactic Acid 1.1 0.7 - 2.0 mmol/L   Ionized Calcium    Collection Time: 03/20/25  8:58 AM   Result Value Ref Range    Calcium Ionized Whole Blood 4.6 4.4 - 5.2 mg/dL   Magnesium    Collection Time: 03/20/25  8:58 AM   Result Value Ref Range    Magnesium 2.4 (H) 1.7 - 2.3 mg/dL   Phosphorus    Collection Time: 03/20/25  8:58 AM   Result Value Ref Range    Phosphorus 3.6 2.5 - 4.5 mg/dL   Comprehensive metabolic panel    Collection Time: 03/20/25  8:58 AM   Result Value Ref Range    Sodium 140 135 - 145 mmol/L    Potassium 4.8 3.4 - 5.3 mmol/L    Carbon Dioxide (CO2) 24 22 - 29 mmol/L    Anion Gap 9 7 - 15 mmol/L    Urea Nitrogen 64.9 (H) 8.0 - 23.0 mg/dL    Creatinine 2.83 (H) 0.67 - 1.17 mg/dL    GFR Estimate 23 (L) >60 mL/min/1.73m2    Calcium 8.4 (L) 8.8 - 10.4 mg/dL    Chloride 107 98 - 107 mmol/L    Glucose 213 (H) 70 - 99 mg/dL    Alkaline Phosphatase 62 40 - 150 U/L    AST 20 0 - 45 U/L    ALT <5 0 - 70 U/L    Protein Total 5.6 (L) 6.4 - 8.3 g/dL    Albumin 3.3 (L) 3.5 - 5.2 g/dL    Bilirubin Total 0.4 <=1.2 mg/dL   CBC with platelets    Collection Time: 03/20/25  8:58 AM   Result Value Ref Range    WBC Count 19.8 (H) 4.0 - 11.0 10e3/uL    RBC Count 3.13 (L) 4.40 - 5.90 10e6/uL    Hemoglobin 9.9 (L) 13.3 - 17.7 g/dL    Hematocrit 30.2 (L) 40.0 - 53.0 %    MCV 97 78 - 100 fL    MCH 31.6 26.5 - 33.0 pg    MCHC 32.8 31.5 - 36.5 g/dL    RDW 13.3 10.0 - 15.0 %    Platelet Count 123 (L) 150 - 450 10e3/uL   Extra Green Top (Lithium Heparin) Tube    Collection Time: 03/20/25  9:16 AM   Result Value Ref Range    Hold Specimen JIC    UA with Microscopic reflex to Culture    Collection Time: 03/20/25 10:02 AM    Specimen: Urine, [place] Catheter   Result Value Ref Range    Color Urine Light Yellow  Colorless, Straw, Light Yellow, Yellow    Appearance Urine Clear Clear    Glucose Urine Negative Negative mg/dL    Bilirubin Urine Negative Negative    Ketones Urine Negative Negative mg/dL    Specific Gravity Urine 1.010 1.003 - 1.035    Blood Urine Trace (A) Negative    pH Urine 5.0 5.0 - 7.0    Protein Albumin Urine Negative Negative mg/dL    Urobilinogen Urine Normal Normal, 2.0 mg/dL    Nitrite Urine Negative Negative    Leukocyte Esterase Urine Negative Negative    Bacteria Urine None Seen None Seen /HPF    RBC Urine <=2 <=2 /HPF    WBC Urine <=5 <=5 /HPF    Squamous Epithelials Urine <=1 <=1 /HPF   Blood Culture Line, venous    Collection Time: 03/20/25 10:06 AM    Specimen: Line, venous; Blood   Result Value Ref Range    Culture No Growth    Respiratory Aerobic Bacterial Culture with Gram Stain    Collection Time: 03/20/25 10:08 AM    Specimen: Expectorate; Sputum   Result Value Ref Range    Culture       >10 Squamous epithelial cells/low power field indicates oral contamination. Please recollect.    Gram Stain Result >10 Squamous epithelial cells/low power field     Gram Stain Result <25 PMNs/low power field     Gram Stain Result 4+ Mixed sunitha    Glucose by meter    Collection Time: 03/20/25 11:27 AM   Result Value Ref Range    GLUCOSE BY METER POCT 201 (H) 70 - 99 mg/dL   Lactic acid whole blood    Collection Time: 03/20/25  2:04 PM   Result Value Ref Range    Lactic Acid 1.6 0.7 - 2.0 mmol/L   Basic metabolic panel    Collection Time: 03/20/25  2:04 PM   Result Value Ref Range    Sodium 141 135 - 145 mmol/L    Potassium 4.8 3.4 - 5.3 mmol/L    Chloride 106 98 - 107 mmol/L    Carbon Dioxide (CO2) 21 (L) 22 - 29 mmol/L    Anion Gap 14 7 - 15 mmol/L    Urea Nitrogen 67.2 (H) 8.0 - 23.0 mg/dL    Creatinine 2.68 (H) 0.67 - 1.17 mg/dL    GFR Estimate 24 (L) >60 mL/min/1.73m2    Calcium 9.0 8.8 - 10.4 mg/dL    Glucose 235 (H) 70 - 99 mg/dL   Glucose by meter    Collection Time: 03/20/25  3:42 PM   Result Value  Ref Range    GLUCOSE BY METER POCT 250 (H) 70 - 99 mg/dL   Basic metabolic panel    Collection Time: 03/20/25  6:00 PM   Result Value Ref Range    Sodium 142 135 - 145 mmol/L    Potassium 4.5 3.4 - 5.3 mmol/L    Chloride 108 (H) 98 - 107 mmol/L    Carbon Dioxide (CO2) 20 (L) 22 - 29 mmol/L    Anion Gap 14 7 - 15 mmol/L    Urea Nitrogen 68.6 (H) 8.0 - 23.0 mg/dL    Creatinine 2.58 (H) 0.67 - 1.17 mg/dL    GFR Estimate 25 (L) >60 mL/min/1.73m2    Calcium 8.8 8.8 - 10.4 mg/dL    Glucose 264 (H) 70 - 99 mg/dL   Glucose by meter    Collection Time: 03/20/25  9:03 PM   Result Value Ref Range    GLUCOSE BY METER POCT 235 (H) 70 - 99 mg/dL   Lactic acid whole blood    Collection Time: 03/20/25 10:11 PM   Result Value Ref Range    Lactic Acid 1.1 0.7 - 2.0 mmol/L   Basic metabolic panel    Collection Time: 03/20/25 10:11 PM   Result Value Ref Range    Sodium 142 135 - 145 mmol/L    Potassium 4.3 3.4 - 5.3 mmol/L    Chloride 108 (H) 98 - 107 mmol/L    Carbon Dioxide (CO2) 25 22 - 29 mmol/L    Anion Gap 9 7 - 15 mmol/L    Urea Nitrogen 64.7 (H) 8.0 - 23.0 mg/dL    Creatinine 2.66 (H) 0.67 - 1.17 mg/dL    GFR Estimate 24 (L) >60 mL/min/1.73m2    Calcium 8.3 (L) 8.8 - 10.4 mg/dL    Glucose 241 (H) 70 - 99 mg/dL   Glucose by meter    Collection Time: 03/21/25 12:41 AM   Result Value Ref Range    GLUCOSE BY METER POCT 214 (H) 70 - 99 mg/dL   Basic metabolic panel    Collection Time: 03/21/25  2:12 AM   Result Value Ref Range    Sodium 143 135 - 145 mmol/L    Potassium 4.3 3.4 - 5.3 mmol/L    Chloride 110 (H) 98 - 107 mmol/L    Carbon Dioxide (CO2) 24 22 - 29 mmol/L    Anion Gap 9 7 - 15 mmol/L    Urea Nitrogen 63.4 (H) 8.0 - 23.0 mg/dL    Creatinine 2.60 (H) 0.67 - 1.17 mg/dL    GFR Estimate 25 (L) >60 mL/min/1.73m2    Calcium 8.3 (L) 8.8 - 10.4 mg/dL    Glucose 231 (H) 70 - 99 mg/dL   Magnesium    Collection Time: 03/21/25  2:12 AM   Result Value Ref Range    Magnesium 2.3 1.7 - 2.3 mg/dL   Phosphorus    Collection Time: 03/21/25   2:12 AM   Result Value Ref Range    Phosphorus 3.1 2.5 - 4.5 mg/dL   Glucose by meter    Collection Time: 03/21/25  4:52 AM   Result Value Ref Range    GLUCOSE BY METER POCT 217 (H) 70 - 99 mg/dL   Ionized Calcium    Collection Time: 03/21/25  5:57 AM   Result Value Ref Range    Calcium Ionized Whole Blood 4.9 4.4 - 5.2 mg/dL   CBC with platelets    Collection Time: 03/21/25  5:57 AM   Result Value Ref Range    WBC Count 12.8 (H) 4.0 - 11.0 10e3/uL    RBC Count 3.08 (L) 4.40 - 5.90 10e6/uL    Hemoglobin 9.9 (L) 13.3 - 17.7 g/dL    Hematocrit 29.3 (L) 40.0 - 53.0 %    MCV 95 78 - 100 fL    MCH 32.1 26.5 - 33.0 pg    MCHC 33.8 31.5 - 36.5 g/dL    RDW 13.0 10.0 - 15.0 %    Platelet Count 137 (L) 150 - 450 10e3/uL   Glucose by meter    Collection Time: 03/21/25  8:15 AM   Result Value Ref Range    GLUCOSE BY METER POCT 202 (H) 70 - 99 mg/dL   Glucose by meter    Collection Time: 03/21/25 11:59 AM   Result Value Ref Range    GLUCOSE BY METER POCT 250 (H) 70 - 99 mg/dL   Glucose by meter    Collection Time: 03/21/25  2:33 PM   Result Value Ref Range    GLUCOSE BY METER POCT 225 (H) 70 - 99 mg/dL   Basic metabolic panel    Collection Time: 03/21/25  3:50 PM   Result Value Ref Range    Sodium 142 135 - 145 mmol/L    Potassium 4.1 3.4 - 5.3 mmol/L    Chloride 108 (H) 98 - 107 mmol/L    Carbon Dioxide (CO2) 23 22 - 29 mmol/L    Anion Gap 11 7 - 15 mmol/L    Urea Nitrogen 64.3 (H) 8.0 - 23.0 mg/dL    Creatinine 2.25 (H) 0.67 - 1.17 mg/dL    GFR Estimate 30 (L) >60 mL/min/1.73m2    Calcium 8.6 (L) 8.8 - 10.4 mg/dL    Glucose 217 (H) 70 - 99 mg/dL   Glucose by meter    Collection Time: 03/21/25  3:54 PM   Result Value Ref Range    GLUCOSE BY METER POCT 144 (H) 70 - 99 mg/dL   Glucose by meter    Collection Time: 03/21/25  5:12 PM   Result Value Ref Range    GLUCOSE BY METER POCT 186 (H) 70 - 99 mg/dL   Glucose by meter    Collection Time: 03/21/25  6:15 PM   Result Value Ref Range    GLUCOSE BY METER POCT 180 (H) 70 - 99 mg/dL    Glucose by meter    Collection Time: 03/21/25  6:59 PM   Result Value Ref Range    GLUCOSE BY METER POCT 169 (H) 70 - 99 mg/dL   Glucose by meter    Collection Time: 03/21/25  8:01 PM   Result Value Ref Range    GLUCOSE BY METER POCT 169 (H) 70 - 99 mg/dL   Glucose by meter    Collection Time: 03/21/25  9:01 PM   Result Value Ref Range    GLUCOSE BY METER POCT 145 (H) 70 - 99 mg/dL   Glucose by meter    Collection Time: 03/21/25 10:02 PM   Result Value Ref Range    GLUCOSE BY METER POCT 125 (H) 70 - 99 mg/dL   Glucose by meter    Collection Time: 03/21/25 11:00 PM   Result Value Ref Range    GLUCOSE BY METER POCT 109 (H) 70 - 99 mg/dL   Glucose by meter    Collection Time: 03/21/25 11:56 PM   Result Value Ref Range    GLUCOSE BY METER POCT 91 70 - 99 mg/dL   Glucose by meter    Collection Time: 03/22/25  1:05 AM   Result Value Ref Range    GLUCOSE BY METER POCT 97 70 - 99 mg/dL   Glucose by meter    Collection Time: 03/22/25  2:03 AM   Result Value Ref Range    GLUCOSE BY METER POCT 104 (H) 70 - 99 mg/dL   Glucose by meter    Collection Time: 03/22/25  3:05 AM   Result Value Ref Range    GLUCOSE BY METER POCT 127 (H) 70 - 99 mg/dL   Glucose by meter    Collection Time: 03/22/25  4:03 AM   Result Value Ref Range    GLUCOSE BY METER POCT 120 (H) 70 - 99 mg/dL   Ionized Calcium    Collection Time: 03/22/25  4:06 AM   Result Value Ref Range    Calcium Ionized Whole Blood 4.7 4.4 - 5.2 mg/dL   Basic metabolic panel    Collection Time: 03/22/25  4:06 AM   Result Value Ref Range    Sodium 141 135 - 145 mmol/L    Potassium 4.0 3.4 - 5.3 mmol/L    Chloride 108 (H) 98 - 107 mmol/L    Carbon Dioxide (CO2) 24 22 - 29 mmol/L    Anion Gap 9 7 - 15 mmol/L    Urea Nitrogen 62.8 (H) 8.0 - 23.0 mg/dL    Creatinine 2.20 (H) 0.67 - 1.17 mg/dL    GFR Estimate 30 (L) >60 mL/min/1.73m2    Calcium 8.4 (L) 8.8 - 10.4 mg/dL    Glucose 123 (H) 70 - 99 mg/dL   Phosphorus    Collection Time: 03/22/25  4:06 AM   Result Value Ref Range     Phosphorus 2.7 2.5 - 4.5 mg/dL   Magnesium    Collection Time: 03/22/25  4:06 AM   Result Value Ref Range    Magnesium 2.3 1.7 - 2.3 mg/dL   Extra Purple Top Tube    Collection Time: 03/22/25  4:06 AM   Result Value Ref Range    Hold Specimen JIC    Glucose by meter    Collection Time: 03/22/25  5:04 AM   Result Value Ref Range    GLUCOSE BY METER POCT 135 (H) 70 - 99 mg/dL   Glucose by meter    Collection Time: 03/22/25  6:55 AM   Result Value Ref Range    GLUCOSE BY METER POCT 138 (H) 70 - 99 mg/dL   Glucose by meter    Collection Time: 03/22/25  8:50 AM   Result Value Ref Range    GLUCOSE BY METER POCT 166 (H) 70 - 99 mg/dL   Ammonia    Collection Time: 03/22/25  9:18 AM   Result Value Ref Range    Ammonia 14 (L) 16 - 60 umol/L   CBC with platelets    Collection Time: 03/22/25  9:49 AM   Result Value Ref Range    WBC Count 10.2 4.0 - 11.0 10e3/uL    RBC Count 3.21 (L) 4.40 - 5.90 10e6/uL    Hemoglobin 10.2 (L) 13.3 - 17.7 g/dL    Hematocrit 30.6 (L) 40.0 - 53.0 %    MCV 95 78 - 100 fL    MCH 31.8 26.5 - 33.0 pg    MCHC 33.3 31.5 - 36.5 g/dL    RDW 13.1 10.0 - 15.0 %    Platelet Count 147 (L) 150 - 450 10e3/uL   UA with Microscopic reflex to Culture    Collection Time: 03/22/25 12:08 PM    Specimen: Urine, Clean Catch   Result Value Ref Range    Color Urine Colorless Colorless, Straw, Light Yellow, Yellow    Appearance Urine Clear Clear    Glucose Urine Negative Negative mg/dL    Bilirubin Urine Negative Negative    Ketones Urine Negative Negative mg/dL    Specific Gravity Urine 1.010 1.001 - 1.030    Blood Urine Negative Negative    pH Urine 5.0 5.0 - 7.0    Protein Albumin Urine Negative Negative mg/dL    Urobilinogen Urine <2.0 <2.0 mg/dL    Nitrite Urine Negative Negative    Leukocyte Esterase Urine Negative Negative    RBC Urine 1 <=2 /HPF    WBC Urine 1 <=5 /HPF    Hyaline Casts Urine 1 <=2 /LPF   Glucose by meter    Collection Time: 03/22/25 12:42 PM   Result Value Ref Range    GLUCOSE BY METER POCT 165 (H)  70 - 99 mg/dL   Glucose by meter    Collection Time: 03/22/25  6:57 PM   Result Value Ref Range    GLUCOSE BY METER POCT 186 (H) 70 - 99 mg/dL   Glucose by meter    Collection Time: 03/22/25  8:28 PM   Result Value Ref Range    GLUCOSE BY METER POCT 171 (H) 70 - 99 mg/dL   Ionized Calcium    Collection Time: 03/23/25  4:17 AM   Result Value Ref Range    Calcium Ionized Whole Blood 4.8 4.4 - 5.2 mg/dL   Basic metabolic panel    Collection Time: 03/23/25  4:17 AM   Result Value Ref Range    Sodium 143 135 - 145 mmol/L    Potassium 3.8 3.4 - 5.3 mmol/L    Chloride 109 (H) 98 - 107 mmol/L    Carbon Dioxide (CO2) 27 22 - 29 mmol/L    Anion Gap 7 7 - 15 mmol/L    Urea Nitrogen 60.1 (H) 8.0 - 23.0 mg/dL    Creatinine 2.17 (H) 0.67 - 1.17 mg/dL    GFR Estimate 31 (L) >60 mL/min/1.73m2    Calcium 8.3 (L) 8.8 - 10.4 mg/dL    Glucose 181 (H) 70 - 99 mg/dL   Magnesium    Collection Time: 03/23/25  4:17 AM   Result Value Ref Range    Magnesium 2.2 1.7 - 2.3 mg/dL   Phosphorus    Collection Time: 03/23/25  4:17 AM   Result Value Ref Range    Phosphorus 3.7 2.5 - 4.5 mg/dL   CBC with platelets    Collection Time: 03/23/25  4:17 AM   Result Value Ref Range    WBC Count 7.5 4.0 - 11.0 10e3/uL    RBC Count 2.63 (L) 4.40 - 5.90 10e6/uL    Hemoglobin 8.6 (L) 13.3 - 17.7 g/dL    Hematocrit 24.7 (L) 40.0 - 53.0 %    MCV 94 78 - 100 fL    MCH 32.7 26.5 - 33.0 pg    MCHC 34.8 31.5 - 36.5 g/dL    RDW 13.0 10.0 - 15.0 %    Platelet Count 116 (L) 150 - 450 10e3/uL   TSH with free T4 reflex    Collection Time: 03/23/25  4:17 AM   Result Value Ref Range    TSH 2.66 0.30 - 4.20 uIU/mL   Glucose by meter    Collection Time: 03/23/25  7:50 AM   Result Value Ref Range    GLUCOSE BY METER POCT 186 (H) 70 - 99 mg/dL   Glucose by meter    Collection Time: 03/23/25 12:01 PM   Result Value Ref Range    GLUCOSE BY METER POCT 231 (H) 70 - 99 mg/dL   Glucose by meter    Collection Time: 03/23/25  4:28 PM   Result Value Ref Range    GLUCOSE BY METER POCT  243 (H) 70 - 99 mg/dL   Glucose by meter    Collection Time: 03/23/25  9:41 PM   Result Value Ref Range    GLUCOSE BY METER POCT 234 (H) 70 - 99 mg/dL   Platelet count    Collection Time: 03/24/25  5:31 AM   Result Value Ref Range    Platelet Count 118 (L) 150 - 450 10e3/uL   Basic metabolic panel    Collection Time: 03/24/25  5:31 AM   Result Value Ref Range    Sodium 143 135 - 145 mmol/L    Potassium 4.1 3.4 - 5.3 mmol/L    Chloride 108 (H) 98 - 107 mmol/L    Carbon Dioxide (CO2) 25 22 - 29 mmol/L    Anion Gap 10 7 - 15 mmol/L    Urea Nitrogen 53.1 (H) 8.0 - 23.0 mg/dL    Creatinine 2.00 (H) 0.67 - 1.17 mg/dL    GFR Estimate 34 (L) >60 mL/min/1.73m2    Calcium 8.6 (L) 8.8 - 10.4 mg/dL    Glucose 202 (H) 70 - 99 mg/dL   Magnesium    Collection Time: 03/24/25  5:31 AM   Result Value Ref Range    Magnesium 2.1 1.7 - 2.3 mg/dL   Phosphorus    Collection Time: 03/24/25  5:31 AM   Result Value Ref Range    Phosphorus 3.9 2.5 - 4.5 mg/dL   Vitamin B12    Collection Time: 03/24/25  5:31 AM   Result Value Ref Range    Vitamin B12 182 (L) 232 - 1,245 pg/mL   Folate    Collection Time: 03/24/25  5:31 AM   Result Value Ref Range    Folic Acid 7.7 4.6 - 34.8 ng/mL   Glucose by meter    Collection Time: 03/24/25  7:22 AM   Result Value Ref Range    GLUCOSE BY METER POCT 172 (H) 70 - 99 mg/dL   Glucose by meter    Collection Time: 03/24/25 12:01 PM   Result Value Ref Range    GLUCOSE BY METER POCT 250 (H) 70 - 99 mg/dL   Cmed; MMAS; Methylmalonic Acid, Quantitative, Serum (Laboratory Miscellaneous Order)    Collection Time: 03/24/25 12:13 PM   Result Value Ref Range    Specimen Status       Specimen received. Reordered and sent to performing laboratory. Report to follow upon completion.    Performing Laboratory [facility]     Test Name Methylmalonic Acid, Quantitative, Serum     Test Code MMAS    Glucose by meter    Collection Time: 03/24/25  4:56 PM   Result Value Ref Range    GLUCOSE BY  METER POCT 170 (H) 70 - 99 mg/dL   Glucose by meter    Collection Time: 03/24/25  8:55 PM   Result Value Ref Range    GLUCOSE BY METER POCT 191 (H) 70 - 99 mg/dL   Glucose by meter    Collection Time: 03/25/25  2:21 AM   Result Value Ref Range    GLUCOSE BY METER POCT 163 (H) 70 - 99 mg/dL   UA with Microscopic reflex to Culture    Collection Time: 03/25/25  4:31 AM    Specimen: Urine, Clean Catch   Result Value Ref Range    Color Urine Yellow Colorless, Straw, Light Yellow, Yellow    Appearance Urine Clear Clear    Glucose Urine Negative Negative mg/dL    Bilirubin Urine Negative Negative    Ketones Urine Negative Negative mg/dL    Specific Gravity Urine 1.023 1.001 - 1.030    Blood Urine Negative Negative    pH Urine 5.5 5.0 - 7.0    Protein Albumin Urine Negative Negative mg/dL    Urobilinogen Urine 2.0 (A) Normal mg/dL    Nitrite Urine Negative Negative    Leukocyte Esterase Urine Negative Negative    RBC Urine 0 <=2 /HPF    WBC Urine <1 <=5 /HPF   Basic metabolic panel    Collection Time: 03/25/25  4:38 AM   Result Value Ref Range    Sodium 140 135 - 145 mmol/L    Potassium 4.2 3.4 - 5.3 mmol/L    Chloride 105 98 - 107 mmol/L    Carbon Dioxide (CO2) 25 22 - 29 mmol/L    Anion Gap 10 7 - 15 mmol/L    Urea Nitrogen 49.6 (H) 8.0 - 23.0 mg/dL    Creatinine 1.94 (H) 0.67 - 1.17 mg/dL    GFR Estimate 35 (L) >60 mL/min/1.73m2    Calcium 8.7 (L) 8.8 - 10.4 mg/dL    Glucose 158 (H) 70 - 99 mg/dL   Magnesium    Collection Time: 03/25/25  4:38 AM   Result Value Ref Range    Magnesium 2.3 1.7 - 2.3 mg/dL   Phosphorus    Collection Time: 03/25/25  4:38 AM   Result Value Ref Range    Phosphorus 3.8 2.5 - 4.5 mg/dL   Ionized Calcium    Collection Time: 03/25/25  4:38 AM   Result Value Ref Range    Calcium Ionized Whole Blood 4.6 4.4 - 5.2 mg/dL   Glucose by meter    Collection Time: 03/25/25  7:34 AM   Result Value Ref Range    GLUCOSE BY METER POCT 181 (H) 70 - 99 mg/dL   Echocardiogram Limited    Collection Time: 03/25/25   8:43 AM   Result Value Ref Range    LVEF  35-40% (moderately reduced)    Glucose by meter    Collection Time: 03/25/25 12:26 PM   Result Value Ref Range    GLUCOSE BY METER POCT 221 (H) 70 - 99 mg/dL   Glucose by meter    Collection Time: 03/25/25  5:22 PM   Result Value Ref Range    GLUCOSE BY METER POCT 146 (H) 70 - 99 mg/dL   Glucose by meter    Collection Time: 03/25/25 10:12 PM   Result Value Ref Range    GLUCOSE BY METER POCT 123 (H) 70 - 99 mg/dL   Basic metabolic panel    Collection Time: 03/26/25  5:05 AM   Result Value Ref Range    Sodium 138 135 - 145 mmol/L    Potassium 3.9 3.4 - 5.3 mmol/L    Chloride 105 98 - 107 mmol/L    Carbon Dioxide (CO2) 27 22 - 29 mmol/L    Anion Gap 6 (L) 7 - 15 mmol/L    Urea Nitrogen 38.8 (H) 8.0 - 23.0 mg/dL    Creatinine 1.84 (H) 0.67 - 1.17 mg/dL    GFR Estimate 38 (L) >60 mL/min/1.73m2    Calcium 8.6 (L) 8.8 - 10.4 mg/dL    Glucose 106 (H) 70 - 99 mg/dL   Magnesium    Collection Time: 03/26/25  5:05 AM   Result Value Ref Range    Magnesium 2.3 1.7 - 2.3 mg/dL   Phosphorus    Collection Time: 03/26/25  5:05 AM   Result Value Ref Range    Phosphorus 3.4 2.5 - 4.5 mg/dL   Glucose by meter    Collection Time: 03/26/25  8:26 AM   Result Value Ref Range    GLUCOSE BY METER POCT 120 (H) 70 - 99 mg/dL   Glucose by meter    Collection Time: 03/26/25  1:21 PM   Result Value Ref Range    GLUCOSE BY METER POCT 165 (H) 70 - 99 mg/dL   Glucose by meter    Collection Time: 03/26/25  1:31 PM   Result Value Ref Range    GLUCOSE BY METER POCT 154 (H) 70 - 99 mg/dL   Glucose by meter    Collection Time: 03/26/25  4:14 PM   Result Value Ref Range    GLUCOSE BY METER POCT 127 (H) 70 - 99 mg/dL        MEDICATIONS:  MED REC REQUIRED  Post Medication Reconciliation Status: discharge medications reconciled, continue medications without change          Review of your medicines            Accurate as of March 27, 2025 10:25 AM. If you have any questions, ask your nurse or doctor.                 CONTINUE these medicines which may have CHANGED, or have new prescriptions. If we are uncertain of the size of tablets/capsules you have at home, strength may be listed as something that might have changed.        Dose / Directions   acetaminophen 500 MG tablet  Commonly known as: TYLENOL  This may have changed: Another medication with the same name was removed. Continue taking this medication, and follow the directions you see here.  Changed by: Deanna Ryder      Dose: 500-1,000 mg  Take 500-1,000 mg by mouth 3 times daily. While awake  Refills: 0            CONTINUE these medicines which have NOT CHANGED        Dose / Directions   aspirin 81 MG chewable tablet  Commonly known as: ASA  Indication: Coronary Bypass Surgery  Used for: Coronary artery disease of native artery of native heart with stable angina pectoris, S/P CABG (coronary artery bypass graft), Type 2 diabetes mellitus with ketoacidosis without coma, with long-term current use of insulin (H), Stage 3b chronic kidney disease (H), Coronary artery disease involving native coronary artery of native heart, unspecified whether angina present, Secondary cardiomyopathy (H)      Dose: 81 mg  Take 1 tablet (81 mg) by mouth daily. Continue for one year postop, then when stopping plavix, increase aspirin to 162 mg daily indefinitely.  Refills: 0     carvedilol 12.5 MG tablet  Commonly known as: COREG  Indication: Cardiac Failure, High Blood Pressure      Dose: 12.5 mg  Take 12.5 mg by mouth 2 times daily  Refills: 0     clopidogrel 75 MG tablet  Commonly known as: PLAVIX  Indication: Coronary Bypass Surgery  Used for: Coronary artery disease of native artery of native heart with stable angina pectoris, S/P CABG (coronary artery bypass graft), Type 2 diabetes mellitus with ketoacidosis without coma, with long-term current use of insulin (H), Stage 3b chronic kidney disease (H), Coronary artery disease involving native coronary artery of native heart, unspecified  whether angina present, Secondary cardiomyopathy (H)      Dose: 75 mg  Take 1 tablet (75 mg) by mouth daily. Continue for one year postop, then stop and increase aspirin to 162 mg daily indefinitely.  Refills: 0     cyanocobalamin 500 MCG Subl sublingual tablet  Commonly known as: VITAMIN B-12  Indication: Inadequate Vitamin B12  Used for: Coronary artery disease of native artery of native heart with stable angina pectoris, Delirium, MCI (mild cognitive impairment), Type 2 diabetes mellitus with ketoacidosis without coma, with long-term current use of insulin (H), Stage 3b chronic kidney disease (H), Coronary artery disease involving native coronary artery of native heart, unspecified whether angina present      Dose: 500 mcg  Place 1 tablet (500 mcg) under the tongue daily.  Refills: 0     furosemide 20 MG tablet  Commonly known as: LASIX  Indication: Edema, Cardiac Failure      Dose: 40 mg  Take 40 mg by mouth daily  Refills: 0     * insulin aspart 100 UNIT/ML pen  Commonly known as: NovoLOG PEN  Indication: Type 2 Diabetes  Used for: Coronary artery disease of native artery of native heart with stable angina pectoris, Type 2 diabetes mellitus with ketoacidosis without coma, with long-term current use of insulin (H), Stage 3b chronic kidney disease (H), Coronary artery disease involving native coronary artery of native heart, unspecified whether angina present      Dose: 1-10 Units  Inject 1-10 Units subcutaneously 3 times daily (before meals). Correction Scale - HIGH INSULIN RESISTANCE DOSING   Do Not give Correction Insulin if Pre-Meal BG less than 140.   For Pre-Meal  - 164 give 1 unit.   For Pre-Meal  - 189 give 2 units.   For Pre-Meal  - 214 give 3 units.   For Pre-Meal  - 239 give 4 units.   For Pre-Meal  - 264 give 5 units.   For Pre-Meal  - 289 give 6 units.   For Pre-Meal  - 314 give 7 units.   For Pre-Meal  - 339 give 8 units.   For Pre-Meal  - 364  give 9 units.  For Pre-Meal BG greater than or equal to 365 give 10 units  To be given with prandial insulin, and based on pre-meal blood glucose. Administering insulin within 5 minutes of the start of the meal is ideal. Administer insulin no more than 30 minutes after the start of the meal, unless directed otherwise by provider.   Notify provider if glucose greater than or equal to 350 mg/dL after administration of correction dose.  Refills: 0     * insulin aspart 100 UNIT/ML pen  Commonly known as: NovoLOG PEN  Indication: Type 2 Diabetes  Used for: Coronary artery disease of native artery of native heart with stable angina pectoris, Type 2 diabetes mellitus with ketoacidosis without coma, with long-term current use of insulin (H), Stage 3b chronic kidney disease (H), Coronary artery disease involving native coronary artery of native heart, unspecified whether angina present      Dose: 1-7 Units  Inject 1-7 Units subcutaneously at bedtime. HIGH INSULIN RESISTANCE DOSING   Do Not give Bedtime Correction Insulin if BG less than 200.   For  - 224 give 1 units.   For  - 249 give 2 units.   For  - 274 give 3 units.   For  - 299 give 4 units.   For  - 324 give 5 units.   For  - 349 give 6 units.   For BG greater than or equal to 350 give 7 units.   Notify provider if glucose greater than or equal to 350 mg/dL after administration of correction dose.  Refills: 0     Lantus SoloStar 100 UNIT/ML soln  Indication: Type 2 Diabetes  Used for: Coronary artery disease of native artery of native heart with stable angina pectoris, Type 2 diabetes mellitus with ketoacidosis without coma, with long-term current use of insulin (H), Stage 3b chronic kidney disease (H), Coronary artery disease involving native coronary artery of native heart, unspecified whether angina present  Generic drug: insulin glargine      Dose: 35 Units  Inject 35 Units subcutaneously every morning.  Refills: 0     Lidocaine  4 % Patch  Commonly known as: LIDOCARE  Indication: Pain Following an Operation  Used for: Coronary artery disease of native artery of native heart with stable angina pectoris, S/P CABG (coronary artery bypass graft), Type 2 diabetes mellitus with ketoacidosis without coma, with long-term current use of insulin (H), Stage 3b chronic kidney disease (H), Coronary artery disease involving native coronary artery of native heart, unspecified whether angina present      Dose: 1-2 patch  Place 1-2 patches over 12 hours onto the skin every 24 hours. To prevent lidocaine toxicity, patient should be patch free for 12 hrs daily.  Refills: 0     losartan 25 MG tablet  Commonly known as: COZAAR  Indication: High Blood Pressure, Heart Failure  Used for: Coronary artery disease of native artery of native heart with stable angina pectoris, S/P CABG (coronary artery bypass graft), Type 2 diabetes mellitus with ketoacidosis without coma, with long-term current use of insulin (H), Stage 3b chronic kidney disease (H), Coronary artery disease involving native coronary artery of native heart, unspecified whether angina present, Secondary cardiomyopathy (H)      Dose: 12.5 mg  Take 0.5 tablets (12.5 mg) by mouth daily.  Refills: 0     polyethylene glycol 17 GM/Dose powder  Commonly known as: MIRALAX  Indication: Constipation  Used for: Coronary artery disease of native artery of native heart with stable angina pectoris, S/P CABG (coronary artery bypass graft), Type 2 diabetes mellitus with ketoacidosis without coma, with long-term current use of insulin (H), Stage 3b chronic kidney disease (H), Coronary artery disease involving native coronary artery of native heart, unspecified whether angina present      Dose: 17 g  Take 17 g by mouth daily.  Refills: 0     senna-docusate 8.6-50 MG tablet  Commonly known as: SENOKOT-S/PERICOLACE      Dose: 1 tablet  Take 1 tablet by mouth 2 times daily.  Refills: 0     simvastatin 40 MG tablet  Commonly  known as: ZOCOR  Indication: High Cholesterol, Lower Risk of Heart Event or Stroke      Dose: 40 mg  Take 40 mg by mouth daily  Refills: 0     traZODone 50 MG tablet  Commonly known as: DESYREL      Dose: 50 mg  Take 50 mg by mouth at bedtime.  Refills: 0           * This list has 2 medication(s) that are the same as other medications prescribed for you. Read the directions carefully, and ask your doctor or other care provider to review them with you.                STOP taking      melatonin 5 MG tablet  Stopped by: Deanna Ryder                 ASSESSMENT/PLAN  Encounter Diagnoses   Name Primary?     Pain management Yes     Physical deconditioning      S/P CABG x 4      Insomnia, unspecified type      Pain management lidocaine patch, extra strength Tylenol 3 times daily while awake    Heart health on baby aspirin 81 mg daily     hypertension continue carvedilol 12.5 mg twice daily, losartan 12.5 mg daily     CAD on Plavix 75 mg daily    Type 2 diabetes aspart sliding scales, Lantus 35 units subcu daily    Insomnia melatonin DC'd and started on trazodone 50 mg at bedtime    HDL continue simvastatin    Physical deconditioning PT OT    Status post CABG x 4 follow-up with cardiology, pain management monitor incisions for signs and symptoms of infection    CHF Daily weights continue Lasix 40 mg daily    Lower extremity edema- Compression on during the day/off at night     Electronically signed by: Deanna Ryder CNP       Sincerely,        Deanna Ryder CNP    Electronically signed

## 2025-03-27 NOTE — PLAN OF CARE
Physical Therapy Discharge Summary    Reason for therapy discharge:    Discharged to transitional care facility.    Progress towards therapy goal(s). See goals on Care Plan in [facility] electronic health record for goal details.  Goals partially met.  Barriers to achieving goals:   discharge from facility.    Therapy recommendation(s):    Recommend continued therapies to improve overall strength, balance and mobility.       Telma Ghosh, PT, DPT  3/27/2025

## 2025-03-27 NOTE — PROGRESS NOTES
[facility] GERIATRIC SERVICES  Chief Complaint   Patient presents with    [facility] F/U     Switchback Medical Record Number:  [id number]  Place of Service where encounter took place:  [facility] ([facility]) [[id number]]  Code Status:Full Code    HISTORY:      HPI:  Eric Cordoba  is 74 year old (1950) undergoing physical and occupational therapy.  He is with past medical history type 2 diabetes, hypertension, history of CVA who is s/p CABG x4, LLE EVH     Today is seen to review vital signs, labs, routine visit and to establish care.  He rates incisional pain 5 out of 10, regular strength Tylenol switch to extra strength scheduled 3 times daily while awake, denied shortness of breath cough congestion constipation or diarrhea however he reports his bowels are slower than he would like them to be and senna S was added twice daily.  His midline incision and chest tube sites are all clean dry and intact open to air and scabbed over.  Also left medial leg incisions clean dry and intact and scabbed.  He normally wears a shan monitor however reported he took it off for surgery so he will get fingersticks ACHS.  He reports no relief with sleep from his melatonin this was discontinued and he was started on trazodone 50 mg at bedtime.  He does have neuropathy in his fingers and toes.  It appears his gabapentin was stopped in the hospital.  He tells writer he lives independently but has a roommate that lives downstairs.  He lost his wife approximately 5 years ago.    ALLERGIES:Lisinopril and Propoxyphene    PAST MEDICAL HISTORY:   Past Medical History:   Diagnosis Date    Diabetes mellitus, type 2 (H)     History of CVA (cerebrovascular accident)     Hypertension     Nutritional and metabolic cardiomyopathies (H)        PAST SURGICAL HISTORY:   has a past surgical history that includes Coronary Angiogram (N/A, 2/24/2025); Left Heart Catheterization (N/A, 2/24/2025); Intra aortic Balloon Pump Insertion (N/A, 3/17/2025);  PICC/Midline Placement (3/20/2025); Coronary Artery Bypass Graft, With Endoscopic Vessel Procurement (N/A, 3/17/2025); and Transesophageal echocardiogram intraoperative (3/17/2025).    FAMILY HISTORY: family history is not on file.    SOCIAL HISTORY:  reports that he has quit smoking. His smoking use included cigarettes. He has never used smokeless tobacco. He reports that he does not currently use alcohol.    ROS:  Constitutional: Negative for activity change, appetite change, fatigue and fever.   HENT: Negative for congestion.    Respiratory: Negative for cough, shortness of breath and wheezing.    Cardiovascular: Negative for chest pain and positive leg swelling.   Gastrointestinal: Negative for abdominal distention, abdominal pain, constipation, diarrhea and nausea.   Genitourinary: Negative for dysuria.   Musculoskeletal: Negative for arthralgia. Negative for back pain.  Missing the tip of his left index finger reported it was from her past printing injury  Skin: Negative for color change and wound.   Neurological: Negative for dizziness.   Psychiatric/Behavioral: Negative for agitation, behavioral problems and confusion.     Physical Exam:  Constitutional:       Appearance: Patient is well-developed.   HENT:      Head: Normocephalic.   Eyes:      Conjunctiva/sclera: Conjunctivae normal.   Neck:      Musculoskeletal: Normal range of motion.   Cardiovascular:      Rate and Rhythm: Normal rate and regular rhythm.      Heart sounds: Normal heart sounds. No murmur.   Pulmonary:      Effort: No respiratory distress.      Breath sounds: Normal breath sounds. No wheezing or rales.   Abdominal:      General: Bowel sounds are normal. There is no distension.      Palpations: Abdomen is soft.      Tenderness: There is no abdominal tenderness.   Musculoskeletal:       Normal range of motion.     Skin:General:        Skin is warm.  Midline surgical incision along with chest tube sites clean dry and intact open to air.   "Surgical incisions left medial leg clean dry and intact open to air  Neurological:         Mental Status: Patient is alert and oriented to person, place, and time.   Psychiatric:         Behavior: Behavior normal.     Vitals:/58   Pulse 83   Temp 98.8  F (37.1  C)   Resp 18   Ht 1.753 m (5' 9\")   Wt 92.1 kg (203 lb)   SpO2 98%   BMI 29.98 kg/m   and Body mass index is 29.98 kg/m .    Lab/Diagnostic data:   Recent Results (from the past 240 hours)   Venous Panel POCT    Collection Time: 03/17/25 11:12 AM   Result Value Ref Range    pH Venous POCT 7.31 (L) 7.32 - 7.43    pCO2 Venous POCT 51 (H) 40 - 50 mm Hg    pO2 Venous POCT 55 (H) 25 - 47 mm Hg    Bicarbonate Venous POCT 24 21 - 28 mmol/L    Sodium POCT 140 135 - 145 mmol/L    Potassium POCT 4.4 3.4 - 5.3 mmol/L    Hemoglobin POCT 9.6 (L) 13.3 - 17.7 g/dL    Oxyhemoglobin Venous POCT 85 (H) 70 - 75 %    Glucose Whole Blood POCT 115 (H) 70 - 99 mg/dL    Base Excess/Deficit (+/-) POCT -0.9 -3.0 - 3.0 mmol/L    Calcium, Ionized Whole Blood POCT 4.2 (L) 4.4 - 5.2 mg/dL    O2 Sat, Venous POCT 87 (H) 70 - 75 %    Lactic Acid POCT 0.6 (L) 0.7 - 2.0 mmol/L   Arterial Panel POCT    Collection Time: 03/17/25 12:01 PM   Result Value Ref Range    pH Arterial POCT 7.40 7.35 - 7.45    pCO2 Arterial POCT 39 35 - 45 mm Hg    pO2 Arterial POCT 378 (H) 80 - 105 mm Hg    Bicarbonate Arterial POCT 24 21 - 28 mmol/L    Sodium POCT 139 135 - 145 mmol/L    Potassium POCT 5.0 3.4 - 5.3 mmol/L    Hemoglobin POCT 9.8 (L) 13.3 - 17.7 g/dL    Glucose Whole Blood POCT 121 (H) 70 - 99 mg/dL    Calcium, Ionized Whole Blood POCT 4.1 (L) 4.4 - 5.2 mg/dL    Base Excess/Deficit (+/-) POCT -0.8 -3.0 - 3.0 mmol/L    O2 Sat, Arterial POCT 100 (H) 95 - 96 %    Lactic Acid POCT 0.7 0.7 - 2.0 mmol/L    Oxyhemoglobin Arterial POCT 99 92 - 100 %   Arterial Panel POCT    Collection Time: 03/17/25 12:44 PM   Result Value Ref Range    pH Arterial POCT 7.40 7.35 - 7.45    pCO2 Arterial POCT 37 35 - " 45 mm Hg    pO2 Arterial POCT 293 (H) 80 - 105 mm Hg    Bicarbonate Arterial POCT 23 21 - 28 mmol/L    Sodium POCT 140 135 - 145 mmol/L    Potassium POCT 5.1 3.4 - 5.3 mmol/L    Hemoglobin POCT 10.1 (L) 13.3 - 17.7 g/dL    Glucose Whole Blood POCT 140 (H) 70 - 99 mg/dL    Calcium, Ionized Whole Blood POCT 4.1 (L) 4.4 - 5.2 mg/dL    Base Excess/Deficit (+/-) POCT -1.4 -3.0 - 3.0 mmol/L    O2 Sat, Arterial POCT 100 (H) 95 - 96 %    Lactic Acid POCT 0.9 0.7 - 2.0 mmol/L    Oxyhemoglobin Arterial POCT 99 92 - 100 %   CBC with platelets    Collection Time: 03/17/25  1:05 PM   Result Value Ref Range    WBC Count 9.3 4.0 - 11.0 10e3/uL    RBC Count 3.01 (L) 4.40 - 5.90 10e6/uL    Hemoglobin 9.8 (L) 13.3 - 17.7 g/dL    Hematocrit 27.8 (L) 40.0 - 53.0 %    MCV 92 78 - 100 fL    MCH 32.6 26.5 - 33.0 pg    MCHC 35.3 31.5 - 36.5 g/dL    RDW 12.3 10.0 - 15.0 %    Platelet Count 110 (L) 150 - 450 10e3/uL   Fibrinogen    Collection Time: 03/17/25  1:05 PM   Result Value Ref Range    Fibrinogen Activity 241 170 - 510 mg/dL   INR    Collection Time: 03/17/25  1:05 PM   Result Value Ref Range    INR 1.65 (H) 0.85 - 1.15   PTT    Collection Time: 03/17/25  1:05 PM   Result Value Ref Range    aPTT 48 (H) 22 - 38 Seconds   Arterial Panel POCT    Collection Time: 03/17/25  1:09 PM   Result Value Ref Range    pH Arterial POCT 7.38 7.35 - 7.45    pCO2 Arterial POCT 39 35 - 45 mm Hg    pO2 Arterial POCT 98 80 - 105 mm Hg    Bicarbonate Arterial POCT 22 21 - 28 mmol/L    Sodium POCT 142 135 - 145 mmol/L    Potassium POCT 4.3 3.4 - 5.3 mmol/L    Hemoglobin POCT 10.1 (L) 13.3 - 17.7 g/dL    Glucose Whole Blood POCT 137 (H) 70 - 99 mg/dL    Calcium, Ionized Whole Blood POCT 5.0 4.4 - 5.2 mg/dL    Base Excess/Deficit (+/-) POCT -2.4 -3.0 - 3.0 mmol/L    O2 Sat, Arterial POCT 98 (H) 95 - 96 %    Lactic Acid POCT 1.4 0.7 - 2.0 mmol/L    Oxyhemoglobin Arterial POCT 97 92 - 100 %   Arterial Panel POCT    Collection Time: 03/17/25  1:40 PM   Result  Value Ref Range    pH Arterial POCT 7.40 7.35 - 7.45    pCO2 Arterial POCT 38 35 - 45 mm Hg    pO2 Arterial POCT 100 80 - 105 mm Hg    Bicarbonate Arterial POCT 24 21 - 28 mmol/L    Sodium POCT 144 135 - 145 mmol/L    Potassium POCT 3.9 3.4 - 5.3 mmol/L    Hemoglobin POCT 10.2 (L) 13.3 - 17.7 g/dL    Glucose Whole Blood POCT 121 (H) 70 - 99 mg/dL    Calcium, Ionized Whole Blood POCT 4.5 4.4 - 5.2 mg/dL    Base Excess/Deficit (+/-) POCT -1.1 -3.0 - 3.0 mmol/L    O2 Sat, Arterial POCT 99 (H) 95 - 96 %    Lactic Acid POCT 1.4 0.7 - 2.0 mmol/L    Oxyhemoglobin Arterial POCT 97 92 - 100 %   Glucose by meter    Collection Time: 03/17/25  3:01 PM   Result Value Ref Range    GLUCOSE BY METER POCT 140 (H) 70 - 99 mg/dL   INR    Collection Time: 03/17/25  3:02 PM   Result Value Ref Range    INR 1.31 (H) 0.85 - 1.15   Partial thromboplastin time    Collection Time: 03/17/25  3:02 PM   Result Value Ref Range    aPTT 43 (H) 22 - 38 Seconds   CBC with platelets    Collection Time: 03/17/25  3:02 PM   Result Value Ref Range    WBC Count 12.6 (H) 4.0 - 11.0 10e3/uL    RBC Count 3.43 (L) 4.40 - 5.90 10e6/uL    Hemoglobin 10.9 (L) 13.3 - 17.7 g/dL    Hematocrit 31.3 (L) 40.0 - 53.0 %    MCV 91 78 - 100 fL    MCH 31.8 26.5 - 33.0 pg    MCHC 34.8 31.5 - 36.5 g/dL    RDW 12.4 10.0 - 15.0 %    Platelet Count 132 (L) 150 - 450 10e3/uL   Comprehensive metabolic panel    Collection Time: 03/17/25  3:02 PM   Result Value Ref Range    Sodium 144 135 - 145 mmol/L    Potassium 4.2 3.4 - 5.3 mmol/L    Carbon Dioxide (CO2) 22 22 - 29 mmol/L    Anion Gap 11 7 - 15 mmol/L    Urea Nitrogen 25.9 (H) 8.0 - 23.0 mg/dL    Creatinine 1.94 (H) 0.67 - 1.17 mg/dL    GFR Estimate 35 (L) >60 mL/min/1.73m2    Calcium 8.5 (L) 8.8 - 10.4 mg/dL    Chloride 111 (H) 98 - 107 mmol/L    Glucose 149 (H) 70 - 99 mg/dL    Alkaline Phosphatase 68 40 - 150 U/L    AST 38 0 - 45 U/L    ALT 13 0 - 70 U/L    Protein Total 5.3 (L) 6.4 - 8.3 g/dL    Albumin 3.6 3.5 - 5.2 g/dL     Bilirubin Total 0.6 <=1.2 mg/dL   Lactic acid whole blood    Collection Time: 03/17/25  3:02 PM   Result Value Ref Range    Lactic Acid 1.4 0.7 - 2.0 mmol/L   Ionized Calcium    Collection Time: 03/17/25  3:02 PM   Result Value Ref Range    Calcium Ionized Whole Blood 4.4 4.4 - 5.2 mg/dL   Magnesium    Collection Time: 03/17/25  3:02 PM   Result Value Ref Range    Magnesium 2.8 (H) 1.7 - 2.3 mg/dL   Phosphorus    Collection Time: 03/17/25  3:02 PM   Result Value Ref Range    Phosphorus 2.2 (L) 2.5 - 4.5 mg/dL   Potassium Lab    Collection Time: 03/17/25  3:02 PM   Result Value Ref Range    Potassium 4.2 3.4 - 5.3 mmol/L   iStat Gases (Electrolytes) arterial POCT    Collection Time: 03/17/25  3:02 PM   Result Value Ref Range    CPB Applied No     Hematocrit POCT 29 (L) 40-53 % %    Bicarbonate Arterial POCT 22 21 - 28 mmol/L    Hemoglobin POCT 9.9 (L) 13.3 - 17.7 g/dL    Potassium POCT 4.0 3.4 - 5.3 mmol/L    Sodium POCT 143 135 - 145 mmol/L    pCO2 Arterial POCT 32 (L) 35 - 45 mm Hg    pH Arterial POCT 7.44 7.35 - 7.45    pO2 Arterial POCT 151 (H) 80 - 105 mm Hg    O2 Sat, Arterial POCT 99 (H) 95 - 96 %    Base Excess/Deficit (+/-) POCT -2.0 -3.0 - 3.0 mmol/L   ECG 12-LEAD WITH MUSE (LHE)    Collection Time: 03/17/25  3:26 PM   Result Value Ref Range    Systolic Blood Pressure  mmHg    Diastolic Blood Pressure  mmHg    Ventricular Rate 86 BPM    Atrial Rate 86 BPM    FL Interval 148 ms    QRS Duration 124 ms     ms    QTc 500 ms    P Axis 112 degrees    R AXIS 67 degrees    T Axis 90 degrees    Interpretation ECG       Sinus rhythm with occasional Premature ventricular complexes  Non-specific intra-ventricular conduction delay  Old inferior MI   Prolonged QTc   When compared with ECG of 24-Feb-2025 09:28,  No significant change was found  Confirmed by MOLINA NEVILLE MD LOC:JN ([id number]) on 3/17/2025 3:44:15 PM     Glucose by meter    Collection Time: 03/17/25  5:11 PM   Result Value Ref Range    GLUCOSE BY  METER POCT 143 (H) 70 - 99 mg/dL   Glucose by meter    Collection Time: 03/17/25  7:04 PM   Result Value Ref Range    GLUCOSE BY METER POCT 172 (H) 70 - 99 mg/dL   Glucose by meter    Collection Time: 03/17/25  8:13 PM   Result Value Ref Range    GLUCOSE BY METER POCT 167 (H) 70 - 99 mg/dL   Blood gas arterial    Collection Time: 03/17/25  9:00 PM   Result Value Ref Range    pH Arterial 7.42 7.35 - 7.45    pCO2 Arterial 33 (L) 35 - 45 mm Hg    pO2 Arterial 100 80 - 105 mm Hg    FIO2 30     Bicarbonate Arterial 21 21 - 28 mmol/L    Base Excess/Deficit Arterial -3.0 -3.0 - 3.0 mmol/L    Oxyhemoglobin Arterial 97 92 - 100 %    O2 Sat, Arterial 98.8 (H) 95.0 - 96.0 %    Peep 5 cm H2O   Glucose by meter    Collection Time: 03/17/25  9:00 PM   Result Value Ref Range    GLUCOSE BY METER POCT 177 (H) 70 - 99 mg/dL   Glucose by meter    Collection Time: 03/17/25 10:19 PM   Result Value Ref Range    GLUCOSE BY METER POCT 137 (H) 70 - 99 mg/dL   Glucose by meter    Collection Time: 03/17/25 11:07 PM   Result Value Ref Range    GLUCOSE BY METER POCT 136 (H) 70 - 99 mg/dL   Comprehensive metabolic panel    Collection Time: 03/18/25 12:08 AM   Result Value Ref Range    Sodium 146 (H) 135 - 145 mmol/L    Potassium 4.9 3.4 - 5.3 mmol/L    Carbon Dioxide (CO2) 23 22 - 29 mmol/L    Anion Gap 8 7 - 15 mmol/L    Urea Nitrogen 26.6 (H) 8.0 - 23.0 mg/dL    Creatinine 1.99 (H) 0.67 - 1.17 mg/dL    GFR Estimate 34 (L) >60 mL/min/1.73m2    Calcium 8.5 (L) 8.8 - 10.4 mg/dL    Chloride 115 (H) 98 - 107 mmol/L    Glucose 149 (H) 70 - 99 mg/dL    Alkaline Phosphatase 59 40 - 150 U/L    AST 35 0 - 45 U/L    ALT 11 0 - 70 U/L    Protein Total 5.5 (L) 6.4 - 8.3 g/dL    Albumin 3.9 3.5 - 5.2 g/dL    Bilirubin Total 0.6 <=1.2 mg/dL   Ionized Calcium    Collection Time: 03/18/25 12:08 AM   Result Value Ref Range    Calcium Ionized Whole Blood 4.6 4.4 - 5.2 mg/dL   Glucose by meter    Collection Time: 03/18/25 12:10 AM   Result Value Ref Range     GLUCOSE BY METER POCT 152 (H) 70 - 99 mg/dL   Glucose by meter    Collection Time: 03/18/25  1:09 AM   Result Value Ref Range    GLUCOSE BY METER POCT 116 (H) 70 - 99 mg/dL   Glucose by meter    Collection Time: 03/18/25  2:17 AM   Result Value Ref Range    GLUCOSE BY METER POCT 107 (H) 70 - 99 mg/dL   Glucose by meter    Collection Time: 03/18/25  3:14 AM   Result Value Ref Range    GLUCOSE BY METER POCT 109 (H) 70 - 99 mg/dL   Blood gas venous    Collection Time: 03/18/25  4:25 AM   Result Value Ref Range    pH Venous 7.33 7.32 - 7.43    pCO2 Venous 47 40 - 50 mm Hg    pO2 Venous 33 25 - 47 mm Hg    Bicarbonate Venous 25 21 - 28 mmol/L    Base Excess/Deficit Venous -1.0 -3.0 - 3.0 mmol/L    FIO2 28     Oxyhemoglobin Venous 61 (L) 70 - 75 %    O2 Sat, Venous 62.8 (L) 70.0 - 75.0 %   CBC with platelets    Collection Time: 03/18/25  4:25 AM   Result Value Ref Range    WBC Count 11.2 (H) 4.0 - 11.0 10e3/uL    RBC Count 2.90 (L) 4.40 - 5.90 10e6/uL    Hemoglobin 9.3 (L) 13.3 - 17.7 g/dL    Hematocrit 27.5 (L) 40.0 - 53.0 %    MCV 95 78 - 100 fL    MCH 32.1 26.5 - 33.0 pg    MCHC 33.8 31.5 - 36.5 g/dL    RDW 12.7 10.0 - 15.0 %    Platelet Count 95 (L) 150 - 450 10e3/uL   Magnesium    Collection Time: 03/18/25  4:25 AM   Result Value Ref Range    Magnesium 2.3 1.7 - 2.3 mg/dL   Ionized Calcium    Collection Time: 03/18/25  4:25 AM   Result Value Ref Range    Calcium Ionized Whole Blood 4.6 4.4 - 5.2 mg/dL   Phosphorus    Collection Time: 03/18/25  4:25 AM   Result Value Ref Range    Phosphorus 3.3 2.5 - 4.5 mg/dL   Comprehensive metabolic panel    Collection Time: 03/18/25  4:25 AM   Result Value Ref Range    Sodium 144 135 - 145 mmol/L    Potassium 5.0 3.4 - 5.3 mmol/L    Carbon Dioxide (CO2) 23 22 - 29 mmol/L    Anion Gap 8 7 - 15 mmol/L    Urea Nitrogen 26.6 (H) 8.0 - 23.0 mg/dL    Creatinine 2.00 (H) 0.67 - 1.17 mg/dL    GFR Estimate 34 (L) >60 mL/min/1.73m2    Calcium 8.4 (L) 8.8 - 10.4 mg/dL    Chloride 113 (H) 98  - 107 mmol/L    Glucose 122 (H) 70 - 99 mg/dL    Alkaline Phosphatase 56 40 - 150 U/L    AST 33 0 - 45 U/L    ALT 9 0 - 70 U/L    Protein Total 5.3 (L) 6.4 - 8.3 g/dL    Albumin 3.7 3.5 - 5.2 g/dL    Bilirubin Total 0.5 <=1.2 mg/dL   INR    Collection Time: 03/18/25  4:25 AM   Result Value Ref Range    INR 1.15 0.85 - 1.15   Partial thromboplastin time    Collection Time: 03/18/25  4:25 AM   Result Value Ref Range    aPTT 34 22 - 38 Seconds   Fibrinogen activity    Collection Time: 03/18/25  4:25 AM   Result Value Ref Range    Fibrinogen Activity 294 170 - 510 mg/dL   Glucose by meter    Collection Time: 03/18/25  4:31 AM   Result Value Ref Range    GLUCOSE BY METER POCT 120 (H) 70 - 99 mg/dL   ECG 12-LEAD WITH MUSE (LHE)    Collection Time: 03/18/25  4:41 AM   Result Value Ref Range    Systolic Blood Pressure  mmHg    Diastolic Blood Pressure  mmHg    Ventricular Rate 83 BPM    Atrial Rate 83 BPM    WI Interval 146 ms    QRS Duration 140 ms     ms    QTc 484 ms    P Axis 18 degrees    R AXIS 41 degrees    T Axis 77 degrees    Interpretation ECG       Sinus rhythm  Right bundle branch block  Inferior infarct , age undetermined  Submillimeter ST elevation in the inferior leads  Anteroseptal infarct , age undetermined  Abnormal ECG  When compared with ECG of 17-Mar-2025 15:26,  Premature ventricular complexes are no longer Present  Right bundle branch block has replaced Non-specific intra-ventricular conduction delay  Anteroseptal infarct is now Present  Inferior infarct is now Present  Confirmed by JAIME OCHOA MD LOC:JN ([id number]) on 3/18/2025 8:01:09 AM     Glucose by meter    Collection Time: 03/18/25  6:16 AM   Result Value Ref Range    GLUCOSE BY METER POCT 106 (H) 70 - 99 mg/dL   Glucose by meter    Collection Time: 03/18/25  7:57 AM   Result Value Ref Range    GLUCOSE BY METER POCT 82 70 - 99 mg/dL   Glucose by meter    Collection Time: 03/18/25 10:00 AM   Result Value Ref Range    GLUCOSE BY METER POCT  129 (H) 70 - 99 mg/dL   Glucose by meter    Collection Time: 03/18/25 12:07 PM   Result Value Ref Range    GLUCOSE BY METER POCT 130 (H) 70 - 99 mg/dL   CBC with platelets    Collection Time: 03/18/25  3:23 PM   Result Value Ref Range    WBC Count 16.2 (H) 4.0 - 11.0 10e3/uL    RBC Count 3.09 (L) 4.40 - 5.90 10e6/uL    Hemoglobin 10.0 (L) 13.3 - 17.7 g/dL    Hematocrit 29.3 (L) 40.0 - 53.0 %    MCV 95 78 - 100 fL    MCH 32.4 26.5 - 33.0 pg    MCHC 34.1 31.5 - 36.5 g/dL    RDW 13.2 10.0 - 15.0 %    Platelet Count 96 (L) 150 - 450 10e3/uL   Glucose by meter    Collection Time: 03/18/25  4:23 PM   Result Value Ref Range    GLUCOSE BY METER POCT 195 (H) 70 - 99 mg/dL   ECG 12-LEAD WITH MUSE (LHE)    Collection Time: 03/18/25  5:24 PM   Result Value Ref Range    Systolic Blood Pressure  mmHg    Diastolic Blood Pressure  mmHg    Ventricular Rate 74 BPM    Atrial Rate 42 BPM    DE Interval  ms    QRS Duration 148 ms     ms    QTc 495 ms    P Axis  degrees    R AXIS 39 degrees    T Axis 53 degrees    Interpretation ECG       Wide QRS rhythm  Right bundle branch block  Cannot rule out Inferior infarct , age undetermined  Anteroseptal infarct , age undetermined  Abnormal ECG  When compared with ECG of 18-Mar-2025 04:41,  Wide QRS rhythm has replaced Sinus rhythm  Confirmed by JAIME OCHOA MD LOC:JN [id number]) on 3/19/2025 4:03:35 AM     Glucose by meter    Collection Time: 03/18/25  8:18 PM   Result Value Ref Range    GLUCOSE BY METER POCT 269 (H) 70 - 99 mg/dL   Ionized Calcium    Collection Time: 03/19/25  4:26 AM   Result Value Ref Range    Calcium Ionized Whole Blood 4.6 4.4 - 5.2 mg/dL   Potassium    Collection Time: 03/19/25  4:26 AM   Result Value Ref Range    Potassium 5.1 3.4 - 5.3 mmol/L   Magnesium    Collection Time: 03/19/25  4:26 AM   Result Value Ref Range    Magnesium 2.3 1.7 - 2.3 mg/dL   Phosphorus    Collection Time: 03/19/25  4:26 AM   Result Value Ref Range    Phosphorus 5.3 (H) 2.5 - 4.5 mg/dL    Basic metabolic panel    Collection Time: 03/19/25  4:26 AM   Result Value Ref Range    Sodium 142 135 - 145 mmol/L    Potassium 5.1 3.4 - 5.3 mmol/L    Chloride 103 98 - 107 mmol/L    Carbon Dioxide (CO2) 12 (L) 22 - 29 mmol/L    Anion Gap 27 (H) 7 - 15 mmol/L    Urea Nitrogen 41.9 (H) 8.0 - 23.0 mg/dL    Creatinine 2.19 (H) 0.67 - 1.17 mg/dL    GFR Estimate 31 (L) >60 mL/min/1.73m2    Calcium 8.9 8.8 - 10.4 mg/dL    Glucose 388 (H) 70 - 99 mg/dL   Hepatic panel    Collection Time: 03/19/25  4:26 AM   Result Value Ref Range    Protein Total 6.3 (L) 6.4 - 8.3 g/dL    Albumin 3.9 3.5 - 5.2 g/dL    Bilirubin Total 0.7 <=1.2 mg/dL    Alkaline Phosphatase 71 40 - 150 U/L    AST 33 0 - 45 U/L    ALT 9 0 - 70 U/L    Bilirubin Direct 0.37 (H) 0.00 - 0.30 mg/dL   Ketone Beta-Hydroxybutyrate Quantitative    Collection Time: 03/19/25  4:26 AM   Result Value Ref Range    Ketone (Beta-Hydroxybutyrate) Quantitative 3.27 (HH) <=0.30 mmol/L   Glucose by meter    Collection Time: 03/19/25  8:03 AM   Result Value Ref Range    GLUCOSE BY METER POCT 395 (H) 70 - 99 mg/dL   Lactic acid whole blood    Collection Time: 03/19/25  8:48 AM   Result Value Ref Range    Lactic Acid 4.0 (HH) 0.7 - 2.0 mmol/L   Blood gas venous    Collection Time: 03/19/25  8:49 AM   Result Value Ref Range    pH Venous 7.25 (L) 7.32 - 7.43    pCO2 Venous 38 (L) 40 - 50 mm Hg    pO2 Venous 41 25 - 47 mm Hg    Bicarbonate Venous 17 (L) 21 - 28 mmol/L    Base Excess/Deficit Venous -10.0 (L) -3.0 - 3.0 mmol/L    FIO2 21     Oxyhemoglobin Venous 68 (L) 70 - 75 %    O2 Sat, Venous 68.7 (L) 70.0 - 75.0 %   CBC with platelets    Collection Time: 03/19/25  8:59 AM   Result Value Ref Range    WBC Count 18.5 (H) 4.0 - 11.0 10e3/uL    RBC Count 3.45 (L) 4.40 - 5.90 10e6/uL    Hemoglobin 10.9 (L) 13.3 - 17.7 g/dL    Hematocrit 33.7 (L) 40.0 - 53.0 %    MCV 98 78 - 100 fL    MCH 31.6 26.5 - 33.0 pg    MCHC 32.3 31.5 - 36.5 g/dL    RDW 13.3 10.0 - 15.0 %    Platelet Count  109 (L) 150 - 450 10e3/uL   Blood gas arterial    Collection Time: 03/19/25  9:19 AM   Result Value Ref Range    pH Arterial 7.30 (L) 7.35 - 7.45    pCO2 Arterial 34 (L) 35 - 45 mm Hg    pO2 Arterial 74 (L) 80 - 105 mm Hg    FIO2 21     Bicarbonate Arterial 17 (L) 21 - 28 mmol/L    Base Excess/Deficit Arterial -8.7 (L) -3.0 - 3.0 mmol/L    Oxyhemoglobin Arterial 93 92 - 100 %    O2 Sat, Arterial 94.5 (L) 95.0 - 96.0 %   Glucose by meter    Collection Time: 03/19/25  9:25 AM   Result Value Ref Range    GLUCOSE BY METER POCT 364 (H) 70 - 99 mg/dL   Glucose by meter    Collection Time: 03/19/25 10:28 AM   Result Value Ref Range    GLUCOSE BY METER POCT 349 (H) 70 - 99 mg/dL   Lactic acid whole blood    Collection Time: 03/19/25 10:56 AM   Result Value Ref Range    Lactic Acid 4.0 (HH) 0.7 - 2.0 mmol/L   Glucose by meter    Collection Time: 03/19/25 11:36 AM   Result Value Ref Range    GLUCOSE BY METER POCT 303 (H) 70 - 99 mg/dL   Glucose by meter    Collection Time: 03/19/25 12:39 PM   Result Value Ref Range    GLUCOSE BY METER POCT 252 (H) 70 - 99 mg/dL   Lactic acid whole blood    Collection Time: 03/19/25 12:44 PM   Result Value Ref Range    Lactic Acid 2.9 (H) 0.7 - 2.0 mmol/L   Basic metabolic panel    Collection Time: 03/19/25 12:44 PM   Result Value Ref Range    Sodium 140 135 - 145 mmol/L    Potassium 4.2 3.4 - 5.3 mmol/L    Chloride 108 (H) 98 - 107 mmol/L    Carbon Dioxide (CO2) 19 (L) 22 - 29 mmol/L    Anion Gap 13 7 - 15 mmol/L    Urea Nitrogen 53.8 (H) 8.0 - 23.0 mg/dL    Creatinine 2.40 (H) 0.67 - 1.17 mg/dL    GFR Estimate 27 (L) >60 mL/min/1.73m2    Calcium 8.8 8.8 - 10.4 mg/dL    Glucose 288 (H) 70 - 99 mg/dL   Ketone Beta-Hydroxybutyrate Quantitative    Collection Time: 03/19/25 12:44 PM   Result Value Ref Range    Ketone (Beta-Hydroxybutyrate) Quantitative 0.30 <=0.30 mmol/L   Extra Green Top (Lithium Heparin) Tube    Collection Time: 03/19/25 12:44 PM   Result Value Ref Range    Hold Specimen JIC     Extra Purple Top Tube    Collection Time: 03/19/25 12:44 PM   Result Value Ref Range    Hold Specimen JIC    Glucose by meter    Collection Time: 03/19/25  1:30 PM   Result Value Ref Range    GLUCOSE BY METER POCT 212 (H) 70 - 99 mg/dL   Blood gas arterial    Collection Time: 03/19/25  2:24 PM   Result Value Ref Range    pH Arterial 7.36 7.35 - 7.45    pCO2 Arterial 37 35 - 45 mm Hg    pO2 Arterial 80 80 - 105 mm Hg    FIO2 28     Bicarbonate Arterial 21 21 - 28 mmol/L    Base Excess/Deficit Arterial -4.4 (L) -3.0 - 3.0 mmol/L    Oxyhemoglobin Arterial 95 92 - 100 %    O2 Sat, Arterial 96.3 (H) 95.0 - 96.0 %   Glucose by meter    Collection Time: 03/19/25  2:24 PM   Result Value Ref Range    GLUCOSE BY METER POCT 186 (H) 70 - 99 mg/dL   Lactic acid whole blood    Collection Time: 03/19/25  2:43 PM   Result Value Ref Range    Lactic Acid 2.6 (H) 0.7 - 2.0 mmol/L   Basic metabolic panel    Collection Time: 03/19/25  2:48 PM   Result Value Ref Range    Sodium 140 135 - 145 mmol/L    Potassium      Chloride 109 (H) 98 - 107 mmol/L    Carbon Dioxide (CO2) 21 (L) 22 - 29 mmol/L    Anion Gap 10 7 - 15 mmol/L    Urea Nitrogen 54.3 (H) 8.0 - 23.0 mg/dL    Creatinine 2.42 (H) 0.67 - 1.17 mg/dL    GFR Estimate 27 (L) >60 mL/min/1.73m2    Calcium 8.4 (L) 8.8 - 10.4 mg/dL    Glucose 180 (H) 70 - 99 mg/dL   Glucose by meter    Collection Time: 03/19/25  3:30 PM   Result Value Ref Range    GLUCOSE BY METER POCT 139 (H) 70 - 99 mg/dL   Glucose by meter    Collection Time: 03/19/25  4:02 PM   Result Value Ref Range    GLUCOSE BY METER POCT 128 (H) 70 - 99 mg/dL   Potassium    Collection Time: 03/19/25  4:20 PM   Result Value Ref Range    Potassium 4.5 3.4 - 5.3 mmol/L   Lactic acid whole blood    Collection Time: 03/19/25  7:11 PM   Result Value Ref Range    Lactic Acid 1.9 0.7 - 2.0 mmol/L   Basic metabolic panel    Collection Time: 03/19/25  7:11 PM   Result Value Ref Range    Sodium 140 135 - 145 mmol/L    Potassium 5.0 3.4 -  5.3 mmol/L    Chloride 108 (H) 98 - 107 mmol/L    Carbon Dioxide (CO2) 20 (L) 22 - 29 mmol/L    Anion Gap 12 7 - 15 mmol/L    Urea Nitrogen 58.0 (H) 8.0 - 23.0 mg/dL    Creatinine 2.67 (H) 0.67 - 1.17 mg/dL    GFR Estimate 24 (L) >60 mL/min/1.73m2    Calcium 8.7 (L) 8.8 - 10.4 mg/dL    Glucose 167 (H) 70 - 99 mg/dL   Glucose by meter    Collection Time: 03/19/25  7:59 PM   Result Value Ref Range    GLUCOSE BY METER POCT 166 (H) 70 - 99 mg/dL   Lactic acid whole blood    Collection Time: 03/19/25 10:17 PM   Result Value Ref Range    Lactic Acid 1.4 0.7 - 2.0 mmol/L   Basic metabolic panel    Collection Time: 03/19/25 10:17 PM   Result Value Ref Range    Sodium 139 135 - 145 mmol/L    Potassium 4.9 3.4 - 5.3 mmol/L    Chloride 107 98 - 107 mmol/L    Carbon Dioxide (CO2) 20 (L) 22 - 29 mmol/L    Anion Gap 12 7 - 15 mmol/L    Urea Nitrogen 60.0 (H) 8.0 - 23.0 mg/dL    Creatinine 2.69 (H) 0.67 - 1.17 mg/dL    GFR Estimate 24 (L) >60 mL/min/1.73m2    Calcium 8.5 (L) 8.8 - 10.4 mg/dL    Glucose 195 (H) 70 - 99 mg/dL   Glucose by meter    Collection Time: 03/20/25 12:03 AM   Result Value Ref Range    GLUCOSE BY METER POCT 182 (H) 70 - 99 mg/dL   Lactic acid whole blood    Collection Time: 03/20/25  1:16 AM   Result Value Ref Range    Lactic Acid 1.2 0.7 - 2.0 mmol/L   Basic metabolic panel    Collection Time: 03/20/25  1:16 AM   Result Value Ref Range    Sodium 140 135 - 145 mmol/L    Potassium 4.8 3.4 - 5.3 mmol/L    Chloride 107 98 - 107 mmol/L    Carbon Dioxide (CO2) 21 (L) 22 - 29 mmol/L    Anion Gap 12 7 - 15 mmol/L    Urea Nitrogen 61.2 (H) 8.0 - 23.0 mg/dL    Creatinine 2.78 (H) 0.67 - 1.17 mg/dL    GFR Estimate 23 (L) >60 mL/min/1.73m2    Calcium 8.3 (L) 8.8 - 10.4 mg/dL    Glucose 191 (H) 70 - 99 mg/dL   Glucose by meter    Collection Time: 03/20/25  4:12 AM   Result Value Ref Range    GLUCOSE BY METER POCT 209 (H) 70 - 99 mg/dL   Glucose by meter    Collection Time: 03/20/25  7:33 AM   Result Value Ref Range     GLUCOSE BY METER POCT 204 (H) 70 - 99 mg/dL   Lactic acid whole blood    Collection Time: 03/20/25  8:58 AM   Result Value Ref Range    Lactic Acid 1.1 0.7 - 2.0 mmol/L   Ionized Calcium    Collection Time: 03/20/25  8:58 AM   Result Value Ref Range    Calcium Ionized Whole Blood 4.6 4.4 - 5.2 mg/dL   Magnesium    Collection Time: 03/20/25  8:58 AM   Result Value Ref Range    Magnesium 2.4 (H) 1.7 - 2.3 mg/dL   Phosphorus    Collection Time: 03/20/25  8:58 AM   Result Value Ref Range    Phosphorus 3.6 2.5 - 4.5 mg/dL   Comprehensive metabolic panel    Collection Time: 03/20/25  8:58 AM   Result Value Ref Range    Sodium 140 135 - 145 mmol/L    Potassium 4.8 3.4 - 5.3 mmol/L    Carbon Dioxide (CO2) 24 22 - 29 mmol/L    Anion Gap 9 7 - 15 mmol/L    Urea Nitrogen 64.9 (H) 8.0 - 23.0 mg/dL    Creatinine 2.83 (H) 0.67 - 1.17 mg/dL    GFR Estimate 23 (L) >60 mL/min/1.73m2    Calcium 8.4 (L) 8.8 - 10.4 mg/dL    Chloride 107 98 - 107 mmol/L    Glucose 213 (H) 70 - 99 mg/dL    Alkaline Phosphatase 62 40 - 150 U/L    AST 20 0 - 45 U/L    ALT <5 0 - 70 U/L    Protein Total 5.6 (L) 6.4 - 8.3 g/dL    Albumin 3.3 (L) 3.5 - 5.2 g/dL    Bilirubin Total 0.4 <=1.2 mg/dL   CBC with platelets    Collection Time: 03/20/25  8:58 AM   Result Value Ref Range    WBC Count 19.8 (H) 4.0 - 11.0 10e3/uL    RBC Count 3.13 (L) 4.40 - 5.90 10e6/uL    Hemoglobin 9.9 (L) 13.3 - 17.7 g/dL    Hematocrit 30.2 (L) 40.0 - 53.0 %    MCV 97 78 - 100 fL    MCH 31.6 26.5 - 33.0 pg    MCHC 32.8 31.5 - 36.5 g/dL    RDW 13.3 10.0 - 15.0 %    Platelet Count 123 (L) 150 - 450 10e3/uL   Extra Green Top (Lithium Heparin) Tube    Collection Time: 03/20/25  9:16 AM   Result Value Ref Range    Hold Specimen JIC    UA with Microscopic reflex to Culture    Collection Time: 03/20/25 10:02 AM    Specimen: Urine, [place] Catheter   Result Value Ref Range    Color Urine Light Yellow Colorless, Straw, Light Yellow, Yellow    Appearance Urine Clear Clear    Glucose Urine  Negative Negative mg/dL    Bilirubin Urine Negative Negative    Ketones Urine Negative Negative mg/dL    Specific Gravity Urine 1.010 1.003 - 1.035    Blood Urine Trace (A) Negative    pH Urine 5.0 5.0 - 7.0    Protein Albumin Urine Negative Negative mg/dL    Urobilinogen Urine Normal Normal, 2.0 mg/dL    Nitrite Urine Negative Negative    Leukocyte Esterase Urine Negative Negative    Bacteria Urine None Seen None Seen /HPF    RBC Urine <=2 <=2 /HPF    WBC Urine <=5 <=5 /HPF    Squamous Epithelials Urine <=1 <=1 /HPF   Blood Culture Line, venous    Collection Time: 03/20/25 10:06 AM    Specimen: Line, venous; Blood   Result Value Ref Range    Culture No Growth    Respiratory Aerobic Bacterial Culture with Gram Stain    Collection Time: 03/20/25 10:08 AM    Specimen: Expectorate; Sputum   Result Value Ref Range    Culture       >10 Squamous epithelial cells/low power field indicates oral contamination. Please recollect.    Gram Stain Result >10 Squamous epithelial cells/low power field     Gram Stain Result <25 PMNs/low power field     Gram Stain Result 4+ Mixed sunitha    Glucose by meter    Collection Time: 03/20/25 11:27 AM   Result Value Ref Range    GLUCOSE BY METER POCT 201 (H) 70 - 99 mg/dL   Lactic acid whole blood    Collection Time: 03/20/25  2:04 PM   Result Value Ref Range    Lactic Acid 1.6 0.7 - 2.0 mmol/L   Basic metabolic panel    Collection Time: 03/20/25  2:04 PM   Result Value Ref Range    Sodium 141 135 - 145 mmol/L    Potassium 4.8 3.4 - 5.3 mmol/L    Chloride 106 98 - 107 mmol/L    Carbon Dioxide (CO2) 21 (L) 22 - 29 mmol/L    Anion Gap 14 7 - 15 mmol/L    Urea Nitrogen 67.2 (H) 8.0 - 23.0 mg/dL    Creatinine 2.68 (H) 0.67 - 1.17 mg/dL    GFR Estimate 24 (L) >60 mL/min/1.73m2    Calcium 9.0 8.8 - 10.4 mg/dL    Glucose 235 (H) 70 - 99 mg/dL   Glucose by meter    Collection Time: 03/20/25  3:42 PM   Result Value Ref Range    GLUCOSE BY METER POCT 250 (H) 70 - 99 mg/dL   Basic metabolic panel     Collection Time: 03/20/25  6:00 PM   Result Value Ref Range    Sodium 142 135 - 145 mmol/L    Potassium 4.5 3.4 - 5.3 mmol/L    Chloride 108 (H) 98 - 107 mmol/L    Carbon Dioxide (CO2) 20 (L) 22 - 29 mmol/L    Anion Gap 14 7 - 15 mmol/L    Urea Nitrogen 68.6 (H) 8.0 - 23.0 mg/dL    Creatinine 2.58 (H) 0.67 - 1.17 mg/dL    GFR Estimate 25 (L) >60 mL/min/1.73m2    Calcium 8.8 8.8 - 10.4 mg/dL    Glucose 264 (H) 70 - 99 mg/dL   Glucose by meter    Collection Time: 03/20/25  9:03 PM   Result Value Ref Range    GLUCOSE BY METER POCT 235 (H) 70 - 99 mg/dL   Lactic acid whole blood    Collection Time: 03/20/25 10:11 PM   Result Value Ref Range    Lactic Acid 1.1 0.7 - 2.0 mmol/L   Basic metabolic panel    Collection Time: 03/20/25 10:11 PM   Result Value Ref Range    Sodium 142 135 - 145 mmol/L    Potassium 4.3 3.4 - 5.3 mmol/L    Chloride 108 (H) 98 - 107 mmol/L    Carbon Dioxide (CO2) 25 22 - 29 mmol/L    Anion Gap 9 7 - 15 mmol/L    Urea Nitrogen 64.7 (H) 8.0 - 23.0 mg/dL    Creatinine 2.66 (H) 0.67 - 1.17 mg/dL    GFR Estimate 24 (L) >60 mL/min/1.73m2    Calcium 8.3 (L) 8.8 - 10.4 mg/dL    Glucose 241 (H) 70 - 99 mg/dL   Glucose by meter    Collection Time: 03/21/25 12:41 AM   Result Value Ref Range    GLUCOSE BY METER POCT 214 (H) 70 - 99 mg/dL   Basic metabolic panel    Collection Time: 03/21/25  2:12 AM   Result Value Ref Range    Sodium 143 135 - 145 mmol/L    Potassium 4.3 3.4 - 5.3 mmol/L    Chloride 110 (H) 98 - 107 mmol/L    Carbon Dioxide (CO2) 24 22 - 29 mmol/L    Anion Gap 9 7 - 15 mmol/L    Urea Nitrogen 63.4 (H) 8.0 - 23.0 mg/dL    Creatinine 2.60 (H) 0.67 - 1.17 mg/dL    GFR Estimate 25 (L) >60 mL/min/1.73m2    Calcium 8.3 (L) 8.8 - 10.4 mg/dL    Glucose 231 (H) 70 - 99 mg/dL   Magnesium    Collection Time: 03/21/25  2:12 AM   Result Value Ref Range    Magnesium 2.3 1.7 - 2.3 mg/dL   Phosphorus    Collection Time: 03/21/25  2:12 AM   Result Value Ref Range    Phosphorus 3.1 2.5 - 4.5 mg/dL   Glucose by  meter    Collection Time: 03/21/25  4:52 AM   Result Value Ref Range    GLUCOSE BY METER POCT 217 (H) 70 - 99 mg/dL   Ionized Calcium    Collection Time: 03/21/25  5:57 AM   Result Value Ref Range    Calcium Ionized Whole Blood 4.9 4.4 - 5.2 mg/dL   CBC with platelets    Collection Time: 03/21/25  5:57 AM   Result Value Ref Range    WBC Count 12.8 (H) 4.0 - 11.0 10e3/uL    RBC Count 3.08 (L) 4.40 - 5.90 10e6/uL    Hemoglobin 9.9 (L) 13.3 - 17.7 g/dL    Hematocrit 29.3 (L) 40.0 - 53.0 %    MCV 95 78 - 100 fL    MCH 32.1 26.5 - 33.0 pg    MCHC 33.8 31.5 - 36.5 g/dL    RDW 13.0 10.0 - 15.0 %    Platelet Count 137 (L) 150 - 450 10e3/uL   Glucose by meter    Collection Time: 03/21/25  8:15 AM   Result Value Ref Range    GLUCOSE BY METER POCT 202 (H) 70 - 99 mg/dL   Glucose by meter    Collection Time: 03/21/25 11:59 AM   Result Value Ref Range    GLUCOSE BY METER POCT 250 (H) 70 - 99 mg/dL   Glucose by meter    Collection Time: 03/21/25  2:33 PM   Result Value Ref Range    GLUCOSE BY METER POCT 225 (H) 70 - 99 mg/dL   Basic metabolic panel    Collection Time: 03/21/25  3:50 PM   Result Value Ref Range    Sodium 142 135 - 145 mmol/L    Potassium 4.1 3.4 - 5.3 mmol/L    Chloride 108 (H) 98 - 107 mmol/L    Carbon Dioxide (CO2) 23 22 - 29 mmol/L    Anion Gap 11 7 - 15 mmol/L    Urea Nitrogen 64.3 (H) 8.0 - 23.0 mg/dL    Creatinine 2.25 (H) 0.67 - 1.17 mg/dL    GFR Estimate 30 (L) >60 mL/min/1.73m2    Calcium 8.6 (L) 8.8 - 10.4 mg/dL    Glucose 217 (H) 70 - 99 mg/dL   Glucose by meter    Collection Time: 03/21/25  3:54 PM   Result Value Ref Range    GLUCOSE BY METER POCT 144 (H) 70 - 99 mg/dL   Glucose by meter    Collection Time: 03/21/25  5:12 PM   Result Value Ref Range    GLUCOSE BY METER POCT 186 (H) 70 - 99 mg/dL   Glucose by meter    Collection Time: 03/21/25  6:15 PM   Result Value Ref Range    GLUCOSE BY METER POCT 180 (H) 70 - 99 mg/dL   Glucose by meter    Collection Time: 03/21/25  6:59 PM   Result Value Ref Range     GLUCOSE BY METER POCT 169 (H) 70 - 99 mg/dL   Glucose by meter    Collection Time: 03/21/25  8:01 PM   Result Value Ref Range    GLUCOSE BY METER POCT 169 (H) 70 - 99 mg/dL   Glucose by meter    Collection Time: 03/21/25  9:01 PM   Result Value Ref Range    GLUCOSE BY METER POCT 145 (H) 70 - 99 mg/dL   Glucose by meter    Collection Time: 03/21/25 10:02 PM   Result Value Ref Range    GLUCOSE BY METER POCT 125 (H) 70 - 99 mg/dL   Glucose by meter    Collection Time: 03/21/25 11:00 PM   Result Value Ref Range    GLUCOSE BY METER POCT 109 (H) 70 - 99 mg/dL   Glucose by meter    Collection Time: 03/21/25 11:56 PM   Result Value Ref Range    GLUCOSE BY METER POCT 91 70 - 99 mg/dL   Glucose by meter    Collection Time: 03/22/25  1:05 AM   Result Value Ref Range    GLUCOSE BY METER POCT 97 70 - 99 mg/dL   Glucose by meter    Collection Time: 03/22/25  2:03 AM   Result Value Ref Range    GLUCOSE BY METER POCT 104 (H) 70 - 99 mg/dL   Glucose by meter    Collection Time: 03/22/25  3:05 AM   Result Value Ref Range    GLUCOSE BY METER POCT 127 (H) 70 - 99 mg/dL   Glucose by meter    Collection Time: 03/22/25  4:03 AM   Result Value Ref Range    GLUCOSE BY METER POCT 120 (H) 70 - 99 mg/dL   Ionized Calcium    Collection Time: 03/22/25  4:06 AM   Result Value Ref Range    Calcium Ionized Whole Blood 4.7 4.4 - 5.2 mg/dL   Basic metabolic panel    Collection Time: 03/22/25  4:06 AM   Result Value Ref Range    Sodium 141 135 - 145 mmol/L    Potassium 4.0 3.4 - 5.3 mmol/L    Chloride 108 (H) 98 - 107 mmol/L    Carbon Dioxide (CO2) 24 22 - 29 mmol/L    Anion Gap 9 7 - 15 mmol/L    Urea Nitrogen 62.8 (H) 8.0 - 23.0 mg/dL    Creatinine 2.20 (H) 0.67 - 1.17 mg/dL    GFR Estimate 30 (L) >60 mL/min/1.73m2    Calcium 8.4 (L) 8.8 - 10.4 mg/dL    Glucose 123 (H) 70 - 99 mg/dL   Phosphorus    Collection Time: 03/22/25  4:06 AM   Result Value Ref Range    Phosphorus 2.7 2.5 - 4.5 mg/dL   Magnesium    Collection Time: 03/22/25  4:06 AM    Result Value Ref Range    Magnesium 2.3 1.7 - 2.3 mg/dL   Extra Purple Top Tube    Collection Time: 03/22/25  4:06 AM   Result Value Ref Range    Hold Specimen JIC    Glucose by meter    Collection Time: 03/22/25  5:04 AM   Result Value Ref Range    GLUCOSE BY METER POCT 135 (H) 70 - 99 mg/dL   Glucose by meter    Collection Time: 03/22/25  6:55 AM   Result Value Ref Range    GLUCOSE BY METER POCT 138 (H) 70 - 99 mg/dL   Glucose by meter    Collection Time: 03/22/25  8:50 AM   Result Value Ref Range    GLUCOSE BY METER POCT 166 (H) 70 - 99 mg/dL   Ammonia    Collection Time: 03/22/25  9:18 AM   Result Value Ref Range    Ammonia 14 (L) 16 - 60 umol/L   CBC with platelets    Collection Time: 03/22/25  9:49 AM   Result Value Ref Range    WBC Count 10.2 4.0 - 11.0 10e3/uL    RBC Count 3.21 (L) 4.40 - 5.90 10e6/uL    Hemoglobin 10.2 (L) 13.3 - 17.7 g/dL    Hematocrit 30.6 (L) 40.0 - 53.0 %    MCV 95 78 - 100 fL    MCH 31.8 26.5 - 33.0 pg    MCHC 33.3 31.5 - 36.5 g/dL    RDW 13.1 10.0 - 15.0 %    Platelet Count 147 (L) 150 - 450 10e3/uL   UA with Microscopic reflex to Culture    Collection Time: 03/22/25 12:08 PM    Specimen: Urine, Clean Catch   Result Value Ref Range    Color Urine Colorless Colorless, Straw, Light Yellow, Yellow    Appearance Urine Clear Clear    Glucose Urine Negative Negative mg/dL    Bilirubin Urine Negative Negative    Ketones Urine Negative Negative mg/dL    Specific Gravity Urine 1.010 1.001 - 1.030    Blood Urine Negative Negative    pH Urine 5.0 5.0 - 7.0    Protein Albumin Urine Negative Negative mg/dL    Urobilinogen Urine <2.0 <2.0 mg/dL    Nitrite Urine Negative Negative    Leukocyte Esterase Urine Negative Negative    RBC Urine 1 <=2 /HPF    WBC Urine 1 <=5 /HPF    Hyaline Casts Urine 1 <=2 /LPF   Glucose by meter    Collection Time: 03/22/25 12:42 PM   Result Value Ref Range    GLUCOSE BY METER POCT 165 (H) 70 - 99 mg/dL   Glucose by meter    Collection Time: 03/22/25  6:57 PM   Result  Value Ref Range    GLUCOSE BY METER POCT 186 (H) 70 - 99 mg/dL   Glucose by meter    Collection Time: 03/22/25  8:28 PM   Result Value Ref Range    GLUCOSE BY METER POCT 171 (H) 70 - 99 mg/dL   Ionized Calcium    Collection Time: 03/23/25  4:17 AM   Result Value Ref Range    Calcium Ionized Whole Blood 4.8 4.4 - 5.2 mg/dL   Basic metabolic panel    Collection Time: 03/23/25  4:17 AM   Result Value Ref Range    Sodium 143 135 - 145 mmol/L    Potassium 3.8 3.4 - 5.3 mmol/L    Chloride 109 (H) 98 - 107 mmol/L    Carbon Dioxide (CO2) 27 22 - 29 mmol/L    Anion Gap 7 7 - 15 mmol/L    Urea Nitrogen 60.1 (H) 8.0 - 23.0 mg/dL    Creatinine 2.17 (H) 0.67 - 1.17 mg/dL    GFR Estimate 31 (L) >60 mL/min/1.73m2    Calcium 8.3 (L) 8.8 - 10.4 mg/dL    Glucose 181 (H) 70 - 99 mg/dL   Magnesium    Collection Time: 03/23/25  4:17 AM   Result Value Ref Range    Magnesium 2.2 1.7 - 2.3 mg/dL   Phosphorus    Collection Time: 03/23/25  4:17 AM   Result Value Ref Range    Phosphorus 3.7 2.5 - 4.5 mg/dL   CBC with platelets    Collection Time: 03/23/25  4:17 AM   Result Value Ref Range    WBC Count 7.5 4.0 - 11.0 10e3/uL    RBC Count 2.63 (L) 4.40 - 5.90 10e6/uL    Hemoglobin 8.6 (L) 13.3 - 17.7 g/dL    Hematocrit 24.7 (L) 40.0 - 53.0 %    MCV 94 78 - 100 fL    MCH 32.7 26.5 - 33.0 pg    MCHC 34.8 31.5 - 36.5 g/dL    RDW 13.0 10.0 - 15.0 %    Platelet Count 116 (L) 150 - 450 10e3/uL   TSH with free T4 reflex    Collection Time: 03/23/25  4:17 AM   Result Value Ref Range    TSH 2.66 0.30 - 4.20 uIU/mL   Glucose by meter    Collection Time: 03/23/25  7:50 AM   Result Value Ref Range    GLUCOSE BY METER POCT 186 (H) 70 - 99 mg/dL   Glucose by meter    Collection Time: 03/23/25 12:01 PM   Result Value Ref Range    GLUCOSE BY METER POCT 231 (H) 70 - 99 mg/dL   Glucose by meter    Collection Time: 03/23/25  4:28 PM   Result Value Ref Range    GLUCOSE BY METER POCT 243 (H) 70 - 99 mg/dL   Glucose by meter    Collection Time: 03/23/25  9:41 PM    Result Value Ref Range    GLUCOSE BY METER POCT 234 (H) 70 - 99 mg/dL   Platelet count    Collection Time: 03/24/25  5:31 AM   Result Value Ref Range    Platelet Count 118 (L) 150 - 450 10e3/uL   Basic metabolic panel    Collection Time: 03/24/25  5:31 AM   Result Value Ref Range    Sodium 143 135 - 145 mmol/L    Potassium 4.1 3.4 - 5.3 mmol/L    Chloride 108 (H) 98 - 107 mmol/L    Carbon Dioxide (CO2) 25 22 - 29 mmol/L    Anion Gap 10 7 - 15 mmol/L    Urea Nitrogen 53.1 (H) 8.0 - 23.0 mg/dL    Creatinine 2.00 (H) 0.67 - 1.17 mg/dL    GFR Estimate 34 (L) >60 mL/min/1.73m2    Calcium 8.6 (L) 8.8 - 10.4 mg/dL    Glucose 202 (H) 70 - 99 mg/dL   Magnesium    Collection Time: 03/24/25  5:31 AM   Result Value Ref Range    Magnesium 2.1 1.7 - 2.3 mg/dL   Phosphorus    Collection Time: 03/24/25  5:31 AM   Result Value Ref Range    Phosphorus 3.9 2.5 - 4.5 mg/dL   Vitamin B12    Collection Time: 03/24/25  5:31 AM   Result Value Ref Range    Vitamin B12 182 (L) 232 - 1,245 pg/mL   Folate    Collection Time: 03/24/25  5:31 AM   Result Value Ref Range    Folic Acid 7.7 4.6 - 34.8 ng/mL   Glucose by meter    Collection Time: 03/24/25  7:22 AM   Result Value Ref Range    GLUCOSE BY METER POCT 172 (H) 70 - 99 mg/dL   Glucose by meter    Collection Time: 03/24/25 12:01 PM   Result Value Ref Range    GLUCOSE BY METER POCT 250 (H) 70 - 99 mg/dL   [place] [facility]; MMAS; Methylmalonic Acid, Quantitative, Serum (Laboratory Miscellaneous Order)    Collection Time: 03/24/25 12:13 PM   Result Value Ref Range    Specimen Status       Specimen received. Reordered and sent to performing laboratory. Report to follow upon completion.    Performing Laboratory [place] [facility]     Test Name Methylmalonic Acid, Quantitative, Serum     Test Code MMAS    Glucose by meter    Collection Time: 03/24/25  4:56 PM   Result Value Ref Range    GLUCOSE BY METER POCT 170 (H) 70 - 99 mg/dL   Glucose by meter    Collection Time: 03/24/25   8:55 PM   Result Value Ref Range    GLUCOSE BY METER POCT 191 (H) 70 - 99 mg/dL   Glucose by meter    Collection Time: 03/25/25  2:21 AM   Result Value Ref Range    GLUCOSE BY METER POCT 163 (H) 70 - 99 mg/dL   UA with Microscopic reflex to Culture    Collection Time: 03/25/25  4:31 AM    Specimen: Urine, Clean Catch   Result Value Ref Range    Color Urine Yellow Colorless, Straw, Light Yellow, Yellow    Appearance Urine Clear Clear    Glucose Urine Negative Negative mg/dL    Bilirubin Urine Negative Negative    Ketones Urine Negative Negative mg/dL    Specific Gravity Urine 1.023 1.001 - 1.030    Blood Urine Negative Negative    pH Urine 5.5 5.0 - 7.0    Protein Albumin Urine Negative Negative mg/dL    Urobilinogen Urine 2.0 (A) Normal mg/dL    Nitrite Urine Negative Negative    Leukocyte Esterase Urine Negative Negative    RBC Urine 0 <=2 /HPF    WBC Urine <1 <=5 /HPF   Basic metabolic panel    Collection Time: 03/25/25  4:38 AM   Result Value Ref Range    Sodium 140 135 - 145 mmol/L    Potassium 4.2 3.4 - 5.3 mmol/L    Chloride 105 98 - 107 mmol/L    Carbon Dioxide (CO2) 25 22 - 29 mmol/L    Anion Gap 10 7 - 15 mmol/L    Urea Nitrogen 49.6 (H) 8.0 - 23.0 mg/dL    Creatinine 1.94 (H) 0.67 - 1.17 mg/dL    GFR Estimate 35 (L) >60 mL/min/1.73m2    Calcium 8.7 (L) 8.8 - 10.4 mg/dL    Glucose 158 (H) 70 - 99 mg/dL   Magnesium    Collection Time: 03/25/25  4:38 AM   Result Value Ref Range    Magnesium 2.3 1.7 - 2.3 mg/dL   Phosphorus    Collection Time: 03/25/25  4:38 AM   Result Value Ref Range    Phosphorus 3.8 2.5 - 4.5 mg/dL   Ionized Calcium    Collection Time: 03/25/25  4:38 AM   Result Value Ref Range    Calcium Ionized Whole Blood 4.6 4.4 - 5.2 mg/dL   Glucose by meter    Collection Time: 03/25/25  7:34 AM   Result Value Ref Range    GLUCOSE BY METER POCT 181 (H) 70 - 99 mg/dL   Echocardiogram Limited    Collection Time: 03/25/25  8:43 AM   Result Value Ref Range    LVEF  35-40% (moderately reduced)    Glucose  by meter    Collection Time: 03/25/25 12:26 PM   Result Value Ref Range    GLUCOSE BY METER POCT 221 (H) 70 - 99 mg/dL   Glucose by meter    Collection Time: 03/25/25  5:22 PM   Result Value Ref Range    GLUCOSE BY METER POCT 146 (H) 70 - 99 mg/dL   Glucose by meter    Collection Time: 03/25/25 10:12 PM   Result Value Ref Range    GLUCOSE BY METER POCT 123 (H) 70 - 99 mg/dL   Basic metabolic panel    Collection Time: 03/26/25  5:05 AM   Result Value Ref Range    Sodium 138 135 - 145 mmol/L    Potassium 3.9 3.4 - 5.3 mmol/L    Chloride 105 98 - 107 mmol/L    Carbon Dioxide (CO2) 27 22 - 29 mmol/L    Anion Gap 6 (L) 7 - 15 mmol/L    Urea Nitrogen 38.8 (H) 8.0 - 23.0 mg/dL    Creatinine 1.84 (H) 0.67 - 1.17 mg/dL    GFR Estimate 38 (L) >60 mL/min/1.73m2    Calcium 8.6 (L) 8.8 - 10.4 mg/dL    Glucose 106 (H) 70 - 99 mg/dL   Magnesium    Collection Time: 03/26/25  5:05 AM   Result Value Ref Range    Magnesium 2.3 1.7 - 2.3 mg/dL   Phosphorus    Collection Time: 03/26/25  5:05 AM   Result Value Ref Range    Phosphorus 3.4 2.5 - 4.5 mg/dL   Glucose by meter    Collection Time: 03/26/25  8:26 AM   Result Value Ref Range    GLUCOSE BY METER POCT 120 (H) 70 - 99 mg/dL   Glucose by meter    Collection Time: 03/26/25  1:21 PM   Result Value Ref Range    GLUCOSE BY METER POCT 165 (H) 70 - 99 mg/dL   Glucose by meter    Collection Time: 03/26/25  1:31 PM   Result Value Ref Range    GLUCOSE BY METER POCT 154 (H) 70 - 99 mg/dL   Glucose by meter    Collection Time: 03/26/25  4:14 PM   Result Value Ref Range    GLUCOSE BY METER POCT 127 (H) 70 - 99 mg/dL        MEDICATIONS:  MED REC REQUIRED  Post Medication Reconciliation Status: discharge medications reconciled, continue medications without change          Review of your medicines            Accurate as of March 27, 2025 10:25 AM. If you have any questions, ask your nurse or doctor.                CONTINUE these medicines which may have CHANGED, or have new prescriptions. If we  are uncertain of the size of tablets/capsules you have at home, strength may be listed as something that might have changed.        Dose / Directions   acetaminophen 500 MG tablet  Commonly known as: TYLENOL  This may have changed: Another medication with the same name was removed. Continue taking this medication, and follow the directions you see here.  Changed by: Deanna Ryder      Dose: 500-1,000 mg  Take 500-1,000 mg by mouth 3 times daily. While awake  Refills: 0            CONTINUE these medicines which have NOT CHANGED        Dose / Directions   aspirin 81 MG chewable tablet  Commonly known as: ASA  Indication: Coronary Bypass Surgery  Used for: Coronary artery disease of native artery of native heart with stable angina pectoris, S/P CABG (coronary artery bypass graft), Type 2 diabetes mellitus with ketoacidosis without coma, with long-term current use of insulin (H), Stage 3b chronic kidney disease (H), Coronary artery disease involving native coronary artery of native heart, unspecified whether angina present, Secondary cardiomyopathy (H)      Dose: 81 mg  Take 1 tablet (81 mg) by mouth daily. Continue for one year postop, then when stopping plavix, increase aspirin to 162 mg daily indefinitely.  Refills: 0     carvedilol 12.5 MG tablet  Commonly known as: COREG  Indication: Cardiac Failure, High Blood Pressure      Dose: 12.5 mg  Take 12.5 mg by mouth 2 times daily  Refills: 0     clopidogrel 75 MG tablet  Commonly known as: PLAVIX  Indication: Coronary Bypass Surgery  Used for: Coronary artery disease of native artery of native heart with stable angina pectoris, S/P CABG (coronary artery bypass graft), Type 2 diabetes mellitus with ketoacidosis without coma, with long-term current use of insulin (H), Stage 3b chronic kidney disease (H), Coronary artery disease involving native coronary artery of native heart, unspecified whether angina present, Secondary cardiomyopathy (H)      Dose: 75 mg  Take 1 tablet  (75 mg) by mouth daily. Continue for one year postop, then stop and increase aspirin to 162 mg daily indefinitely.  Refills: 0     cyanocobalamin 500 MCG Subl sublingual tablet  Commonly known as: VITAMIN B-12  Indication: Inadequate Vitamin B12  Used for: Coronary artery disease of native artery of native heart with stable angina pectoris, Delirium, MCI (mild cognitive impairment), Type 2 diabetes mellitus with ketoacidosis without coma, with long-term current use of insulin (H), Stage 3b chronic kidney disease (H), Coronary artery disease involving native coronary artery of native heart, unspecified whether angina present      Dose: 500 mcg  Place 1 tablet (500 mcg) under the tongue daily.  Refills: 0     furosemide 20 MG tablet  Commonly known as: LASIX  Indication: Edema, Cardiac Failure      Dose: 40 mg  Take 40 mg by mouth daily  Refills: 0     * insulin aspart 100 UNIT/ML pen  Commonly known as: NovoLOG PEN  Indication: Type 2 Diabetes  Used for: Coronary artery disease of native artery of native heart with stable angina pectoris, Type 2 diabetes mellitus with ketoacidosis without coma, with long-term current use of insulin (H), Stage 3b chronic kidney disease (H), Coronary artery disease involving native coronary artery of native heart, unspecified whether angina present      Dose: 1-10 Units  Inject 1-10 Units subcutaneously 3 times daily (before meals). Correction Scale - HIGH INSULIN RESISTANCE DOSING   Do Not give Correction Insulin if Pre-Meal BG less than 140.   For Pre-Meal  - 164 give 1 unit.   For Pre-Meal  - 189 give 2 units.   For Pre-Meal  - 214 give 3 units.   For Pre-Meal  - 239 give 4 units.   For Pre-Meal  - 264 give 5 units.   For Pre-Meal  - 289 give 6 units.   For Pre-Meal  - 314 give 7 units.   For Pre-Meal  - 339 give 8 units.   For Pre-Meal  - 364 give 9 units.  For Pre-Meal BG greater than or equal to 365 give 10 units  To be given  with prandial insulin, and based on pre-meal blood glucose. Administering insulin within 5 minutes of the start of the meal is ideal. Administer insulin no more than 30 minutes after the start of the meal, unless directed otherwise by provider.   Notify provider if glucose greater than or equal to 350 mg/dL after administration of correction dose.  Refills: 0     * insulin aspart 100 UNIT/ML pen  Commonly known as: NovoLOG PEN  Indication: Type 2 Diabetes  Used for: Coronary artery disease of native artery of native heart with stable angina pectoris, Type 2 diabetes mellitus with ketoacidosis without coma, with long-term current use of insulin (H), Stage 3b chronic kidney disease (H), Coronary artery disease involving native coronary artery of native heart, unspecified whether angina present      Dose: 1-7 Units  Inject 1-7 Units subcutaneously at bedtime. HIGH INSULIN RESISTANCE DOSING   Do Not give Bedtime Correction Insulin if BG less than 200.   For  - 224 give 1 units.   For  - 249 give 2 units.   For  - 274 give 3 units.   For  - 299 give 4 units.   For  - 324 give 5 units.   For  - 349 give 6 units.   For BG greater than or equal to 350 give 7 units.   Notify provider if glucose greater than or equal to 350 mg/dL after administration of correction dose.  Refills: 0     Lantus SoloStar 100 UNIT/ML soln  Indication: Type 2 Diabetes  Used for: Coronary artery disease of native artery of native heart with stable angina pectoris, Type 2 diabetes mellitus with ketoacidosis without coma, with long-term current use of insulin (H), Stage 3b chronic kidney disease (H), Coronary artery disease involving native coronary artery of native heart, unspecified whether angina present  Generic drug: insulin glargine      Dose: 35 Units  Inject 35 Units subcutaneously every morning.  Refills: 0     Lidocaine 4 % Patch  Commonly known as: LIDOCARE  Indication: Pain Following an Operation  Used  for: Coronary artery disease of native artery of native heart with stable angina pectoris, S/P CABG (coronary artery bypass graft), Type 2 diabetes mellitus with ketoacidosis without coma, with long-term current use of insulin (H), Stage 3b chronic kidney disease (H), Coronary artery disease involving native coronary artery of native heart, unspecified whether angina present      Dose: 1-2 patch  Place 1-2 patches over 12 hours onto the skin every 24 hours. To prevent lidocaine toxicity, patient should be patch free for 12 hrs daily.  Refills: 0     losartan 25 MG tablet  Commonly known as: COZAAR  Indication: High Blood Pressure, Heart Failure  Used for: Coronary artery disease of native artery of native heart with stable angina pectoris, S/P CABG (coronary artery bypass graft), Type 2 diabetes mellitus with ketoacidosis without coma, with long-term current use of insulin (H), Stage 3b chronic kidney disease (H), Coronary artery disease involving native coronary artery of native heart, unspecified whether angina present, Secondary cardiomyopathy (H)      Dose: 12.5 mg  Take 0.5 tablets (12.5 mg) by mouth daily.  Refills: 0     polyethylene glycol 17 GM/Dose powder  Commonly known as: MIRALAX  Indication: Constipation  Used for: Coronary artery disease of native artery of native heart with stable angina pectoris, S/P CABG (coronary artery bypass graft), Type 2 diabetes mellitus with ketoacidosis without coma, with long-term current use of insulin (H), Stage 3b chronic kidney disease (H), Coronary artery disease involving native coronary artery of native heart, unspecified whether angina present      Dose: 17 g  Take 17 g by mouth daily.  Refills: 0     senna-docusate 8.6-50 MG tablet  Commonly known as: SENOKOT-S/PERICOLACE      Dose: 1 tablet  Take 1 tablet by mouth 2 times daily.  Refills: 0     simvastatin 40 MG tablet  Commonly known as: ZOCOR  Indication: High Cholesterol, Lower Risk of Heart Event or Stroke       Dose: 40 mg  Take 40 mg by mouth daily  Refills: 0     traZODone 50 MG tablet  Commonly known as: DESYREL      Dose: 50 mg  Take 50 mg by mouth at bedtime.  Refills: 0           * This list has 2 medication(s) that are the same as other medications prescribed for you. Read the directions carefully, and ask your doctor or other care provider to review them with you.                STOP taking      melatonin 5 MG tablet  Stopped by: Denana Ryder                 ASSESSMENT/PLAN  Encounter Diagnoses   Name Primary?    Pain management Yes    Physical deconditioning     S/P CABG x 4     Insomnia, unspecified type      Pain management lidocaine patch, extra strength Tylenol 3 times daily while awake    Heart health on baby aspirin 81 mg daily     hypertension continue carvedilol 12.5 mg twice daily, losartan 12.5 mg daily     CAD on Plavix 75 mg daily    Type 2 diabetes aspart sliding scales, Lantus 35 units subcu daily    Insomnia melatonin DC'd and started on trazodone 50 mg at bedtime    HDL continue simvastatin    Physical deconditioning PT OT    Status post CABG x 4 follow-up with cardiology, pain management monitor incisions for signs and symptoms of infection    CHF Daily weights continue Lasix 40 mg daily    Lower extremity edema- Compression on during the day/off at night     Electronically signed by: Deanna Ryder, CNP

## 2025-03-27 NOTE — PLAN OF CARE
Cardiac Rehab Discharge Summary    Reason for therapy discharge:    Discharged to transitional care facility.    Progress towards therapy goal(s). See goals on Care Plan in Epic electronic health record for goal details.  Goals partially met.  Barriers to achieving goals:   discharge from facility.    Therapy recommendation(s):    Continued therapy is recommended.  Rationale/Recommendations:  discharge to TCU.    DA Clemens, OTR/L, 3/27/2025, 7:24 AM

## 2025-03-30 ENCOUNTER — LAB REQUISITION (OUTPATIENT)
Dept: LAB | Facility: CLINIC | Age: 75
End: 2025-03-30
Payer: COMMERCIAL

## 2025-03-30 DIAGNOSIS — I50.33 ACUTE ON CHRONIC DIASTOLIC (CONGESTIVE) HEART FAILURE (H): ICD-10-CM

## 2025-03-30 DIAGNOSIS — Z48.812 ENCOUNTER FOR SURGICAL AFTERCARE FOLLOWING SURGERY ON THE CIRCULATORY SYSTEM: ICD-10-CM

## 2025-03-30 DIAGNOSIS — Z51.81 ENCOUNTER FOR THERAPEUTIC DRUG LEVEL MONITORING: ICD-10-CM

## 2025-03-31 LAB
ANION GAP SERPL CALCULATED.3IONS-SCNC: 13 MMOL/L (ref 7–15)
BUN SERPL-MCNC: 24.1 MG/DL (ref 8–23)
CALCIUM SERPL-MCNC: 9.2 MG/DL (ref 8.8–10.4)
CHLORIDE SERPL-SCNC: 104 MMOL/L (ref 98–107)
CREAT SERPL-MCNC: 1.6 MG/DL (ref 0.67–1.17)
EGFRCR SERPLBLD CKD-EPI 2021: 45 ML/MIN/1.73M2
ERYTHROCYTE [DISTWIDTH] IN BLOOD BY AUTOMATED COUNT: 14.9 % (ref 10–15)
GLUCOSE SERPL-MCNC: 128 MG/DL (ref 70–99)
HCO3 SERPL-SCNC: 23 MMOL/L (ref 22–29)
HCT VFR BLD AUTO: 29.7 % (ref 40–53)
HGB BLD-MCNC: 9.5 G/DL (ref 13.3–17.7)
MCH RBC QN AUTO: 31.6 PG (ref 26.5–33)
MCHC RBC AUTO-ENTMCNC: 32 G/DL (ref 31.5–36.5)
MCV RBC AUTO: 99 FL (ref 78–100)
PLATELET # BLD AUTO: 309 10E3/UL (ref 150–450)
POTASSIUM SERPL-SCNC: 4.1 MMOL/L (ref 3.4–5.3)
RBC # BLD AUTO: 3.01 10E6/UL (ref 4.4–5.9)
SODIUM SERPL-SCNC: 140 MMOL/L (ref 135–145)
VIT B12 SERPL-MCNC: 673 PG/ML (ref 232–1245)
WBC # BLD AUTO: 10.4 10E3/UL (ref 4–11)

## 2025-03-31 PROCEDURE — 85027 COMPLETE CBC AUTOMATED: CPT | Mod: ORL | Performed by: NURSE PRACTITIONER

## 2025-03-31 PROCEDURE — 80048 BASIC METABOLIC PNL TOTAL CA: CPT | Mod: ORL | Performed by: NURSE PRACTITIONER

## 2025-03-31 PROCEDURE — 36415 COLL VENOUS BLD VENIPUNCTURE: CPT | Mod: ORL | Performed by: NURSE PRACTITIONER

## 2025-03-31 PROCEDURE — 82607 VITAMIN B-12: CPT | Mod: ORL | Performed by: NURSE PRACTITIONER

## 2025-03-31 PROCEDURE — P9604 ONE-WAY ALLOW PRORATED TRIP: HCPCS | Mod: ORL | Performed by: NURSE PRACTITIONER

## 2025-04-01 ENCOUNTER — TRANSITIONAL CARE UNIT VISIT (OUTPATIENT)
Dept: GERIATRICS | Facility: CLINIC | Age: 75
End: 2025-04-01
Payer: COMMERCIAL

## 2025-04-01 ENCOUNTER — TELEPHONE (OUTPATIENT)
Dept: CARDIOLOGY | Facility: CLINIC | Age: 75
End: 2025-04-01

## 2025-04-01 VITALS
RESPIRATION RATE: 18 BRPM | SYSTOLIC BLOOD PRESSURE: 132 MMHG | BODY MASS INDEX: 28.71 KG/M2 | DIASTOLIC BLOOD PRESSURE: 62 MMHG | HEIGHT: 69 IN | WEIGHT: 193.8 LBS | HEART RATE: 72 BPM | TEMPERATURE: 97.8 F | OXYGEN SATURATION: 96 %

## 2025-04-01 DIAGNOSIS — R53.81 PHYSICAL DECONDITIONING: ICD-10-CM

## 2025-04-01 DIAGNOSIS — R52 PAIN MANAGEMENT: Primary | ICD-10-CM

## 2025-04-01 DIAGNOSIS — Z95.1 S/P CABG X 4: ICD-10-CM

## 2025-04-01 PROBLEM — I65.29 CAROTID ARTERY STENOSIS: Status: ACTIVE | Noted: 2020-10-13

## 2025-04-01 PROBLEM — I25.5 ISCHEMIC CARDIOMYOPATHY: Status: ACTIVE | Noted: 2019-10-07

## 2025-04-01 PROBLEM — Z79.4 CONTROLLED TYPE 2 DIABETES MELLITUS, WITH LONG-TERM CURRENT USE OF INSULIN (H): Status: ACTIVE | Noted: 2018-09-10

## 2025-04-01 PROBLEM — E11.9 CONTROLLED TYPE 2 DIABETES MELLITUS, WITH LONG-TERM CURRENT USE OF INSULIN (H): Status: ACTIVE | Noted: 2018-09-10

## 2025-04-01 PROBLEM — E11.42 POLYNEUROPATHY DUE TO TYPE 2 DIABETES MELLITUS (H): Status: ACTIVE | Noted: 2018-09-10

## 2025-04-01 PROBLEM — Z86.73 HISTORY OF TIA (TRANSIENT ISCHEMIC ATTACK): Status: ACTIVE | Noted: 2018-09-10

## 2025-04-01 PROBLEM — R06.02 SHORTNESS OF BREATH: Status: ACTIVE | Noted: 2018-09-10

## 2025-04-01 PROBLEM — R60.0 BILATERAL LOWER EXTREMITY EDEMA: Status: ACTIVE | Noted: 2018-09-10

## 2025-04-01 PROBLEM — R94.31 ABNORMAL EKG: Status: ACTIVE | Noted: 2018-09-10

## 2025-04-01 PROBLEM — I10 ESSENTIAL HYPERTENSION: Status: ACTIVE | Noted: 2018-09-10

## 2025-04-01 PROBLEM — Z82.49 FAMILY HISTORY OF PREMATURE CAD: Status: ACTIVE | Noted: 2018-09-10

## 2025-04-01 PROBLEM — E78.2 MIXED HYPERLIPIDEMIA: Status: ACTIVE | Noted: 2018-09-10

## 2025-04-01 PROCEDURE — 99309 SBSQ NF CARE MODERATE MDM 30: CPT | Performed by: NURSE PRACTITIONER

## 2025-04-01 NOTE — LETTER
" 4/1/2025      Eric Cordoba  [address]  [facility] [address]        [facility] GERIATRIC SERVICES  Chief Complaint   [facility]     [facility]     [place] Medical Record Number:  [id number]  Place of Service where encounter took place:  [facility] ([facility]) [[id number]]  Code Status:Full Code    HISTORY:      HPI:  Eric Cordoba  is 74 year old (1950) undergoing physical and occupational therapy.  He is with past medical history type 2 diabetes, hypertension, history of CVA who is s/p CABG x4, LLE EVH     Today is seen to review vital signs, labs, routine visit.  He was seen at the bedside working with OT on medication task.  He denied shortness of breath cough congestion constipation or diarrhea.  Per therapy he has not been participating well, writer did talk with him regarding the importance of his therapy and being able to discharge quicker if he participates.  Therapy reported he needs to do stairs, he agreed he will participate more.  Labs reviewed from 3/31/2025, hemoglobin 9.5 B12 within normal limits at 673 creatinine 1.60 improving.  His weight is down 10.1 pounds over the last 5 days he reports he has not been eating much except for \"junk\".  His midline incision and chest tube sites are all clean dry and intact open to air and scabbed over.  Also left medial leg incisions clean dry and intact and scabbed.  He normally wears a shan monitor however reported he took it off for surgery so he will get fingersticks ACHS.  Fingersticks have been stable with a couple isolated elevations.    He lives independently however has a roommate that lives in the basement.      ALLERGIES:Lisinopril and Propoxyphene    PAST MEDICAL HISTORY:   Past Medical History:   Diagnosis Date     Diabetes mellitus, type 2 (H)      History of CVA (cerebrovascular accident)      Hypertension      Nutritional and metabolic cardiomyopathies (H)        PAST SURGICAL HISTORY:   has a past surgical history that includes " Coronary Angiogram (N/A, 2/24/2025); Left Heart Catheterization (N/A, 2/24/2025); Intra aortic Balloon Pump Insertion (N/A, 3/17/2025); PICC/Midline Placement (3/20/2025); Coronary Artery Bypass Graft, With Endoscopic Vessel Procurement (N/A, 3/17/2025); and Transesophageal echocardiogram intraoperative (3/17/2025).    FAMILY HISTORY: family history is not on file.    SOCIAL HISTORY:  reports that he has quit smoking. His smoking use included cigarettes. He has never used smokeless tobacco. He reports that he does not currently use alcohol.    ROS:  Constitutional: Negative for activity change, appetite change, fatigue and fever.   HENT: Negative for congestion.    Respiratory: Negative for cough, shortness of breath and wheezing.    Cardiovascular: Negative for chest pain and positive leg swelling.   Gastrointestinal: Negative for abdominal distention, abdominal pain, constipation, diarrhea and nausea.   Genitourinary: Negative for dysuria.   Musculoskeletal: Negative for arthralgia. Negative for back pain.  Missing the tip of his left index finger reported it was from her past printing injury  Skin: Negative for color change and wound.   Neurological: Negative for dizziness.   Psychiatric/Behavioral: Negative for agitation, behavioral problems and confusion.     Physical Exam:  Constitutional:       Appearance: Patient is well-developed.   HENT:      Head: Normocephalic.   Eyes:      Conjunctiva/sclera: Conjunctivae normal.   Neck:      Musculoskeletal: Normal range of motion.   Cardiovascular:      Rate and Rhythm: Normal rate and regular rhythm.      Heart sounds: Normal heart sounds. No murmur.   Pulmonary:      Effort: No respiratory distress.      Breath sounds: Normal breath sounds. No wheezing or rales.   Abdominal:      General: Bowel sounds are normal. There is no distension.      Palpations: Abdomen is soft.      Tenderness: There is no abdominal tenderness.   Musculoskeletal:       Normal range of  "motion.     Skin:General:        Skin is warm.  Midline surgical incision along with chest tube sites clean dry and intact open to air.  Surgical incisions left medial leg clean dry and intact open to air  Neurological:         Mental Status: Patient is alert and oriented to person, place, and time.   Psychiatric:         Behavior: Behavior normal.     Vitals:/62   Pulse 72   Temp 97.8  F (36.6  C)   Resp 18   Ht 1.753 m (5' 9\")   Wt 87.9 kg (193 lb 12.8 oz)   SpO2 96%   BMI 28.62 kg/m   and Body mass index is 28.62 kg/m .    Lab/Diagnostic data:   Recent Results (from the past 240 hours)   Glucose by meter    Collection Time: 03/22/25  8:50 AM   Result Value Ref Range    GLUCOSE BY METER POCT 166 (H) 70 - 99 mg/dL   Ammonia    Collection Time: 03/22/25  9:18 AM   Result Value Ref Range    Ammonia 14 (L) 16 - 60 umol/L   CBC with platelets    Collection Time: 03/22/25  9:49 AM   Result Value Ref Range    WBC Count 10.2 4.0 - 11.0 10e3/uL    RBC Count 3.21 (L) 4.40 - 5.90 10e6/uL    Hemoglobin 10.2 (L) 13.3 - 17.7 g/dL    Hematocrit 30.6 (L) 40.0 - 53.0 %    MCV 95 78 - 100 fL    MCH 31.8 26.5 - 33.0 pg    MCHC 33.3 31.5 - 36.5 g/dL    RDW 13.1 10.0 - 15.0 %    Platelet Count 147 (L) 150 - 450 10e3/uL   UA with Microscopic reflex to Culture    Collection Time: 03/22/25 12:08 PM    Specimen: Urine, Clean Catch   Result Value Ref Range    Color Urine Colorless Colorless, Straw, Light Yellow, Yellow    Appearance Urine Clear Clear    Glucose Urine Negative Negative mg/dL    Bilirubin Urine Negative Negative    Ketones Urine Negative Negative mg/dL    Specific Gravity Urine 1.010 1.001 - 1.030    Blood Urine Negative Negative    pH Urine 5.0 5.0 - 7.0    Protein Albumin Urine Negative Negative mg/dL    Urobilinogen Urine <2.0 <2.0 mg/dL    Nitrite Urine Negative Negative    Leukocyte Esterase Urine Negative Negative    RBC Urine 1 <=2 /HPF    WBC Urine 1 <=5 /HPF    Hyaline Casts Urine 1 <=2 /LPF   Glucose " by meter    Collection Time: 03/22/25 12:42 PM   Result Value Ref Range    GLUCOSE BY METER POCT 165 (H) 70 - 99 mg/dL   Glucose by meter    Collection Time: 03/22/25  6:57 PM   Result Value Ref Range    GLUCOSE BY METER POCT 186 (H) 70 - 99 mg/dL   Glucose by meter    Collection Time: 03/22/25  8:28 PM   Result Value Ref Range    GLUCOSE BY METER POCT 171 (H) 70 - 99 mg/dL   Ionized Calcium    Collection Time: 03/23/25  4:17 AM   Result Value Ref Range    Calcium Ionized Whole Blood 4.8 4.4 - 5.2 mg/dL   Basic metabolic panel    Collection Time: 03/23/25  4:17 AM   Result Value Ref Range    Sodium 143 135 - 145 mmol/L    Potassium 3.8 3.4 - 5.3 mmol/L    Chloride 109 (H) 98 - 107 mmol/L    Carbon Dioxide (CO2) 27 22 - 29 mmol/L    Anion Gap 7 7 - 15 mmol/L    Urea Nitrogen 60.1 (H) 8.0 - 23.0 mg/dL    Creatinine 2.17 (H) 0.67 - 1.17 mg/dL    GFR Estimate 31 (L) >60 mL/min/1.73m2    Calcium 8.3 (L) 8.8 - 10.4 mg/dL    Glucose 181 (H) 70 - 99 mg/dL   Magnesium    Collection Time: 03/23/25  4:17 AM   Result Value Ref Range    Magnesium 2.2 1.7 - 2.3 mg/dL   Phosphorus    Collection Time: 03/23/25  4:17 AM   Result Value Ref Range    Phosphorus 3.7 2.5 - 4.5 mg/dL   CBC with platelets    Collection Time: 03/23/25  4:17 AM   Result Value Ref Range    WBC Count 7.5 4.0 - 11.0 10e3/uL    RBC Count 2.63 (L) 4.40 - 5.90 10e6/uL    Hemoglobin 8.6 (L) 13.3 - 17.7 g/dL    Hematocrit 24.7 (L) 40.0 - 53.0 %    MCV 94 78 - 100 fL    MCH 32.7 26.5 - 33.0 pg    MCHC 34.8 31.5 - 36.5 g/dL    RDW 13.0 10.0 - 15.0 %    Platelet Count 116 (L) 150 - 450 10e3/uL   TSH with free T4 reflex    Collection Time: 03/23/25  4:17 AM   Result Value Ref Range    TSH 2.66 0.30 - 4.20 uIU/mL   Glucose by meter    Collection Time: 03/23/25  7:50 AM   Result Value Ref Range    GLUCOSE BY METER POCT 186 (H) 70 - 99 mg/dL   Glucose by meter    Collection Time: 03/23/25 12:01 PM   Result Value Ref Range    GLUCOSE BY METER POCT 231 (H) 70 - 99 mg/dL    Glucose by meter    Collection Time: 03/23/25  4:28 PM   Result Value Ref Range    GLUCOSE BY METER POCT 243 (H) 70 - 99 mg/dL   Glucose by meter    Collection Time: 03/23/25  9:41 PM   Result Value Ref Range    GLUCOSE BY METER POCT 234 (H) 70 - 99 mg/dL   Platelet count    Collection Time: 03/24/25  5:31 AM   Result Value Ref Range    Platelet Count 118 (L) 150 - 450 10e3/uL   Basic metabolic panel    Collection Time: 03/24/25  5:31 AM   Result Value Ref Range    Sodium 143 135 - 145 mmol/L    Potassium 4.1 3.4 - 5.3 mmol/L    Chloride 108 (H) 98 - 107 mmol/L    Carbon Dioxide (CO2) 25 22 - 29 mmol/L    Anion Gap 10 7 - 15 mmol/L    Urea Nitrogen 53.1 (H) 8.0 - 23.0 mg/dL    Creatinine 2.00 (H) 0.67 - 1.17 mg/dL    GFR Estimate 34 (L) >60 mL/min/1.73m2    Calcium 8.6 (L) 8.8 - 10.4 mg/dL    Glucose 202 (H) 70 - 99 mg/dL   Magnesium    Collection Time: 03/24/25  5:31 AM   Result Value Ref Range    Magnesium 2.1 1.7 - 2.3 mg/dL   Phosphorus    Collection Time: 03/24/25  5:31 AM   Result Value Ref Range    Phosphorus 3.9 2.5 - 4.5 mg/dL   Vitamin B12    Collection Time: 03/24/25  5:31 AM   Result Value Ref Range    Vitamin B12 182 (L) 232 - 1,245 pg/mL   Folate    Collection Time: 03/24/25  5:31 AM   Result Value Ref Range    Folic Acid 7.7 4.6 - 34.8 ng/mL   Glucose by meter    Collection Time: 03/24/25  7:22 AM   Result Value Ref Range    GLUCOSE BY METER POCT 172 (H) 70 - 99 mg/dL   Glucose by meter    Collection Time: 03/24/25 12:01 PM   Result Value Ref Range    GLUCOSE BY METER POCT 250 (H) 70 - 99 mg/dL   [facility] [employer]; MMAS; Methylmalonic Acid, Quantitative, Serum (Laboratory Miscellaneous Order)    Collection Time: 03/24/25 12:13 PM   Result Value Ref Range    Specimen Status       Specimen received. Reordered and sent to performing laboratory. Report to follow upon completion.    Performing Laboratory [facility]     Test Name Methylmalonic Acid, Quantitative, Serum     Test Code  MMAS    MMAS; Methylmalonic Acid, Quantitative, Serum [facility] Miscellaneous Test    Collection Time: 03/24/25 12:13 PM   Result Value Ref Range    [facility] Result SEE NOTE (A)    Glucose by meter    Collection Time: 03/24/25  4:56 PM   Result Value Ref Range    GLUCOSE BY METER POCT 170 (H) 70 - 99 mg/dL   Glucose by meter    Collection Time: 03/24/25  8:55 PM   Result Value Ref Range    GLUCOSE BY METER POCT 191 (H) 70 - 99 mg/dL   Glucose by meter    Collection Time: 03/25/25  2:21 AM   Result Value Ref Range    GLUCOSE BY METER POCT 163 (H) 70 - 99 mg/dL   UA with Microscopic reflex to Culture    Collection Time: 03/25/25  4:31 AM    Specimen: Urine, Clean Catch   Result Value Ref Range    Color Urine Yellow Colorless, Straw, Light Yellow, Yellow    Appearance Urine Clear Clear    Glucose Urine Negative Negative mg/dL    Bilirubin Urine Negative Negative    Ketones Urine Negative Negative mg/dL    Specific Gravity Urine 1.023 1.001 - 1.030    Blood Urine Negative Negative    pH Urine 5.5 5.0 - 7.0    Protein Albumin Urine Negative Negative mg/dL    Urobilinogen Urine 2.0 (A) Normal mg/dL    Nitrite Urine Negative Negative    Leukocyte Esterase Urine Negative Negative    RBC Urine 0 <=2 /HPF    WBC Urine <1 <=5 /HPF   Basic metabolic panel    Collection Time: 03/25/25  4:38 AM   Result Value Ref Range    Sodium 140 135 - 145 mmol/L    Potassium 4.2 3.4 - 5.3 mmol/L    Chloride 105 98 - 107 mmol/L    Carbon Dioxide (CO2) 25 22 - 29 mmol/L    Anion Gap 10 7 - 15 mmol/L    Urea Nitrogen 49.6 (H) 8.0 - 23.0 mg/dL    Creatinine 1.94 (H) 0.67 - 1.17 mg/dL    GFR Estimate 35 (L) >60 mL/min/1.73m2    Calcium 8.7 (L) 8.8 - 10.4 mg/dL    Glucose 158 (H) 70 - 99 mg/dL   Magnesium    Collection Time: 03/25/25  4:38 AM   Result Value Ref Range    Magnesium 2.3 1.7 - 2.3 mg/dL   Phosphorus    Collection Time: 03/25/25  4:38 AM   Result Value Ref Range    Phosphorus 3.8 2.5 - 4.5 mg/dL   Ionized Calcium    Collection Time: 03/25/25   4:38 AM   Result Value Ref Range    Calcium Ionized Whole Blood 4.6 4.4 - 5.2 mg/dL   Glucose by meter    Collection Time: 03/25/25  7:34 AM   Result Value Ref Range    GLUCOSE BY METER POCT 181 (H) 70 - 99 mg/dL   Echocardiogram Limited    Collection Time: 03/25/25  8:43 AM   Result Value Ref Range    LVEF  35-40% (moderately reduced)    Glucose by meter    Collection Time: 03/25/25 12:26 PM   Result Value Ref Range    GLUCOSE BY METER POCT 221 (H) 70 - 99 mg/dL   Glucose by meter    Collection Time: 03/25/25  5:22 PM   Result Value Ref Range    GLUCOSE BY METER POCT 146 (H) 70 - 99 mg/dL   Glucose by meter    Collection Time: 03/25/25 10:12 PM   Result Value Ref Range    GLUCOSE BY METER POCT 123 (H) 70 - 99 mg/dL   Basic metabolic panel    Collection Time: 03/26/25  5:05 AM   Result Value Ref Range    Sodium 138 135 - 145 mmol/L    Potassium 3.9 3.4 - 5.3 mmol/L    Chloride 105 98 - 107 mmol/L    Carbon Dioxide (CO2) 27 22 - 29 mmol/L    Anion Gap 6 (L) 7 - 15 mmol/L    Urea Nitrogen 38.8 (H) 8.0 - 23.0 mg/dL    Creatinine 1.84 (H) 0.67 - 1.17 mg/dL    GFR Estimate 38 (L) >60 mL/min/1.73m2    Calcium 8.6 (L) 8.8 - 10.4 mg/dL    Glucose 106 (H) 70 - 99 mg/dL   Magnesium    Collection Time: 03/26/25  5:05 AM   Result Value Ref Range    Magnesium 2.3 1.7 - 2.3 mg/dL   Phosphorus    Collection Time: 03/26/25  5:05 AM   Result Value Ref Range    Phosphorus 3.4 2.5 - 4.5 mg/dL   Glucose by meter    Collection Time: 03/26/25  8:26 AM   Result Value Ref Range    GLUCOSE BY METER POCT 120 (H) 70 - 99 mg/dL   Glucose by meter    Collection Time: 03/26/25  1:21 PM   Result Value Ref Range    GLUCOSE BY METER POCT 165 (H) 70 - 99 mg/dL   Glucose by meter    Collection Time: 03/26/25  1:31 PM   Result Value Ref Range    GLUCOSE BY METER POCT 154 (H) 70 - 99 mg/dL   Glucose by meter    Collection Time: 03/26/25  4:14 PM   Result Value Ref Range    GLUCOSE BY METER POCT 127 (H) 70 - 99 mg/dL   CBC with platelets    Collection  Time: 03/31/25  8:33 AM   Result Value Ref Range    WBC Count 10.4 4.0 - 11.0 10e3/uL    RBC Count 3.01 (L) 4.40 - 5.90 10e6/uL    Hemoglobin 9.5 (L) 13.3 - 17.7 g/dL    Hematocrit 29.7 (L) 40.0 - 53.0 %    MCV 99 78 - 100 fL    MCH 31.6 26.5 - 33.0 pg    MCHC 32.0 31.5 - 36.5 g/dL    RDW 14.9 10.0 - 15.0 %    Platelet Count 309 150 - 450 10e3/uL   Glucose (OUTREACH)    Collection Time: 03/31/25  8:33 AM   Result Value Ref Range    Glucose 128 (H) 70 - 99 mg/dL   Basic Metabolic Panel No Glucose (OUTREACH)    Collection Time: 03/31/25  8:33 AM   Result Value Ref Range    Sodium 140 135 - 145 mmol/L    Potassium 4.1 3.4 - 5.3 mmol/L    Chloride 104 98 - 107 mmol/L    Carbon Dioxide (CO2) 23 22 - 29 mmol/L    Anion Gap 13 7 - 15 mmol/L    Urea Nitrogen 24.1 (H) 8.0 - 23.0 mg/dL    Creatinine 1.60 (H) 0.67 - 1.17 mg/dL    GFR Estimate 45 (L) >60 mL/min/1.73m2    Calcium 9.2 8.8 - 10.4 mg/dL   Vitamin B12    Collection Time: 03/31/25  8:33 AM   Result Value Ref Range    Vitamin B12 673 232 - 1,245 pg/mL        MEDICATIONS:  MED REC REQUIRED  Post Medication Reconciliation Status: discharge medications reconciled, continue medications without change          Review of your medicines            Accurate as of April 1, 2025  8:38 AM. If you have any questions, ask your nurse or doctor.                CONTINUE these medicines which have NOT CHANGED        Dose / Directions   acetaminophen 500 MG tablet  Commonly known as: TYLENOL      Dose: 1,000 mg  Take 1,000 mg by mouth 3 times daily. While awake  Refills: 0     aspirin 81 MG chewable tablet  Commonly known as: ASA  Indication: Coronary Bypass Surgery  Used for: Coronary artery disease of native artery of native heart with stable angina pectoris, S/P CABG (coronary artery bypass graft), Type 2 diabetes mellitus with ketoacidosis without coma, with long-term current use of insulin (H), Stage 3b chronic kidney disease (H), Coronary artery disease involving native coronary  artery of native heart, unspecified whether angina present, Secondary cardiomyopathy (H)      Dose: 81 mg  Take 1 tablet (81 mg) by mouth daily. Continue for one year postop, then when stopping plavix, increase aspirin to 162 mg daily indefinitely.  Refills: 0     carvedilol 12.5 MG tablet  Commonly known as: COREG  Indication: Cardiac Failure, High Blood Pressure      Dose: 12.5 mg  Take 12.5 mg by mouth 2 times daily  Refills: 0     clopidogrel 75 MG tablet  Commonly known as: PLAVIX  Indication: Coronary Bypass Surgery  Used for: Coronary artery disease of native artery of native heart with stable angina pectoris, S/P CABG (coronary artery bypass graft), Type 2 diabetes mellitus with ketoacidosis without coma, with long-term current use of insulin (H), Stage 3b chronic kidney disease (H), Coronary artery disease involving native coronary artery of native heart, unspecified whether angina present, Secondary cardiomyopathy (H)      Dose: 75 mg  Take 1 tablet (75 mg) by mouth daily. Continue for one year postop, then stop and increase aspirin to 162 mg daily indefinitely.  Refills: 0     cyanocobalamin 500 MCG Subl sublingual tablet  Commonly known as: VITAMIN B-12  Indication: Inadequate Vitamin B12  Used for: Coronary artery disease of native artery of native heart with stable angina pectoris, Delirium, MCI (mild cognitive impairment), Type 2 diabetes mellitus with ketoacidosis without coma, with long-term current use of insulin (H), Stage 3b chronic kidney disease (H), Coronary artery disease involving native coronary artery of native heart, unspecified whether angina present      Dose: 500 mcg  Place 1 tablet (500 mcg) under the tongue daily.  Refills: 0     furosemide 20 MG tablet  Commonly known as: LASIX  Indication: Edema, Cardiac Failure      Dose: 40 mg  Take 40 mg by mouth daily  Refills: 0     * insulin aspart 100 UNIT/ML pen  Commonly known as: NovoLOG PEN  Indication: Type 2 Diabetes  Used for:  Coronary artery disease of native artery of native heart with stable angina pectoris, Type 2 diabetes mellitus with ketoacidosis without coma, with long-term current use of insulin (H), Stage 3b chronic kidney disease (H), Coronary artery disease involving native coronary artery of native heart, unspecified whether angina present      Dose: 1-10 Units  Inject 1-10 Units subcutaneously 3 times daily (before meals). Correction Scale - HIGH INSULIN RESISTANCE DOSING   Do Not give Correction Insulin if Pre-Meal BG less than 140.   For Pre-Meal  - 164 give 1 unit.   For Pre-Meal  - 189 give 2 units.   For Pre-Meal  - 214 give 3 units.   For Pre-Meal  - 239 give 4 units.   For Pre-Meal  - 264 give 5 units.   For Pre-Meal  - 289 give 6 units.   For Pre-Meal  - 314 give 7 units.   For Pre-Meal  - 339 give 8 units.   For Pre-Meal  - 364 give 9 units.  For Pre-Meal BG greater than or equal to 365 give 10 units  To be given with prandial insulin, and based on pre-meal blood glucose. Administering insulin within 5 minutes of the start of the meal is ideal. Administer insulin no more than 30 minutes after the start of the meal, unless directed otherwise by provider.   Notify provider if glucose greater than or equal to 350 mg/dL after administration of correction dose.  Refills: 0     * insulin aspart 100 UNIT/ML pen  Commonly known as: NovoLOG PEN  Indication: Type 2 Diabetes  Used for: Coronary artery disease of native artery of native heart with stable angina pectoris, Type 2 diabetes mellitus with ketoacidosis without coma, with long-term current use of insulin (H), Stage 3b chronic kidney disease (H), Coronary artery disease involving native coronary artery of native heart, unspecified whether angina present      Dose: 1-7 Units  Inject 1-7 Units subcutaneously at bedtime. HIGH INSULIN RESISTANCE DOSING   Do Not give Bedtime Correction Insulin if BG less than 200.   For   - 224 give 1 units.   For  - 249 give 2 units.   For  - 274 give 3 units.   For  - 299 give 4 units.   For  - 324 give 5 units.   For  - 349 give 6 units.   For BG greater than or equal to 350 give 7 units.   Notify provider if glucose greater than or equal to 350 mg/dL after administration of correction dose.  Refills: 0     Lantus SoloStar 100 UNIT/ML soln  Indication: Type 2 Diabetes  Used for: Coronary artery disease of native artery of native heart with stable angina pectoris, Type 2 diabetes mellitus with ketoacidosis without coma, with long-term current use of insulin (H), Stage 3b chronic kidney disease (H), Coronary artery disease involving native coronary artery of native heart, unspecified whether angina present  Generic drug: insulin glargine      Dose: 35 Units  Inject 35 Units subcutaneously every morning.  Refills: 0     Lidocaine 4 % Patch  Commonly known as: LIDOCARE  Indication: Pain Following an Operation  Used for: Coronary artery disease of native artery of native heart with stable angina pectoris, S/P CABG (coronary artery bypass graft), Type 2 diabetes mellitus with ketoacidosis without coma, with long-term current use of insulin (H), Stage 3b chronic kidney disease (H), Coronary artery disease involving native coronary artery of native heart, unspecified whether angina present      Dose: 1-2 patch  Place 1-2 patches over 12 hours onto the skin every 24 hours. To prevent lidocaine toxicity, patient should be patch free for 12 hrs daily.  Refills: 0     losartan 25 MG tablet  Commonly known as: COZAAR  Indication: High Blood Pressure, Heart Failure  Used for: Coronary artery disease of native artery of native heart with stable angina pectoris, S/P CABG (coronary artery bypass graft), Type 2 diabetes mellitus with ketoacidosis without coma, with long-term current use of insulin (H), Stage 3b chronic kidney disease (H), Coronary artery disease involving native  coronary artery of native heart, unspecified whether angina present, Secondary cardiomyopathy (H)      Dose: 12.5 mg  Take 0.5 tablets (12.5 mg) by mouth daily.  Refills: 0     polyethylene glycol 17 GM/Dose powder  Commonly known as: MIRALAX  Indication: Constipation  Used for: Coronary artery disease of native artery of native heart with stable angina pectoris, S/P CABG (coronary artery bypass graft), Type 2 diabetes mellitus with ketoacidosis without coma, with long-term current use of insulin (H), Stage 3b chronic kidney disease (H), Coronary artery disease involving native coronary artery of native heart, unspecified whether angina present      Dose: 17 g  Take 17 g by mouth daily.  Refills: 0     senna-docusate 8.6-50 MG tablet  Commonly known as: SENOKOT-S/PERICOLACE      Dose: 1 tablet  Take 1 tablet by mouth 2 times daily.  Refills: 0     simvastatin 40 MG tablet  Commonly known as: ZOCOR  Indication: High Cholesterol, Lower Risk of Heart Event or Stroke      Dose: 40 mg  Take 40 mg by mouth daily  Refills: 0     traZODone 50 MG tablet  Commonly known as: DESYREL      Dose: 50 mg  Take 50 mg by mouth at bedtime.  Refills: 0           * This list has 2 medication(s) that are the same as other medications prescribed for you. Read the directions carefully, and ask your doctor or other care provider to review them with you.                  ASSESSMENT/PLAN  Encounter Diagnoses   Name Primary?     Pain management Yes     Physical deconditioning      S/P CABG x 4        Pain management lidocaine patch, extra strength Tylenol 3 times daily while awake    Heart health on baby aspirin 81 mg daily     hypertension continue carvedilol 12.5 mg twice daily, losartan 12.5 mg daily     CAD on Plavix 75 mg daily    Type 2 diabetes aspart sliding scales, Lantus 35 units subcu daily fingersticks ACHS    Insomnia melatonin DC'd and recently started on trazodone 50 mg at bedtime    HDL continue simvastatin    Physical  deconditioning PT OT    Status post CABG x 4 follow-up with cardiology, pain management monitor incisions for signs and symptoms of infection    CHF Daily weights continue Lasix 40 mg daily    Lower extremity edema- Compression on during the day/off at night     Electronically signed by: Deanna Ryder CNP I       Sincerely,        Deanna Ryder CNP    Electronically signed

## 2025-04-01 NOTE — TELEPHONE ENCOUNTER
Called [facility]CU and left  requesting call back. CV RN contact info provided.      ----- Message from Uzma JAQUEZ sent at 3/26/2025 12:27 PM CDT -----  Regarding: FW: Discharge  POC TCU 3/28    ----- Message -----  From: Prabha Giang PA-C  Sent: 3/26/2025   8:50 AM CDT  To: Uzma Dwyer RN  Subject: Discharge                                        Of note:  - No LV thrombus on intra-op MANUEL or postop TTE, so per discussion with patient's primary cardiologist, PTA eliquis was discontinued.  - C/f CVA vs delirium - found to have vit B12 def and delirium - neuro and neuropsych referrals at discharge per neuro  - Postop echo w/ unchanged EF 35-40%. Has a referral placed for HF f/u.  - DKA POD#2, resolved. Will be sent on lantus 35u qAM, 1:10 carb count novolog and high dose sliding scale insulin w/ meals and at bedtime.  - Plavix 75 mg qday to be maintained x1 year postop per surgeon preference for graft patency. ASA should be 81 mg for this duration. When plavix is stopped, ASA should be increased to 162 mg qday indefinitely.   - Patient is below preop weight, but w/ low EF will be sent on his PTA daily lasix but no K+ supplementation as he has not required any in several days. Patient should call clinic if they gain > 3lbs in one day or > 5lbs in one week.'      Discharging to TCU today.  ThanksAudra

## 2025-04-01 NOTE — PROGRESS NOTES
"[facility] GERIATRIC SERVICES  Chief Complaint   Patient presents with    JENNHELENA     [place] Medical Record Number:  [id number]  Place of Service where encounter took place:  [facility] ([facility]) [[id number]]  Code Status:Full Code    HISTORY:      HPI:  Eric Cordoba  is 74 year old (1950) undergoing physical and occupational therapy.  He is with past medical history type 2 diabetes, hypertension, history of CVA who is s/p CABG x4, LLE EVH     Today is seen to review vital signs, labs, routine visit.  He was seen at the bedside working with OT on medication task.  He denied shortness of breath cough congestion constipation or diarrhea.  Per therapy he has not been participating well, writer did talk with him regarding the importance of his therapy and being able to discharge quicker if he participates.  Therapy reported he needs to do stairs, he agreed he will participate more.  Labs reviewed from 3/31/2025, hemoglobin 9.5 B12 within normal limits at 673 creatinine 1.60 improving.  His weight is down 10.1 pounds over the last 5 days he reports he has not been eating much except for \"junk\".  His midline incision and chest tube sites are all clean dry and intact open to air and scabbed over.  Also left medial leg incisions clean dry and intact and scabbed.  He normally wears a shan monitor however reported he took it off for surgery so he will get fingersticks ACHS.  Fingersticks have been stable with a couple isolated elevations.    He lives independently however has a roommate that lives in the basement.      ALLERGIES:Lisinopril and Propoxyphene    PAST MEDICAL HISTORY:   Past Medical History:   Diagnosis Date    Diabetes mellitus, type 2 (H)     History of CVA (cerebrovascular accident)     Hypertension     Nutritional and metabolic cardiomyopathies (H)        PAST SURGICAL HISTORY:   has a past surgical history that includes Coronary Angiogram (N/A, 2/24/2025); Left Heart Catheterization (N/A, " 2/24/2025); Intra aortic Balloon Pump Insertion (N/A, 3/17/2025); PICC/Midline Placement (3/20/2025); Coronary Artery Bypass Graft, With Endoscopic Vessel Procurement (N/A, 3/17/2025); and Transesophageal echocardiogram intraoperative (3/17/2025).    FAMILY HISTORY: family history is not on file.    SOCIAL HISTORY:  reports that he has quit smoking. His smoking use included cigarettes. He has never used smokeless tobacco. He reports that he does not currently use alcohol.    ROS:  Constitutional: Negative for activity change, appetite change, fatigue and fever.   HENT: Negative for congestion.    Respiratory: Negative for cough, shortness of breath and wheezing.    Cardiovascular: Negative for chest pain and positive leg swelling.   Gastrointestinal: Negative for abdominal distention, abdominal pain, constipation, diarrhea and nausea.   Genitourinary: Negative for dysuria.   Musculoskeletal: Negative for arthralgia. Negative for back pain.  Missing the tip of his left index finger reported it was from her past printing injury  Skin: Negative for color change and wound.   Neurological: Negative for dizziness.   Psychiatric/Behavioral: Negative for agitation, behavioral problems and confusion.     Physical Exam:  Constitutional:       Appearance: Patient is well-developed.   HENT:      Head: Normocephalic.   Eyes:      Conjunctiva/sclera: Conjunctivae normal.   Neck:      Musculoskeletal: Normal range of motion.   Cardiovascular:      Rate and Rhythm: Normal rate and regular rhythm.      Heart sounds: Normal heart sounds. No murmur.   Pulmonary:      Effort: No respiratory distress.      Breath sounds: Normal breath sounds. No wheezing or rales.   Abdominal:      General: Bowel sounds are normal. There is no distension.      Palpations: Abdomen is soft.      Tenderness: There is no abdominal tenderness.   Musculoskeletal:       Normal range of motion.     Skin:General:        Skin is warm.  Midline surgical incision  "along with chest tube sites clean dry and intact open to air.  Surgical incisions left medial leg clean dry and intact open to air  Neurological:         Mental Status: Patient is alert and oriented to person, place, and time.   Psychiatric:         Behavior: Behavior normal.     Vitals:/62   Pulse 72   Temp 97.8  F (36.6  C)   Resp 18   Ht 1.753 m (5' 9\")   Wt 87.9 kg (193 lb 12.8 oz)   SpO2 96%   BMI 28.62 kg/m   and Body mass index is 28.62 kg/m .    Lab/Diagnostic data:   Recent Results (from the past 240 hours)   Glucose by meter    Collection Time: 03/22/25  8:50 AM   Result Value Ref Range    GLUCOSE BY METER POCT 166 (H) 70 - 99 mg/dL   Ammonia    Collection Time: 03/22/25  9:18 AM   Result Value Ref Range    Ammonia 14 (L) 16 - 60 umol/L   CBC with platelets    Collection Time: 03/22/25  9:49 AM   Result Value Ref Range    WBC Count 10.2 4.0 - 11.0 10e3/uL    RBC Count 3.21 (L) 4.40 - 5.90 10e6/uL    Hemoglobin 10.2 (L) 13.3 - 17.7 g/dL    Hematocrit 30.6 (L) 40.0 - 53.0 %    MCV 95 78 - 100 fL    MCH 31.8 26.5 - 33.0 pg    MCHC 33.3 31.5 - 36.5 g/dL    RDW 13.1 10.0 - 15.0 %    Platelet Count 147 (L) 150 - 450 10e3/uL   UA with Microscopic reflex to Culture    Collection Time: 03/22/25 12:08 PM    Specimen: Urine, Clean Catch   Result Value Ref Range    Color Urine Colorless Colorless, Straw, Light Yellow, Yellow    Appearance Urine Clear Clear    Glucose Urine Negative Negative mg/dL    Bilirubin Urine Negative Negative    Ketones Urine Negative Negative mg/dL    Specific Gravity Urine 1.010 1.001 - 1.030    Blood Urine Negative Negative    pH Urine 5.0 5.0 - 7.0    Protein Albumin Urine Negative Negative mg/dL    Urobilinogen Urine <2.0 <2.0 mg/dL    Nitrite Urine Negative Negative    Leukocyte Esterase Urine Negative Negative    RBC Urine 1 <=2 /HPF    WBC Urine 1 <=5 /HPF    Hyaline Casts Urine 1 <=2 /LPF   Glucose by meter    Collection Time: 03/22/25 12:42 PM   Result Value Ref Range    " GLUCOSE BY METER POCT 165 (H) 70 - 99 mg/dL   Glucose by meter    Collection Time: 03/22/25  6:57 PM   Result Value Ref Range    GLUCOSE BY METER POCT 186 (H) 70 - 99 mg/dL   Glucose by meter    Collection Time: 03/22/25  8:28 PM   Result Value Ref Range    GLUCOSE BY METER POCT 171 (H) 70 - 99 mg/dL   Ionized Calcium    Collection Time: 03/23/25  4:17 AM   Result Value Ref Range    Calcium Ionized Whole Blood 4.8 4.4 - 5.2 mg/dL   Basic metabolic panel    Collection Time: 03/23/25  4:17 AM   Result Value Ref Range    Sodium 143 135 - 145 mmol/L    Potassium 3.8 3.4 - 5.3 mmol/L    Chloride 109 (H) 98 - 107 mmol/L    Carbon Dioxide (CO2) 27 22 - 29 mmol/L    Anion Gap 7 7 - 15 mmol/L    Urea Nitrogen 60.1 (H) 8.0 - 23.0 mg/dL    Creatinine 2.17 (H) 0.67 - 1.17 mg/dL    GFR Estimate 31 (L) >60 mL/min/1.73m2    Calcium 8.3 (L) 8.8 - 10.4 mg/dL    Glucose 181 (H) 70 - 99 mg/dL   Magnesium    Collection Time: 03/23/25  4:17 AM   Result Value Ref Range    Magnesium 2.2 1.7 - 2.3 mg/dL   Phosphorus    Collection Time: 03/23/25  4:17 AM   Result Value Ref Range    Phosphorus 3.7 2.5 - 4.5 mg/dL   CBC with platelets    Collection Time: 03/23/25  4:17 AM   Result Value Ref Range    WBC Count 7.5 4.0 - 11.0 10e3/uL    RBC Count 2.63 (L) 4.40 - 5.90 10e6/uL    Hemoglobin 8.6 (L) 13.3 - 17.7 g/dL    Hematocrit 24.7 (L) 40.0 - 53.0 %    MCV 94 78 - 100 fL    MCH 32.7 26.5 - 33.0 pg    MCHC 34.8 31.5 - 36.5 g/dL    RDW 13.0 10.0 - 15.0 %    Platelet Count 116 (L) 150 - 450 10e3/uL   TSH with free T4 reflex    Collection Time: 03/23/25  4:17 AM   Result Value Ref Range    TSH 2.66 0.30 - 4.20 uIU/mL   Glucose by meter    Collection Time: 03/23/25  7:50 AM   Result Value Ref Range    GLUCOSE BY METER POCT 186 (H) 70 - 99 mg/dL   Glucose by meter    Collection Time: 03/23/25 12:01 PM   Result Value Ref Range    GLUCOSE BY METER POCT 231 (H) 70 - 99 mg/dL   Glucose by meter    Collection Time: 03/23/25  4:28 PM   Result Value Ref  Range    GLUCOSE BY METER POCT 243 (H) 70 - 99 mg/dL   Glucose by meter    Collection Time: 03/23/25  9:41 PM   Result Value Ref Range    GLUCOSE BY METER POCT 234 (H) 70 - 99 mg/dL   Platelet count    Collection Time: 03/24/25  5:31 AM   Result Value Ref Range    Platelet Count 118 (L) 150 - 450 10e3/uL   Basic metabolic panel    Collection Time: 03/24/25  5:31 AM   Result Value Ref Range    Sodium 143 135 - 145 mmol/L    Potassium 4.1 3.4 - 5.3 mmol/L    Chloride 108 (H) 98 - 107 mmol/L    Carbon Dioxide (CO2) 25 22 - 29 mmol/L    Anion Gap 10 7 - 15 mmol/L    Urea Nitrogen 53.1 (H) 8.0 - 23.0 mg/dL    Creatinine 2.00 (H) 0.67 - 1.17 mg/dL    GFR Estimate 34 (L) >60 mL/min/1.73m2    Calcium 8.6 (L) 8.8 - 10.4 mg/dL    Glucose 202 (H) 70 - 99 mg/dL   Magnesium    Collection Time: 03/24/25  5:31 AM   Result Value Ref Range    Magnesium 2.1 1.7 - 2.3 mg/dL   Phosphorus    Collection Time: 03/24/25  5:31 AM   Result Value Ref Range    Phosphorus 3.9 2.5 - 4.5 mg/dL   Vitamin B12    Collection Time: 03/24/25  5:31 AM   Result Value Ref Range    Vitamin B12 182 (L) 232 - 1,245 pg/mL   Folate    Collection Time: 03/24/25  5:31 AM   Result Value Ref Range    Folic Acid 7.7 4.6 - 34.8 ng/mL   Glucose by meter    Collection Time: 03/24/25  7:22 AM   Result Value Ref Range    GLUCOSE BY METER POCT 172 (H) 70 - 99 mg/dL   Glucose by meter    Collection Time: 03/24/25 12:01 PM   Result Value Ref Range    GLUCOSE BY METER POCT 250 (H) 70 - 99 mg/dL   hoohbe; MMAS; Methylmalonic Acid, Quantitative, Serum (Laboratory Miscellaneous Order)    Collection Time: 03/24/25 12:13 PM   Result Value Ref Range    Specimen Status       Specimen received. Reordered and sent to performing laboratory. Report to follow upon completion.    Performing Laboratory [facility] [facility]     Test Name Methylmalonic Acid, Quantitative, Serum     Test Code MMAS    MMAS; Methylmalonic Acid, Quantitative, Serum [place] Miscellaneous  Test    Collection Time: 03/24/25 12:13 PM   Result Value Ref Range    [facility] Result SEE NOTE (A)    Glucose by meter    Collection Time: 03/24/25  4:56 PM   Result Value Ref Range    GLUCOSE BY METER POCT 170 (H) 70 - 99 mg/dL   Glucose by meter    Collection Time: 03/24/25  8:55 PM   Result Value Ref Range    GLUCOSE BY METER POCT 191 (H) 70 - 99 mg/dL   Glucose by meter    Collection Time: 03/25/25  2:21 AM   Result Value Ref Range    GLUCOSE BY METER POCT 163 (H) 70 - 99 mg/dL   UA with Microscopic reflex to Culture    Collection Time: 03/25/25  4:31 AM    Specimen: Urine, Clean Catch   Result Value Ref Range    Color Urine Yellow Colorless, Straw, Light Yellow, Yellow    Appearance Urine Clear Clear    Glucose Urine Negative Negative mg/dL    Bilirubin Urine Negative Negative    Ketones Urine Negative Negative mg/dL    Specific Gravity Urine 1.023 1.001 - 1.030    Blood Urine Negative Negative    pH Urine 5.5 5.0 - 7.0    Protein Albumin Urine Negative Negative mg/dL    Urobilinogen Urine 2.0 (A) Normal mg/dL    Nitrite Urine Negative Negative    Leukocyte Esterase Urine Negative Negative    RBC Urine 0 <=2 /HPF    WBC Urine <1 <=5 /HPF   Basic metabolic panel    Collection Time: 03/25/25  4:38 AM   Result Value Ref Range    Sodium 140 135 - 145 mmol/L    Potassium 4.2 3.4 - 5.3 mmol/L    Chloride 105 98 - 107 mmol/L    Carbon Dioxide (CO2) 25 22 - 29 mmol/L    Anion Gap 10 7 - 15 mmol/L    Urea Nitrogen 49.6 (H) 8.0 - 23.0 mg/dL    Creatinine 1.94 (H) 0.67 - 1.17 mg/dL    GFR Estimate 35 (L) >60 mL/min/1.73m2    Calcium 8.7 (L) 8.8 - 10.4 mg/dL    Glucose 158 (H) 70 - 99 mg/dL   Magnesium    Collection Time: 03/25/25  4:38 AM   Result Value Ref Range    Magnesium 2.3 1.7 - 2.3 mg/dL   Phosphorus    Collection Time: 03/25/25  4:38 AM   Result Value Ref Range    Phosphorus 3.8 2.5 - 4.5 mg/dL   Ionized Calcium    Collection Time: 03/25/25  4:38 AM   Result Value Ref Range    Calcium Ionized Whole Blood 4.6 4.4 -  5.2 mg/dL   Glucose by meter    Collection Time: 03/25/25  7:34 AM   Result Value Ref Range    GLUCOSE BY METER POCT 181 (H) 70 - 99 mg/dL   Echocardiogram Limited    Collection Time: 03/25/25  8:43 AM   Result Value Ref Range    LVEF  35-40% (moderately reduced)    Glucose by meter    Collection Time: 03/25/25 12:26 PM   Result Value Ref Range    GLUCOSE BY METER POCT 221 (H) 70 - 99 mg/dL   Glucose by meter    Collection Time: 03/25/25  5:22 PM   Result Value Ref Range    GLUCOSE BY METER POCT 146 (H) 70 - 99 mg/dL   Glucose by meter    Collection Time: 03/25/25 10:12 PM   Result Value Ref Range    GLUCOSE BY METER POCT 123 (H) 70 - 99 mg/dL   Basic metabolic panel    Collection Time: 03/26/25  5:05 AM   Result Value Ref Range    Sodium 138 135 - 145 mmol/L    Potassium 3.9 3.4 - 5.3 mmol/L    Chloride 105 98 - 107 mmol/L    Carbon Dioxide (CO2) 27 22 - 29 mmol/L    Anion Gap 6 (L) 7 - 15 mmol/L    Urea Nitrogen 38.8 (H) 8.0 - 23.0 mg/dL    Creatinine 1.84 (H) 0.67 - 1.17 mg/dL    GFR Estimate 38 (L) >60 mL/min/1.73m2    Calcium 8.6 (L) 8.8 - 10.4 mg/dL    Glucose 106 (H) 70 - 99 mg/dL   Magnesium    Collection Time: 03/26/25  5:05 AM   Result Value Ref Range    Magnesium 2.3 1.7 - 2.3 mg/dL   Phosphorus    Collection Time: 03/26/25  5:05 AM   Result Value Ref Range    Phosphorus 3.4 2.5 - 4.5 mg/dL   Glucose by meter    Collection Time: 03/26/25  8:26 AM   Result Value Ref Range    GLUCOSE BY METER POCT 120 (H) 70 - 99 mg/dL   Glucose by meter    Collection Time: 03/26/25  1:21 PM   Result Value Ref Range    GLUCOSE BY METER POCT 165 (H) 70 - 99 mg/dL   Glucose by meter    Collection Time: 03/26/25  1:31 PM   Result Value Ref Range    GLUCOSE BY METER POCT 154 (H) 70 - 99 mg/dL   Glucose by meter    Collection Time: 03/26/25  4:14 PM   Result Value Ref Range    GLUCOSE BY METER POCT 127 (H) 70 - 99 mg/dL   CBC with platelets    Collection Time: 03/31/25  8:33 AM   Result Value Ref Range    WBC Count 10.4 4.0 -  11.0 10e3/uL    RBC Count 3.01 (L) 4.40 - 5.90 10e6/uL    Hemoglobin 9.5 (L) 13.3 - 17.7 g/dL    Hematocrit 29.7 (L) 40.0 - 53.0 %    MCV 99 78 - 100 fL    MCH 31.6 26.5 - 33.0 pg    MCHC 32.0 31.5 - 36.5 g/dL    RDW 14.9 10.0 - 15.0 %    Platelet Count 309 150 - 450 10e3/uL   Glucose (OUTREACH)    Collection Time: 03/31/25  8:33 AM   Result Value Ref Range    Glucose 128 (H) 70 - 99 mg/dL   Basic Metabolic Panel No Glucose (OUTREACH)    Collection Time: 03/31/25  8:33 AM   Result Value Ref Range    Sodium 140 135 - 145 mmol/L    Potassium 4.1 3.4 - 5.3 mmol/L    Chloride 104 98 - 107 mmol/L    Carbon Dioxide (CO2) 23 22 - 29 mmol/L    Anion Gap 13 7 - 15 mmol/L    Urea Nitrogen 24.1 (H) 8.0 - 23.0 mg/dL    Creatinine 1.60 (H) 0.67 - 1.17 mg/dL    GFR Estimate 45 (L) >60 mL/min/1.73m2    Calcium 9.2 8.8 - 10.4 mg/dL   Vitamin B12    Collection Time: 03/31/25  8:33 AM   Result Value Ref Range    Vitamin B12 673 232 - 1,245 pg/mL        MEDICATIONS:  MED REC REQUIRED  Post Medication Reconciliation Status: discharge medications reconciled, continue medications without change          Review of your medicines            Accurate as of April 1, 2025  8:38 AM. If you have any questions, ask your nurse or doctor.                CONTINUE these medicines which have NOT CHANGED        Dose / Directions   acetaminophen 500 MG tablet  Commonly known as: TYLENOL      Dose: 1,000 mg  Take 1,000 mg by mouth 3 times daily. While awake  Refills: 0     aspirin 81 MG chewable tablet  Commonly known as: ASA  Indication: Coronary Bypass Surgery  Used for: Coronary artery disease of native artery of native heart with stable angina pectoris, S/P CABG (coronary artery bypass graft), Type 2 diabetes mellitus with ketoacidosis without coma, with long-term current use of insulin (H), Stage 3b chronic kidney disease (H), Coronary artery disease involving native coronary artery of native heart, unspecified whether angina present, Secondary  cardiomyopathy (H)      Dose: 81 mg  Take 1 tablet (81 mg) by mouth daily. Continue for one year postop, then when stopping plavix, increase aspirin to 162 mg daily indefinitely.  Refills: 0     carvedilol 12.5 MG tablet  Commonly known as: COREG  Indication: Cardiac Failure, High Blood Pressure      Dose: 12.5 mg  Take 12.5 mg by mouth 2 times daily  Refills: 0     clopidogrel 75 MG tablet  Commonly known as: PLAVIX  Indication: Coronary Bypass Surgery  Used for: Coronary artery disease of native artery of native heart with stable angina pectoris, S/P CABG (coronary artery bypass graft), Type 2 diabetes mellitus with ketoacidosis without coma, with long-term current use of insulin (H), Stage 3b chronic kidney disease (H), Coronary artery disease involving native coronary artery of native heart, unspecified whether angina present, Secondary cardiomyopathy (H)      Dose: 75 mg  Take 1 tablet (75 mg) by mouth daily. Continue for one year postop, then stop and increase aspirin to 162 mg daily indefinitely.  Refills: 0     cyanocobalamin 500 MCG Subl sublingual tablet  Commonly known as: VITAMIN B-12  Indication: Inadequate Vitamin B12  Used for: Coronary artery disease of native artery of native heart with stable angina pectoris, Delirium, MCI (mild cognitive impairment), Type 2 diabetes mellitus with ketoacidosis without coma, with long-term current use of insulin (H), Stage 3b chronic kidney disease (H), Coronary artery disease involving native coronary artery of native heart, unspecified whether angina present      Dose: 500 mcg  Place 1 tablet (500 mcg) under the tongue daily.  Refills: 0     furosemide 20 MG tablet  Commonly known as: LASIX  Indication: Edema, Cardiac Failure      Dose: 40 mg  Take 40 mg by mouth daily  Refills: 0     * insulin aspart 100 UNIT/ML pen  Commonly known as: NovoLOG PEN  Indication: Type 2 Diabetes  Used for: Coronary artery disease of native artery of native heart with stable angina  pectoris, Type 2 diabetes mellitus with ketoacidosis without coma, with long-term current use of insulin (H), Stage 3b chronic kidney disease (H), Coronary artery disease involving native coronary artery of native heart, unspecified whether angina present      Dose: 1-10 Units  Inject 1-10 Units subcutaneously 3 times daily (before meals). Correction Scale - HIGH INSULIN RESISTANCE DOSING   Do Not give Correction Insulin if Pre-Meal BG less than 140.   For Pre-Meal  - 164 give 1 unit.   For Pre-Meal  - 189 give 2 units.   For Pre-Meal  - 214 give 3 units.   For Pre-Meal  - 239 give 4 units.   For Pre-Meal  - 264 give 5 units.   For Pre-Meal  - 289 give 6 units.   For Pre-Meal  - 314 give 7 units.   For Pre-Meal  - 339 give 8 units.   For Pre-Meal  - 364 give 9 units.  For Pre-Meal BG greater than or equal to 365 give 10 units  To be given with prandial insulin, and based on pre-meal blood glucose. Administering insulin within 5 minutes of the start of the meal is ideal. Administer insulin no more than 30 minutes after the start of the meal, unless directed otherwise by provider.   Notify provider if glucose greater than or equal to 350 mg/dL after administration of correction dose.  Refills: 0     * insulin aspart 100 UNIT/ML pen  Commonly known as: NovoLOG PEN  Indication: Type 2 Diabetes  Used for: Coronary artery disease of native artery of native heart with stable angina pectoris, Type 2 diabetes mellitus with ketoacidosis without coma, with long-term current use of insulin (H), Stage 3b chronic kidney disease (H), Coronary artery disease involving native coronary artery of native heart, unspecified whether angina present      Dose: 1-7 Units  Inject 1-7 Units subcutaneously at bedtime. HIGH INSULIN RESISTANCE DOSING   Do Not give Bedtime Correction Insulin if BG less than 200.   For  - 224 give 1 units.   For  - 249 give 2 units.   For  -  274 give 3 units.   For  - 299 give 4 units.   For  - 324 give 5 units.   For  - 349 give 6 units.   For BG greater than or equal to 350 give 7 units.   Notify provider if glucose greater than or equal to 350 mg/dL after administration of correction dose.  Refills: 0     Lantus SoloStar 100 UNIT/ML soln  Indication: Type 2 Diabetes  Used for: Coronary artery disease of native artery of native heart with stable angina pectoris, Type 2 diabetes mellitus with ketoacidosis without coma, with long-term current use of insulin (H), Stage 3b chronic kidney disease (H), Coronary artery disease involving native coronary artery of native heart, unspecified whether angina present  Generic drug: insulin glargine      Dose: 35 Units  Inject 35 Units subcutaneously every morning.  Refills: 0     Lidocaine 4 % Patch  Commonly known as: LIDOCARE  Indication: Pain Following an Operation  Used for: Coronary artery disease of native artery of native heart with stable angina pectoris, S/P CABG (coronary artery bypass graft), Type 2 diabetes mellitus with ketoacidosis without coma, with long-term current use of insulin (H), Stage 3b chronic kidney disease (H), Coronary artery disease involving native coronary artery of native heart, unspecified whether angina present      Dose: 1-2 patch  Place 1-2 patches over 12 hours onto the skin every 24 hours. To prevent lidocaine toxicity, patient should be patch free for 12 hrs daily.  Refills: 0     losartan 25 MG tablet  Commonly known as: COZAAR  Indication: High Blood Pressure, Heart Failure  Used for: Coronary artery disease of native artery of native heart with stable angina pectoris, S/P CABG (coronary artery bypass graft), Type 2 diabetes mellitus with ketoacidosis without coma, with long-term current use of insulin (H), Stage 3b chronic kidney disease (H), Coronary artery disease involving native coronary artery of native heart, unspecified whether angina present,  Secondary cardiomyopathy (H)      Dose: 12.5 mg  Take 0.5 tablets (12.5 mg) by mouth daily.  Refills: 0     polyethylene glycol 17 GM/Dose powder  Commonly known as: MIRALAX  Indication: Constipation  Used for: Coronary artery disease of native artery of native heart with stable angina pectoris, S/P CABG (coronary artery bypass graft), Type 2 diabetes mellitus with ketoacidosis without coma, with long-term current use of insulin (H), Stage 3b chronic kidney disease (H), Coronary artery disease involving native coronary artery of native heart, unspecified whether angina present      Dose: 17 g  Take 17 g by mouth daily.  Refills: 0     senna-docusate 8.6-50 MG tablet  Commonly known as: SENOKOT-S/PERICOLACE      Dose: 1 tablet  Take 1 tablet by mouth 2 times daily.  Refills: 0     simvastatin 40 MG tablet  Commonly known as: ZOCOR  Indication: High Cholesterol, Lower Risk of Heart Event or Stroke      Dose: 40 mg  Take 40 mg by mouth daily  Refills: 0     traZODone 50 MG tablet  Commonly known as: DESYREL      Dose: 50 mg  Take 50 mg by mouth at bedtime.  Refills: 0           * This list has 2 medication(s) that are the same as other medications prescribed for you. Read the directions carefully, and ask your doctor or other care provider to review them with you.                  ASSESSMENT/PLAN  Encounter Diagnoses   Name Primary?    Pain management Yes    Physical deconditioning     S/P CABG x 4        Pain management lidocaine patch, extra strength Tylenol 3 times daily while awake    Heart health on baby aspirin 81 mg daily     hypertension continue carvedilol 12.5 mg twice daily, losartan 12.5 mg daily     CAD on Plavix 75 mg daily    Type 2 diabetes aspart sliding scales, Lantus 35 units subcu daily fingersticks ACHS    Insomnia melatonin DC'd and recently started on trazodone 50 mg at bedtime    HDL continue simvastatin    Physical deconditioning PT OT    Status post CABG x 4 follow-up with cardiology, pain  management monitor incisions for signs and symptoms of infection    CHF Daily weights continue Lasix 40 mg daily    Lower extremity edema- Compression on during the day/off at night     Electronically signed by: Deanna Ryder CNP I

## 2025-04-02 NOTE — TELEPHONE ENCOUNTER
[facility] Call Center    Phone Message    May a detailed message be left on voicemail: yes     Reason for Call: Other: Viviane from [facility] would like a call back she missed from [place] yesterday, please reach out to first number given if she is not reached then try this second number at [phone]     Action Taken: Other: Cardio    Travel Screening: Not Applicable     Date of Service:

## 2025-04-02 NOTE — PROGRESS NOTES
[facility] GERIATRIC SERVICES       Patient Eric Cordoba  MRN: [id number]        Reason for Visit     Chief Complaint   Patient presents with    RECHECK     INITIAL       Code Status     CPR/Full code     Assessment   CAD s/p  Surgery:   3/17/2025 with Dr. Shahid  PROCEDURES PERFORMED:    1) Ultrasound evaluation of lower extremity vein  2) Median sternotomy  3) Left internal mammary artery harvest  4) Endoscopic harvest of left saphenous vein   5) Epiaortic ultrasound of the ascending aorta  6) Placement on central cardiopulmonary bypass  7) Coronary artery bypass grafting x4 (in-situ left internal mammary artery to left anterior descending and separate reverse saphenous vein grafts to distal right coronary artery, first obtuse marginal, and diagonal coronary arteries)   8) Placement of temporary ventricular pacing wires  9) Transesophageal echocardiography  IDDM  HTN  HLD  Acute metabolic encephalopathy postoperatively with left-sided weakness no evidence of CVA   Suspected cognitive impairment with a SLUMS 15/30  Generalized weakness  ONGOING WT LOSS >10LB  Depression  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  status post quadruple vessel bypass  No evidence of LV thrombus on intraoperative MANUEL or postoperative TTE  Taken off Eliquis  On aspirin and Plavix anticoagulation  Will be on Plavix for 1 year after which she will be on a higher dose of aspirin 162 mg daily indefinitely  On medical management of CAD now with statin Coreg and losartan  Outpatient follow-up with cardiology  Ongoing weight loss of more than 10 pounds noticed.  Patient does report significant weight loss.  Medication profile shows that he was on Mounjaro at home wondering if there is a continued effect of Mounjaro  Will monitor for weight loss.  Suspected cognitive impairment with a slums 15/30.  Recheck CPT  Also diabetes control reviewed with ongoing weight loss   On supplements  Adjust lantus  to 30u   Continue with insulin sliding scale  Monitor blood sugar trends he is not eating very well  Mood disorder suspected in the setting of cognitive impairment will start him on fluoxetine 20 mg daily and monitor response  Recheck labs  Continue with PT/OT-refusal to participate  Has been refusing cares including showers and reports that he would like to go home  Care plan reviewed with the  who does not feel he is ready to go home  Reviewed with his friend present at bedside separately as well as in his presence and it is felt that he may not be safe in his home environment in light of his concern for cognitive impairment and physical debilitation and recent weight loss  Also reviewed with therapy and nursing and reports that he gets very angry and upset easily and does not participate in his cares  Requested family involvement in his care conference to discuss discharge planning  Time spent was more than 50 minutes in this visit including reviewing discharge planning with patient and family friend as well as the  and nursing and therapy including behavior concerns with concerns of depression.  Patient is amenable to trying antidepressant    History     Patient is a very pleasant 74 year old male who is admitted to TCU  Patient was electively admitted on 3/17/2025 for elective open heart surgery secondary to history of severe coronary artery disease.  He underwent the procedure on 3/17/2025-quadruple vessel coronary artery bypass grafting  Tolerated the procedure well was discharged to the TCU  Postoperative course complicated by left-sided weakness due to which neurology was consulted  Head CT done with no evidence of CVA  Taken off Eliquis.  Workup in the hospital including intraoperative MANUEL/postoperative TTE did not show any evidence of LV thrombus.      Past Medical & Surgical History     PAST MEDICAL HISTORY:   Past Medical History:   Diagnosis Date    Diabetes mellitus, type 2  (H)     History of CVA (cerebrovascular accident)     Hypertension     Nutritional and metabolic cardiomyopathies (H)       PAST SURGICAL HISTORY:   has a past surgical history that includes Coronary Angiogram (N/A, 2/24/2025); Left Heart Catheterization (N/A, 2/24/2025); Intra aortic Balloon Pump Insertion (N/A, 3/17/2025); PICC/Midline Placement (3/20/2025); Coronary Artery Bypass Graft, With Endoscopic Vessel Procurement (N/A, 3/17/2025); and Transesophageal echocardiogram intraoperative (3/17/2025).      Past Social History     Reviewed,  reports that he has quit smoking. His smoking use included cigarettes. He has never used smokeless tobacco. He reports that he does not currently use alcohol.    Family History     Reviewed, and family history is not on file.    Medication List     Current Outpatient Medications   Medication Sig Dispense Refill    acetaminophen (TYLENOL) 500 MG tablet Take 1,000 mg by mouth 3 times daily. While awake      aspirin (ASA) 81 MG chewable tablet Take 1 tablet (81 mg) by mouth daily. Continue for one year postop, then when stopping plavix, increase aspirin to 162 mg daily indefinitely.      carvedilol (COREG) 12.5 MG tablet Take 12.5 mg by mouth 2 times daily      clopidogrel (PLAVIX) 75 MG tablet Take 1 tablet (75 mg) by mouth daily. Continue for one year postop, then stop and increase aspirin to 162 mg daily indefinitely.      cyanocobalamin (VITAMIN B-12) 500 MCG SUBL sublingual tablet Place 1 tablet (500 mcg) under the tongue daily.      furosemide (LASIX) 20 MG tablet Take 40 mg by mouth daily      insulin aspart (NOVOLOG PEN) 100 UNIT/ML pen Inject 1-10 Units subcutaneously 3 times daily (before meals). Correction Scale - HIGH INSULIN RESISTANCE DOSING   Do Not give Correction Insulin if Pre-Meal BG less than 140.   For Pre-Meal  - 164 give 1 unit.   For Pre-Meal  - 189 give 2 units.   For Pre-Meal  - 214 give 3 units.   For Pre-Meal  - 239 give 4 units.    For Pre-Meal  - 264 give 5 units.   For Pre-Meal  - 289 give 6 units.   For Pre-Meal  - 314 give 7 units.   For Pre-Meal  - 339 give 8 units.   For Pre-Meal  - 364 give 9 units.  For Pre-Meal BG greater than or equal to 365 give 10 units  To be given with prandial insulin, and based on pre-meal blood glucose. Administering insulin within 5 minutes of the start of the meal is ideal. Administer insulin no more than 30 minutes after the start of the meal, unless directed otherwise by provider.   Notify provider if glucose greater than or equal to 350 mg/dL after administration of correction dose.      insulin aspart (NOVOLOG PEN) 100 UNIT/ML pen Inject 1-7 Units subcutaneously at bedtime. HIGH INSULIN RESISTANCE DOSING   Do Not give Bedtime Correction Insulin if BG less than 200.   For  - 224 give 1 units.   For  - 249 give 2 units.   For  - 274 give 3 units.   For  - 299 give 4 units.   For  - 324 give 5 units.   For  - 349 give 6 units.   For BG greater than or equal to 350 give 7 units.   Notify provider if glucose greater than or equal to 350 mg/dL after administration of correction dose.      LANTUS SOLOSTAR 100 UNIT/ML soln Inject 35 Units subcutaneously every morning.      Lidocaine (LIDOCARE) 4 % Patch Place 1-2 patches over 12 hours onto the skin every 24 hours. To prevent lidocaine toxicity, patient should be patch free for 12 hrs daily.      losartan (COZAAR) 25 MG tablet Take 0.5 tablets (12.5 mg) by mouth daily.      polyethylene glycol (MIRALAX) 17 GM/Dose powder Take 17 g by mouth daily.      senna-docusate (SENOKOT-S/PERICOLACE) 8.6-50 MG tablet Take 1 tablet by mouth 2 times daily.      simvastatin (ZOCOR) 40 MG tablet Take 40 mg by mouth daily      traZODone (DESYREL) 50 MG tablet Take 50 mg by mouth at bedtime.       No current facility-administered medications for this visit.      MED REC REQUIRED  Post Medication Reconciliation  "Status:        Allergies     Allergies   Allergen Reactions    Lisinopril Cough    Propoxyphene Unknown and Hives       Review of Systems   A comprehensive review of 14 systems was done. Pertinent findings noted here and in history of present illness. All the rest negative.  Constitutional: Negative.  Negative for fever, chills, he has  activity change, appetite change and fatigue.   Reports he has had ongoing weight loss with poor appetite  HENT: Negative for congestion and facial swelling.    Eyes: Negative for photophobia, redness and visual disturbance.   Respiratory: Negative for cough and chest tightness.    Cardiovascular: Negative for chest pain, palpitations and leg swelling.   Gastrointestinal: Negative for nausea, diarrhea, constipation, blood in stool and abdominal distention.   Genitourinary: Negative.    Musculoskeletal: Negative.  Some pain in his chest wall incision  Noted to be able to walk using a walker but has been refusing to get out of bed or participate in therapy   Skin: Negative.    Neurological: Negative for dizziness, tremors, syncope, weakness, light-headedness and headaches.   Hematological: Does not bruise/bleed easily.   Psychiatric/Behavioral      refusing cares and gets angry and upset very easily as per staff he wants to go home  Physical Exam   BP (!) 144/60   Pulse 77   Temp 97.7  F (36.5  C)   Resp 18   Ht 1.753 m (5' 9\")   Wt 87.1 kg (192 lb)   SpO2 95%   BMI 28.35 kg/m       Constitutional: Oriented to person, place, and time and appears well-developed.   HEENT:  Normocephalic and atraumatic.  Eyes: Conjunctivae and EOM are normal. Pupils are equal, round, and reactive to light. No discharge.  No scleral icterus. Nose normal. Mouth/Throat: Oropharynx is clear and moist. No oropharyngeal exudate.    NECK: Normal range of motion. Neck supple. No JVD present. No tracheal deviation present. No thyromegaly present.   CARDIOVASCULAR: Normal rate, regular rhythm and intact " distal pulses.  Exam reveals no gallop and no friction rub.  Systolic murmur present.  Midline chest wall incision is intact  PULMONARY: Effort normal and breath sounds normal. No respiratory distress.No Wheezing or rales.  ABDOMEN: Soft. Bowel sounds are normal. No distension and no mass.  There is no tenderness. There is no rebound and no guarding. No HSM.  MUSCULOSKELETAL: Normal range of motion. Mild kyphosis, no tenderness.  LYMPH NODES: Has no cervical, supraclavicular, axillary and groin adenopathy.   NEUROLOGICAL: Alert and oriented to person, place, and time. No cranial nerve deficit.  Normal muscle tone. Coordination normal.   GENITOURINARY: Deferred exam.  SKIN: Skin is warm and dry. No rash noted. No erythema. No pallor.   EXTREMITIES: No cyanosis, no clubbing, no edema. No Deformity.  PSYCHIATRIC: Normal mood, affect and behavior.  Affect is somewhat flat      Lab Results     Recent Results (from the past 240 hours)   Glucose by meter    Collection Time: 03/24/25 12:01 PM   Result Value Ref Range    GLUCOSE BY METER POCT 250 (H) 70 - 99 mg/dL   [facility]; [facility]; Methylmalonic Acid, Quantitative, Serum (Laboratory Miscellaneous Order)    Collection Time: 03/24/25 12:13 PM   Result Value Ref Range    Specimen Status       Specimen received. Reordered and sent to performing laboratory. Report to follow upon completion.    Performing Laboratory [facility]     Test Name Methylmalonic Acid, Quantitative, Serum     Test Code [facility]    [facility]; Methylmalonic Acid, Quantitative, Serum [place] Miscellaneous Test    Collection Time: 03/24/25 12:13 PM   Result Value Ref Range    [place] Result SEE NOTE (A)    Glucose by meter    Collection Time: 03/24/25  4:56 PM   Result Value Ref Range    GLUCOSE BY METER POCT 170 (H) 70 - 99 mg/dL   Glucose by meter    Collection Time: 03/24/25  8:55 PM   Result Value Ref Range    GLUCOSE BY METER POCT 191 (H) 70 - 99 mg/dL   Glucose by meter    Collection  Time: 03/25/25  2:21 AM   Result Value Ref Range    GLUCOSE BY METER POCT 163 (H) 70 - 99 mg/dL   UA with Microscopic reflex to Culture    Collection Time: 03/25/25  4:31 AM    Specimen: Urine, Clean Catch   Result Value Ref Range    Color Urine Yellow Colorless, Straw, Light Yellow, Yellow    Appearance Urine Clear Clear    Glucose Urine Negative Negative mg/dL    Bilirubin Urine Negative Negative    Ketones Urine Negative Negative mg/dL    Specific Gravity Urine 1.023 1.001 - 1.030    Blood Urine Negative Negative    pH Urine 5.5 5.0 - 7.0    Protein Albumin Urine Negative Negative mg/dL    Urobilinogen Urine 2.0 (A) Normal mg/dL    Nitrite Urine Negative Negative    Leukocyte Esterase Urine Negative Negative    RBC Urine 0 <=2 /HPF    WBC Urine <1 <=5 /HPF   Basic metabolic panel    Collection Time: 03/25/25  4:38 AM   Result Value Ref Range    Sodium 140 135 - 145 mmol/L    Potassium 4.2 3.4 - 5.3 mmol/L    Chloride 105 98 - 107 mmol/L    Carbon Dioxide (CO2) 25 22 - 29 mmol/L    Anion Gap 10 7 - 15 mmol/L    Urea Nitrogen 49.6 (H) 8.0 - 23.0 mg/dL    Creatinine 1.94 (H) 0.67 - 1.17 mg/dL    GFR Estimate 35 (L) >60 mL/min/1.73m2    Calcium 8.7 (L) 8.8 - 10.4 mg/dL    Glucose 158 (H) 70 - 99 mg/dL   Magnesium    Collection Time: 03/25/25  4:38 AM   Result Value Ref Range    Magnesium 2.3 1.7 - 2.3 mg/dL   Phosphorus    Collection Time: 03/25/25  4:38 AM   Result Value Ref Range    Phosphorus 3.8 2.5 - 4.5 mg/dL   Ionized Calcium    Collection Time: 03/25/25  4:38 AM   Result Value Ref Range    Calcium Ionized Whole Blood 4.6 4.4 - 5.2 mg/dL   Glucose by meter    Collection Time: 03/25/25  7:34 AM   Result Value Ref Range    GLUCOSE BY METER POCT 181 (H) 70 - 99 mg/dL   Echocardiogram Limited    Collection Time: 03/25/25  8:43 AM   Result Value Ref Range    LVEF  35-40% (moderately reduced)    Glucose by meter    Collection Time: 03/25/25 12:26 PM   Result Value Ref Range    GLUCOSE BY METER POCT 221 (H) 70 - 99  mg/dL   Glucose by meter    Collection Time: 03/25/25  5:22 PM   Result Value Ref Range    GLUCOSE BY METER POCT 146 (H) 70 - 99 mg/dL   Glucose by meter    Collection Time: 03/25/25 10:12 PM   Result Value Ref Range    GLUCOSE BY METER POCT 123 (H) 70 - 99 mg/dL   Basic metabolic panel    Collection Time: 03/26/25  5:05 AM   Result Value Ref Range    Sodium 138 135 - 145 mmol/L    Potassium 3.9 3.4 - 5.3 mmol/L    Chloride 105 98 - 107 mmol/L    Carbon Dioxide (CO2) 27 22 - 29 mmol/L    Anion Gap 6 (L) 7 - 15 mmol/L    Urea Nitrogen 38.8 (H) 8.0 - 23.0 mg/dL    Creatinine 1.84 (H) 0.67 - 1.17 mg/dL    GFR Estimate 38 (L) >60 mL/min/1.73m2    Calcium 8.6 (L) 8.8 - 10.4 mg/dL    Glucose 106 (H) 70 - 99 mg/dL   Magnesium    Collection Time: 03/26/25  5:05 AM   Result Value Ref Range    Magnesium 2.3 1.7 - 2.3 mg/dL   Phosphorus    Collection Time: 03/26/25  5:05 AM   Result Value Ref Range    Phosphorus 3.4 2.5 - 4.5 mg/dL   Glucose by meter    Collection Time: 03/26/25  8:26 AM   Result Value Ref Range    GLUCOSE BY METER POCT 120 (H) 70 - 99 mg/dL   Glucose by meter    Collection Time: 03/26/25  1:21 PM   Result Value Ref Range    GLUCOSE BY METER POCT 165 (H) 70 - 99 mg/dL   Glucose by meter    Collection Time: 03/26/25  1:31 PM   Result Value Ref Range    GLUCOSE BY METER POCT 154 (H) 70 - 99 mg/dL   Glucose by meter    Collection Time: 03/26/25  4:14 PM   Result Value Ref Range    GLUCOSE BY METER POCT 127 (H) 70 - 99 mg/dL   CBC with platelets    Collection Time: 03/31/25  8:33 AM   Result Value Ref Range    WBC Count 10.4 4.0 - 11.0 10e3/uL    RBC Count 3.01 (L) 4.40 - 5.90 10e6/uL    Hemoglobin 9.5 (L) 13.3 - 17.7 g/dL    Hematocrit 29.7 (L) 40.0 - 53.0 %    MCV 99 78 - 100 fL    MCH 31.6 26.5 - 33.0 pg    MCHC 32.0 31.5 - 36.5 g/dL    RDW 14.9 10.0 - 15.0 %    Platelet Count 309 150 - 450 10e3/uL   Glucose (OUTREACH)    Collection Time: 03/31/25  8:33 AM   Result Value Ref Range    Glucose 128 (H) 70 - 99  mg/dL   Basic Metabolic Panel No Glucose (OUTREACH)    Collection Time: 03/31/25  8:33 AM   Result Value Ref Range    Sodium 140 135 - 145 mmol/L    Potassium 4.1 3.4 - 5.3 mmol/L    Chloride 104 98 - 107 mmol/L    Carbon Dioxide (CO2) 23 22 - 29 mmol/L    Anion Gap 13 7 - 15 mmol/L    Urea Nitrogen 24.1 (H) 8.0 - 23.0 mg/dL    Creatinine 1.60 (H) 0.67 - 1.17 mg/dL    GFR Estimate 45 (L) >60 mL/min/1.73m2    Calcium 9.2 8.8 - 10.4 mg/dL   Vitamin B12    Collection Time: 03/31/25  8:33 AM   Result Value Ref Range    Vitamin B12 673 232 - 1,245 pg/mL           Electronically signed by    Corinne Claros MD

## 2025-04-03 ENCOUNTER — TRANSITIONAL CARE UNIT VISIT (OUTPATIENT)
Dept: GERIATRICS | Facility: CLINIC | Age: 75
End: 2025-04-03
Payer: COMMERCIAL

## 2025-04-03 VITALS
WEIGHT: 192 LBS | HEART RATE: 77 BPM | OXYGEN SATURATION: 95 % | BODY MASS INDEX: 28.44 KG/M2 | RESPIRATION RATE: 18 BRPM | HEIGHT: 69 IN | DIASTOLIC BLOOD PRESSURE: 60 MMHG | TEMPERATURE: 97.7 F | SYSTOLIC BLOOD PRESSURE: 144 MMHG

## 2025-04-03 DIAGNOSIS — N18.30 CONTROLLED TYPE 2 DIABETES MELLITUS WITH STAGE 3 CHRONIC KIDNEY DISEASE, WITH LONG-TERM CURRENT USE OF INSULIN (H): ICD-10-CM

## 2025-04-03 DIAGNOSIS — E11.42 POLYNEUROPATHY DUE TO TYPE 2 DIABETES MELLITUS (H): ICD-10-CM

## 2025-04-03 DIAGNOSIS — Z79.4 CONTROLLED TYPE 2 DIABETES MELLITUS WITH STAGE 3 CHRONIC KIDNEY DISEASE, WITH LONG-TERM CURRENT USE OF INSULIN (H): ICD-10-CM

## 2025-04-03 DIAGNOSIS — N18.32 STAGE 3B CHRONIC KIDNEY DISEASE (H): ICD-10-CM

## 2025-04-03 DIAGNOSIS — I10 ESSENTIAL HYPERTENSION: ICD-10-CM

## 2025-04-03 DIAGNOSIS — E11.22 CONTROLLED TYPE 2 DIABETES MELLITUS WITH STAGE 3 CHRONIC KIDNEY DISEASE, WITH LONG-TERM CURRENT USE OF INSULIN (H): ICD-10-CM

## 2025-04-03 DIAGNOSIS — Z95.1 S/P CABG X 4: Primary | ICD-10-CM

## 2025-04-03 RX ORDER — FLUOXETINE 20 MG/1
20 TABLET, FILM COATED ORAL DAILY
COMMUNITY

## 2025-04-03 NOTE — LETTER
4/3/2025      Eric Cordoba  [address] [facility] [address]        [facility]       Patient Eric Cordoba  MRN: [id number]        Reason for Visit     Chief Complaint   Patient presents with     RECHECK     INITIAL       Code Status     CPR/Full code     Assessment   CAD s/p  Surgery:   3/17/2025 with Dr. Shahid  PROCEDURES PERFORMED:    1) Ultrasound evaluation of lower extremity vein  2) Median sternotomy  3) Left internal mammary artery harvest  4) Endoscopic harvest of left saphenous vein   5) Epiaortic ultrasound of the ascending aorta  6) Placement on central cardiopulmonary bypass  7) Coronary artery bypass grafting x4 (in-situ left internal mammary artery to left anterior descending and separate reverse saphenous vein grafts to distal right coronary artery, first obtuse marginal, and diagonal coronary arteries)   8) Placement of temporary ventricular pacing wires  9) Transesophageal echocardiography  IDDM  HTN  HLD  Acute metabolic encephalopathy postoperatively with left-sided weakness no evidence of CVA   Suspected cognitive impairment with a SLUMS 15/30  Generalized weakness  ONGOING WT LOSS >10LB  Depression  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  status post quadruple vessel bypass  No evidence of LV thrombus on intraoperative MANUEL or postoperative TTE  Taken off Eliquis  On aspirin and Plavix anticoagulation  Will be on Plavix for 1 year after which she will be on a higher dose of aspirin 162 mg daily indefinitely  On medical management of CAD now with statin Coreg and losartan  Outpatient follow-up with cardiology  Ongoing weight loss of more than 10 pounds noticed.  Patient does report significant weight loss.  Medication profile shows that he was on Mounjaro at home wondering if there is a continued effect of Mounjaro  Will monitor for weight loss.  Suspected cognitive impairment with a slums 15/30.  Recheck CPT  Also diabetes  control reviewed with ongoing weight loss   On supplements  Adjust lantus to 30u   Continue with insulin sliding scale  Monitor blood sugar trends he is not eating very well  Mood disorder suspected in the setting of cognitive impairment will start him on fluoxetine 20 mg daily and monitor response  Recheck labs  Continue with PT/OT-refusal to participate  Has been refusing cares including showers and reports that he would like to go home  Care plan reviewed with the  who does not feel he is ready to go home  Reviewed with his friend present at bedside separately as well as in his presence and it is felt that he may not be safe in his home environment in light of his concern for cognitive impairment and physical debilitation and recent weight loss  Also reviewed with therapy and nursing and reports that he gets very angry and upset easily and does not participate in his cares  Requested family involvement in his care conference to discuss discharge planning  Time spent was more than 50 minutes in this visit including reviewing discharge planning with patient and family friend as well as the  and nursing and therapy including behavior concerns with concerns of depression.  Patient is amenable to trying antidepressant    History     Patient is a very pleasant 74 year old male who is admitted to TCU  Patient was electively admitted on 3/17/2025 for elective open heart surgery secondary to history of severe coronary artery disease.  He underwent the procedure on 3/17/2025-quadruple vessel coronary artery bypass grafting  Tolerated the procedure well was discharged to the TCU  Postoperative course complicated by left-sided weakness due to which neurology was consulted  Head CT done with no evidence of CVA  Taken off Eliquis.  Workup in the hospital including intraoperative MANUEL/postoperative TTE did not show any evidence of LV thrombus.      Past Medical & Surgical History     PAST MEDICAL  HISTORY:   Past Medical History:   Diagnosis Date     Diabetes mellitus, type 2 (H)      History of CVA (cerebrovascular accident)      Hypertension      Nutritional and metabolic cardiomyopathies (H)       PAST SURGICAL HISTORY:   has a past surgical history that includes Coronary Angiogram (N/A, 2/24/2025); Left Heart Catheterization (N/A, 2/24/2025); Intra aortic Balloon Pump Insertion (N/A, 3/17/2025); PICC/Midline Placement (3/20/2025); Coronary Artery Bypass Graft, With Endoscopic Vessel Procurement (N/A, 3/17/2025); and Transesophageal echocardiogram intraoperative (3/17/2025).      Past Social History     Reviewed,  reports that he has quit smoking. His smoking use included cigarettes. He has never used smokeless tobacco. He reports that he does not currently use alcohol.    Family History     Reviewed, and family history is not on file.    Medication List     Current Outpatient Medications   Medication Sig Dispense Refill     acetaminophen (TYLENOL) 500 MG tablet Take 1,000 mg by mouth 3 times daily. While awake       aspirin (ASA) 81 MG chewable tablet Take 1 tablet (81 mg) by mouth daily. Continue for one year postop, then when stopping plavix, increase aspirin to 162 mg daily indefinitely.       carvedilol (COREG) 12.5 MG tablet Take 12.5 mg by mouth 2 times daily       clopidogrel (PLAVIX) 75 MG tablet Take 1 tablet (75 mg) by mouth daily. Continue for one year postop, then stop and increase aspirin to 162 mg daily indefinitely.       cyanocobalamin (VITAMIN B-12) 500 MCG SUBL sublingual tablet Place 1 tablet (500 mcg) under the tongue daily.       furosemide (LASIX) 20 MG tablet Take 40 mg by mouth daily       insulin aspart (NOVOLOG PEN) 100 UNIT/ML pen Inject 1-10 Units subcutaneously 3 times daily (before meals). Correction Scale - HIGH INSULIN RESISTANCE DOSING   Do Not give Correction Insulin if Pre-Meal BG less than 140.   For Pre-Meal  - 164 give 1 unit.   For Pre-Meal  - 189 give 2  units.   For Pre-Meal  - 214 give 3 units.   For Pre-Meal  - 239 give 4 units.   For Pre-Meal  - 264 give 5 units.   For Pre-Meal  - 289 give 6 units.   For Pre-Meal  - 314 give 7 units.   For Pre-Meal  - 339 give 8 units.   For Pre-Meal  - 364 give 9 units.  For Pre-Meal BG greater than or equal to 365 give 10 units  To be given with prandial insulin, and based on pre-meal blood glucose. Administering insulin within 5 minutes of the start of the meal is ideal. Administer insulin no more than 30 minutes after the start of the meal, unless directed otherwise by provider.   Notify provider if glucose greater than or equal to 350 mg/dL after administration of correction dose.       insulin aspart (NOVOLOG PEN) 100 UNIT/ML pen Inject 1-7 Units subcutaneously at bedtime. HIGH INSULIN RESISTANCE DOSING   Do Not give Bedtime Correction Insulin if BG less than 200.   For  - 224 give 1 units.   For  - 249 give 2 units.   For  - 274 give 3 units.   For  - 299 give 4 units.   For  - 324 give 5 units.   For  - 349 give 6 units.   For BG greater than or equal to 350 give 7 units.   Notify provider if glucose greater than or equal to 350 mg/dL after administration of correction dose.       LANTUS SOLOSTAR 100 UNIT/ML soln Inject 35 Units subcutaneously every morning.       Lidocaine (LIDOCARE) 4 % Patch Place 1-2 patches over 12 hours onto the skin every 24 hours. To prevent lidocaine toxicity, patient should be patch free for 12 hrs daily.       losartan (COZAAR) 25 MG tablet Take 0.5 tablets (12.5 mg) by mouth daily.       polyethylene glycol (MIRALAX) 17 GM/Dose powder Take 17 g by mouth daily.       senna-docusate (SENOKOT-S/PERICOLACE) 8.6-50 MG tablet Take 1 tablet by mouth 2 times daily.       simvastatin (ZOCOR) 40 MG tablet Take 40 mg by mouth daily       traZODone (DESYREL) 50 MG tablet Take 50 mg by mouth at bedtime.       No current  "facility-administered medications for this visit.      MED REC REQUIRED  Post Medication Reconciliation Status:        Allergies     Allergies   Allergen Reactions     Lisinopril Cough     Propoxyphene Unknown and Hives       Review of Systems   A comprehensive review of 14 systems was done. Pertinent findings noted here and in history of present illness. All the rest negative.  Constitutional: Negative.  Negative for fever, chills, he has  activity change, appetite change and fatigue.   Reports he has had ongoing weight loss with poor appetite  HENT: Negative for congestion and facial swelling.    Eyes: Negative for photophobia, redness and visual disturbance.   Respiratory: Negative for cough and chest tightness.    Cardiovascular: Negative for chest pain, palpitations and leg swelling.   Gastrointestinal: Negative for nausea, diarrhea, constipation, blood in stool and abdominal distention.   Genitourinary: Negative.    Musculoskeletal: Negative.  Some pain in his chest wall incision  Noted to be able to walk using a walker but has been refusing to get out of bed or participate in therapy   Skin: Negative.    Neurological: Negative for dizziness, tremors, syncope, weakness, light-headedness and headaches.   Hematological: Does not bruise/bleed easily.   Psychiatric/Behavioral      refusing cares and gets angry and upset very easily as per staff he wants to go home  Physical Exam   BP (!) 144/60   Pulse 77   Temp 97.7  F (36.5  C)   Resp 18   Ht 1.753 m (5' 9\")   Wt 87.1 kg (192 lb)   SpO2 95%   BMI 28.35 kg/m       Constitutional: Oriented to person, place, and time and appears well-developed.   HEENT:  Normocephalic and atraumatic.  Eyes: Conjunctivae and EOM are normal. Pupils are equal, round, and reactive to light. No discharge.  No scleral icterus. Nose normal. Mouth/Throat: Oropharynx is clear and moist. No oropharyngeal exudate.    NECK: Normal range of motion. Neck supple. No JVD present. No " tracheal deviation present. No thyromegaly present.   CARDIOVASCULAR: Normal rate, regular rhythm and intact distal pulses.  Exam reveals no gallop and no friction rub.  Systolic murmur present.  Midline chest wall incision is intact  PULMONARY: Effort normal and breath sounds normal. No respiratory distress.No Wheezing or rales.  ABDOMEN: Soft. Bowel sounds are normal. No distension and no mass.  There is no tenderness. There is no rebound and no guarding. No HSM.  MUSCULOSKELETAL: Normal range of motion. Mild kyphosis, no tenderness.  LYMPH NODES: Has no cervical, supraclavicular, axillary and groin adenopathy.   NEUROLOGICAL: Alert and oriented to person, place, and time. No cranial nerve deficit.  Normal muscle tone. Coordination normal.   GENITOURINARY: Deferred exam.  SKIN: Skin is warm and dry. No rash noted. No erythema. No pallor.   EXTREMITIES: No cyanosis, no clubbing, no edema. No Deformity.  PSYCHIATRIC: Normal mood, affect and behavior.  Affect is somewhat flat      Lab Results     Recent Results (from the past 240 hours)   Glucose by meter    Collection Time: 03/24/25 12:01 PM   Result Value Ref Range    GLUCOSE BY METER POCT 250 (H) 70 - 99 mg/dL   [facility]; MMAS; Methylmalonic Acid, Quantitative, Serum (Laboratory Miscellaneous Order)    Collection Time: 03/24/25 12:13 PM   Result Value Ref Range    Specimen Status       Specimen received. Reordered and sent to performing laboratory. Report to follow upon completion.    Performing Laboratory [facility]     Test Name Methylmalonic Acid, Quantitative, Serum     Test Code MMAS    MMAS; Methylmalonic Acid, Quantitative, Serum [place] Miscellaneous Test    Collection Time: 03/24/25 12:13 PM   Result Value Ref Range    [place] Result SEE NOTE (A)    Glucose by meter    Collection Time: 03/24/25  4:56 PM   Result Value Ref Range    GLUCOSE BY METER POCT 170 (H) 70 - 99 mg/dL   Glucose by meter    Collection Time: 03/24/25  8:55 PM    Result Value Ref Range    GLUCOSE BY METER POCT 191 (H) 70 - 99 mg/dL   Glucose by meter    Collection Time: 03/25/25  2:21 AM   Result Value Ref Range    GLUCOSE BY METER POCT 163 (H) 70 - 99 mg/dL   UA with Microscopic reflex to Culture    Collection Time: 03/25/25  4:31 AM    Specimen: Urine, Clean Catch   Result Value Ref Range    Color Urine Yellow Colorless, Straw, Light Yellow, Yellow    Appearance Urine Clear Clear    Glucose Urine Negative Negative mg/dL    Bilirubin Urine Negative Negative    Ketones Urine Negative Negative mg/dL    Specific Gravity Urine 1.023 1.001 - 1.030    Blood Urine Negative Negative    pH Urine 5.5 5.0 - 7.0    Protein Albumin Urine Negative Negative mg/dL    Urobilinogen Urine 2.0 (A) Normal mg/dL    Nitrite Urine Negative Negative    Leukocyte Esterase Urine Negative Negative    RBC Urine 0 <=2 /HPF    WBC Urine <1 <=5 /HPF   Basic metabolic panel    Collection Time: 03/25/25  4:38 AM   Result Value Ref Range    Sodium 140 135 - 145 mmol/L    Potassium 4.2 3.4 - 5.3 mmol/L    Chloride 105 98 - 107 mmol/L    Carbon Dioxide (CO2) 25 22 - 29 mmol/L    Anion Gap 10 7 - 15 mmol/L    Urea Nitrogen 49.6 (H) 8.0 - 23.0 mg/dL    Creatinine 1.94 (H) 0.67 - 1.17 mg/dL    GFR Estimate 35 (L) >60 mL/min/1.73m2    Calcium 8.7 (L) 8.8 - 10.4 mg/dL    Glucose 158 (H) 70 - 99 mg/dL   Magnesium    Collection Time: 03/25/25  4:38 AM   Result Value Ref Range    Magnesium 2.3 1.7 - 2.3 mg/dL   Phosphorus    Collection Time: 03/25/25  4:38 AM   Result Value Ref Range    Phosphorus 3.8 2.5 - 4.5 mg/dL   Ionized Calcium    Collection Time: 03/25/25  4:38 AM   Result Value Ref Range    Calcium Ionized Whole Blood 4.6 4.4 - 5.2 mg/dL   Glucose by meter    Collection Time: 03/25/25  7:34 AM   Result Value Ref Range    GLUCOSE BY METER POCT 181 (H) 70 - 99 mg/dL   Echocardiogram Limited    Collection Time: 03/25/25  8:43 AM   Result Value Ref Range    LVEF  35-40% (moderately reduced)    Glucose by meter     Collection Time: 03/25/25 12:26 PM   Result Value Ref Range    GLUCOSE BY METER POCT 221 (H) 70 - 99 mg/dL   Glucose by meter    Collection Time: 03/25/25  5:22 PM   Result Value Ref Range    GLUCOSE BY METER POCT 146 (H) 70 - 99 mg/dL   Glucose by meter    Collection Time: 03/25/25 10:12 PM   Result Value Ref Range    GLUCOSE BY METER POCT 123 (H) 70 - 99 mg/dL   Basic metabolic panel    Collection Time: 03/26/25  5:05 AM   Result Value Ref Range    Sodium 138 135 - 145 mmol/L    Potassium 3.9 3.4 - 5.3 mmol/L    Chloride 105 98 - 107 mmol/L    Carbon Dioxide (CO2) 27 22 - 29 mmol/L    Anion Gap 6 (L) 7 - 15 mmol/L    Urea Nitrogen 38.8 (H) 8.0 - 23.0 mg/dL    Creatinine 1.84 (H) 0.67 - 1.17 mg/dL    GFR Estimate 38 (L) >60 mL/min/1.73m2    Calcium 8.6 (L) 8.8 - 10.4 mg/dL    Glucose 106 (H) 70 - 99 mg/dL   Magnesium    Collection Time: 03/26/25  5:05 AM   Result Value Ref Range    Magnesium 2.3 1.7 - 2.3 mg/dL   Phosphorus    Collection Time: 03/26/25  5:05 AM   Result Value Ref Range    Phosphorus 3.4 2.5 - 4.5 mg/dL   Glucose by meter    Collection Time: 03/26/25  8:26 AM   Result Value Ref Range    GLUCOSE BY METER POCT 120 (H) 70 - 99 mg/dL   Glucose by meter    Collection Time: 03/26/25  1:21 PM   Result Value Ref Range    GLUCOSE BY METER POCT 165 (H) 70 - 99 mg/dL   Glucose by meter    Collection Time: 03/26/25  1:31 PM   Result Value Ref Range    GLUCOSE BY METER POCT 154 (H) 70 - 99 mg/dL   Glucose by meter    Collection Time: 03/26/25  4:14 PM   Result Value Ref Range    GLUCOSE BY METER POCT 127 (H) 70 - 99 mg/dL   CBC with platelets    Collection Time: 03/31/25  8:33 AM   Result Value Ref Range    WBC Count 10.4 4.0 - 11.0 10e3/uL    RBC Count 3.01 (L) 4.40 - 5.90 10e6/uL    Hemoglobin 9.5 (L) 13.3 - 17.7 g/dL    Hematocrit 29.7 (L) 40.0 - 53.0 %    MCV 99 78 - 100 fL    MCH 31.6 26.5 - 33.0 pg    MCHC 32.0 31.5 - 36.5 g/dL    RDW 14.9 10.0 - 15.0 %    Platelet Count 309 150 - 450 10e3/uL   Glucose  (OUTREACH)    Collection Time: 03/31/25  8:33 AM   Result Value Ref Range    Glucose 128 (H) 70 - 99 mg/dL   Basic Metabolic Panel No Glucose (OUTREACH)    Collection Time: 03/31/25  8:33 AM   Result Value Ref Range    Sodium 140 135 - 145 mmol/L    Potassium 4.1 3.4 - 5.3 mmol/L    Chloride 104 98 - 107 mmol/L    Carbon Dioxide (CO2) 23 22 - 29 mmol/L    Anion Gap 13 7 - 15 mmol/L    Urea Nitrogen 24.1 (H) 8.0 - 23.0 mg/dL    Creatinine 1.60 (H) 0.67 - 1.17 mg/dL    GFR Estimate 45 (L) >60 mL/min/1.73m2    Calcium 9.2 8.8 - 10.4 mg/dL   Vitamin B12    Collection Time: 03/31/25  8:33 AM   Result Value Ref Range    Vitamin B12 673 232 - 1,245 pg/mL           Electronically signed by    Corinne Claros MD                            Sincerely,        VIOLETA Venegas    Electronically signed

## 2025-04-03 NOTE — TELEPHONE ENCOUNTER
Call placed [facility] [phone], left VM at TCU nursing station with [facility] RN contact info provided.    Call placed to [phone], no answer and no voicemail available.

## 2025-04-04 ENCOUNTER — HOSPITAL ENCOUNTER (EMERGENCY)
Facility: HOSPITAL | Age: 75
Discharge: HOME | End: 2025-04-04
Attending: EMERGENCY MEDICINE | Admitting: EMERGENCY MEDICINE
Payer: COMMERCIAL

## 2025-04-04 ENCOUNTER — APPOINTMENT (OUTPATIENT)
Dept: RADIOLOGY | Facility: HOSPITAL | Age: 75
End: 2025-04-04
Attending: EMERGENCY MEDICINE
Payer: COMMERCIAL

## 2025-04-04 VITALS
BODY MASS INDEX: 28.34 KG/M2 | TEMPERATURE: 98.2 F | HEIGHT: 68 IN | OXYGEN SATURATION: 95 % | RESPIRATION RATE: 14 BRPM | WEIGHT: 187 LBS | SYSTOLIC BLOOD PRESSURE: 146 MMHG | DIASTOLIC BLOOD PRESSURE: 69 MMHG | HEART RATE: 82 BPM

## 2025-04-04 DIAGNOSIS — R53.81 PHYSICAL DECONDITIONING: ICD-10-CM

## 2025-04-04 DIAGNOSIS — I50.9 CONGESTIVE HEART FAILURE, UNSPECIFIED HF CHRONICITY, UNSPECIFIED HEART FAILURE TYPE (H): ICD-10-CM

## 2025-04-04 LAB
ABO + RH BLD: NORMAL
ANION GAP SERPL CALCULATED.3IONS-SCNC: 10 MMOL/L (ref 7–15)
BASOPHILS # BLD AUTO: 0.1 10E3/UL (ref 0–0.2)
BASOPHILS NFR BLD AUTO: 2 %
BLD GP AB SCN SERPL QL: NEGATIVE
BUN SERPL-MCNC: 31.3 MG/DL (ref 8–23)
CALCIUM SERPL-MCNC: 9 MG/DL (ref 8.8–10.4)
CHLORIDE SERPL-SCNC: 101 MMOL/L (ref 98–107)
CREAT SERPL-MCNC: 1.96 MG/DL (ref 0.67–1.17)
EGFRCR SERPLBLD CKD-EPI 2021: 35 ML/MIN/1.73M2
EOSINOPHIL # BLD AUTO: 0.4 10E3/UL (ref 0–0.7)
EOSINOPHIL NFR BLD AUTO: 5 %
ERYTHROCYTE [DISTWIDTH] IN BLOOD BY AUTOMATED COUNT: 14.2 % (ref 10–15)
GLUCOSE SERPL-MCNC: 160 MG/DL (ref 70–99)
HCO3 SERPL-SCNC: 25 MMOL/L (ref 22–29)
HCT VFR BLD AUTO: 30.8 % (ref 40–53)
HGB BLD-MCNC: 10.4 G/DL (ref 13.3–17.7)
HOLD SPECIMEN: NORMAL
IMM GRANULOCYTES # BLD: 0 10E3/UL
IMM GRANULOCYTES NFR BLD: 0 %
INR PPP: 1.19 (ref 0.85–1.15)
LYMPHOCYTES # BLD AUTO: 1.2 10E3/UL (ref 0.8–5.3)
LYMPHOCYTES NFR BLD AUTO: 17 %
MCH RBC QN AUTO: 32.8 PG (ref 26.5–33)
MCHC RBC AUTO-ENTMCNC: 33.8 G/DL (ref 31.5–36.5)
MCV RBC AUTO: 97 FL (ref 78–100)
MONOCYTES # BLD AUTO: 0.8 10E3/UL (ref 0–1.3)
MONOCYTES NFR BLD AUTO: 11 %
NEUTROPHILS # BLD AUTO: 4.4 10E3/UL (ref 1.6–8.3)
NEUTROPHILS NFR BLD AUTO: 64 %
NRBC # BLD AUTO: 0 10E3/UL
NRBC BLD AUTO-RTO: 0 /100
NT-PROBNP SERPL-MCNC: 5628 PG/ML (ref 0–900)
PLATELET # BLD AUTO: 308 10E3/UL (ref 150–450)
POTASSIUM SERPL-SCNC: 4.7 MMOL/L (ref 3.4–5.3)
RBC # BLD AUTO: 3.17 10E6/UL (ref 4.4–5.9)
SODIUM SERPL-SCNC: 136 MMOL/L (ref 135–145)
SPECIMEN EXP DATE BLD: NORMAL
TROPONIN T SERPL HS-MCNC: 56 NG/L
TROPONIN T SERPL HS-MCNC: 65 NG/L
WBC # BLD AUTO: 6.9 10E3/UL (ref 4–11)

## 2025-04-04 PROCEDURE — 85018 HEMOGLOBIN: CPT | Performed by: EMERGENCY MEDICINE

## 2025-04-04 PROCEDURE — 250N000011 HC RX IP 250 OP 636: Performed by: EMERGENCY MEDICINE

## 2025-04-04 PROCEDURE — 93005 ELECTROCARDIOGRAM TRACING: CPT | Performed by: EMERGENCY MEDICINE

## 2025-04-04 PROCEDURE — 86850 RBC ANTIBODY SCREEN: CPT | Performed by: EMERGENCY MEDICINE

## 2025-04-04 PROCEDURE — 83880 ASSAY OF NATRIURETIC PEPTIDE: CPT | Performed by: EMERGENCY MEDICINE

## 2025-04-04 PROCEDURE — 71045 X-RAY EXAM CHEST 1 VIEW: CPT

## 2025-04-04 PROCEDURE — 96374 THER/PROPH/DIAG INJ IV PUSH: CPT

## 2025-04-04 PROCEDURE — 84484 ASSAY OF TROPONIN QUANT: CPT | Performed by: EMERGENCY MEDICINE

## 2025-04-04 PROCEDURE — 80048 BASIC METABOLIC PNL TOTAL CA: CPT | Performed by: EMERGENCY MEDICINE

## 2025-04-04 PROCEDURE — 99285 EMERGENCY DEPT VISIT HI MDM: CPT | Mod: 25

## 2025-04-04 PROCEDURE — 82310 ASSAY OF CALCIUM: CPT | Performed by: EMERGENCY MEDICINE

## 2025-04-04 PROCEDURE — 85004 AUTOMATED DIFF WBC COUNT: CPT | Performed by: EMERGENCY MEDICINE

## 2025-04-04 PROCEDURE — 85610 PROTHROMBIN TIME: CPT | Performed by: EMERGENCY MEDICINE

## 2025-04-04 PROCEDURE — 86900 BLOOD TYPING SEROLOGIC ABO: CPT | Performed by: EMERGENCY MEDICINE

## 2025-04-04 PROCEDURE — 36415 COLL VENOUS BLD VENIPUNCTURE: CPT | Performed by: EMERGENCY MEDICINE

## 2025-04-04 RX ORDER — FUROSEMIDE 20 MG/1
40 TABLET ORAL
Qty: 40 TABLET | Refills: 0 | Status: ON HOLD | OUTPATIENT
Start: 2025-04-04 | End: 2025-04-14

## 2025-04-04 RX ORDER — FUROSEMIDE 10 MG/ML
60 INJECTION INTRAMUSCULAR; INTRAVENOUS ONCE
Status: COMPLETED | OUTPATIENT
Start: 2025-04-04 | End: 2025-04-04

## 2025-04-04 RX ADMIN — FUROSEMIDE 60 MG: 10 INJECTION, SOLUTION INTRAMUSCULAR; INTRAVENOUS at 14:51

## 2025-04-04 ASSESSMENT — COLUMBIA-SUICIDE SEVERITY RATING SCALE - C-SSRS
2. HAVE YOU ACTUALLY HAD ANY THOUGHTS OF KILLING YOURSELF IN THE PAST MONTH?: NO
1. IN THE PAST MONTH, HAVE YOU WISHED YOU WERE DEAD OR WISHED YOU COULD GO TO SLEEP AND NOT WAKE UP?: NO
6. HAVE YOU EVER DONE ANYTHING, STARTED TO DO ANYTHING, OR PREPARED TO DO ANYTHING TO END YOUR LIFE?: NO

## 2025-04-04 ASSESSMENT — ACTIVITIES OF DAILY LIVING (ADL)
ADLS_ACUITY_SCORE: 55

## 2025-04-04 NOTE — ED TRIAGE NOTES
Patient arrives with EMS from assisted living facility with reports of ongoing SOB for one week. Had CABG 3/17. Has been reporting diarrhea in the morning along with dizziness and light headedness. BG en route 253. Nurse at facility reported mild confusion in mornings. At baseline ambulates independently, has been feeling too dizzy and SOB to ambulate today.     Triage Assessment (Adult)       Row Name 04/04/25 1110          Triage Assessment    Airway WDL WDL        Respiratory WDL    Respiratory WDL X;all     Rhythm/Pattern, Respiratory shortness of breath     Expansion/Accessory Muscles/Retractions no retractions     Cough Frequency infrequent     Cough Type dry        Skin Circulation/Temperature WDL    Skin Circulation/Temperature WDL WDL        Cardiac WDL    Cardiac WDL chest pain  with inspiration        Chest Pain Assessment    Chest Pain Location midsternal     Duration several days        Peripheral/Neurovascular WDL    Peripheral Neurovascular WDL WDL        Cognitive/Neuro/Behavioral WDL    Cognitive/Neuro/Behavioral WDL WDL

## 2025-04-04 NOTE — ED PROVIDER NOTES
Emergency Department Encounter     Evaluation Date & Time:   2025 11:03 AM    CHIEF COMPLAINT:  Shortness of Breath (/)      Triage Note:     FINAL IMPRESSION:    ICD-10-CM    1. Congestive heart failure, unspecified HF chronicity, unspecified heart failure type (H)  I50.9       2. Physical deconditioning  R53.81           Impression and Plan     ED COURSE & MEDICAL DECISION MAKIN:16 AM I met with the patient, obtained history, performed an initial exam, and discussed options and plan for diagnostics and treatment here in the ED.   2:26 PM Rechecked and updated patient on lab and imaging results. Discussed plan for care.   2:28 PM I spoke to Dr. Campos, Cardiology.,   ED Course as of 25 3036      1127 Patient with persistent shortness of breath and fatigue with right lower back pain since the time of CABG.  No radicular symptoms to suggest a pinched nerve and/or pathologic fracture of the lower back.  Unclear cause of right lower back pain may be muscular.  However, decreased lung sounds on the left side are quite concerning for persistent significant sized effusion complicating his recovery.  Will start with blood work type and screen to see if anemic and chest x-ray as well as EKG for rhythm check and then further testing will be determined based on those results.   1426 Patient ambulated a short distance with a walker.  He complains still of being very weak and lightheaded, but oxygen saturation stayed 95%.   1440 So, as patient really had not been improving at the care facility only able to walk what he describes as 100 feet or less before getting so breathless he needs to stop, and getting fairly breathless here when up walking around with oxygen saturations staying around 95%, I recommended admission to the hospital for a cardiology group to look at him to see how they can stabilize his medications and hopefully assist in his improving more quickly at rehab so that he will end  "up getting home sooner.      I spoke with Dr. Campos with cardiology and she feels this is also appropriate.  She recommended starting the patient on IV Lasix 60 mg IV initial dose is okay and then continuing 40 mg IV twice a day.  Unfortunately, the patient adamantly refuses to stay in the hospital.   1441 He seems more frustrated with being in bed here and when I try to talk with him about why he wants to go back to the care facility instead of staying in the hospital he states that when he was here in the hospital after his bypass he became quite frustrated because the doctors told him they were trying to get him better but he was not improving.  He he does finally state he has no louann in the cardiology doctors here and so he wants to return \"immediately \"to his rehab facility and wants to only continue to work with the physicians there.  We discussed with him that they are not cardiologists and if he is not improving well enough they would likely recommend he return to the hospital so he would just end up back care but he refuses to stay here and wants to return to his rehab facility and just continue to work with the physicians there.    I will recommend that they increase his Lasix and there to twice a day to at least try to do some of what we are going to do here to diurese him.   1444 I did have a long discussion with him to try to figure out if there were things that we could do to make him more comfortable or help to understand why he does not want to stay in the hospital.  He finally tells me he has no confidence in the providers here and that is the reason he wishes to return to his rehab facility.       At the conclusion of the encounter I discussed the results of all the tests and the disposition. The questions were answered. The patient or family acknowledged understanding and was agreeable with the care plan.          0 minutes of critical care time        MEDICATIONS GIVEN IN THE EMERGENCY " DEPARTMENT:  Medications   furosemide (LASIX) injection 60 mg (60 mg Intravenous $Given 4/4/25 9059)       NEW PRESCRIPTIONS STARTED AT TODAY'S ED VISIT:  Current Discharge Medication List          HPI     HPI     Eric Cordoba is a 74 year old male with a pertinent history of s/p CABG x 4, coronary artery disease, cardiomyopathy, Type II diabetes, hyperlipidemia, who presents to this ED via EMS for evaluation of shortness of breath.    The patient reports shortness of breath, dizziness, lower back pain, and diarrhea that has persisted since his discharge from the hospital. He recently had a CABG x4 and was discharged on 3/26.     Per triage note, nurse at facility reported the patient to have mild confusion in the mornings. Patient able to ambulate independently but has been feeling increased shortness of breath and dizziness to ambulate today.     Per chart review, the patient was seen at [facility] on 4/4/2024 for evaluation of shortness of breath and dizziness at rest. BP was dropping, patient was sent to the Emergency Department for further evaluation.     REVIEW OF SYSTEMS:  Review of Systems  remainder of systems are all otherwise negative.        Medical History     Past Medical History:   Diagnosis Date    Diabetes mellitus, type 2 (H)     History of CVA (cerebrovascular accident)     Hypertension     Nutritional and metabolic cardiomyopathies (H)        Past Surgical History:   Procedure Laterality Date    CORONARY ARTERY BYPASS GRAFT, WITH ENDOSCOPIC VESSEL PROCUREMENT N/A 3/17/2025    Procedure: CORONARY ARTERY BYPASS GRAFT TIMES FOUR, LEFT INTERNAL MAMMARY ARTERY HARVEST, LEFT LEG ENDOSCOPIC SAPHENOUS VEIN PROCUREMENT,;  Surgeon: Alice Shahid MD;  Location: [facility] Main OR    CV CORONARY ANGIOGRAM N/A 2/24/2025    Procedure: Coronary Angiogram;  Surgeon: Bautista Durand MD;  Location: [facility] CATH LAB CV    CV INTRA AORTIC BALLOON N/A 3/17/2025    Procedure: Intra aortic Balloon  Pump Insertion;  Surgeon: Bautista Durand MD;  Location: [facility] CATH LAB CV    CV LEFT HEART CATH N/A 2/24/2025    Procedure: Left Heart Catheterization;  Surgeon: Bautista Durand MD;  Location: [facility] CV    PICC DOUBLE LUMEN PLACEMENT  3/20/2025    TRANSESOPHAGEAL ECHOCARDIOGRAM INTRAOPERATIVE  3/17/2025    Procedure: EPIAORTIC ULTRASOUND, ANESTHESIA TRANSESOPHAGEAL ECHOCARDIOGRAM;  Surgeon: Alice Shahid MD;  Location: [facility] Main OR       No family history on file.    Social History     Tobacco Use    Smoking status: Former     Types: Cigarettes    Smokeless tobacco: Never   Substance Use Topics    Alcohol use: Not Currently       furosemide (LASIX) 20 MG tablet  acetaminophen (TYLENOL) 500 MG tablet  aspirin (ASA) 81 MG chewable tablet  carvedilol (COREG) 12.5 MG tablet  clopidogrel (PLAVIX) 75 MG tablet  cyanocobalamin (VITAMIN B-12) 500 MCG SUBL sublingual tablet  FLUoxetine 20 MG tablet  insulin aspart (NOVOLOG PEN) 100 UNIT/ML pen  insulin aspart (NOVOLOG PEN) 100 UNIT/ML pen  LANTUS SOLOSTAR 100 UNIT/ML soln  Lidocaine (LIDOCARE) 4 % Patch  losartan (COZAAR) 25 MG tablet  polyethylene glycol (MIRALAX) 17 GM/Dose powder  senna-docusate (SENOKOT-S/PERICOLACE) 8.6-50 MG tablet  simvastatin (ZOCOR) 40 MG tablet  traZODone (DESYREL) 50 MG tablet        Physical Exam     First Vitals:  Patient Vitals for the past 24 hrs:   BP Temp Temp src Pulse Resp SpO2 Height Weight   04/04/25 1415 108/71 -- -- 80 22 -- -- --   04/04/25 1400 118/68 -- -- 74 15 95 % -- --   04/04/25 1345 116/56 -- -- -- -- -- -- --   04/04/25 1330 120/60 -- -- 77 13 -- -- --   04/04/25 1315 116/63 -- -- 78 13 94 % -- --   04/04/25 1300 118/57 -- -- 77 15 94 % -- --   04/04/25 1245 117/57 -- -- 74 -- -- -- --   04/04/25 1215 122/60 -- -- 75 23 97 % -- --   04/04/25 1200 122/59 -- -- 75 18 98 % -- --   04/04/25 1130 117/62 -- -- 73 -- 95 % -- --   04/04/25 1121 111/61 98.2  F (36.8  C) Oral 75 -- 98 % -- --   04/04/25 1115  "111/61 -- -- 74 18 97 % -- --   04/04/25 1112 114/65 -- -- 75 -- 99 % 1.727 m (5' 8\") 84.8 kg (187 lb)       PHYSICAL EXAM:   Constitutional:   Lying semi-upright, conversive.    HENT:  Normocephalic, posterior pharynx wnl, mucous membranes moist and dark pink   Eyes:  PERRL, EOMI, Conjunctiva normal, No discharge, no scleral icterus.  Respiratory: Decreased breath sounds on the left compared to the right.   Cardiovascular:  Heart sounds distant, regular rate and rhythm, nl s1s2. Peripheral pulses dp, pt, and radial are wnl.  Trace edema at the soft line.   GI:  Bowel sounds normal, Soft, No tenderness, No flank tenderness, nondistended.  :No CVA tenderness.   Musculoskeletal:  Moves all extremities.  No erythematous or swollen major joints,   Integument:  Puncture bharti on the right side of the neck healing well. Skin color good, not diaphoretic. Surgical incisions appear scabbed over, no surrounding erythema. Trace edema at the soft line.   Neurologic:  Alert & oriented x 3, Normal motor function, Normal sensory function, No focal deficits noted. Normal speech.  Psychiatric:  Affect normal, Judgment normal, Mood normal.     Results     LAB AND RADIOLOGY:  All pertinent labs reviewed and interpreted  Results for orders placed or performed during the hospital encounter of 04/04/25   XR Chest 1 View     Status: None    Narrative    EXAM: XR CHEST 1 VIEW  LOCATION: [facility]  DATE: 4/4/2025    INDICATION: persistent dyspnea after cabg 3 17 25  COMPARISON: AP view of the chest 3/22/2025      Impression    IMPRESSION:     Unchanged mild elevation of the left hemidiaphragm and adjacent atelectasis. The right lung is well-expanded and clear.    Unchanged heart and mediastinal interfaces. Previous median sternotomy and coronary revascularization.    No enlarging pleural effusion or pneumothorax.    Intact and aligned sternal wires. Small thoracic spine degenerative osteophytes.   INR     Status: " Abnormal   Result Value Ref Range    INR 1.19 (H) 0.85 - 1.15   Basic metabolic panel     Status: Abnormal   Result Value Ref Range    Sodium 136 135 - 145 mmol/L    Potassium 4.7 3.4 - 5.3 mmol/L    Chloride 101 98 - 107 mmol/L    Carbon Dioxide (CO2) 25 22 - 29 mmol/L    Anion Gap 10 7 - 15 mmol/L    Urea Nitrogen 31.3 (H) 8.0 - 23.0 mg/dL    Creatinine 1.96 (H) 0.67 - 1.17 mg/dL    GFR Estimate 35 (L) >60 mL/min/1.73m2    Calcium 9.0 8.8 - 10.4 mg/dL    Glucose 160 (H) 70 - 99 mg/dL   Troponin T, High Sensitivity     Status: Abnormal   Result Value Ref Range    Troponin T, High Sensitivity 65 (H) <=22 ng/L   Nt probnp inpatient (BNP)     Status: Abnormal   Result Value Ref Range    N terminal Pro BNP Inpatient 5,628 (H) 0 - 900 pg/mL   CBC with platelets and differential     Status: Abnormal   Result Value Ref Range    WBC Count 6.9 4.0 - 11.0 10e3/uL    RBC Count 3.17 (L) 4.40 - 5.90 10e6/uL    Hemoglobin 10.4 (L) 13.3 - 17.7 g/dL    Hematocrit 30.8 (L) 40.0 - 53.0 %    MCV 97 78 - 100 fL    MCH 32.8 26.5 - 33.0 pg    MCHC 33.8 31.5 - 36.5 g/dL    RDW 14.2 10.0 - 15.0 %    Platelet Count 308 150 - 450 10e3/uL    % Neutrophils 64 %    % Lymphocytes 17 %    % Monocytes 11 %    % Eosinophils 5 %    % Basophils 2 %    % Immature Granulocytes 0 %    NRBCs per 100 WBC 0 <1 /100    Absolute Neutrophils 4.4 1.6 - 8.3 10e3/uL    Absolute Lymphocytes 1.2 0.8 - 5.3 10e3/uL    Absolute Monocytes 0.8 0.0 - 1.3 10e3/uL    Absolute Eosinophils 0.4 0.0 - 0.7 10e3/uL    Absolute Basophils 0.1 0.0 - 0.2 10e3/uL    Absolute Immature Granulocytes 0.0 <=0.4 10e3/uL    Absolute NRBCs 0.0 10e3/uL   [place] Draw     Status: None    Narrative    The following orders were created for panel order [place] Draw.  Procedure                               Abnormality         Status                     ---------                               -----------         ------                     Extra Urine Collection[[id number]]                           Final result                 Please view results for these tests on the individual orders.   Extra Urine Collection     Status: None   Result Value Ref Range    Hold Specimen JIC    Troponin T, High Sensitivity     Status: Abnormal   Result Value Ref Range    Troponin T, High Sensitivity 56 (H) <=22 ng/L   Adult Type and Screen     Status: None   Result Value Ref Range    ABO/RH(D) A NEG     Antibody Screen Negative Negative    SPECIMEN EXPIRATION DATE [id number]    CBC with platelets differential     Status: Abnormal    Narrative    The following orders were created for panel order CBC with platelets differential.  Procedure                               Abnormality         Status                     ---------                               -----------         ------                     CBC with platelets and ...[[id number]]  Abnormal            Final result                 Please view results for these tests on the individual orders.   ABO/Rh type and screen     Status: None    Narrative    The following orders were created for panel order ABO/Rh type and screen.  Procedure                               Abnormality         Status                     ---------                               -----------         ------                     Adult Type and Screen[[id number]]                           Final result                 Please view results for these tests on the individual orders.   I did review the cxr above and agree with radiology results      ECG:    Performed at: 1149    Impression: nsr rate of 78, incomplete rbbb with repolarization changes with flipped t waves in v1-2, and avl.  Anteroseptal infarct pattern age undetermined, qtc long at 519.  Compared to 3/18/25 nsr rreplaced wide qrs rhythm, and qtc lengthened.    Pr 172ms, qrs 118ms, qtc 519ms, prt axes 74 73 100    I have independently reviewed and interpreted the EKS(s) documented above    PROCEDURES:  Procedures:      [facility] [place] [facility]  Documentation     Medical Decision Making    Obtained supplemental history:Supplemental history obtained?: from EMS  Reviewed external records: External records reviewed?: Documented in chart  Care impacted by chronic illness:Documented in Chart - heart disease  Did you consider but not order tests?: Work up considered but not performed and documented in chart, if applicable  Did you interpret images independently?: Independent interpretation of ECG and images noted in documentation, when applicable.  Consultation discussion with other provider:Did you involve another provider (consultant, , pharmacy, etc.)?: I discussed the care with another health care provider, see documentation for details.    I recommended admission but patient refused and is leaving ama.    MIPS (CTPE, Dental pain, [place], Sinusitis, Asthma/COPD, Head Trauma): Not Applicable    SEPSIS: None    The creation of this record is based on the scribe s observations of the work being performed by Myrna Deleon and the provider s statements to them. This document has been checked and approved by MD Jany Ferguson MD  Emergency Medicine  [facility] EMERGENCY DEPARTMENT         Jany Fontana MD  04/04/25 9477

## 2025-04-04 NOTE — DISCHARGE INSTRUCTIONS
You are still recovering from your bypass.  Continue to work with the rehab physicians and staff at your facility.    Since you are not willing to stay in the hospital I did recommend they at least increase your Lasix for the next few days.  You should be seen by a cardiologist within the next week to review your medications and see how you are doing with this.  Call your cardiologist of choice to set up the appointment.  I would recommend that you return to the cardiologist here since they did your procedure here at [facility] and it will be more difficult for you to get an elsewhere but you can see a cardiologist of your choice since you are not happy with your care at [facility].  I will make a referral for you to be seen at our cardiology clinic here in a week if you choose to return here.  You should receive a call from the cardiology group to set that up.

## 2025-04-04 NOTE — ED NOTES
When the pt sat on the edge of the bed he said that he was feeling dizzy. He needed support when he was walking, because he refused to use a walker. Pt O2 level was 95%. Pt walked short distance.

## 2025-04-07 ENCOUNTER — APPOINTMENT (OUTPATIENT)
Dept: CT IMAGING | Facility: HOSPITAL | Age: 75
End: 2025-04-07
Attending: STUDENT IN AN ORGANIZED HEALTH CARE EDUCATION/TRAINING PROGRAM
Payer: COMMERCIAL

## 2025-04-07 ENCOUNTER — TELEPHONE (OUTPATIENT)
Dept: CARDIOLOGY | Facility: CLINIC | Age: 75
End: 2025-04-07

## 2025-04-07 ENCOUNTER — HOSPITAL ENCOUNTER (INPATIENT)
Facility: HOSPITAL | Age: 75
End: 2025-04-07
Attending: STUDENT IN AN ORGANIZED HEALTH CARE EDUCATION/TRAINING PROGRAM
Payer: COMMERCIAL

## 2025-04-07 DIAGNOSIS — I25.10 CORONARY ARTERY DISEASE INVOLVING NATIVE CORONARY ARTERY OF NATIVE HEART, UNSPECIFIED WHETHER ANGINA PRESENT: ICD-10-CM

## 2025-04-07 DIAGNOSIS — I42.9 SECONDARY CARDIOMYOPATHY (H): ICD-10-CM

## 2025-04-07 DIAGNOSIS — I25.118 CORONARY ARTERY DISEASE OF NATIVE ARTERY OF NATIVE HEART WITH STABLE ANGINA PECTORIS: ICD-10-CM

## 2025-04-07 DIAGNOSIS — R07.9 CHEST PAIN, UNSPECIFIED TYPE: Primary | ICD-10-CM

## 2025-04-07 DIAGNOSIS — I50.9 ACUTE DECOMPENSATED HEART FAILURE (H): Primary | ICD-10-CM

## 2025-04-07 DIAGNOSIS — T73.0XXA STARVATION KETOACIDOSIS: ICD-10-CM

## 2025-04-07 DIAGNOSIS — Z98.890 STATUS POST CORONARY ANGIOGRAM: ICD-10-CM

## 2025-04-07 DIAGNOSIS — Z79.4 TYPE 2 DIABETES MELLITUS WITH KETOACIDOSIS WITHOUT COMA, WITH LONG-TERM CURRENT USE OF INSULIN (H): ICD-10-CM

## 2025-04-07 DIAGNOSIS — Z95.1 S/P CABG (CORONARY ARTERY BYPASS GRAFT): ICD-10-CM

## 2025-04-07 DIAGNOSIS — N18.32 STAGE 3B CHRONIC KIDNEY DISEASE (H): ICD-10-CM

## 2025-04-07 DIAGNOSIS — E87.29 STARVATION KETOACIDOSIS: ICD-10-CM

## 2025-04-07 DIAGNOSIS — E11.10 TYPE 2 DIABETES MELLITUS WITH KETOACIDOSIS WITHOUT COMA, WITH LONG-TERM CURRENT USE OF INSULIN (H): ICD-10-CM

## 2025-04-07 DIAGNOSIS — R11.2 NAUSEA AND VOMITING, UNSPECIFIED VOMITING TYPE: ICD-10-CM

## 2025-04-07 LAB
ALBUMIN SERPL BCG-MCNC: 3.4 G/DL (ref 3.5–5.2)
ALBUMIN SERPL BCG-MCNC: 4.2 G/DL (ref 3.5–5.2)
ALP SERPL-CCNC: 217 U/L (ref 40–150)
ALP SERPL-CCNC: 233 U/L (ref 40–150)
ALT SERPL W P-5'-P-CCNC: 10 U/L (ref 0–70)
ALT SERPL W P-5'-P-CCNC: 13 U/L (ref 0–70)
ANION GAP SERPL CALCULATED.3IONS-SCNC: 13 MMOL/L (ref 7–15)
ANION GAP SERPL CALCULATED.3IONS-SCNC: 16 MMOL/L (ref 7–15)
ANION GAP SERPL CALCULATED.3IONS-SCNC: 19 MMOL/L (ref 7–15)
ANION GAP SERPL CALCULATED.3IONS-SCNC: 21 MMOL/L (ref 7–15)
AST SERPL W P-5'-P-CCNC: 15 U/L (ref 0–45)
AST SERPL W P-5'-P-CCNC: 17 U/L (ref 0–45)
B-OH-BUTYR SERPL-SCNC: 1.99 MMOL/L
B-OH-BUTYR SERPL-SCNC: 3.1 MMOL/L
B-OH-BUTYR SERPL-SCNC: 3.92 MMOL/L
B-OH-BUTYR SERPL-SCNC: 4.03 MMOL/L
BASE EXCESS BLDA CALC-SCNC: -9.4 MMOL/L (ref -3–3)
BASE EXCESS BLDV CALC-SCNC: -5.3 MMOL/L (ref -3–3)
BASOPHILS # BLD AUTO: 0.2 10E3/UL (ref 0–0.2)
BASOPHILS NFR BLD AUTO: 2 %
BILIRUB DIRECT SERPL-MCNC: 0.5 MG/DL (ref 0–0.3)
BILIRUB SERPL-MCNC: 0.8 MG/DL
BILIRUB SERPL-MCNC: 0.8 MG/DL
BUN SERPL-MCNC: 35.9 MG/DL (ref 8–23)
BUN SERPL-MCNC: 39.8 MG/DL (ref 8–23)
BUN SERPL-MCNC: 41.6 MG/DL (ref 8–23)
BUN SERPL-MCNC: 43.4 MG/DL (ref 8–23)
CALCIUM SERPL-MCNC: 7.3 MG/DL (ref 8.8–10.4)
CALCIUM SERPL-MCNC: 8.5 MG/DL (ref 8.8–10.4)
CALCIUM SERPL-MCNC: 8.6 MG/DL (ref 8.8–10.4)
CALCIUM SERPL-MCNC: 9.3 MG/DL (ref 8.8–10.4)
CHLORIDE SERPL-SCNC: 100 MMOL/L (ref 98–107)
CHLORIDE SERPL-SCNC: 100 MMOL/L (ref 98–107)
CHLORIDE SERPL-SCNC: 104 MMOL/L (ref 98–107)
CHLORIDE SERPL-SCNC: 110 MMOL/L (ref 98–107)
CREAT BLD-MCNC: 2.4 MG/DL (ref 0.7–1.2)
CREAT SERPL-MCNC: 1.66 MG/DL (ref 0.67–1.17)
CREAT SERPL-MCNC: 1.86 MG/DL (ref 0.67–1.17)
CREAT SERPL-MCNC: 1.92 MG/DL (ref 0.67–1.17)
CREAT SERPL-MCNC: 2.06 MG/DL (ref 0.67–1.17)
EGFRCR SERPLBLD CKD-EPI 2021: 27 ML/MIN/1.73M2
EGFRCR SERPLBLD CKD-EPI 2021: 33 ML/MIN/1.73M2
EGFRCR SERPLBLD CKD-EPI 2021: 36 ML/MIN/1.73M2
EGFRCR SERPLBLD CKD-EPI 2021: 37 ML/MIN/1.73M2
EGFRCR SERPLBLD CKD-EPI 2021: 43 ML/MIN/1.73M2
EOSINOPHIL # BLD AUTO: 0.3 10E3/UL (ref 0–0.7)
EOSINOPHIL NFR BLD AUTO: 3 %
ERYTHROCYTE [DISTWIDTH] IN BLOOD BY AUTOMATED COUNT: 13.8 % (ref 10–15)
EST. AVERAGE GLUCOSE BLD GHB EST-MCNC: 160 MG/DL
ETHANOL SERPL-MCNC: <0.01 G/DL
GLUCOSE BLDC GLUCOMTR-MCNC: 180 MG/DL (ref 70–99)
GLUCOSE BLDC GLUCOMTR-MCNC: 188 MG/DL (ref 70–99)
GLUCOSE BLDC GLUCOMTR-MCNC: 191 MG/DL (ref 70–99)
GLUCOSE BLDC GLUCOMTR-MCNC: 199 MG/DL (ref 70–99)
GLUCOSE BLDC GLUCOMTR-MCNC: 204 MG/DL (ref 70–99)
GLUCOSE BLDC GLUCOMTR-MCNC: 211 MG/DL (ref 70–99)
GLUCOSE SERPL-MCNC: 173 MG/DL (ref 70–99)
GLUCOSE SERPL-MCNC: 190 MG/DL (ref 70–99)
GLUCOSE SERPL-MCNC: 206 MG/DL (ref 70–99)
GLUCOSE SERPL-MCNC: 208 MG/DL (ref 70–99)
HBA1C MFR BLD: 7.2 %
HCO3 BLD-SCNC: 14 MMOL/L (ref 21–28)
HCO3 BLDV-SCNC: 20 MMOL/L (ref 21–28)
HCO3 SERPL-SCNC: 15 MMOL/L (ref 22–29)
HCO3 SERPL-SCNC: 16 MMOL/L (ref 22–29)
HCO3 SERPL-SCNC: 17 MMOL/L (ref 22–29)
HCO3 SERPL-SCNC: 18 MMOL/L (ref 22–29)
HCT VFR BLD AUTO: 35.8 % (ref 40–53)
HGB BLD-MCNC: 12.3 G/DL (ref 13.3–17.7)
HOLD SPECIMEN: NORMAL
IMM GRANULOCYTES # BLD: 0.1 10E3/UL
IMM GRANULOCYTES NFR BLD: 1 %
LIPASE SERPL-CCNC: 51 U/L (ref 13–60)
LYMPHOCYTES # BLD AUTO: 1.7 10E3/UL (ref 0.8–5.3)
LYMPHOCYTES NFR BLD AUTO: 19 %
MAGNESIUM SERPL-MCNC: 2.4 MG/DL (ref 1.7–2.3)
MAGNESIUM SERPL-MCNC: 2.6 MG/DL (ref 1.7–2.3)
MCH RBC QN AUTO: 32.4 PG (ref 26.5–33)
MCHC RBC AUTO-ENTMCNC: 34.4 G/DL (ref 31.5–36.5)
MCV RBC AUTO: 94 FL (ref 78–100)
MONOCYTES # BLD AUTO: 0.7 10E3/UL (ref 0–1.3)
MONOCYTES NFR BLD AUTO: 8 %
NEUTROPHILS # BLD AUTO: 5.9 10E3/UL (ref 1.6–8.3)
NEUTROPHILS NFR BLD AUTO: 67 %
NRBC # BLD AUTO: 0 10E3/UL
NRBC BLD AUTO-RTO: 0 /100
NT-PROBNP SERPL-MCNC: 3877 PG/ML (ref 0–900)
O2/TOTAL GAS SETTING VFR VENT: 21 %
O2/TOTAL GAS SETTING VFR VENT: 21 %
OSMOLALITY SERPL: 315 MMOL/KG (ref 280–301)
OXYHGB MFR BLDA: 95 % (ref 92–100)
OXYHGB MFR BLDV: 30 % (ref 70–75)
PCO2 BLD: 24 MM HG (ref 35–45)
PCO2 BLDV: 39 MM HG (ref 40–50)
PH BLD: 7.37 [PH] (ref 7.35–7.45)
PH BLDV: 7.33 [PH] (ref 7.32–7.43)
PHOSPHATE SERPL-MCNC: 3.3 MG/DL (ref 2.5–4.5)
PHOSPHATE SERPL-MCNC: 4 MG/DL (ref 2.5–4.5)
PHOSPHATE SERPL-MCNC: 4 MG/DL (ref 2.5–4.5)
PLATELET # BLD AUTO: 352 10E3/UL (ref 150–450)
PO2 BLD: 82 MM HG (ref 80–105)
PO2 BLDV: 22 MM HG (ref 25–47)
POTASSIUM SERPL-SCNC: 3.9 MMOL/L (ref 3.4–5.3)
POTASSIUM SERPL-SCNC: 4.6 MMOL/L (ref 3.4–5.3)
POTASSIUM SERPL-SCNC: 4.7 MMOL/L (ref 3.4–5.3)
POTASSIUM SERPL-SCNC: 4.9 MMOL/L (ref 3.4–5.3)
PROT SERPL-MCNC: 6.8 G/DL (ref 6.4–8.3)
PROT SERPL-MCNC: 7.3 G/DL (ref 6.4–8.3)
RBC # BLD AUTO: 3.8 10E6/UL (ref 4.4–5.9)
SAO2 % BLDA: 96.5 % (ref 95–96)
SAO2 % BLDV: 30.6 % (ref 70–75)
SODIUM SERPL-SCNC: 134 MMOL/L (ref 135–145)
SODIUM SERPL-SCNC: 136 MMOL/L (ref 135–145)
SODIUM SERPL-SCNC: 139 MMOL/L (ref 135–145)
SODIUM SERPL-SCNC: 140 MMOL/L (ref 135–145)
TROPONIN T SERPL HS-MCNC: 56 NG/L
TROPONIN T SERPL HS-MCNC: 58 NG/L
WBC # BLD AUTO: 8.8 10E3/UL (ref 4–11)

## 2025-04-07 PROCEDURE — 99285 EMERGENCY DEPT VISIT HI MDM: CPT | Mod: 25

## 2025-04-07 PROCEDURE — 83930 ASSAY OF BLOOD OSMOLALITY: CPT | Performed by: STUDENT IN AN ORGANIZED HEALTH CARE EDUCATION/TRAINING PROGRAM

## 2025-04-07 PROCEDURE — 82247 BILIRUBIN TOTAL: CPT | Performed by: STUDENT IN AN ORGANIZED HEALTH CARE EDUCATION/TRAINING PROGRAM

## 2025-04-07 PROCEDURE — 83735 ASSAY OF MAGNESIUM: CPT | Performed by: STUDENT IN AN ORGANIZED HEALTH CARE EDUCATION/TRAINING PROGRAM

## 2025-04-07 PROCEDURE — 82805 BLOOD GASES W/O2 SATURATION: CPT | Performed by: STUDENT IN AN ORGANIZED HEALTH CARE EDUCATION/TRAINING PROGRAM

## 2025-04-07 PROCEDURE — 258N000003 HC RX IP 258 OP 636: Performed by: STUDENT IN AN ORGANIZED HEALTH CARE EDUCATION/TRAINING PROGRAM

## 2025-04-07 PROCEDURE — 250N000011 HC RX IP 250 OP 636: Performed by: STUDENT IN AN ORGANIZED HEALTH CARE EDUCATION/TRAINING PROGRAM

## 2025-04-07 PROCEDURE — 82077 ASSAY SPEC XCP UR&BREATH IA: CPT | Performed by: STUDENT IN AN ORGANIZED HEALTH CARE EDUCATION/TRAINING PROGRAM

## 2025-04-07 PROCEDURE — 83690 ASSAY OF LIPASE: CPT | Performed by: STUDENT IN AN ORGANIZED HEALTH CARE EDUCATION/TRAINING PROGRAM

## 2025-04-07 PROCEDURE — 93005 ELECTROCARDIOGRAM TRACING: CPT | Performed by: STUDENT IN AN ORGANIZED HEALTH CARE EDUCATION/TRAINING PROGRAM

## 2025-04-07 PROCEDURE — 94640 AIRWAY INHALATION TREATMENT: CPT | Mod: 76

## 2025-04-07 PROCEDURE — 84100 ASSAY OF PHOSPHORUS: CPT | Performed by: STUDENT IN AN ORGANIZED HEALTH CARE EDUCATION/TRAINING PROGRAM

## 2025-04-07 PROCEDURE — 82010 KETONE BODYS QUAN: CPT | Performed by: STUDENT IN AN ORGANIZED HEALTH CARE EDUCATION/TRAINING PROGRAM

## 2025-04-07 PROCEDURE — 99223 1ST HOSP IP/OBS HIGH 75: CPT | Mod: AI | Performed by: STUDENT IN AN ORGANIZED HEALTH CARE EDUCATION/TRAINING PROGRAM

## 2025-04-07 PROCEDURE — 74174 CTA ABD&PLVS W/CONTRAST: CPT

## 2025-04-07 PROCEDURE — 85004 AUTOMATED DIFF WBC COUNT: CPT | Performed by: STUDENT IN AN ORGANIZED HEALTH CARE EDUCATION/TRAINING PROGRAM

## 2025-04-07 PROCEDURE — 83880 ASSAY OF NATRIURETIC PEPTIDE: CPT | Performed by: STUDENT IN AN ORGANIZED HEALTH CARE EDUCATION/TRAINING PROGRAM

## 2025-04-07 PROCEDURE — P9047 ALBUMIN (HUMAN), 25%, 50ML: HCPCS | Performed by: STUDENT IN AN ORGANIZED HEALTH CARE EDUCATION/TRAINING PROGRAM

## 2025-04-07 PROCEDURE — 36415 COLL VENOUS BLD VENIPUNCTURE: CPT | Performed by: STUDENT IN AN ORGANIZED HEALTH CARE EDUCATION/TRAINING PROGRAM

## 2025-04-07 PROCEDURE — 82310 ASSAY OF CALCIUM: CPT | Performed by: STUDENT IN AN ORGANIZED HEALTH CARE EDUCATION/TRAINING PROGRAM

## 2025-04-07 PROCEDURE — 999N000157 HC STATISTIC RCP TIME EA 10 MIN

## 2025-04-07 PROCEDURE — 99418 PROLNG IP/OBS E/M EA 15 MIN: CPT | Performed by: STUDENT IN AN ORGANIZED HEALTH CARE EDUCATION/TRAINING PROGRAM

## 2025-04-07 PROCEDURE — 80048 BASIC METABOLIC PNL TOTAL CA: CPT | Performed by: STUDENT IN AN ORGANIZED HEALTH CARE EDUCATION/TRAINING PROGRAM

## 2025-04-07 PROCEDURE — 96374 THER/PROPH/DIAG INJ IV PUSH: CPT | Mod: 59

## 2025-04-07 PROCEDURE — 82962 GLUCOSE BLOOD TEST: CPT

## 2025-04-07 PROCEDURE — 250N000012 HC RX MED GY IP 250 OP 636 PS 637: Performed by: STUDENT IN AN ORGANIZED HEALTH CARE EDUCATION/TRAINING PROGRAM

## 2025-04-07 PROCEDURE — 82248 BILIRUBIN DIRECT: CPT | Performed by: STUDENT IN AN ORGANIZED HEALTH CARE EDUCATION/TRAINING PROGRAM

## 2025-04-07 PROCEDURE — 83036 HEMOGLOBIN GLYCOSYLATED A1C: CPT | Performed by: STUDENT IN AN ORGANIZED HEALTH CARE EDUCATION/TRAINING PROGRAM

## 2025-04-07 PROCEDURE — 84155 ASSAY OF PROTEIN SERUM: CPT | Performed by: STUDENT IN AN ORGANIZED HEALTH CARE EDUCATION/TRAINING PROGRAM

## 2025-04-07 PROCEDURE — 84484 ASSAY OF TROPONIN QUANT: CPT | Performed by: STUDENT IN AN ORGANIZED HEALTH CARE EDUCATION/TRAINING PROGRAM

## 2025-04-07 PROCEDURE — 250N000013 HC RX MED GY IP 250 OP 250 PS 637: Performed by: STUDENT IN AN ORGANIZED HEALTH CARE EDUCATION/TRAINING PROGRAM

## 2025-04-07 PROCEDURE — 120N000001 HC R&B MED SURG/OB

## 2025-04-07 PROCEDURE — 96375 TX/PRO/DX INJ NEW DRUG ADDON: CPT

## 2025-04-07 PROCEDURE — 94640 AIRWAY INHALATION TREATMENT: CPT

## 2025-04-07 PROCEDURE — 36600 WITHDRAWAL OF ARTERIAL BLOOD: CPT

## 2025-04-07 PROCEDURE — 82565 ASSAY OF CREATININE: CPT

## 2025-04-07 PROCEDURE — 250N000009 HC RX 250: Performed by: STUDENT IN AN ORGANIZED HEALTH CARE EDUCATION/TRAINING PROGRAM

## 2025-04-07 RX ORDER — CARVEDILOL 12.5 MG/1
12.5 TABLET ORAL 2 TIMES DAILY
Status: DISCONTINUED | OUTPATIENT
Start: 2025-04-07 | End: 2025-04-09

## 2025-04-07 RX ORDER — FUROSEMIDE 20 MG/1
40 TABLET ORAL
Status: DISCONTINUED | OUTPATIENT
Start: 2025-04-07 | End: 2025-04-09

## 2025-04-07 RX ORDER — AMOXICILLIN 250 MG
2 CAPSULE ORAL 2 TIMES DAILY PRN
Status: DISCONTINUED | OUTPATIENT
Start: 2025-04-07 | End: 2025-04-14 | Stop reason: HOSPADM

## 2025-04-07 RX ORDER — ASPIRIN 81 MG/1
81 TABLET, CHEWABLE ORAL DAILY
Status: DISCONTINUED | OUTPATIENT
Start: 2025-04-07 | End: 2025-04-09

## 2025-04-07 RX ORDER — IPRATROPIUM BROMIDE AND ALBUTEROL SULFATE 2.5; .5 MG/3ML; MG/3ML
3 SOLUTION RESPIRATORY (INHALATION)
Status: DISCONTINUED | OUTPATIENT
Start: 2025-04-07 | End: 2025-04-08

## 2025-04-07 RX ORDER — HYDROMORPHONE HCL IN WATER/PF 6 MG/30 ML
0.2 PATIENT CONTROLLED ANALGESIA SYRINGE INTRAVENOUS
Status: DISCONTINUED | OUTPATIENT
Start: 2025-04-07 | End: 2025-04-13

## 2025-04-07 RX ORDER — HYDROMORPHONE HCL IN WATER/PF 6 MG/30 ML
0.4 PATIENT CONTROLLED ANALGESIA SYRINGE INTRAVENOUS
Status: DISCONTINUED | OUTPATIENT
Start: 2025-04-07 | End: 2025-04-13

## 2025-04-07 RX ORDER — NALOXONE HYDROCHLORIDE 0.4 MG/ML
0.2 INJECTION, SOLUTION INTRAMUSCULAR; INTRAVENOUS; SUBCUTANEOUS
Status: DISCONTINUED | OUTPATIENT
Start: 2025-04-07 | End: 2025-04-14 | Stop reason: HOSPADM

## 2025-04-07 RX ORDER — ALBUMIN (HUMAN) 12.5 G/50ML
50 SOLUTION INTRAVENOUS ONCE
Status: COMPLETED | OUTPATIENT
Start: 2025-04-07 | End: 2025-04-07

## 2025-04-07 RX ORDER — LIDOCAINE 40 MG/G
CREAM TOPICAL
Status: DISCONTINUED | OUTPATIENT
Start: 2025-04-07 | End: 2025-04-14 | Stop reason: HOSPADM

## 2025-04-07 RX ORDER — ACETAMINOPHEN 650 MG/1
650 SUPPOSITORY RECTAL EVERY 4 HOURS PRN
Status: DISCONTINUED | OUTPATIENT
Start: 2025-04-07 | End: 2025-04-14 | Stop reason: HOSPADM

## 2025-04-07 RX ORDER — HYDRALAZINE HYDROCHLORIDE 10 MG/1
10 TABLET, FILM COATED ORAL EVERY 4 HOURS PRN
Status: DISCONTINUED | OUTPATIENT
Start: 2025-04-07 | End: 2025-04-09

## 2025-04-07 RX ORDER — CALCIUM CARBONATE 500 MG/1
1000 TABLET, CHEWABLE ORAL 4 TIMES DAILY PRN
Status: DISCONTINUED | OUTPATIENT
Start: 2025-04-07 | End: 2025-04-14 | Stop reason: HOSPADM

## 2025-04-07 RX ORDER — HYDRALAZINE HYDROCHLORIDE 20 MG/ML
10 INJECTION INTRAMUSCULAR; INTRAVENOUS EVERY 4 HOURS PRN
Status: DISCONTINUED | OUTPATIENT
Start: 2025-04-07 | End: 2025-04-09

## 2025-04-07 RX ORDER — ONDANSETRON 2 MG/ML
4 INJECTION INTRAMUSCULAR; INTRAVENOUS EVERY 6 HOURS PRN
Status: DISCONTINUED | OUTPATIENT
Start: 2025-04-07 | End: 2025-04-14 | Stop reason: HOSPADM

## 2025-04-07 RX ORDER — NALOXONE HYDROCHLORIDE 0.4 MG/ML
0.4 INJECTION, SOLUTION INTRAMUSCULAR; INTRAVENOUS; SUBCUTANEOUS
Status: DISCONTINUED | OUTPATIENT
Start: 2025-04-07 | End: 2025-04-14 | Stop reason: HOSPADM

## 2025-04-07 RX ORDER — NICOTINE POLACRILEX 4 MG
15-30 LOZENGE BUCCAL
Status: DISCONTINUED | OUTPATIENT
Start: 2025-04-07 | End: 2025-04-07 | Stop reason: ALTCHOICE

## 2025-04-07 RX ORDER — DEXTROSE MONOHYDRATE 25 G/50ML
25-50 INJECTION, SOLUTION INTRAVENOUS
Status: DISCONTINUED | OUTPATIENT
Start: 2025-04-07 | End: 2025-04-07 | Stop reason: ALTCHOICE

## 2025-04-07 RX ORDER — ACETAMINOPHEN 325 MG/1
650 TABLET ORAL EVERY 4 HOURS PRN
Status: DISCONTINUED | OUTPATIENT
Start: 2025-04-07 | End: 2025-04-14 | Stop reason: HOSPADM

## 2025-04-07 RX ORDER — AMOXICILLIN 250 MG
1 CAPSULE ORAL 2 TIMES DAILY PRN
Status: DISCONTINUED | OUTPATIENT
Start: 2025-04-07 | End: 2025-04-14 | Stop reason: HOSPADM

## 2025-04-07 RX ORDER — SIMVASTATIN 40 MG
40 TABLET ORAL AT BEDTIME
Status: DISCONTINUED | OUTPATIENT
Start: 2025-04-07 | End: 2025-04-10

## 2025-04-07 RX ORDER — DEXTROSE MONOHYDRATE AND SODIUM CHLORIDE 5; .45 G/100ML; G/100ML
INJECTION, SOLUTION INTRAVENOUS CONTINUOUS
Status: ACTIVE | OUTPATIENT
Start: 2025-04-07 | End: 2025-04-08

## 2025-04-07 RX ORDER — IOPAMIDOL 755 MG/ML
75 INJECTION, SOLUTION INTRAVASCULAR ONCE
Status: COMPLETED | OUTPATIENT
Start: 2025-04-07 | End: 2025-04-07

## 2025-04-07 RX ORDER — MAGNESIUM HYDROXIDE/ALUMINUM HYDROXICE/SIMETHICONE 120; 1200; 1200 MG/30ML; MG/30ML; MG/30ML
30 SUSPENSION ORAL EVERY 4 HOURS PRN
Status: DISCONTINUED | OUTPATIENT
Start: 2025-04-07 | End: 2025-04-14 | Stop reason: HOSPADM

## 2025-04-07 RX ORDER — SODIUM CHLORIDE 9 MG/ML
INJECTION, SOLUTION INTRAVENOUS CONTINUOUS
Status: DISCONTINUED | OUTPATIENT
Start: 2025-04-07 | End: 2025-04-08

## 2025-04-07 RX ORDER — CLOPIDOGREL BISULFATE 75 MG/1
75 TABLET ORAL DAILY
Status: DISCONTINUED | OUTPATIENT
Start: 2025-04-07 | End: 2025-04-14 | Stop reason: HOSPADM

## 2025-04-07 RX ORDER — OXYCODONE HYDROCHLORIDE 5 MG/1
5 TABLET ORAL EVERY 4 HOURS PRN
Status: DISCONTINUED | OUTPATIENT
Start: 2025-04-07 | End: 2025-04-14 | Stop reason: HOSPADM

## 2025-04-07 RX ORDER — TRAZODONE HYDROCHLORIDE 50 MG/1
50 TABLET ORAL AT BEDTIME
Status: DISCONTINUED | OUTPATIENT
Start: 2025-04-07 | End: 2025-04-14 | Stop reason: HOSPADM

## 2025-04-07 RX ORDER — ONDANSETRON 2 MG/ML
4 INJECTION INTRAMUSCULAR; INTRAVENOUS ONCE
Status: COMPLETED | OUTPATIENT
Start: 2025-04-07 | End: 2025-04-07

## 2025-04-07 RX ORDER — NICOTINE POLACRILEX 4 MG
15-30 LOZENGE BUCCAL
Status: DISCONTINUED | OUTPATIENT
Start: 2025-04-07 | End: 2025-04-14 | Stop reason: HOSPADM

## 2025-04-07 RX ORDER — POLYETHYLENE GLYCOL 3350 17 G/17G
17 POWDER, FOR SOLUTION ORAL DAILY
Status: DISCONTINUED | OUTPATIENT
Start: 2025-04-07 | End: 2025-04-14 | Stop reason: HOSPADM

## 2025-04-07 RX ORDER — LORAZEPAM 2 MG/ML
1 INJECTION INTRAMUSCULAR ONCE
Status: COMPLETED | OUTPATIENT
Start: 2025-04-07 | End: 2025-04-07

## 2025-04-07 RX ORDER — DEXTROSE MONOHYDRATE 25 G/50ML
25-50 INJECTION, SOLUTION INTRAVENOUS
Status: DISCONTINUED | OUTPATIENT
Start: 2025-04-07 | End: 2025-04-14 | Stop reason: HOSPADM

## 2025-04-07 RX ORDER — ONDANSETRON 4 MG/1
4 TABLET, ORALLY DISINTEGRATING ORAL EVERY 6 HOURS PRN
Status: DISCONTINUED | OUTPATIENT
Start: 2025-04-07 | End: 2025-04-14 | Stop reason: HOSPADM

## 2025-04-07 RX ADMIN — PROCHLORPERAZINE EDISYLATE 5 MG: 5 INJECTION INTRAMUSCULAR; INTRAVENOUS at 16:08

## 2025-04-07 RX ADMIN — SODIUM CHLORIDE: 9 INJECTION, SOLUTION INTRAVENOUS at 19:32

## 2025-04-07 RX ADMIN — SIMVASTATIN 40 MG: 40 TABLET, FILM COATED ORAL at 22:27

## 2025-04-07 RX ADMIN — ONDANSETRON 4 MG: 2 INJECTION, SOLUTION INTRAMUSCULAR; INTRAVENOUS at 10:24

## 2025-04-07 RX ADMIN — SODIUM CHLORIDE: 9 INJECTION, SOLUTION INTRAVENOUS at 14:29

## 2025-04-07 RX ADMIN — FLUOXETINE HYDROCHLORIDE 20 MG: 20 CAPSULE ORAL at 20:36

## 2025-04-07 RX ADMIN — TRAZODONE HYDROCHLORIDE 50 MG: 50 TABLET ORAL at 22:27

## 2025-04-07 RX ADMIN — IPRATROPIUM BROMIDE AND ALBUTEROL SULFATE 3 ML: .5; 3 SOLUTION RESPIRATORY (INHALATION) at 19:28

## 2025-04-07 RX ADMIN — FAMOTIDINE 20 MG: 10 INJECTION, SOLUTION INTRAVENOUS at 10:48

## 2025-04-07 RX ADMIN — ALBUMIN HUMAN 50 G: 0.25 SOLUTION INTRAVENOUS at 14:39

## 2025-04-07 RX ADMIN — SODIUM CHLORIDE 500 ML: 9 INJECTION, SOLUTION INTRAVENOUS at 13:51

## 2025-04-07 RX ADMIN — IPRATROPIUM BROMIDE AND ALBUTEROL SULFATE 3 ML: .5; 3 SOLUTION RESPIRATORY (INHALATION) at 16:36

## 2025-04-07 RX ADMIN — LORAZEPAM 1 MG: 2 INJECTION INTRAMUSCULAR; INTRAVENOUS at 10:47

## 2025-04-07 RX ADMIN — HYDROMORPHONE HYDROCHLORIDE 0.4 MG: 0.2 INJECTION, SOLUTION INTRAMUSCULAR; INTRAVENOUS; SUBCUTANEOUS at 19:01

## 2025-04-07 RX ADMIN — ONDANSETRON 4 MG: 2 INJECTION, SOLUTION INTRAMUSCULAR; INTRAVENOUS at 19:01

## 2025-04-07 RX ADMIN — DEXTROSE AND SODIUM CHLORIDE: 5; .45 INJECTION, SOLUTION INTRAVENOUS at 18:34

## 2025-04-07 RX ADMIN — INSULIN HUMAN 2 UNITS/HR: 1 INJECTION, SOLUTION INTRAVENOUS at 17:18

## 2025-04-07 RX ADMIN — INSULIN ASPART 2 UNITS: 100 INJECTION, SOLUTION INTRAVENOUS; SUBCUTANEOUS at 17:14

## 2025-04-07 RX ADMIN — Medication 12.5 MG: at 20:35

## 2025-04-07 RX ADMIN — ASPIRIN 81 MG CHEWABLE TABLET 81 MG: 81 TABLET CHEWABLE at 20:17

## 2025-04-07 RX ADMIN — HYDRALAZINE HYDROCHLORIDE 10 MG: 20 INJECTION INTRAMUSCULAR; INTRAVENOUS at 14:37

## 2025-04-07 RX ADMIN — POLYETHYLENE GLYCOL 3350 17 G: 17 POWDER, FOR SOLUTION ORAL at 20:36

## 2025-04-07 RX ADMIN — HYDROMORPHONE HYDROCHLORIDE 0.4 MG: 0.2 INJECTION, SOLUTION INTRAMUSCULAR; INTRAVENOUS; SUBCUTANEOUS at 14:29

## 2025-04-07 RX ADMIN — PANTOPRAZOLE SODIUM 40 MG: 40 INJECTION, POWDER, FOR SOLUTION INTRAVENOUS at 15:08

## 2025-04-07 RX ADMIN — CLOPIDOGREL 75 MG: 75 TABLET ORAL at 20:17

## 2025-04-07 RX ADMIN — CARVEDILOL 12.5 MG: 12.5 TABLET, FILM COATED ORAL at 20:35

## 2025-04-07 RX ADMIN — IOPAMIDOL 75 ML: 755 INJECTION, SOLUTION INTRAVENOUS at 11:04

## 2025-04-07 ASSESSMENT — ACTIVITIES OF DAILY LIVING (ADL)
ADLS_ACUITY_SCORE: 55

## 2025-04-07 NOTE — ED TRIAGE NOTES
PT here from [facility]. Pt has been there since discharge 3/26 following CABG x 4. Pt has been increasingly having pain and sob N/V since. Pt was last seen 4/4 for similar sx. Pt is minimally verbal however EMS report pt alert and oriented x 4. Received 4 mg Zofran in route, line infiltrated unknown how much he received. Vomiting at arrival no blood in emesis. Pt nodding to questions but not talking about specifics to location of C/p. Afebrile, refused nitro due to nausea. Takes Plavix. Reports now pain 9/10. Nothing makes better. Sob. No tingling in hands or feet. Denies HA. No vision changes. Denies dizziness.

## 2025-04-07 NOTE — ED PROVIDER NOTES
NAME: Eric Cordoba  AGE: 74 year old male  YOB: 1950  MRN: [id number]  EVALUATION DATE & TIME: 4/7/2025 10:12 AM    PCP: Abner Elmore  ED PROVIDER: Negra Reyna MD.    Chief Complaint   Patient presents with    Chest Pain    Nausea       FINAL IMPRESSION:  1. Chest pain, unspecified type    2. Nausea and vomiting, unspecified vomiting type        MEDICAL DECISION MAKING:    10:18 AM I met with the patient, obtained history, performed an initial exam, and discussed options and plan for diagnostics and treatment here in the ED.   10:20 AM Spoke with interventional cardiologist, Dr. Shaw, agrees with deactivation of cath lab.  11:33 AM Rechecked and updated the patient.  1:30 PM I spoke with the hospitalist, Dr. Gondal. We discussed the patient's case and they agree to admit the patient.     Eric Cordoba is a 74 year old male with a past medical history of DM2, HTN, CVA, CKD3b, CAD s/p CABGx4, HLD, and carotid artery stenosis, who presents for evaluation of chest pain.  Patient underwent recent CABG x 4 on 3/17/2025. Seen in the ED 4/4/25 for shortness of breath, fatigue and right lower back pain. Plan was for IV lasix and admission but patient adamantely refused.    Patient is here with chest pain, shortness of breath, nausea and vomiting that started this morning.  Medics had initially activated the Cath Lab for STEMI.  However on arrival his EKG looks stable compared to recent without acute ST changes.  I also discussed with interventional cardiology who is in agreement and they do not recommend activating the Cath Lab at this time. OF note QTC is slightly prolonged at 516, he had this on prior EKG as well.    Differential includes but not limited to ACS, PE, dissection, pericarditis, effusion, esophageal rupture, dyspepsia, chest wall pain, intraabdominal pathology, metabolic abnormality among others.     Given the nature of his pain I did have concern for aortic dissection.  CTA  thankfully does not show this or any other acute findings. Troponin near priors 58-->56. BNP 3877, decreased from a few days ago and likely still elevated from recent CABG.    Labs notable for mild AGMA and JESS. Though cardiology had recommended diuresis a few days ago he actually appears dry on exam and there are no pleural effusions on his CT scan. Ketones are elevated but normal pH on VBG. May be starvation ketoacidosis. I discussed this with the hospitalist.  He agrees with starting with small amount of fluids and they will monitor closely to ensure he does not need insulin drip for possible early DKA.  Patient was accepted for admission to a cardiac telemetry bed.    Medical Decision Making      Admit.    MIPS (CTPE, Dental pain, [place], Sinusitis, Asthma/COPD, Head Trauma): Not Applicable    SEPSIS: None    MEDICATIONS GIVEN IN THE EMERGENCY:  Medications   ondansetron (ZOFRAN) injection 4 mg (4 mg Intravenous $Given 4/7/25 1024)   famotidine (PEPCID) injection 20 mg (20 mg Intravenous $Given 4/7/25 1048)   LORazepam (ATIVAN) injection 1 mg (1 mg Intravenous $Given 4/7/25 1047)   iopamidol (ISOVUE-370) solution 75 mL (75 mLs Intravenous $Given 4/7/25 1104)       NEW PRESCRIPTIONS STARTED AT TODAY'S ER VISIT:  New Prescriptions    No medications on file        =================================================================  HPI    Patient information was obtained from: EMS, Patient  Use of : N/A       Eric Cordoba is a 74 year old male with a past medical history of DM2, HTN, CVA, CKD3b, CAD s/p CABGx4, HLD, and carotid artery stenosis, who presents for evaluation of chest pain.    Per chart review, the patient was admitted from 3/17/25 until 3/26/25 for planned open heart surgery. Patient was deemed an appropriate candidate for CABG, and was taken to the operating room on 3/17/2025 where patient underwent quadruple vessel coronary artery bypass grafting and endoscopic great saphenous vein harvest  "from the left lower extremity with preoperative IABP placement per cardiology. Surgery was uneventful and patient was brought to the ICU post-operatively. He was extubated on POD#0, IABP was removed on POD#1, and he was weaned from pressors.  He was transferred to general telemetry status on POD#6 where patient has had return of bowel function, is maintaining oxygen saturations on room air, had their chest tubes removed, and has no complaints of chest pain or shortness of breath. On 03/26/25 (POD#8) patient was stable enough to be discharged to TCU.     The patient was seen in the ED on 4/4/25 presenting with shortness of breath, fatigue, and right low back pain since his CABG. BNP elevated at 5,628. Chest x-ray unchanged from previous. Cardiology consulted, recommended starting IV Lasix 60 mg, then proceed to 40 mg 2x/day. Patient refusing admission, adamant about returning to his care facility. Lasix increased to 2x/day at discharge.    Per EMS, the patient recently had an open heart CABG and was discharged to [facility] TCU. This morning the patient had sudden onset of chest pain and shortness of breath with nausea and vomiting. EMS gave 4 mg Zofran en route. EMS obtained EKG en route, concerned for STEMI. Patient is on Plavix and baby aspirin. No NTG or ASA given en route.    The patient endorses chest pain and shortness of breath that started this morning. Also complains of nausea and vomiting. Endorses abdominal pain. Reports chest pain is 9/10. TO nursing later reported no headache, dizziness, new numbness/neurologic changes.     PHYSICAL EXAM:    Vitals: BP (!) 170/80   Pulse 88   Temp 96.8  F (36  C) (Axillary)   Resp 20   Ht 1.727 m (5' 8\")   Wt 86 kg (189 lb 9.6 oz)   SpO2 100%   BMI 28.83 kg/m     Constitutional: Well developed.  Appears uncomfortable.   HENT: Normocephalic, atraumatic. Neck-gross ROM intact.   Eyes: Pupils mid-range, sclera white  Respiratory: CTAB, no respiratory distress. " Sternal incisional wound without erythema/dehiscence  Cardiovascular: Normal heart rate, regular rhythm. No lower extremity edema. Intact and equal radial and DP pulses.  GI: Soft, not distended, diffuse tenderness   Musculoskeletal: Moving extremities intentionally and without pain. No obvious deformity.  Skin: Warm, dry, no rash.  Neurologic: Alert & oriented, speech clear, no focal deficits noted    LAB:  All pertinent labs reviewed and interpreted.  Labs Ordered and Resulted from Time of ED Arrival to Time of ED Departure   COMPREHENSIVE METABOLIC PANEL - Abnormal       Result Value    Sodium 134 (*)     Potassium 4.7      Carbon Dioxide (CO2) 18 (*)     Anion Gap 16 (*)     Urea Nitrogen 43.4 (*)     Creatinine 2.06 (*)     GFR Estimate 33 (*)     Calcium 8.6 (*)     Chloride 100      Glucose 190 (*)     Alkaline Phosphatase 233 (*)     AST 17      ALT 13      Protein Total 6.8      Albumin 3.4 (*)     Bilirubin Total 0.8     TROPONIN T, HIGH SENSITIVITY - Abnormal    Troponin T, High Sensitivity 58 (*)    CBC WITH PLATELETS AND DIFFERENTIAL - Abnormal    WBC Count 8.8      RBC Count 3.80 (*)     Hemoglobin 12.3 (*)     Hematocrit 35.8 (*)     MCV 94      MCH 32.4      MCHC 34.4      RDW 13.8      Platelet Count 352      % Neutrophils 67      % Lymphocytes 19      % Monocytes 8      % Eosinophils 3      % Basophils 2      % Immature Granulocytes 1      NRBCs per 100 WBC 0      Absolute Neutrophils 5.9      Absolute Lymphocytes 1.7      Absolute Monocytes 0.7      Absolute Eosinophils 0.3      Absolute Basophils 0.2      Absolute Immature Granulocytes 0.1      Absolute NRBCs 0.0     MAGNESIUM - Abnormal    Magnesium 2.6 (*)    ISTAT CREATININE POCT - Abnormal    Creatinine POCT 2.4 (*)     GFR, ESTIMATED POCT 27 (*)    TROPONIN T, HIGH SENSITIVITY - Abnormal    Troponin T, High Sensitivity 56 (*)    BLOOD GAS VENOUS - Abnormal    pH Venous 7.33      pCO2 Venous 39 (*)     pO2 Venous 22 (*)     Bicarbonate Venous  20 (*)     Base Excess/Deficit Venous -5.3 (*)     FIO2 21      Oxyhemoglobin Venous 30 (*)     O2 Sat, Venous 30.6 (*)    KETONE BETA-HYDROXYBUTYRATE QUANTITATIVE, RAPID - Abnormal    Ketone (Beta-Hydroxybutyrate) Quantitative 3.10 (*)    NT PROBNP INPATIENT - Abnormal    N terminal Pro BNP Inpatient 3,877 (*)    LIPASE - Normal    Lipase 51     ETHYL ALCOHOL LEVEL - Normal    Alcohol ethyl <0.01     ISTAT CREATININE POCT       RADIOLOGY:  CTA Chest Abdomen Pelvis w Contrast   Final Result   IMPRESSION:   1.  No evidence of acute aortic syndrome.   2.  Postoperative changes of recent coronary artery bypass grafting. No mediastinal fluid collection.   3.  No acute findings in the abdomen or pelvis.              EKG:   Performed at: 10:16  Impression: Sinus rhythm with sinus arrhythmia. Anteroseptal infarct, cited on or before 1/7/25. Prolonged QT.   Rate: 77 bpm  Rhythm: Sinus  QRS Interval: 116 ms  QTc Interval: 516 ms  Comparison: No significant change from 4/4/25.  I have independently reviewed and interpreted the EKG(s) documented above.     I, Barrie Contreras, am serving as a scribe to document services personally performed by Dr. Negra Reyna based on my observation and the provider's statements to me. I, Negra Reyna MD attest that Barrie Contreras is acting in a scribe capacity, has observed my performance of the services and has documented them in accordance with my direction.    Negra Reyna M.D.  Emergency Medicine  [facility] EMERGENCY DEPARTMENT  [address] [place] [id number] [phone]  Dept: [phone]     Negra Reyna MD  04/07/25 [id number]       Negra Reyna MD  04/07/25 1139

## 2025-04-07 NOTE — PHARMACY-ADMISSION MEDICATION HISTORY
Pharmacist Admission Medication History    Admission medication history is complete. The information provided in this note is only as accurate as the sources available at the time of the update.    Information Source(s): Clinic records, Facility (TCU/NH/) medication list/MAR, and CareEverywhere/SureScripts via in-person    Pertinent Information: Patient's TCU Facility ([facility]) provided a MAR with current medications and last known administration times.    Changes made to PTA medication list:  Added: None  Deleted: None  Changed: None    Allergies reviewed with patient and updates made in EHR: yes    Medication History Completed By: Sammy Marks [facility] 4/7/2025 5:11 PM    PTA Med List   Medication Sig Last Dose/Taking    acetaminophen (TYLENOL) 500 MG tablet Take 1,000 mg by mouth 3 times daily. While awake 4/6/2025 Evening    aspirin (ASA) 81 MG chewable tablet Take 1 tablet (81 mg) by mouth daily. Continue for one year postop, then when stopping plavix, increase aspirin to 162 mg daily indefinitely. 4/6/2025 Morning    carvedilol (COREG) 12.5 MG tablet Take 12.5 mg by mouth 2 times daily 4/6/2025 Evening    clopidogrel (PLAVIX) 75 MG tablet Take 1 tablet (75 mg) by mouth daily. Continue for one year postop, then stop and increase aspirin to 162 mg daily indefinitely. 4/6/2025 Morning    cyanocobalamin (VITAMIN B-12) 500 MCG SUBL sublingual tablet Place 1 tablet (500 mcg) under the tongue daily. 4/6/2025 Morning    FLUoxetine 20 MG tablet Take 20 mg by mouth daily. 4/6/2025 Morning    furosemide (LASIX) 20 MG tablet Take 2 tablets (40 mg) by mouth 2 times daily for 10 days. 4/6/2025 Evening    insulin aspart (NOVOLOG PEN) 100 UNIT/ML pen Inject 1-10 Units subcutaneously 3 times daily (before meals). Correction Scale - HIGH INSULIN RESISTANCE DOSING   Do Not give Correction Insulin if Pre-Meal BG less than 140.   For Pre-Meal  - 164 give 1 unit.   For Pre-Meal  - 189 give 2 units.   For  Pre-Meal  - 214 give 3 units.   For Pre-Meal  - 239 give 4 units.   For Pre-Meal  - 264 give 5 units.   For Pre-Meal  - 289 give 6 units.   For Pre-Meal  - 314 give 7 units.   For Pre-Meal  - 339 give 8 units.   For Pre-Meal  - 364 give 9 units.  For Pre-Meal BG greater than or equal to 365 give 10 units  To be given with prandial insulin, and based on pre-meal blood glucose. Administering insulin within 5 minutes of the start of the meal is ideal. Administer insulin no more than 30 minutes after the start of the meal, unless directed otherwise by provider.   Notify provider if glucose greater than or equal to 350 mg/dL after administration of correction dose. Taking    insulin aspart (NOVOLOG PEN) 100 UNIT/ML pen Inject 1-7 Units subcutaneously at bedtime. HIGH INSULIN RESISTANCE DOSING   Do Not give Bedtime Correction Insulin if BG less than 200.   For  - 224 give 1 units.   For  - 249 give 2 units.   For  - 274 give 3 units.   For  - 299 give 4 units.   For  - 324 give 5 units.   For  - 349 give 6 units.   For BG greater than or equal to 350 give 7 units.   Notify provider if glucose greater than or equal to 350 mg/dL after administration of correction dose. Taking    insulin glargine (LANTUS PEN) 100 UNIT/ML pen Inject 30 Units subcutaneously every morning. 4/6/2025 Morning    Lidocaine (LIDOCARE) 4 % Patch Place 1-2 patches over 12 hours onto the skin every 24 hours. To prevent lidocaine toxicity, patient should be patch free for 12 hrs daily. 4/6/2025 Morning    losartan (COZAAR) 25 MG tablet Take 0.5 tablets (12.5 mg) by mouth daily. 4/6/2025 Morning    polyethylene glycol (MIRALAX) 17 GM/Dose powder Take 17 g by mouth daily. 4/6/2025 Morning    simvastatin (ZOCOR) 40 MG tablet Take 40 mg by mouth daily 4/6/2025 Bedtime    traZODone (DESYREL) 50 MG tablet Take 50 mg by mouth at bedtime. 4/6/2025 Bedtime

## 2025-04-07 NOTE — ED NOTES
Bed: JNED-18  Expected date: 4/7/25  Expected time: 10:05 AM  Means of arrival:   Comments:  74M STEMI

## 2025-04-07 NOTE — ED NOTES
Pt sleeping hooded head that he was more comfortable however still not rating pain with writer. Updated family SAVANA Piper [phone]

## 2025-04-07 NOTE — H&P
[facility]    History and Physical - Hospitalist Service       Date of Admission:  4/7/2025    Assessment & Plan    Assessment:  Eric Cordoba is a 74 year old male with a past medical history of coronary artery disease and recent CABG presented to the hospital with complaint of chest discomfort associated with nausea and vomiting, CTA chest abdomen pelvis was done which was negative for acute process, EMS had activated the Cath Lab for STEMI however on arrival his EKG looked stable compared to recent without acute changes, ED provider discussed with interventional cardiology who are in agreement and recommended to deactivate the Cath Lab, patient received Zofran, Ativan, Pepcid and fluids in the ED and is going to be admitted for possible GERD associated chest discomfort, dehydration, JESS, starvation ketosis.    Problem list and plan:  GERD associated chest discomfort  Hyponatremia secondary to dehydration   JESS most likely prerenal  Anion gap metabolic acidosis  Hypoalbuminemia  Starvation ketosis  Shortness of breath possible COPD associated  Patient presented to the hospital with complaint of chest discomfort associated with nausea and vomiting  Patient recently had CABG and subsequently was discharged to TCU where he was recovering  Was recently seen on 4 April in the ED for shortness of breath, fatigue and right lower back pain, BNP was elevated, chest x-ray did not show any acute process, cardiology was consulted who recommended IV diuresis but patient at that time refused admission and wanted to return to care facility, Lasix was increased and patient was subsequently discharged  Per EMS this morning patient had sudden onset of chest discomfort and shortness of breath associated with nausea and vomiting  EMS gave 4 mg of Zofran  Patient was also complaining of some abdominal discomfort  In the ER Labs significant for hyponatremia, JESS with a creatinine of 2.06 and a baseline of  around 1.6, anion gap metabolic acidosis, hypoalbuminemia, minimal hyperglycemia, ketones elevated 3.10, BNP elevated at 3877, troponin elevated but flat and downtrending  CTA chest abdomen pelvis was done which showed  No evidence of acute aortic syndrome. Postoperative changes of recent coronary artery bypass grafting. No mediastinal fluid collection. No acute findings in the abdomen or pelvis.  EMS had activated the Cath Lab for STEMI however on arrival his EKG looked stable compared to recent without acute changes  ED provider discussed with interventional cardiology who are in agreement and recommended to deactivate the Cath Lab  Patient received Zofran, Ativan, Pepcid and fluids in the ED   Continue with pain management as per protocol and antiemetics as appropriate  Continue with Protonix and Maalox  Continue gentle IV hydration  Monitor sodium levels  Baseline creatinine around 1.6, current creatinine 2.06  Monitor renal function closely  Monitor for worsening acidosis  Albumin repleted, monitor albumin levels  Trend ketones suspecting associated starvation nausea and vomiting  CT scan does not show any signs of fluid overload but does show signs of emphysema, ordered DuoNeb  Creatinine improved to 1.92 on follow-up labs    DKA  On follow-up labs sodium improved but acidosis, anion gap and ketosis appeared to be worsening so started the patient on DKA protocol    Electrolyte abnormality/hypermagnesemia secondary to CKD  Monitor magnesium levels closely    Elevated BNP most likely nonspecific  Elevated troponin most likely in setting of type II NSTEMI/demand ischemia  Patient does not appear to be in fluid overload  CT also does not show any signs of effusion or edema  Currently on Lasix which would be on hold  Monitor volume status closely  Troponin elevated but flat and downtrending, no current complaint of chest pain, repeat EKG for symptoms  Consider cardiology evaluation and echocardiogram if needed if  chest pain recurs  DuoNeb ordered CT scan of the chest shows emphysema    Normocytic anemia  Monitor CBC, consider iron panel    History of insulin-dependent type 2 diabetes mellitus with hyperglycemia  Hemoglobin A1c 7.2  Monitor blood sugar level  Hypoglycemia protocol  Started on sliding scale  Continue with home insulin regimen    History of hyperlipidemia  History of CAD status post CABG  Continue with aspirin, Plavix, simvastatin    History of hypertension with high blood pressure  Monitor vital signs as per protocol  IV hydralazine as needed for elevated blood pressure  Continue with losartan, Coreg    History of mood disorder  Continue with fluoxetine, trazodone     DVT PPx  Intermittent pneumatic compression    CODE STATUS  DNR/DNI as per discussion with the patient        Diet: NPO for Medical/Clinical Reasons Except for: Meds    DVT Prophylaxis: Pneumatic Compression Devices  [place]jas Catheter: Not present  Lines: None     Cardiac Monitoring: ACTIVE order. Indication: DKA  Code Status: No CPR- Do NOT Intubate  Mental status: Patient is alert awake and oriented x 3, patient is a good Historian and most of the history was obtained from the patient, some history was obtained from chart review and the rest of the history was obtained from discussion with the ER physician  Clinically Significant Risk Factors Present on Admission         # Hyponatremia: Lowest Na = 134 mmol/L in last 2 days, will monitor as appropriate   # Hypocalcemia: Lowest Ca = 8.6 mg/dL in last 2 days, will monitor and replace as appropriate    # Anion Gap Metabolic Acidosis: Highest Anion Gap = 21 mmol/L in last 2 days, will monitor and treat as appropriate  # Hypoalbuminemia: Lowest albumin = 3.4 g/dL at 4/7/2025 11:14 AM, will monitor as appropriate   # Drug Induced Platelet Defect: home medication list includes an antiplatelet medication   # Hypertension: Noted on problem list    # Chronic heart failure with reduced ejection fraction:  "last echo with EF <40%         # DMII: A1C = 7.2 % (Ref range: <5.7 %) within past 6 months   # Overweight: Estimated body mass index is 28.83 kg/m  as calculated from the following:    Height as of this encounter: 1.727 m (5' 8\").    Weight as of this encounter: 86 kg (189 lb 9.6 oz).        # History of CABG: noted on surgical history       Disposition Plan     Medically Ready for Discharge: Anticipated in 2-4 Days     Saad J. Gondal, MD  Hospitalist Service  [facility]  Securely message with FAD ? IO (more info)  Text page via [facility] Paging/Directory     ______________________________________________________________________    Chief Complaint   Chest discomfort, nausea and vomiting    History is obtained from the patient and chart review    History of Present Illness   Eric Cordoba is a 74 year old male with a past medical history of coronary artery disease and recent CABG presented to the hospital with complaint of chest discomfort associated with nausea and vomiting, patient recently had CABG and subsequently was discharged to TCU where he was recovering, was recently seen on 4 April in the ED for shortness of breath, fatigue and right lower back pain, BNP was elevated, chest x-ray did not show any acute process, cardiology was consulted who recommended IV diuresis but patient at that time refused admission and wanted to return to care facility, Lasix was increased and patient was subsequently discharged, per EMS this morning patient had sudden onset of chest discomfort and shortness of breath associated with nausea and vomiting, EMS gave 4 mg of Zofran, patient was also complaining of some abdominal discomfort, in the ER vitals significant for elevated blood pressure otherwise vitally stable, labs significant for hyponatremia, JESS with a creatinine of 2.06 and a baseline of around 1.6, anion gap metabolic acidosis, hypoalbuminemia, minimal hyperglycemia, ketones elevated 3.10, BNP " elevated at 3877, troponin elevated but flat and downtrending, patient also had normocytic anemia, CTA chest abdomen pelvis was done which was negative for acute process, EMS had activated the Cath Lab for STEMI however on arrival his EKG looked stable compared to recent without acute changes, ED provider discussed with interventional cardiology who are in agreement and recommended to deactivate the Cath Lab, patient received Zofran, Ativan, Pepcid and fluids in the ED and is going to be admitted for possible GERD associated chest discomfort, dehydration, JESS, starvation ketosis.      Past Medical History    Past Medical History:   Diagnosis Date    Diabetes mellitus, type 2 (H)     History of CVA (cerebrovascular accident)     Hypertension     Nutritional and metabolic cardiomyopathies (H)        Past Surgical History   Past Surgical History:   Procedure Laterality Date    CORONARY ARTERY BYPASS GRAFT, WITH ENDOSCOPIC VESSEL PROCUREMENT N/A 3/17/2025    Procedure: CORONARY ARTERY BYPASS GRAFT TIMES FOUR, LEFT INTERNAL MAMMARY ARTERY HARVEST, LEFT LEG ENDOSCOPIC SAPHENOUS VEIN PROCUREMENT,;  Surgeon: Alice Shahid MD;  [facility]    CV CORONARY ANGIOGRAM N/A 2/24/2025    Procedure: Coronary Angiogram;  Surgeon: Bautista Durand MD;  Location: [facility] CATH LAB CV    CV INTRA AORTIC BALLOON N/A 3/17/2025    Procedure: Intra aortic Balloon Pump Insertion;  Surgeon: Bautista Durand MD;  Location: [facility] LAB CV    CV LEFT HEART CATH N/A 2/24/2025    Procedure: Left Heart Catheterization;  Surgeon: Bautista Durand MD;  [facility] CV    PICC DOUBLE LUMEN PLACEMENT  3/20/2025    TRANSESOPHAGEAL ECHOCARDIOGRAM INTRAOPERATIVE  3/17/2025    Procedure: EPIAORTIC ULTRASOUND, ANESTHESIA TRANSESOPHAGEAL ECHOCARDIOGRAM;  Surgeon: Alice Shahid MD;  Location: [facility] OR       Prior to Admission Medications   Prior to Admission Medications   Prescriptions Last Dose Informant Patient  Reported? Taking?   FLUoxetine 20 MG tablet 4/6/2025 Morning  Yes Yes   Sig: Take 20 mg by mouth daily.   Lidocaine (LIDOCARE) 4 % Patch 4/6/2025 Morning  No Yes   Sig: Place 1-2 patches over 12 hours onto the skin every 24 hours. To prevent lidocaine toxicity, patient should be patch free for 12 hrs daily.   acetaminophen (TYLENOL) 500 MG tablet 4/6/2025 Evening  Yes Yes   Sig: Take 1,000 mg by mouth 3 times daily. While awake   aspirin (ASA) 81 MG chewable tablet 4/6/2025 Morning  No Yes   Sig: Take 1 tablet (81 mg) by mouth daily. Continue for one year postop, then when stopping plavix, increase aspirin to 162 mg daily indefinitely.   carvedilol (COREG) 12.5 MG tablet 4/6/2025 Evening  Yes Yes   Sig: Take 12.5 mg by mouth 2 times daily   clopidogrel (PLAVIX) 75 MG tablet 4/6/2025 Morning  No Yes   Sig: Take 1 tablet (75 mg) by mouth daily. Continue for one year postop, then stop and increase aspirin to 162 mg daily indefinitely.   cyanocobalamin (VITAMIN B-12) 500 MCG SUBL sublingual tablet 4/6/2025 Morning  No Yes   Sig: Place 1 tablet (500 mcg) under the tongue daily.   furosemide (LASIX) 20 MG tablet 4/6/2025 Evening  No Yes   Sig: Take 2 tablets (40 mg) by mouth 2 times daily for 10 days.   insulin aspart (NOVOLOG PEN) 100 UNIT/ML pen   No Yes   Sig: Inject 1-10 Units subcutaneously 3 times daily (before meals). Correction Scale - HIGH INSULIN RESISTANCE DOSING   Do Not give Correction Insulin if Pre-Meal BG less than 140.   For Pre-Meal  - 164 give 1 unit.   For Pre-Meal  - 189 give 2 units.   For Pre-Meal  - 214 give 3 units.   For Pre-Meal  - 239 give 4 units.   For Pre-Meal  - 264 give 5 units.   For Pre-Meal  - 289 give 6 units.   For Pre-Meal  - 314 give 7 units.   For Pre-Meal  - 339 give 8 units.   For Pre-Meal  - 364 give 9 units.  For Pre-Meal BG greater than or equal to 365 give 10 units  To be given with prandial insulin, and based on pre-meal  blood glucose. Administering insulin within 5 minutes of the start of the meal is ideal. Administer insulin no more than 30 minutes after the start of the meal, unless directed otherwise by provider.   Notify provider if glucose greater than or equal to 350 mg/dL after administration of correction dose.   insulin aspart (NOVOLOG PEN) 100 UNIT/ML pen   No Yes   Sig: Inject 1-7 Units subcutaneously at bedtime. HIGH INSULIN RESISTANCE DOSING   Do Not give Bedtime Correction Insulin if BG less than 200.   For  - 224 give 1 units.   For  - 249 give 2 units.   For  - 274 give 3 units.   For  - 299 give 4 units.   For  - 324 give 5 units.   For  - 349 give 6 units.   For BG greater than or equal to 350 give 7 units.   Notify provider if glucose greater than or equal to 350 mg/dL after administration of correction dose.   insulin glargine (LANTUS PEN) 100 UNIT/ML pen 4/6/2025 Morning  Yes Yes   Sig: Inject 30 Units subcutaneously every morning.   losartan (COZAAR) 25 MG tablet 4/6/2025 Morning  No Yes   Sig: Take 0.5 tablets (12.5 mg) by mouth daily.   polyethylene glycol (MIRALAX) 17 GM/Dose powder 4/6/2025 Morning  No Yes   Sig: Take 17 g by mouth daily.   senna-docusate (SENOKOT-S/PERICOLACE) 8.6-50 MG tablet   Yes No   Sig: Take 1 tablet by mouth 2 times daily.   simvastatin (ZOCOR) 40 MG tablet 4/6/2025 Bedtime  Yes Yes   Sig: Take 40 mg by mouth daily   traZODone (DESYREL) 50 MG tablet 4/6/2025 Bedtime  Yes Yes   Sig: Take 50 mg by mouth at bedtime.      Facility-Administered Medications: None        Review of Systems    The 10 point Review of Systems is negative other than noted in the HPI or here.  Chest discomfort associated nausea and vomiting    Physical Exam   Vital Signs: Temp: 96.8  F (36  C) Temp src: Axillary BP: (!) 147/71 Pulse: 108   Resp: 14 SpO2: 100 % O2 Device: None (Room air)    Weight: 189 lbs 9.6 oz    GENERAL: Patient was seen and examined at bedside and  appeared to be in moderate distress due to abdominal discomfort and nausea, chronically ill-appearing and frail  HENT:  Head is normocephalic, atraumatic.  Sunken eyes, pale conjunctival mucosa, dry mouth  EYES:  Eyes have anicteric sclerae without conjunctival injection   LUNGS: Bilateral air entry, clear to auscultation bilaterally  CARDIOVASCULAR:  Regular rate and rhythm with no murmurs, gallops or rubs.  ABDOMEN:  Normal bowel sounds. Soft; tenderness to palpation in the lower quadrant of the abdomen  EXT: no edema    SKIN:  No acute rashes.  Dry scaly wrinkled skin, poor skin turgor  NEUROLOGIC:  Grossly nonfocal.      Medical Decision Making       90 MINUTES SPENT BY ME on the date of service doing chart review, history, exam, documentation & further activities per the note.      Data     I have personally reviewed the following data over the past 24 hrs:    8.8  \   12.3 (L)   / 352     136 100 41.6 (H) /  204 (H)   4.9 15 (L) 1.92 (H) \     ALT: 10 AST: 15 AP: 217 (H) TBILI: 0.8   ALB: 4.2 TOT PROTEIN: 7.3 LIPASE: 51     Trop: 56 (H) BNP: 3,877 (H)     TSH: N/A T4: N/A A1C: 7.2 (H)       Imaging results reviewed over the past 24 hrs:   Recent Results (from the past 24 hours)   CTA Chest Abdomen Pelvis w Contrast    Narrative    EXAM: CTA CHEST ABDOMEN PELVIS W CONTRAST  LOCATION: [facility]  DATE: 4/7/2025    INDICATION: recent cabg, shortness of breath, nausea, vomiting, abdominal pain  COMPARISON: CT chest 02/24/2025  TECHNIQUE: CT angiogram chest abdomen pelvis during arterial phase of injection of IV contrast. 2D and 3D MIP reconstructions were performed by the CT technologist. Dose reduction techniques were used.   CONTRAST: 75ml isovue 370    FINDINGS:   CT ANGIOGRAM CHEST, ABDOMEN, AND PELVIS: No aneurysm, intramural hematoma, or dissection of the aorta. Left internal mammary artery and three saphenous vein coronary artery bypass grafts patent where visualized. Great vessels  patent without significant   stenosis. Celiac, superior and inferior mesenteric arteries patent without significant stenosis. Severe proximal splenic artery stenosis. Moderate right and severe left renal artery stenosis. Patent iliac and proximal lower extremity arteries without   significant stenosis. Pulmonary arteries are suboptimally opacified for detection of pulmonary embolism.    LUNGS AND PLEURA: Emphysema. Left lower lobe atelectasis. Tiny right upper lobe calcified granuloma. No consolidation, pleural effusion, or pneumothorax.    MEDIASTINUM/AXILLAE: Postoperative anterior mediastinal fat stranding. No fluid collection or pneumomediastinum. No lymphadenopathy.     CORONARY ARTERY CALCIFICATION: Previous intervention (CABG).    HEPATOBILIARY: Distended gallbladder without wall thickening or adjacent inflammatory change. No calcified stones. No biliary duct dilatation. Smooth liver contour.    PANCREAS: Normal.    SPLEEN: Normal.    ADRENAL GLANDS: Normal.    KIDNEYS/BLADDER: Atrophic kidneys. No hydronephrosis. Urinary bladder within normal limits.    BOWEL: Normal appendix. Diverticulosis of the colon. No acute inflammatory change. No obstruction.     LYMPH NODES: Normal.    PELVIC ORGANS: Prostatomegaly.    MUSCULOSKELETAL: Well-approximated sternotomy not yet healed with intact wires. Degenerative change of the spine, shoulders, and hips.      Impression    IMPRESSION:  1.  No evidence of acute aortic syndrome.  2.  Postoperative changes of recent coronary artery bypass grafting. No mediastinal fluid collection.  3.  No acute findings in the abdomen or pelvis.

## 2025-04-08 ENCOUNTER — APPOINTMENT (OUTPATIENT)
Dept: CARDIOLOGY | Facility: HOSPITAL | Age: 75
End: 2025-04-08
Attending: STUDENT IN AN ORGANIZED HEALTH CARE EDUCATION/TRAINING PROGRAM
Payer: COMMERCIAL

## 2025-04-08 ENCOUNTER — APPOINTMENT (OUTPATIENT)
Dept: RADIOLOGY | Facility: HOSPITAL | Age: 75
End: 2025-04-08
Attending: STUDENT IN AN ORGANIZED HEALTH CARE EDUCATION/TRAINING PROGRAM
Payer: COMMERCIAL

## 2025-04-08 LAB
ALBUMIN SERPL BCG-MCNC: 3.5 G/DL (ref 3.5–5.2)
ALP SERPL-CCNC: 158 U/L (ref 40–150)
ALT SERPL W P-5'-P-CCNC: 8 U/L (ref 0–70)
ANION GAP SERPL CALCULATED.3IONS-SCNC: 10 MMOL/L (ref 7–15)
ANION GAP SERPL CALCULATED.3IONS-SCNC: 9 MMOL/L (ref 7–15)
AST SERPL W P-5'-P-CCNC: 16 U/L (ref 0–45)
B-OH-BUTYR SERPL-SCNC: 0.24 MMOL/L
BASE EXCESS BLDV CALC-SCNC: -2 MMOL/L (ref -3–3)
BILIRUB SERPL-MCNC: 0.6 MG/DL
BUN SERPL-MCNC: 35.5 MG/DL (ref 8–23)
BUN SERPL-MCNC: 36.2 MG/DL (ref 8–23)
CALCIUM SERPL-MCNC: 7.9 MG/DL (ref 8.8–10.4)
CALCIUM SERPL-MCNC: 8.3 MG/DL (ref 8.8–10.4)
CHLORIDE SERPL-SCNC: 109 MMOL/L (ref 98–107)
CHLORIDE SERPL-SCNC: 110 MMOL/L (ref 98–107)
CREAT SERPL-MCNC: 1.81 MG/DL (ref 0.67–1.17)
CREAT SERPL-MCNC: 1.87 MG/DL (ref 0.67–1.17)
EGFRCR SERPLBLD CKD-EPI 2021: 37 ML/MIN/1.73M2
EGFRCR SERPLBLD CKD-EPI 2021: 39 ML/MIN/1.73M2
ERYTHROCYTE [DISTWIDTH] IN BLOOD BY AUTOMATED COUNT: 14 % (ref 10–15)
GLUCOSE BLDC GLUCOMTR-MCNC: 110 MG/DL (ref 70–99)
GLUCOSE BLDC GLUCOMTR-MCNC: 112 MG/DL (ref 70–99)
GLUCOSE BLDC GLUCOMTR-MCNC: 116 MG/DL (ref 70–99)
GLUCOSE BLDC GLUCOMTR-MCNC: 134 MG/DL (ref 70–99)
GLUCOSE BLDC GLUCOMTR-MCNC: 136 MG/DL (ref 70–99)
GLUCOSE BLDC GLUCOMTR-MCNC: 164 MG/DL (ref 70–99)
GLUCOSE BLDC GLUCOMTR-MCNC: 174 MG/DL (ref 70–99)
GLUCOSE BLDC GLUCOMTR-MCNC: 79 MG/DL (ref 70–99)
GLUCOSE BLDC GLUCOMTR-MCNC: 96 MG/DL (ref 70–99)
GLUCOSE SERPL-MCNC: 130 MG/DL (ref 70–99)
GLUCOSE SERPL-MCNC: 92 MG/DL (ref 70–99)
HCO3 BLDV-SCNC: 20 MMOL/L (ref 21–28)
HCO3 SERPL-SCNC: 21 MMOL/L (ref 22–29)
HCO3 SERPL-SCNC: 22 MMOL/L (ref 22–29)
HCT VFR BLD AUTO: 30.4 % (ref 40–53)
HGB BLD-MCNC: 9.9 G/DL (ref 13.3–17.7)
HOLD SPECIMEN: NORMAL
LACTATE SERPL-SCNC: 1.7 MMOL/L (ref 0.7–2)
LVEF ECHO: NORMAL
MAGNESIUM SERPL-MCNC: 2.3 MG/DL (ref 1.7–2.3)
MCH RBC QN AUTO: 31.5 PG (ref 26.5–33)
MCHC RBC AUTO-ENTMCNC: 32.6 G/DL (ref 31.5–36.5)
MCV RBC AUTO: 97 FL (ref 78–100)
NT-PROBNP SERPL-MCNC: ABNORMAL PG/ML (ref 0–900)
O2/TOTAL GAS SETTING VFR VENT: 21 %
OXYHGB MFR BLDV: 51 % (ref 70–75)
PCO2 BLDV: 26 MM HG (ref 40–50)
PH BLDV: 7.5 [PH] (ref 7.32–7.43)
PHOSPHATE SERPL-MCNC: 3.2 MG/DL (ref 2.5–4.5)
PHOSPHATE SERPL-MCNC: 3.4 MG/DL (ref 2.5–4.5)
PLATELET # BLD AUTO: 268 10E3/UL (ref 150–450)
PO2 BLDV: 26 MM HG (ref 25–47)
POTASSIUM SERPL-SCNC: 4 MMOL/L (ref 3.4–5.3)
POTASSIUM SERPL-SCNC: 4.2 MMOL/L (ref 3.4–5.3)
PROT SERPL-MCNC: 6 G/DL (ref 6.4–8.3)
RBC # BLD AUTO: 3.14 10E6/UL (ref 4.4–5.9)
SAO2 % BLDV: 52.2 % (ref 70–75)
SODIUM SERPL-SCNC: 140 MMOL/L (ref 135–145)
SODIUM SERPL-SCNC: 141 MMOL/L (ref 135–145)
TROPONIN T SERPL HS-MCNC: 58 NG/L
TROPONIN T SERPL HS-MCNC: 63 NG/L
WBC # BLD AUTO: 10 10E3/UL (ref 4–11)

## 2025-04-08 PROCEDURE — 93321 DOPPLER ECHO F-UP/LMTD STD: CPT | Mod: 26 | Performed by: INTERNAL MEDICINE

## 2025-04-08 PROCEDURE — 85018 HEMOGLOBIN: CPT | Performed by: STUDENT IN AN ORGANIZED HEALTH CARE EDUCATION/TRAINING PROGRAM

## 2025-04-08 PROCEDURE — 83735 ASSAY OF MAGNESIUM: CPT | Performed by: STUDENT IN AN ORGANIZED HEALTH CARE EDUCATION/TRAINING PROGRAM

## 2025-04-08 PROCEDURE — 36415 COLL VENOUS BLD VENIPUNCTURE: CPT | Performed by: STUDENT IN AN ORGANIZED HEALTH CARE EDUCATION/TRAINING PROGRAM

## 2025-04-08 PROCEDURE — 999N000156 HC STATISTIC RCP CONSULT EA 30 MIN

## 2025-04-08 PROCEDURE — 258N000003 HC RX IP 258 OP 636: Performed by: STUDENT IN AN ORGANIZED HEALTH CARE EDUCATION/TRAINING PROGRAM

## 2025-04-08 PROCEDURE — 255N000002 HC RX 255 OP 636: Performed by: STUDENT IN AN ORGANIZED HEALTH CARE EDUCATION/TRAINING PROGRAM

## 2025-04-08 PROCEDURE — 93308 TTE F-UP OR LMTD: CPT | Mod: 26 | Performed by: INTERNAL MEDICINE

## 2025-04-08 PROCEDURE — 94640 AIRWAY INHALATION TREATMENT: CPT

## 2025-04-08 PROCEDURE — 999N000157 HC STATISTIC RCP TIME EA 10 MIN

## 2025-04-08 PROCEDURE — 84484 ASSAY OF TROPONIN QUANT: CPT | Performed by: STUDENT IN AN ORGANIZED HEALTH CARE EDUCATION/TRAINING PROGRAM

## 2025-04-08 PROCEDURE — 82805 BLOOD GASES W/O2 SATURATION: CPT | Performed by: STUDENT IN AN ORGANIZED HEALTH CARE EDUCATION/TRAINING PROGRAM

## 2025-04-08 PROCEDURE — 84100 ASSAY OF PHOSPHORUS: CPT | Performed by: STUDENT IN AN ORGANIZED HEALTH CARE EDUCATION/TRAINING PROGRAM

## 2025-04-08 PROCEDURE — 82962 GLUCOSE BLOOD TEST: CPT

## 2025-04-08 PROCEDURE — 80048 BASIC METABOLIC PNL TOTAL CA: CPT | Performed by: STUDENT IN AN ORGANIZED HEALTH CARE EDUCATION/TRAINING PROGRAM

## 2025-04-08 PROCEDURE — 250N000011 HC RX IP 250 OP 636: Mod: JZ | Performed by: STUDENT IN AN ORGANIZED HEALTH CARE EDUCATION/TRAINING PROGRAM

## 2025-04-08 PROCEDURE — 82010 KETONE BODYS QUAN: CPT | Performed by: STUDENT IN AN ORGANIZED HEALTH CARE EDUCATION/TRAINING PROGRAM

## 2025-04-08 PROCEDURE — 85041 AUTOMATED RBC COUNT: CPT | Performed by: STUDENT IN AN ORGANIZED HEALTH CARE EDUCATION/TRAINING PROGRAM

## 2025-04-08 PROCEDURE — 93325 DOPPLER ECHO COLOR FLOW MAPG: CPT

## 2025-04-08 PROCEDURE — 120N000001 HC R&B MED SURG/OB

## 2025-04-08 PROCEDURE — 83605 ASSAY OF LACTIC ACID: CPT | Performed by: STUDENT IN AN ORGANIZED HEALTH CARE EDUCATION/TRAINING PROGRAM

## 2025-04-08 PROCEDURE — 250N000012 HC RX MED GY IP 250 OP 636 PS 637: Performed by: HOSPITALIST

## 2025-04-08 PROCEDURE — 250N000009 HC RX 250: Performed by: STUDENT IN AN ORGANIZED HEALTH CARE EDUCATION/TRAINING PROGRAM

## 2025-04-08 PROCEDURE — 250N000013 HC RX MED GY IP 250 OP 250 PS 637: Performed by: STUDENT IN AN ORGANIZED HEALTH CARE EDUCATION/TRAINING PROGRAM

## 2025-04-08 PROCEDURE — 71045 X-RAY EXAM CHEST 1 VIEW: CPT

## 2025-04-08 PROCEDURE — 93325 DOPPLER ECHO COLOR FLOW MAPG: CPT | Mod: 26 | Performed by: INTERNAL MEDICINE

## 2025-04-08 PROCEDURE — 83880 ASSAY OF NATRIURETIC PEPTIDE: CPT | Performed by: STUDENT IN AN ORGANIZED HEALTH CARE EDUCATION/TRAINING PROGRAM

## 2025-04-08 PROCEDURE — 250N000011 HC RX IP 250 OP 636: Performed by: STUDENT IN AN ORGANIZED HEALTH CARE EDUCATION/TRAINING PROGRAM

## 2025-04-08 PROCEDURE — 99232 SBSQ HOSP IP/OBS MODERATE 35: CPT | Performed by: STUDENT IN AN ORGANIZED HEALTH CARE EDUCATION/TRAINING PROGRAM

## 2025-04-08 RX ORDER — IPRATROPIUM BROMIDE AND ALBUTEROL SULFATE 2.5; .5 MG/3ML; MG/3ML
3 SOLUTION RESPIRATORY (INHALATION) EVERY 6 HOURS PRN
Status: DISCONTINUED | OUTPATIENT
Start: 2025-04-08 | End: 2025-04-14 | Stop reason: HOSPADM

## 2025-04-08 RX ORDER — PROCHLORPERAZINE 25 MG
12.5 SUPPOSITORY, RECTAL RECTAL EVERY 12 HOURS PRN
Status: DISCONTINUED | OUTPATIENT
Start: 2025-04-08 | End: 2025-04-14 | Stop reason: HOSPADM

## 2025-04-08 RX ORDER — PROCHLORPERAZINE MALEATE 5 MG/1
5 TABLET ORAL EVERY 6 HOURS PRN
Status: DISCONTINUED | OUTPATIENT
Start: 2025-04-08 | End: 2025-04-14 | Stop reason: HOSPADM

## 2025-04-08 RX ORDER — FUROSEMIDE 10 MG/ML
20 INJECTION INTRAMUSCULAR; INTRAVENOUS EVERY 12 HOURS
Status: DISCONTINUED | OUTPATIENT
Start: 2025-04-08 | End: 2025-04-09

## 2025-04-08 RX ORDER — LORAZEPAM 2 MG/ML
0.25 INJECTION INTRAMUSCULAR ONCE
Status: COMPLETED | OUTPATIENT
Start: 2025-04-08 | End: 2025-04-08

## 2025-04-08 RX ORDER — LORAZEPAM 0.5 MG/1
0.25 TABLET ORAL EVERY 4 HOURS PRN
Status: DISCONTINUED | OUTPATIENT
Start: 2025-04-08 | End: 2025-04-14 | Stop reason: HOSPADM

## 2025-04-08 RX ADMIN — FUROSEMIDE 20 MG: 10 INJECTION, SOLUTION INTRAMUSCULAR; INTRAVENOUS at 17:56

## 2025-04-08 RX ADMIN — ONDANSETRON 4 MG: 2 INJECTION, SOLUTION INTRAMUSCULAR; INTRAVENOUS at 21:54

## 2025-04-08 RX ADMIN — PERFLUTREN 1.5 ML: 6.52 INJECTION, SUSPENSION INTRAVENOUS at 14:00

## 2025-04-08 RX ADMIN — IPRATROPIUM BROMIDE AND ALBUTEROL SULFATE 3 ML: .5; 3 SOLUTION RESPIRATORY (INHALATION) at 21:54

## 2025-04-08 RX ADMIN — INSULIN GLARGINE 30 UNITS: 100 INJECTION, SOLUTION SUBCUTANEOUS at 02:36

## 2025-04-08 RX ADMIN — POLYETHYLENE GLYCOL 3350 17 G: 17 POWDER, FOR SOLUTION ORAL at 07:41

## 2025-04-08 RX ADMIN — CLOPIDOGREL 75 MG: 75 TABLET ORAL at 07:50

## 2025-04-08 RX ADMIN — ONDANSETRON 4 MG: 2 INJECTION, SOLUTION INTRAMUSCULAR; INTRAVENOUS at 16:15

## 2025-04-08 RX ADMIN — PANTOPRAZOLE SODIUM 40 MG: 40 INJECTION, POWDER, FOR SOLUTION INTRAVENOUS at 17:55

## 2025-04-08 RX ADMIN — IPRATROPIUM BROMIDE AND ALBUTEROL SULFATE 3 ML: .5; 3 SOLUTION RESPIRATORY (INHALATION) at 07:20

## 2025-04-08 RX ADMIN — FLUOXETINE HYDROCHLORIDE 20 MG: 20 CAPSULE ORAL at 07:45

## 2025-04-08 RX ADMIN — PROCHLORPERAZINE EDISYLATE 5 MG: 5 INJECTION INTRAMUSCULAR; INTRAVENOUS at 18:05

## 2025-04-08 RX ADMIN — ASPIRIN 81 MG CHEWABLE TABLET 81 MG: 81 TABLET CHEWABLE at 07:49

## 2025-04-08 RX ADMIN — CARVEDILOL 12.5 MG: 12.5 TABLET, FILM COATED ORAL at 07:49

## 2025-04-08 RX ADMIN — SODIUM CHLORIDE: 9 INJECTION, SOLUTION INTRAVENOUS at 05:38

## 2025-04-08 RX ADMIN — HYDRALAZINE HYDROCHLORIDE 10 MG: 20 INJECTION INTRAMUSCULAR; INTRAVENOUS at 16:22

## 2025-04-08 RX ADMIN — HYDRALAZINE HYDROCHLORIDE 10 MG: 20 INJECTION INTRAMUSCULAR; INTRAVENOUS at 19:53

## 2025-04-08 RX ADMIN — IPRATROPIUM BROMIDE AND ALBUTEROL SULFATE 3 ML: .5; 3 SOLUTION RESPIRATORY (INHALATION) at 15:57

## 2025-04-08 RX ADMIN — HYDROMORPHONE HYDROCHLORIDE 0.2 MG: 0.2 INJECTION, SOLUTION INTRAMUSCULAR; INTRAVENOUS; SUBCUTANEOUS at 16:21

## 2025-04-08 RX ADMIN — Medication 12.5 MG: at 07:50

## 2025-04-08 RX ADMIN — LORAZEPAM 0.25 MG: 2 INJECTION INTRAMUSCULAR; INTRAVENOUS at 22:38

## 2025-04-08 RX ADMIN — PANTOPRAZOLE SODIUM 40 MG: 40 INJECTION, POWDER, FOR SOLUTION INTRAVENOUS at 03:35

## 2025-04-08 ASSESSMENT — ACTIVITIES OF DAILY LIVING (ADL)
ADLS_ACUITY_SCORE: 55
ADLS_ACUITY_SCORE: 55
ADLS_ACUITY_SCORE: 56
ADLS_ACUITY_SCORE: 55
ADLS_ACUITY_SCORE: 61
ADLS_ACUITY_SCORE: 59
ADLS_ACUITY_SCORE: 55
ADLS_ACUITY_SCORE: 55
ADLS_ACUITY_SCORE: 56
ADLS_ACUITY_SCORE: 61
ADLS_ACUITY_SCORE: 55
ADLS_ACUITY_SCORE: 61
ADLS_ACUITY_SCORE: 55
DEPENDENT_IADLS:: INDEPENDENT
ADLS_ACUITY_SCORE: 55
ADLS_ACUITY_SCORE: 55
ADLS_ACUITY_SCORE: 70
ADLS_ACUITY_SCORE: 55

## 2025-04-08 NOTE — TREATMENT PLAN
RCAT Treatment Plan    Patient Score: 5  Patient Acuity: 5    Clinical Indication for Therapy: recent CVTS 3/36/25. CP    Therapy Ordered: Duoneb Q 6 hours PRN    Assessment Summary: Chest pain, post op CVTS 3/36/25. Former smoker, no hx pulmonary disease, BS clear bilat, room air SpO2 100 %,  no home respiratory medications. Last CXR 4/4/25 negative. Will change Duoneb to Q 6 hours PRN. Will discontinue RCAT at this time. RT available as needed     Sabino Mejia, RT  4/8/2025

## 2025-04-08 NOTE — ED NOTES
Veronique updated on patient status, patient declined speaking with her but gave writer permission to speak with her and give her an update.  Veronique requesting that care  her to discuss his current TCU placement.  Care manager notified.

## 2025-04-08 NOTE — PROGRESS NOTES
RT called to bedside for patient's shortness of breath. Patient stating he has chest pain and can't breath. Stated he wanted the nebulizer. PRN Duoneb given with no change. BS clear but diminished on URVASHI and LLL. Patient still complaining of shortness of breath and chest pain. RN at bedside, MD paged.     Patient on room air, SPO2 99%.     Order for STAT chest xray obtained.     Tonja Calderón, RT

## 2025-04-08 NOTE — ED NOTES
"[facility] ED Handoff Report    ED Chief Complaint: Chest pain with nausea and vomiting    ED Diagnosis:  (R07.9) Chest pain, unspecified type  (primary encounter diagnosis)  Comment: STEMI was called when he arrived, after cards consult was cancelled.  Plan: Primary Care - Care Coordination Referral            (R11.2) Nausea and vomiting, unspecified vomiting type  Comment:   Plan:        PMH:    Past Medical History:   Diagnosis Date    Diabetes mellitus, type 2 (H)     History of CVA (cerebrovascular accident)     Hypertension     Nutritional and metabolic cardiomyopathies (H)         Code Status:  No CPR- Do NOT Intubate     Falls Risk: Yes Band: Applied    Current Living Situation/Residence: Currently at [facility] TCU post CABG x 4      Elimination Status: Continent: Yes     Activity Level: 2 assist- patient states that he usually uses a wheelchair to get around.  States he has not been able to walk more than 100ft since his CABG.    Patients Preferred Language:  English     Needed: No    Vital Signs:  /67   Pulse 81   Temp 97.8  F (36.6  C) (Oral)   Resp 18   Ht 1.727 m (5' 8\")   Wt 86 kg (189 lb 9.6 oz)   SpO2 100%   BMI 28.83 kg/m       Cardiac Rhythm: NSR    Pain Score: 0/10    Is the Patient Confused:  No    Last Food or Drink: 04/08/25 at lunch    Focused Assessment:  PT here from [facility] TCU. Pt has been there since discharge 3/26 following CABG x 4. Pt has been increasingly having pain and sob N/V since. Pt was last seen 4/4 for similar sx. Pt is minimally verbal however EMS report pt alert and oriented x 4. Received 4 mg Zofran in route, line infiltrated unknown how much he received. Vomiting at arrival no blood in emesis. Pt nodding to questions but not talking about specifics to location of C/p. Afebrile, refused nitro due to nausea. Takes Plavix. Reports now pain 9/10. Nothing makes better. Sob. No tingling in hands or feet. Denies HA. No vision changes. Denies " dizziness.   Patient treated for DKA upon arrival, no longer on any IV drips.  Last blood sugar was 116 prior to lunch.  Echo is being done at this time 1350.      Tests Performed: Done: Labs and Imaging    Treatments Provided:  SEE MAR    Family Dynamics/Concerns: No    Family Updated On Visitor Policy: Yes    Plan of Care Communicated to Family: Yes    Who Was Updated about Plan of Care: Veronique Bhardwaj Checklist Done and Signed by Patient: No    Medications sent with patient: Insulin pens    Covid: asymptomatic , not tested    Additional Information: Patient is from a TCU.  Veronique has been in contact with them.  Patient states that he has been unable to walk even with a walker and has only been using a wheelchair.    Patient wants his oral medications crushed and put in sherbert- used pudding this am     Vicki Webb RN 4/8/2025 1:34 PM

## 2025-04-08 NOTE — PROGRESS NOTES
[facility]    Medicine Progress Note - Hospitalist Service    Date of Admission:  4/7/2025    Assessment & Plan   Eric Cordoba is a 74 year old male with a past medical history of coronary artery disease and recent CABG presented to the hospital with complaint of chest discomfort associated with nausea and vomiting, CTA chest abdomen pelvis was done which was negative for acute process.      Anion gap metabolic acidosis   DKA, resolved  Type 2 diabetes mellitus  S/p insulin gtt  Lantus 25 units subcutaneous daily  Medium intensity insulin sliding scale  Accu-Cheks  Hypoglycemia protocol    GERD associated chest discomfort and nausea  - Continue IV pantoprazole 40 mg twice daily  -Antinausea medication as needed    Elevated BNP  -Clinically patient does not appear to be in heart failure exacerbation.  -Resume Lasix as kidney function is at its baseline  - will get echo   -Monitor input and output      History of hyperlipidemia  History of CAD status post CABG  Continue with aspirin, Plavix, simvastatin, carvedilol     History of hypertension with high blood pressure  Monitor vital signs as per protocol  IV hydralazine as needed for elevated blood pressure  Continue with losartan, Coreg     History of mood disorder  Continue with fluoxetine, trazodone           Diet: Advance Diet as Tolerated: Clear Liquid Diet; Moderate Consistent Carb (60 g CHO per Meal) Diet; Low Saturated Fat Na <2400mg Diet    DVT Prophylaxis: Pneumatic Compression Devices  [place] Catheter: Not present  Lines: None     Cardiac Monitoring: ACTIVE order. Indication: DKA  Code Status: No CPR- Do NOT Intubate      Clinically Significant Risk Factors Present on Admission         # Hyponatremia: Lowest Na = 134 mmol/L in last 2 days, will monitor as appropriate  # Hyperchloremia: Highest Cl = 110 mmol/L in last 2 days, will monitor as appropriate      # Hypocalcemia: Lowest Ca = 7.3 mg/dL in last 2 days, will monitor and  "replace as appropriate    # Anion Gap Metabolic Acidosis: Highest Anion Gap = 21 mmol/L in last 2 days, will monitor and treat as appropriate  # Hypoalbuminemia: Lowest albumin = 3.4 g/dL at 4/7/2025 11:14 AM, will monitor as appropriate   # Drug Induced Platelet Defect: home medication list includes an antiplatelet medication   # Hypertension: Noted on problem list  # Chronic heart failure with reduced ejection fraction: last echo with EF <40%     # Anemia: based on hgb <11      # DMII: A1C = 7.2 % (Ref range: <5.7 %) within past 6 months   # Overweight: Estimated body mass index is 28.83 kg/m  as calculated from the following:    Height as of this encounter: 1.727 m (5' 8\").    Weight as of this encounter: 86 kg (189 lb 9.6 oz).        # History of CABG: noted on surgical history       Social Drivers of Health    Tobacco Use: Medium Risk (4/4/2025)    Patient History     Smoking Tobacco Use: Former     Smokeless Tobacco Use: Never    Received from [facility]    Social Connections          Disposition Plan     Medically Ready for Discharge: Anticipated Tomorrow             Kev Dejesus MD  Hospitalist Service  [facility]  Securely message with [employer] (more info)  Text page via [employer] Paging/Directory   ______________________________________________________________________    Interval History   Patient boarding in ER awaiting bed assignment.  he is on room air.  He states he feels relatively better today compared to yesterday.  He denies having nausea.  Management plan discussed with the patient and he expressed understanding.    Physical Exam   Vital Signs: Temp: 97.8  F (36.6  C) Temp src: Oral BP: (!) 144/69 Pulse: 85   Resp: 15 SpO2: 100 % O2 Device: None (Room air)    Weight: 189 lbs 9.6 oz    General Appearance: No distress noted  Respiratory: Good air entry bilaterally, healing sternal surgical wound  Cardiovascular: S1 and S2 heard, no " murmur or gallop  GI: Soft abdomen, no tenderness, normoactive bowel sound  Skin: Intact and warm      Medical Decision Making       40 MINUTES SPENT BY ME on the date of service doing chart review, history, exam, documentation & further activities per the note.      Data

## 2025-04-08 NOTE — ED NOTES
Patient given ice water, denies pain and states he feels pretty good.  Urinals empty and output documented.

## 2025-04-08 NOTE — CONSULTS
Care Management Initial Consult    General Information  Assessment completed with: Patient, pt  Type of CM/SW Visit: CM Role Introduction    Primary Care Provider verified and updated as needed: Yes   Readmission within the last 30 days: current reason for admission unrelated to previous admission   Return Category: Exacerbation of disease  Reason for Consult: discharge planning, facility placement  Advance Care Planning: Advance Care Planning Reviewed: no concerns identified, verified with patient     General Information Comments: pt came from [facility] and had lived alone independently prior to last hospitalization    Communication Assessment  Patient's communication style: spoken language (English or Bilingual)             Cognitive  Cognitive/Neuro/Behavioral: mood/behavior  Level of Consciousness: alert  Arousal Level: opens eyes spontaneously  Orientation: oriented x 4  Mood/Behavior: agitated     Speech: spontaneous, clear, logical    Living Environment:   People in home: alone, other (see comments) (friend lives in his basement)     Current living Arrangements: house      Able to return to prior arrangements: yes       Family/Social Support:  Care provided by: self  Provides care for: no one  Marital Status: Single  Support system: Friend          Description of Support System: Supportive, Involved    Support Assessment: Lacks necessary supervision and assistance, Lacks adequate physical care    Current Resources:   Patient receiving home care services: No        Community Resources: None  Equipment currently used at home: cane, straight  Supplies currently used at home: None    Employment/Financial:  Employment Status:          Financial Concerns: none   Referral to Financial Worker: No       Does the patient's insurance plan have a 3 day qualifying hospital stay waiver?  No    Lifestyle & Psychosocial Needs:  Social Drivers of Health     Food Insecurity: Low Risk  (3/24/2025)    Food Insecurity      Within the past 12 months, did you worry that your food would run out before you got money to buy more?: No     Within the past 12 months, did the food you bought just not last and you didn t have money to get more?: No   Depression: Not at risk (1/13/2025)    PHQ-2     PHQ-2 Score: 0   Housing Stability: Low Risk  (3/24/2025)    Housing Stability     Do you have housing? : Yes     Are you worried about losing your housing?: No   Tobacco Use: Medium Risk (4/4/2025)    Patient History     Smoking Tobacco Use: Former     Smokeless Tobacco Use: Never     Passive Exposure: Not on file   Financial Resource Strain: Low Risk  (3/24/2025)    Financial Resource Strain     Within the past 12 months, have you or your family members you live with been unable to get utilities (heat, electricity) when it was really needed?: No   Alcohol Use: Not on file   Transportation Needs: Low Risk  (3/24/2025)    Transportation Needs     Within the past 12 months, has lack of transportation kept you from medical appointments, getting your medicines, non-medical meetings or appointments, work, or from getting things that you need?: No   Physical Activity: Not on file   Interpersonal Safety: Low Risk  (3/23/2025)    Interpersonal Safety     Do you feel physically and emotionally safe where you currently live?: Yes     Within the past 12 months, have you been hit, slapped, kicked or otherwise physically hurt by someone?: No     Within the past 12 months, have you been humiliated or emotionally abused in other ways by your partner or ex-partner?: No   Stress: Not on file   Social Connections: Unknown (12/30/2021)    Received from [employer] [facility]    Social Connections     Frequency of Communication with Friends and Family: Not on file   Health Literacy: Not on file       Functional Status:  Prior to admission patient needed assistance:   Dependent ADLs:: Ambulation-cane  Dependent IADLs:: Independent  Assesssment of  Functional Status: Not at baseline with ADL Functioning, Not at baseline with mobility    Mental Health Status:  Mental Health Status: No Current Concerns       Chemical Dependency Status:                Values/Beliefs:  Spiritual, Cultural Beliefs, Yarsani Practices, Values that affect care:                 Discussed  Partnership in Safe Discharge Planning  document with patient/family: No    Additional Information:  pt came from [facility] and had lived alone independently prior to last hospitalization   Pt states he spoke to Veronique and that CM doesn't have to call her. He states he doesn't want to go to TCU but also states he is unable to care for self safely or appropriately. CM recommends keeping TCU options open and maybe he can go to another TCU at discharge if he can't return home to care for self.    Next Steps: final treatment plan, medical stability and TCU placement vs home w/homecare and transportation    Ladonna Armstrong RN

## 2025-04-09 ENCOUNTER — APPOINTMENT (OUTPATIENT)
Dept: PHYSICAL THERAPY | Facility: HOSPITAL | Age: 75
End: 2025-04-09
Attending: INTERNAL MEDICINE
Payer: COMMERCIAL

## 2025-04-09 ENCOUNTER — DOCUMENTATION ONLY (OUTPATIENT)
Dept: OTHER | Facility: CLINIC | Age: 75
End: 2025-04-09
Payer: COMMERCIAL

## 2025-04-09 LAB
ANION GAP SERPL CALCULATED.3IONS-SCNC: 16 MMOL/L (ref 7–15)
ATRIAL RATE - MUSE: 108 BPM
ATRIAL RATE - MUSE: 77 BPM
ATRIAL RATE - MUSE: 78 BPM
ATRIAL RATE - MUSE: 95 BPM
BUN SERPL-MCNC: 35.6 MG/DL (ref 8–23)
CALCIUM SERPL-MCNC: 9 MG/DL (ref 8.8–10.4)
CHLORIDE SERPL-SCNC: 105 MMOL/L (ref 98–107)
CREAT SERPL-MCNC: 1.93 MG/DL (ref 0.67–1.17)
DIASTOLIC BLOOD PRESSURE - MUSE: 62 MMHG
DIASTOLIC BLOOD PRESSURE - MUSE: 83 MMHG
DIASTOLIC BLOOD PRESSURE - MUSE: 89 MMHG
DIASTOLIC BLOOD PRESSURE - MUSE: 91 MMHG
EGFRCR SERPLBLD CKD-EPI 2021: 36 ML/MIN/1.73M2
ERYTHROCYTE [DISTWIDTH] IN BLOOD BY AUTOMATED COUNT: 14 % (ref 10–15)
GLUCOSE BLDC GLUCOMTR-MCNC: 151 MG/DL (ref 70–99)
GLUCOSE BLDC GLUCOMTR-MCNC: 159 MG/DL (ref 70–99)
GLUCOSE BLDC GLUCOMTR-MCNC: 183 MG/DL (ref 70–99)
GLUCOSE BLDC GLUCOMTR-MCNC: 195 MG/DL (ref 70–99)
GLUCOSE SERPL-MCNC: 192 MG/DL (ref 70–99)
HCO3 SERPL-SCNC: 20 MMOL/L (ref 22–29)
HCT VFR BLD AUTO: 34.5 % (ref 40–53)
HGB BLD-MCNC: 11.4 G/DL (ref 13.3–17.7)
INTERPRETATION ECG - MUSE: NORMAL
MCH RBC QN AUTO: 31.8 PG (ref 26.5–33)
MCHC RBC AUTO-ENTMCNC: 33 G/DL (ref 31.5–36.5)
MCV RBC AUTO: 96 FL (ref 78–100)
P AXIS - MUSE: 63 DEGREES
P AXIS - MUSE: 66 DEGREES
P AXIS - MUSE: 69 DEGREES
P AXIS - MUSE: 74 DEGREES
PLATELET # BLD AUTO: 309 10E3/UL (ref 150–450)
POTASSIUM SERPL-SCNC: 4.5 MMOL/L (ref 3.4–5.3)
PR INTERVAL - MUSE: 136 MS
PR INTERVAL - MUSE: 162 MS
PR INTERVAL - MUSE: 172 MS
PR INTERVAL - MUSE: 172 MS
QRS DURATION - MUSE: 116 MS
QRS DURATION - MUSE: 118 MS
QRS DURATION - MUSE: 144 MS
QRS DURATION - MUSE: 154 MS
QT - MUSE: 412 MS
QT - MUSE: 426 MS
QT - MUSE: 456 MS
QT - MUSE: 456 MS
QTC - MUSE: 516 MS
QTC - MUSE: 519 MS
QTC - MUSE: 535 MS
QTC - MUSE: 552 MS
R AXIS - MUSE: 68 DEGREES
R AXIS - MUSE: 71 DEGREES
R AXIS - MUSE: 73 DEGREES
R AXIS - MUSE: 75 DEGREES
RBC # BLD AUTO: 3.58 10E6/UL (ref 4.4–5.9)
SODIUM SERPL-SCNC: 141 MMOL/L (ref 135–145)
SYSTOLIC BLOOD PRESSURE - MUSE: 117 MMHG
SYSTOLIC BLOOD PRESSURE - MUSE: 176 MMHG
SYSTOLIC BLOOD PRESSURE - MUSE: 192 MMHG
SYSTOLIC BLOOD PRESSURE - MUSE: 198 MMHG
T AXIS - MUSE: 100 DEGREES
T AXIS - MUSE: 41 DEGREES
T AXIS - MUSE: 63 DEGREES
T AXIS - MUSE: 92 DEGREES
VENTRICULAR RATE- MUSE: 108 BPM
VENTRICULAR RATE- MUSE: 77 BPM
VENTRICULAR RATE- MUSE: 78 BPM
VENTRICULAR RATE- MUSE: 95 BPM
WBC # BLD AUTO: 10.8 10E3/UL (ref 4–11)

## 2025-04-09 PROCEDURE — 80048 BASIC METABOLIC PNL TOTAL CA: CPT | Performed by: STUDENT IN AN ORGANIZED HEALTH CARE EDUCATION/TRAINING PROGRAM

## 2025-04-09 PROCEDURE — 36415 COLL VENOUS BLD VENIPUNCTURE: CPT | Performed by: STUDENT IN AN ORGANIZED HEALTH CARE EDUCATION/TRAINING PROGRAM

## 2025-04-09 PROCEDURE — 250N000013 HC RX MED GY IP 250 OP 250 PS 637: Performed by: INTERNAL MEDICINE

## 2025-04-09 PROCEDURE — 120N000001 HC R&B MED SURG/OB

## 2025-04-09 PROCEDURE — 250N000013 HC RX MED GY IP 250 OP 250 PS 637: Performed by: STUDENT IN AN ORGANIZED HEALTH CARE EDUCATION/TRAINING PROGRAM

## 2025-04-09 PROCEDURE — 99232 SBSQ HOSP IP/OBS MODERATE 35: CPT | Performed by: INTERNAL MEDICINE

## 2025-04-09 PROCEDURE — 97110 THERAPEUTIC EXERCISES: CPT | Mod: GP

## 2025-04-09 PROCEDURE — 250N000011 HC RX IP 250 OP 636: Performed by: STUDENT IN AN ORGANIZED HEALTH CARE EDUCATION/TRAINING PROGRAM

## 2025-04-09 PROCEDURE — 99222 1ST HOSP IP/OBS MODERATE 55: CPT | Mod: FS | Performed by: INTERNAL MEDICINE

## 2025-04-09 PROCEDURE — 99207 PR APP CREDIT; MD BILLING SHARED VISIT: CPT | Mod: FS | Performed by: STUDENT IN AN ORGANIZED HEALTH CARE EDUCATION/TRAINING PROGRAM

## 2025-04-09 PROCEDURE — 85018 HEMOGLOBIN: CPT | Performed by: STUDENT IN AN ORGANIZED HEALTH CARE EDUCATION/TRAINING PROGRAM

## 2025-04-09 PROCEDURE — 97162 PT EVAL MOD COMPLEX 30 MIN: CPT | Mod: GP

## 2025-04-09 RX ORDER — CARVEDILOL 12.5 MG/1
12.5 TABLET ORAL 2 TIMES DAILY WITH MEALS
Status: DISCONTINUED | OUTPATIENT
Start: 2025-04-09 | End: 2025-04-10

## 2025-04-09 RX ORDER — METOPROLOL TARTRATE 25 MG/1
25 TABLET, FILM COATED ORAL 2 TIMES DAILY
Status: DISCONTINUED | OUTPATIENT
Start: 2025-04-09 | End: 2025-04-09

## 2025-04-09 RX ORDER — CLONIDINE 0.1 MG/24H
1 PATCH, EXTENDED RELEASE TRANSDERMAL WEEKLY
Status: DISCONTINUED | OUTPATIENT
Start: 2025-04-09 | End: 2025-04-10

## 2025-04-09 RX ADMIN — ASPIRIN 81 MG CHEWABLE TABLET 81 MG: 81 TABLET CHEWABLE at 11:00

## 2025-04-09 RX ADMIN — CARVEDILOL 12.5 MG: 12.5 TABLET, FILM COATED ORAL at 11:00

## 2025-04-09 RX ADMIN — POLYETHYLENE GLYCOL 3350 17 G: 17 POWDER, FOR SOLUTION ORAL at 11:00

## 2025-04-09 RX ADMIN — TRAZODONE HYDROCHLORIDE 50 MG: 50 TABLET ORAL at 21:21

## 2025-04-09 RX ADMIN — FUROSEMIDE 20 MG: 10 INJECTION, SOLUTION INTRAMUSCULAR; INTRAVENOUS at 05:07

## 2025-04-09 RX ADMIN — INSULIN ASPART 2 UNITS: 100 INJECTION, SOLUTION INTRAVENOUS; SUBCUTANEOUS at 08:47

## 2025-04-09 RX ADMIN — CLONIDINE 1 PATCH: 0.1 PATCH TRANSDERMAL at 12:54

## 2025-04-09 RX ADMIN — PANTOPRAZOLE SODIUM 40 MG: 40 INJECTION, POWDER, FOR SOLUTION INTRAVENOUS at 16:27

## 2025-04-09 RX ADMIN — PANTOPRAZOLE SODIUM 40 MG: 40 INJECTION, POWDER, FOR SOLUTION INTRAVENOUS at 05:07

## 2025-04-09 RX ADMIN — INSULIN ASPART 1 UNITS: 100 INJECTION, SOLUTION INTRAVENOUS; SUBCUTANEOUS at 16:43

## 2025-04-09 RX ADMIN — INSULIN ASPART 1 UNITS: 100 INJECTION, SOLUTION INTRAVENOUS; SUBCUTANEOUS at 13:24

## 2025-04-09 RX ADMIN — FLUOXETINE HYDROCHLORIDE 20 MG: 20 CAPSULE ORAL at 11:00

## 2025-04-09 RX ADMIN — CARVEDILOL 12.5 MG: 12.5 TABLET, FILM COATED ORAL at 16:27

## 2025-04-09 RX ADMIN — CLOPIDOGREL 75 MG: 75 TABLET ORAL at 11:00

## 2025-04-09 RX ADMIN — Medication 12.5 MG: at 11:00

## 2025-04-09 ASSESSMENT — ACTIVITIES OF DAILY LIVING (ADL)
ADLS_ACUITY_SCORE: 70
ADLS_ACUITY_SCORE: 69
ADLS_ACUITY_SCORE: 70

## 2025-04-09 NOTE — DISCHARGE INSTRUCTIONS
REGARDING CV SURGERY (seen in hospital in lieu of clinic visit):    - Surgically doing well. Incisions are healing well with no signs of infection.  - You will be maintained on Plavix for one year post-op per surgeon preference for graft patency. OK to stop Plavix 3/2026, at which point ASA should be increased to 162mg daily indefinitely s/p CABG.  - Follow up with cardiology as scheduled (04/10 and 04/25)  - Start and continue Cardiac Rehab until completed  - May start driving 03/14/2025 (4 weeks post-op) if not using narcotic pain medications.  - Continue strict sternal precautions until 05/12/2025. No lifting >10bs; may gradually increase at this point (8 weeks post-op).   - No need for further follow-up with CV surgery unless concerns. Feel free to call our office with questions. [phone].

## 2025-04-09 NOTE — CONSULTS
CARDIOTHORACIC SURGERY FOLLOW-UP VISIT     Eric Cordoba   1950   [id number]      Reason for visit: Post operative clinic visit. Patient underwent CABGx4 with Dr. Shahid on 03/17/2025.     HPI: Eric Cordoba is a 74 year old year old male seen in the hospital in lieu of his clinic appointment with us (scheduled for yesterday) as he is currently inpatient. This is for a routine follow-up appointment after surgery. Patient has past medical history as below. Hospital course was remarkable for DKA and unchanged post-op EF of 35-40%. Patient was discharged to TCU.    Patient has had a juan course since discharge. He had an ED visit on 04/04 with multiple complaints (SOB, fatigue, chest pain) but ultimately refused admission and returned to TCU at his preference before work-up was completed. He returned to ED on 04/07 with chest pain, SOB, nausea. He agreed to being admitted and remains inpatient. Work-up so far reveals likely GERD-associated chest pain/nausea and elevated BMP. Patient is hoping to discharge back to his home within 24 hours.     Patient did not yet follow-up with primary care since leaving the hospital as he has been at TCU. Reports that incision is healing well. Denies fevers, peripheral edema. Appetite has not been good. Patient is voiding without difficulty. Weight has been stable. Pain management at this point with dilaudid while he's been inpatient. Patient has not yet been attending cardiac rehab. Patient is on Plavix for one year s/p CABG per surgeon preference for graft patency.       PAST MEDICAL HISTORY:  Past Medical History:   Diagnosis Date    Diabetes mellitus, type 2 (H)     History of CVA (cerebrovascular accident)     Hypertension     Nutritional and metabolic cardiomyopathies (H)        PAST SURGICAL HISTORY:  Past Surgical History:   Procedure Laterality Date    CORONARY ARTERY BYPASS GRAFT, WITH ENDOSCOPIC VESSEL PROCUREMENT N/A 3/17/2025    Procedure: CORONARY ARTERY BYPASS  GRAFT TIMES FOUR, LEFT INTERNAL MAMMARY ARTERY HARVEST, LEFT LEG ENDOSCOPIC SAPHENOUS VEIN PROCUREMENT,;  Surgeon: Alice Shahid MD;  Location: [facility] OR    CV CORONARY ANGIOGRAM N/A 2/24/2025    Procedure: Coronary Angiogram;  Surgeon: Bautista Durand MD;  Location: [facility] CATH LAB CV    CV INTRA AORTIC BALLOON N/A 3/17/2025    Procedure: Intra aortic Balloon Pump Insertion;  Surgeon: Bautista Durand MD;  Location: [facility] CATH LAB CV    CV LEFT HEART CATH N/A 2/24/2025    Procedure: Left Heart Catheterization;  Surgeon: Bautista Durand MD;  Location: [facility] CV    PICC DOUBLE LUMEN PLACEMENT  3/20/2025    TRANSESOPHAGEAL ECHOCARDIOGRAM INTRAOPERATIVE  3/17/2025    Procedure: EPIAORTIC ULTRASOUND, ANESTHESIA TRANSESOPHAGEAL ECHOCARDIOGRAM;  Surgeon: Alice Shahid MD;  Location: [facility]       CURRENT MEDICATIONS:     Current Facility-Administered Medications:     acetaminophen (TYLENOL) tablet 650 mg, 650 mg, Oral, Q4H PRN **OR** acetaminophen (TYLENOL) Suppository 650 mg, 650 mg, Rectal, Q4H PRN, Gondal, Saad J, MD    alum & mag hydroxide-simethicone (MAALOX) suspension 30 mL, 30 mL, Oral, Q4H PRN, Gondal, Saad J, MD    calcium carbonate (TUMS) chewable tablet 1,000 mg, 1,000 mg, Oral, 4x Daily PRN, Gondal, Saad J, MD    cloNIDine (CATAPRES-TTS1) 0.1 MG/24HR WK patch 1 patch, 1 patch, Transdermal, Weekly, Óscar Mejía MD    cloNIDine (CATAPRES-TTS1) Patch in Place, , Transdermal, Q8H, Óscar Mejía MD    clopidogrel (PLAVIX) tablet 75 mg, 75 mg, Oral, Daily, Óscar Mejía MD, 75 mg at 04/08/25 0750    glucose gel 15-30 g, 15-30 g, Oral, Q15 Min PRN **OR** dextrose 50 % injection 25-50 mL, 25-50 mL, Intravenous, Q15 Min PRN **OR** glucagon injection 1 mg, 1 mg, Subcutaneous, Q15 Min PRN, Gondal, Saad J, MD    FLUoxetine (PROzac) capsule 20 mg, 20 mg, Oral, Daily, Gondal, Saad J, MD, 20 mg at 04/08/25 0745    HYDROmorphone (DILAUDID) injection 0.2 mg,  0.2 mg, Intravenous, Q2H PRN, Gondal, Saad J, MD, 0.2 mg at 04/08/25 1621    HYDROmorphone (DILAUDID) injection 0.4 mg, 0.4 mg, Intravenous, Q2H PRN, Gondal, Saad J, MD, 0.4 mg at 04/07/25 1901    insulin aspart (NovoLOG) injection (RAPID ACTING), 1-7 Units, Subcutaneous, TID AC, Hermilo Weinberg MD, 2 Units at 04/09/25 0847    insulin aspart (NovoLOG) injection (RAPID ACTING), 1-5 Units, Subcutaneous, At Bedtime, Hermilo Weinberg MD    insulin glargine (LANTUS PEN) injection 25 Units, 25 Units, Subcutaneous, QAMAnjelica Miheret, MD, 25 Units at 04/09/25 0847    ipratropium - albuterol 0.5 mg/2.5 mg/3 mL (DUONEB) neb solution 3 mL, 3 mL, Nebulization, Q6H PRN, Kev Dejesus MD, 3 mL at 04/08/25 2154    lidocaine (LMX4) cream, , Topical, Q1H PRN, Gondal, Saad J, MD    lidocaine 1 % 0.1-1 mL, 0.1-1 mL, Other, Q1H PRN, Gondal, Saad J, MD    LORazepam (ATIVAN) half-tab 0.25 mg, 0.25 mg, Oral, Q4H PRN, Kev Dejesus MD    metoprolol tartrate (LOPRESSOR) tablet 25 mg, 25 mg, Oral, BID, Óscar Mejía MD    naloxone (NARCAN) injection 0.2 mg, 0.2 mg, Intravenous, Q2 Min PRN **OR** naloxone (NARCAN) injection 0.4 mg, 0.4 mg, Intravenous, Q2 Min PRN **OR** naloxone (NARCAN) injection 0.2 mg, 0.2 mg, Intramuscular, Q2 Min PRN **OR** naloxone (NARCAN) injection 0.4 mg, 0.4 mg, Intramuscular, Q2 Min PRN, Gondal, Saad J, MD    ondansetron (ZOFRAN ODT) ODT tab 4 mg, 4 mg, Oral, Q6H PRN **OR** ondansetron (ZOFRAN) injection 4 mg, 4 mg, Intravenous, Q6H PRN, Gondal, Saad J, MD, 4 mg at 04/08/25 5857    oxyCODONE (ROXICODONE) tablet 5 mg, 5 mg, Oral, Q4H PRN, Gondal, Saad J, MD    oxyCODONE IR (ROXICODONE) half-tab 2.5 mg, 2.5 mg, Oral, Q4H PRN, Gondal, Saad J, MD    pantoprazole (PROTONIX) IV push injection 40 mg, 40 mg, Intravenous, Q12H, Gondal, Saad J, MD, 40 mg at 04/09/25 2856    polyethylene glycol (MIRALAX) Packet 17 g, 17 g, Oral, Daily, Gondal, Saad J, MD, 17 g at 04/08/25 1505     "prochlorperazine (COMPAZINE) injection 5 mg, 5 mg, Intravenous, Q6H PRN, 5 mg at 04/08/25 1805 **OR** prochlorperazine (COMPAZINE) tablet 5 mg, 5 mg, Oral, Q6H PRN **OR** prochlorperazine (COMPAZINE) suppository 12.5 mg, 12.5 mg, Rectal, Q12H PRN, Kev Dejesus MD    senna-docusate (SENOKOT-S/PERICOLACE) 8.6-50 MG per tablet 1 tablet, 1 tablet, Oral, BID PRN **OR** senna-docusate (SENOKOT-S/PERICOLACE) 8.6-50 MG per tablet 2 tablet, 2 tablet, Oral, BID PRN, Gondal, Saad J, MD    [Held by provider] simvastatin (ZOCOR) tablet 40 mg, 40 mg, Oral, At Bedtime, Gondal, Saad J, MD, 40 mg at 04/07/25 2227    sodium chloride (PF) 0.9% PF flush 3 mL, 3 mL, Intracatheter, Q8H PRANEETH, Gondal, Saad J, MD, 3 mL at 04/09/25 0507    sodium chloride (PF) 0.9% PF flush 3 mL, 3 mL, Intracatheter, q1 min prn, Gondal, Saad J, MD    traZODone (DESYREL) tablet 50 mg, 50 mg, Oral, At Bedtime, Gondal, Saad J, MD, 50 mg at 04/07/25 2227    ALLERGIES:      Allergies   Allergen Reactions    Lisinopril Cough    Propoxyphene Unknown and Hives       ROS:  Gen: No fevers, weight loss/gain  CV: Denies chest pain, peripheral edema  Pulm: Denies SOB  GI/: Voiding without problems, appetite improving.     LABS:  None    IMAGING:  None    PHYSICAL EXAM:   BP (!) 142/75   Pulse 98   Temp 98.4  F (36.9  C) (Oral)   Resp 22   Ht 1.727 m (5' 8\")   Wt 86 kg (189 lb 9.6 oz)   SpO2 98%   BMI 28.83 kg/m    General: Alert and oriented, pleasant, no acute distress.  CV:  No peripheral edema.  Pulm: Easy work of breathing on room air.   GI: Soft, non-tender, and non-distended  Incision: Chest and leg incisions clean dry and intact without erythema, swelling or drainage  Neuro: CNs grossly intact.      ASSESSMENT/PLAN:  Eric Cordoba is a 74 year old year old male status post CABG who is being evaluated post-op.    - Surgically doing well. Incisions are healing well with no signs of infection.  - Appears slightly hypertensive; defer to primary " teams to manage although would recommend tighter BP control.  - Patient will be maintained on Plavix for one year post-op per surgeon preference for graft patency. OK to stop Plavix 3/2026, at which point ASA should be increased to 162mg daily indefinitely s/p CABG.  - Follow up with cardiology as scheduled (04/10 and 04/25)  - Start and continue Cardiac Rehab until completed--ordered inpatient.   - May start driving 03/14/2025 (4 weeks post-op) if not using narcotic pain medications.  - Continue strict sternal precautions until 05/12/2025. No lifting >10bs; may gradually increase at this point (8 weeks post-op).   - No need for further follow-up with CV surgery unless concerns. Feel free to call our office with questions. [phone].  - The above will be entered into the patient's discharge instructions.         _______  Zayra Raya PA-C  [facility] Cardiothoracic Surgery  Communication via [employer] Messaging

## 2025-04-09 NOTE — PROGRESS NOTES
04/09/25 1000   Appointment Info   Signing Clinician's Name / Credentials (PT) Lea Michelle PT   Living Environment   People in Home friend(s)  (has a renter in basement level)   Current Living Arrangements house   Home Accessibility stairs to enter home;stairs within home   Number of Stairs, Main Entrance 3   Stair Railings, Main Entrance none   Number of Stairs, Within Home, Primary five   Stair Railings, Within Home, Primary railings safe and in good condition   Living Environment Comments from TCU since 3/26 following CABG 3/17/25   Self-Care   Equipment Currently Used at Home none   Activity/Exercise/Self-Care Comment independent ADL/IADL's   General Information   Onset of Illness/Injury or Date of Surgery 04/07/25   Referring Physician Dr. Rust   Patient/Family Therapy Goals Statement (PT) go home; fishing   Pertinent History of Current Problem (include personal factors and/or comorbidities that impact the POC) from TCU with DKA, nausea/vomitting, chest pain; s/p CABG 3/17/25   Existing Precautions/Restrictions sternal;fall   Cognition   Affect/Mental Status (Cognition) low arousal/lethargic   Orientation Status (Cognition) oriented to;person;disoriented to;time   Follows Commands (Cognition) WFL   Range of Motion (ROM)   Range of Motion ROM is WFL   Strength (Manual Muscle Testing)   Strength (Manual Muscle Testing) Deficits observed during functional mobility   Bed Mobility   Bed Mobility supine-sit   Supine-Sit [place] (Bed Mobility) minimum assist (75% patient effort);verbal cues;nonverbal cues (demo/gesture)   Comment, (Bed Mobility) cuing for log roll and sternal prec.   Transfers   Transfers sit-stand transfer   Sit-Stand Transfer   Sit-Stand [place] (Transfers) minimum assist (75% patient effort);verbal cues;nonverbal cues (demo/gesture)   Gait/Stairs (Locomotion)   [place] Level (Gait) verbal cues;nonverbal cues (demo/gesture);contact guard   Assistive Device (Gait) walker,  4-wheeled   Distance in Feet (Gait) 3, 20   Pattern (Gait) step-through   Deviations/Abnormal Patterns (Gait) gait speed decreased;weight shifting decreased;stride length decreased   Clinical Impression   Criteria for Skilled Therapeutic Intervention Yes, treatment indicated   PT Diagnosis (PT) impaired functional mobility   Influenced by the following impairments decreased strength, balance, cognition,activty tolerance   Functional limitations due to impairments bed mob., transfers, gait, stairs   Clinical Presentation (PT Evaluation Complexity) stable   Clinical Presentation Rationale pt presents as medically diagnosed   Clinical Decision Making (Complexity) moderate complexity   Planned Therapy Interventions (PT) balance training;bed mobility training;gait training;home exercise program;neuromuscular re-education;patient/family education;stair training;strengthening;transfer training   Risk & Benefits of therapy have been explained evaluation/treatment results reviewed;patient   PT Total Evaluation Time   PT Eval, Moderate Complexity Minutes (15942) 12   Physical Therapy Goals   PT Frequency 5x/week   PT Predicted Duration/Target Date for Goal Attainment 04/16/25   PT: Bed Mobility Supervision/stand-by assist;Supine to/from sit   PT: Transfers Supervision/stand-by assist;Sit to/from stand;Within precautions;Assistive device   PT: Gait Supervision/stand-by assist;50 feet;Rolling walker;Within precautions   Interventions   Interventions Quick Adds Therapeutic Procedure   Therapeutic Procedure/Exercise   Ther. Procedure: strength, endurance, ROM, flexibillity Minutes (79329) 10   Symptoms Noted During/After Treatment fatigue   Treatment Detail/Skilled Intervention instruction in seated BLE ex 2x5 reps with supervision, cuing and demonstration for technique   PT Discharge Planning   PT Plan sternal precautions - sit/stands, standing tolerance, gait 4WW, LE ex   PT Discharge Recommendation (DC Rec) Transitional Care  Facility   PT Rationale for DC Rec assist of 1 for bed mob. and transfers; amb. 20 feet with walker; continue with PT for strengthening and mobility training following CABG   PT Brief overview of current status bed mob. min assist; transfers min assist; amb. 20 feet with walker assist of 1   PT Total Distance Amb During Session (feet) 23   PT Equipment Needed at Discharge walker, rolling  (4WW)   Physical Therapy Time and Intention   Timed Code Treatment Minutes 10   Total Session Time (sum of timed and untimed services) 22

## 2025-04-09 NOTE — PLAN OF CARE
Problem: Adult Inpatient Plan of Care  Goal: Optimal Comfort and Wellbeing  4/9/2025 0657 by Angelica Sotelo RN  Outcome: Progressing     Problem: Pain Acute  Goal: Optimal Pain Control and Function  4/9/2025 0657 by Angelica Sotelo RN  Outcome: Progressing     Problem: Anxiety  Goal: Anxiety Reduction or Resolution  4/9/2025 0657 by Angelica Sotelo RN  Outcome: Progressing   Goal Outcome Evaluation:         Pt A&Ox4. VSS. Denies shortness of breath. Denies nausea. Challenging bed alarm overnight. No other acute events. Bed alarm on, call light within reach.

## 2025-04-09 NOTE — PROGRESS NOTES
[facility]    Medicine Progress Note - Hospitalist Service    Date of Admission:  4/7/2025    Assessment & Plan   Eric Cordoba is a 74 year old male with a past medical history of coronary artery disease and recent CABG presented to the hospital with complaint of chest discomfort associated with nausea and vomiting, CTA chest abdomen pelvis was done which was negative for acute process.      Anion gap metabolic acidosis   DKA, resolved  Type 2 diabetes mellitus  S/p insulin gtt  Lantus 25 units subcutaneous daily  Medium intensity insulin sliding scale  Accu-Cheks  Hypoglycemia protocol    GERD associated chest discomfort and nausea  Continue IV pantoprazole 40 mg twice daily  Antinausea medication as needed    Acute on chronic systolic heart failure  Clinically patient does not appear to be in heart failure exacerbation.  Resume Lasix as kidney function is at its baseline  Echo 4/8: LVEF 20-25%, severe global hypokinesis, decline since echo 3/25/2025  Monitor input and output      History of hyperlipidemia  History of CAD status post CABG 3/17  Continue with aspirin, Plavix, simvastatin, carvedilol  -CV surgery consulted: okay to stop plavix 3/2026, continue on ASA indefinitely, has follow up 4/10 and 4/25, can start driving 3/14/2025, strict sternal precautions until 5/12/2025     History of hypertension with high blood pressure  Monitor vital signs as per protocol  IV hydralazine as needed for elevated blood pressure  Continue with losartan, Coreg     History of mood disorder  Continue with fluoxetine, trazodone           Diet: Advance Diet as Tolerated: Clear Liquid Diet; Moderate Consistent Carb (60 g CHO per Meal) Diet; Low Saturated Fat Na <2400mg Diet    DVT Prophylaxis: Pneumatic Compression Devices  [place] Catheter: Not present  Lines: None     Cardiac Monitoring: ACTIVE order. Indication: Tachyarrhythmias, acute (48 hours)  Code Status: No CPR- Do NOT Intubate      Clinically  "Significant Risk Factors          # Hyperchloremia: Highest Cl = 110 mmol/L in last 2 days, will monitor as appropriate      # Hypocalcemia: Lowest Ca = 7.3 mg/dL in last 2 days, will monitor and replace as appropriate    # Anion Gap Metabolic Acidosis: Highest Anion Gap = 19 mmol/L in last 2 days, will monitor and treat as appropriate  # Hypoalbuminemia: Lowest albumin = 3.4 g/dL at 4/7/2025 11:14 AM, will monitor as appropriate     # Hypertension: Noted on problem list  # Acute heart failure with reduced ejection fraction: last echo with EF <40% and receiving IV diuretics          # DMII: A1C = 7.2 % (Ref range: <5.7 %) within past 6 months   # Overweight: Estimated body mass index is 28.83 kg/m  as calculated from the following:    Height as of this encounter: 1.727 m (5' 8\").    Weight as of this encounter: 86 kg (189 lb 9.6 oz)., PRESENT ON ADMISSION     # Financial/Environmental Concerns: none   # History of CABG: noted on surgical history       Social Drivers of Health    Tobacco Use: Medium Risk (4/4/2025)    Patient History     Smoking Tobacco Use: Former     Smokeless Tobacco Use: Never    [facility]    Social Connections          Disposition Plan     Medically Ready for Discharge: Anticipated Tomorrow             Jeanne Rust MD  Hospitalist Service  [facility]  Securely message with [employer] (more info)  Text page via [facility] Paging/Directory   ______________________________________________________________________    Interval History   Sleepy today. No complaints. Has been declining to take medications. Denies anxiety but having signs of anxiety.     Physical Exam   Vital Signs: Temp: 98  F (36.7  C) Temp src: Oral BP: 119/61 Pulse: 98   Resp: 18 SpO2: 98 % O2 Device: None (Room air) Oxygen Delivery: 1 LPM (per pt request wanting to be placed on supplemental oxygen)  Weight: 189 lbs 9.6 oz    General Appearance: Awake, alert, in " no acute distress  Respiratory: CTAB, no wheeze  Cardiovascular: RRR, no murmur noted, surgical scar down mid chest healing  GI: soft, nontender, non distended, normal bowel sounds  Skin: no jaundice, no rash      Medical Decision Making       45 MINUTES SPENT BY ME on the date of service doing chart review, history, exam, documentation & further activities per the note.      Data     I have personally reviewed the following data over the past 24 hrs:    10.8  \   11.4 (L)   / 309     141 105 35.6 (H) /  159 (H)   4.5 20 (L) 1.93 (H) \     Trop: N/A BNP: N/A       Imaging results reviewed over the past 24 hrs:   No results found for this or any previous visit (from the past 24 hours).

## 2025-04-09 NOTE — PLAN OF CARE
Problem: Adult Inpatient Plan of Care  Goal: Optimal Comfort and Wellbeing  Outcome: Not Progressing     Problem: Comorbidity Management  Goal: Blood Pressure in Desired Range  Outcome: Progressing     Problem: Pain Acute  Goal: Optimal Pain Control and Function  Outcome: Progressing   Goal Outcome Evaluation:      Plan of Care Reviewed With: patient    Overall Patient Progress: no changeOverall Patient Progress: no change     A&Ox4, able to express needs to staff. C/O 5/10 chest pain which is managed with PRN medications. C/O SOB despite receiving PRN Duoneb, repositioning, breathing techniques and attempting to calm him (provider updated). Stat Chest X-ray and labs drawn. Oxygen saturation has remained 97% or higher throughout c/o SOB. Tele monitoring in place. Has been nauseated despite PRN Zofran and compazine but has not had emesis since arriving on the unit.

## 2025-04-09 NOTE — CONSULTS
Impression and Plan     Assessment:  Coronary artery disease: Status post four-vessel CABG 3/17/2025 with LIMA to LAD, separate SVG to distal RCA, OM1, diagonal.  Patient's postop course fairly unremarkable other than DKA and possible metabolic encephalopathy.  Hyperlipidemia: Previously on simvastatin 40 mg daily.  Currently held as patient has been declining medications and feel consolidating his medications to the most important at this time would allow better compliance.  Patient will need to restart simvastatin versus another statin therapy at discharge.  Hypertension: PTA carvedilol and losartan.  Given creatinine of 1.9 we will hold losartan.  Will discontinue carvedilol and switch to metoprolol tartrate to be crushed into orange sherbet per patient preference.  Clonidine patch placed.  Heart failure with reduced ejection fraction: LVEF before and after CABG unchanged 35 to 40%.  This admission now 20 to 25%.  Patient appears fairly euvolemic on exam.  Noted significant shortness of breath last night both laying flat and sitting up.  Discontinued Lasix to avoid dehydration.  On metoprolol for GDMT.  Ideally would like to add back ARB, and add spironolactone and Jardiance but given medication noncompliance and creatinine we will hold off at this time.  Diabetes mellitus type 2: Previous DKA now resolved.    Plan:  Patient was declining taking oral medications as he noted he would gag on them.  However declined pills crushed up from nursing.  Upon another asking agreed to take them with orange sherbet.  Will consolidate medications given some noncompliance and given his creatinine level will discontinue losartan.  Will discontinue carvedilol and switch to metoprolol tartrate 25 mg twice daily to be crushed in food  Add clonidine patch 0.1 mg  Continue Plavix  Discontinue Lasix as patient appears euvolemic on exam and want avoid getting too dry.  No sign of effusion on CT or chest x-ray    Primary  "Cardiologist: Dr. Sumner    Clinically Significant Risk Factors          # Hyperchloremia: Highest Cl = 110 mmol/L in last 2 days, will monitor as appropriate      # Hypocalcemia: Lowest Ca = 7.3 mg/dL in last 2 days, will monitor and replace as appropriate    # Anion Gap Metabolic Acidosis: Highest Anion Gap = 21 mmol/L in last 2 days, will monitor and treat as appropriate  # Hypoalbuminemia: Lowest albumin = 3.4 g/dL at 4/7/2025 11:14 AM, will monitor as appropriate     # Hypertension: Noted on problem list  # Acute heart failure with reduced ejection fraction: last echo with EF <40% and receiving IV diuretics          # DMII: A1C = 7.2 % (Ref range: <5.7 %) within past 6 months   # Overweight: Estimated body mass index is 28.83 kg/m  as calculated from the following:    Height as of this encounter: 1.727 m (5' 8\").    Weight as of this encounter: 86 kg (189 lb 9.6 oz)., PRESENT ON ADMISSION     # Financial/Environmental Concerns: none   # History of CABG: noted on surgical history          History of Present Illness      Mr. Eric Cordoba is a 74 year old male with past medical history of diabetes mellitus type 2, HFrEF, hypertension, hyperlipidemia, coronary artery disease status post four-vessel CABG 3/17/2025 with LIMA to LAD, separate SVG to distal RCA, OM1, and diagonal.  Patient's postoperative course complicated by DKA and possible metabolic encephalopathy.  Patient was discharged to TCU and presented to the ED 4/4 with shortness of breath and chest pain but refused admission.  Return to ED again 4/7 with chest pain, shortness of breath and nausea.  CTA chest abdomen pelvis was negative for acute process and showing 4 patent grafts with no sign of dissection or pleural effusion.  Patient's proBNP was elevated but patient appearing euvolemic on exam.  EKG looks stable compared to recent.  Patient received Zofran, Ativan, Pepcid and fluids in the ED.  Troponin elevated but flat and downtrending.  " "Echocardiogram before and after bypass surgery unchanged LVEF 35 to 40%.  This admission limited echo showed further decline in LVEF 20 to 25%.  Patient currently denying any chest pain.  Notes last night having significant shortness of breath and feeling \"yucky\".  Patient tells me today that he declines oral medication as he gags on the pills and that he has not taken any pills by mouth for 2 weeks.  Talking with nursing however medications were given yesterday and he had only declined last night and this morning.  Reviewing chart from TCU it appears all medications were given.        Other than noted above, Mr. Cordoba denies any chest pain/pressure/tightness, light headedness/dizziness, pre-syncope, syncope, lower extremity swelling, palpitations, paroxysmal nocturnal dyspnea (PND), or orthopnea.          Review of Systems:  Further review of systems is otherwise negative/noncontributory (based on review of medical record (admission H&P) and 13 point review of systems reviewed. Pertinent positives noted).    Cardiac Diagnostics       Telemetry (personally reviewed): Sinus rhythm with slight tachycardia    Most recent:  Echocardiogram limited 4/8/25 (results reviewed):   Left ventricular function is decreased. The ejection fraction is 20-25%  (severely reduced).  There is global hypokinesis with severe anterior, septal, and apical wall  hypokinesis.  Compared to the prior study of 3/25/25 there has been an interval decline in  LV function.    Echocardiogram Limited 3/25/2025  Left ventricular function is decreased. The ejection fraction is 35-40%  (moderately reduced).  There is severe anterior, septal, and apical wall hypokinesis.  There is no pericardial effusion.  Normal right ventricle size and systolic function.    CTA chest abdomen pelvis 4/7/25  1.  No evidence of acute aortic syndrome.  2.  Postoperative changes of recent coronary artery bypass grafting. No mediastinal fluid collection.  3.  No acute " findings in the abdomen or pelvis.    Cardiac Cath 2/24/2025  CONCLUSIONS: Severe multivessel coronary disease with chronic total occlusion of the proximal to mid left anterior descending artery (fills via L-L collaterals), proximal to mid left circumflex (fills via L-L collaterals), and ostial right coronary artery (fills via L-R collaterals) and severe obstructive disease of the large OM branch.     RECOMMENDATIONS:   Postprocedure cares, please see orders.  Recommend cardiothoracic surgery consultation for evaluation of surgical revascularization, potential targets include the LAD, diagonal branch, OM 1 and 2 branches, and rPDA.  Aggressive risk factor modification for secondary prevention.          Medical History  Surgical History Family History Social History   Past Medical History:   Diagnosis Date    Diabetes mellitus, type 2 (H)     History of CVA (cerebrovascular accident)     Hypertension     Nutritional and metabolic cardiomyopathies (H)      Past Surgical History:   Procedure Laterality Date    CORONARY ARTERY BYPASS GRAFT, WITH ENDOSCOPIC VESSEL PROCUREMENT N/A 3/17/2025    Procedure: CORONARY ARTERY BYPASS GRAFT TIMES FOUR, LEFT INTERNAL MAMMARY ARTERY HARVEST, LEFT LEG ENDOSCOPIC SAPHENOUS VEIN PROCUREMENT,;  Surgeon: Alice Shahid MD;  Location: [facility] Main OR    CV CORONARY ANGIOGRAM N/A 2/24/2025    Procedure: Coronary Angiogram;  Surgeon: Bautista Durand MD;  Location: [facility] CATH LAB CV    CV INTRA AORTIC BALLOON N/A 3/17/2025    Procedure: Intra aortic Balloon Pump Insertion;  Surgeon: Bautista Durand MD;  Location: [facility] LAB CV    CV LEFT HEART CATH N/A 2/24/2025    Procedure: Left Heart Catheterization;  Surgeon: Bautista Durand MD;  Location: [facility] LAB CV    PICC DOUBLE LUMEN PLACEMENT  3/20/2025    TRANSESOPHAGEAL ECHOCARDIOGRAM INTRAOPERATIVE  3/17/2025    Procedure: EPIAORTIC ULTRASOUND, ANESTHESIA TRANSESOPHAGEAL ECHOCARDIOGRAM;  Surgeon: Alice Shahid MD;   Location: [facility] OR     No family history on file.        Social History     Socioeconomic History    Marital status:      Spouse name: Not on file    Number of children: Not on file    Years of education: Not on file    Highest education level: Not on file   Occupational History    Not on file   Tobacco Use    Smoking status: Former     Types: Cigarettes    Smokeless tobacco: Never   Substance and Sexual Activity    Alcohol use: Not Currently    Drug use: Not on file    Sexual activity: Not on file   Other Topics Concern    Not on file   Social History Narrative    Not on file     Social Drivers of Health     Financial Resource Strain: Low Risk  (3/24/2025)    Financial Resource Strain     Within the past 12 months, have you or your family members you live with been unable to get utilities (heat, electricity) when it was really needed?: No   Food Insecurity: Low Risk  (3/24/2025)    Food Insecurity     Within the past 12 months, did you worry that your food would run out before you got money to buy more?: No     Within the past 12 months, did the food you bought just not last and you didn t have money to get more?: No   Transportation Needs: Low Risk  (3/24/2025)    Transportation Needs     Within the past 12 months, has lack of transportation kept you from medical appointments, getting your medicines, non-medical meetings or appointments, work, or from getting things that you need?: No   Physical Activity: Not on file   Stress: Not on file   Social Connections: Unknown (12/30/2021)    [facility]    Social Connections     Frequency of Communication with Friends and Family: Not on file   Interpersonal Safety: Low Risk  (3/23/2025)    Interpersonal Safety     Do you feel physically and emotionally safe where you currently live?: Yes     Within the past 12 months, have you been hit, slapped, kicked or otherwise physically hurt by someone?: No     Within the  "past 12 months, have you been humiliated or emotionally abused in other ways by your partner or ex-partner?: No   Housing Stability: Low Risk  (3/24/2025)    Housing Stability     Do you have housing? : Yes     Are you worried about losing your housing?: No             Physical Examination   VITALS: BP (!) 142/75   Pulse 98   Temp 98.4  F (36.9  C) (Oral)   Resp 22   Ht 1.727 m (5' 8\")   Wt 86 kg (189 lb 9.6 oz)   SpO2 98%   BMI 28.83 kg/m    BMI: Body mass index is 28.83 kg/m .  Wt Readings from Last 3 Encounters:   04/07/25 86 kg (189 lb 9.6 oz)   04/04/25 84.8 kg (187 lb)   04/04/25 83 kg (183 lb)         Intake/Output Summary (Last 24 hours) at 4/9/2025 1156  Last data filed at 4/9/2025 1050  Gross per 24 hour   Intake 120 ml   Output 2150 ml   Net -2030 ml       General Appearance:  Alert, cooperative, no distress, appears stated age    Head:  Normocephalic, without obvious abnormality, atraumatic   Eyes:  PERRL, conjunctiva/corneas clear, EOM's intact   Ears:  Normal external ear canals bilaterally   Nose: Nares normal, septum midline, no drainage   Throat: Lips, mucosa, and tongue normal; teeth and gums normal   Neck: Supple, symmetrical, trachea midline, no adenopathy, no  JVD    Back:   Symmetric, no abnormal curvature, ROM normal   Lungs:   Clear to auscultation bilaterally, respirations unlabored   Chest Wall:  No tenderness or deformity   Heart:  Regular rate and rhythm , S1, S2 normal,no murmur, rub or gallop       Extremities: Extremities normal, atraumatic, no cyanosis or edema   Skin: Skin color, texture, no rashes or lesions   Psychiatric: Normal affect   Neurologic: Alert and oriented X 3, Moves all 4 extremities            Imaging      Chest x-ray 4/8/2025  IMPRESSION: Median sternotomy wires. Elevation of the left hemidiaphragm. Left basilar atelectasis. Stable cardiomediastinal silhouette.        Lab Results    Chemistry/lipid CBC Cardiac Enzymes/BNP/TSH/INR   Recent Labs   Lab Test " "02/24/25  0930   CHOL 99   HDL 32*   LDL 46   TRIG 103     Recent Labs   Lab Test 02/24/25  0930 07/18/24  0947 07/21/23  1030   LDL 46 51 36     Recent Labs   Lab Test 04/09/25  0840 04/09/25  0647   NA  --  141   POTASSIUM  --  4.5   CHLORIDE  --  105   CO2  --  20*   * 192*   BUN  --  35.6*   CR  --  1.93*   GFRESTIMATED  --  36*   GAYATHRI  --  9.0     Recent Labs   Lab Test 04/09/25  0647 04/08/25  0533 04/08/25  0133   CR 1.93* 1.87* 1.81*     Recent Labs   Lab Test 04/07/25  1017 03/13/25  0824   A1C 7.2* 8.7*          Recent Labs   Lab Test 04/09/25  0647   WBC 10.8   HGB 11.4*   HCT 34.5*   MCV 96        Recent Labs   Lab Test 04/09/25  0647 04/08/25  0533 04/07/25  1017   HGB 11.4* 9.9* 12.3*    No results for input(s): \"TROPONINI\" in the last 65672 hours.  Recent Labs   Lab Test 04/08/25  1624 04/07/25  1114 04/04/25  1202 01/07/25  1535 08/27/18  1146   BNP  --   --   --   --  1,313*   NTBNPI 10,947* 3,877* 5,628*   < >  --     < > = values in this interval not displayed.     Recent Labs   Lab Test 03/23/25  0417   TSH 2.66     Recent Labs   Lab Test 04/04/25  1202 03/18/25  0425 03/17/25  1502   INR 1.19* 1.15 1.31*           Current Inpatient Scheduled Medications   Scheduled Meds:  Current Facility-Administered Medications   Medication Dose Route Frequency Provider Last Rate Last Admin    cloNIDine (CATAPRES-TTS1) 0.1 MG/24HR WK patch 1 patch  1 patch Transdermal Weekly Óscar Mejía MD        cloNIDine (CATAPRES-TTS1) Patch in Place   Transdermal Q8H PRANEETH Óscar Mejía MD        clopidogrel (PLAVIX) tablet 75 mg  75 mg Oral Daily Óscar Mejía MD   75 mg at 04/09/25 1100    FLUoxetine (PROzac) capsule 20 mg  20 mg Oral Daily Gondal, Saad J, MD   20 mg at 04/09/25 1100    insulin aspart (NovoLOG) injection (RAPID ACTING)  1-7 Units Subcutaneous TID  Hermilo Weinberg MD   2 Units at 04/09/25 0847    insulin aspart (NovoLOG) injection (RAPID ACTING)  1-5 Units " Subcutaneous At Bedtime Hermilo Weinberg MD        insulin glargine (LANTUS PEN) injection 25 Units  25 Units Subcutaneous Kev Stubbs MD   25 Units at 04/09/25 0847    metoprolol tartrate (LOPRESSOR) tablet 25 mg  25 mg Oral BID Óscar Mejía MD        pantoprazole (PROTONIX) IV push injection 40 mg  40 mg Intravenous Q12H Gondal, Saad J, MD   40 mg at 04/09/25 0507    polyethylene glycol (MIRALAX) Packet 17 g  17 g Oral Daily Gondal, Saad J, MD   17 g at 04/09/25 1100    [Held by provider] simvastatin (ZOCOR) tablet 40 mg  40 mg Oral At Bedtime Gondal, Saad J, MD   40 mg at 04/07/25 2227    sodium chloride (PF) 0.9% PF flush 3 mL  3 mL Intracatheter Q8H PRANEETH Gondal, Saad J, MD   3 mL at 04/09/25 0507    traZODone (DESYREL) tablet 50 mg  50 mg Oral At Bedtime Gondal, Saad J, MD   50 mg at 04/07/25 2227     Continuous Infusions:  Current Facility-Administered Medications   Medication Dose Route Frequency Provider Last Rate Last Admin       No current outpatient medications on file.          Medications Prior to Admission   Prior to Admission medications    Medication Sig Start Date End Date Taking? Authorizing Provider   acetaminophen (TYLENOL) 500 MG tablet Take 1,000 mg by mouth 3 times daily. While awake   Yes Reported, Patient   aspirin (ASA) 81 MG chewable tablet Take 1 tablet (81 mg) by mouth daily. Continue for one year postop, then when stopping plavix, increase aspirin to 162 mg daily indefinitely. 3/26/25  Yes Prabha Giang PA-C   carvedilol (COREG) 12.5 MG tablet Take 12.5 mg by mouth 2 times daily 5/9/24  Yes Abner lEmore MD   clopidogrel (PLAVIX) 75 MG tablet Take 1 tablet (75 mg) by mouth daily. Continue for one year postop, then stop and increase aspirin to 162 mg daily indefinitely. 3/26/25  Yes Prabha Giang PA-C   cyanocobalamin (VITAMIN B-12) 500 MCG SUBL sublingual tablet Place 1 tablet (500 mcg) under the tongue daily. 3/26/25  Yes Rahat,  Prabha SOSA PA-C   FLUoxetine 20 MG tablet Take 20 mg by mouth daily.   Yes Reported, Patient   furosemide (LASIX) 20 MG tablet Take 2 tablets (40 mg) by mouth 2 times daily for 10 days. 4/4/25 4/14/25 Yes Jany Fontana MD   insulin aspart (NOVOLOG PEN) 100 UNIT/ML pen Inject 1-10 Units subcutaneously 3 times daily (before meals). Correction Scale - HIGH INSULIN RESISTANCE DOSING   Do Not give Correction Insulin if Pre-Meal BG less than 140.   For Pre-Meal  - 164 give 1 unit.   For Pre-Meal  - 189 give 2 units.   For Pre-Meal  - 214 give 3 units.   For Pre-Meal  - 239 give 4 units.   For Pre-Meal  - 264 give 5 units.   For Pre-Meal  - 289 give 6 units.   For Pre-Meal  - 314 give 7 units.   For Pre-Meal  - 339 give 8 units.   For Pre-Meal  - 364 give 9 units.  For Pre-Meal BG greater than or equal to 365 give 10 units  To be given with prandial insulin, and based on pre-meal blood glucose. Administering insulin within 5 minutes of the start of the meal is ideal. Administer insulin no more than 30 minutes after the start of the meal, unless directed otherwise by provider.   Notify provider if glucose greater than or equal to 350 mg/dL after administration of correction dose. 3/26/25  Yes Prabha Giang PA-C   insulin aspart (NOVOLOG PEN) 100 UNIT/ML pen Inject 1-7 Units subcutaneously at bedtime. HIGH INSULIN RESISTANCE DOSING   Do Not give Bedtime Correction Insulin if BG less than 200.   For  - 224 give 1 units.   For  - 249 give 2 units.   For  - 274 give 3 units.   For  - 299 give 4 units.   For  - 324 give 5 units.   For  - 349 give 6 units.   For BG greater than or equal to 350 give 7 units.   Notify provider if glucose greater than or equal to 350 mg/dL after administration of correction dose. 3/26/25  Yes Prabha Giang PA-C   insulin glargine (LANTUS PEN) 100 UNIT/ML pen Inject 30 Units subcutaneously  every morning.   Yes Unknown, Entered By History   Lidocaine (LIDOCARE) 4 % Patch Place 1-2 patches over 12 hours onto the skin every 24 hours. To prevent lidocaine toxicity, patient should be patch free for 12 hrs daily. 3/26/25  Yes Prabha Giang PA-C   losartan (COZAAR) 25 MG tablet Take 0.5 tablets (12.5 mg) by mouth daily. 3/26/25  Yes Prabha Giang PA-C   polyethylene glycol (MIRALAX) 17 GM/Dose powder Take 17 g by mouth daily. 3/26/25  Yes Prabha Giang PA-C   simvastatin (ZOCOR) 40 MG tablet Take 40 mg by mouth daily 3/15/24  Yes Abner Elmore MD   traZODone (DESYREL) 50 MG tablet Take 50 mg by mouth at bedtime.   Yes Reported, Patient   senna-docusate (SENOKOT-S/PERICOLACE) 8.6-50 MG tablet Take 1 tablet by mouth 2 times daily.    Reported, Patient          Felecia Rizzo PA-C

## 2025-04-09 NOTE — PLAN OF CARE
"  Problem: Anxiety  Goal: Anxiety Reduction or Resolution  Outcome: Progressing     Problem: Comorbidity Management  Goal: Blood Glucose Levels Within Targeted Range  Outcome: Progressing     Problem: Fall Injury Risk  Goal: Absence of Fall and Fall-Related Injury  Outcome: Progressing   Goal Outcome Evaluation:         Pt A&Ox4. VSS. On room air. Reports shortness of breath despite O2 sat being 99%, and despite prn duoneb treatment. One time dose ativan ordered to help ease patient's anxiety. His anxiety improved. Continues to dry heave. IV zofran and seaband given and patient reporting they are not really effective. Bed alarm on, call light within reach.     Unable to complete admission questions because pt stating he is \"too weak\" to talk.                 "

## 2025-04-09 NOTE — PLAN OF CARE
Goal Outcome Evaluation:    Pt is alert and oriented x 4. Drowsy today.  Refused am medications but did end up taking them crushed in  orange ice.  States he just wants to sleep today.  Using the primo fit to urinate.  Telemetry sinus tach with BBB and first degree av block.  BP  down this afternoon from 179/86 to 142/75.  Refusing to eat anything today.  Denies chest pain this am.

## 2025-04-10 ENCOUNTER — APPOINTMENT (OUTPATIENT)
Dept: ULTRASOUND IMAGING | Facility: HOSPITAL | Age: 75
End: 2025-04-10
Attending: INTERNAL MEDICINE
Payer: COMMERCIAL

## 2025-04-10 VITALS
WEIGHT: 189.6 LBS | HEIGHT: 68 IN | TEMPERATURE: 97.5 F | RESPIRATION RATE: 16 BRPM | OXYGEN SATURATION: 95 % | BODY MASS INDEX: 28.73 KG/M2 | DIASTOLIC BLOOD PRESSURE: 51 MMHG | HEART RATE: 85 BPM | SYSTOLIC BLOOD PRESSURE: 90 MMHG

## 2025-04-10 PROBLEM — T73.0XXA STARVATION KETOACIDOSIS: Status: ACTIVE | Noted: 2025-04-10

## 2025-04-10 PROBLEM — E87.29 STARVATION KETOACIDOSIS: Status: ACTIVE | Noted: 2025-04-10

## 2025-04-10 LAB
ALBUMIN SERPL BCG-MCNC: 3.8 G/DL (ref 3.5–5.2)
ALP SERPL-CCNC: 153 U/L (ref 40–150)
ALT SERPL W P-5'-P-CCNC: 10 U/L (ref 0–70)
ANION GAP SERPL CALCULATED.3IONS-SCNC: 11 MMOL/L (ref 7–15)
ANION GAP SERPL CALCULATED.3IONS-SCNC: 14 MMOL/L (ref 7–15)
ANION GAP SERPL CALCULATED.3IONS-SCNC: 18 MMOL/L (ref 7–15)
AST SERPL W P-5'-P-CCNC: 23 U/L (ref 0–45)
B-OH-BUTYR SERPL-SCNC: 1.07 MMOL/L
B-OH-BUTYR SERPL-SCNC: 1.69 MMOL/L
B-OH-BUTYR SERPL-SCNC: 1.83 MMOL/L
BASE EXCESS BLDV CALC-SCNC: -2.1 MMOL/L (ref -3–3)
BASE EXCESS BLDV CALC-SCNC: -3.1 MMOL/L (ref -3–3)
BILIRUB DIRECT SERPL-MCNC: 0.51 MG/DL (ref 0–0.3)
BILIRUB SERPL-MCNC: 1 MG/DL
BUN SERPL-MCNC: 32.3 MG/DL (ref 8–23)
BUN SERPL-MCNC: 33 MG/DL (ref 8–23)
BUN SERPL-MCNC: 33.3 MG/DL (ref 8–23)
CALCIUM SERPL-MCNC: 9 MG/DL (ref 8.8–10.4)
CALCIUM SERPL-MCNC: 9.1 MG/DL (ref 8.8–10.4)
CALCIUM SERPL-MCNC: 9.3 MG/DL (ref 8.8–10.4)
CHLORIDE SERPL-SCNC: 102 MMOL/L (ref 98–107)
CREAT SERPL-MCNC: 1.77 MG/DL (ref 0.67–1.17)
CREAT SERPL-MCNC: 1.85 MG/DL (ref 0.67–1.17)
CREAT SERPL-MCNC: 1.88 MG/DL (ref 0.67–1.17)
CRP SERPL-MCNC: 7.9 MG/L
D DIMER PPP FEU-MCNC: 2.52 UG/ML FEU (ref 0–0.5)
EGFRCR SERPLBLD CKD-EPI 2021: 37 ML/MIN/1.73M2
EGFRCR SERPLBLD CKD-EPI 2021: 38 ML/MIN/1.73M2
EGFRCR SERPLBLD CKD-EPI 2021: 40 ML/MIN/1.73M2
ERYTHROCYTE [DISTWIDTH] IN BLOOD BY AUTOMATED COUNT: 13.7 % (ref 10–15)
GLUCOSE BLDC GLUCOMTR-MCNC: 196 MG/DL (ref 70–99)
GLUCOSE BLDC GLUCOMTR-MCNC: 202 MG/DL (ref 70–99)
GLUCOSE BLDC GLUCOMTR-MCNC: 206 MG/DL (ref 70–99)
GLUCOSE BLDC GLUCOMTR-MCNC: 262 MG/DL (ref 70–99)
GLUCOSE SERPL-MCNC: 193 MG/DL (ref 70–99)
GLUCOSE SERPL-MCNC: 212 MG/DL (ref 70–99)
GLUCOSE SERPL-MCNC: 225 MG/DL (ref 70–99)
HCO3 BLDV-SCNC: 22 MMOL/L (ref 21–28)
HCO3 BLDV-SCNC: 23 MMOL/L (ref 21–28)
HCO3 SERPL-SCNC: 18 MMOL/L (ref 22–29)
HCO3 SERPL-SCNC: 21 MMOL/L (ref 22–29)
HCO3 SERPL-SCNC: 22 MMOL/L (ref 22–29)
HCT VFR BLD AUTO: 35 % (ref 40–53)
HGB BLD-MCNC: 11.5 G/DL (ref 13.3–17.7)
HOLD SPECIMEN: NORMAL
MCH RBC QN AUTO: 31.6 PG (ref 26.5–33)
MCHC RBC AUTO-ENTMCNC: 32.9 G/DL (ref 31.5–36.5)
MCV RBC AUTO: 96 FL (ref 78–100)
O2/TOTAL GAS SETTING VFR VENT: 0 %
O2/TOTAL GAS SETTING VFR VENT: 21 %
OXYHGB MFR BLDV: 18 % (ref 70–75)
OXYHGB MFR BLDV: 44 % (ref 70–75)
PCO2 BLDV: 37 MM HG (ref 40–50)
PCO2 BLDV: 41 MM HG (ref 40–50)
PH BLDV: 7.36 [PH] (ref 7.32–7.43)
PH BLDV: 7.38 [PH] (ref 7.32–7.43)
PLATELET # BLD AUTO: 290 10E3/UL (ref 150–450)
PO2 BLDV: 17 MM HG (ref 25–47)
PO2 BLDV: 28 MM HG (ref 25–47)
POTASSIUM SERPL-SCNC: 4.3 MMOL/L (ref 3.4–5.3)
POTASSIUM SERPL-SCNC: 4.4 MMOL/L (ref 3.4–5.3)
POTASSIUM SERPL-SCNC: 4.5 MMOL/L (ref 3.4–5.3)
PROT SERPL-MCNC: 6.8 G/DL (ref 6.4–8.3)
RBC # BLD AUTO: 3.64 10E6/UL (ref 4.4–5.9)
SAO2 % BLDV: 18.3 % (ref 70–75)
SAO2 % BLDV: 44.8 % (ref 70–75)
SODIUM SERPL-SCNC: 135 MMOL/L (ref 135–145)
SODIUM SERPL-SCNC: 137 MMOL/L (ref 135–145)
SODIUM SERPL-SCNC: 138 MMOL/L (ref 135–145)
TOTAL PROTEIN SERUM FOR ELP: 6.6 G/DL (ref 6.4–8.3)
WBC # BLD AUTO: 9.1 10E3/UL (ref 4–11)

## 2025-04-10 PROCEDURE — 76705 ECHO EXAM OF ABDOMEN: CPT

## 2025-04-10 PROCEDURE — 36415 COLL VENOUS BLD VENIPUNCTURE: CPT | Performed by: INTERNAL MEDICINE

## 2025-04-10 PROCEDURE — 84165 PROTEIN E-PHORESIS SERUM: CPT | Mod: TC | Performed by: PATHOLOGY

## 2025-04-10 PROCEDURE — 84166 PROTEIN E-PHORESIS/URINE/CSF: CPT | Performed by: PATHOLOGY

## 2025-04-10 PROCEDURE — 80048 BASIC METABOLIC PNL TOTAL CA: CPT | Performed by: HOSPITALIST

## 2025-04-10 PROCEDURE — 86334 IMMUNOFIX E-PHORESIS SERUM: CPT | Performed by: PATHOLOGY

## 2025-04-10 PROCEDURE — 250N000011 HC RX IP 250 OP 636: Performed by: STUDENT IN AN ORGANIZED HEALTH CARE EDUCATION/TRAINING PROGRAM

## 2025-04-10 PROCEDURE — 36415 COLL VENOUS BLD VENIPUNCTURE: CPT | Performed by: HOSPITALIST

## 2025-04-10 PROCEDURE — 36415 COLL VENOUS BLD VENIPUNCTURE: CPT | Performed by: STUDENT IN AN ORGANIZED HEALTH CARE EDUCATION/TRAINING PROGRAM

## 2025-04-10 PROCEDURE — 258N000003 HC RX IP 258 OP 636: Performed by: HOSPITALIST

## 2025-04-10 PROCEDURE — 80048 BASIC METABOLIC PNL TOTAL CA: CPT | Performed by: INTERNAL MEDICINE

## 2025-04-10 PROCEDURE — 250N000013 HC RX MED GY IP 250 OP 250 PS 637: Performed by: INTERNAL MEDICINE

## 2025-04-10 PROCEDURE — 250N000013 HC RX MED GY IP 250 OP 250 PS 637: Performed by: HOSPITALIST

## 2025-04-10 PROCEDURE — 84155 ASSAY OF PROTEIN SERUM: CPT | Performed by: STUDENT IN AN ORGANIZED HEALTH CARE EDUCATION/TRAINING PROGRAM

## 2025-04-10 PROCEDURE — 82310 ASSAY OF CALCIUM: CPT | Performed by: HOSPITALIST

## 2025-04-10 PROCEDURE — 86140 C-REACTIVE PROTEIN: CPT | Performed by: STUDENT IN AN ORGANIZED HEALTH CARE EDUCATION/TRAINING PROGRAM

## 2025-04-10 PROCEDURE — 85027 COMPLETE CBC AUTOMATED: CPT | Performed by: INTERNAL MEDICINE

## 2025-04-10 PROCEDURE — 85379 FIBRIN DEGRADATION QUANT: CPT | Performed by: STUDENT IN AN ORGANIZED HEALTH CARE EDUCATION/TRAINING PROGRAM

## 2025-04-10 PROCEDURE — 84155 ASSAY OF PROTEIN SERUM: CPT | Performed by: INTERNAL MEDICINE

## 2025-04-10 PROCEDURE — 82010 KETONE BODYS QUAN: CPT | Performed by: HOSPITALIST

## 2025-04-10 PROCEDURE — 82805 BLOOD GASES W/O2 SATURATION: CPT | Performed by: HOSPITALIST

## 2025-04-10 PROCEDURE — 250N000013 HC RX MED GY IP 250 OP 250 PS 637: Performed by: STUDENT IN AN ORGANIZED HEALTH CARE EDUCATION/TRAINING PROGRAM

## 2025-04-10 PROCEDURE — 120N000001 HC R&B MED SURG/OB

## 2025-04-10 PROCEDURE — 99233 SBSQ HOSP IP/OBS HIGH 50: CPT | Performed by: INTERNAL MEDICINE

## 2025-04-10 PROCEDURE — 99233 SBSQ HOSP IP/OBS HIGH 50: CPT | Performed by: HOSPITALIST

## 2025-04-10 PROCEDURE — 82248 BILIRUBIN DIRECT: CPT | Performed by: INTERNAL MEDICINE

## 2025-04-10 RX ORDER — CARVEDILOL 12.5 MG/1
25 TABLET ORAL 2 TIMES DAILY WITH MEALS
Status: DISCONTINUED | OUTPATIENT
Start: 2025-04-10 | End: 2025-04-14 | Stop reason: HOSPADM

## 2025-04-10 RX ORDER — ISOSORBIDE MONONITRATE 30 MG/1
30 TABLET, EXTENDED RELEASE ORAL DAILY
Status: DISCONTINUED | OUTPATIENT
Start: 2025-04-10 | End: 2025-04-14 | Stop reason: HOSPADM

## 2025-04-10 RX ORDER — BISACODYL 10 MG
10 SUPPOSITORY, RECTAL RECTAL DAILY PRN
Status: DISCONTINUED | OUTPATIENT
Start: 2025-04-10 | End: 2025-04-14 | Stop reason: HOSPADM

## 2025-04-10 RX ORDER — SIMVASTATIN 40 MG
40 TABLET ORAL AT BEDTIME
Status: DISCONTINUED | OUTPATIENT
Start: 2025-04-10 | End: 2025-04-14 | Stop reason: HOSPADM

## 2025-04-10 RX ORDER — DEXTROSE MONOHYDRATE AND SODIUM CHLORIDE 5; .9 G/100ML; G/100ML
INJECTION, SOLUTION INTRAVENOUS CONTINUOUS
Status: DISCONTINUED | OUTPATIENT
Start: 2025-04-10 | End: 2025-04-14 | Stop reason: HOSPADM

## 2025-04-10 RX ORDER — CLONIDINE 0.1 MG/24H
1 PATCH, EXTENDED RELEASE TRANSDERMAL WEEKLY
Status: DISCONTINUED | OUTPATIENT
Start: 2025-04-10 | End: 2025-04-14 | Stop reason: HOSPADM

## 2025-04-10 RX ORDER — AMOXICILLIN 250 MG
1 CAPSULE ORAL 2 TIMES DAILY
Status: DISCONTINUED | OUTPATIENT
Start: 2025-04-10 | End: 2025-04-14 | Stop reason: HOSPADM

## 2025-04-10 RX ADMIN — SIMVASTATIN 40 MG: 40 TABLET, FILM COATED ORAL at 20:29

## 2025-04-10 RX ADMIN — DEXTROSE AND SODIUM CHLORIDE: 5; 900 INJECTION, SOLUTION INTRAVENOUS at 21:30

## 2025-04-10 RX ADMIN — APIXABAN 10 MG: 5 TABLET, FILM COATED ORAL at 20:18

## 2025-04-10 RX ADMIN — DEXTROSE AND SODIUM CHLORIDE: 5; 900 INJECTION, SOLUTION INTRAVENOUS at 08:45

## 2025-04-10 RX ADMIN — ISOSORBIDE MONONITRATE 30 MG: 30 TABLET, EXTENDED RELEASE ORAL at 16:22

## 2025-04-10 RX ADMIN — PROCHLORPERAZINE EDISYLATE 5 MG: 5 INJECTION INTRAMUSCULAR; INTRAVENOUS at 05:31

## 2025-04-10 RX ADMIN — CLOPIDOGREL 75 MG: 75 TABLET ORAL at 10:15

## 2025-04-10 RX ADMIN — CLONIDINE 1 PATCH: 0.1 PATCH TRANSDERMAL at 10:24

## 2025-04-10 RX ADMIN — PANTOPRAZOLE SODIUM 40 MG: 40 INJECTION, POWDER, FOR SOLUTION INTRAVENOUS at 05:12

## 2025-04-10 RX ADMIN — CARVEDILOL 12.5 MG: 12.5 TABLET, FILM COATED ORAL at 10:22

## 2025-04-10 RX ADMIN — ONDANSETRON 4 MG: 2 INJECTION, SOLUTION INTRAMUSCULAR; INTRAVENOUS at 02:14

## 2025-04-10 RX ADMIN — APIXABAN 10 MG: 5 TABLET, FILM COATED ORAL at 10:15

## 2025-04-10 RX ADMIN — CLONIDINE 1 PATCH: 0.1 PATCH TRANSDERMAL at 16:45

## 2025-04-10 RX ADMIN — TRAZODONE HYDROCHLORIDE 50 MG: 50 TABLET ORAL at 20:29

## 2025-04-10 RX ADMIN — PANTOPRAZOLE SODIUM 40 MG: 40 INJECTION, POWDER, FOR SOLUTION INTRAVENOUS at 16:21

## 2025-04-10 RX ADMIN — SENNOSIDES AND DOCUSATE SODIUM 1 TABLET: 50; 8.6 TABLET ORAL at 20:18

## 2025-04-10 RX ADMIN — CARVEDILOL 25 MG: 12.5 TABLET, FILM COATED ORAL at 16:22

## 2025-04-10 ASSESSMENT — ACTIVITIES OF DAILY LIVING (ADL)
ADLS_ACUITY_SCORE: 60
ADLS_ACUITY_SCORE: 57
ADLS_ACUITY_SCORE: 57
ADLS_ACUITY_SCORE: 62
ADLS_ACUITY_SCORE: 60
ADLS_ACUITY_SCORE: 57
ADLS_ACUITY_SCORE: 62
ADLS_ACUITY_SCORE: 60
ADLS_ACUITY_SCORE: 57
ADLS_ACUITY_SCORE: 62
ADLS_ACUITY_SCORE: 60
ADLS_ACUITY_SCORE: 62
ADLS_ACUITY_SCORE: 60
ADLS_ACUITY_SCORE: 57
ADLS_ACUITY_SCORE: 62
ADLS_ACUITY_SCORE: 57
ADLS_ACUITY_SCORE: 57
ADLS_ACUITY_SCORE: 60
ADLS_ACUITY_SCORE: 60

## 2025-04-10 NOTE — PROGRESS NOTES
[facility]    Medicine Progress Note - Hospitalist Service    Date of Admission:  4/7/2025    Assessment & Plan                Eric Cordoba is a 74 year old male with history of coronary artery disease and recent CABG presented to the hospital with complaint of chest discomfort associated with nausea and vomiting. Etiology uncertain but now known to have PE, starvation ketosis- multiple metabolic issues resulting in malaise? Hospital Day: 4        Anion gap metabolic acidosis   DKA, resolved  Now starvation ketosis  S/p insulin gtt, off 4/8  --today labs showing worsening metabolic acidosis, checked ketone and was significantly elevated.  Patient has not been eating well, rather than recurrent DKA suspect this is more starvation ketosis.  -Dextrose drip  -Frequent sliding scale  -Okay to continue oral diet, he is not taking much and the underlying issue is malnutrition anyway. Consult RD. Start Ensure- per family, he was drinking these before.  -Labs show improvement in metabolic acidosis throughout the day, can discontinue acute 4-hour labs, recheck BMP, VBG, ketone in the morning  Type 2 diabetes mellitus  -home insulin regimen: Lantus 30 units QAM, novolog sliding scale  -here: Lantus 25 units QAM, today add novolog carb ratio 1:30 and reduce to sensitive sliding scale    RUQ pain  - Alkaline phosphatase and bilirubin mildly elevated.  Gallbladder dilated on recent CT scan.  Right upper quadrant ultrasound obtained, gallbladder with sludge but otherwise unremarkable, will get HIDA for completeness.  Suspicion that right upper quadrant pain is due to newly diagnosed PE    Pulmonary embolus  -Radiologist revisited CT scan from 4/6 and noted pulmonary embolus of RLL  -Likely provoked by recent CABG, per cardiology he is also high risk due to his reduced EF  -Start Eliquis  -no hypoxia     GERD associated chest discomfort and nausea  Continue IV pantoprazole 40 mg twice daily  Antinausea  medication as needed     Acute on chronic systolic heart failure  -Clinically patient does not appear to be in heart failure exacerbation.  holding Lasix as not taking much PO  Echo 4/8: LVEF 20-25%, severe global hypokinesis, decline since echo 3/25/2025  Monitor input and output      History of hyperlipidemia  History of CAD status post CABG 3/17  Continue with aspirin, Plavix, simvastatin, carvedilol  -CV surgery consulted: okay to stop plavix 3/2026, continue on ASA indefinitely, has follow up 4/10 and 4/25, can start driving 4/14/2025, strict sternal precautions until 5/12/2025     History of hypertension with high blood pressure  -Monitor vital signs as per protocol  -IV hydralazine as needed for elevated blood pressure  -Cardiology uptitrating Coreg  -Started on Imdur and clonidine patch and cardiology uptitrating    CKD 3b  -Cr baseline 1.8, at baseline here  - Cardiology considering workup for amyloidosis     History of mood disorder  -Continue with fluoxetine, trazodone     Mild cognitive impairment  -HCPOA, friend Veronique notes patient had recent SLUMS of 15/30.  She feels he has had a cognitive decline since his CABG.  She feels strongly that patient does not have decision-making capacity to return home rather than TCU and I agree with this.  -recommend formal outpatient neuropsych testing    Constipation  -home miralax  -add BID senna  -supp PRN          Diet: Advance Diet as Tolerated: Regular Diet Adult; Moderate Consistent Carb (60 g CHO per Meal) Diet; Low Saturated Fat Na <2400mg Diet    DVT Prophylaxis: Known VTE.   DOAC  [place] Catheter: Not present  Lines: None     Cardiac Monitoring: ACTIVE order. Indication: Tachyarrhythmias, acute (48 hours)  Code Status: No CPR- Do NOT Intubate      Clinically Significant Risk Factors               # Hypoalbuminemia: Lowest albumin = 3.4 g/dL at 4/7/2025 11:14 AM, will monitor as appropriate     # Hypertension: Noted on problem list  # Acute heart failure  "with reduced ejection fraction: last echo with EF <40% and receiving IV diuretics          # DMII: A1C = 7.2 % (Ref range: <5.7 %) within past 6 months   # Overweight: Estimated body mass index is 28.83 kg/m  as calculated from the following:    Height as of this encounter: 1.727 m (5' 8\").    Weight as of this encounter: 86 kg (189 lb 9.6 oz)., PRESENT ON ADMISSION     # Financial/Environmental Concerns: none   # History of CABG: noted on surgical history       Disposition Plan     Medically Ready for Discharge: Anticipated in 2-4 Days         Discharge barrier(s): symptoms, ketosis  Care discussed with: patient, cardiology, HCPOA Veronique Dsouza MD  Hospitalist Service  [facility]  Securely message with [employer] (more info)  Text page via w[employer] Paging/Directory   ______________________________________________________________________      Physical Exam   Vital Signs: Temp: 97.6  F (36.4  C) Temp src: Oral BP: (!) 172/85 (notify nurse) Pulse: 100   Resp: 16 SpO2: 96 % O2 Device: None (Room air)    Weight: 189 lbs 9.6 oz    General: in no apparent distress, non-toxic, and alert male lying in hospital bed oriented x3  HEENT: Head normocephalic atraumatic, oral mucosa moist. Sclerae anicteric  CV: Regular rhythm, normal rate, no murmurs  Resp: No wheezes, no rales or rhonchi, no focal consolidations  GI: Belly soft, nondistended, nontender, bowel sounds present  Skin: No rashes or lesions  Extremities: No peripheral edema  Psych: Flat affect, anxious mood  Neuro: Grossly normal      Medical Decision Making               Data   Recent Results (from the past 16 hours)   Basic metabolic panel    Collection Time: 04/10/25  7:17 AM   Result Value Ref Range    Sodium 138 135 - 145 mmol/L    Potassium 4.4 3.4 - 5.3 mmol/L    Chloride 102 98 - 107 mmol/L    Carbon Dioxide (CO2) 18 (L) 22 - 29 mmol/L    Anion Gap 18 (H) 7 - 15 mmol/L    Urea Nitrogen 33.0 (H) 8.0 - 23.0 mg/dL    Creatinine 1.85 " (H) 0.67 - 1.17 mg/dL    GFR Estimate 38 (L) >60 mL/min/1.73m2    Calcium 9.3 8.8 - 10.4 mg/dL    Glucose 193 (H) 70 - 99 mg/dL   CBC with platelets    Collection Time: 04/10/25  7:17 AM   Result Value Ref Range    WBC Count 9.1 4.0 - 11.0 10e3/uL    RBC Count 3.64 (L) 4.40 - 5.90 10e6/uL    Hemoglobin 11.5 (L) 13.3 - 17.7 g/dL    Hematocrit 35.0 (L) 40.0 - 53.0 %    MCV 96 78 - 100 fL    MCH 31.6 26.5 - 33.0 pg    MCHC 32.9 31.5 - 36.5 g/dL    RDW 13.7 10.0 - 15.0 %    Platelet Count 290 150 - 450 10e3/uL   Ketone Beta-Hydroxybutyrate Quantitative    Collection Time: 04/10/25  7:17 AM   Result Value Ref Range    Ketone (Beta-Hydroxybutyrate) Quantitative 1.83 (HH) <=0.30 mmol/L   CRP inflammation    Collection Time: 04/10/25  7:17 AM   Result Value Ref Range    CRP Inflammation 7.90 (H) <5.00 mg/L   Hepatic panel    Collection Time: 04/10/25  7:17 AM   Result Value Ref Range    Protein Total 6.8 6.4 - 8.3 g/dL    Albumin 3.8 3.5 - 5.2 g/dL    Bilirubin Total 1.0 <=1.2 mg/dL    Alkaline Phosphatase 153 (H) 40 - 150 U/L    AST 23 0 - 45 U/L    ALT 10 0 - 70 U/L    Bilirubin Direct 0.51 (H) 0.00 - 0.30 mg/dL   D dimer quantitative    Collection Time: 04/10/25  8:20 AM   Result Value Ref Range    D-Dimer Quantitative 2.52 (H) 0.00 - 0.50 ug/mL FEU   Total Protein, Serum for ELP    Collection Time: 04/10/25  8:20 AM   Result Value Ref Range    Total Protein Serum for ELP 6.6 6.4 - 8.3 g/dL   Glucose by meter    Collection Time: 04/10/25  8:34 AM   Result Value Ref Range    GLUCOSE BY METER POCT 196 (H) 70 - 99 mg/dL   Basic metabolic panel    Collection Time: 04/10/25 10:06 AM   Result Value Ref Range    Sodium 137 135 - 145 mmol/L    Potassium 4.5 3.4 - 5.3 mmol/L    Chloride 102 98 - 107 mmol/L    Carbon Dioxide (CO2) 21 (L) 22 - 29 mmol/L    Anion Gap 14 7 - 15 mmol/L    Urea Nitrogen 33.3 (H) 8.0 - 23.0 mg/dL    Creatinine 1.88 (H) 0.67 - 1.17 mg/dL    GFR Estimate 37 (L) >60 mL/min/1.73m2    Calcium 9.0 8.8 - 10.4  mg/dL    Glucose 212 (H) 70 - 99 mg/dL   Ketone Beta-Hydroxybutyrate Quantitative    Collection Time: 04/10/25 10:06 AM   Result Value Ref Range    Ketone (Beta-Hydroxybutyrate) Quantitative 1.69 (HH) <=0.30 mmol/L   Blood gas venous    Collection Time: 04/10/25 10:06 AM   Result Value Ref Range    pH Venous 7.38 7.32 - 7.43    pCO2 Venous 37 (L) 40 - 50 mm Hg    pO2 Venous 28 25 - 47 mm Hg    Bicarbonate Venous 22 21 - 28 mmol/L    Base Excess/Deficit Venous -3.1 (L) -3.0 - 3.0 mmol/L    FIO2 21     Oxyhemoglobin Venous 44 (L) 70 - 75 %    O2 Sat, Venous 44.8 (L) 70.0 - 75.0 %   Glucose by meter    Collection Time: 04/10/25 12:14 PM   Result Value Ref Range    GLUCOSE BY METER POCT 202 (H) 70 - 99 mg/dL   Basic metabolic panel    Collection Time: 04/10/25  2:05 PM   Result Value Ref Range    Sodium 135 135 - 145 mmol/L    Potassium 4.3 3.4 - 5.3 mmol/L    Chloride 102 98 - 107 mmol/L    Carbon Dioxide (CO2) 22 22 - 29 mmol/L    Anion Gap 11 7 - 15 mmol/L    Urea Nitrogen 32.3 (H) 8.0 - 23.0 mg/dL    Creatinine 1.77 (H) 0.67 - 1.17 mg/dL    GFR Estimate 40 (L) >60 mL/min/1.73m2    Calcium 9.1 8.8 - 10.4 mg/dL    Glucose 225 (H) 70 - 99 mg/dL   Ketone Beta-Hydroxybutyrate Quantitative    Collection Time: 04/10/25  2:05 PM   Result Value Ref Range    Ketone (Beta-Hydroxybutyrate) Quantitative 1.07 (H) <=0.30 mmol/L   Blood gas venous    Collection Time: 04/10/25  2:05 PM   Result Value Ref Range    pH Venous 7.36 7.32 - 7.43    pCO2 Venous 41 40 - 50 mm Hg    pO2 Venous 17 (L) 25 - 47 mm Hg    Bicarbonate Venous 23 21 - 28 mmol/L    Base Excess/Deficit Venous -2.1 -3.0 - 3.0 mmol/L    FIO2 0     Oxyhemoglobin Venous 18 (L) 70 - 75 %    O2 Sat, Venous 18.3 (L) 70.0 - 75.0 %       Interval History     Patient continues very nauseated. Complains of pain in right upper quadrant. Difficult to get comfortable. Complains mild short of breath. Remains not eating or drinking much. Reluctant to take pills, willing to try if  given crushed with tony. Lab suggestive of recurrent DKA but more likely starvation ketosis, give dextrose dripand frequent sliding scale insulin. Will continue to let him eat. Underlying issue is starvation.     I called and updated his friend, Veronique

## 2025-04-10 NOTE — PLAN OF CARE
Goal Outcome Evaluation:    Pt alert and oriented but forgetful.  Irritable this am. Feels nauseated.  Declined antemetic or Maalox.  No dry heaving noted. Stated no pain to RN but on exam with cards he had RUQ tenderness. ABD ultrasound ordered and completed. New diagnosis of PE today. No shortness of breath and lungs clear.  On RA.  On and off have to sit up at the edge of bed to feel comfortable.  Had removed his weekly clonidine patch that was on his right chest.  Contacted pharmacy and new on scheduled.  Place on his back so he can not get to it.  Did take am medications crushed in scoop of orange ice.  But did take some convincing to the importance of the medications.  Has not eaten anything today. Sips on water a little.  D5NS running at 100cc/hr. No stool today. Voiding via urinal.  Blood glucose every 4 hours. To do them more around meal times.

## 2025-04-10 NOTE — PLAN OF CARE
"Goal Outcome Evaluation:    Pt A/O x 4. C/o nausea since 2am and did not sleep. Gave PRN zofran first and pt report not effective. Later gave PRN compazine and seemed to help.  Pt tolerated IV meds. Pt is supposed to be on strict sternal precaution until 5/12/25. Gave a pillow for pt to use when he needs it to splint his cough or moving around but pt refused pillow.Pt prefer to have urinal at bedside and request for external catheter to be off. Denies pain and refused SCD's. Is SR on tele and VSS on room air.        Problem: Adult Inpatient Plan of Care  Goal: Plan of Care Review  Description: The Plan of Care Review/Shift note should be completed every shift.  The Outcome Evaluation is a brief statement about your assessment that the patient is improving, declining, or no change.  This information will be displayed automatically on your shiftnote.  Outcome: Progressing  Goal: Patient-Specific Goal (Individualized)  Description: You can add care plan individualizations to a care plan. Examples of Individualization might be:  \"Parent requests to be called daily at 9am for status\", \"I have a hard time hearing out of my right ear\", or \"Do not touch me to wake me up as it startlesme\".  Outcome: Progressing  Goal: Absence of Hospital-Acquired Illness or Injury  Outcome: Progressing  Intervention: Identify and Manage Fall Risk  Recent Flowsheet Documentation  Taken 4/10/2025 0000 by Walker Dobbins, RN  Safety Promotion/Fall Prevention:   activity supervised   clutter free environment maintained   nonskid shoes/slippers when out of bed   room near nurse's station   room organization consistent   safety round/check completed   supervised activity  Intervention: Prevent Skin Injury  Recent Flowsheet Documentation  Taken 4/9/2025 2357 by Walker Dobbins, RN  Body Position: position changed independently  Intervention: Prevent and Manage VTE (Venous Thromboembolism) Risk  Recent Flowsheet Documentation  Taken 4/10/2025 0000 by Shilpi" NEFTALI Cardoso  VTE Prevention/Management: (Pt refused , discomfort)   SCDs off (sequential compression devices)   patient refused intervention  Intervention: Prevent Infection  Recent Flowsheet Documentation  Taken 4/10/2025 0000 by Walker Dobbins RN  Infection Prevention:   single patient room provided   rest/sleep promoted  Goal: Optimal Comfort and Wellbeing  Outcome: Progressing  Goal: Readiness for Transition of Care  Outcome: Progressing     Problem: Delirium  Goal: Optimal Coping  Outcome: Progressing  Goal: Improved Behavioral Control  Outcome: Progressing  Intervention: Minimize Safety Risk  Recent Flowsheet Documentation  Taken 4/10/2025 0000 by Walker Dobbins RN  Enhanced Safety Measures: room near unit station  Goal: Improved Attention and Thought Clarity  Outcome: Progressing  Goal: Improved Sleep  Outcome: Progressing     Problem: Comorbidity Management  Goal: Blood Glucose Levels Within Targeted Range  Outcome: Progressing  Intervention: Monitor and Manage Glycemia  Recent Flowsheet Documentation  Taken 4/10/2025 0000 by Walker Dobbins RN  Medication Review/Management: medications reviewed  Goal: Blood Pressure in Desired Range  Outcome: Progressing  Intervention: Maintain Blood Pressure Management  Recent Flowsheet Documentation  Taken 4/10/2025 0000 by Walker Dobbins RN  Medication Review/Management: medications reviewed     Problem: Fall Injury Risk  Goal: Absence of Fall and Fall-Related Injury  Outcome: Progressing  Intervention: Identify and Manage Contributors  Recent Flowsheet Documentation  Taken 4/10/2025 0000 by Walker Dobbins RN  Medication Review/Management: medications reviewed  Intervention: Promote Injury-Free Environment  Recent Flowsheet Documentation  Taken 4/10/2025 0000 by Walker Dobbins RN  Safety Promotion/Fall Prevention:   activity supervised   clutter free environment maintained   nonskid shoes/slippers when out of bed   room near nurse's station   room organization consistent   safety round/check  completed   supervised activity     Problem: Pain Acute  Goal: Optimal Pain Control and Function  Outcome: Progressing  Intervention: Prevent or Manage Pain  Recent Flowsheet Documentation  Taken 4/10/2025 0000 by Walker Dobbins RN  Medication Review/Management: medications reviewed     Problem: Anxiety  Goal: Anxiety Reduction or Resolution  Outcome: Progressing

## 2025-04-10 NOTE — PROGRESS NOTES
States he is having nausea. Zofran and the compazine not helpful according to him. He has not dry heaved since last night.  Currently not dry heaving but has the emesis bad in front of him. Did offer Maalox or Zofran and he declined.

## 2025-04-10 NOTE — PROGRESS NOTES
"Imp/plan:  PE on review CTA from 4/7 with radiology today (they will amend report), c/w high d-dimer, pain RUQ, higher risk with low EF, will defer to hospitalist for therapy, noted on review of Echo from 4/8 normal to small RV  CAD s/p CABG - on clopidogrel, now starting anticoagulation, no aspirin, restart statin  HFrEF - s/p CABG, EF lower since surgery, but now with PE, and HTN, pt noncompliant with medication, raise carvedilol 25mg bid and add imdur, hold hydralazine, stop losartan given CKD for now.  HTN - raising carvedilol, imdur, and clonidine patch  CKD/waxing/waning BP - plan screen for amyloidosis AL type.    RUQ pain -probably related to PE< but with elevated CRP, check US RUQ, LFT pending, distended GB probably from not eating per radiology    Review of Systems: 12 points negative other than above    BP (!) 187/85 (BP Location: Right arm)   Pulse 99   Temp 98.2  F (36.8  C) (Oral)   Resp 18   Ht 1.727 m (5' 8\")   Wt 86 kg (189 lb 9.6 oz)   SpO2 96%   BMI 28.83 kg/m    JVP<7cm, carotids normal  Lungs clear  Cor RRR no c,r,m  Abs pain right upper quadrant and epigastric pain  Ext no c,c,e    Lab Results   Component Value Date    HGB 11.5 (L) 04/10/2025     Lab Results   Component Value Date     04/10/2025     No results found for: \"CREATININE\"  No components found for: \"K\"          Óscar Mejía MD  Interventional Cardiology   [facility]  [phone]    "

## 2025-04-10 NOTE — PLAN OF CARE
Goal Outcome Evaluation:  Problem: Comorbidity Management  Goal: Blood Pressure in Desired Range  Outcome: Progressing  Intervention: Maintain Blood Pressure Management  Recent Flowsheet Documentation  Taken 4/9/2025 2030 by Patricia Balderas RN  Medication Review/Management: medications reviewed  Taken 4/9/2025 1630 by Patricia Balderas RN  Medication Review/Management: medications reviewed     Problem: Pain Acute  Goal: Optimal Pain Control and Function  Outcome: Progressing  Intervention: Prevent or Manage Pain  Recent Flowsheet Documentation  Taken 4/9/2025 2030 by Patricia Balderas RN  Sensory Stimulation Regulation:   television on   care clustered  Medication Review/Management: medications reviewed  Taken 4/9/2025 1630 by Patricia Balderas RN  Sensory Stimulation Regulation:   television on   care clustered  Medication Review/Management: medications reviewed   Pt is alert oriented x4 and is able to communicate his needs to staff. Up in chair for approx 1 hr then  requested to get back to bed. VSS. Tele SR.  Denies pain. Ate fair at dinner. Took his po meds okay. Primofit intact with mod amount of clear yellow urine. No BM this shift. Currently in bed resting. Plan of care ongoing

## 2025-04-10 NOTE — PROGRESS NOTES
Care Management Follow Up    Length of Stay (days): 3    Expected Discharge Date: 04/10/2025     Concerns to be Addressed:       Patient plan of care discussed at interdisciplinary rounds: Yes    Anticipated Discharge Disposition:        TBD        Anticipated Discharge Services:  TBD  Anticipated Discharge DME:  na    Patient/family educated on Medicare website which has current facility and service quality ratings:    Education Provided on the Discharge Plan:    Patient/Family in Agreement with the Plan:      Referrals Placed by CM/SW:    Private pay costs discussed: Not applicable    Discussed  Partnership in Safe Discharge Planning  document with patient/family: No     Handoff Completed: No, handoff not indicated or clinically appropriate    Additional Information:  Met with patient in his room to discuss therapy recommendations for TCU. Patient stated he couldn't discuss this right now; he needs a doctor. CM reported this to Charge RN    Next Steps: follow up with patient about TCU bed    Becky Mcgill RN

## 2025-04-11 LAB
ANION GAP SERPL CALCULATED.3IONS-SCNC: 9 MMOL/L (ref 7–15)
B-OH-BUTYR SERPL-SCNC: <0.18 MMOL/L
BASE EXCESS BLDV CALC-SCNC: -1.6 MMOL/L (ref -3–3)
BUN SERPL-MCNC: 35 MG/DL (ref 8–23)
CALCIUM SERPL-MCNC: 8.6 MG/DL (ref 8.8–10.4)
CHLORIDE SERPL-SCNC: 105 MMOL/L (ref 98–107)
CREAT SERPL-MCNC: 2.12 MG/DL (ref 0.67–1.17)
EGFRCR SERPLBLD CKD-EPI 2021: 32 ML/MIN/1.73M2
GLUCOSE BLDC GLUCOMTR-MCNC: 216 MG/DL (ref 70–99)
GLUCOSE BLDC GLUCOMTR-MCNC: 223 MG/DL (ref 70–99)
GLUCOSE BLDC GLUCOMTR-MCNC: 226 MG/DL (ref 70–99)
GLUCOSE BLDC GLUCOMTR-MCNC: 226 MG/DL (ref 70–99)
GLUCOSE BLDC GLUCOMTR-MCNC: 249 MG/DL (ref 70–99)
GLUCOSE BLDC GLUCOMTR-MCNC: 270 MG/DL (ref 70–99)
GLUCOSE SERPL-MCNC: 221 MG/DL (ref 70–99)
HCO3 BLDV-SCNC: 24 MMOL/L (ref 21–28)
HCO3 SERPL-SCNC: 24 MMOL/L (ref 22–29)
HOLD SPECIMEN: NORMAL
O2/TOTAL GAS SETTING VFR VENT: 21 %
OXYHGB MFR BLDV: 30 % (ref 70–75)
PCO2 BLDV: 43 MM HG (ref 40–50)
PH BLDV: 7.35 [PH] (ref 7.32–7.43)
PO2 BLDV: 22 MM HG (ref 25–47)
POTASSIUM SERPL-SCNC: 4.1 MMOL/L (ref 3.4–5.3)
SAO2 % BLDV: 30.2 % (ref 70–75)
SODIUM SERPL-SCNC: 138 MMOL/L (ref 135–145)

## 2025-04-11 PROCEDURE — 80048 BASIC METABOLIC PNL TOTAL CA: CPT | Performed by: HOSPITALIST

## 2025-04-11 PROCEDURE — 250N000011 HC RX IP 250 OP 636: Performed by: STUDENT IN AN ORGANIZED HEALTH CARE EDUCATION/TRAINING PROGRAM

## 2025-04-11 PROCEDURE — 36415 COLL VENOUS BLD VENIPUNCTURE: CPT | Performed by: HOSPITALIST

## 2025-04-11 PROCEDURE — 250N000013 HC RX MED GY IP 250 OP 250 PS 637: Performed by: STUDENT IN AN ORGANIZED HEALTH CARE EDUCATION/TRAINING PROGRAM

## 2025-04-11 PROCEDURE — 250N000013 HC RX MED GY IP 250 OP 250 PS 637: Performed by: HOSPITALIST

## 2025-04-11 PROCEDURE — 120N000001 HC R&B MED SURG/OB

## 2025-04-11 PROCEDURE — 250N000013 HC RX MED GY IP 250 OP 250 PS 637: Performed by: INTERNAL MEDICINE

## 2025-04-11 PROCEDURE — 258N000003 HC RX IP 258 OP 636: Performed by: HOSPITALIST

## 2025-04-11 PROCEDURE — 99233 SBSQ HOSP IP/OBS HIGH 50: CPT | Performed by: HOSPITALIST

## 2025-04-11 PROCEDURE — 82310 ASSAY OF CALCIUM: CPT | Performed by: HOSPITALIST

## 2025-04-11 PROCEDURE — 82805 BLOOD GASES W/O2 SATURATION: CPT | Performed by: HOSPITALIST

## 2025-04-11 PROCEDURE — 82010 KETONE BODYS QUAN: CPT | Performed by: HOSPITALIST

## 2025-04-11 PROCEDURE — 99232 SBSQ HOSP IP/OBS MODERATE 35: CPT | Performed by: STUDENT IN AN ORGANIZED HEALTH CARE EDUCATION/TRAINING PROGRAM

## 2025-04-11 RX ORDER — FUROSEMIDE 20 MG/1
40 TABLET ORAL
Status: DISCONTINUED | OUTPATIENT
Start: 2025-04-11 | End: 2025-04-14 | Stop reason: HOSPADM

## 2025-04-11 RX ORDER — BISACODYL 10 MG
10 SUPPOSITORY, RECTAL RECTAL ONCE
Status: COMPLETED | OUTPATIENT
Start: 2025-04-11 | End: 2025-04-11

## 2025-04-11 RX ORDER — KIT FOR THE PREPARATION OF TECHNETIUM TC 99M MEBROFENIN 45 MG/10ML
4-10 INJECTION, POWDER, LYOPHILIZED, FOR SOLUTION INTRAVENOUS ONCE
Status: DISCONTINUED | OUTPATIENT
Start: 2025-04-11 | End: 2025-04-12

## 2025-04-11 RX ADMIN — DEXTROSE AND SODIUM CHLORIDE: 5; 900 INJECTION, SOLUTION INTRAVENOUS at 08:02

## 2025-04-11 RX ADMIN — APIXABAN 10 MG: 5 TABLET, FILM COATED ORAL at 08:04

## 2025-04-11 RX ADMIN — CLOPIDOGREL 75 MG: 75 TABLET ORAL at 07:55

## 2025-04-11 RX ADMIN — POLYETHYLENE GLYCOL 3350 17 G: 17 POWDER, FOR SOLUTION ORAL at 07:58

## 2025-04-11 RX ADMIN — ISOSORBIDE MONONITRATE 30 MG: 30 TABLET, EXTENDED RELEASE ORAL at 08:04

## 2025-04-11 RX ADMIN — FUROSEMIDE 40 MG: 20 TABLET ORAL at 18:35

## 2025-04-11 RX ADMIN — FLUOXETINE HYDROCHLORIDE 20 MG: 20 CAPSULE ORAL at 07:56

## 2025-04-11 RX ADMIN — CARVEDILOL 25 MG: 12.5 TABLET, FILM COATED ORAL at 08:04

## 2025-04-11 RX ADMIN — CARVEDILOL 25 MG: 12.5 TABLET, FILM COATED ORAL at 17:10

## 2025-04-11 RX ADMIN — SENNOSIDES AND DOCUSATE SODIUM 1 TABLET: 50; 8.6 TABLET ORAL at 08:03

## 2025-04-11 RX ADMIN — PANTOPRAZOLE SODIUM 40 MG: 40 INJECTION, POWDER, FOR SOLUTION INTRAVENOUS at 05:03

## 2025-04-11 RX ADMIN — PROCHLORPERAZINE EDISYLATE 5 MG: 5 INJECTION INTRAMUSCULAR; INTRAVENOUS at 12:19

## 2025-04-11 RX ADMIN — PANTOPRAZOLE SODIUM 40 MG: 40 INJECTION, POWDER, FOR SOLUTION INTRAVENOUS at 16:26

## 2025-04-11 RX ADMIN — ONDANSETRON 4 MG: 2 INJECTION, SOLUTION INTRAMUSCULAR; INTRAVENOUS at 09:30

## 2025-04-11 ASSESSMENT — ACTIVITIES OF DAILY LIVING (ADL)
ADLS_ACUITY_SCORE: 58
ADLS_ACUITY_SCORE: 57
ADLS_ACUITY_SCORE: 59
ADLS_ACUITY_SCORE: 58
ADLS_ACUITY_SCORE: 57
ADLS_ACUITY_SCORE: 57
ADLS_ACUITY_SCORE: 59
ADLS_ACUITY_SCORE: 57
ADLS_ACUITY_SCORE: 57
ADLS_ACUITY_SCORE: 59
ADLS_ACUITY_SCORE: 58
ADLS_ACUITY_SCORE: 57
ADLS_ACUITY_SCORE: 61
ADLS_ACUITY_SCORE: 57
ADLS_ACUITY_SCORE: 61
ADLS_ACUITY_SCORE: 57
ADLS_ACUITY_SCORE: 59
ADLS_ACUITY_SCORE: 55
ADLS_ACUITY_SCORE: 57
ADLS_ACUITY_SCORE: 57

## 2025-04-11 NOTE — PROGRESS NOTES
Impression and Plan     Assessment:  Coronary artery disease: Status post four-vessel CABG 3/17/2025 with LIMA to LAD, separate SVG to distal RCA, OM1, diagonal.  Patient's postop course fairly unremarkable other than DKA and possible metabolic encephalopathy.  PE: upon second review of CT, PE was found. Higher risk with low EF. Now on eliquis.   Hyperlipidemia: Simvastatin 40 mg daily.   Hypertension: PTA carvedilol and losartan.  Given creatinine of 1.9 losartan discontinued.  Clonidine patch placed. Amyloidosis AL labs pending.   Heart failure with reduced ejection fraction: LVEF before and after CABG unchanged 35 to 40%.  This admission now 20 to 25%.  Patient appears fairly euvolemic on exam.  Discontinued Lasix to avoid dehydration.  On carvedilol and imdur for GDMT.  Would consider entresto in the future. Losartan held given creatinine.   RUQ pain: US showing sludge in gallbladder. HIDA scan ordered by hospitalist.   Diabetes mellitus type 2: Previous DKA now resolved.    Plan:  Continue plavix  Continue Eliquis given PE  Continue carvedilol 25 mg BID  Clonidine patch in place  Continue Imdur for further GDMT  HIDA scan ordered by hospital team    Primary Cardiologist: Dr. Sumner      Subjective      Patient still noting significant RUQ abdominal pain and is still nauseous. Notes pain is worse with certain positions. Breathing has been the same.     Cardiac Diagnostics       Telemetry (personally reviewed): Sinus rhythm with slight tachycardia    Most recent:  Echocardiogram limited 4/8/25 (results reviewed):   Left ventricular function is decreased. The ejection fraction is 20-25%  (severely reduced).  There is global hypokinesis with severe anterior, septal, and apical wall  hypokinesis.  Compared to the prior study of 3/25/25 there has been an interval decline in  LV function.    Echocardiogram Limited 3/25/2025  Left ventricular function is decreased. The ejection fraction is 35-40%  (moderately  reduced).  There is severe anterior, septal, and apical wall hypokinesis.  There is no pericardial effusion.  Normal right ventricle size and systolic function.    CTA chest abdomen pelvis 4/7/25  1.  No evidence of acute aortic syndrome.  2.  Postoperative changes of recent coronary artery bypass grafting. No mediastinal fluid collection.  3.  No acute findings in the abdomen or pelvis.    Cardiac Cath 2/24/2025  CONCLUSIONS: Severe multivessel coronary disease with chronic total occlusion of the proximal to mid left anterior descending artery (fills via L-L collaterals), proximal to mid left circumflex (fills via L-L collaterals), and ostial right coronary artery (fills via L-R collaterals) and severe obstructive disease of the large OM branch.     RECOMMENDATIONS:   Postprocedure cares, please see orders.  Recommend cardiothoracic surgery consultation for evaluation of surgical revascularization, potential targets include the LAD, diagonal branch, OM 1 and 2 branches, and rPDA.  Aggressive risk factor modification for secondary prevention.          Medical History  Surgical History Family History Social History   Past Medical History:   Diagnosis Date    Diabetes mellitus, type 2 (H)     History of CVA (cerebrovascular accident)     Hypertension     Nutritional and metabolic cardiomyopathies (H)      Past Surgical History:   Procedure Laterality Date    CORONARY ARTERY BYPASS GRAFT, WITH ENDOSCOPIC VESSEL PROCUREMENT N/A 3/17/2025    Procedure: CORONARY ARTERY BYPASS GRAFT TIMES FOUR, LEFT INTERNAL MAMMARY ARTERY HARVEST, LEFT LEG ENDOSCOPIC SAPHENOUS VEIN PROCUREMENT,;  Surgeon: Alice Shahid MD;  Location: [facility] Main OR    CV CORONARY ANGIOGRAM N/A 2/24/2025    Procedure: Coronary Angiogram;  Surgeon: Bautista Durand MD;  Location: [facility] CATH LAB CV    CV INTRA AORTIC BALLOON N/A 3/17/2025    Procedure: Intra aortic Balloon Pump Insertion;  Surgeon: Bautista Durand MD;  Location: [facility] CATH LAB CV     CV LEFT HEART CATH N/A 2/24/2025    Procedure: Left Heart Catheterization;  Surgeon: Bautista Durand MD;  Location: [facility] CATH LAB CV    PICC DOUBLE LUMEN PLACEMENT  3/20/2025    TRANSESOPHAGEAL ECHOCARDIOGRAM INTRAOPERATIVE  3/17/2025    Procedure: EPIAORTIC ULTRASOUND, ANESTHESIA TRANSESOPHAGEAL ECHOCARDIOGRAM;  Surgeon: Alice Shahid MD;  Location: [facility] Main OR     No family history on file.        Social History     Socioeconomic History    Marital status:      Spouse name: Not on file    Number of children: Not on file    Years of education: Not on file    Highest education level: Not on file   Occupational History    Not on file   Tobacco Use    Smoking status: Former     Types: Cigarettes    Smokeless tobacco: Never   Substance and Sexual Activity    Alcohol use: Not Currently    Drug use: Not on file    Sexual activity: Not on file   Other Topics Concern    Not on file   Social History Narrative    Not on file     Social Drivers of Health     Financial Resource Strain: Low Risk  (4/10/2025)    Financial Resource Strain     Within the past 12 months, have you or your family members you live with been unable to get utilities (heat, electricity) when it was really needed?: No   Food Insecurity: Low Risk  (4/10/2025)    Food Insecurity     Within the past 12 months, did you worry that your food would run out before you got money to buy more?: No     Within the past 12 months, did the food you bought just not last and you didn t have money to get more?: No   Transportation Needs: Low Risk  (4/10/2025)    Transportation Needs     Within the past 12 months, has lack of transportation kept you from medical appointments, getting your medicines, non-medical meetings or appointments, work, or from getting things that you need?: No   Physical Activity: Not on file   Stress: Not on file   Social Connections: Unknown (12/30/2021)    [facility]    Social  "Connections     Frequency of Communication with Friends and Family: Not on file   Interpersonal Safety: Low Risk  (4/10/2025)    Interpersonal Safety     Do you feel physically and emotionally safe where you currently live?: Yes     Within the past 12 months, have you been hit, slapped, kicked or otherwise physically hurt by someone?: No     Within the past 12 months, have you been humiliated or emotionally abused in other ways by your partner or ex-partner?: No   Housing Stability: Low Risk  (4/10/2025)    Housing Stability     Do you have housing? : Yes     Are you worried about losing your housing?: No             Physical Examination   VITALS: /71 (BP Location: Left arm)   Pulse 85   Temp 97.8  F (36.6  C) (Oral)   Resp 18   Ht 1.727 m (5' 8\")   Wt 89 kg (196 lb 3.4 oz)   SpO2 96%   BMI 29.83 kg/m    BMI: Body mass index is 29.83 kg/m .  Wt Readings from Last 3 Encounters:   04/11/25 89 kg (196 lb 3.4 oz)   04/04/25 84.8 kg (187 lb)   04/04/25 83 kg (183 lb)         Intake/Output Summary (Last 24 hours) at 4/9/2025 1156  Last data filed at 4/9/2025 1050  Gross per 24 hour   Intake 120 ml   Output 2150 ml   Net -2030 ml       General Appearance:  Alert, cooperative, no distress, appears stated age    Head:  Normocephalic, without obvious abnormality, atraumatic   Eyes:  PERRL, conjunctiva/corneas clear, EOM's intact   Ears:  Normal external ear canals bilaterally   Nose: Nares normal, septum midline, no drainage   Throat: Lips, mucosa, and tongue normal; teeth and gums normal   Neck: Supple, symmetrical, trachea midline, no adenopathy, no  JVD    Back:   Symmetric, no abnormal curvature, ROM normal   Lungs:   Clear to auscultation bilaterally, respirations unlabored   Chest Wall:  No tenderness or deformity   Heart:  Regular rate and rhythm , S1, S2 normal,no murmur, rub or gallop       Extremities: Extremities normal, atraumatic, no cyanosis or edema   Skin: Skin color, texture, no rashes or lesions " "  Psychiatric: Normal affect   Neurologic: Alert and oriented X 3, Moves all 4 extremities            Imaging      Chest x-ray 4/8/2025  IMPRESSION: Median sternotomy wires. Elevation of the left hemidiaphragm. Left basilar atelectasis. Stable cardiomediastinal silhouette.        Lab Results    Chemistry/lipid CBC Cardiac Enzymes/BNP/TSH/INR   Recent Labs   Lab Test 02/24/25  0930   CHOL 99   HDL 32*   LDL 46   TRIG 103     Recent Labs   Lab Test 02/24/25  0930 07/18/24  0947 07/21/23  1030   LDL 46 51 36     Recent Labs   Lab Test 04/09/25  0840 04/09/25  0647   NA  --  141   POTASSIUM  --  4.5   CHLORIDE  --  105   CO2  --  20*   * 192*   BUN  --  35.6*   CR  --  1.93*   GFRESTIMATED  --  36*   GAYATHRI  --  9.0     Recent Labs   Lab Test 04/09/25  0647 04/08/25  0533 04/08/25  0133   CR 1.93* 1.87* 1.81*     Recent Labs   Lab Test 04/07/25  1017 03/13/25  0824   A1C 7.2* 8.7*          Recent Labs   Lab Test 04/09/25  0647   WBC 10.8   HGB 11.4*   HCT 34.5*   MCV 96        Recent Labs   Lab Test 04/09/25  0647 04/08/25  0533 04/07/25  1017   HGB 11.4* 9.9* 12.3*    No results for input(s): \"TROPONINI\" in the last 50966 hours.  Recent Labs   Lab Test 04/08/25  1624 04/07/25  1114 04/04/25  1202 01/07/25  1535 08/27/18  1146   BNP  --   --   --   --  1,313*   NTBNPI 10,947* 3,877* 5,628*   < >  --     < > = values in this interval not displayed.     Recent Labs   Lab Test 03/23/25  0417   TSH 2.66     Recent Labs   Lab Test 04/04/25  1202 03/18/25  0425 03/17/25  1502   INR 1.19* 1.15 1.31*           Current Inpatient Scheduled Medications   Scheduled Meds:  Current Facility-Administered Medications   Medication Dose Route Frequency Provider Last Rate Last Admin    apixaban ANTICOAGULANT (ELIQUIS) tablet 10 mg  10 mg Oral BID Ev Dsouza MD   10 mg at 04/11/25 0804    Followed by    [START ON 4/17/2025] apixaban ANTICOAGULANT (ELIQUIS) tablet 5 mg  5 mg Oral BID Ev Dsouza MD        carvedilol " (COREG) tablet 25 mg  25 mg Oral BID w/meals Óscar Mejía MD   25 mg at 04/11/25 0804    cloNIDine (CATAPRES-TTS1) 0.1 MG/24HR WK patch 1 patch  1 patch Transdermal Weekly Óscar Mejía MD   1 patch at 04/10/25 1645    cloNIDine (CATAPRES-TTS1) Patch in Place   Transdermal Q[facility]H [facility] Óscar Mejía MD        clopidogrel (PLAVIX) tablet 75 mg  75 mg Oral Daily Óscar Mejía MD   75 mg at 04/11/25 0755    FLUoxetine (PROzac) capsule 20 mg  20 mg Oral Daily Gondal, Saad J, MD   20 mg at 04/11/25 0756    insulin aspart (NovoLOG) injection (RAPID ACTING)   Subcutaneous TID w/meals Ev Dsouza MD        insulin aspart (NovoLOG) injection (RAPID ACTING)  1-3 Units Subcutaneous Q4H [facility] Ev Dsouza MD   1 Units at 04/11/25 0752    insulin glargine (LANTUS PEN) injection 25 Units  25 Units Subcutaneous Kev Stubbs MD   25 Units at 04/11/25 0753    isosorbide mononitrate (IMDUR) 24 hr tablet 30 mg  30 mg Oral Daily Óscar Mejía MD   30 mg at 04/11/25 0804    pantoprazole (PROTONIX) IV push injection 40 mg  40 mg Intravenous Q12H Gondal, Saad J, MD   40 mg at 04/11/25 0503    polyethylene glycol (MIRALAX) Packet 17 g  17 g Oral Daily Gondal, Saad J, MD   17 g at 04/11/25 0758    senna-docusate (SENOKOT-S/PERICOLACE) 8.6-50 MG per tablet 1 tablet  1 tablet Oral BID Ev Dsouza MD   1 tablet at 04/11/25 0803    simvastatin (ZOCOR) tablet 40 mg  40 mg Oral At Bedtime Óscar Mejía MD   40 mg at 04/10/25 2029    sodium chloride (PF) 0.9% PF flush 3 mL  3 mL Intracatheter Q8H [facility] Gondal, Saad J, MD   3 mL at 04/11/25 0523    technetium Tc 99m mebrofenin (CHOLETEC) radioisotope injection 4-10 millicurie  4-10 millicurie Intravenous Once Ev Dsouza MD        traZODone (DESYREL) tablet 50 mg  50 mg Oral At Bedtime Gondal, Saad J, MD   50 mg at 04/10/25 2029     Continuous Infusions:  Current Facility-Administered Medications   Medication Dose Route  Frequency Provider Last Rate Last Admin    dextrose 5% and 0.9% NaCl infusion   Intravenous Continuous Ev Dsouza  mL/hr at 04/11/25 0802 New Bag at 04/11/25 0802       No current outpatient medications on file.          Medications Prior to Admission   Prior to Admission medications    Medication Sig Start Date End Date Taking? Authorizing Provider   acetaminophen (TYLENOL) 500 MG tablet Take 1,000 mg by mouth 3 times daily. While awake   Yes Reported, Patient   aspirin (ASA) 81 MG chewable tablet Take 1 tablet (81 mg) by mouth daily. Continue for one year postop, then when stopping plavix, increase aspirin to 162 mg daily indefinitely. 3/26/25  Yes Prabha Giang PA-C   carvedilol (COREG) 12.5 MG tablet Take 12.5 mg by mouth 2 times daily 5/9/24  Yes Abner Elmore MD   clopidogrel (PLAVIX) 75 MG tablet Take 1 tablet (75 mg) by mouth daily. Continue for one year postop, then stop and increase aspirin to 162 mg daily indefinitely. 3/26/25  Yes Prabha Giang PA-C   cyanocobalamin (VITAMIN B-12) 500 MCG SUBL sublingual tablet Place 1 tablet (500 mcg) under the tongue daily. 3/26/25  Yes Prabha Giang PA-C   FLUoxetine 20 MG tablet Take 20 mg by mouth daily.   Yes Reported, Patient   furosemide (LASIX) 20 MG tablet Take 2 tablets (40 mg) by mouth 2 times daily for 10 days. 4/4/25 4/14/25 Yes Jany Fontana MD   insulin aspart (NOVOLOG PEN) 100 UNIT/ML pen Inject 1-10 Units subcutaneously 3 times daily (before meals). Correction Scale - HIGH INSULIN RESISTANCE DOSING   Do Not give Correction Insulin if Pre-Meal BG less than 140.   For Pre-Meal  - 164 give 1 unit.   For Pre-Meal  - 189 give 2 units.   For Pre-Meal  - 214 give 3 units.   For Pre-Meal  - 239 give 4 units.   For Pre-Meal  - 264 give 5 units.   For Pre-Meal  - 289 give 6 units.   For Pre-Meal  - 314 give 7 units.   For Pre-Meal  - 339 give 8 units.   For Pre-Meal   - 364 give 9 units.  For Pre-Meal BG greater than or equal to 365 give 10 units  To be given with prandial insulin, and based on pre-meal blood glucose. Administering insulin within 5 minutes of the start of the meal is ideal. Administer insulin no more than 30 minutes after the start of the meal, unless directed otherwise by provider.   Notify provider if glucose greater than or equal to 350 mg/dL after administration of correction dose. 3/26/25  Yes Prabha Giang PA-C   insulin aspart (NOVOLOG PEN) 100 UNIT/ML pen Inject 1-7 Units subcutaneously at bedtime. HIGH INSULIN RESISTANCE DOSING   Do Not give Bedtime Correction Insulin if BG less than 200.   For  - 224 give 1 units.   For  - 249 give 2 units.   For  - 274 give 3 units.   For  - 299 give 4 units.   For  - 324 give 5 units.   For  - 349 give 6 units.   For BG greater than or equal to 350 give 7 units.   Notify provider if glucose greater than or equal to 350 mg/dL after administration of correction dose. 3/26/25  Yes Prabha Giang PA-C   insulin glargine (LANTUS PEN) 100 UNIT/ML pen Inject 30 Units subcutaneously every morning.   Yes Unknown, Entered By History   Lidocaine (LIDOCARE) 4 % Patch Place 1-2 patches over 12 hours onto the skin every 24 hours. To prevent lidocaine toxicity, patient should be patch free for 12 hrs daily. 3/26/25  Yes Prabha Giang PA-C   losartan (COZAAR) 25 MG tablet Take 0.5 tablets (12.5 mg) by mouth daily. 3/26/25  Yes Prabha Giang PA-C   polyethylene glycol (MIRALAX) 17 GM/Dose powder Take 17 g by mouth daily. 3/26/25  Yes Prabha Giang PA-C   simvastatin (ZOCOR) 40 MG tablet Take 40 mg by mouth daily 3/15/24  Yes Abner Elmore MD   traZODone (DESYREL) 50 MG tablet Take 50 mg by mouth at bedtime.   Yes Reported, Patient   senna-docusate (SENOKOT-S/PERICOLACE) 8.6-50 MG tablet Take 1 tablet by mouth 2 times daily.    Reported, Patient           Felecia Rizzo PA-C

## 2025-04-11 NOTE — PLAN OF CARE
"Goal Outcome Evaluation:      Plan of Care Reviewed With: patient    Overall Patient Progress: improvingOverall Patient Progress: improving     Patient rating pain in chest by incision, stating it is \"always there.\" Declined pain medications because he does not like taking pills. No nausea reported this shift. Patient complaining of constipation, provider placed new order for scheduled senna. Roommate, Veronique, visited this afternoon and was able to encourage patient to drink his Ensure. Veronique wondering if she could bring outside food to promote oral intake. Left sticky note to provider asking if this would be okay. At start of shift BP was elevated, at 176/88, but was brought down with scheduled BP meds to 112/55 at 2000. Patient having minimal urine output, bladder scanned patient for 121 mL.      "

## 2025-04-11 NOTE — PLAN OF CARE
"Goal Outcome Evaluation:    Pt is A/O x 4.  Is SR with BBB on tele. VSS on room air. Danilo pain.  Blood sugars are Q4h and were 226, 216 over night which was covered with insulin. Is on continuous fluids with D5 NS running at 100 ml/hr. Clonidine patch is on back of right shoulder.  No nausea reported.  Calls appropriately and call light is within reach.        Problem: Adult Inpatient Plan of Care  Goal: Plan of Care Review  Description: The Plan of Care Review/Shift note should be completed every shift.  The Outcome Evaluation is a brief statement about your assessment that the patient is improving, declining, or no change.  This information will be displayed automatically on your shiftnote.  Outcome: Progressing  Goal: Patient-Specific Goal (Individualized)  Description: You can add care plan individualizations to a care plan. Examples of Individualization might be:  \"Parent requests to be called daily at 9am for status\", \"I have a hard time hearing out of my right ear\", or \"Do not touch me to wake me up as it startlesme\".  Outcome: Progressing  Goal: Absence of Hospital-Acquired Illness or Injury  Outcome: Progressing  Intervention: Identify and Manage Fall Risk  Recent Flowsheet Documentation  Taken 4/11/2025 0100 by Walker Dobbins RN  Safety Promotion/Fall Prevention:   activity supervised   clutter free environment maintained   nonskid shoes/slippers when out of bed   room near nurse's station   room organization consistent   safety round/check completed   supervised activity  Intervention: Prevent Skin Injury  Recent Flowsheet Documentation  Taken 4/11/2025 0359 by Walker Dobbins RN  Body Position: supine  Taken 4/11/2025 0100 by Walker Dobbins RN  Body Position: position changed independently  Intervention: Prevent and Manage VTE (Venous Thromboembolism) Risk  Recent Flowsheet Documentation  Taken 4/11/2025 0100 by Walker Dobbins, NEFTALI  VTE Prevention/Management:   SCDs off (sequential compression devices)   patient " refused intervention  Intervention: Prevent Infection  Recent Flowsheet Documentation  Taken 4/11/2025 0100 by Walker Dobbins RN  Infection Prevention: rest/sleep promoted  Goal: Optimal Comfort and Wellbeing  Outcome: Progressing  Goal: Readiness for Transition of Care  Outcome: Progressing     Problem: Delirium  Goal: Optimal Coping  Outcome: Progressing  Goal: Improved Behavioral Control  Outcome: Progressing  Intervention: Minimize Safety Risk  Recent Flowsheet Documentation  Taken 4/11/2025 0100 by Walker Dobbins RN  Enhanced Safety Measures:   pain management   review medications for side effects with activity   room near unit station  Goal: Improved Attention and Thought Clarity  Outcome: Progressing  Goal: Improved Sleep  Outcome: Progressing     Problem: Comorbidity Management  Goal: Blood Glucose Levels Within Targeted Range  Outcome: Progressing  Intervention: Monitor and Manage Glycemia  Recent Flowsheet Documentation  Taken 4/11/2025 0100 by Walker Dobbins RN  Medication Review/Management: medications reviewed  Goal: Blood Pressure in Desired Range  Outcome: Progressing  Intervention: Maintain Blood Pressure Management  Recent Flowsheet Documentation  Taken 4/11/2025 0100 by Walker Dobbins RN  Medication Review/Management: medications reviewed     Problem: Fall Injury Risk  Goal: Absence of Fall and Fall-Related Injury  Outcome: Progressing  Intervention: Identify and Manage Contributors  Recent Flowsheet Documentation  Taken 4/11/2025 0100 by Walker Dobbins RN  Medication Review/Management: medications reviewed  Intervention: Promote Injury-Free Environment  Recent Flowsheet Documentation  Taken 4/11/2025 0100 by Walker Dobbins RN  Safety Promotion/Fall Prevention:   activity supervised   clutter free environment maintained   nonskid shoes/slippers when out of bed   room near nurse's station   room organization consistent   safety round/check completed   supervised activity     Problem: Pain Acute  Goal: Optimal Pain  Control and Function  Outcome: Progressing  Intervention: Prevent or Manage Pain  Recent Flowsheet Documentation  Taken 4/11/2025 0100 by Walker Dobbins RN  Medication Review/Management: medications reviewed     Problem: Anxiety  Goal: Anxiety Reduction or Resolution  Outcome: Progressing

## 2025-04-11 NOTE — PLAN OF CARE
Problem: Comorbidity Management  Goal: Blood Glucose Levels Within Targeted Range  Outcome: Progressing     Problem: Pain Acute  Goal: Optimal Pain Control and Function  Outcome: Progressing   Goal Outcome Evaluation:         Pt's pain controlled with scheduled meds and effective. Pt was nauseous on and off, Zofran and Compazine given interchangeably with slight effects.  Pt up multiple tines for bathroom. No BM but Pt voided. Pt was bladder scanned for 180.  Pt did not want any meals, Lunch was ordered but Pt hardly touched it.

## 2025-04-11 NOTE — PROGRESS NOTES
[facility]    Medicine Progress Note - Hospitalist Service    Date of Admission:  4/7/2025    Assessment & Plan                Eric Cordoba is a 74 year old male with history of coronary artery disease and recent CABG presented to the hospital with complaint of chest discomfort associated with nausea and vomiting. Etiology uncertain but now known to have PE, starvation ketosis- multiple metabolic issues resulting in malaise? Possible cholecystitis, delay in HIDA scan as patient refused it initially.  Hospital Day: 5        Anion gap metabolic acidosis   DKA, resolved  starvation ketosis, resolved  -presented in DKA  -S/p insulin gtt, off 4/8  --4/10 developed starvation ketosis.  -wean down on Dextrose drip  -change sliding scale from q4h to TIDAC and change to moderate intensity  -encourage better PO  -BMP & VBG in AM    Type 2 diabetes mellitus  -home insulin regimen: Lantus 30 units QAM, novolog sliding scale  -here: Lantus 25 units QAM, change carb ratio from 1:30-->1:20 1:30 and increase sliding scale as above    RUQ pain  - Alkaline phosphatase and bilirubin mildly elevated.  Gallbladder dilated on recent CT scan.  Right upper quadrant ultrasound obtained, gallbladder with sludge but otherwise unremarkable, ordered HIDA for completeness, patient initially refused but now agreed.   -if not GB then pain possibly due to pulmonary infarct from PE?    Pulmonary embolus  -Radiologist revisited CT scan from 4/6 and noted pulmonary embolus of RLL  -Likely provoked by recent CABG, per cardiology he is also high risk due to his reduced EF  -Started Eliquis 4/10  -no hypoxia     GERD associated chest discomfort and nausea  Continue IV pantoprazole 40 mg twice daily  Antinausea medication as needed     Acute on chronic systolic heart failure  -Clinically patient does not appear to be in heart failure exacerbation.  -BP running higher now sp IVF, will resume home lasix  -Echo 4/8: LVEF 20-25%,  severe global hypokinesis, decline since echo 3/25/2025  -Monitor input and output  -can discontinue tele      History of hyperlipidemia  History of CAD status post CABG 3/17  Continue with aspirin, Plavix, simvastatin, carvedilol  -CV surgery consulted: okay to stop plavix 3/2026, continue on ASA indefinitely, has follow up 4/10 and 4/25, can start driving 4/14/2025, strict sternal precautions until 5/12/2025     History of hypertension with high blood pressure  -Monitor vital signs as per protocol  -IV hydralazine as needed for elevated blood pressure  -Cardiology uptitrating Coreg  -Started on Imdur and clonidine patch and cardiology uptitrating    JESS on CKD 3b  -Cr baseline 1.8, today up to 2.1  - Cardiology considering workup for amyloidosis  -monitor with resumption of diuretic  -?cardiorenal?     History of mood disorder  -Continue with fluoxetine, trazodone     Mild cognitive impairment  -HCPOA, friend Veronique notes patient had recent SLUMS of 15/30.  She feels he has had a cognitive decline since his CABG.  She feels strongly that patient does not have decision-making capacity to return home rather than TCU and I agree with this.  -recommend formal outpatient neuropsych testing    Constipation  -home miralax  -added BID senna  -supp today          Diet: Advance Diet as Tolerated: Regular Diet Adult; Moderate Consistent Carb (60 g CHO per Meal) Diet; Low Saturated Fat Na <2400mg Diet  Fluid restriction 2000 ML FLUID  Snacks/Supplements Adult: Ensure Enlive; Between Meals    DVT Prophylaxis: Known VTE.   DOAC  [place] Catheter: Not present  Lines: None     Cardiac Monitoring: None  Code Status: No CPR- Do NOT Intubate      Clinically Significant Risk Factors           # Hypocalcemia: Lowest Ca = 8.6 mg/dL in last 2 days, will monitor and replace as appropriate     # Hypoalbuminemia: Lowest albumin = 3.4 g/dL at 4/7/2025 11:14 AM, will monitor as appropriate     # Hypertension: Noted on problem list    # Acute  "heart failure with reduced ejection fraction: last echo with EF <40% and receiving IV diuretics          # DMII: A1C = 7.2 % (Ref range: <5.7 %) within past 6 months   # Overweight: Estimated body mass index is 29.83 kg/m  as calculated from the following:    Height as of this encounter: 1.727 m (5' 8\").    Weight as of this encounter: 89 kg (196 lb 3.4 oz).        # Financial/Environmental Concerns: none   # History of CABG: noted on surgical history       Disposition Plan     Medically Ready for Discharge: Anticipated Tomorrow         Discharge barrier(s): symptoms  Care discussed with: patient, cardiology, HCPOA Veronique Dsouza MD  Hospitalist Service  [facility]  Securely message with [employer] (more info)  Text page via [employer] Paging/Directory   ______________________________________________________________________      Physical Exam   Vital Signs: Temp: 97.8  F (36.6  C) Temp src: Oral BP: (!) 184/84 Pulse: 86   Resp: 20 SpO2: 95 % O2 Device: None (Room air)    Weight: 196 lbs 3.35 oz    General: in no apparent distress, non-toxic, and alert male lying in hospital bed oriented x3  HEENT: Head normocephalic atraumatic, oral mucosa moist. Sclerae anicteric  Skin: No rashes or lesions  Extremities: No peripheral edema  Psych: Flat affect, irritable mood. Lying in bed, refuses to answer some questions.  Neuro: Grossly normal      Medical Decision Making               Data   Recent Results (from the past 16 hours)   Glucose by meter    Collection Time: 04/11/25  5:00 AM   Result Value Ref Range    GLUCOSE BY METER POCT 226 (H) 70 - 99 mg/dL   Basic metabolic panel    Collection Time: 04/11/25  6:57 AM   Result Value Ref Range    Sodium 138 135 - 145 mmol/L    Potassium 4.1 3.4 - 5.3 mmol/L    Chloride 105 98 - 107 mmol/L    Carbon Dioxide (CO2) 24 22 - 29 mmol/L    Anion Gap 9 7 - 15 mmol/L    Urea Nitrogen 35.0 (H) 8.0 - 23.0 mg/dL    Creatinine 2.12 (H) 0.67 - 1.17 mg/dL    GFR " Estimate 32 (L) >60 mL/min/1.73m2    Calcium 8.6 (L) 8.8 - 10.4 mg/dL    Glucose 221 (H) 70 - 99 mg/dL   Blood gas venous    Collection Time: 04/11/25  6:57 AM   Result Value Ref Range    pH Venous 7.35 7.32 - 7.43    pCO2 Venous 43 40 - 50 mm Hg    pO2 Venous 22 (L) 25 - 47 mm Hg    Bicarbonate Venous 24 21 - 28 mmol/L    Base Excess/Deficit Venous -1.6 -3.0 - 3.0 mmol/L    FIO2 21     Oxyhemoglobin Venous 30 (L) 70 - 75 %    O2 Sat, Venous 30.2 (L) 70.0 - 75.0 %   Ketone Beta-Hydroxybutyrate Quantitative    Collection Time: 04/11/25  6:57 AM   Result Value Ref Range    Ketone (Beta-Hydroxybutyrate) Quantitative <0.18 <=0.30 mmol/L   Extra Purple Top EDTA (LAB USE ONLY)    Collection Time: 04/11/25  6:57 AM   Result Value Ref Range    Hold Specimen JIC    Glucose by meter    Collection Time: 04/11/25  7:18 AM   Result Value Ref Range    GLUCOSE BY METER POCT 216 (H) 70 - 99 mg/dL   Glucose by meter    Collection Time: 04/11/25 12:13 PM   Result Value Ref Range    GLUCOSE BY METER POCT 226 (H) 70 - 99 mg/dL   Glucose by meter    Collection Time: 04/11/25  4:57 PM   Result Value Ref Range    GLUCOSE BY METER POCT 270 (H) 70 - 99 mg/dL       Interval History     Patient still having a lot of nausea and malaise. Minimal oral intake. He declined HIDA scan when they came for him earlier. Discussed rationale for the scan, patient reluctantly agreeable. Decreased rate of dextrose drip as ketosis has resolved. Not Ready for discharge. SAVANA Piper was updated at bedside, she was here visiting with dog.

## 2025-04-11 NOTE — CONSULTS
"CLINICAL NUTRITION SERVICES - ASSESSMENT NOTE    RECOMMENDATIONS FOR MDs/PROVIDERS TO ORDER:  Consider liberalizing diet - pt is eating very little    Registered Dietitian Interventions:  Continue Ensure - pt is drinking some of this  Gel plus cherry 2 PM  Magic cup berry - 8 PM    Brought pt a marlyt this am    Future/Additional Recommendations:  Po -  try different flavor supplement, wt, labs, bowel routine     REASON FOR ASSESSMENT  Provider order - poor intake    Per chart, Eric Cordoba is a 74 year old male with history of coronary artery disease and recent CABG presented to the hospital with complaint of chest discomfort associated with nausea and vomiting. Etiology uncertain but now known to have PE, starvation ketosis- multiple metabolic issues resulting in malaise? Hospital Day: 4   hx DM 2    INFORMATION OBTAINED  Assessed patient in room.  Friend in room with dog.  She asked about IV nutrition if pt isn't eating.  She has been encouraging him to eat.  We talked about tube feeding first as the preferred method of nutrition support if pt does not eat and is agreeable. Pt did not seem to want tube feeding.  Pt's stomach hurts.  Pt refused MRI to evaluate gallbladder today.    NUTRITION HISTORY  Pt not eating well prior to admission    CURRENT NUTRITION ORDERS  Diet:  60 gm cho, Low saturated fat < 2400 mg Na  Snacks/Supplements: Ensure Enlive between meals       CURRENT INTAKE/TOLERANCE  Pt did not eat today or yesterday, he has eaten part of 3 meals out of 4 days in the hospital.  He is drinking some of the Ensure    LABS  Nutrition-relevant labs: BUN 35 H, Creat 2.12 H, FSBG 216 - 249     MEDICATIONS  Nutrition-relevant medications: sliding scale insulin, lantus, pantoprazole, miralax, senna, zocor, senna docusate  D5% and 0.9 NaCl at 50 mL/hr    ANTHROPOMETRICS  Height: 172.7 cm (5' 8\")  Admission Weight: 86 kg (189 lb 9.6 oz) (04/07/25 1013)   Most Recent Weight: 89 kg (196 lb 3.4 oz) (04/11/25 0654)  " fluid ?  IBW: 70 kg  BMI: Body mass index is 29.83 kg/m . overweight  Weight History:     Wt Readings from Last 10 Encounters:   04/11/25 89 kg (196 lb 3.4 oz)   04/04/25 84.8 kg (187 lb)   04/04/25 83 kg (183 lb)   04/03/25 87.1 kg (192 lb)   04/01/25 87.9 kg (193 lb 12.8 oz)   03/27/25 92.1 kg (203 lb)   03/26/25 92.3 kg (203 lb 8 oz)   03/13/25 92.5 kg (204 lb)   03/03/25 93.9 kg (207 lb)   02/24/25 94.8 kg (209 lb)      Dosing Weight: 86 kg, based on admission wt    ASSESSED NUTRITION NEEDS  Estimated Energy Needs: 1600 -1915 kcals/day ([place] Jeor) x 1.2  Justification: Increased needs and Overweight  Estimated Protein Needs: 52 -69 grams protein/day (0.6 - 0.8 grams of pro/kg)  Justification: CKD and Increased needs  Estimated Fluid Needs:1915 mL/day (1 mL/kcal), or per provider  Justification: Maintenance    SYSTEM AND PHYSICAL FINDINGS    GI symptoms: constipation, intermittent nausea, last BM 4/9/25  Skin/wounds: surgical incision noted    MALNUTRITION  % Intake: </= 50% for >/= 5 days (severe)  % Weight Loss: None noted  Subcutaneous Fat Loss: Orbital: Mild  Pt is dressed and cold -did not conduct complete exam  Muscle Loss: Temples (temporalis muscle): Mild  Fluid Accumulation/Edema: Mild, 1+  Malnutrition Diagnosis: Severe malnutrition in the context of acute illness or injury  Malnutrition Present on Admission: Unable to assess    NUTRITION DIAGNOSIS  Malnutrition (undernutrition) related to poor oral intake as evidenced by eating <=50% for .>= 5 days, mild muscle and subcutaneous fat loss, mild fluid accumulation    INTERVENTIONS  Medical food supplement therapy  Recommend liberalizing diet    GOALS  Patient to consume % of nutritionally adequate meal trays TID, or the equivalent with supplements/snacks.    Normalize bowel pattern     MONITORING/EVALUATION  Progress toward goals will be monitored and evaluated per policy.

## 2025-04-11 NOTE — PROGRESS NOTES
Care Management Follow Up    Length of Stay (days): 4    Expected Discharge Date: 04/11/2025     Concerns to be Addressed:       Patient plan of care discussed at interdisciplinary rounds: Yes    Anticipated Discharge Disposition:     Therapy recommending TCU. Patient declining           Anticipated Discharge Services:  TBD  Anticipated Discharge DME:  na    Patient/family educated on Medicare website which has current facility and service quality ratings:    Education Provided on the Discharge Plan:    Patient/Family in Agreement with the Plan:      Referrals Placed by CM/SW:    Private pay costs discussed: Not applicable    Discussed  Partnership in Safe Discharge Planning  document with patient/family: No     Handoff Completed: No, handoff not indicated or clinically appropriate    Additional Information:  CM asked to speak with the patient as he is requesting a notary. CM sent teams messages to notaries in the hospital without response. Met with patient and friend Veronique in his room and gave them a printed copy of a notary they can try    Next Steps: continue to follow for discharge recommendations    Becky Mcgill RN

## 2025-04-12 ENCOUNTER — APPOINTMENT (OUTPATIENT)
Dept: OCCUPATIONAL THERAPY | Facility: HOSPITAL | Age: 75
End: 2025-04-12
Attending: INTERNAL MEDICINE
Payer: COMMERCIAL

## 2025-04-12 ENCOUNTER — APPOINTMENT (OUTPATIENT)
Dept: SPEECH THERAPY | Facility: HOSPITAL | Age: 75
End: 2025-04-12
Attending: INTERNAL MEDICINE
Payer: COMMERCIAL

## 2025-04-12 ENCOUNTER — APPOINTMENT (OUTPATIENT)
Dept: NUCLEAR MEDICINE | Facility: HOSPITAL | Age: 75
End: 2025-04-12
Attending: HOSPITALIST
Payer: COMMERCIAL

## 2025-04-12 LAB
ANION GAP SERPL CALCULATED.3IONS-SCNC: 9 MMOL/L (ref 7–15)
BUN SERPL-MCNC: 26.5 MG/DL (ref 8–23)
CALCIUM SERPL-MCNC: 8.8 MG/DL (ref 8.8–10.4)
CHLORIDE SERPL-SCNC: 109 MMOL/L (ref 98–107)
CREAT SERPL-MCNC: 1.84 MG/DL (ref 0.67–1.17)
EGFRCR SERPLBLD CKD-EPI 2021: 38 ML/MIN/1.73M2
GLUCOSE BLDC GLUCOMTR-MCNC: 169 MG/DL (ref 70–99)
GLUCOSE BLDC GLUCOMTR-MCNC: 182 MG/DL (ref 70–99)
GLUCOSE BLDC GLUCOMTR-MCNC: 190 MG/DL (ref 70–99)
GLUCOSE BLDC GLUCOMTR-MCNC: 195 MG/DL (ref 70–99)
GLUCOSE BLDC GLUCOMTR-MCNC: 195 MG/DL (ref 70–99)
GLUCOSE SERPL-MCNC: 171 MG/DL (ref 70–99)
HCO3 SERPL-SCNC: 24 MMOL/L (ref 22–29)
HOLD SPECIMEN: NORMAL
POTASSIUM SERPL-SCNC: 3.9 MMOL/L (ref 3.4–5.3)
SODIUM SERPL-SCNC: 142 MMOL/L (ref 135–145)

## 2025-04-12 PROCEDURE — 250N000013 HC RX MED GY IP 250 OP 250 PS 637: Performed by: STUDENT IN AN ORGANIZED HEALTH CARE EDUCATION/TRAINING PROGRAM

## 2025-04-12 PROCEDURE — 92610 EVALUATE SWALLOWING FUNCTION: CPT | Mod: GN

## 2025-04-12 PROCEDURE — 99233 SBSQ HOSP IP/OBS HIGH 50: CPT | Performed by: INTERNAL MEDICINE

## 2025-04-12 PROCEDURE — 97535 SELF CARE MNGMENT TRAINING: CPT | Mod: GO

## 2025-04-12 PROCEDURE — 97165 OT EVAL LOW COMPLEX 30 MIN: CPT | Mod: GO

## 2025-04-12 PROCEDURE — 343N000001 HC RX 343 MED OP 636: Performed by: HOSPITALIST

## 2025-04-12 PROCEDURE — A9537 TC99M MEBROFENIN: HCPCS | Performed by: HOSPITALIST

## 2025-04-12 PROCEDURE — 36415 COLL VENOUS BLD VENIPUNCTURE: CPT | Performed by: HOSPITALIST

## 2025-04-12 PROCEDURE — 258N000003 HC RX IP 258 OP 636: Performed by: HOSPITALIST

## 2025-04-12 PROCEDURE — 99233 SBSQ HOSP IP/OBS HIGH 50: CPT | Performed by: HOSPITALIST

## 2025-04-12 PROCEDURE — 250N000013 HC RX MED GY IP 250 OP 250 PS 637: Performed by: HOSPITALIST

## 2025-04-12 PROCEDURE — 78226 HEPATOBILIARY SYSTEM IMAGING: CPT

## 2025-04-12 PROCEDURE — 120N000001 HC R&B MED SURG/OB

## 2025-04-12 PROCEDURE — 97110 THERAPEUTIC EXERCISES: CPT | Mod: GO

## 2025-04-12 PROCEDURE — 250N000013 HC RX MED GY IP 250 OP 250 PS 637: Performed by: INTERNAL MEDICINE

## 2025-04-12 PROCEDURE — 80048 BASIC METABOLIC PNL TOTAL CA: CPT | Performed by: HOSPITALIST

## 2025-04-12 RX ORDER — PANTOPRAZOLE SODIUM 40 MG/1
40 TABLET, DELAYED RELEASE ORAL
Status: DISCONTINUED | OUTPATIENT
Start: 2025-04-12 | End: 2025-04-14 | Stop reason: HOSPADM

## 2025-04-12 RX ORDER — BISACODYL 10 MG
10 SUPPOSITORY, RECTAL RECTAL ONCE
Status: COMPLETED | OUTPATIENT
Start: 2025-04-12 | End: 2025-04-12

## 2025-04-12 RX ORDER — KIT FOR THE PREPARATION OF TECHNETIUM TC 99M MEBROFENIN 45 MG/10ML
7-9 INJECTION, POWDER, LYOPHILIZED, FOR SOLUTION INTRAVENOUS ONCE
Status: COMPLETED | OUTPATIENT
Start: 2025-04-12 | End: 2025-04-12

## 2025-04-12 RX ORDER — HYDRALAZINE HYDROCHLORIDE 25 MG/1
25 TABLET, FILM COATED ORAL 2 TIMES DAILY
Status: DISCONTINUED | OUTPATIENT
Start: 2025-04-12 | End: 2025-04-14 | Stop reason: HOSPADM

## 2025-04-12 RX ADMIN — PANTOPRAZOLE SODIUM 40 MG: 40 TABLET, DELAYED RELEASE ORAL at 16:27

## 2025-04-12 RX ADMIN — SIMVASTATIN 40 MG: 40 TABLET, FILM COATED ORAL at 21:38

## 2025-04-12 RX ADMIN — OXYCODONE HYDROCHLORIDE 5 MG: 5 TABLET ORAL at 21:37

## 2025-04-12 RX ADMIN — SENNOSIDES AND DOCUSATE SODIUM 1 TABLET: 50; 8.6 TABLET ORAL at 21:35

## 2025-04-12 RX ADMIN — DEXTROSE AND SODIUM CHLORIDE: 5; 900 INJECTION, SOLUTION INTRAVENOUS at 07:55

## 2025-04-12 RX ADMIN — TRAZODONE HYDROCHLORIDE 50 MG: 50 TABLET ORAL at 21:35

## 2025-04-12 RX ADMIN — HYDRALAZINE HYDROCHLORIDE 25 MG: 25 TABLET ORAL at 12:00

## 2025-04-12 RX ADMIN — LORAZEPAM 0.25 MG: 0.5 TABLET ORAL at 22:27

## 2025-04-12 RX ADMIN — APIXABAN 10 MG: 5 TABLET, FILM COATED ORAL at 21:35

## 2025-04-12 RX ADMIN — SENNOSIDES AND DOCUSATE SODIUM 1 TABLET: 50; 8.6 TABLET ORAL at 08:11

## 2025-04-12 RX ADMIN — CLOPIDOGREL 75 MG: 75 TABLET ORAL at 08:11

## 2025-04-12 RX ADMIN — ISOSORBIDE MONONITRATE 30 MG: 30 TABLET, EXTENDED RELEASE ORAL at 08:10

## 2025-04-12 RX ADMIN — FLUOXETINE HYDROCHLORIDE 20 MG: 20 CAPSULE ORAL at 08:10

## 2025-04-12 RX ADMIN — POLYETHYLENE GLYCOL 3350 17 G: 17 POWDER, FOR SOLUTION ORAL at 08:11

## 2025-04-12 RX ADMIN — HYDRALAZINE HYDROCHLORIDE 25 MG: 25 TABLET ORAL at 21:35

## 2025-04-12 RX ADMIN — APIXABAN 10 MG: 5 TABLET, FILM COATED ORAL at 11:44

## 2025-04-12 RX ADMIN — CARVEDILOL 25 MG: 12.5 TABLET, FILM COATED ORAL at 08:11

## 2025-04-12 RX ADMIN — MEBROFENIN 8.8 MILLICURIE: 45 INJECTION, POWDER, LYOPHILIZED, FOR SOLUTION INTRAVENOUS at 10:49

## 2025-04-12 RX ADMIN — CARVEDILOL 25 MG: 12.5 TABLET, FILM COATED ORAL at 16:27

## 2025-04-12 ASSESSMENT — ACTIVITIES OF DAILY LIVING (ADL)
ADLS_ACUITY_SCORE: 61
IADL_COMMENTS: IND. W/ IADL ROUTINE
ADLS_ACUITY_SCORE: 61

## 2025-04-12 NOTE — PLAN OF CARE
Problem: Pain Acute  Goal: Optimal Pain Control and Function  Outcome: Progressing     Problem: Comorbidity Management  Goal: Blood Glucose Levels Within Targeted Range  Outcome: Progressing   Goal Outcome Evaluation:    Pt A/Ox4, VS on RA, Sats WNL. Pt denies N/V/Pain. Pt refused his bedtime medications, DR was notified. Sticky note left to morning Team. Pt had a lot of anxiety and frequent urination overnight. Pt has been NPO from 6AM, waiting for the Bilary scan.

## 2025-04-12 NOTE — PLAN OF CARE
Goal Outcome Evaluation:               Pt went down to NUC med but Pt refused the MRI. Pt could only stay for about 13 mins per the Technician. Reported off to the MD. Who came up to see Pt.

## 2025-04-12 NOTE — PLAN OF CARE
Problem: Comorbidity Management  Goal: Blood Glucose Levels Within Targeted Range  4/12/2025 1556 by [place] Mahajan RN  Outcome: Progressing  4/12/2025 1118 by [place] Mahajan RN  Outcome: Not Progressing  4/12/2025 1117 by [place] Mahajan RN  Outcome: Progressing     Problem: Delirium  Goal: Improved Behavioral Control  4/12/2025 1556 by [place] Mahajan RN  Outcome: Progressing  4/12/2025 1118 by [place] Mahajan RN  Outcome: Not Progressing  4/12/2025 1117 by [place] Mahajan RN  Outcome: Progressing   Goal Outcome Evaluation:         Pt had a large BM this afternoon. BG were 169 and 190 for breakfast and Lunch. Got sliding scale coverages. Lantus was pushed back to lunch time due to NPO in the morning for procedure.

## 2025-04-12 NOTE — PROGRESS NOTES
"Imp/plan:  CAD s/p CABG on carvedilol, clopidogrel, imdur, simvastatin, noted on eliquis as well, if concern for falls or bleeding would stop clopidogrel and start asp 81mg daily  HFrEF - EF declined 35 to 20-25% post CABG but PE and medicaiotn compliance, grafts patent on CTA last week, on carvedilol, noted CKD (Cr today 1.84 - baseline), and imdur, along with clonidine for BP control, fursemide held with improvement in Cr,  will add hydralazine 25mg bid  HTN -as above with addition of hydralazine - he agreed if using sherbert with the pills, his appetite is slowly getting better    Review of Systems: 12 points negative other than above    BP (!) 140/87 (BP Location: Right arm, Patient Position: Chair, Cuff Size: Adult Regular)   Pulse 72   Temp 97.4  F (36.3  C) (Oral)   Resp 18   Ht 1.727 m (5' 8\")   Wt 86.4 kg (190 lb 6.4 oz)   SpO2 98%   BMI 28.95 kg/m    JVP<7cm, carotids normal  Lungs clear  Cor RRR no c,r,m  Abs soft +BS, no mass  Ext no c,c,e    Lab Results   Component Value Date    HGB 11.5 (L) 04/10/2025     Lab Results   Component Value Date     04/10/2025     No results found for: \"CREATININE\"  No components found for: \"K\"          Óscar Mejía MD  Interventional Cardiology   [facility]  [phone]    "

## 2025-04-12 NOTE — PROGRESS NOTES
[facility]    Medicine Progress Note - Hospitalist Service    Date of Admission:  4/7/2025    Assessment & Plan                Eric Cordoba is a 74 year old male with history of coronary artery disease and recent CABG presented to the hospital with complaint of chest discomfort associated with nausea and vomiting. Etiology uncertain but now known to have PE, starvation ketosis- multiple metabolic issues resulting in malaise? Possible cholecystitis, patient refused multiple attempts at HIDA scan.  Hospital Day: 6        Anion gap metabolic acidosis, resolved  DKA, resolved  starvation ketosis, resolved  -presented in DKA  -S/p insulin gtt, off 4/8  --4/10 developed starvation ketosis.  -continue Dextrose drip 50cc/hr  -encourage better PO    Type 2 diabetes mellitus  -home insulin regimen: Lantus 30 units QAM, novolog sliding scale  -here: Lantus 25 units QAM, continue carb ratio 1:20 and sliding scale    RUQ pain  Severe malnutrition  - Alkaline phosphatase and bilirubin mildly elevated.  Gallbladder dilated on recent CT scan.  Right upper quadrant ultrasound obtained, gallbladder with sludge but otherwise unremarkable, ordered HIDA for completeness, patient refused test multiple times. Will monitor for sepsis at this time, check CBC and LFT every few days. Patient would need to cooperate to work this possibility up any further than this and he is not willing to do so.  -if not GB then pain possibly due to pulmonary infarct from PE? Is slightly improved today so maybe starting to subside?  -RD following for supps  -Liberalized diet, removed carb, salt, fat restrictions since he is eating so little anyway  -Could schedule a med but he refuses a lot of meds anyway. Would prefer to prioritize cardiac meds.    Pulmonary embolus  -CT scan from 4/6 noted pulmonary embolus of RLL  -Likely provoked by recent CABG, per cardiology he is also high risk due to his reduced EF  -Started Eliquis  4/10  -no hypoxia     GERD associated chest discomfort and nausea  Continue IV pantoprazole 40 mg twice daily  Antinausea medication as needed     Acute on chronic systolic heart failure  -Clinically patient does not appear to be in heart failure exacerbation.  -holding home Lasix, did give a dose yesterday when BP high  -Echo 4/8: LVEF 20-25%, severe global hypokinesis, decline since echo 3/25/2025  -Monitor input and output      History of hyperlipidemia  History of CAD status post CABG 3/17  Continue with aspirin, Plavix, simvastatin, carvedilol  -CV surgery consulted: okay to stop plavix 3/2026, continue on ASA indefinitely, has follow up 4/10 and 4/25, can start driving 4/14/2025, strict sternal precautions until 5/12/2025     History of hypertension with high blood pressure  -Monitor vital signs as per protocol  -IV hydralazine as needed for elevated blood pressure  -Cardiology uptitrated Coreg  -Started on Imdur and clonidine patch and cardiology uptitrated    JESS on CKD 3b  -Cr baseline 1.8, at baseline now  - Cardiology considering workup for amyloidosis  -monitor  -?cardiorenal? Did improve after lasix dose 4/11     History of mood disorder  -Continue with fluoxetine, trazodone   -family feel he has some adjustment disorder contributing to poor PO    Mild cognitive impairment  -HCPOA, friend Veronique notes patient had recent SLUMS of 15/30.  She feels he has had a cognitive decline since his CABG.  She feels strongly that patient does not have decision-making capacity to return home rather than TCU and I agree with this.  -recommend formal outpatient neuropsych testing    Constipation  -home miralax  -added BID senna  -supp PRN          Diet: Fluid restriction 2000 ML FLUID  Snacks/Supplements Adult: Ensure Enlive; Between Meals  Snacks/Supplements Adult: Gelatein Plus; Between Meals  Snacks/Supplements Adult: Magic Cup; Between Meals  Regular Diet Adult    DVT Prophylaxis: Known VTE.   DOAC  [place]  "Catheter: Not present  Lines: None     Cardiac Monitoring: None  Code Status: No CPR- Do NOT Intubate      Clinically Significant Risk Factors          # Hyperchloremia: Highest Cl = 109 mmol/L in last 2 days, will monitor as appropriate          # Hypoalbuminemia: Lowest albumin = 3.4 g/dL at 4/7/2025 11:14 AM, will monitor as appropriate     # Hypertension: Noted on problem list    # Chronic heart failure with reduced ejection fraction: last echo with EF <40%          # DMII: A1C = 7.2 % (Ref range: <5.7 %) within past 6 months   # Overweight: Estimated body mass index is 28.95 kg/m  as calculated from the following:    Height as of this encounter: 1.727 m (5' 8\").    Weight as of this encounter: 86.4 kg (190 lb 6.4 oz).   # Severe Malnutrition: based on nutrition assessment and treatment provided per dietitian's recommendations.      # Financial/Environmental Concerns: none   # History of CABG: noted on surgical history       Disposition Plan     Medically Ready for Discharge: Anticipated in 2-4 Days         Discharge barrier(s): very poor PO. IVF reliance  Care discussed with: patient, RN      Ev Dsouza MD  Hospitalist Service  [facility]  Securely message with [employer] (more info)  Text page via [employer] Paging/Directory   ______________________________________________________________________      Physical Exam   Vital Signs: Temp: 97.4  F (36.3  C) Temp src: Oral BP: 127/66 Pulse: 83   Resp: 18 SpO2: 98 % O2 Device: None (Room air)    Weight: 190 lbs 6.4 oz    General: in no apparent distress, non-toxic, and alert male sitting on edge of bed oriented x3  HEENT: Head normocephalic atraumatic, oral mucosa moist. Sclerae anicteric  Skin: No rashes or lesions  Extremities: No peripheral edema  Psych: Flat affect, irritable mood.  Neuro: Grossly normal      Medical Decision Making               Data   Recent Results (from the past 16 hours)   Glucose by meter    Collection Time: 04/11/25  " 9:36 PM   Result Value Ref Range    GLUCOSE BY METER POCT 223 (H) 70 - 99 mg/dL   Glucose by meter    Collection Time: 04/12/25  2:38 AM   Result Value Ref Range    GLUCOSE BY METER POCT 182 (H) 70 - 99 mg/dL   Basic metabolic panel    Collection Time: 04/12/25  7:05 AM   Result Value Ref Range    Sodium 142 135 - 145 mmol/L    Potassium 3.9 3.4 - 5.3 mmol/L    Chloride 109 (H) 98 - 107 mmol/L    Carbon Dioxide (CO2) 24 22 - 29 mmol/L    Anion Gap 9 7 - 15 mmol/L    Urea Nitrogen 26.5 (H) 8.0 - 23.0 mg/dL    Creatinine 1.84 (H) 0.67 - 1.17 mg/dL    GFR Estimate 38 (L) >60 mL/min/1.73m2    Calcium 8.8 8.8 - 10.4 mg/dL    Glucose 171 (H) 70 - 99 mg/dL   Extra Purple Top EDTA (LAB USE ONLY)    Collection Time: 04/12/25  7:05 AM   Result Value Ref Range    Hold Specimen JIC    Glucose by meter    Collection Time: 04/12/25  8:06 AM   Result Value Ref Range    GLUCOSE BY METER POCT 169 (H) 70 - 99 mg/dL   Glucose by meter    Collection Time: 04/12/25 11:59 AM   Result Value Ref Range    GLUCOSE BY METER POCT 190 (H) 70 - 99 mg/dL       Interval History     Patient doing a little bit better today.  Has tolerated a few bites of liquid intake.  He did refuse his HIDA scan again.

## 2025-04-12 NOTE — PROGRESS NOTES
Occupational therapy      04/12/25 6248   Appointment Info   Signing Clinician's Name / Credentials (OT) Lidia Salmeron OTR/L   Living Environment   People in Home friend(s)  (lives alone but has renter in basement level)   Current Living Arrangements house   Home Accessibility stairs to enter home;stairs within home   Number of Stairs, Main Entrance 3   Stair Railings, Main Entrance none   Number of Stairs, Within Home, Primary five   Stair Railings, Within Home, Primary railings safe and in good condition   Transportation Anticipated family or friend will provide   Self-Care   Usual Activity Tolerance good   Current Activity Tolerance fair   Regular Exercise No   Equipment Currently Used at Home grab bar, toilet;grab bar, tub/shower;shower chair   Fall history within last six months yes   Number of times patient has fallen within last six months 1   Activity/Exercise/Self-Care Comment Ind. w/ ADL routine   Instrumental Activities of Daily Living (IADL)   IADL Comments Ind. w/ IADL routine   General Information   Onset of Illness/Injury or Date of Surgery 04/07/25   Referring Physician Ev Dsouza MD   Additional Occupational Profile Info/Pertinent History of Current Problem 74 year old male with history of coronary artery disease and recent CABG presented to the hospital with complaint of chest discomfort associated with nausea and vomiting. Etiology uncertain but now known to have PE, starvation ketosis- multiple metabolic issues resulting in malaise? Possible cholecystitis, delay in HIDA scan as patient refused it initially. History of CAD status post CABG 3/17   Existing Precautions/Restrictions cardiac;sternal   Cognitive Status Examination   Orientation Status person   Affect/Mental Status (Cognitive) confused   Follows Commands repetition of directions required;physical/tactile prompts required   Bed Mobility   Bed Mobility supine-sit;sit-supine   Supine-Sit [place] (Bed Mobility) moderate assist (50%  patient effort)   Transfers   Transfers sit-stand transfer   Sit-Stand Transfer   Sit-Stand [place] (Transfers) moderate assist (50% patient effort);2 person assist   Balance   Balance Assessment standing dynamic balance   Standing Balance: Dynamic moderate assist   Position/Device Used, Standing Balance walker, 4-wheeled   Activities of Daily Living   BADL Assessment/Intervention lower body dressing   Lower Body Dressing Assessment/Training   [place] Level (Lower Body Dressing) verbal cues;supervision;minimum assist (75% patient effort)   Clinical Impression   Criteria for Skilled Therapeutic Interventions Met (OT) Yes, treatment indicated   OT Diagnosis decreased ADL ind.   OT Problem List-Impairments impacting ADL problems related to;activity tolerance impaired;balance;cognition;mobility;post-surgical precautions   Assessment of Occupational Performance 3-5 Performance Deficits   Identified Performance Deficits toileting, transfers, safety w/ mobility/ADL tasks   Planned Therapy Interventions (OT) ADL retraining;balance training;strengthening;transfer training;home program guidelines;progressive activity/exercise   Clinical Decision Making Complexity (OT) problem focused assessment/low complexity   Risk & Benefits of therapy have been explained evaluation/treatment results reviewed;risks/benefits reviewed;patient   OT Total Evaluation Time   OT Eval, Low Complexity Minutes (64764) 10   OT Goals   Therapy Frequency (OT) 5 times/week   OT Predicted Duration/Target Date for Goal Attainment 04/19/25   OT Goals Hygiene/Grooming;Cardiac Phase 1;Toilet Transfer/Toileting;Lower Body Dressing;Cognition   OT: Hygiene/Grooming supervision/stand-by assist;using adaptive equipment   OT: Lower Body Dressing Supervision/stand-by assist;within precautions;using adaptive equipment   OT: Toilet Transfer/Toileting Modified independent;using adaptive equipment   OT: Cognitive Patient/caregiver will verbalize understanding  of cognitive assessment results/recommendations as needed for safe discharge planning   OT: Understanding of cardiac education to maximize quality of life, condition management, and health outcomes Patient;Verbalize;Demonstrate   Self-Care/Home Management   Self-Care/Home Mgmt/ADL, Compensatory, Meal Prep Minutes (73638) 15   Treatment Detail/Skilled Intervention Pt confused throughout session cues for re-direction/safety. pt needing mod.A for STS trnf w/ cues for sternal prec, pt able to stand to void w/ CGA slightly unsteady. Pt then completed toileting task w/ Min.A w/ log roll tech. Pt alarm on at end, alarm on .   Therapeutic Procedures/Exercise   Therapeutic Procedure: strength, endurance, ROM, flexibillity minutes (86254) 8   Treatment Detail/Skilled Intervention see ambulation tab   Treatment Time Includes (CR Only) See specific exercise details intervention group(s)   Ambulation   Workload 150ft   Symptoms Fatigue   Cardiovascular Response Normal   Exercise Details CGA w/ 4ww slow pace   Vital Signs Details pre/post vitals WFL   Cardiac Education   Education Provided Precautions   OT Discharge Planning   OT Plan progress ambulation w/ 4ww, sternal prec (CABG 3/17), dressing/toileting, monitor cog   OT Discharge Recommendation (DC Rec) Transitional Care Facility   OT Rationale for DC Rec Pt needing assistx1 for self cares/transfers and rec. increased superivsion d/t decreased safety awareness.   OT Brief overview of current status CGA w/ 4ww   OT Total Distance Amb During Session (feet) 150   Total Session Time   Timed Code Treatment Minutes 23   Total Session Time (sum of timed and untimed services) 33

## 2025-04-12 NOTE — PROGRESS NOTES
"Speech-Language Pathology: Clinical Swallow Evaluation     04/12/25 1500   Appointment Info   Signing Clinician's Name / Credentials (SLP) CECILY Siegel Student   Student Supervision On-site supervision provided   General Information   Onset of Illness/Injury or Date of Surgery 04/07/25   Referring Physician Jeanne Rust MD   Pertinent History of Current Problem Per EMR: \"Eric Cordoba is a 74 year old male with a past medical history of coronary artery disease and recent CABG presented to the hospital with complaint of chest discomfort associated with nausea and vomiting, CTA chest abdomen pelvis was done which was negative for acute process, EMS had activated the Cath Lab for STEMI however on arrival his EKG looked stable compared to recent without acute changes, ED provider discussed with interventional cardiology who are in agreement and recommended to deactivate the Cath Lab, patient received Zofran, Ativan, Pepcid and fluids in the ED and is going to be admitted for possible GERD associated chest discomfort, dehydration, JESS, starvation ketosis.\"   General Observations Pt was awake and agreeable to swallow evaluation but had low tolerance for completion of assesment.   Type of Evaluation   Type of Evaluation Swallow Evaluation   Oral Motor   Oral Musculature generally intact   Structural Abnormalities none present   Mucosal Quality good   Dentition (Oral Motor)   Dentition (Oral Motor) adequate dentition   Facial Symmetry (Oral Motor)   Facial Symmetry (Oral Motor) WNL   Lip Function (Oral Motor)   Lip Range of Motion (Oral Motor) WNL   Lip Strength (Oral Motor) WNL   Tongue Function (Oral Motor)   Tongue ROM (Oral Motor) WNL   Tongue Coordination/Speed (Oral Motor) WNL   Jaw Function (Oral Motor)   Jaw Function (Oral Motor) WNL   Cough/Swallow/Gag Reflex (Oral Motor)   Soft Palate/Velum (Oral Motor) WNL   Volitional Throat Clear/Cough (Oral Motor) WNL   Volitional Swallow (Oral Motor) WNL   Vocal " Quality/Secretion Management (Oral Motor)   Vocal Quality (Oral Motor) WFL   Secretion Management (Oral Motor) WNL   General Swallowing Observations   Current Diet/Method of Nutritional Intake (General Swallowing Observations, NIS) thin liquids (level 0);regular diet   Past History of Dysphagia Pt had CSE on 3/19/2025 where he showed no s/s of aspiration with thin liquids but refused to try solids d/t low appetite and delirium. Pt was recommended a regular and thin liquid diet and demonstrated good tolerance of regular and thin diet with occasional throat clearing that was difficult to determine if related to aspiration.   Swallowing Evaluation Clinical swallow evaluation   Respiratory Support room air   Clinical Swallow Evaluation   Feeding Assistance no assistance needed   Clinical Swallow Evaluation Textures Trialed thin liquids;pureed   Clinical Swallow Eval: Thin Liquid Texture Trial   Mode of Presentation, Thin Liquids cup;straw;self-fed   Volume of Liquid or Food Presented 4 oz   Oral Phase of Swallow WFL   Pharyngeal Phase of Swallow intact   Diagnostic Statement Pt had immediate cough with single and consecutive sip from straw. Pt had no s/s of aspiration when drinking from open cup.   Clinical Swallow Evaluation: Puree Solid Texture Trial   Mode of Presentation, Puree spoon   Volume of Puree Presented 2 bites of Gelatein   Oral Phase, Puree WFL   Pharyngeal Phase, Puree intact   Diagnostic Statement No s/s of aspiration   Esophageal Phase of Swallow   Patient reports or presents with symptoms of esophageal dysphagia No   Swallowing Recommendations   Diet Consistency Recommendations thin liquids (level 0);regular diet   Supervision Level for Intake distant supervision needed   Mode of Delivery Recommendations bolus size, small;slow rate of intake   Monitoring/Assistance Required (Eating/Swallowing) stop eating activities when fatigue is present   Recommended Feeding/Eating Techniques (Swallow Eval) maintain  upright sitting position for eating   Medication Administration Recommendations, Swallowing (SLP) Meds in puree   Clinical Impression   Risks & Benefits of therapy have been explained evaluation/treatment results reviewed;care plan/treatment goals reviewed;participants voiced agreement with care plan;participants included;patient   Clinical Impression Comments Limited Clinical Swallow Evaluation completed. Patient had no s/s of aspiration with pureed texture and thin liquids from open cup. Pt had immediate cough when taking single and consecutive sips from straw. Pt refused to try solid texture d/t nausea.  Oral motor function was WFL. Mastication was WFL. Hyolaryngeal elevation appears present upon visualization and palpitation. D/t good tolerance with regular diet, intact oral motor, and pt demonstrating good alertness/cognitively intact, recommend diet of regular textures and thin liquids with strategies of no straws, upright poisiton, and small sips and bites. SLP to follow for further dysphagia treatment.   SLP Total Evaluation Time   Eval: oral/pharyngeal swallow function, clinical swallow Minutes (54260) 20   SLP Goals   Therapy Frequency (SLP Eval) 5 times/week   SLP Predicted Duration/Target Date for Goal Attainment 04/19/25   SLP Goals Swallow   SLP: Safely tolerate diet without signs/symptoms of aspiration Regular diet;Thin liquids;Independently   SLP Discharge Planning   SLP Plan Sat; 0/5: F/u on reg and thin diet during meal or snack.   SLP Rationale for DC Rec Anticipate goals met prior to DC   SLP Brief overview of current status  Recommend diet of regular textures and thin liquids with strategies of no straws, upright poisiton, and small sips and bites. SLP to follow for further dysphagia treatment.   Psychosocial Support   Trust Relationship/Rapport care explained

## 2025-04-13 ENCOUNTER — APPOINTMENT (OUTPATIENT)
Dept: SPEECH THERAPY | Facility: HOSPITAL | Age: 75
End: 2025-04-13
Attending: HOSPITALIST
Payer: COMMERCIAL

## 2025-04-13 LAB
ANION GAP SERPL CALCULATED.3IONS-SCNC: 9 MMOL/L (ref 7–15)
BUN SERPL-MCNC: 21.9 MG/DL (ref 8–23)
CALCIUM SERPL-MCNC: 8.6 MG/DL (ref 8.8–10.4)
CHLORIDE SERPL-SCNC: 107 MMOL/L (ref 98–107)
CREAT SERPL-MCNC: 1.67 MG/DL (ref 0.67–1.17)
EGFRCR SERPLBLD CKD-EPI 2021: 42 ML/MIN/1.73M2
GLUCOSE BLDC GLUCOMTR-MCNC: 167 MG/DL (ref 70–99)
GLUCOSE BLDC GLUCOMTR-MCNC: 168 MG/DL (ref 70–99)
GLUCOSE BLDC GLUCOMTR-MCNC: 168 MG/DL (ref 70–99)
GLUCOSE BLDC GLUCOMTR-MCNC: 172 MG/DL (ref 70–99)
GLUCOSE BLDC GLUCOMTR-MCNC: 176 MG/DL (ref 70–99)
GLUCOSE SERPL-MCNC: 174 MG/DL (ref 70–99)
HCO3 SERPL-SCNC: 24 MMOL/L (ref 22–29)
HOLD SPECIMEN: NORMAL
POTASSIUM SERPL-SCNC: 3.9 MMOL/L (ref 3.4–5.3)
SODIUM SERPL-SCNC: 140 MMOL/L (ref 135–145)

## 2025-04-13 PROCEDURE — 36415 COLL VENOUS BLD VENIPUNCTURE: CPT | Performed by: HOSPITALIST

## 2025-04-13 PROCEDURE — 250N000013 HC RX MED GY IP 250 OP 250 PS 637: Performed by: INTERNAL MEDICINE

## 2025-04-13 PROCEDURE — 92526 ORAL FUNCTION THERAPY: CPT | Mod: GN

## 2025-04-13 PROCEDURE — 99233 SBSQ HOSP IP/OBS HIGH 50: CPT | Performed by: HOSPITALIST

## 2025-04-13 PROCEDURE — 250N000013 HC RX MED GY IP 250 OP 250 PS 637: Performed by: HOSPITALIST

## 2025-04-13 PROCEDURE — 250N000013 HC RX MED GY IP 250 OP 250 PS 637: Performed by: STUDENT IN AN ORGANIZED HEALTH CARE EDUCATION/TRAINING PROGRAM

## 2025-04-13 PROCEDURE — 82310 ASSAY OF CALCIUM: CPT | Performed by: HOSPITALIST

## 2025-04-13 PROCEDURE — 99233 SBSQ HOSP IP/OBS HIGH 50: CPT | Performed by: INTERNAL MEDICINE

## 2025-04-13 PROCEDURE — 80048 BASIC METABOLIC PNL TOTAL CA: CPT | Performed by: HOSPITALIST

## 2025-04-13 PROCEDURE — 120N000001 HC R&B MED SURG/OB

## 2025-04-13 RX ORDER — LORAZEPAM 2 MG/ML
0.25 INJECTION INTRAMUSCULAR ONCE
Status: COMPLETED | OUTPATIENT
Start: 2025-04-13 | End: 2025-04-13

## 2025-04-13 RX ORDER — LORAZEPAM 2 MG/ML
0.5 INJECTION INTRAMUSCULAR ONCE
Status: DISCONTINUED | OUTPATIENT
Start: 2025-04-13 | End: 2025-04-13

## 2025-04-13 RX ADMIN — APIXABAN 10 MG: 5 TABLET, FILM COATED ORAL at 23:29

## 2025-04-13 RX ADMIN — FLUOXETINE HYDROCHLORIDE 20 MG: 20 CAPSULE ORAL at 09:29

## 2025-04-13 RX ADMIN — SENNOSIDES AND DOCUSATE SODIUM 1 TABLET: 50; 8.6 TABLET ORAL at 23:30

## 2025-04-13 RX ADMIN — CARVEDILOL 25 MG: 12.5 TABLET, FILM COATED ORAL at 17:54

## 2025-04-13 RX ADMIN — HYDRALAZINE HYDROCHLORIDE 25 MG: 25 TABLET ORAL at 09:28

## 2025-04-13 RX ADMIN — CARVEDILOL 25 MG: 12.5 TABLET, FILM COATED ORAL at 09:27

## 2025-04-13 RX ADMIN — TRAZODONE HYDROCHLORIDE 50 MG: 50 TABLET ORAL at 23:30

## 2025-04-13 RX ADMIN — SIMVASTATIN 40 MG: 40 TABLET, FILM COATED ORAL at 23:30

## 2025-04-13 RX ADMIN — HYDRALAZINE HYDROCHLORIDE 25 MG: 25 TABLET ORAL at 23:30

## 2025-04-13 RX ADMIN — CLOPIDOGREL 75 MG: 75 TABLET ORAL at 09:28

## 2025-04-13 RX ADMIN — APIXABAN 10 MG: 5 TABLET, FILM COATED ORAL at 09:28

## 2025-04-13 RX ADMIN — ISOSORBIDE MONONITRATE 30 MG: 30 TABLET, EXTENDED RELEASE ORAL at 09:34

## 2025-04-13 RX ADMIN — LORAZEPAM 0.25 MG: 0.5 TABLET ORAL at 04:25

## 2025-04-13 RX ADMIN — PANTOPRAZOLE SODIUM 40 MG: 40 TABLET, DELAYED RELEASE ORAL at 17:54

## 2025-04-13 ASSESSMENT — ACTIVITIES OF DAILY LIVING (ADL)
ADLS_ACUITY_SCORE: 61
ADLS_ACUITY_SCORE: 57
ADLS_ACUITY_SCORE: 61
ADLS_ACUITY_SCORE: 57
ADLS_ACUITY_SCORE: 61

## 2025-04-13 NOTE — PLAN OF CARE
Problem: Delirium  Goal: Optimal Coping  Outcome: Progressing  Goal: Improved Behavioral Control  Outcome: Progressing  Goal: Improved Sleep  Outcome: Progressing     Problem: Fall Injury Risk  Goal: Absence of Fall and Fall-Related Injury  Outcome: Progressing   Goal Outcome Evaluation:      Pt is A/Ox4. Can express his needs. Can be inappropriate w/ call light. Take medication crush in Sherbet or Lemon Italian Ice. ACHS w/ sliding scale. Writer, spent hours addressing to the patients needs. Given PRN oxycodone, Ativan, and schedule trazadone, nothing worked for him. He is stable. On room air .

## 2025-04-13 NOTE — PLAN OF CARE
Goal Outcome Evaluation:                      A&Ox3, disoriented to situation. Assist of 1 with walker and gait belt. Pt refused to eat breakfast and lunch. Did take pills today. Insulin administered per orders. Sat up in chair for 1 hour.

## 2025-04-13 NOTE — CARE PLAN
Patient called in charge nurse to complain about his IV. He stated that his IV was not painful but that it is causing him to urinate a lot. Primary nurse paged the on call provider but patient demanded in the mean time to stop the fluids. IV fluids stopped, IV flushed, and wrapped for protection. PIV remains patent and intact.

## 2025-04-13 NOTE — PROGRESS NOTES
"Imp/plan:  CAD s/p CABG on carvedilol, clopidogrel, imdur, simvastatin, noted on eliquis as well, if concern for falls or bleeding would stop clopidogrel and start asp 81mg daily  HFrEF - EF declined 35 to 20-25% post CABG presumed from both new PE found alst week and medicaiotn compliance, grafts patent on CTA last week, on carvedilol, noted CKD (Cr today 1.67 - baseline), and imdur, along with clonidine for BP control, fursemide held with improvement in Cr,  and hydralazine 25mg bid  HTN -as above with addition of hydralazine - he agreed if using sherbert with the pills, his appetite is slowly getting better    Pt feeling better today, little abn pain, plan follow up echo limited with outpt visit with cardiology team.      Will sign off    Followed by Dr. Sumner in Cardiology - will set up appointments    Review of Systems: 12 points negative other than above    /63 (BP Location: Right arm)   Pulse 69   Temp 97.9  F (36.6  C) (Oral)   Resp 18   Ht 1.727 m (5' 8\")   Wt 86.4 kg (190 lb 6.4 oz)   SpO2 95%   BMI 28.95 kg/m    JVP<7cm, carotids normal  Lungs clear  Cor RRR no c,r,m  Abs soft +BS, no mass  Ext no c,c,e    Lab Results   Component Value Date    HGB 11.5 (L) 04/10/2025     Lab Results   Component Value Date     04/10/2025     No results found for: \"CREATININE\"  No components found for: \"K\"          Óscar Mejía MD  Interventional Cardiology   [facility]  [phone]    "

## 2025-04-13 NOTE — PROGRESS NOTES
[facility]    Medicine Progress Note - Hospitalist Service    Date of Admission:  4/7/2025    Assessment & Plan                  Eric Cordoba is a 74 year old male with history of coronary artery disease and recent CABG presented to the hospital with complaint of chest discomfort associated with nausea and vomiting.  Found to have pulmonary embolus and starvation ketosis.  Here he is barely eating, frequently declining meds, refusing tests.  Not improving despite best efforts.  Patient expressed interest in transitioning to a hospice approach.  Palliative care consulted for further conversations.  Hospital Day: 7        Anion gap metabolic acidosis, resolved  DKA, resolved  starvation ketosis, resolved  -presented in DKA  -S/p insulin gtt, off 4/8  --4/10 developed starvation ketosis.  -have had to maintain him on D5 drip to keep him out of ketotic state, now refusing D5 drip  -not following BMP any longer as no utility since refusing IVF  -encourage PO intake    Severe malnutrition  -very poor oral intake, due to oral aversion and nausea. From depression vs cholecystitis vs GERD?  -RD following for supps  -Could schedule a nausea med but he refuses a lot of meds anyway. Would prefer to prioritize cardiac meds.  -Continue IV pantoprazole 40 mg twice daily    RUQ pain  -favored to be from pulmonary infarct, but very slow to improve  -cholecystitis could not be ruled out, HIDA scan was advised but patient refused multiple times  -tylenol, oxycodone PRN    Pulmonary embolus  -CT scan from 4/6 noted pulmonary embolus of RLL  -Likely provoked by recent CABG, also high risk due to his reduced EF  -Started Eliquis 4/10  -no hypoxia     Acute on chronic systolic heart failure  -Clinically patient does not appear to be in heart failure exacerbation.  -holding home Lasix, did give a dose 4/11 when BP high  -Echo 4/8: LVEF 20-25%, severe global hypokinesis, decline since echo 3/25/2025  -Monitor  input and output      Type 2 diabetes mellitus  -home insulin regimen: Lantus 30 units QAM, novolog sliding scale  -here: Lantus 25 units QAM, continue carb ratio 1:20 and sliding scale    History of CAD status post CABG 3/17  Hyperlipidemia  Continue with aspirin, Plavix, simvastatin, carvedilol  -CV surgery consulted: okay to stop plavix 3/2026, continue on ASA indefinitely, has follow up 4/10 and 4/25, can start driving 4/14/2025, strict sternal precautions until 5/12/2025     Hypertension  -IV hydralazine as needed  -Cardiology uptitrated Coreg  -Started on Imdur and clonidine patch and cardiology uptitrated    JESS on CKD 3b  -Cr baseline 1.8, at baseline now  - Cardiology considering workup for amyloidosis     History of mood disorder  -Continue with fluoxetine, trazodone   -family feel he has some adjustment disorder contributing to poor PO    Mild cognitive impairment  -YULI, friend Veronique notes patient had recent SLUMS of 15/30.  She feels he has had a cognitive decline since his CABG.  She feels strongly that patient does not have decision-making capacity to return home rather than TCU and I agree with this.  -recommend formal outpatient neuropsych testing if does not sign onto Hospice    Constipation  -home miralax  -BID senna  -supp PRN    Goals of care  -Patient is often refusing medications, noncompliant with recommendations for workup, very poor oral intake.  Does not seem motivated to get better.  Very unhappy in the hospital.  Wondering if he may benefit the most from meeting him where he is at, get him discharged from the hospital and let him eat and take or not take meds according to his own wishes.  Suspect he might actually do better in that setting.  Would benefit from hospice support to guide him in the weeks to come.  Patient does not want to be rehospitalized.  Patient has Veronique ROLDAN, she is interested in exploring this option more.  Patient likely unable to return home due to  "inadequate help and supervision there.  Would consider LTC with hospice.  - Consult palliative care to explore goals of care, considering hospice          Diet: Fluid restriction 2000 ML FLUID  Snacks/Supplements Adult: Ensure Enlive; Between Meals  Snacks/Supplements Adult: Gelatein Plus; Between Meals  Snacks/Supplements Adult: Magic Cup; Between Meals  Regular Diet Adult    DVT Prophylaxis: Known VTE.   DOAC  [place] Catheter: Not present  Lines: None     Cardiac Monitoring: None  Code Status: No CPR- Do NOT Intubate      Clinically Significant Risk Factors          # Hyperchloremia: Highest Cl = 109 mmol/L in last 2 days, will monitor as appropriate          # Hypoalbuminemia: Lowest albumin = 3.4 g/dL at 4/7/2025 11:14 AM, will monitor as appropriate     # Hypertension: Noted on problem list    # Chronic heart failure with reduced ejection fraction: last echo with EF <40%          # DMII: A1C = 7.2 % (Ref range: <5.7 %) within past 6 months   # Overweight: Estimated body mass index is 28.95 kg/m  as calculated from the following:    Height as of this encounter: 1.727 m (5' 8\").    Weight as of this encounter: 86.4 kg (190 lb 6.4 oz).   # Severe Malnutrition: based on nutrition assessment and treatment provided per dietitian's recommendations.      # Financial/Environmental Concerns: none   # History of CABG: noted on surgical history       Disposition Plan     Medically Ready for Discharge: Anticipated in 2-4 Days         Discharge barrier(s): very poor PO. Goals of care  Care discussed with: patient, POA, cardiology      Ev Dsouza MD  Hospitalist Service  [facility]  Securely message with Justin (more info)  Text page via [employer] Paging/Directory   ______________________________________________________________________      Physical Exam   Vital Signs: Temp: 98.2  F (36.8  C) Temp src: Oral BP: 127/59 Pulse: 69   Resp: 18 SpO2: 94 % O2 Device: None (Room air)    Weight: 190 lbs 6.4 " oz    General: in no apparent distress, non-toxic, and alert male sitting on edge of bed oriented x3  HEENT: Head normocephalic atraumatic, oral mucosa moist. Sclerae anicteric  Skin: No rashes or lesions  Extremities: No peripheral edema  Psych: Flat affect, mood euthymic.  Neuro: Grossly normal      Medical Decision Making               Data   Recent Results (from the past 16 hours)   Glucose by meter    Collection Time: 04/13/25  2:22 AM   Result Value Ref Range    GLUCOSE BY METER POCT 176 (H) 70 - 99 mg/dL   Basic metabolic panel    Collection Time: 04/13/25  7:33 AM   Result Value Ref Range    Sodium 140 135 - 145 mmol/L    Potassium 3.9 3.4 - 5.3 mmol/L    Chloride 107 98 - 107 mmol/L    Carbon Dioxide (CO2) 24 22 - 29 mmol/L    Anion Gap 9 7 - 15 mmol/L    Urea Nitrogen 21.9 8.0 - 23.0 mg/dL    Creatinine 1.67 (H) 0.67 - 1.17 mg/dL    GFR Estimate 42 (L) >60 mL/min/1.73m2    Calcium 8.6 (L) 8.8 - 10.4 mg/dL    Glucose 174 (H) 70 - 99 mg/dL   Extra Purple Top EDTA (LAB USE ONLY)    Collection Time: 04/13/25  7:33 AM   Result Value Ref Range    Hold Specimen JIC    Glucose by meter    Collection Time: 04/13/25  8:37 AM   Result Value Ref Range    GLUCOSE BY METER POCT 172 (H) 70 - 99 mg/dL   Glucose by meter    Collection Time: 04/13/25 12:14 PM   Result Value Ref Range    GLUCOSE BY METER POCT 168 (H) 70 - 99 mg/dL       Interval History     Patient states doing OK today. Complains feeling chilled, I gave him some more blankets. Patient self discontinued his dextrose drip. Discussed goals of care. Inquired if he thought he would be best served by a more comfort focused approach, possibly hospice. Patient is actually very interested in this.    I called Veronique.  She is not surprised to hear that patient is continuing to refuse interventions.  We discussed the possibility of getting him onto hospice, told her that patient expressed interest in this.  She is interested in learning more information about this,  told her we would be consulting palliative care.

## 2025-04-14 ENCOUNTER — DOCUMENTATION ONLY (OUTPATIENT)
Dept: OTHER | Facility: CLINIC | Age: 75
End: 2025-04-14
Payer: COMMERCIAL

## 2025-04-14 ENCOUNTER — APPOINTMENT (OUTPATIENT)
Dept: OCCUPATIONAL THERAPY | Facility: HOSPITAL | Age: 75
End: 2025-04-14
Attending: HOSPITALIST
Payer: COMMERCIAL

## 2025-04-14 VITALS
RESPIRATION RATE: 16 BRPM | DIASTOLIC BLOOD PRESSURE: 78 MMHG | HEART RATE: 80 BPM | WEIGHT: 190.4 LBS | TEMPERATURE: 98.1 F | BODY MASS INDEX: 28.85 KG/M2 | SYSTOLIC BLOOD PRESSURE: 164 MMHG | HEIGHT: 68 IN | OXYGEN SATURATION: 98 %

## 2025-04-14 LAB
ALBUMIN SERPL ELPH-MCNC: 3.5 G/DL (ref 3.7–5.1)
ALPHA1 GLOB SERPL ELPH-MCNC: 0.4 G/DL (ref 0.2–0.4)
ALPHA2 GLOB SERPL ELPH-MCNC: 0.9 G/DL (ref 0.5–0.9)
B-GLOBULIN SERPL ELPH-MCNC: 0.8 G/DL (ref 0.6–1)
GAMMA GLOB SERPL ELPH-MCNC: 1.1 G/DL (ref 0.7–1.6)
GLUCOSE BLDC GLUCOMTR-MCNC: 135 MG/DL (ref 70–99)
GLUCOSE BLDC GLUCOMTR-MCNC: 144 MG/DL (ref 70–99)
M PROTEIN SERPL ELPH-MCNC: 0 G/DL
PROT PATTERN SERPL ELPH-IMP: ABNORMAL
PROT PATTERN SERPL IFE-IMP: NORMAL
PROT PATTERN UR ELPH-IMP: NORMAL

## 2025-04-14 PROCEDURE — 250N000013 HC RX MED GY IP 250 OP 250 PS 637: Performed by: INTERNAL MEDICINE

## 2025-04-14 PROCEDURE — 84165 PROTEIN E-PHORESIS SERUM: CPT | Mod: 26 | Performed by: PATHOLOGY

## 2025-04-14 PROCEDURE — 86334 IMMUNOFIX E-PHORESIS SERUM: CPT | Mod: 26 | Performed by: PATHOLOGY

## 2025-04-14 PROCEDURE — 97110 THERAPEUTIC EXERCISES: CPT | Mod: GO

## 2025-04-14 PROCEDURE — 99222 1ST HOSP IP/OBS MODERATE 55: CPT | Performed by: CLINICAL NURSE SPECIALIST

## 2025-04-14 PROCEDURE — 99239 HOSP IP/OBS DSCHRG MGMT >30: CPT | Performed by: INTERNAL MEDICINE

## 2025-04-14 PROCEDURE — 250N000013 HC RX MED GY IP 250 OP 250 PS 637: Performed by: STUDENT IN AN ORGANIZED HEALTH CARE EDUCATION/TRAINING PROGRAM

## 2025-04-14 PROCEDURE — 250N000013 HC RX MED GY IP 250 OP 250 PS 637: Performed by: HOSPITALIST

## 2025-04-14 PROCEDURE — 84166 PROTEIN E-PHORESIS/URINE/CSF: CPT | Mod: 26 | Performed by: PATHOLOGY

## 2025-04-14 PROCEDURE — 97535 SELF CARE MNGMENT TRAINING: CPT | Mod: GO

## 2025-04-14 RX ORDER — ASPIRIN 81 MG/1
81 TABLET, CHEWABLE ORAL DAILY
Status: SHIPPED
Start: 2025-04-14

## 2025-04-14 RX ORDER — OXYCODONE HYDROCHLORIDE 5 MG/1
5 TABLET ORAL EVERY 4 HOURS PRN
Qty: 12 TABLET | Refills: 0 | Status: SHIPPED | OUTPATIENT
Start: 2025-04-14

## 2025-04-14 RX ORDER — CLONIDINE 0.1 MG/24H
1 PATCH, EXTENDED RELEASE TRANSDERMAL WEEKLY
Qty: 4 PATCH | Refills: 0 | Status: SHIPPED | OUTPATIENT
Start: 2025-04-17 | End: 2025-05-17

## 2025-04-14 RX ORDER — ISOSORBIDE MONONITRATE 30 MG/1
30 TABLET, EXTENDED RELEASE ORAL DAILY
Qty: 30 TABLET | Refills: 0 | Status: SHIPPED | OUTPATIENT
Start: 2025-04-15 | End: 2025-05-15

## 2025-04-14 RX ORDER — CARVEDILOL 25 MG/1
25 TABLET ORAL 2 TIMES DAILY WITH MEALS
Qty: 60 TABLET | Refills: 0 | Status: SHIPPED | OUTPATIENT
Start: 2025-04-14 | End: 2025-05-14

## 2025-04-14 RX ORDER — PANTOPRAZOLE SODIUM 40 MG/1
40 TABLET, DELAYED RELEASE ORAL
Qty: 28 TABLET | Refills: 0 | Status: SHIPPED | OUTPATIENT
Start: 2025-04-14 | End: 2025-04-28

## 2025-04-14 RX ADMIN — SENNOSIDES AND DOCUSATE SODIUM 1 TABLET: 50; 8.6 TABLET ORAL at 09:41

## 2025-04-14 RX ADMIN — ISOSORBIDE MONONITRATE 30 MG: 30 TABLET, EXTENDED RELEASE ORAL at 09:42

## 2025-04-14 RX ADMIN — FLUOXETINE HYDROCHLORIDE 20 MG: 20 CAPSULE ORAL at 09:42

## 2025-04-14 RX ADMIN — POLYETHYLENE GLYCOL 3350 17 G: 17 POWDER, FOR SOLUTION ORAL at 09:42

## 2025-04-14 RX ADMIN — CARVEDILOL 25 MG: 12.5 TABLET, FILM COATED ORAL at 09:41

## 2025-04-14 RX ADMIN — HYDRALAZINE HYDROCHLORIDE 25 MG: 25 TABLET ORAL at 09:42

## 2025-04-14 RX ADMIN — CLOPIDOGREL 75 MG: 75 TABLET ORAL at 09:42

## 2025-04-14 RX ADMIN — APIXABAN 10 MG: 5 TABLET, FILM COATED ORAL at 09:40

## 2025-04-14 ASSESSMENT — ACTIVITIES OF DAILY LIVING (ADL)
ADLS_ACUITY_SCORE: 59
ADLS_ACUITY_SCORE: 57
ADLS_ACUITY_SCORE: 59
ADLS_ACUITY_SCORE: 57
ADLS_ACUITY_SCORE: 59
ADLS_ACUITY_SCORE: 59
ADLS_ACUITY_SCORE: 57
ADLS_ACUITY_SCORE: 59
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
## 2025-04-14 NOTE — PLAN OF CARE
Problem: Adult Inpatient Plan of Care  Goal: Plan of Care Review  Description: The Plan of Care Review/Shift note should be completed every shift.  The Outcome Evaluation is a brief statement about your assessment that the patient is improving, declining, or no change.  This information will be displayed automatically on your shiftnote.  Outcome: Progressing  Flowsheets (Taken 4/14/2025 0434)  Plan of Care Reviewed With: patient  Overall Patient Progress: no change  Problem: Delirium  Goal: Improved Behavioral Control  Outcome: Progressing  Intervention: Minimize Safety Risk  Recent Flowsheet Documentation  Taken 4/14/2025 0000 by Gaurav Lopez RN  Enhanced Safety Measures: room near unit station      Goal Outcome Evaluation:    A&O x3. Disoriented to situation. Pt took bedtime medications without difficulty from writer. Pt behaviorally appropriate this shift, cooperative with cares. Pt frequently used call light over night to use urinal. Tele SR.  Currently up in chair with chair alarm on. Able to make needs known.

## 2025-04-14 NOTE — PROVIDER NOTIFICATION
"During assessment and multiple assists with urinal this AM, Patient has made this RN aware that he expects to discharge \"NOW\" and included swearing in the request.  Provider notified.  Jennifer Manning RN   "

## 2025-04-14 NOTE — PROGRESS NOTES
SPIRITUAL HEALTH SERVICES ([facility])  SPIRITUAL ASSESSMENT Progress Note  [facility]. Unit P1    REFERRAL SOURCE: Length of Stay      Because of Mr Beryl behaviours to the Eucharistic  today I was advised by floor staff not to visit him.      PLAN: No plans to follow.      Evie Holliday, Ph.D., [facility]      Securely Message with [facility] or [facility] Pager.    Non-urgent needs:  Phone  to leave a message

## 2025-04-14 NOTE — PLAN OF CARE
Physical Therapy Discharge Summary    Reason for therapy discharge:    Discharged to home.    Progress towards therapy goal(s). See goals on Care Plan in [facility] electronic health record for goal details.  Goals not met.  Barriers to achieving goals:   discharge from facility.    Therapy recommendation(s):    Continued therapy is recommended.  Rationale/Recommendations:  TCU was recommended. PT spoke to patient - Today patient declines 4WW and FWW for home when offered, despite education on purpose of use to decrease fall risk. Per chart, patient has been ambulating in hospital with therapy using 4WW and 4WW had been recommended for patient to discharge home with. Order placed for 4WW in case patient changes mind.     Addendum: Notified by cardiac OT that patient now agreeable to FWW for home. At the time PT returned, patient with palliative - PT left [place] DME form for patient to sign afterwards, and left FWW for discharge home. PT communicated with RN that will need signed form before discharge.

## 2025-04-14 NOTE — PLAN OF CARE
"Progress Note     Problem: Comorbidity Management  Goal: Blood Glucose Levels Within Targeted Range  Outcome: Progressing  Intervention: Monitor and Manage Glycemia  Recent Flowsheet Documentation  Taken 4/14/2025 0940 by Narda Manning RN  Medication Review/Management:   high-risk medications identified   medications reviewed  Goal: Blood Pressure in Desired Range  Outcome: Progressing  Intervention: Maintain Blood Pressure Management  Recent Flowsheet Documentation  Taken 4/14/2025 0940 by Narda Manning RN  Medication Review/Management:   high-risk medications identified   medications reviewed   Goal Outcome Evaluation:  BP (!) 164/78   Pulse 80   Temp 98.1  F (36.7  C) (Oral)   Resp 16   Ht 1.727 m (5' 8\")   Wt 86.4 kg (190 lb 6.4 oz)   SpO2 98%   BMI 28.95 kg/m    VSS Patient refuses some cares, assessments and scheduled medications.  Patient appears to demonstrate some clarity at times, however, patient will greatly minimize overall health conditions and states that he does not want to be in hospital and can take care of him self multiple times today.  Physician, RN, Nursing Assistant, Social Work, therapies, consult(s) have been bedside frequently today.  Patient plans to discharge home with in home support agencies arranged by social work.   Family have arrived bedside at 1515 to discuss advised plan of care with patient with concerns for his discharge requests due to his inability to take care of himself at home.  TCU has been recommended. Jennifer Manning RN                       "

## 2025-04-14 NOTE — PLAN OF CARE
Speech Language Therapy Discharge Summary    Reason for therapy discharge:    Discharged to home.    Progress towards therapy goal(s). See goals on Care Plan in [facility] electronic health record for goal details.  Goals partially met.  Barriers to achieving goals:   limited tolerance for therapy.    Therapy recommendation(s):    No further therapy is recommended.  Recommend diet of regular textures and thin liquids with strategies of no straws, upright position, alternate consistencies, and small sips and bites.

## 2025-04-14 NOTE — PROGRESS NOTES
"Refusal(s) for ordered care / assessments.  Physician notified:  Patient has refused to allow noon blood sugar check and VS.  Patient refused to participate in PT therapy and therapist's evaluation for mobility support.  Patient has refused to participate in home oxygen assessment ordered by Attending Physician.  When asked if there was a better time or location to do these assessments, patient just reiterated forcefully \"I AM GOING HOME!!\"  Social work has been bedside, therapist have been bedside, this RN has been bedside multiple times.  Will continue to monitor.  Jennifer Manning RN     "

## 2025-04-14 NOTE — CONSULTS
Palliative Care Consultation Note  [facility] [place]      Patient: Eric Cordoba  Date of Admission:  4/7/2025    Requesting Clinician / Team: Dr. Ev Dosuza  Reason for consult: Goals of care     Recommendations & Counseling     GOALS OF CARE:   Comfort focused   Patient wishes to discharge home as soon as possible.  He feels he will be able to care for himself presently.  He does not wish to return to the hospital.  He understands his heart function is advancing and worsening.  He would want to focus on quality of life and comfort and involve hospice.  Potential hospice admission criteria of heart failure with EF of 20-25% reviewed with patient.  He is not compliant with medications and symptoms will exacerbate rather quickly following discharge.  Care management consulted to assist with discharge disposition and hospice.    ADVANCE CARE PLANNING:  Patient has an advance directive dated 2/2025.  Primary Health Care Agent friend, Veronique Owens.  Alternate(s) friend, Vincent Tay.   There is a POLST form on file, but will need to be updated prior to discharge.  POLST document updated today indicating wishes for DNR/DNI and comfort focused treatments and no feeding tube placement.  Original and copies provided to patient.  Copy placed in chart.  Copy sent to honoring choices for placement in EMR  Code status: No CPR- Do NOT Intubate    MEDICAL MANAGEMENT:   We are not actively managing symptoms at this time.    PSYCHOSOCIAL/SPIRITUAL SUPPORT:  Family significant other, Veronique, (lives with patient). 3 children - 2 daughters and son.    Palliative Care will continue to follow. Thank you for the consult and allowing us to aid in the care of Eric Cordoba.    These recommendations have been discussed with Narda Manning RN, Dr. Kahn and care management team.    KYMBERLY Glasgow, CNS  [facility], Palliative Care  Securely message with the Vocera Web Console (learn more here) or  Text  "page via [facility] Paging/Directory       Assessment      Eric Cordoba is a 74 year old male with a past medical history of DM2, CAD and CABG 3/17/2025, CVA, HTN, CKD IIIb, mood disorder, cardiomyopathy  who presented on 4/7/2025 with increased chest pain, nausea and vomiting and found to have PE and starvation ketosis and DKA.  Echocardiogram 4/8 shows LVEF 20-25%, severe global hypokinesis since echocardiogram 3/25/2025. Patient declining medications and cares from time to time.    Today, the patient was seen for:  Introduction to palliative medicine specialty, establish rapport, goals of care discussion    History of Present Illness   Met with Eric in person and .   Palliative care team helps patients and families dealing with serious, potentially life-limiting illnesses, navigate their care while focusing on the whole person; providing emotional, social and spiritual support.  Palliative care often assists with symptom management, information sharing about what to expect from the illness, available treatment options and what effect those options may have on the disease course, and provide effective communication and caring support.    Prognosis, Goals, & Planning:   Functional Status just prior to this current hospitalization:  ECOG3 (Capable of only limited self-care; needs help with ADLs; in bed/chair >50% of waking hours)  Per family report, was not adhering to medication regimen prescribed by cardiology, diet or fluid restriction.    Prognosis, Goals, and/or Advance Care Planning:  Met with patient today who is very clear about his desires to \"live life the way I want to.\"  He understands and verbalizes understanding that heart function is limited and has decreased from March to present.  We reviewed that it is decreased down to 20 to 25% in terms of ejection fraction and pumping power and that there is little motility of the heart.  He wants to spend his time in his home and being surrounded with family and " friends.  He indicates that his 3 children will likely be coming to visit and including his sister from [place].  He understands over time he will need more physical support and he will rely on family to help calm care for him.  His roommate, Veronique who lives in the basement, cares for the lawn and outside maintenance.  She also has her dog that lives with him and that gives her a lot of hansa    Code Status was addressed today:   Yes, We discussed potential risks and rationale of attempting cardiac resuscitation, intubation, and mechanical ventilation.  We also discussed probability of survival as well as quality of life implications.  Based on this discussion, patient or surrogate response/decision: Patient very clearly articulates desires for no chest compressions or intubation      Patient's decision making preferences: independently        Patient has decision-making capacity today for complex decisions: Intact          Coping, Meaning, & Spirituality:   Mood, coping, and/or meaning in the context of serious illness were addressed today: Yes    Social:   Living situation: lives with roommate, Veronique Owens  Important relationships/caregivers: Patient has 3 children, son, Italo Guerin (lives in [facility]), daughter Fatimah (lives in [place], [place]) and daughter Harriett (lives in [place]).  FISHING beings patient hansa. Favorite activity.    Medications:  Reviewed this patient's medication profile and medications from this hospitalization.   [place] Board of Pharmacy Data Base Reviewed: Yes:   reviewed - controlled substances prescribed by other outside provider(s)..  Total: 4  Private Pay: 0   Showing 1-4 of 4 Items View   1 of 1   Filled  Written  ID  Drug  QTY  Days  Prescriber  RX #  Dispenser  Refill  Daily Dose*  Pymt Type      01/17/2025 01/17/2025 1 Gabapentin 300 Mg Capsule 270.00 90 [facility] [id number] Gra ([id number]) 0/0  [place]are [place]   10/11/2024 10/11/2024 1 Gabapentin 300 Mg Capsule 270.00 90  Carol Ann Sheri [id number] Gra ([id number]) 0/0  [place]are [place]   07/31/2024 07/31/2024 1 Gabapentin 300 Mg Capsule 270.00 90 [facility] Wade [id number] Gra ([id number]) 0/0  [place]   05/02/2024 05/02/2024 1 Gabapentin 300 Mg Capsule 270.00 90 Carol Ann Wade           ROS:  Comprehensive ROS is reviewed and is negative except as here & per HPI:   ESAS (Martin Symptom Assessment Scale 0 none -10 severe)  Pain: 0/10  Tiredness: mild  Drowsiness: none  Nausea: none  Appetite: fair  Shortness of breath: none    Physical Exam   Vital Signs with Ranges  Temp:  [98  F (36.7  C)-98.3  F (36.8  C)] 98.1  F (36.7  C)  Pulse:  [69-77] 77  Resp:  [12-18] 16  BP: (127-168)/(59-81) 168/81  SpO2:  [94 %-98 %] 98 %  Wt Readings from Last 10 Encounters:   04/12/25 86.4 kg (190 lb 6.4 oz)   04/04/25 84.8 kg (187 lb)   04/04/25 83 kg (183 lb)   04/03/25 87.1 kg (192 lb)   04/01/25 87.9 kg (193 lb 12.8 oz)   03/27/25 92.1 kg (203 lb)   03/26/25 92.3 kg (203 lb 8 oz)   03/13/25 92.5 kg (204 lb)   03/03/25 93.9 kg (207 lb)   02/24/25 94.8 kg (209 lb)     190 lbs 6.4 oz    PHYSICAL EXAM:  Temp:  [98  F (36.7  C)-98.3  F (36.8  C)] 98.1  F (36.7  C)  Pulse:  [69-77] 77  Resp:  [12-18] 16  BP: (127-168)/(59-81) 168/81  SpO2:  [94 %-98 %] 98 %  190 lbs 6.4 oz    Physical Exam  Vitals and nursing note reviewed.   Constitutional:       General: He is not in acute distress.  HENT:      Head: Normocephalic.      Nose: Nose normal.      Mouth/Throat:      Mouth: Mucous membranes are moist.   Eyes:      General: No scleral icterus.     Conjunctiva/sclera: Conjunctivae normal.   Cardiovascular:      Rate and Rhythm: Normal rate.   Pulmonary:      Effort: Pulmonary effort is normal.   Abdominal:      Palpations: Abdomen is soft.   Skin:     General: Skin is warm.   Neurological:      Mental Status: He is alert and oriented to person, place, and time.   Psychiatric:         Mood and Affect: Mood normal.         Behavior: Behavior normal. Behavior is cooperative.         Thought Content:  Thought content normal.         Data reviewed:  Results for orders placed or performed during the hospital encounter of 04/07/25 (from the past 24 hours)   Glucose by meter   Result Value Ref Range    GLUCOSE BY METER POCT 168 (H) 70 - 99 mg/dL   Glucose by meter   Result Value Ref Range    GLUCOSE BY METER POCT 168 (H) 70 - 99 mg/dL   Glucose by meter   Result Value Ref Range    GLUCOSE BY METER POCT 167 (H) 70 - 99 mg/dL   Glucose by meter   Result Value Ref Range    GLUCOSE BY METER POCT 144 (H) 70 - 99 mg/dL   Glucose by meter   Result Value Ref Range    GLUCOSE BY METER POCT 135 (H) 70 - 99 mg/dL     70 MINUTES SPENT BY ME on the date of service doing chart review, history, exam, documentation & further activities per the note.

## 2025-04-14 NOTE — DISCHARGE SUMMARY
"[facility]  Hospitalist Discharge Summary      Date of Admission:  4/7/2025  Date of Discharge:  4/14/2025  Discharging Provider: Narda Kahn DO  Discharge Service: Hospitalist Service    Discharge Diagnoses   Anion gap metabolic acidosis  DKA  Starvation ketosis  Severe malnutrition  Medical noncompliance  Right upper quadrant pain  Pulmonary embolism  Type 2 diabetes  JESS on CKD stage III  Mild cognitive impairment    Clinically Significant Risk Factors     # DMII: A1C = 7.2 % (Ref range: <5.7 %) within past 6 months  # Overweight: Estimated body mass index is 28.95 kg/m  as calculated from the following:    Height as of this encounter: 1.727 m (5' 8\").    Weight as of this encounter: 86.4 kg (190 lb 6.4 oz).  # Severe Malnutrition: based on nutrition assessment and treatment provided per dietitian's recommendations.      Follow-ups Needed After Discharge   Follow-up Appointments       Hospital Follow-up with Existing Primary Care Provider (PCP)          Schedule Primary Care visit within: 7 Days               Unresulted Labs Ordered in the Past 30 Days of this Admission       Date and Time Order Name Status Description    3/17/2025  5:39 AM Prepare red blood cells (unit) Preliminary     3/17/2025  5:39 AM Prepare red blood cells (unit) Preliminary             Discharge Disposition   Discharged to home with health care  Condition at discharge: Fair    Hospital Course   Patient is a 74-year-old male who underwent recent CABG and presented to the hospital with chest discomfort.  Found to have pulmonary embolism and starvation ketosis.  Patient has not been wanting to take medications and refusing cares.  He is not wanting to eat and has been refusing IV drips as well.  He demanded to go home today 4/14.  Patient has medical capacity to make decisions as he understands the consequences of his actions.  I discussed with his friend Veronique who lives with him.  She wishes he would go " to long-term care with hospice but understands that it is his decision to come home.  Patient was discharged home with home hospice and home care.  Social work filed a vulnerable adult report.    Consultations This Hospital Stay   CARE MANAGEMENT / SOCIAL WORK IP CONSULT  CARDIOLOGY IP CONSULT  PHYSICAL THERAPY ADULT IP CONSULT  OCCUPATIONAL THERAPY ADULT IP CONSULT  CARDIAC REHAB IP CONSULT  SPEECH LANGUAGE PATH ADULT IP CONSULT  NUTRITION SERVICES ADULT IP CONSULT  PALLIATIVE CARE ADULT IP CONSULT  CARE MANAGEMENT / SOCIAL WORK IP CONSULT  CARE MANAGEMENT / SOCIAL WORK IP CONSULT    Code Status   No CPR- Do NOT Intubate    Time Spent on this Encounter   I, Narda Kahn DO, personally saw the patient today and spent greater than 30 minutes discharging this patient.       DO REUBEN Russo [address] [address]  Phone: [phone]  Fax: [phone]  ______________________________________________________________________    Physical Exam   Vital Signs: Temp: 98.1  F (36.7  C) Temp src: Oral BP: (!) 164/78 Pulse: 80   Resp: 16 SpO2: 98 % O2 Device: None (Room air)    Weight: 190 lbs 6.4 oz         Primary Care Physician   Abner Elmore    Discharge Orders      Primary Care - Care Coordination Referral      Hospice Referral      Home Care Referral      Reason for your hospital stay    Chest pain, failure to thrive, DKA, starvation ketosis, malnutrition, pulmonary embolism     Activity    Your activity upon discharge: activity as tolerated     Walker Order for DME - ONLY FOR DME     Walker Order for DME - ONLY FOR DME     Diet    Follow this diet upon discharge: Current Diet:Orders Placed This Encounter      Fluid restriction 2000 ML FLUID      Snacks/Supplements Adult: Ensure Enlive; Between Meals      Snacks/Supplements Adult: Gelatein Plus; Between Meals      Snacks/Supplements Adult: Magic Cup; Between Meals      Regular Diet Adult      Hospital Follow-up with Existing Primary Care Provider (PCP)            Significant Results and Procedures   Most Recent 3 CBC's:  Recent Labs   Lab Test 04/10/25  0717 04/09/25  0647 04/08/25  0533   WBC 9.1 10.8 10.0   HGB 11.5* 11.4* 9.9*   MCV 96 96 97    309 268     Most Recent 3 BMP's:  Recent Labs   Lab Test 04/14/25  0803 04/14/25  0224 04/13/25 2056 04/13/25  0837 04/13/25  0733 04/12/25  0806 04/12/25  0705 04/11/25  0718 04/11/25  0657   NA  --   --   --   --  140  --  142  --  138   POTASSIUM  --   --   --   --  3.9  --  3.9  --  4.1   CHLORIDE  --   --   --   --  107  --  109*  --  105   CO2  --   --   --   --  24  --  24  --  24   BUN  --   --   --   --  21.9  --  26.5*  --  35.0*   CR  --   --   --   --  1.67*  --  1.84*  --  2.12*   ANIONGAP  --   --   --   --  9  --  9  --  9   GAYATHRI  --   --   --   --  8.6*  --  8.8  --  8.6*   * 144* 167*   < > 174*   < > 171*   < > 221*    < > = values in this interval not displayed.     Most Recent 2 LFT's:  Recent Labs   Lab Test 04/10/25  0717 04/08/25  0533   AST 23 16   ALT 10 8   ALKPHOS 153* 158*   BILITOTAL 1.0 0.6   ,   Results for orders placed or performed during the hospital encounter of 04/07/25   CTA Chest Abdomen Pelvis w Contrast    Addendum: 4/10/2025    CLINICAL ADDENDUM:   Clinical information in this report has been modified from the previous version as follows:     Exam was reviewed together with cardiology as there was elevated d-dimer and new symptoms related to the right upper quadrant. Opacification of the pulmonary arteries is suboptimal, however there is a visible filling defect within the right lower lobe   branch pulmonary artery (series 5, image 78) consistent with pulmonary embolism.    END ADDENDUM      Narrative    EXAM: CTA CHEST ABDOMEN PELVIS W CONTRAST  LOCATION: [facility]  DATE: 4/7/2025    INDICATION: recent cabg, shortness of breath, nausea, vomiting, abdominal pain  COMPARISON: CT  chest 02/24/2025  TECHNIQUE: CT angiogram chest abdomen pelvis during arterial phase of injection of IV contrast. 2D and 3D MIP reconstructions were performed by the CT technologist. Dose reduction techniques were used.   CONTRAST: 75ml isovue 370    FINDINGS:   CT ANGIOGRAM CHEST, ABDOMEN, AND PELVIS: No aneurysm, intramural hematoma, or dissection of the aorta. Left internal mammary artery and three saphenous vein coronary artery bypass grafts patent where visualized. Great vessels patent without significant   stenosis. Celiac, superior and inferior mesenteric arteries patent without significant stenosis. Severe proximal splenic artery stenosis. Moderate right and severe left renal artery stenosis. Patent iliac and proximal lower extremity arteries without   significant stenosis. Pulmonary arteries are suboptimally opacified for detection of pulmonary embolism.    LUNGS AND PLEURA: Emphysema. Left lower lobe atelectasis. Tiny right upper lobe calcified granuloma. No consolidation, pleural effusion, or pneumothorax.    MEDIASTINUM/AXILLAE: Postoperative anterior mediastinal fat stranding. No fluid collection or pneumomediastinum. No lymphadenopathy.     CORONARY ARTERY CALCIFICATION: Previous intervention (CABG).    HEPATOBILIARY: Distended gallbladder without wall thickening or adjacent inflammatory change. No calcified stones. No biliary duct dilatation. Smooth liver contour.    PANCREAS: Normal.    SPLEEN: Normal.    ADRENAL GLANDS: Normal.    KIDNEYS/BLADDER: Atrophic kidneys. No hydronephrosis. Urinary bladder within normal limits.    BOWEL: Normal appendix. Diverticulosis of the colon. No acute inflammatory change. No obstruction.     LYMPH NODES: Normal.    PELVIC ORGANS: Prostatomegaly.    MUSCULOSKELETAL: Well-approximated sternotomy not yet healed with intact wires. Degenerative change of the spine, shoulders, and hips.      Impression    IMPRESSION:  1.  No evidence of acute aortic syndrome.  2.   Postoperative changes of recent coronary artery bypass grafting. No mediastinal fluid collection.  3.  No acute findings in the abdomen or pelvis.       XR Chest Port 1 View    Narrative    EXAM: XR CHEST PORT 1 VIEW  LOCATION: [facility]  DATE: 4/8/2025    INDICATION: Sob  COMPARISON: CT 4/7/2025      Impression    IMPRESSION: Median sternotomy wires. Elevation of the left hemidiaphragm. Left basilar atelectasis. Stable cardiomediastinal silhouette.   US Abdomen Limited    Narrative    EXAM: US ABDOMEN LIMITED  LOCATION: [facility]  DATE: 4/10/2025    INDICATION: RUQ pain and distended GB on recent CT. mild elevated alk phos, bili. assess GB and ducts  COMPARISON: 4/7/2025.  TECHNIQUE: Limited abdominal ultrasound.    FINDINGS:    GALLBLADDER: The gallbladder is distended and contains sludge. No sonographic Gomez sign, pericholecystic fluid, or gallbladder wall thickening.    BILE DUCTS: No biliary dilatation. The common duct measures 6 mm.    LIVER: Normal parenchyma with smooth contour. No focal mass. The portal vein is patent with flow in the normal direction.    RIGHT KIDNEY: No hydronephrosis.    PANCREAS: The pancreas is largely obscured by overlying gas.    No ascites.      Impression    IMPRESSION:  1.  The gallbladder is distended and contains sludge. No sonographic Gomez sign, pericholecystic fluid, or gallbladder wall thickening. Findings are equivocal for acute cholecystitis. Consider nuclear medicine hepatobiliary imaging.  2.  No bile duct dilatation.     NM HepatOBiliary Scan    Narrative    EXAM: NM HEPATOBILIARY SCAN  LOCATION: [facility]  DATE: 4/12/2025    INDICATION: Right upper quadrant pain and distended gallbladder with gallbladder sludge.  COMPARISON: Ultrasound 4/10/2025 and CT 4/7/2025  TECHNIQUE: 8.8 mCi of Tc-99m mebrofenin, IV. Anterior planar imaging of the abdomen.     FINDINGS: Homogeneous uptake of  radiopharmaceutical throughout the liver. Activity identified in the common bile duct at 13 minutes. No activity identified within the gallbladder at 13 minutes. The patient refused to complete further imaging up to   protocol of 60 minutes, due to pain/discomfort.      Impression    IMPRESSION:     Patient refused to complete the exam due to pain/discomfort. Incomplete imaging performed up to 13 minutes demonstrating activity and common bile duct.    Repeat imaging could be performed at 2-3 hours to evaluate for gallbladder activity, if the patient is able. An addended report could be made.   Echocardiogram Limited     Value    LVEF  20-25% (severely reduced)    [facility]    [id number]  [id number]  [id number]  [id number]^ARYAN[address] [address]     Name: AMARILYS SCANLON  MRN: [id number]  : 1950  Study Date: 2025 01:31 PM  Age: 74 yrs  Gender: Male  Patient Location: [facility]  Reason For Study: SOB  Ordering Physician: TIARA BAEZA  Performed By: ROLY     BSA: 2.0 m2  Height: 68 in  Weight: 190 lb  HR: 88  BP: 170/80 mmHg  ______________________________________________________________________________  Procedure  Limited Echocardiogram with two-dimensional, color and spectral Doppler.  Definity (NDC #62917-935) given intravenously.  ______________________________________________________________________________  Interpretation Summary     Left ventricular function is decreased. The ejection fraction is 20-25%  (severely reduced).  There is global hypokinesis with severe anterior, septal, and apical wall  hypokinesis.  Compared to the prior study of 3/25/25 there has been an interval decline in  LV function.  ______________________________________________________________________________  Left Ventricle  Left ventricular function is decreased. The ejection fraction is 20-25%  (severely reduced). There is global hypokinesis with severe anterior,  septal,  and apical wall hypokinesis.     Mitral Valve  There is mild mitral annular calcification.     ______________________________________________________________________________  MMode/2D Measurements & Calculations  EF Biplane: 24.4 %  TAPSE: 1.9 cm     Time Measurements  MM HR: 80.0 BPM     ______________________________________________________________________________  Report approved by: Omega Sumner MD on 04/08/2025 02:39 PM             Discharge Medications   Current Discharge Medication List        START taking these medications    Details   apixaban ANTICOAGULANT (ELIQUIS) 5 MG tablet Take 2 tablets (10 mg) by mouth 2 times daily for 3 days, THEN 1 tablet (5 mg) 2 times daily for 28 days.  Qty: 68 tablet, Refills: 0    Associated Diagnoses: Status post coronary angiogram      cloNIDine (CATAPRES-TTS1) 0.1 MG/24HR WK patch Place 1 patch over 168 hours onto the skin once a week.  Qty: 4 patch, Refills: 0    Associated Diagnoses: Status post coronary angiogram      isosorbide mononitrate (IMDUR) 30 MG 24 hr tablet Take 1 tablet (30 mg) by mouth daily.  Qty: 30 tablet, Refills: 0    Associated Diagnoses: Status post coronary angiogram      oxyCODONE (ROXICODONE) 5 MG tablet Take 1 tablet (5 mg) by mouth every 4 hours as needed for severe pain (IF pain not managed with non-pharmacological and non-opioid interventions).  Qty: 12 tablet, Refills: 0    Associated Diagnoses: Status post coronary angiogram      pantoprazole (PROTONIX) 40 MG EC tablet Take 1 tablet (40 mg) by mouth 2 times daily (before meals) for 14 days.  Qty: 28 tablet, Refills: 0    Associated Diagnoses: Status post coronary angiogram           CONTINUE these medications which have CHANGED    Details   aspirin (ASA) 81 MG chewable tablet Take 1 tablet (81 mg) by mouth daily. Continue for one year postop, then when stopping plavix, increase aspirin to 162 mg daily indefinitely.    Comments: Follow up with cardiology to determine if this needs  to be continued  Associated Diagnoses: Stage 3b chronic kidney disease (H); Coronary artery disease of native artery of native heart with stable angina pectoris; S/P CABG (coronary artery bypass graft); Type 2 diabetes mellitus with ketoacidosis without coma, with long-term current use of insulin (H); Coronary artery disease involving native coronary artery of native heart, unspecified whether angina present; Secondary cardiomyopathy (H)      carvedilol (COREG) 25 MG tablet Take 1 tablet (25 mg) by mouth 2 times daily (with meals).  Qty: 60 tablet, Refills: 0    Associated Diagnoses: Status post coronary angiogram      insulin glargine (LANTUS PEN) 100 UNIT/ML pen Inject 25 Units subcutaneously every morning.    Comments: If Lantus is not covered by insurance, may substitute Basaglar or Semglee or other insulin glargine product per insurance preference at same dose and frequency.    Associated Diagnoses: Status post coronary angiogram           CONTINUE these medications which have NOT CHANGED    Details   acetaminophen (TYLENOL) 500 MG tablet Take 1,000 mg by mouth 3 times daily. While awake      clopidogrel (PLAVIX) 75 MG tablet Take 1 tablet (75 mg) by mouth daily. Continue for one year postop, then stop and increase aspirin to 162 mg daily indefinitely.    Associated Diagnoses: Coronary artery disease of native artery of native heart with stable angina pectoris; S/P CABG (coronary artery bypass graft); Type 2 diabetes mellitus with ketoacidosis without coma, with long-term current use of insulin (H); Stage 3b chronic kidney disease (H); Coronary artery disease involving native coronary artery of native heart, unspecified whether angina present; Secondary cardiomyopathy (H)      FLUoxetine 20 MG tablet Take 20 mg by mouth daily.      furosemide (LASIX) 20 MG tablet Take 2 tablets (40 mg) by mouth 2 times daily for 10 days.  Qty: 40 tablet, Refills: 0      !! insulin aspart (NOVOLOG PEN) 100 UNIT/ML pen Inject  1-10 Units subcutaneously 3 times daily (before meals). Correction Scale - HIGH INSULIN RESISTANCE DOSING   Do Not give Correction Insulin if Pre-Meal BG less than 140.   For Pre-Meal  - 164 give 1 unit.   For Pre-Meal  - 189 give 2 units.   For Pre-Meal  - 214 give 3 units.   For Pre-Meal  - 239 give 4 units.   For Pre-Meal  - 264 give 5 units.   For Pre-Meal  - 289 give 6 units.   For Pre-Meal  - 314 give 7 units.   For Pre-Meal  - 339 give 8 units.   For Pre-Meal  - 364 give 9 units.  For Pre-Meal BG greater than or equal to 365 give 10 units  To be given with prandial insulin, and based on pre-meal blood glucose. Administering insulin within 5 minutes of the start of the meal is ideal. Administer insulin no more than 30 minutes after the start of the meal, unless directed otherwise by provider.   Notify provider if glucose greater than or equal to 350 mg/dL after administration of correction dose.    Associated Diagnoses: Coronary artery disease of native artery of native heart with stable angina pectoris; Type 2 diabetes mellitus with ketoacidosis without coma, with long-term current use of insulin (H); Stage 3b chronic kidney disease (H); Coronary artery disease involving native coronary artery of native heart, unspecified whether angina present      !! insulin aspart (NOVOLOG PEN) 100 UNIT/ML pen Inject 1-7 Units subcutaneously at bedtime. HIGH INSULIN RESISTANCE DOSING   Do Not give Bedtime Correction Insulin if BG less than 200.   For  - 224 give 1 units.   For  - 249 give 2 units.   For  - 274 give 3 units.   For  - 299 give 4 units.   For  - 324 give 5 units.   For  - 349 give 6 units.   For BG greater than or equal to 350 give 7 units.   Notify provider if glucose greater than or equal to 350 mg/dL after administration of correction dose.    Associated Diagnoses: Coronary artery disease of native artery of native  heart with stable angina pectoris; Type 2 diabetes mellitus with ketoacidosis without coma, with long-term current use of insulin (H); Stage 3b chronic kidney disease (H); Coronary artery disease involving native coronary artery of native heart, unspecified whether angina present      Lidocaine (LIDOCARE) 4 % Patch Place 1-2 patches over 12 hours onto the skin every 24 hours. To prevent lidocaine toxicity, patient should be patch free for 12 hrs daily.    Associated Diagnoses: Coronary artery disease of native artery of native heart with stable angina pectoris; S/P CABG (coronary artery bypass graft); Type 2 diabetes mellitus with ketoacidosis without coma, with long-term current use of insulin (H); Stage 3b chronic kidney disease (H); Coronary artery disease involving native coronary artery of native heart, unspecified whether angina present      polyethylene glycol (MIRALAX) 17 GM/Dose powder Take 17 g by mouth daily.    Associated Diagnoses: Coronary artery disease of native artery of native heart with stable angina pectoris; S/P CABG (coronary artery bypass graft); Type 2 diabetes mellitus with ketoacidosis without coma, with long-term current use of insulin (H); Stage 3b chronic kidney disease (H); Coronary artery disease involving native coronary artery of native heart, unspecified whether angina present      simvastatin (ZOCOR) 40 MG tablet Take 40 mg by mouth daily      traZODone (DESYREL) 50 MG tablet Take 50 mg by mouth at bedtime.      senna-docusate (SENOKOT-S/PERICOLACE) 8.6-50 MG tablet Take 1 tablet by mouth 2 times daily.       !! - Potential duplicate medications found. Please discuss with provider.        STOP taking these medications       cyanocobalamin (VITAMIN B-12) 500 MCG SUBL sublingual tablet Comments:   Reason for Stopping:         losartan (COZAAR) 25 MG tablet Comments:   Reason for Stopping:             Allergies   Allergies   Allergen Reactions    Lisinopril Cough    Propoxyphene  Unknown and Hives

## 2025-04-14 NOTE — PLAN OF CARE
Problem: Adult Inpatient Plan of Care  Goal: Readiness for Transition of Care  Outcome: Progressing     Problem: Delirium  Goal: Improved Behavioral Control  Outcome: Progressing   Goal Outcome Evaluation:       A&Ox3. Denies pain. Had a very good shift. Took pt outside in a wheelchair to watch the sunset, which he enjoyed. Pt had appropriate boundaries and was respectful. Up twice to use urinal. VSS.

## 2025-04-15 DIAGNOSIS — Z09 HOSPITAL DISCHARGE FOLLOW-UP: ICD-10-CM

## 2025-04-15 NOTE — PLAN OF CARE
Cardiac Rehab Discharge Summary    Reason for therapy discharge:    Discharged to home.with homecare    Progress towards therapy goal(s). See goals on Care Plan in [facility] electronic health record for goal details.  Goals partially met.  Barriers to achieving goals:   discharge from facility.    Therapy recommendation(s):    Continued therapy is recommended.  Rationale/Recommendations:  Pt. refusing TCU, needs further therapy for functional endurance, cardiac monitoring /ed and to assess safety needs.

## 2025-04-17 ENCOUNTER — TRANSFERRED RECORDS (OUTPATIENT)
Dept: HEALTH INFORMATION MANAGEMENT | Facility: CLINIC | Age: 75
End: 2025-04-17
Payer: COMMERCIAL

## 2025-04-27 ENCOUNTER — APPOINTMENT (OUTPATIENT)
Dept: RADIOLOGY | Facility: HOSPITAL | Age: 75
End: 2025-04-27
Attending: EMERGENCY MEDICINE
Payer: COMMERCIAL

## 2025-04-27 ENCOUNTER — HOSPITAL ENCOUNTER (INPATIENT)
Facility: HOSPITAL | Age: 75
End: 2025-04-27
Attending: EMERGENCY MEDICINE | Admitting: INTERNAL MEDICINE
Payer: COMMERCIAL

## 2025-04-27 DIAGNOSIS — N17.9 ACUTE KIDNEY INJURY: ICD-10-CM

## 2025-04-27 DIAGNOSIS — I10 ESSENTIAL HYPERTENSION: ICD-10-CM

## 2025-04-27 DIAGNOSIS — R11.2 NAUSEA VOMITING AND DIARRHEA: ICD-10-CM

## 2025-04-27 DIAGNOSIS — E87.20 METABOLIC ACIDOSIS: ICD-10-CM

## 2025-04-27 DIAGNOSIS — R32 DERMATITIS ASSOCIATED WITH INCONTINENCE: ICD-10-CM

## 2025-04-27 DIAGNOSIS — R42 LIGHTHEADEDNESS: ICD-10-CM

## 2025-04-27 DIAGNOSIS — I25.10 CORONARY ARTERY DISEASE INVOLVING NATIVE CORONARY ARTERY OF NATIVE HEART, UNSPECIFIED WHETHER ANGINA PRESENT: ICD-10-CM

## 2025-04-27 DIAGNOSIS — Z51.5 HOSPICE CARE PATIENT: ICD-10-CM

## 2025-04-27 DIAGNOSIS — R19.7 NAUSEA VOMITING AND DIARRHEA: ICD-10-CM

## 2025-04-27 DIAGNOSIS — R73.9 HYPERGLYCEMIA: Primary | ICD-10-CM

## 2025-04-27 DIAGNOSIS — E86.0 DEHYDRATION: ICD-10-CM

## 2025-04-27 DIAGNOSIS — L25.8 DERMATITIS ASSOCIATED WITH INCONTINENCE: ICD-10-CM

## 2025-04-27 DIAGNOSIS — R19.7 DIARRHEA OF PRESUMED INFECTIOUS ORIGIN: ICD-10-CM

## 2025-04-27 LAB
ALBUMIN SERPL BCG-MCNC: 4.3 G/DL (ref 3.5–5.2)
ALBUMIN UR-MCNC: 10 MG/DL
ALP SERPL-CCNC: 137 U/L (ref 40–150)
ALT SERPL W P-5'-P-CCNC: 16 U/L (ref 0–70)
ANION GAP SERPL CALCULATED.3IONS-SCNC: 25 MMOL/L (ref 7–15)
APPEARANCE UR: CLEAR
AST SERPL W P-5'-P-CCNC: 16 U/L (ref 0–45)
BACTERIA #/AREA URNS HPF: ABNORMAL /HPF
BASE EXCESS BLDV CALC-SCNC: -2.7 MMOL/L (ref -3–3)
BASOPHILS # BLD AUTO: 0.1 10E3/UL (ref 0–0.2)
BASOPHILS NFR BLD AUTO: 1 %
BILIRUB SERPL-MCNC: 1 MG/DL
BILIRUB UR QL STRIP: NEGATIVE
BUN SERPL-MCNC: 46.5 MG/DL (ref 8–23)
CALCIUM SERPL-MCNC: 10.3 MG/DL (ref 8.8–10.4)
CHLORIDE SERPL-SCNC: 93 MMOL/L (ref 98–107)
COLOR UR AUTO: ABNORMAL
CREAT SERPL-MCNC: 1.68 MG/DL (ref 0.67–1.17)
EGFRCR SERPLBLD CKD-EPI 2021: 42 ML/MIN/1.73M2
EOSINOPHIL # BLD AUTO: 0 10E3/UL (ref 0–0.7)
EOSINOPHIL NFR BLD AUTO: 0 %
ERYTHROCYTE [DISTWIDTH] IN BLOOD BY AUTOMATED COUNT: 13.2 % (ref 10–15)
FLUAV RNA SPEC QL NAA+PROBE: NEGATIVE
FLUBV RNA RESP QL NAA+PROBE: NEGATIVE
GLUCOSE SERPL-MCNC: 309 MG/DL (ref 70–99)
GLUCOSE UR STRIP-MCNC: 500 MG/DL
HCO3 BLDV-SCNC: 19 MMOL/L (ref 21–28)
HCO3 SERPL-SCNC: 17 MMOL/L (ref 22–29)
HCT VFR BLD AUTO: 36.6 % (ref 40–53)
HGB BLD-MCNC: 13.1 G/DL (ref 13.3–17.7)
HGB UR QL STRIP: NEGATIVE
HYALINE CASTS: 4 /LPF
IMM GRANULOCYTES # BLD: 0 10E3/UL
IMM GRANULOCYTES NFR BLD: 0 %
INR PPP: 1.29 (ref 0.85–1.15)
KETONES UR STRIP-MCNC: 20 MG/DL
LACTATE SERPL-SCNC: 1.3 MMOL/L (ref 0.7–2)
LACTATE SERPL-SCNC: 2.9 MMOL/L (ref 0.7–2)
LEUKOCYTE ESTERASE UR QL STRIP: NEGATIVE
LYMPHOCYTES # BLD AUTO: 1.2 10E3/UL (ref 0.8–5.3)
LYMPHOCYTES NFR BLD AUTO: 11 %
MAGNESIUM SERPL-MCNC: 2.1 MG/DL (ref 1.7–2.3)
MCH RBC QN AUTO: 32.1 PG (ref 26.5–33)
MCHC RBC AUTO-ENTMCNC: 35.8 G/DL (ref 31.5–36.5)
MCV RBC AUTO: 90 FL (ref 78–100)
MONOCYTES # BLD AUTO: 0.7 10E3/UL (ref 0–1.3)
MONOCYTES NFR BLD AUTO: 6 %
NEUTROPHILS # BLD AUTO: 8.8 10E3/UL (ref 1.6–8.3)
NEUTROPHILS NFR BLD AUTO: 82 %
NITRATE UR QL: NEGATIVE
NRBC # BLD AUTO: 0 10E3/UL
NRBC BLD AUTO-RTO: 0 /100
NT-PROBNP SERPL-MCNC: 7785 PG/ML (ref 0–900)
O2/TOTAL GAS SETTING VFR VENT: 21 %
OXYHGB MFR BLDV: 70 % (ref 70–75)
PCO2 BLDV: 25 MM HG (ref 40–50)
PH BLDV: 7.48 [PH] (ref 7.32–7.43)
PH UR STRIP: 5 [PH] (ref 5–7)
PLATELET # BLD AUTO: 268 10E3/UL (ref 150–450)
PO2 BLDV: 42 MM HG (ref 25–47)
POTASSIUM SERPL-SCNC: 4.2 MMOL/L (ref 3.4–5.3)
PROT SERPL-MCNC: 8 G/DL (ref 6.4–8.3)
RBC # BLD AUTO: 4.08 10E6/UL (ref 4.4–5.9)
RBC URINE: 0 /HPF
RSV RNA SPEC NAA+PROBE: NEGATIVE
SAO2 % BLDV: 70.8 % (ref 70–75)
SARS-COV-2 RNA RESP QL NAA+PROBE: NEGATIVE
SODIUM SERPL-SCNC: 135 MMOL/L (ref 135–145)
SP GR UR STRIP: 1.01 (ref 1–1.03)
TROPONIN T SERPL HS-MCNC: 51 NG/L
TROPONIN T SERPL HS-MCNC: 59 NG/L
UROBILINOGEN UR STRIP-MCNC: NORMAL MG/DL
WBC # BLD AUTO: 10.7 10E3/UL (ref 4–11)
WBC URINE: 0 /HPF

## 2025-04-27 PROCEDURE — 85025 COMPLETE CBC W/AUTO DIFF WBC: CPT | Performed by: EMERGENCY MEDICINE

## 2025-04-27 PROCEDURE — 87637 SARSCOV2&INF A&B&RSV AMP PRB: CPT | Performed by: EMERGENCY MEDICINE

## 2025-04-27 PROCEDURE — 258N000003 HC RX IP 258 OP 636: Performed by: EMERGENCY MEDICINE

## 2025-04-27 PROCEDURE — 96375 TX/PRO/DX INJ NEW DRUG ADDON: CPT

## 2025-04-27 PROCEDURE — 71045 X-RAY EXAM CHEST 1 VIEW: CPT

## 2025-04-27 PROCEDURE — 84484 ASSAY OF TROPONIN QUANT: CPT | Performed by: EMERGENCY MEDICINE

## 2025-04-27 PROCEDURE — 83605 ASSAY OF LACTIC ACID: CPT | Performed by: EMERGENCY MEDICINE

## 2025-04-27 PROCEDURE — 96374 THER/PROPH/DIAG INJ IV PUSH: CPT

## 2025-04-27 PROCEDURE — 36415 COLL VENOUS BLD VENIPUNCTURE: CPT | Performed by: EMERGENCY MEDICINE

## 2025-04-27 PROCEDURE — 84460 ALANINE AMINO (ALT) (SGPT): CPT | Performed by: EMERGENCY MEDICINE

## 2025-04-27 PROCEDURE — 83735 ASSAY OF MAGNESIUM: CPT | Performed by: EMERGENCY MEDICINE

## 2025-04-27 PROCEDURE — 82805 BLOOD GASES W/O2 SATURATION: CPT | Performed by: EMERGENCY MEDICINE

## 2025-04-27 PROCEDURE — 81001 URINALYSIS AUTO W/SCOPE: CPT | Performed by: EMERGENCY MEDICINE

## 2025-04-27 PROCEDURE — 85610 PROTHROMBIN TIME: CPT | Performed by: EMERGENCY MEDICINE

## 2025-04-27 PROCEDURE — 250N000011 HC RX IP 250 OP 636: Performed by: EMERGENCY MEDICINE

## 2025-04-27 PROCEDURE — 83880 ASSAY OF NATRIURETIC PEPTIDE: CPT | Performed by: EMERGENCY MEDICINE

## 2025-04-27 PROCEDURE — 96361 HYDRATE IV INFUSION ADD-ON: CPT

## 2025-04-27 PROCEDURE — 93005 ELECTROCARDIOGRAM TRACING: CPT | Performed by: EMERGENCY MEDICINE

## 2025-04-27 PROCEDURE — 99291 CRITICAL CARE FIRST HOUR: CPT | Mod: 25

## 2025-04-27 RX ORDER — DIPHENHYDRAMINE HYDROCHLORIDE 50 MG/ML
12.5 INJECTION, SOLUTION INTRAMUSCULAR; INTRAVENOUS ONCE
Status: COMPLETED | OUTPATIENT
Start: 2025-04-27 | End: 2025-04-27

## 2025-04-27 RX ORDER — ONDANSETRON 2 MG/ML
4 INJECTION INTRAMUSCULAR; INTRAVENOUS EVERY 30 MIN PRN
Status: DISCONTINUED | OUTPATIENT
Start: 2025-04-27 | End: 2025-04-28

## 2025-04-27 RX ADMIN — DIPHENHYDRAMINE HYDROCHLORIDE 12.5 MG: 50 INJECTION, SOLUTION INTRAMUSCULAR; INTRAVENOUS at 22:46

## 2025-04-27 RX ADMIN — PROCHLORPERAZINE EDISYLATE 5 MG: 5 INJECTION INTRAMUSCULAR; INTRAVENOUS at 22:47

## 2025-04-27 RX ADMIN — ONDANSETRON 4 MG: 2 INJECTION, SOLUTION INTRAMUSCULAR; INTRAVENOUS at 21:24

## 2025-04-27 RX ADMIN — ONDANSETRON 4 MG: 2 INJECTION, SOLUTION INTRAMUSCULAR; INTRAVENOUS at 21:52

## 2025-04-27 RX ADMIN — SODIUM CHLORIDE 500 ML: 0.9 INJECTION, SOLUTION INTRAVENOUS at 21:23

## 2025-04-27 ASSESSMENT — ACTIVITIES OF DAILY LIVING (ADL)
ADLS_ACUITY_SCORE: 57

## 2025-04-28 PROBLEM — R42 LIGHTHEADEDNESS: Status: ACTIVE | Noted: 2025-04-28

## 2025-04-28 PROBLEM — R73.9 HYPERGLYCEMIA: Status: ACTIVE | Noted: 2025-04-28

## 2025-04-28 PROBLEM — R19.7 DIARRHEA OF PRESUMED INFECTIOUS ORIGIN: Status: ACTIVE | Noted: 2025-04-28

## 2025-04-28 PROBLEM — E87.20 METABOLIC ACIDOSIS: Status: ACTIVE | Noted: 2025-04-28

## 2025-04-28 PROBLEM — R07.89 CHEST PRESSURE: Status: ACTIVE | Noted: 2025-04-28

## 2025-04-28 PROBLEM — E86.0 DEHYDRATION: Status: ACTIVE | Noted: 2025-04-28

## 2025-04-28 PROBLEM — N17.9 ACUTE KIDNEY INJURY: Status: ACTIVE | Noted: 2025-04-28

## 2025-04-28 PROBLEM — R19.7 NAUSEA VOMITING AND DIARRHEA: Status: ACTIVE | Noted: 2025-04-07

## 2025-04-28 LAB
GLUCOSE BLDC GLUCOMTR-MCNC: 203 MG/DL (ref 70–99)
GLUCOSE BLDC GLUCOMTR-MCNC: 285 MG/DL (ref 70–99)
GLUCOSE BLDC GLUCOMTR-MCNC: 300 MG/DL (ref 70–99)
GLUCOSE BLDC GLUCOMTR-MCNC: 320 MG/DL (ref 70–99)
GLUCOSE BLDC GLUCOMTR-MCNC: 332 MG/DL (ref 70–99)
HOLD SPECIMEN: NORMAL
LIPASE SERPL-CCNC: 61 U/L (ref 13–60)
TROPONIN T SERPL HS-MCNC: 116 NG/L
TROPONIN T SERPL HS-MCNC: 121 NG/L
TROPONIN T SERPL HS-MCNC: 126 NG/L
TROPONIN T SERPL HS-MCNC: 97 NG/L

## 2025-04-28 PROCEDURE — 82962 GLUCOSE BLOOD TEST: CPT

## 2025-04-28 PROCEDURE — 250N000011 HC RX IP 250 OP 636: Performed by: INTERNAL MEDICINE

## 2025-04-28 PROCEDURE — 99207 PR NO BILLABLE SERVICE THIS VISIT: CPT | Performed by: INTERNAL MEDICINE

## 2025-04-28 PROCEDURE — 84484 ASSAY OF TROPONIN QUANT: CPT | Performed by: INTERNAL MEDICINE

## 2025-04-28 PROCEDURE — 250N000012 HC RX MED GY IP 250 OP 636 PS 637: Performed by: INTERNAL MEDICINE

## 2025-04-28 PROCEDURE — 36415 COLL VENOUS BLD VENIPUNCTURE: CPT | Performed by: INTERNAL MEDICINE

## 2025-04-28 PROCEDURE — G0378 HOSPITAL OBSERVATION PER HR: HCPCS

## 2025-04-28 PROCEDURE — 250N000013 HC RX MED GY IP 250 OP 250 PS 637: Performed by: INTERNAL MEDICINE

## 2025-04-28 PROCEDURE — 83690 ASSAY OF LIPASE: CPT | Performed by: INTERNAL MEDICINE

## 2025-04-28 PROCEDURE — 120N000004 HC R&B MS OVERFLOW

## 2025-04-28 PROCEDURE — 99223 1ST HOSP IP/OBS HIGH 75: CPT | Performed by: INTERNAL MEDICINE

## 2025-04-28 RX ORDER — HYDRALAZINE HYDROCHLORIDE 20 MG/ML
10 INJECTION INTRAMUSCULAR; INTRAVENOUS EVERY 8 HOURS PRN
Status: DISCONTINUED | OUTPATIENT
Start: 2025-04-28 | End: 2025-04-28

## 2025-04-28 RX ORDER — ONDANSETRON 2 MG/ML
4 INJECTION INTRAMUSCULAR; INTRAVENOUS EVERY 6 HOURS PRN
Status: DISCONTINUED | OUTPATIENT
Start: 2025-04-28 | End: 2025-05-05 | Stop reason: HOSPADM

## 2025-04-28 RX ORDER — PANTOPRAZOLE SODIUM 40 MG/1
40 TABLET, DELAYED RELEASE ORAL
Status: DISCONTINUED | OUTPATIENT
Start: 2025-04-28 | End: 2025-05-01

## 2025-04-28 RX ORDER — CLOPIDOGREL BISULFATE 75 MG/1
75 TABLET ORAL DAILY
Status: DISCONTINUED | OUTPATIENT
Start: 2025-04-28 | End: 2025-05-05 | Stop reason: HOSPADM

## 2025-04-28 RX ORDER — ACETAMINOPHEN 650 MG/1
650 SUPPOSITORY RECTAL EVERY 4 HOURS PRN
Status: DISCONTINUED | OUTPATIENT
Start: 2025-04-28 | End: 2025-05-05 | Stop reason: HOSPADM

## 2025-04-28 RX ORDER — ONDANSETRON 4 MG/1
4 TABLET, ORALLY DISINTEGRATING ORAL EVERY 6 HOURS PRN
Status: DISCONTINUED | OUTPATIENT
Start: 2025-04-28 | End: 2025-05-05 | Stop reason: HOSPADM

## 2025-04-28 RX ORDER — NALOXONE HYDROCHLORIDE 0.4 MG/ML
0.4 INJECTION, SOLUTION INTRAMUSCULAR; INTRAVENOUS; SUBCUTANEOUS
Status: DISCONTINUED | OUTPATIENT
Start: 2025-04-28 | End: 2025-05-02

## 2025-04-28 RX ORDER — NALOXONE HYDROCHLORIDE 0.4 MG/ML
0.2 INJECTION, SOLUTION INTRAMUSCULAR; INTRAVENOUS; SUBCUTANEOUS
Status: DISCONTINUED | OUTPATIENT
Start: 2025-04-28 | End: 2025-05-02

## 2025-04-28 RX ORDER — HYDRALAZINE HYDROCHLORIDE 25 MG/1
25 TABLET, FILM COATED ORAL EVERY 6 HOURS PRN
Status: DISCONTINUED | OUTPATIENT
Start: 2025-04-28 | End: 2025-04-28

## 2025-04-28 RX ORDER — ACETAMINOPHEN 325 MG/1
650 TABLET ORAL EVERY 4 HOURS PRN
Status: DISCONTINUED | OUTPATIENT
Start: 2025-04-28 | End: 2025-05-05 | Stop reason: HOSPADM

## 2025-04-28 RX ORDER — LIDOCAINE 40 MG/G
CREAM TOPICAL
Status: DISCONTINUED | OUTPATIENT
Start: 2025-04-28 | End: 2025-05-05 | Stop reason: HOSPADM

## 2025-04-28 RX ORDER — CARVEDILOL 12.5 MG/1
12.5 TABLET ORAL 2 TIMES DAILY WITH MEALS
Status: DISCONTINUED | OUTPATIENT
Start: 2025-04-28 | End: 2025-05-05 | Stop reason: HOSPADM

## 2025-04-28 RX ORDER — PROCHLORPERAZINE MALEATE 5 MG/1
5 TABLET ORAL EVERY 6 HOURS PRN
Status: DISCONTINUED | OUTPATIENT
Start: 2025-04-28 | End: 2025-05-05 | Stop reason: HOSPADM

## 2025-04-28 RX ORDER — OXYCODONE HYDROCHLORIDE 5 MG/1
5 TABLET ORAL EVERY 4 HOURS PRN
Status: DISCONTINUED | OUTPATIENT
Start: 2025-04-28 | End: 2025-05-05 | Stop reason: HOSPADM

## 2025-04-28 RX ORDER — PROCHLORPERAZINE 25 MG
12.5 SUPPOSITORY, RECTAL RECTAL EVERY 12 HOURS PRN
Status: DISCONTINUED | OUTPATIENT
Start: 2025-04-28 | End: 2025-05-05 | Stop reason: HOSPADM

## 2025-04-28 RX ORDER — AMOXICILLIN 250 MG
1 CAPSULE ORAL 2 TIMES DAILY PRN
Status: DISCONTINUED | OUTPATIENT
Start: 2025-04-28 | End: 2025-05-02

## 2025-04-28 RX ORDER — ISOSORBIDE MONONITRATE 30 MG/1
30 TABLET, EXTENDED RELEASE ORAL EVERY EVENING
Status: DISCONTINUED | OUTPATIENT
Start: 2025-04-28 | End: 2025-05-05 | Stop reason: HOSPADM

## 2025-04-28 RX ORDER — SIMVASTATIN 10 MG
40 TABLET ORAL EVERY EVENING
Status: DISCONTINUED | OUTPATIENT
Start: 2025-04-28 | End: 2025-05-05 | Stop reason: HOSPADM

## 2025-04-28 RX ORDER — ASPIRIN 81 MG/1
81 TABLET ORAL DAILY
Status: DISCONTINUED | OUTPATIENT
Start: 2025-04-28 | End: 2025-04-30

## 2025-04-28 RX ORDER — CALCIUM CARBONATE 500 MG/1
1000 TABLET, CHEWABLE ORAL 4 TIMES DAILY PRN
Status: DISCONTINUED | OUTPATIENT
Start: 2025-04-28 | End: 2025-05-05 | Stop reason: HOSPADM

## 2025-04-28 RX ORDER — NICOTINE POLACRILEX 4 MG
15-30 LOZENGE BUCCAL
Status: DISCONTINUED | OUTPATIENT
Start: 2025-04-28 | End: 2025-05-05 | Stop reason: HOSPADM

## 2025-04-28 RX ORDER — ASPIRIN 81 MG/1
81 TABLET ORAL DAILY
Status: ON HOLD | COMMUNITY
End: 2025-05-04

## 2025-04-28 RX ORDER — ISOSORBIDE MONONITRATE 30 MG/1
30 TABLET, EXTENDED RELEASE ORAL DAILY
COMMUNITY

## 2025-04-28 RX ORDER — AMOXICILLIN 250 MG
2 CAPSULE ORAL 2 TIMES DAILY PRN
Status: DISCONTINUED | OUTPATIENT
Start: 2025-04-28 | End: 2025-05-02

## 2025-04-28 RX ORDER — LORAZEPAM 2 MG/ML
0.5 INJECTION INTRAMUSCULAR EVERY 6 HOURS PRN
Status: DISCONTINUED | OUTPATIENT
Start: 2025-04-28 | End: 2025-05-02

## 2025-04-28 RX ORDER — DEXTROSE MONOHYDRATE 25 G/50ML
25-50 INJECTION, SOLUTION INTRAVENOUS
Status: DISCONTINUED | OUTPATIENT
Start: 2025-04-28 | End: 2025-05-05 | Stop reason: HOSPADM

## 2025-04-28 RX ORDER — HYDRALAZINE HYDROCHLORIDE 20 MG/ML
10 INJECTION INTRAMUSCULAR; INTRAVENOUS EVERY 4 HOURS PRN
Status: DISCONTINUED | OUTPATIENT
Start: 2025-04-28 | End: 2025-05-05 | Stop reason: HOSPADM

## 2025-04-28 RX ORDER — TRAZODONE HYDROCHLORIDE 50 MG/1
50 TABLET ORAL
Status: DISCONTINUED | OUTPATIENT
Start: 2025-04-28 | End: 2025-05-05 | Stop reason: HOSPADM

## 2025-04-28 RX ADMIN — INSULIN GLARGINE 20 UNITS: 100 INJECTION, SOLUTION SUBCUTANEOUS at 10:58

## 2025-04-28 RX ADMIN — ONDANSETRON 4 MG: 2 INJECTION, SOLUTION INTRAMUSCULAR; INTRAVENOUS at 04:06

## 2025-04-28 RX ADMIN — CARVEDILOL 12.5 MG: 12.5 TABLET, FILM COATED ORAL at 10:54

## 2025-04-28 RX ADMIN — ISOSORBIDE MONONITRATE 30 MG: 30 TABLET, EXTENDED RELEASE ORAL at 21:23

## 2025-04-28 RX ADMIN — INSULIN ASPART 5 UNITS: 100 INJECTION, SOLUTION INTRAVENOUS; SUBCUTANEOUS at 02:10

## 2025-04-28 RX ADMIN — PROCHLORPERAZINE EDISYLATE 5 MG: 5 INJECTION INTRAMUSCULAR; INTRAVENOUS at 01:24

## 2025-04-28 RX ADMIN — PANTOPRAZOLE SODIUM 40 MG: 40 TABLET, DELAYED RELEASE ORAL at 16:26

## 2025-04-28 RX ADMIN — LORAZEPAM 0.5 MG: 2 INJECTION INTRAMUSCULAR; INTRAVENOUS at 01:23

## 2025-04-28 RX ADMIN — HYDRALAZINE HYDROCHLORIDE 10 MG: 20 INJECTION INTRAMUSCULAR; INTRAVENOUS at 01:25

## 2025-04-28 RX ADMIN — FLUOXETINE HYDROCHLORIDE 20 MG: 20 CAPSULE ORAL at 10:54

## 2025-04-28 RX ADMIN — CLOPIDOGREL 75 MG: 75 TABLET ORAL at 10:54

## 2025-04-28 RX ADMIN — SIMVASTATIN 40 MG: 10 TABLET, FILM COATED ORAL at 21:23

## 2025-04-28 RX ADMIN — ONDANSETRON 4 MG: 2 INJECTION, SOLUTION INTRAMUSCULAR; INTRAVENOUS at 18:28

## 2025-04-28 RX ADMIN — ASPIRIN 81 MG: 81 TABLET, COATED ORAL at 10:54

## 2025-04-28 RX ADMIN — APIXABAN 5 MG: 5 TABLET, FILM COATED ORAL at 10:54

## 2025-04-28 RX ADMIN — CARVEDILOL 12.5 MG: 12.5 TABLET, FILM COATED ORAL at 18:28

## 2025-04-28 RX ADMIN — APIXABAN 5 MG: 5 TABLET, FILM COATED ORAL at 21:23

## 2025-04-28 ASSESSMENT — ACTIVITIES OF DAILY LIVING (ADL)
ADLS_ACUITY_SCORE: 57
ADLS_ACUITY_SCORE: 57
ADLS_ACUITY_SCORE: 59
ADLS_ACUITY_SCORE: 54
ADLS_ACUITY_SCORE: 59
ADLS_ACUITY_SCORE: 54
ADLS_ACUITY_SCORE: 59
ADLS_ACUITY_SCORE: 54
DEPENDENT_IADLS:: CLEANING;COOKING;LAUNDRY;SHOPPING;MEAL PREPARATION;MEDICATION MANAGEMENT;MONEY MANAGEMENT;TRANSPORTATION
ADLS_ACUITY_SCORE: 57
ADLS_ACUITY_SCORE: 54
ADLS_ACUITY_SCORE: 59
ADLS_ACUITY_SCORE: 57
ADLS_ACUITY_SCORE: 59
ADLS_ACUITY_SCORE: 57
ADLS_ACUITY_SCORE: 54
ADLS_ACUITY_SCORE: 59
ADLS_ACUITY_SCORE: 57
ADLS_ACUITY_SCORE: 54
ADLS_ACUITY_SCORE: 57
ADLS_ACUITY_SCORE: 59
ADLS_ACUITY_SCORE: 59

## 2025-04-28 NOTE — PROGRESS NOTES
[facility]    Medicine Progress Note - Hospitalist Service    Date of Admission:  4/27/2025    Assessment & Plan   74 year old male with history of coronary artery disease s/p CABG 03/17/25, HFrEF, DM2, CKD, mood disorder, recent pulmonary embolus, starvation ketosis and known cognitive impairment presents 4/27 with nausea, vomiting and diarrhea.       Goals of care:  -- will consult palliative care to clarify goals of care since family states he is not safe to return home, patient tells me he is interested in hospice but then tells care manager he wants home care  -- prior discharge summary notes he was discharged home on hospice but care manager tells me that never happened and family does confirm that  -- family notes consistent medication noncompliance and concerns for safety at home  -- family also concerned with decision making capacity and requests psychiatry consultation  -- will also have PT/OT assess   -- patient and family do confirm DNR/DNI status so code status is now changed to DNR      N/V/D:  -- infectious stool studies pending  -- continue supportive cares for suspected viral gastroenteritis      CAD  Troponin elevation:  Likely related to demand ischemia and trop has started to plateau  Denies chest pain  Recent CABG noted but also unclear if patient is compliant with home medications  -- continue home asa, plavix, statin, imdur and coreg  -- stop tele since patient is DNR  -- recheck trop x 1 and if not decreasing will consult cardiology      DM2:   -- continue home lantus  -- start high intensity sliding scale insulin and add mealtime coverage at 1:10 ratio      CKD 3: GFR stable from previous  -- trend      Recent PE: continue DOAC      Mood disorder: continue home prozac      GERD: continue home PPI         Diet: Moderate Consistent Carb (60 g CHO per Meal) Diet    DVT Prophylaxis: DOAC  R[place]jas Catheter: Not present  Lines: None     Cardiac Monitoring: None  Code Status:  "No CPR- Do NOT Intubate      Clinically Significant Risk Factors Present on Admission          # Hypochloremia: Lowest Cl = 93 mmol/L in last 2 days, will monitor as appropriate     # Anion Gap Metabolic Acidosis: Highest Anion Gap = 25 mmol/L in last 2 days, will monitor and treat as appropriate   # Drug Induced Coagulation Defect: home medication list includes an anticoagulant medication  # Drug Induced Platelet Defect: home medication list includes an antiplatelet medication   # Hypertension: Noted on problem list  # Chronic heart failure with reduced ejection fraction: last echo with EF <40%         # DMII: A1C = 7.2 % (Ref range: <5.7 %) within past 6 months   # Overweight: Estimated body mass index is 26.46 kg/m  as calculated from the following:    Height as of this encounter: 1.727 m (5' 8\").    Weight as of this encounter: 78.9 kg (174 lb).       # Financial/Environmental Concerns: none   # History of CABG: noted on surgical history       Disposition Plan   Medically Ready for Discharge: Anticipated in 2-4 Days      Ru King DO  Hospitalist Service  [facility]  Securely message with [employer] (more info)  Text page via [email or web address] Paging/Directory   ______________________________________________________________________    Interval History   NAD. States he doesn't ever want to be hospitalized again and is interested in dying at home.     Physical Exam   Vital Signs: Temp: 97.3  F (36.3  C) Temp src: Oral BP: 121/58 Pulse: 97   Resp: 17 SpO2: 95 % O2 Device: None (Room air)    Weight: 174 lbs 0 oz  General: NAD  RESPIRATORY: Breathing nonlabored  CARDIOVASCULAR: No le edema bilat.   ABDOMEN: soft and non-tender  NEUROLOGIC: Alert, speech clear         >50 MINUTES SPENT BY ME on the date of service doing chart review, history, exam, documentation & further activities per the note.  Data       "

## 2025-04-28 NOTE — PLAN OF CARE
Problem: Adult Inpatient Plan of Care  Goal: Plan of Care Review  Outcome: Progressing    Problem: Nausea and Vomiting  Goal: Nausea and Vomiting Relief  Outcome: Progressing    Problem: Glycemic Control Impaired  Goal: Blood Glucose Level Within Targeted Range  Outcome: Progressing     Problem: Hypertension Acute  Goal: Blood Pressure Within Desired Range  Outcome: Progressing    Pt is alert and oriented. A1 to commode. Uses urinal at bedside. Had some nausea early this morning, has currently resolved. BS: 275 and 332. This morning pt would not allow BS check. Pt has a continuous monitoring system and showed us the result on his phone. The reading of 203 in pt's chart this am in incorrect. There was a mix up with pt's. Please disregard that number. BP: 171/88 and 121/58. Pt has a palliative care consult. Is on Enteric precautions to r/o c-diff. Pt has not had a BM this shift. Tele discontinued. Trops trending up. See flowsheet. Dr. King updated.

## 2025-04-28 NOTE — PROGRESS NOTES
Pt to transfer to P1 Room 112. Report called and given to Julia PURDY RN. Insulin pens: Novolog and Lantus sent up with pt.     Pt is now going to transfer to P3 Room 316. Report has now been called and given to NEFTALI Murillo. Insulin pens have been sent with pt.

## 2025-04-28 NOTE — PLAN OF CARE
Goal Outcome Evaluation:      Plan of Care Reviewed With: patient, family          Outcome Evaluation: TBD

## 2025-04-28 NOTE — CONSULTS
Care Management Initial Consult    General Information  Assessment completed with: Patient, patient and SENG- Gege  Type of CM/SW Visit: Initial Assessment    Primary Care Provider verified and updated as needed: Yes   Readmission within the last 30 days: previous discharge plan unsuccessful   Return Category: Medically unsuccessful treatment plan  Reason for Consult: discharge planning, end of life/hospice  Advance Care Planning: Advance Care Planning Reviewed: present on chart          Communication Assessment  Patient's communication style: spoken language (English or Bilingual)             Cognitive  Cognitive/Neuro/Behavioral: WDL  Level of Consciousness: lethargic        Mood/Behavior: agitated          Living Environment:   People in home: alone, other (see comments) (friend/care giver lives in basement)     Current living Arrangements: house      Able to return to prior arrangements: yes  Living Arrangement Comments: Lives in own home.  Has caregiver who lives downstairs - but care giver works full time out side of home.    Family/Social Support:  Care provided by: self, other (see comments) (live in - friend/ caregiver)  Provides care for: no one, unable/limited ability to care for self  Marital Status:   Support system: Friend, Children          Description of Support System: Involved, Supportive    Support Assessment: Adequate family and caregiver support    Current Resources:   Patient receiving home care services: Yes  Skilled Home Care Services: Skilled Nursing, Physical Therapy     Community Resources: Home Care  Equipment currently used at home: walker, standard, shower chair, grab bar, toilet, grab bar, tub/shower  Supplies currently used at home: Diabetic Supplies, Incontinence Supplies    Employment/Financial:  Employment Status: retired        Financial Concerns: none   Referral to Financial Worker: No       Does the patient's insurance plan have a 3 day qualifying hospital stay waiver?   No    Lifestyle & Psychosocial Needs:  Social Drivers of Health     Food Insecurity: Low Risk  (4/10/2025)    Food Insecurity     Within the past 12 months, did you worry that your food would run out before you got money to buy more?: No     Within the past 12 months, did the food you bought just not last and you didn t have money to get more?: No   Depression: Not at risk (1/13/2025)    PHQ-2     PHQ-2 Score: 0   Housing Stability: Low Risk  (4/10/2025)    Housing Stability     Do you have housing? : Yes     Are you worried about losing your housing?: No   Tobacco Use: Medium Risk (4/28/2025)    Patient History     Smoking Tobacco Use: Former     Smokeless Tobacco Use: Never     Passive Exposure: Not on file   Financial Resource Strain: Low Risk  (4/10/2025)    Financial Resource Strain     Within the past 12 months, have you or your family members you live with been unable to get utilities (heat, electricity) when it was really needed?: No   Alcohol Use: Not on file   Transportation Needs: Low Risk  (4/10/2025)    Transportation Needs     Within the past 12 months, has lack of transportation kept you from medical appointments, getting your medicines, non-medical meetings or appointments, work, or from getting things that you need?: No   Physical Activity: Not on file   Interpersonal Safety: Low Risk  (4/10/2025)    Interpersonal Safety     Do you feel physically and emotionally safe where you currently live?: Yes     Within the past 12 months, have you been hit, slapped, kicked or otherwise physically hurt by someone?: No     Within the past 12 months, have you been humiliated or emotionally abused in other ways by your partner or ex-partner?: No   Stress: Not on file   Social Connections: Unknown (12/30/2021)    Received from [facility]    Social Connections     Frequency of Communication with Friends and Family: Not on file   Health Literacy: Not on file       Functional  Status:  Prior to admission patient needed assistance:   Dependent ADLs:: Ambulation-walker, Dressing (uses walker when feeling weak)  Dependent IADLs:: Cleaning, Cooking, Laundry, Shopping, Meal Preparation, Medication Management, Money Management, Transportation  Assesssment of Functional Status: Needs assistance with dressing, Needs assistance with meals, Needs assistance with medications, Needs assistance with shopping, Needs assistance with transportation, Needs assistance with laundry/houskeeping, Needs assistance with handling finances, Needs placement in a SNF/TCF for rehabilitation    Mental Health Status:          Chemical Dependency Status:                Values/Beliefs:  Spiritual, Cultural Beliefs, [employer] Practices, Values that affect care:                 Discussed  Partnership in Safe Discharge Planning  document with patient/family: No    Additional Information:  CM met with pt in room and called Gege ELLSWORTH to discuss discharge planning and complete initial assessment. Demographics confirmed and updated on facesheet.     Pt lives in a house with his care giver (Veronique) who lives in the lower level.  does work full time during the day . Pt uses walker for ambulation when he feels weak. Pt reports that he has 2 steps into his home and once inside 7 steps upstairs to his bedroom and bathroom. Pt is independent with ADLs and gets assist with IADLs. Pt reports urinary incontinence that he manages himself. Pt does report that getting dressed is difficult due to lifting arms (recent CABG on 3/17/25). Otherwise, pt reports that he can manage the rest of his ADLs on his own but gets assistance for IADLs . Veronique does the cooking, cleaning, laundry, drives pt places, and crushes meds and places them in a protein smoothy for pt. Fatimah Rousseau manages finances and supports with healthcare needs.  Pt reports his neighbor Fernando or Veronique are available to assist him when needed. Pt is open with  "[facility] for nursing and PT from previous hospital admission on 3/17/25 for CABG .. Unknown discharge needs. Transport TBD.      RNCM called dtrFatimah and left msg, requesting call back. -  she is on a flight    RNCM spoke with pt's Gege ELLSWORTH to gather additional background information on pt.   Gege shared pt is not on hospice and has not been. She reports that pt is open to home care services and he gets home care and hospice confused. Gege shared that her, pt's dtr Fatimah and pt's son all feel pt needs to be in an [facility]ICA or LTC. They report he is home alone while Veronique is working full time and do not feel it is safe for pt to be alone. During pt's previous IP stay on 3/17/25, (CABG), TCU was recommended at discharge but pt refused and but was agreeable to home with HC services.     RNCM provided DIL with contact information for [employer]. She reports he owns his own home, but doesn't think he would have enough money to private pay for group home or LTC. RNCM provided contact info for [facility] assessment to determine if he qualifies for assistance to cover group home/LTC costs,     Gege ELLSWORTH reports that pt's son Eric does not want to be involved with making decisions related to pt's medical needs.     Gege asked if provider can assess pt to determine capacity - provider notified    [facility] report was completed at pt's last discharge due to pt declining TCU rec and CM not feeling pt was making a safe discharge decision.        Next Steps: Continue to follow for discharge recs, update dtr, family may want care conference.     Tennille Del Castillo RN     RNCM confirmed with pt he has only received home care services and is open to continuing to work with them, cause \"they help take care of me.\" Confirmed he has never been on hospice and is not interested in supportive services to assist with end of life transitioning.   Pt had been getting the services home care vs hospice care mixed up.   "

## 2025-04-28 NOTE — H&P
[facility]    History and Physical  Hospitalist       Date of Admission:  4/27/2025    Assessment & Plan   74 year old male with recent CABG  and has HTN and DM type 2 -- and now with chest pressure, nausea, vomiting and diarrhea.     Summary:    Principal Problem:    Nausea vomiting and diarrhea -- suspect viral gastroenteritis, but given recent antibiotics and unclear hx ER is checking him for C diff.    -- IV fluids, anti-emetics   -- Ativan 0.5 mg tid prn added for nausea and to help with anxiety      Essential hypertension -- currently not well controlled   -- resume home meds, add additional meds if needed       Chest pressure -- suspect this is chest wall pain related to recent CABG   -- repeat trop in AM, is on cardiac tele      Hyperglycemia -- additional insulin as needed      Stage 3b chronic kidney disease (H)  -- Stable      CAD -- S/P CABG x 4 on 3/17/25 -- stable      DM type 2, Hgb A1C 7.2 on 4/7/25   -- resume home lantus dose once reviewed by pharmacy        Plan: as above    DVT Prophylaxis: DOAC  Code Status: Full Code    Disposition: Expected discharge home in 2-3 days, but may need TCU if not able to care for himself.   Medically Ready for Discharge: Anticipated in 2-4 Days      Other Diagnoses:  Clinically Significant Risk Factors Present on Admission          # Hypochloremia: Lowest Cl = 93 mmol/L in last 2 days, will monitor as appropriate     # Anion Gap Metabolic Acidosis: Highest Anion Gap = 25 mmol/L in last 2 days, will monitor and treat as appropriate     # Drug Induced Coagulation Defect: home medication list includes an anticoagulant medication  # Drug Induced Platelet Defect: home medication list includes an antiplatelet medication   # Hypertension: Noted on problem list  # Chronic heart failure with reduced ejection fraction: last echo with EF <40%         # DMII: A1C = 7.2 % (Ref range: <5.7 %) within past 6 months   # Overweight: Estimated body mass index  "is 26.46 kg/m  as calculated from the following:    Height as of this encounter: 1.727 m (5' 8\").    Weight as of this encounter: 78.9 kg (174 lb).         # Financial/Environmental Concerns:     # History of CABG: noted on surgical history         Abner Contreras MD  Pager: [phone]  Cell Phone:  [phone]     Primary Care Physician   Abner Ramírez    Chief Complaint   Nausea, vomiting, diarreha    History is obtained from Patient    History of Present Illness   74 year old male with recent CABG x 4 on 3/17/25 and has HTN and DM type 2, and hx of Pulm embolus on Eliquis -- and now with chest pressure, nausea, vomiting and diarrhea.  He initially discharged home on 3/24/25, but then back from 4/7 to 4/14 with DKA and questionable medical compliance and concern of mild cognitive impairment but he chose to go home, but now reports nausea, vomiting, and reports diarrhea several times today.  He is  and lives alone, except he has a renter in the house who does help him some.  He appears angry and is short with his answers.      In ER, /101 and , BUN 47 with Creat 1.68 (baseline Creat 1.6 to 1.8), glucose 309, trop 59 and repeat in 2 hours 51.  UA with trace ketones.      PAST MEDICAL HISTORY    Past Medical History:   Diagnosis Date    Diabetes mellitus, type 2 (H)     History of CVA (cerebrovascular accident)     Hypertension     Nutritional and metabolic cardiomyopathies (H)         PAST SURGICAL HISTORY    Past Surgical History:   Procedure Laterality Date    CORONARY ARTERY BYPASS GRAFT, WITH ENDOSCOPIC VESSEL PROCUREMENT N/A 3/17/2025    Procedure: CORONARY ARTERY BYPASS GRAFT TIMES FOUR, LEFT INTERNAL MAMMARY ARTERY HARVEST, LEFT LEG ENDOSCOPIC SAPHENOUS VEIN PROCUREMENT,;  Surgeon: Alice Shahid MD;  Location: [facility] Main OR    CV CORONARY ANGIOGRAM N/A 2/24/2025    Procedure: Coronary Angiogram;  Surgeon: Bautista Durand MD;  Location: [facility] CATH LAB CV    CV INTRA " AORTIC BALLOON N/A 3/17/2025    Procedure: Intra aortic Balloon Pump Insertion;  Surgeon: Bautista Durand MD;  Location: [facility] CATH LAB CV    CV LEFT HEART CATH N/A 2/24/2025    Procedure: Left Heart Catheterization;  Surgeon: Bautista Durand MD;  Location: [facility] LAB CV    PICC DOUBLE LUMEN PLACEMENT  3/20/2025    TRANSESOPHAGEAL ECHOCARDIOGRAM INTRAOPERATIVE  3/17/2025    Procedure: EPIAORTIC ULTRASOUND, ANESTHESIA TRANSESOPHAGEAL ECHOCARDIOGRAM;  Surgeon: Alice Shahid MD;  Location: [facility] OR        Prior to Admission Medications   Prior to Admission Medications   Prescriptions Last Dose Informant Patient Reported? Taking?   FLUoxetine 20 MG tablet   Yes No   Sig: Take 20 mg by mouth daily.   Lidocaine (LIDOCARE) 4 % Patch   No No   Sig: Place 1-2 patches over 12 hours onto the skin every 24 hours. To prevent lidocaine toxicity, patient should be patch free for 12 hrs daily.   acetaminophen (TYLENOL) 500 MG tablet   Yes No   Sig: Take 1,000 mg by mouth 3 times daily. While awake   apixaban ANTICOAGULANT (ELIQUIS) 5 MG tablet   No No   Sig: Take 2 tablets (10 mg) by mouth 2 times daily for 3 days, THEN 1 tablet (5 mg) 2 times daily for 28 days.   aspirin (ASA) 81 MG chewable tablet   No No   Sig: Take 1 tablet (81 mg) by mouth daily. Continue for one year postop, then when stopping plavix, increase aspirin to 162 mg daily indefinitely.   Patient not taking: Reported on 4/25/2025   carvedilol (COREG) 12.5 MG tablet   No No   Sig: Take 1 tablet (12.5 mg) by mouth 2 times daily (with meals).   clopidogrel (PLAVIX) 75 MG tablet   No No   Sig: Take 1 tablet (75 mg) by mouth daily. Continue for one year postop, then stop and increase aspirin to 162 mg daily indefinitely.   furosemide (LASIX) 20 MG tablet   No No   Sig: Take 2 tablets (40 mg) by mouth 2 times daily for 10 days.   insulin aspart (NOVOLOG PEN) 100 UNIT/ML pen   No No   Sig: Inject 1-10 Units subcutaneously 3 times daily (before meals).  Correction Scale - HIGH INSULIN RESISTANCE DOSING   Do Not give Correction Insulin if Pre-Meal BG less than 140.   For Pre-Meal  - 164 give 1 unit.   For Pre-Meal  - 189 give 2 units.   For Pre-Meal  - 214 give 3 units.   For Pre-Meal  - 239 give 4 units.   For Pre-Meal  - 264 give 5 units.   For Pre-Meal  - 289 give 6 units.   For Pre-Meal  - 314 give 7 units.   For Pre-Meal  - 339 give 8 units.   For Pre-Meal  - 364 give 9 units.  For Pre-Meal BG greater than or equal to 365 give 10 units  To be given with prandial insulin, and based on pre-meal blood glucose. Administering insulin within 5 minutes of the start of the meal is ideal. Administer insulin no more than 30 minutes after the start of the meal, unless directed otherwise by provider.   Notify provider if glucose greater than or equal to 350 mg/dL after administration of correction dose.   Patient not taking: Reported on 4/25/2025   insulin aspart (NOVOLOG PEN) 100 UNIT/ML pen   No No   Sig: Inject 1-7 Units subcutaneously at bedtime. HIGH INSULIN RESISTANCE DOSING   Do Not give Bedtime Correction Insulin if BG less than 200.   For  - 224 give 1 units.   For  - 249 give 2 units.   For  - 274 give 3 units.   For  - 299 give 4 units.   For  - 324 give 5 units.   For  - 349 give 6 units.   For BG greater than or equal to 350 give 7 units.   Notify provider if glucose greater than or equal to 350 mg/dL after administration of correction dose.   Patient not taking: Reported on 4/25/2025   insulin glargine (LANTUS PEN) 100 UNIT/ML pen   No No   Sig: Inject 25 Units subcutaneously every morning.   oxyCODONE (ROXICODONE) 5 MG tablet   No No   Sig: Take 1 tablet (5 mg) by mouth every 4 hours as needed for severe pain (IF pain not managed with non-pharmacological and non-opioid interventions).   pantoprazole (PROTONIX) 40 MG EC tablet   No No   Sig: Take 1 tablet (40 mg) by  mouth 2 times daily (before meals) for 14 days.   polyethylene glycol (MIRALAX) 17 GM/Dose powder   No No   Sig: Take 17 g by mouth daily.   Patient not taking: Reported on 4/25/2025   senna-docusate (SENOKOT-S/PERICOLACE) 8.6-50 MG tablet   Yes No   Sig: Take 1 tablet by mouth 2 times daily.   simvastatin (ZOCOR) 40 MG tablet   Yes No   Sig: Take 40 mg by mouth daily   traZODone (DESYREL) 50 MG tablet   Yes No   Sig: Take 50 mg by mouth at bedtime.   Patient not taking: Reported on 4/25/2025      Facility-Administered Medications: None     Allergies   Allergies   Allergen Reactions    Lisinopril Cough    Propoxyphene Unknown and Hives       SOCIAL HISTORY    Social History     Social History Narrative    , 3 children, he lives by himself but has a renter in his home who does provide some assistance.  (last updated 4/28/2025)       Social History     Tobacco Use    Smoking status: Former     Types: Cigarettes    Smokeless tobacco: Never   Substance Use Topics    Alcohol use: Not Currently        FAMILY HISTORY    History reviewed. No pertinent family history.     Review of Systems   The 10 point Review of Systems is negative other than noted in the HPI or here.       PHYSICAL EXAM     Temp: 98  F (36.7  C) Temp src: Oral BP: (!) 186/88 Pulse: 107   Resp: 20 SpO2: 97 % O2 Device: None (Room air)    Vital Signs with Ranges  Temp:  [98  F (36.7  C)] 98  F (36.7  C)  Pulse:  [100-107] 107  Resp:  [19-26] 20  BP: (176-186)/() 186/88  SpO2:  [97 %-99 %] 97 %  174 lbs 0 oz    Constitutional: Awake, alert, semi-cooperative, appears anxious and upset  Eyes: Conjunctiva and pupils examined and normal.  HEENT: Moist mucous membranes, normal dentition.  Respiratory: Clear to auscultation bilaterally, no crackles or wheezing.  Cardiovascular: Regular rate and rhythm, normal S1 and S2, and no murmur noted, no carotid bruits.  no ankle edema.   GI: Soft, non-distended, non-tender, normal bowel  sounds.  Lymph/Hematologic: No anterior cervical, supraclavicular or axillary adenopathy.  Skin: No rashes, no cyanosis.   Musculoskeletal: No joint swelling, erythema or tenderness.  Neurologic: Alert, Ox2.5 (gives brief answers to questions -- doesn't want to be bothered), Cranial nerves 2-12 intact, no focal weakness or numbness  Psychiatric:  No obvious anxiety or depression.    Data   Data reviewed today:  I personally reviewed the EKG tracing showing RBBB with occas PVC .  Recent Labs   Lab 04/27/25  2115   WBC 10.7   HGB 13.1*   MCV 90      INR 1.29*      POTASSIUM 4.2   CHLORIDE 93*   CO2 17*   BUN 46.5*   CR 1.68*   ANIONGAP 25*   GAYATHRI 10.3   *   ALBUMIN 4.3   PROTTOTAL 8.0   BILITOTAL 1.0   ALKPHOS 137   ALT 16   AST 16       Imaging:  Recent Results (from the past 24 hours)   XR Chest Port 1 View    Narrative    EXAM: CHEST SINGLE VIEW PORTABLE  LOCATION: [facility]  DATE: 4/27/2025    INDICATION: Recent coronary artery bypass surgery.  COMPARISON: 4/8/2025.    FINDINGS: Hypoinflated lungs. A few band-like opacities at the left lung base, most likely representing atelectasis. The lungs are otherwise clear. Unchanged cardiac silhouette. Prior median sternotomy.      Impression    IMPRESSION: No convincing evidence of active cardiopulmonary disease.

## 2025-04-28 NOTE — ED PROVIDER NOTES
NAME: Eric Cordoba  AGE: 74 year old male  YOB: 1950  MRN: [id number]  EVALUATION DATE & TIME: 2025  8:54 PM    PCP: Abner Elmore    ED PROVIDER: Edward Loaiza M.D.      Chief Complaint   Patient presents with    Chest Pain     CP, diarrhea, SOB, weakness, post open heart surgery 1 month ago         FINAL IMPRESSION:  1. Acute kidney injury    2. Diarrhea of presumed infectious origin    3. Dehydration    4. Lightheadedness    5. Metabolic acidosis        MEDICAL DECISION MAKIN:54 PM I met with the patient, obtained history, performed an initial exam, and discussed options and plan for diagnostics and treatment here in the ED.   11:28 PM preliminary eval of chest x-ray without significant infiltrate or pulmonary edema, cardiomegaly noted.  12:28 AM I discussed findings with the patient and lactic acid improving we will plan for admission and will hold off antibiotics at this time due to the concern for infectious diarrhea.  12:43 AM patient discussed with hospitalist, Dr. Magallanes for admission.    Patient was clinically assessed and consented to treatment. After assessment, medical decision making and workup were discussed with the patient. The patient was agreeable to plan for testing, workup, and treatment.  Pertinent Labs & Imaging studies reviewed. (See chart for details)       Medical Decision Making  I reviewed the EMR: Inpatient Record: Recent admission for CABG , repeat admission on  to   Care impacted by Heart Disease  I considered additional work up, including abdominal CT, but deferred no abdominal tenderness  I independently interpreted the EKG and note sinus tach with PVCs but no acute morphology change compared to prior. See radiology report for final interpretation.  I discussed the care with another health care provider: Hospitalist  Admit.    MIPS (CTPE, Dental pain, [place], Sinusitis, Asthma/COPD, Head Trauma): Not Applicable    SEPSIS: The  patient has signs of sepsis   Sepsis ED evaluation   The patient has signs of sepsis as evidenced by:  1. Presence of 2 SIRS criteria, suspected infection, AND  2. Organ dysfunction: Sepsis work up in progress. Will continue to monitor for signs of organ dysfunction and Lactic Acidosis with value >2.0 due to sepsis    Sepsis Care Initiation: Starting at  915 PM on 04/27/25, until specified. Prior to this documentation, sepsis, severe sepsis, or septic shock was NOT thought to be a significant cause of illness. This order represents the first time infection was seriously considered to be affecting the patient.    Lactic Acid Results:  Recent Labs   Lab Test 04/27/25  2321 04/27/25  2115 04/08/25  1624   LACT 1.3 2.9* 1.7       3 Hour Bundle 6 Hour Bundle (Reassessment)   Blood Cultures before IV Antibiotics: No, antibiotics were started prior to BCx collection b/c waiting for BCx to be collected would have been detrimental to the patient  Antibiotics given: see below  Prehospital fluid volume (mL):                     Total fluids given (ED +Pre-hospital):  The patient responded to a lesser volume of IV fluids. The initial volume ordered was 500 mL.    Repeat Lactic Acid Level: Ordered by reflex for 2 hours after initial lactic acid collection.  Vasopressors: MAP>65 after initial IVF bolus, will continue to monitor fluid status and vital signs.  Repeat perfusion exam: I attest to having performed a repeat sepsis exam and assessment of perfusion at  11:30 PM PM.   BMI Readings from Last 1 Encounters:   04/27/25 26.46 kg/m        Anti-infectives (From admission through now)      None            ck        Eric RAMSES Beryl is a 74 year old male who presents with chest pain, diarrhea, weakness.   Differential diagnosis includes but not limited to sepsis, SIRS, infectious diarrhea, dehydration, acute kidney injury, CHF exacerbation, acute coronary syndrome, demand ischemia.  Patient 74-year-old male who underwent open heart  surgery with CABG on March 17.  He was discharged a week later however returned to the hospital on April 7 and was admitted for a week due to starvation ketosis, deconditioning, and difficulty caring for himself.  Patient was readmitted and there was evaluation for possible TCU placement patient refusing.  Patient had a vulnerable daughter evaluation at that time as well and ultimately went home on the 14th signing out AGAINST MEDICAL ADVICE.  Patient returns now roughly 2 weeks later with profuse diarrhea for 1 day, now with some chest pain and shortness of breath as well as weakness and appears pale, diaphoretic, and dehydrated.  Patient given IV fluids but will be judicious as patient's blood pressure is stable and I do not wish to fluid overload him as he does have history of CHF.  Additionally lactic acid was elevated and patient concerning for SIRS or sepsis.  Patient responded to fluids and blood pressure remained stable or even slightly elevated.  Chest pain was gone and patient feeling better after nausea was controlled following multiple doses of Zofran and that sort of worked but ultimately did not sustain nausea and Compazine was given which gave good relief.  Patient had no abdominal tenderness and I did consider CT of the abdomen but hold off as creatinine was slightly elevated and GFR was down.  Patient without any pain and will plan to reassess after lactic acid returns.  Troponin was equivocal we will plan to repeat though patient has chronic troponin leak on review of labs.  Blood gas did not reveal any acidosis but patient did have an anion gap which likely could be related to again starvation ketosis which she had previously.  BNP was also elevated though chest x-ray did not reveal any significant fluid overload.  Urinalysis was also negative.  Patient considered for antibiotics but given the lack of abdominal pain and possibly infectious diarrhea which could be viral we will await enteric  cultures before starting antibiotics.  Enteric studies pending and after fluids and improvement along with negative chest x-ray repeat troponin was pending.  Repeat troponin was equivocal and repeat lactic acid improving.  Following discussion with patient he will be plan for admission and was discussed with hospitalist.      Critical Care     Performed by: Dr Asif Loaiza  Authorized by: Dr Asif Loaiza  Total critical care time: 60 minutes  Critical care was necessary to treat or prevent imminent or life-threatening deterioration of the following conditions: Sepsis, SIRS, metabolic acidosis, starvation ketosis, CHF exacerbation, fluid overload, infectious diarrhea, C. Difficile.  Critical care was time spent personally by me on the following activities: development of treatment plan with patient or surrogate, discussions with consultants, examination of patient, evaluation of patient's response to treatment, obtaining history from patient or surrogate, ordering and performing treatments and interventions, ordering and review of laboratory studies, ordering and review of radiographic studies, re-evaluation of patient's condition and monitoring for potential decompensation.  Critical care time was exclusive of separately billable procedures and treating other patients.     MEDICATIONS GIVEN IN THE EMERGENCY:  Medications   lidocaine 1 % 0.1-1 mL (has no administration in time range)   lidocaine (LMX4) cream (has no administration in time range)   sodium chloride (PF) 0.9% PF flush 3 mL (3 mLs Intracatheter $Given 4/28/25 0128)   sodium chloride (PF) 0.9% PF flush 3 mL (has no administration in time range)   hydrALAZINE (APRESOLINE) tablet 25 mg ( Oral See Alternative 4/28/25 0125)     Or   hydrALAZINE (APRESOLINE) injection 10 mg (10 mg Intravenous $Given 4/28/25 0125)   acetaminophen (TYLENOL) tablet 650 mg (has no administration in time range)     Or   acetaminophen (TYLENOL) Suppository 650 mg (has no administration  in time range)   melatonin tablet 3 mg (has no administration in time range)   senna-docusate (SENOKOT-S/PERICOLACE) 8.6-50 MG per tablet 1 tablet (has no administration in time range)     Or   senna-docusate (SENOKOT-S/PERICOLACE) 8.6-50 MG per tablet 2 tablet (has no administration in time range)   ondansetron (ZOFRAN ODT) ODT tab 4 mg ( Oral See Alternative 4/28/25 0406)     Or   ondansetron (ZOFRAN) injection 4 mg (4 mg Intravenous $Given 4/28/25 0406)   calcium carbonate (TUMS) chewable tablet 1,000 mg (has no administration in time range)   benzocaine-menthol (CHLORASEPTIC) 6-10 MG lozenge 1 lozenge (has no administration in time range)   miconazole (MICATIN) 2 % powder (has no administration in time range)   menthol-zinc oxide (CALMOSEPTINE) 0.44-20.6 % ointment OINT (has no administration in time range)   prochlorperazine (COMPAZINE) injection 5 mg (5 mg Intravenous $Given 4/28/25 0124)     Or   prochlorperazine (COMPAZINE) tablet 5 mg ( Oral See Alternative 4/28/25 0124)     Or   prochlorperazine (COMPAZINE) suppository 12.5 mg ( Rectal See Alternative 4/28/25 0124)   LORazepam (ATIVAN) injection 0.5 mg (0.5 mg Intravenous $Given 4/28/25 0123)   traZODone (DESYREL) tablet 50 mg (has no administration in time range)   insulin aspart (NovoLOG) injection (RAPID ACTING) (has no administration in time range)   insulin aspart (NovoLOG) injection (RAPID ACTING) (5 Units Subcutaneous $Given 4/28/25 0210)   glucose gel 15-30 g (has no administration in time range)     Or   dextrose 50 % injection 25-50 mL (has no administration in time range)     Or   glucagon injection 1 mg (has no administration in time range)   sodium chloride 0.9% BOLUS 500 mL (0 mLs Intravenous Stopped 4/27/25 2242)   prochlorperazine (COMPAZINE) injection 5 mg (5 mg Intravenous $Given 4/27/25 2247)   diphenhydrAMINE (BENADRYL) injection 12.5 mg (12.5 mg Intravenous $Given 4/27/25 1959)   prochlorperazine (COMPAZINE) injection 5 mg (5 mg  Intravenous Not Given 4/28/25 0130)       NEW PRESCRIPTIONS STARTED AT TODAY'S ER VISIT:  New Prescriptions    No medications on file          =================================================================    HPI    Patient information was obtained from: Patient    Use of : N/A    Eric Cordoba is a 74 year old male with a past medical history of cerebrovascular disease, heart disease, CHF, mitral valve regurgitation, diabetes, CAD, HTN, TIA, s/p CABG x 4, CKD, who presents for chest pain, generalized weakness, diarrhea and shortness of breath. patient reports 24 hours now of diarrhea that is profuse and difficult to control.  Patient denies any abdominal pain but reports that with this he has become weak and feeling lightheaded and pale.  Patient reports he just had open heart surgery with a CABG on 3/17/2025.  He states that he ended up needing to be readmitted in early April due to weakness.  Patient is concerned about dehydration as well as his fluid status as he does feel short of breath but he does not report any increase in weight or significant leg swelling.  He reports he is taking all his medications appropriately but also reports some nausea and vomiting at home with the diarrhea today.  No abdominal pain as stated, possibly a little bit of blood in the stool that was bright red and no vomiting blood.      REVIEW OF SYSTEMS   Review of Systems     PAST MEDICAL HISTORY:  Past Medical History:   Diagnosis Date    Diabetes mellitus, type 2 (H)     History of CVA (cerebrovascular accident)     Hypertension     Nutritional and metabolic cardiomyopathies (H)        PAST SURGICAL HISTORY:  Past Surgical History:   Procedure Laterality Date    CORONARY ARTERY BYPASS GRAFT, WITH ENDOSCOPIC VESSEL PROCUREMENT N/A 3/17/2025    Procedure: CORONARY ARTERY BYPASS GRAFT TIMES FOUR, LEFT INTERNAL MAMMARY ARTERY HARVEST, LEFT LEG ENDOSCOPIC SAPHENOUS VEIN PROCUREMENT,;  Surgeon: Alice Shahid MD;  [place]  [facility] [place]    CV CORONARY ANGIOGRAM N/A 2/24/2025    Procedure: Coronary Angiogram;  Surgeon: Bautista Durand MD;  Location: [facility] LAB CV    CV INTRA AORTIC BALLOON N/A 3/17/2025    Procedure: Intra aortic Balloon Pump Insertion;  Surgeon: Bautista Durand MD;  Location: [facility] CV    CV LEFT HEART CATH N/A 2/24/2025    Procedure: Left Heart Catheterization;  Surgeon: Bautista Durand MD;  Location: [facility] CV    PICC DOUBLE LUMEN PLACEMENT  3/20/2025    TRANSESOPHAGEAL ECHOCARDIOGRAM INTRAOPERATIVE  3/17/2025    Procedure: EPIAORTIC ULTRASOUND, ANESTHESIA TRANSESOPHAGEAL ECHOCARDIOGRAM;  Surgeon: Alice Shahid MD;  Location: [facility]       CURRENT MEDICATIONS:      Current Facility-Administered Medications:     acetaminophen (TYLENOL) tablet 650 mg, 650 mg, Oral, Q4H PRN **OR** acetaminophen (TYLENOL) Suppository 650 mg, 650 mg, Rectal, Q4H PRN, Abner Magallanes MD    benzocaine-menthol (CHLORASEPTIC) 6-10 MG lozenge 1 lozenge, 1 lozenge, Buccal, Q1H PRN, Abner Magallanes MD    calcium carbonate (TUMS) chewable tablet 1,000 mg, 1,000 mg, Oral, 4x Daily PRN, Abner Magallanes MD    glucose gel 15-30 g, 15-30 g, Oral, Q15 Min PRN **OR** dextrose 50 % injection 25-50 mL, 25-50 mL, Intravenous, Q15 Min PRN **OR** glucagon injection 1 mg, 1 mg, Subcutaneous, Q15 Min PRN, Abner Magallanes MD    hydrALAZINE (APRESOLINE) tablet 25 mg, 25 mg, Oral, Q6H PRN **OR** hydrALAZINE (APRESOLINE) injection 10 mg, 10 mg, Intravenous, Q8H PRN, Abner Magallanes MD, 10 mg at 04/28/25 0125    insulin aspart (NovoLOG) injection (RAPID ACTING), 1-10 Units, Subcutaneous, TID AC, Abner Magallanes MD    insulin aspart (NovoLOG) injection (RAPID ACTING), 1-7 Units, Subcutaneous, At Bedtime, Abner Magallanes MD, 5 Units at 04/28/25 0210    lidocaine (LMX4) cream, , Topical, Q1H PRN, Abner Magallanes MD    lidocaine 1 % 0.1-1 mL, 0.1-1 mL,  Other, Q1H PRN, Abner Magallanes MD    LORazepam (ATIVAN) injection 0.5 mg, 0.5 mg, Intravenous, Q6H PRN, Abner Magallanes MD, 0.5 mg at 04/28/25 0123    melatonin tablet 3 mg, 3 mg, Oral, At Bedtime PRN, Abner Magallanes MD    menthol-zinc oxide (CALMOSEPTINE) 0.44-20.6 % ointment OINT, , Topical, 4x Daily PRN, Abner Magallanes MD    miconazole (MICATIN) 2 % powder, , Topical, BID PRN, Abenr Magallanes MD    ondansetron (ZOFRAN ODT) ODT tab 4 mg, 4 mg, Oral, Q6H PRN **OR** ondansetron (ZOFRAN) injection 4 mg, 4 mg, Intravenous, Q6H PRN, Abner Magallanes MD, 4 mg at 04/28/25 0406    prochlorperazine (COMPAZINE) injection 5 mg, 5 mg, Intravenous, Q6H PRN, 5 mg at 04/28/25 0124 **OR** prochlorperazine (COMPAZINE) tablet 5 mg, 5 mg, Oral, Q6H PRN **OR** prochlorperazine (COMPAZINE) suppository 12.5 mg, 12.5 mg, Rectal, Q12H PRN, Abner Magallanes MD    senna-docusate (SENOKOT-S/PERICOLACE) 8.6-50 MG per tablet 1 tablet, 1 tablet, Oral, BID PRN **OR** senna-docusate (SENOKOT-S/PERICOLACE) 8.6-50 MG per tablet 2 tablet, 2 tablet, Oral, BID PRN, Abner Magallanes MD    sodium chloride (PF) 0.9% PF flush 3 mL, 3 mL, Intracatheter, Q8H, Abnre Magallanes MD, 3 mL at 04/28/25 0128    sodium chloride (PF) 0.9% PF flush 3 mL, 3 mL, Intracatheter, q1 min prn, Abner Magallanes MD    traZODone (DESYREL) tablet 50 mg, 50 mg, Oral, At Bedtime PRN, Elpidio, Abner Rodriguez MD    Current Outpatient Medications:     acetaminophen (TYLENOL) 500 MG tablet, Take 1,000 mg by mouth 3 times daily. While awake, Disp: , Rfl:     apixaban ANTICOAGULANT (ELIQUIS) 5 MG tablet, Take 2 tablets (10 mg) by mouth 2 times daily for 3 days, THEN 1 tablet (5 mg) 2 times daily for 28 days., Disp: 68 tablet, Rfl: 0    aspirin (ASA) 81 MG chewable tablet, Take 1 tablet (81 mg) by mouth daily. Continue for one year postop, then when stopping plavix, increase aspirin to 162 mg daily  indefinitely. (Patient not taking: Reported on 4/25/2025), Disp: , Rfl:     carvedilol (COREG) 12.5 MG tablet, Take 1 tablet (12.5 mg) by mouth 2 times daily (with meals)., Disp: 60 tablet, Rfl: 11    clopidogrel (PLAVIX) 75 MG tablet, Take 1 tablet (75 mg) by mouth daily. Continue for one year postop, then stop and increase aspirin to 162 mg daily indefinitely., Disp: , Rfl:     FLUoxetine 20 MG tablet, Take 20 mg by mouth daily., Disp: , Rfl:     furosemide (LASIX) 20 MG tablet, Take 2 tablets (40 mg) by mouth 2 times daily for 10 days., Disp: 40 tablet, Rfl: 0    insulin aspart (NOVOLOG PEN) 100 UNIT/ML pen, Inject 1-10 Units subcutaneously 3 times daily (before meals). Correction Scale - HIGH INSULIN RESISTANCE DOSING  Do Not give Correction Insulin if Pre-Meal BG less than 140.  For Pre-Meal  - 164 give 1 unit.  For Pre-Meal  - 189 give 2 units.  For Pre-Meal  - 214 give 3 units.  For Pre-Meal  - 239 give 4 units.  For Pre-Meal  - 264 give 5 units.  For Pre-Meal  - 289 give 6 units.  For Pre-Meal  - 314 give 7 units.  For Pre-Meal  - 339 give 8 units.  For Pre-Meal  - 364 give 9 units. For Pre-Meal BG greater than or equal to 365 give 10 units To be given with prandial insulin, and based on pre-meal blood glucose. Administering insulin within 5 minutes of the start of the meal is ideal. Administer insulin no more than 30 minutes after the start of the meal, unless directed otherwise by provider.  Notify provider if glucose greater than or equal to 350 mg/dL after administration of correction dose. (Patient not taking: Reported on 4/25/2025), Disp: , Rfl:     insulin aspart (NOVOLOG PEN) 100 UNIT/ML pen, Inject 1-7 Units subcutaneously at bedtime. HIGH INSULIN RESISTANCE DOSING  Do Not give Bedtime Correction Insulin if BG less than 200.  For  - 224 give 1 units.  For  - 249 give 2 units.  For  - 274 give 3 units.  For  - 299 give 4  units.  For  - 324 give 5 units.  For  - 349 give 6 units.  For BG greater than or equal to 350 give 7 units.  Notify provider if glucose greater than or equal to 350 mg/dL after administration of correction dose. (Patient not taking: Reported on 4/25/2025), Disp: , Rfl:     insulin glargine (LANTUS PEN) 100 UNIT/ML pen, Inject 25 Units subcutaneously every morning., Disp: , Rfl:     Lidocaine (LIDOCARE) 4 % Patch, Place 1-2 patches over 12 hours onto the skin every 24 hours. To prevent lidocaine toxicity, patient should be patch free for 12 hrs daily., Disp: , Rfl:     oxyCODONE (ROXICODONE) 5 MG tablet, Take 1 tablet (5 mg) by mouth every 4 hours as needed for severe pain (IF pain not managed with non-pharmacological and non-opioid interventions)., Disp: 12 tablet, Rfl: 0    pantoprazole (PROTONIX) 40 MG EC tablet, Take 1 tablet (40 mg) by mouth 2 times daily (before meals) for 14 days., Disp: 28 tablet, Rfl: 0    polyethylene glycol (MIRALAX) 17 GM/Dose powder, Take 17 g by mouth daily. (Patient not taking: Reported on 4/25/2025), Disp: , Rfl:     senna-docusate (SENOKOT-S/PERICOLACE) 8.6-50 MG tablet, Take 1 tablet by mouth 2 times daily., Disp: , Rfl:     simvastatin (ZOCOR) 40 MG tablet, Take 40 mg by mouth daily, Disp: , Rfl:     traZODone (DESYREL) 50 MG tablet, Take 50 mg by mouth at bedtime. (Patient not taking: Reported on 4/25/2025), Disp: , Rfl:     ALLERGIES:  Allergies   Allergen Reactions    Lisinopril Cough    Propoxyphene Unknown and Hives       FAMILY HISTORY:  History reviewed. No pertinent family history.    SOCIAL HISTORY:   Social History     Socioeconomic History    Marital status:    Tobacco Use    Smoking status: Former     Types: Cigarettes    Smokeless tobacco: Never   Substance and Sexual Activity    Alcohol use: Not Currently     Social Drivers of Health     Financial Resource Strain: Low Risk  (4/10/2025)    Financial Resource Strain     Within the past 12 months,  "have you or your family members you live with been unable to get utilities (heat, electricity) when it was really needed?: No   Food Insecurity: Low Risk  (4/10/2025)    Food Insecurity     Within the past 12 months, did you worry that your food would run out before you got money to buy more?: No     Within the past 12 months, did the food you bought just not last and you didn t have money to get more?: No   Transportation Needs: Low Risk  (4/10/2025)    Transportation Needs     Within the past 12 months, has lack of transportation kept you from medical appointments, getting your medicines, non-medical meetings or appointments, work, or from getting things that you need?: No    [facility]    Social Connections   Interpersonal Safety: Low Risk  (4/10/2025)    Interpersonal Safety     Do you feel physically and emotionally safe where you currently live?: Yes     Within the past 12 months, have you been hit, slapped, kicked or otherwise physically hurt by someone?: No     Within the past 12 months, have you been humiliated or emotionally abused in other ways by your partner or ex-partner?: No   Housing Stability: Low Risk  (4/10/2025)    Housing Stability     Do you have housing? : Yes     Are you worried about losing your housing?: No       PHYSICAL EXAM:    Vitals: BP (!) 154/73 (Patient Position: Semi-Berg's)   Pulse 107   Temp 98  F (36.7  C) (Oral)   Resp 20   Ht 1.727 m (5' 8\")   Wt 78.9 kg (174 lb)   SpO2 96%   BMI 26.46 kg/m     Physical Exam  Vitals and nursing note reviewed.   Constitutional:       General: He is not in acute distress.     Appearance: He is well-developed and normal weight. He is ill-appearing and diaphoretic. He is not toxic-appearing.   HENT:      Head: Normocephalic.   Neck:      Vascular: No JVD.   Cardiovascular:      Rate and Rhythm: Regular rhythm. Tachycardia present.      Heart sounds: Normal heart sounds.   Pulmonary:      " Effort: Pulmonary effort is normal. No tachypnea, accessory muscle usage or respiratory distress.      Breath sounds: Normal breath sounds. No stridor.   Chest:      Chest wall: No tenderness.      Comments: Well-healed midline scar, no signs of erythema or tenderness.  Abdominal:      General: Abdomen is flat. Bowel sounds are increased. There is no abdominal bruit.      Palpations: Abdomen is soft.      Tenderness: There is no abdominal tenderness.      Hernia: No hernia is present.   Musculoskeletal:      Cervical back: Normal range of motion and neck supple.      Right lower leg: No tenderness. No edema.      Left lower leg: No tenderness. No edema.   Skin:     General: Skin is warm.      Coloration: Skin is pale. Skin is not cyanotic.   Neurological:      General: No focal deficit present.      Mental Status: He is alert and oriented to person, place, and time.   Psychiatric:         Mood and Affect: Mood is not anxious.         Behavior: Behavior is not agitated.           LAB:  All pertinent labs reviewed and interpreted.  Labs Ordered and Resulted from Time of ED Arrival to Time of ED Departure   INR - Abnormal       Result Value    INR 1.29 (*)    COMPREHENSIVE METABOLIC PANEL - Abnormal    Sodium 135      Potassium 4.2      Carbon Dioxide (CO2) 17 (*)     Anion Gap 25 (*)     Urea Nitrogen 46.5 (*)     Creatinine 1.68 (*)     GFR Estimate 42 (*)     Calcium 10.3      Chloride 93 (*)     Glucose 309 (*)     Alkaline Phosphatase 137      AST 16      ALT 16      Protein Total 8.0      Albumin 4.3      Bilirubin Total 1.0     LACTIC ACID WHOLE BLOOD WITH 1X REPEAT IN 2 HR WHEN >2 - Abnormal    Lactic Acid, Initial 2.9 (*)    TROPONIN T, HIGH SENSITIVITY - Abnormal    Troponin T, High Sensitivity 59 (*)    BLOOD GAS VENOUS - Abnormal    pH Venous 7.48 (*)     pCO2 Venous 25 (*)     pO2 Venous 42      Bicarbonate Venous 19 (*)     Base Excess/Deficit Venous -2.7      FIO2 21      Oxyhemoglobin Venous 70      O2  Sat, Venous 70.8     NT PROBNP INPATIENT - Abnormal    N terminal Pro BNP Inpatient 7,785 (*)    ROUTINE UA WITH MICROSCOPIC REFLEX TO CULTURE - Abnormal    Color Urine Light Yellow      Appearance Urine Clear      Glucose Urine 500 (*)     Bilirubin Urine Negative      Ketones Urine 20 (*)     Specific Gravity Urine 1.014      Blood Urine Negative      pH Urine 5.0      Protein Albumin Urine 10 (*)     Urobilinogen Urine Normal      Nitrite Urine Negative      Leukocyte Esterase Urine Negative      Bacteria Urine None Seen      RBC Urine 0      WBC Urine 0      Hyaline Casts Urine 4 (*)    CBC WITH PLATELETS AND DIFFERENTIAL - Abnormal    WBC Count 10.7      RBC Count 4.08 (*)     Hemoglobin 13.1 (*)     Hematocrit 36.6 (*)     MCV 90      MCH 32.1      MCHC 35.8      RDW 13.2      Platelet Count 268      % Neutrophils 82      % Lymphocytes 11      % Monocytes 6      % Eosinophils 0      % Basophils 1      % Immature Granulocytes 0      NRBCs per 100 WBC 0      Absolute Neutrophils 8.8 (*)     Absolute Lymphocytes 1.2      Absolute Monocytes 0.7      Absolute Eosinophils 0.0      Absolute Basophils 0.1      Absolute Immature Granulocytes 0.0      Absolute NRBCs 0.0     TROPONIN T, HIGH SENSITIVITY - Abnormal    Troponin T, High Sensitivity 51 (*)    TROPONIN T, HIGH SENSITIVITY - Abnormal    Troponin T, High Sensitivity 97 (*)    GLUCOSE BY METER - Abnormal    GLUCOSE BY METER POCT 320 (*)    MAGNESIUM - Normal    Magnesium 2.1     INFLUENZA A/B, RSV AND SARS-COV2 PCR - Normal    Influenza A PCR Negative      Influenza B PCR Negative      RSV PCR Negative      SARS CoV2 PCR Negative     LACTIC ACID WHOLE BLOOD - Normal    Lactic Acid 1.3     OCCULT BLOOD STOOL   GLUCOSE MONITOR NURSING POCT   GLUCOSE MONITOR NURSING POCT   GLUCOSE MONITOR NURSING POCT   GLUCOSE MONITOR NURSING POCT   TROPONIN T, HIGH SENSITIVITY   ENTERIC BACTERIA AND VIRUS PANEL BY PCR   C. DIFFICILE TOXIN B PCR WITH REFLEX TO C. DIFFICILE EIA        RADIOLOGY:  XR Chest Port 1 View   Final Result   IMPRESSION: No convincing evidence of active cardiopulmonary disease.           EKG:   Performed at: 9:08 PM on April 27, 2025.  Impression: Sinus tachycardia with PVC, right bundle branch block and similar morphology to prior EKG following CABG, no signs of acute ischemia  Rate: 102 bpm  Rhythm: Sinus tachycardia with PVCs  QRS Interval: 150 ms  QTc Interval: 547 ms  Comparison: No acute morphology change when compared to prior EKG from April 7, 2025  I have independently reviewed and interpreted the EKG(s) documented above.     PROCEDURES:   Procedures     Edward Loaiza M.D.  Emergency Medicine  [facility] Emergency Department     Edward Loaiza MD  04/28/25 0774

## 2025-04-28 NOTE — ED NOTES
Bed: JNED-21  Expected date: 4/27/25  Expected time: 8:46 PM  Means of arrival: Ambulance  Comments:  LV 74M diarrhea; recent open heart surgery; EKG ok.

## 2025-04-28 NOTE — ED TRIAGE NOTES
CP, diarrhea, SOB, weakness, post open heart surgery 1 month ago     Triage Assessment (Adult)       Row Name 04/27/25 9124          Triage Assessment    Airway WDL WDL        Respiratory WDL    Respiratory WDL X;rhythm/pattern     Rhythm/Pattern, Respiratory shortness of breath        Skin Circulation/Temperature WDL    Skin Circulation/Temperature WDL WDL        Cardiac WDL    Cardiac WDL X;chest pain        Chest Pain Assessment    Chest Pain Location midsternal     Character pressure

## 2025-04-28 NOTE — PLAN OF CARE
Tachycardic at rest, c/o nausea, PRN's not effective per pt report, declined further interventions.  TELE: ST, BBB, inverted t-wave, PAC's and PVC's.  Trop increased from 51-97, Dr. King notified.   Goal Outcome Evaluation:  Problem: Nausea and Vomiting  Goal: Nausea and Vomiting Relief  Outcome: Not Progressing

## 2025-04-28 NOTE — PHARMACY-ADMISSION MEDICATION HISTORY
Pharmacist Admission Medication History    Admission medication history is complete. The information provided in this note is only as accurate as the sources available at the time of the update.    Information Source(s): Caregiver, Hospital records, Facility (U/[facility]/) medication list/MAR, and CareEverywhere/SureScripts via in-person    Pertinent Information:   Unable to speak with the patient, but information that was passed off to me indicate that he does not manage his medications and that he was not familiar with them when he is interviewed last night.  Spoke with Veronique (friend) who crushes and administers his medications. She is also a poor historian. She did confirm that she has been administering his Lantus and dials the pen to the 2 which she believes was ~25 units. So the patient has been under dosed on lantus for some time now. Additionally it is unclear what the dose of lantus should be. His med list from his home care agency states 25 units which matches with what Veronique thought she was giving. However his most recent fill has a sig of 48 units.  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   Pt's med list from his home health care agency also notes he is on isosorbide 60 mg, but his fill hx only shows he is taking 30 mg.  There is a recent fill for a clonidine patch, but Veronique confirmed that his is not currently using any patches.  When speaking with an RN from his home health agency she shared that they have noted the patient to be medication noncompliant.  Overall the patient's fill hx, med list, and information from Veronique varies. He would greatly benefit from MTM post discharge.    Changes made to PTA medication list:  Added: isosorbide mononitrate ER   Deleted: lasix, novolog, lidocaine patch, miralax, trazodone  Changed: None    Allergies reviewed with patient and updates made in EHR: yes    Medication History Completed By: Shahzad Nicholson [facility]  4/28/2025 9:08 AM    PTA Med List   Medication Sig Last Dose/Taking    acetaminophen (TYLENOL) 500 MG tablet Take 1,000 mg by mouth 3 times daily. While awake 4/27/2025 Bedtime    apixaban ANTICOAGULANT (ELIQUIS) 5 MG tablet Take 2 tablets (10 mg) by mouth 2 times daily for 3 days, THEN 1 tablet (5 mg) 2 times daily for 28 days. 4/26/2025 Bedtime    aspirin 81 MG EC tablet Take 81 mg by mouth daily. 4/25/2025 Morning    carvedilol (COREG) 12.5 MG tablet Take 1 tablet (12.5 mg) by mouth 2 times daily (with meals). 4/26/2025 Evening    clopidogrel (PLAVIX) 75 MG tablet Take 1 tablet (75 mg) by mouth daily. Continue for one year postop, then stop and increase aspirin to 162 mg daily indefinitely. 4/25/2025 Morning    FLUoxetine 20 MG tablet Take 20 mg by mouth daily. 4/25/2025 Morning    insulin glargine (LANTUS PEN) 100 UNIT/ML pen Inject 25 Units subcutaneously every morning. 4/27/2025 Morning    isosorbide mononitrate (IMDUR) 30 MG 24 hr tablet Take 30 mg by mouth daily. 4/26/2025 Evening    oxyCODONE (ROXICODONE) 5 MG tablet Take 1 tablet (5 mg) by mouth every 4 hours as needed for severe pain (IF pain not managed with non-pharmacological and non-opioid interventions). Taking As Needed    pantoprazole (PROTONIX) 40 MG EC tablet Take 1 tablet (40 mg) by mouth 2 times daily (before meals) for 14 days. 4/26/2025 Evening    senna-docusate (SENOKOT-S/PERICOLACE) 8.6-50 MG tablet Take 1 tablet by mouth 2 times daily. 4/26/2025 Evening    simvastatin (ZOCOR) 40 MG tablet Take 40 mg by mouth daily 4/26/2025 Bedtime

## 2025-04-29 ENCOUNTER — APPOINTMENT (OUTPATIENT)
Dept: OCCUPATIONAL THERAPY | Facility: HOSPITAL | Age: 75
End: 2025-04-29
Attending: INTERNAL MEDICINE
Payer: COMMERCIAL

## 2025-04-29 LAB
ADV 40+41 DNA STL QL NAA+NON-PROBE: NEGATIVE
ANION GAP SERPL CALCULATED.3IONS-SCNC: 10 MMOL/L (ref 7–15)
ASTRO TYP 1-8 RNA STL QL NAA+NON-PROBE: NEGATIVE
BUN SERPL-MCNC: 54.1 MG/DL (ref 8–23)
C CAYETANENSIS DNA STL QL NAA+NON-PROBE: NEGATIVE
C DIFF TOX B STL QL: NEGATIVE
CALCIUM SERPL-MCNC: 8.8 MG/DL (ref 8.8–10.4)
CAMPYLOBACTER DNA SPEC NAA+PROBE: NEGATIVE
CHLORIDE SERPL-SCNC: 96 MMOL/L (ref 98–107)
CREAT SERPL-MCNC: 1.99 MG/DL (ref 0.67–1.17)
CRYPTOSP DNA STL QL NAA+NON-PROBE: NEGATIVE
E COLI O157 DNA STL QL NAA+NON-PROBE: NORMAL
E HISTOLYT DNA STL QL NAA+NON-PROBE: NEGATIVE
EAEC ASTA GENE ISLT QL NAA+PROBE: NEGATIVE
EC STX1+STX2 GENES STL QL NAA+NON-PROBE: NEGATIVE
EGFRCR SERPLBLD CKD-EPI 2021: 34 ML/MIN/1.73M2
EPEC EAE GENE STL QL NAA+NON-PROBE: NEGATIVE
ETEC LTA+ST1A+ST1B TOX ST NAA+NON-PROBE: NEGATIVE
G LAMBLIA DNA STL QL NAA+NON-PROBE: NEGATIVE
GLUCOSE BLDC GLUCOMTR-MCNC: 126 MG/DL (ref 70–99)
GLUCOSE BLDC GLUCOMTR-MCNC: 138 MG/DL (ref 70–99)
GLUCOSE BLDC GLUCOMTR-MCNC: 214 MG/DL (ref 70–99)
GLUCOSE BLDC GLUCOMTR-MCNC: 265 MG/DL (ref 70–99)
GLUCOSE SERPL-MCNC: 272 MG/DL (ref 70–99)
HCO3 SERPL-SCNC: 24 MMOL/L (ref 22–29)
NOROVIRUS GI+II RNA STL QL NAA+NON-PROBE: NEGATIVE
OSMOLALITY UR: 513 MMOL/KG (ref 100–1200)
P SHIGELLOIDES DNA STL QL NAA+NON-PROBE: NEGATIVE
POTASSIUM SERPL-SCNC: 3.8 MMOL/L (ref 3.4–5.3)
RVA RNA STL QL NAA+NON-PROBE: NEGATIVE
SALMONELLA SP RPOD STL QL NAA+PROBE: NEGATIVE
SAPO I+II+IV+V RNA STL QL NAA+NON-PROBE: NEGATIVE
SHIGELLA SP+EIEC IPAH ST NAA+NON-PROBE: NEGATIVE
SODIUM SERPL-SCNC: 130 MMOL/L (ref 135–145)
SODIUM UR-SCNC: <20 MMOL/L
V CHOLERAE DNA SPEC QL NAA+PROBE: NEGATIVE
VIBRIO DNA SPEC NAA+PROBE: NEGATIVE
Y ENTEROCOL DNA STL QL NAA+PROBE: NEGATIVE

## 2025-04-29 PROCEDURE — 97535 SELF CARE MNGMENT TRAINING: CPT | Mod: GO

## 2025-04-29 PROCEDURE — 36415 COLL VENOUS BLD VENIPUNCTURE: CPT | Performed by: INTERNAL MEDICINE

## 2025-04-29 PROCEDURE — 83935 ASSAY OF URINE OSMOLALITY: CPT | Performed by: INTERNAL MEDICINE

## 2025-04-29 PROCEDURE — 87493 C DIFF AMPLIFIED PROBE: CPT | Performed by: EMERGENCY MEDICINE

## 2025-04-29 PROCEDURE — 250N000013 HC RX MED GY IP 250 OP 250 PS 637: Performed by: INTERNAL MEDICINE

## 2025-04-29 PROCEDURE — 84300 ASSAY OF URINE SODIUM: CPT | Performed by: INTERNAL MEDICINE

## 2025-04-29 PROCEDURE — 120N000004 HC R&B MS OVERFLOW

## 2025-04-29 PROCEDURE — 87507 IADNA-DNA/RNA PROBE TQ 12-25: CPT | Performed by: EMERGENCY MEDICINE

## 2025-04-29 PROCEDURE — 99223 1ST HOSP IP/OBS HIGH 75: CPT | Mod: 95 | Performed by: REGISTERED NURSE

## 2025-04-29 PROCEDURE — 82962 GLUCOSE BLOOD TEST: CPT

## 2025-04-29 PROCEDURE — 97166 OT EVAL MOD COMPLEX 45 MIN: CPT | Mod: GO

## 2025-04-29 PROCEDURE — 99233 SBSQ HOSP IP/OBS HIGH 50: CPT | Performed by: INTERNAL MEDICINE

## 2025-04-29 PROCEDURE — 258N000003 HC RX IP 258 OP 636: Performed by: INTERNAL MEDICINE

## 2025-04-29 PROCEDURE — 99222 1ST HOSP IP/OBS MODERATE 55: CPT | Performed by: NURSE PRACTITIONER

## 2025-04-29 PROCEDURE — 82310 ASSAY OF CALCIUM: CPT | Performed by: INTERNAL MEDICINE

## 2025-04-29 RX ORDER — OLANZAPINE 10 MG/2ML
5 INJECTION, POWDER, FOR SOLUTION INTRAMUSCULAR EVERY 8 HOURS PRN
Status: DISCONTINUED | OUTPATIENT
Start: 2025-04-29 | End: 2025-05-05 | Stop reason: HOSPADM

## 2025-04-29 RX ORDER — SODIUM CHLORIDE 9 MG/ML
INJECTION, SOLUTION INTRAVENOUS CONTINUOUS
Status: DISCONTINUED | OUTPATIENT
Start: 2025-04-29 | End: 2025-04-30

## 2025-04-29 RX ADMIN — SODIUM CHLORIDE: 0.9 INJECTION, SOLUTION INTRAVENOUS at 18:16

## 2025-04-29 RX ADMIN — ISOSORBIDE MONONITRATE 30 MG: 30 TABLET, EXTENDED RELEASE ORAL at 20:16

## 2025-04-29 RX ADMIN — CARVEDILOL 12.5 MG: 12.5 TABLET, FILM COATED ORAL at 18:14

## 2025-04-29 RX ADMIN — SIMVASTATIN 40 MG: 10 TABLET, FILM COATED ORAL at 20:16

## 2025-04-29 RX ADMIN — ASPIRIN 81 MG: 81 TABLET, COATED ORAL at 07:47

## 2025-04-29 RX ADMIN — PANTOPRAZOLE SODIUM 40 MG: 40 TABLET, DELAYED RELEASE ORAL at 18:14

## 2025-04-29 RX ADMIN — APIXABAN 5 MG: 5 TABLET, FILM COATED ORAL at 20:16

## 2025-04-29 RX ADMIN — APIXABAN 5 MG: 5 TABLET, FILM COATED ORAL at 07:48

## 2025-04-29 RX ADMIN — CLOPIDOGREL 75 MG: 75 TABLET ORAL at 07:47

## 2025-04-29 RX ADMIN — SODIUM CHLORIDE: 0.9 INJECTION, SOLUTION INTRAVENOUS at 08:39

## 2025-04-29 RX ADMIN — CARVEDILOL 12.5 MG: 12.5 TABLET, FILM COATED ORAL at 07:48

## 2025-04-29 RX ADMIN — FLUOXETINE HYDROCHLORIDE 20 MG: 20 CAPSULE ORAL at 07:47

## 2025-04-29 ASSESSMENT — ACTIVITIES OF DAILY LIVING (ADL)
ADLS_ACUITY_SCORE: 54
ADLS_ACUITY_SCORE: 55
ADLS_ACUITY_SCORE: 54
ADLS_ACUITY_SCORE: 55
PREVIOUS_RESPONSIBILITIES: MEAL PREP;LAUNDRY;HOUSEKEEPING;SHOPPING;MEDICATION MANAGEMENT;FINANCES;DRIVING
ADLS_ACUITY_SCORE: 55
ADLS_ACUITY_SCORE: 54
ADLS_ACUITY_SCORE: 55
ADLS_ACUITY_SCORE: 55

## 2025-04-29 NOTE — SIGNIFICANT EVENT
Significant Event Note    Time of event: 12:30 AM April 29, 2025    Description of event:  RN paged regarding assisted fall.  Patient is in bed and reports feeling tired but no pain or discomfort.  He states he was heading to the restroom with the help of staff members when he felt weak and was lowered to the ground.  He denies hitting his head.  RN confirms this at bedside.  Discussed that patient is on DAPT and Eliquis  which makes him high risk for a bleed.  Follow fall precautions.    Plan:  Patient is high risk for bleed however nursing and staff were in the room and patient did not hit his head and was lowered to the ground.  Fall precautions and monitor closely.  Low threshold to order CT with in symptoms or clinical changes.      Discussed with: bedside nurse    Emmanuel Greenfield MD

## 2025-04-29 NOTE — CARE PLAN
04/29/25 0000   Fall Event   Patient Assessed By nurse;other  (Scott Harman)   Name of Provider Notified Dr. Greenfield   Fall Prevention Plan Updated Yes

## 2025-04-29 NOTE — CONSULTS
Initial Psychiatric Consult   Consult date: April 29, 2025         Reason for Consult, requesting source:    Question decision making capacity    Requesting source: Ru King    Labs and imaging reviewed.  Provider contacted.    Visit/Communication Style   Virtual (Video) communication was used to evaluate Eric.  Eric consented to the use of video communication.  Video START time: 11:48 am, 4/29/2025  Video STOP time: 12:03 pm, 4/29/2025   Patient's location: [facility]  Provider's location during the visit: Home             HPI:   Psychiatry seeing patient today regarding decision making capacity.    74 year old male with history of coronary artery disease s/p CABG 03/17/25, HFrEF, DM2, CKD, mood disorder, recent pulmonary embolus, starvation ketosis and known cognitive impairment presents 4/27 with nausea, vomiting and diarrhea.         Goals of care:  -- will consult palliative care to clarify goals of care since family states he is not safe to return home, patient tells me he is interested in hospice but then tells care manager he wants home care  -- prior discharge summary notes he was discharged home on hospice but care manager tells me that never happened and family does confirm that  -- family notes consistent medication noncompliance and concerns for safety at home  -- family also concerned with decision making capacity and requests psychiatry consultation  -- will also have PT/OT assess   -- patient and family do confirm DNR/DNI status so code status is now changed to DNR     Patient was initially discharged home from the hospital  on 3/24 but then returned from 4/7 to 4/14 with DKA, questionable medical compliance and concern for mild cognitive impairment.  Following this, patient chose to go home, but has returned with reports of nausea, vomiting, and diarrhea several times daily.    There are concerns from the family that patient is not safe to  "discharge from.  Today, patient reports \"I take insulin once a day and check a blood sugar in the morning.\"  He reports that the individual who rents from his house manages his medications.  When asked what medications he is currently taking, patient reports \"I have no idea.\"  When asked if there could be possible safety concerns with going home, patient reports \"I do, cooperative.  Not know.\"  When asked what he would do in a fire, he reports \"call a fire truck.\"  When asked what he would do if he had a medical emergency, he reports, \"call an ambulance.\"  However, when asked specific safety concerns, patient reports, \"they might feel I am not taking care of myself\" and mentions \"not bathing.\"  When asked what could be a possible benefit for transferring to a TCU, patient responds, \"there is nothing.\"        Past Psychiatric History:   Reports some depression, but no outpatient psychiatry or therapy.        Substance Use and History:     Tobacco Use    Smoking status: Former     Types: Cigarettes    Smokeless tobacco: Never   Substance Use Topics    Alcohol use: Not Currently             Past Medical History:   PAST MEDICAL HISTORY:   Past Medical History:   Diagnosis Date    Diabetes mellitus, type 2 (H)     History of CVA (cerebrovascular accident)     Hypertension     Nutritional and metabolic cardiomyopathies (H)        PAST SURGICAL HISTORY:   Past Surgical History:   Procedure Laterality Date    CORONARY ARTERY BYPASS GRAFT, WITH ENDOSCOPIC VESSEL PROCUREMENT N/A 3/17/2025    Procedure: CORONARY ARTERY BYPASS GRAFT TIMES FOUR, LEFT INTERNAL MAMMARY ARTERY HARVEST, LEFT LEG ENDOSCOPIC SAPHENOUS VEIN PROCUREMENT,;  Surgeon: Alice Shahid MD;  Location: [facility] Main OR    CV CORONARY ANGIOGRAM N/A 2/24/2025    Procedure: Coronary Angiogram;  Surgeon: Bautista Durand MD;  Location: [facility] CATH LAB CV    CV INTRA AORTIC BALLOON N/A 3/17/2025    Procedure: Intra aortic Balloon Pump Insertion;  Surgeon: " Bautista Durand MD;  Location: [facility] CV    CV LEFT HEART CATH N/A 2/24/2025    Procedure: Left Heart Catheterization;  Surgeon: Bautista Durand MD;  Location: [facility] CV    PICC DOUBLE LUMEN PLACEMENT  3/20/2025    TRANSESOPHAGEAL ECHOCARDIOGRAM INTRAOPERATIVE  3/17/2025    Procedure: EPIAORTIC ULTRASOUND, ANESTHESIA TRANSESOPHAGEAL ECHOCARDIOGRAM;  Surgeon: Alice Shahid MD;  Location: [facility] Main OR             Family History:   FAMILY HISTORY: History reviewed. No pertinent family history.        Social History:   Currently lives independently in a house.          Physical ROS:   The 10 point Review of Systems is negative other than noted in the HPI or here.           Medications:     Current Facility-Administered Medications   Medication Dose Route Frequency Provider Last Rate Last Admin    apixaban ANTICOAGULANT (ELIQUIS) tablet 5 mg  5 mg Oral BID Ru King DO   5 mg at 04/29/25 0748    aspirin EC tablet 81 mg  81 mg Oral Daily Ru King DO   81 mg at 04/29/25 0747    carvedilol (COREG) tablet 12.5 mg  12.5 mg Oral BID w/meals Ru King DO   12.5 mg at 04/29/25 0748    clopidogrel (PLAVIX) tablet 75 mg  75 mg Oral Daily Ru King DO   75 mg at 04/29/25 0747    FLUoxetine (PROzac) capsule 20 mg  20 mg Oral Daily Ru King, DO   20 mg at 04/29/25 0747    insulin aspart (NovoLOG) injection (RAPID ACTING)  1-10 Units Subcutaneous TID AC Abner Magallanes MD   6 Units at 04/29/25 0748    insulin aspart (NovoLOG) injection (RAPID ACTING)  1-7 Units Subcutaneous At Bedtime Abner Magallanes MD   5 Units at 04/28/25 0210    insulin aspart (NovoLOG) injection (RAPID ACTING)   Subcutaneous TID w/meals Ru King DO        insulin glargine (LANTUS PEN) injection 25 Units  25 Units Subcutaneous QAM Ru King DO   25 Units at 04/29/25 0748    isosorbide mononitrate (IMDUR) 24 hr tablet 30 mg  30 mg Oral QPM Fernando  Ru A, DO   30 mg at 04/28/25 2123    pantoprazole (PROTONIX) EC tablet 40 mg  40 mg Oral BID AC Ru King A, DO   40 mg at 04/28/25 1626    simvastatin (ZOCOR) tablet 40 mg  40 mg Oral QPM Ru King, DO   40 mg at 04/28/25 2123    sodium chloride (PF) 0.9% PF flush 3 mL  3 mL Intracatheter Q8H RichardsonAbner kerr MD   3 mL at 04/29/25 0748              Allergies:     Allergies   Allergen Reactions    Lisinopril Cough    Propoxyphene Unknown and Hives          Labs:     Recent Results (from the past 48 hours)   INR    Collection Time: 04/27/25  9:15 PM   Result Value Ref Range    INR 1.29 (H) 0.85 - 1.15   Comprehensive metabolic panel    Collection Time: 04/27/25  9:15 PM   Result Value Ref Range    Sodium 135 135 - 145 mmol/L    Potassium 4.2 3.4 - 5.3 mmol/L    Carbon Dioxide (CO2) 17 (L) 22 - 29 mmol/L    Anion Gap 25 (H) 7 - 15 mmol/L    Urea Nitrogen 46.5 (H) 8.0 - 23.0 mg/dL    Creatinine 1.68 (H) 0.67 - 1.17 mg/dL    GFR Estimate 42 (L) >60 mL/min/1.73m2    Calcium 10.3 8.8 - 10.4 mg/dL    Chloride 93 (L) 98 - 107 mmol/L    Glucose 309 (H) 70 - 99 mg/dL    Alkaline Phosphatase 137 40 - 150 U/L    AST 16 0 - 45 U/L    ALT 16 0 - 70 U/L    Protein Total 8.0 6.4 - 8.3 g/dL    Albumin 4.3 3.5 - 5.2 g/dL    Bilirubin Total 1.0 <=1.2 mg/dL   Lactic acid whole blood with 1x repeat in 2 hr when >2    Collection Time: 04/27/25  9:15 PM   Result Value Ref Range    Lactic Acid, Initial 2.9 (H) 0.7 - 2.0 mmol/L   Troponin T, High Sensitivity    Collection Time: 04/27/25  9:15 PM   Result Value Ref Range    Troponin T, High Sensitivity 59 (H) <=22 ng/L   Magnesium    Collection Time: 04/27/25  9:15 PM   Result Value Ref Range    Magnesium 2.1 1.7 - 2.3 mg/dL   Blood gas venous    Collection Time: 04/27/25  9:15 PM   Result Value Ref Range    pH Venous 7.48 (H) 7.32 - 7.43    pCO2 Venous 25 (L) 40 - 50 mm Hg    pO2 Venous 42 25 - 47 mm Hg    Bicarbonate Venous 19 (L) 21 - 28 mmol/L    Base  Excess/Deficit Venous -2.7 -3.0 - 3.0 mmol/L    FIO2 21     Oxyhemoglobin Venous 70 70 - 75 %    O2 Sat, Venous 70.8 70.0 - 75.0 %   Nt probnp inpatient (BNP)    Collection Time: 04/27/25  9:15 PM   Result Value Ref Range    N terminal Pro BNP Inpatient 7,785 (H) 0 - 900 pg/mL   CBC with platelets and differential    Collection Time: 04/27/25  9:15 PM   Result Value Ref Range    WBC Count 10.7 4.0 - 11.0 10e3/uL    RBC Count 4.08 (L) 4.40 - 5.90 10e6/uL    Hemoglobin 13.1 (L) 13.3 - 17.7 g/dL    Hematocrit 36.6 (L) 40.0 - 53.0 %    MCV 90 78 - 100 fL    MCH 32.1 26.5 - 33.0 pg    MCHC 35.8 31.5 - 36.5 g/dL    RDW 13.2 10.0 - 15.0 %    Platelet Count 268 150 - 450 10e3/uL    % Neutrophils 82 %    % Lymphocytes 11 %    % Monocytes 6 %    % Eosinophils 0 %    % Basophils 1 %    % Immature Granulocytes 0 %    NRBCs per 100 WBC 0 <1 /100    Absolute Neutrophils 8.8 (H) 1.6 - 8.3 10e3/uL    Absolute Lymphocytes 1.2 0.8 - 5.3 10e3/uL    Absolute Monocytes 0.7 0.0 - 1.3 10e3/uL    Absolute Eosinophils 0.0 0.0 - 0.7 10e3/uL    Absolute Basophils 0.1 0.0 - 0.2 10e3/uL    Absolute Immature Granulocytes 0.0 <=0.4 10e3/uL    Absolute NRBCs 0.0 10e3/uL   Influenza A/B, RSV and SARS-CoV2 PCR (COVID-19) Nose    Collection Time: 04/27/25  9:32 PM    Specimen: Nose; Swab   Result Value Ref Range    Influenza A PCR Negative Negative    Influenza B PCR Negative Negative    RSV PCR Negative Negative    SARS CoV2 PCR Negative Negative   UA with Microscopic reflex to Culture    Collection Time: 04/27/25 10:27 PM    Specimen: Urine, Midstream   Result Value Ref Range    Color Urine Light Yellow Colorless, Straw, Light Yellow, Yellow    Appearance Urine Clear Clear    Glucose Urine 500 (A) Negative mg/dL    Bilirubin Urine Negative Negative    Ketones Urine 20 (A) Negative mg/dL    Specific Gravity Urine 1.014 1.001 - 1.030    Blood Urine Negative Negative    pH Urine 5.0 5.0 - 7.0    Protein Albumin Urine 10 (A) Negative mg/dL     Urobilinogen Urine Normal Normal mg/dL    Nitrite Urine Negative Negative    Leukocyte Esterase Urine Negative Negative    Bacteria Urine None Seen None Seen /HPF    RBC Urine 0 <=2 /HPF    WBC Urine 0 <=5 /HPF    Hyaline Casts Urine 4 (H) <=2 /LPF   Lactic acid whole blood    Collection Time: 04/27/25 11:21 PM   Result Value Ref Range    Lactic Acid 1.3 0.7 - 2.0 mmol/L   Troponin T, High Sensitivity    Collection Time: 04/27/25 11:21 PM   Result Value Ref Range    Troponin T, High Sensitivity 51 (H) <=22 ng/L   Glucose by meter    Collection Time: 04/28/25  2:03 AM   Result Value Ref Range    GLUCOSE BY METER POCT 320 (H) 70 - 99 mg/dL   Troponin T, High Sensitivity    Collection Time: 04/28/25  5:54 AM   Result Value Ref Range    Troponin T, High Sensitivity 97 (H) <=22 ng/L   Extra Purple Top Tube    Collection Time: 04/28/25  5:54 AM   Result Value Ref Range    Hold Specimen JIC    Lipase    Collection Time: 04/28/25  5:54 AM   Result Value Ref Range    Lipase 61 (H) 13 - 60 U/L   Glucose by meter    Collection Time: 04/28/25  7:27 AM   Result Value Ref Range    GLUCOSE BY METER POCT 203 (H) 70 - 99 mg/dL   Troponin T, High Sensitivity    Collection Time: 04/28/25  9:06 AM   Result Value Ref Range    Troponin T, High Sensitivity 116 (HH) <=22 ng/L   Troponin T, High Sensitivity    Collection Time: 04/28/25 11:44 AM   Result Value Ref Range    Troponin T, High Sensitivity 121 (HH) <=22 ng/L   Glucose by meter    Collection Time: 04/28/25 12:19 PM   Result Value Ref Range    GLUCOSE BY METER POCT 332 (H) 70 - 99 mg/dL   Troponin T, High Sensitivity    Collection Time: 04/28/25  3:12 PM   Result Value Ref Range    Troponin T, High Sensitivity 126 (HH) <=22 ng/L   Glucose by meter    Collection Time: 04/28/25  6:17 PM   Result Value Ref Range    GLUCOSE BY METER POCT 285 (H) 70 - 99 mg/dL   Glucose by meter    Collection Time: 04/28/25 11:50 PM   Result Value Ref Range    GLUCOSE BY METER POCT 300 (H) 70 - 99 mg/dL    C. difficile Toxin B PCR with reflex to C. difficile EIA    Collection Time: 04/29/25 12:48 AM    Specimen: Per Rectum; Stool   Result Value Ref Range    C Difficile Toxin B by PCR Negative Negative   Enteric Bacteria and Virus Panel PCR    Collection Time: 04/29/25 12:49 AM    Specimen: Per Rectum; Stool   Result Value Ref Range    Campylobacter species Negative Negative    Salmonella species Negative Negative    Vibrio species Negative Negative    Vibrio cholerae Negative Negative    Yersinia enterocolitica Negative Negative    Enteropathogenic E. coli (EPEC) Negative Negative, NA    Shiga-like toxin-producing E. coli (STEC) Negative Negative    Shigella/Enteroinvasive E. coli (EIEC) Negative Negative    Cryptosporidium species Negative Negative    Giardia lamblia Negative Negative    Norovirus Gl/Gll Negative Negative    Rotavirus A Negative Negative    Plesiomonas shigelloides Negative Negative    Enteroaggregative E. coli (EAEC) Negative Negative    Enterotoxigenic E. coli (ETEC) Negative Negative    E. coli O157 NA Negative, NA    Cyclospora cayetanensis Negative Negative    Entamoeba histolytica Negative Negative    Adenovirus F40/41 Negative Negative    Astrovirus Negative Negative    Sapovirus Negative Negative   Basic metabolic panel    Collection Time: 04/29/25  5:55 AM   Result Value Ref Range    Sodium 130 (L) 135 - 145 mmol/L    Potassium 3.8 3.4 - 5.3 mmol/L    Chloride 96 (L) 98 - 107 mmol/L    Carbon Dioxide (CO2) 24 22 - 29 mmol/L    Anion Gap 10 7 - 15 mmol/L    Urea Nitrogen 54.1 (H) 8.0 - 23.0 mg/dL    Creatinine 1.99 (H) 0.67 - 1.17 mg/dL    GFR Estimate 34 (L) >60 mL/min/1.73m2    Calcium 8.8 8.8 - 10.4 mg/dL    Glucose 272 (H) 70 - 99 mg/dL   Glucose by meter    Collection Time: 04/29/25  7:37 AM   Result Value Ref Range    GLUCOSE BY METER POCT 265 (H) 70 - 99 mg/dL   Sodium random urine    Collection Time: 04/29/25  9:36 AM   Result Value Ref Range    Sodium Urine mmol/L <20 mmol/L         "  Physical and Psychiatric Examination:     /69 (BP Location: Right arm)   Pulse 108   Temp 97.3  F (36.3  C) (Oral)   Resp 16   Ht 1.727 m (5' 8\")   Wt 78.6 kg (173 lb 4.5 oz)   SpO2 97%   BMI 26.35 kg/m    Weight is 173 lbs 4.5 oz  Body mass index is 26.35 kg/m .    Physical Exam:  I have reviewed the physical exam as documented by by the medical team and agree with findings and assessment and have no additional findings to add at this time.         MSE:   Denies SI and SIB as well as auditory and visual hallucinations.  Patient is alert and oriented to place and person, but did not recall the year.  Insight is poor, judgment is fair at time of meeting.             DSM-5 Diagnosis:   Unspecified neurocognitive disorder  Unspecified depressive disorder          Assessment:   74 year old male with history of coronary artery disease s/p CABG 03/17/25, HFrEF, DM2, CKD, mood disorder, recent pulmonary embolus, starvation ketosis and known cognitive impairment presents 4/27 with nausea, vomiting and diarrhea.      Psychiatry seeing patient today regarding decision making capacity.    There are concerns from the family that patient is not safe to discharge from.  Today, patient reports \"I take insulin once a day and check a blood sugar in the morning.\"  He reports that the individual who rents from his house manages his medications.  When asked what medications he is currently taking, patient reports \"I have no idea.\"  When asked if there could be possible safety concerns with going home, patient reports \"I do not know.\"  When asked what he would do in a fire, he reports \"call a fire truck.\"  When asked what he would do if he had a medical emergency, he reports, \"call an ambulance.\"  However, when asked specific safety concerns, patient reports, \"they might feel I am not taking care of myself\" and mentions \"not bathing.\"  When asked what could be a possible benefit for transferring to a TCU, patient " "responds, \"there is nothing.\"    It is important to note that capacity is the ability of a person to make a specific decision at a given time. It is possible that one has capacity for a straight forward decision, but lacks capacity to make a complex medical decision. Capacity may wax and wane given a patiens fluctuating medical/mental status.  Global competency cannot be assessed, and each criteria and recommendation must be assessed independently.       Aid to Capacity:  Able to understand proposed treatment? No. He does not understand the recommendation of going to a TCU.      Able to understand option of refusing proposed treatment? No. \"I just want to go home and sleep in my own bed.\" Patient does not, with clear understanding, know the implications of refusing treatment.     What is your present medical condition? He understands he has diabetes, but reports, \"I don't know\" when asked to name his current medications. In addition, patient responds, \"I take insulin once per day and check my blood sugar in the morning.\" When asked if he might need insulin to cover meals, he reports, \"I have no idea.\"     What is the treatment being recommended for you? \"I don't know.\"     What do you and your doctor think might happen to you if you decide to accept the recommended treatment? Patient reports, \"Nothing.\"     What are the alternatives available (including no treatment) and what are the probable consequences of accepting each? Patient responds, \"I have no idea\" when discussing possible risks. Does mention \"Lack of bathing\" when asked for more specific safety concerns.     -Patient must be able to compare and contrast the different treatment options available.   -Is the person's decision affected by depression? Reports some depression, but this does not appear to be affecting his decision making.     Is the person s decision affected by delusion/psychosis? No.         Summary of capacity screener:  Patient reports to " "checking his blood glucose once/day and reports \"I don't know\" when asked if he should be checking his blood glucose more than once/day or giving himself insulin coverage when he eats. Patient reports \"I have no idea\" when asked to tell me the medications he is taking. He said his family would likely be concerned that he wasn't bathing and \"taking care of myself\" but was not aware of other possible safety concerns. He could tell me he would \"call a fire truck\" or \"Call an ambulance\" if there was a fire or medical emergency, but was not able to discuss beyond this. In assessing his disposition capacity, he does not appear to have capacity to discharge AMA     After completing screeners with patient and reviewing collateral information, it appears patient does not display complex disposition capacity at this time. Decision making capacity may waxe and wane, and is also issue specific. While the patient may not be able to make complex care decisions, simpler decisions should still be honored despite some impairment. Recommended  find surrogate decision maker by contacting next of kin. If next of kin is unable to be found, hospital is able to act in patient's best interest.              Summary of Recommendations:   1) It would not be appropriate for patient to discharge themselves AMA and they do not understanding consequences or risk or benefits regarding an AMA discharge            Page me or re-consult psychiatry as needed.       Corina Chen, VOLODYMYR, APRN  Consult/Liaison Psychiatry  [facility]   Contact information available via [facility] Paging/Directory.  If I am not available, please call [facility] intake ([phone])              "

## 2025-04-29 NOTE — PROGRESS NOTES
"PRIMARY DIAGNOSIS: \"GENERIC\" NURSING  OUTPATIENT/OBSERVATION GOALS TO BE MET BEFORE DISCHARGE:  ADLs back to baseline: No    Activity and level of assistance: Up with maximum assistance. Consider SW and/or PT evaluation. Patient lightheaded, dizzy and unsteady on feet. This writer and another staff answered call light, helped patient up to bathroom, and assisted patient to floor on left side blocking patient from hitting head.    Pain status: Pain free.    Return to near baseline physical activity: No     Discharge Planner Nurse   Safe discharge environment identified: No  Barriers to discharge: Yes       Entered by: Jodee Marroquin RN 04/29/2025 1:25 AM     Please review provider order for any additional goals.   Nurse to notify provider when observation goals have been met and patient is ready for discharge.   "

## 2025-04-29 NOTE — CONSULTS
"  Palliative Care Consultation Note  [facility]      Patient: Eric Cordoba  Date of Admission:  4/27/2025    Requesting Clinician / Team: Hospital Medicine  Reason for consult: Goals of care  Patient and family support    ADDENDUM:  Spoke with patient's friend Veronique on the phone.  She reviewed health care directive on file and her role as health care surrogate, along with another friend Vincent.  She noted that both she and patient's family are in agreement that he is not making decisions that will keep him safe and that discharge home is not an option at this point.     Veronique reviewed her observations prior to admission, states C is not sufficient and Italo's obvious lack of understanding surrounding the level of care he needs at home. She stated her schedule does not allow her to be home all the time, and that he calls her multiple times a day with requests and questions that do not make sense.     Inquired about previous conversations regarding hospice enrollment and her thoughts.  Veronique notes that Italo has told her: \"I want to go home, but not to pass\". She acknowledges that his actions are not compliant with the medical plan which would sustain his life and his lack of appetite is not helping his status either.  She acknowledges that his behavior may lead to him becoming unstable and push him to end of life. She noted concern that he may not be ready for hospice, but is just not making wise choices.     Regarding his appetite, Veronique states that Italo has not been eating well since his cardiac surgery.  States that he reports lack of appetite.  She requested appetite stimulant if it would be helpful.      Discussed plan for formal assessment for capacity and further discussions in the coming days. She denied further questions.     Recommendations & Counseling     GOALS OF CARE:   Life-prolonging with limits : DNR, Do NOT intubate  Goal as stated by patient is to return home with " "current assistance from roommate, family and HHC if indicated.    Patient making inconsistent statements regarding hospice enrollment.  Indicated agreement with hospice enrollment, but upon later interview stated that he did not want hospice.   Patient does state he would not want to return to the hospital because he does not like it, but would still accept restorative or life-prolonging cares as recommended.   During discussion with daughter Fatimah, she indicated significant concern surrounding patient's capacity for safe decision making and options to maintain safety at time of discharge.  Brought question of psychiatric consultation for more thorough evaluation.   Full evaluation for capacity is required prior to proceeding with decision making by patient or surrogate decision makers with regard to hospice enrollment.  Would recommend seeking consensus between patient, next of kin and primary caregiver when making care decisions prior to psych evaluation.     ADVANCE CARE PLANNING:  No health care directive on file. Per system policy, Surrogate Decision-makers for Patients With Diminished Decision-making Capacity offers guidance on possible decision-makers. Fatimah Cordoba (daughter) has been identified as a surrogate decision maker.   Also noted as significant support is friend Veronique Owens.   Per documentation by [employer] dated 4/14/25:  Eric Cordoba has NO KNOWN legal document designating a medical decision maker.   Eric Cordoba has presented an INVALID Health Care Directive dated 2/21/2025. See scanned \"Invalid HCD\" Validation Form for details. Notification letter sent to patient (view in Letters Navigator).   Per system policy, if the patient has been determined by a Physician/APRN/PA-C to lack the capacity to make their own decisions AND there is no legal document naming a decision maker, review the \"Surrogate Decision-makers for Patients With Diminished Decision-making Capacity\" policy for " "guidance.   For questions contact [employer]: [email or web address] or [phone].  There is a POLST form on file, this was reviewed and current.  Code status: No CPR- Do NOT Intubate    MEDICAL MANAGEMENT:   We are not actively managing symptoms at this time.    PSYCHOSOCIAL/SPIRITUAL SUPPORT:  Family : daughter: Fatimah Cordoba, daughter in law: Gege Cordoba, friend: Veronique Owens  Jenifer community: Jew   Patient requesting  consult.    Palliative Care will continue to follow. Thank you for the consult and allowing us to aid in the care of Eric Cordoba.    These recommendations have been discussed with primary team, nursing staff, case management.    KYMBERLY Bajwa CNP  [facility], Palliative Care  Securely message with the Vocera Web Console (learn more here) or  Text page via [facility] Paging/Directory         Assessment      Eric Cordoba is a 74 year old male with a past medical history of CAD s/p CABG x 4 on 3/17/25, HTN, DM type 2, recent Pulmonary Embolus on Eliquis who presented on 4/28/25 with chest pressure, n/v and diarrhea. Work-up negative for C. Diff.        Today, the patient was seen for:  Goals of care  Patient/Family support    History of Present Illness   Met with Italo at the bedside.  Reviewed current hospitalization and symptom burden.  Italo states that he is doing well and wishes to discharge.  Notes that diarrhea, nausea and vomiting have improved significantly.  Reviewed current medical plan and need to remain in the hospital at this time.  He verbalized understanding.      Re-introduced palliative care and outlined the purpose of our consult.  Reviewed prior hospitalization and his wish to \"not return to the hospital\".  Inquired about his goals and wishes.  He indicated that he is in favor of having medical interventions that will keep  him alive,  stated dislike for coming into the hospital and states he is ready to return home.     Eric discharged from " the hospital following CABG on 3/24.  Returned and was admitted 4/7 - 4/14 with questionable medical compliance, DKA and concern for mild cognitive impairment was discussed.  Most recent discharge plan included discharge home with hospice enrollment.  Patient did not enroll into hospice and has been followed by [facility].    He requested call to his daughter Fatimah to update her on information.  He denied further questions or concerns.    Prognosis, Goals, & Planning:   Functional Status just prior to this current hospitalization:  Outpatient Palliative Performance Score (PPS) 60%  Significant disease.  Normal or reduced intake. Normal LOC or confusion. Reduced ambulation, some assist w/self-care, unable to work/do housework.   Does not drive or leave the house with the exception of medical appointments.    Prognosis, Goals, and/or Advance Care Planning:  We discussed general treatment options (full/restorative, selective/conservatives, and comfort only/hospice). We then discussed how these specifically apply to Italo.  Based on this discussion, Italo has decided to continue current plan of care with goal of discharge home.  Discussed what continuing restorative/life-prolonging care entails, including continued (re)admissions to the hospital, continuing with preventative and primary care, seeing disease/organ specific specialty consultations for medical treatments in hopes to prolong life for as long as possible.      Code Status was addressed today:   No established code status; DNR, Do NOT intubate    Patient's decision making preferences: not assessed: patient confused and with questionable decisional capacity.  Open to information sharing with daughter and friend.        Patient has decision-making capacity today for complex decisions: Questionable : Multiple contrary statements by patient surrounding his plan of care.  Lack of insight into the safety of his preferred discharge disposition or need to follow  medication recommendations to maintain medical stability.           Coping, Meaning, & Spirituality:   Mood, coping, and/or meaning in the context of serious illness were addressed today: Yes: patient in distress with regard to current hospitalization and inability to discharge home.  Unable to speak to details regarding coping with medical status since surgery.   Requested  visit for emotional support    Social:   Living situation: lives with roommates  Important relationships/caregivers: Veronique, friend - provides assistance with ADLs,  cooking, cleaning.  Daughter Fatimah and daughter in law Gege involved in visiting patient regularly.    Medications:  Reviewed this patient's medication profile and medications from this hospitalization. Notable medications:oxycodone 5 mg tablet, filled 4/14/25. Gabapentin 300 mg Capsule last filled 1/17/25   [place] [employer] of Pharmacy Data Base Reviewed: Yes:   reviewed - controlled substances reflected in medication list..    ROS:  Comprehensive ROS is reviewed and is negative except as here & per HPI:     Physical Exam   Vital Signs with Ranges  Temp:  [97.3  F (36.3  C)] 97.3  F (36.3  C)  Pulse:  [] 108  Resp:  [16-18] 16  BP: (119-158)/(58-78) 131/69  SpO2:  [94 %-97 %] 97 %  Wt Readings from Last 10 Encounters:   04/29/25 78.6 kg (173 lb 4.5 oz)   04/12/25 86.4 kg (190 lb 6.4 oz)   04/04/25 84.8 kg (187 lb)   04/04/25 83 kg (183 lb)   04/03/25 87.1 kg (192 lb)   04/01/25 87.9 kg (193 lb 12.8 oz)   03/27/25 92.1 kg (203 lb)   03/26/25 92.3 kg (203 lb 8 oz)   03/13/25 92.5 kg (204 lb)   03/03/25 93.9 kg (207 lb)     173 lbs 4.5 oz    PHYSICAL EXAM:  Constitutional: alert and no distress   Cardiovascular: RRR  Respiratory: RR WNL, LS clear  Psychiatric: affect flat, confused, and agitated  Abdomen: soft, non-tender  NEURO: no focal deficits, confused to situation.    Data reviewed:  Results for orders placed or performed during the hospital encounter of  04/27/25 (from the past 24 hours)   Troponin T, High Sensitivity   Result Value Ref Range    Troponin T, High Sensitivity 121 (HH) <=22 ng/L   Glucose by meter   Result Value Ref Range    GLUCOSE BY METER POCT 332 (H) 70 - 99 mg/dL   Troponin T, High Sensitivity   Result Value Ref Range    Troponin T, High Sensitivity 126 (HH) <=22 ng/L   Glucose by meter   Result Value Ref Range    GLUCOSE BY METER POCT 285 (H) 70 - 99 mg/dL   Glucose by meter   Result Value Ref Range    GLUCOSE BY METER POCT 300 (H) 70 - 99 mg/dL   C. difficile Toxin B PCR with reflex to C. difficile EIA    Specimen: Per Rectum; Stool   Result Value Ref Range    C Difficile Toxin B by PCR Negative Negative    Narrative    The [employer] Xpert C. difficile Assay, performed on the [employer], is a qualitative in vitro diagnostic test for rapid detection of toxin B gene sequences from unformed (liquid or soft) stool specimens collected from patients suspected of having Clostridioides difficile infection (CDI). The test utilizes automated real-time polymerase chain reaction (PCR) to detect toxin gene sequences associated with toxin producing C. difficile. The Xpert C. difficile Assay is intended as an aid in the diagnosis of CDI.   Enteric Bacteria and Virus Panel PCR    Specimen: Per Rectum; Stool   Result Value Ref Range    Campylobacter species Negative Negative    Salmonella species Negative Negative    Vibrio species Negative Negative    Vibrio cholerae Negative Negative    Yersinia enterocolitica Negative Negative    Enteropathogenic E. coli (EPEC) Negative Negative, NA    Shiga-like toxin-producing E. coli (STEC) Negative Negative    Shigella/Enteroinvasive E. coli (EIEC) Negative Negative    Cryptosporidium species Negative Negative    Giardia lamblia Negative Negative    Norovirus Gl/Gll Negative Negative    Rotavirus A Negative Negative    Plesiomonas shigelloides Negative Negative    Enteroaggregative E. coli (EAEC)  Negative Negative    Enterotoxigenic E. coli (ETEC) Negative Negative    E. coli O157 NA Negative, NA    Cyclospora cayetanensis Negative Negative    Entamoeba histolytica Negative Negative    Adenovirus F40/41 Negative Negative    Astrovirus Negative Negative    Sapovirus Negative Negative    Narrative    Assay performed using the FDA-cleared FilmArray GI Panel from [employer].  A negative result should not rule out infection in patients with a probability for gastrointestinal infection. The assay does not test for all potential infectious agents of diarrheal disease.  Positive results do not distinguish between a viable or replicating organism and the presence of a nonviable organism or nucleic acid, nor do they exclude the possibility of coinfection by organisms not in the panel.  Results are intended to aid in the diagnosis of illness and are meant to be used in conjunction with other clinical findings.  This test has been verified and is performed by the Infectious Diseases Diagnostic Laboratory at [facility]. This laboratory is certified under the Clinical Laboratory Improvement Amendments of 1988 (CLIA-88) as qualified to perform high complexity clinical laboratory testing.   Basic metabolic panel   Result Value Ref Range    Sodium 130 (L) 135 - 145 mmol/L    Potassium 3.8 3.4 - 5.3 mmol/L    Chloride 96 (L) 98 - 107 mmol/L    Carbon Dioxide (CO2) 24 22 - 29 mmol/L    Anion Gap 10 7 - 15 mmol/L    Urea Nitrogen 54.1 (H) 8.0 - 23.0 mg/dL    Creatinine 1.99 (H) 0.67 - 1.17 mg/dL    GFR Estimate 34 (L) >60 mL/min/1.73m2    Calcium 8.8 8.8 - 10.4 mg/dL    Glucose 272 (H) 70 - 99 mg/dL   Glucose by meter   Result Value Ref Range    GLUCOSE BY METER POCT 265 (H) 70 - 99 mg/dL       Medical Decision Making       Please see A&P for additional details of medical decision making.  MANAGEMENT DISCUSSED with the following over the past 24 hours: hospitalist, nursing staff, OT, case management    NOTE(S)/MEDICAL RECORDS REVIEWED over the past 24 hours: previous hospitalization records, H&P for current hospitalization, RN notes, progress notes.  Tests REVIEWED in the past 24 hours:  - See lab/imaging results included in the data section of the note  SUPPLEMENTAL HISTORY, in addition to the patient's history, over the past 24 hours obtained from:   - adult child  Medical complexity over the past 24 hours:  - goals of care discussion, discussion regarding decisional capacity and need for work up.

## 2025-04-29 NOTE — PLAN OF CARE
"  Problem: Nausea and Vomiting  Goal: Nausea and Vomiting Relief  Outcome: Progressing     Problem: Glycemic Control Impaired  Goal: Blood Glucose Level Within Targeted Range  Outcome: Progressing   Goal Outcome Evaluation:    Patient has a very flat affect. He answers with minimal response to questions. Stated that he did not want to order meals. S/S given since BS were 265 & 214, no CHO coverage. Patient requesting that his pills are crushed and \"put in something\" given with lemon ice.   Palliative care saw patient today and spoke with the family via telephone.   Psych consult done via [employer] ipad.     Denied nausea. Did endorse feeling the need to have a BM, placed on bedpan for safety since fall occurred last PM with ambulation to the bathroom. No movement happened. He feels as though he may be constipated but has had no intake to make output.    OT tried to evaluate but patient became agitated and session was ended. MD ordered SLUMS.    NS running @ 100 mL/hr for possible dehydration.                    "

## 2025-04-29 NOTE — PROGRESS NOTES
"Care Management Follow Up    Length of Stay (days): 1    Expected Discharge Date: 04/30/2025     Concerns to be Addressed: Care progression - discharge planning     Patient plan of care discussed at interdisciplinary rounds: Yes    Anticipated Discharge Disposition:  OT rec Transitional Care     Anticipated Discharge Services:  TBD  Anticipated Discharge DME:  [facility]    Patient/family educated on Medicare website which has current facility and service quality ratings:  NA  Education Provided on the Discharge Plan:  Yes per team  Patient/Family in Agreement with the Plan: NA    Referrals Placed by CM/SW:  [facility]  Private pay costs discussed: Not applicable    Discussed  Partnership in Safe Discharge Planning  document with patient/family: Yes, Veronique amaro    Handoff Completed: No, handoff not indicated or clinically appropriate    Additional Information:  0536 rec'd a voicemail from [facility] with [facility], will need discharge orders for JC when patient discharge for RN/ST/RD/PT/OT/SW/HHA. Questions call [phone] option 3    Met with patient at bedside to discuss the OT rec for Transitional Care. Patient declined stating, \"No. I want to go home. I can leave right now.\" Patient is not sure who will provide transportation.  Writer provided a list of local skilled nursing facilities (which includes the medicare.gov website) for patient and family to review.     RNCM asked to speak with family and patient agreed, but states, \"That's not going to make me change my mind.\"    Next Steps: RNCM to follow for medical progression, recommendations, and final discharge plan.     NEFTALI Boyle stated family said patient is not able to make own decision. Call Veronique, who is the surrogate decision maker.     Called patient's niece, Veronique, and she does not want patient to return home. Veronique will not be home for the rest of the week. She would like referrals sent to   [facility] R[facility] and " [facility].  Discussed LTC and Veronique thought this might be a good option, but would like to discuss with the rest of the family before making a decision. Also discuss LTC-MA if funding is an issue.    1523 rec'd a [facility] IP Consult: Hospice. [facility].   Referral sent    Called Veronique and she agreed for the referral

## 2025-04-29 NOTE — PROGRESS NOTES
04/29/25 1109   Appointment Info   Signing Clinician's Name / Credentials (OT) Emma Gomez OT   Living Environment   People in Home friend(s)  (lives w/renter)   Current Living Arrangements house   Home Accessibility stairs to enter home;stairs within home   Number of Stairs, Main Entrance 3   Stair Railings, Main Entrance none   Number of Stairs, Within Home, Primary five   Stair Railings, Within Home, Primary railings safe and in good condition   Transportation Anticipated family or friend will provide   Living Environment Comments pt was independent w/his ADLs/mobility   Self-Care   Usual Activity Tolerance good   Current Activity Tolerance fair   Equipment Currently Used at Home walker, standard;shower chair;grab bar, toilet;grab bar, tub/shower   Fall history within last six months yes   Number of times patient has fallen within last six months 1   Instrumental Activities of Daily Living (IADL)   Previous Responsibilities meal prep;laundry;housekeeping;shopping;medication management;finances;driving  (family assist PRN, renter does yard)   General Information   Onset of Illness/Injury or Date of Surgery 04/27/25   Referring Physician Dr King   Patient/Family Therapy Goal Statement (OT) go home   Additional Occupational Profile Info/Pertinent History of Current Problem 74 year old male with recent CABG  and has HTN and DM type 2 -- and now with chest pressure, nausea, vomiting and diarrhea.   Existing Precautions/Restrictions cardiac;sternal   Left Upper Extremity (Weight-bearing Status) non weight-bearing (NWB)   Right Upper Extremity (Weight-bearing Status) non weight-bearing (NWB)   Left Lower Extremity (Weight-bearing Status) full weight-bearing (FWB)   Right Lower Extremity (Weight-bearing Status) full weight-bearing (FWB)   Cognitive Status Examination   Orientation Status orientation to person, place and time   Follows Commands WNL   Visual Perception   Visual Impairment/Limitations corrective  lenses for reading   Sensory   Sensory Quick Adds bilateral UE   Bilateral UE Sensory Assessment impaired   Pain Assessment   Patient Currently in Pain No   Posture   Posture forward head position   Range of Motion Comprehensive   General Range of Motion no range of motion deficits identified   Strength Comprehensive (MMT)   General Manual Muscle Testing (MMT) Assessment no strength deficits identified   Muscle Tone Assessment   Muscle Tone Quick Adds No deficits were identified   Coordination   Upper Extremity Coordination No deficits were identified   Bed Mobility   Bed Mobility supine-sit;sit-supine   Supine-Sit Garrison (Bed Mobility) moderate assist (50% patient effort)   Sit-Supine Garrison (Bed Mobility) moderate assist (50% patient effort)   Comment (Bed Mobility) pt needs cues for sternal precautions   Transfers   Transfers sit-stand transfer   Sit-Stand Transfer   Sit-Stand Garrison (Transfers) minimum assist (75% patient effort)   Assistive Device (Sit-Stand Transfers) walker, front-wheeled   Balance   Balance Assessment standing dynamic balance   Standing Balance: Dynamic minimal assist   Position/Device Used, Standing Balance walker, rolling   Activities of Daily Living   BADL Assessment/Intervention lower body dressing   Lower Body Dressing Assessment/Training   Garrison Level (Lower Body Dressing) minimum assist (75% patient effort)   Clinical Impression   Criteria for Skilled Therapeutic Interventions Met (OT) Yes, treatment indicated   OT Diagnosis nausea, vomiting   Influenced by the following impairments fatigue, decreased ADLs/balance   OT Problem List-Impairments impacting ADL activity tolerance impaired   Assessment of Occupational Performance 3-5 Performance Deficits   Identified Performance Deficits fatigue, decreased ADLs/balance   Planned Therapy Interventions (OT) ADL retraining   Clinical Decision Making Complexity (OT) detailed assessment/moderate complexity   Risk &  Benefits of therapy have been explained evaluation/treatment results reviewed;care plan/treatment goals reviewed;risks/benefits reviewed;participants voiced agreement with care plan   OT Total Evaluation Time   OT Eval, Moderate Complexity Minutes (57925) 10   OT Goals   Therapy Frequency (OT) 5 times/week   OT Predicted Duration/Target Date for Goal Attainment 05/05/25   OT Goals Hygiene/Grooming;Lower Body Dressing;Toilet Transfer/Toileting;Cognition   OT: Hygiene/Grooming modified independent   OT: Lower Body Dressing Modified independent   OT: Toilet Transfer/Toileting Modified independent   OT: Cognitive Patient/caregiver will verbalize understanding of cognitive assessment results/recommendations as needed for safe discharge planning   OT: Understanding of cardiac education to maximize quality of life, condition management, and health outcomes Completed   Self-Care/Home Management   Self-Care/Home Mgmt/ADL, Compensatory, Meal Prep Minutes (48806) 10   Treatment Detail/Skilled Intervention transfers Min A of 1 w/ RW pt took 4 steps-fearful of falling, G/H Min A sitting EOB w/cues to initiate tasks, LB dress Min A due to decreased standing balance to pull up pants   Symptoms Noted During/After Treatment (Meal Preparation/Planning Training) fatigue   OT Discharge Planning   OT Plan progress ambulation w/ 4ww-only taking a few steps, sternal prec (CABG 3/17), dressing/toileting, SLUMs   OT Discharge Recommendation (DC Rec) Transitional Care Facility  (family indicated pt is not caring for self)   OT Rationale for DC Rec Pt lives alone and has a decline in his ADLs/mobility requiring a TCU to increase skills to PLOF   OT Brief overview of current status Min A w/ADLs/mobility   Total Session Time   Timed Code Treatment Minutes 10   Total Session Time (sum of timed and untimed services) 20

## 2025-04-29 NOTE — SIGNIFICANT EVENT
Significant Event Note    Time of event: 12:10 AM April 29, 2025    Description of event:  RN paged regarding a fall.     Staff in the room patient was going to the restroom and felt his legs were weak and as lowerd to the ground, patient denies hitting his head or an ypart of ** discussed with RN.     Plan:  ***    Discussed with: { :[id number]}    Emmanuel Greenfield MD

## 2025-04-29 NOTE — PROGRESS NOTES
"PRIMARY DIAGNOSIS: \"GENERIC\" NURSING  OUTPATIENT/OBSERVATION GOALS TO BE MET BEFORE DISCHARGE:  ADLs back to baseline: No    Activity and level of assistance: Up with maximum assistance. Consider SW and/or PT evaluation. Still not safe on feet.    Pain status: Pain free.    Return to near baseline physical activity: No     Discharge Planner Nurse   Safe discharge environment identified: No  Barriers to discharge: Yes       Entered by: Jodee Marroquin RN 04/29/2025 7:48 AM     Please review provider order for any additional goals.   Nurse to notify provider when observation goals have been met and patient is ready for discharge.   "

## 2025-04-29 NOTE — PLAN OF CARE
"PRIMARY DIAGNOSIS: \"GENERIC\" NURSING  OUTPATIENT/OBSERVATION GOALS TO BE MET BEFORE DISCHARGE:  ADLs back to baseline: Yes    Activity and level of assistance: Up with standby assistance.    Pain status: Denies pain    Return to near baseline physical activity: Yes     Discharge Planner Nurse   Safe discharge environment identified: No  Barriers to discharge: Yes awaiting pallitive and psych consult       Entered by: Lidia Mcgowan RN 04/28/2025 8:09 PM     Patient AxO x4. Off tele. RA. Denies chest pain, but endorses SOB at times- O2 sats WDL. Denies pain. 1x IV Zofran administered per MAR. C-Diff precautions placed but awaiting rule out- Needs stool sample still. Patient SBA/ x1 assist. BS monitored, per NST BS for evening 121-  no evening insulin administered. Patient able to make needs known, call light within reach. POCC.     Lidia Mcgowan RN on 4/28/2025 at 10:23 PM PM      "

## 2025-04-29 NOTE — CONSULTS
SPIRITUAL HEALTH SERVICES Progress Note  [facility], P3    Saw pt Eric Cordoba per consult order.    Patient/Family Understanding of Illness and Goals of Care - Italo stated that he was not sure what was keeping him hospitalized and that he really wants to go home. He shared some about his cardiac history. Writer did visit with him during recent hospitalization after his heart surgery. He stated that he is really tired and that he wants to sleep in his own bed.      Distress and Loss - Ongoing health challenges and wanting to be home.     Strengths, Coping, and Resources - Not discussed during this visit.     Meaning, Beliefs, and Spirituality - He is open to SHS visits as able. He comes from [place] background; no current louann community.     Plan of Care - A  will continue to visit as able or per request by patient/family/staff.      Yovani Loyola MDiv, [facility]  /Manager Spiritual Health Services  [phone]       Spiritual Health Services is available 24/7 for emergent requests and consults, either by paging the on-call  or by entering an ASAP/STAT consult in [employer], which will also page the on-call .

## 2025-04-29 NOTE — PROGRESS NOTES
[facility]    Medicine Progress Note - Hospitalist Service    Date of Admission:  4/27/2025    Assessment & Plan   74 year old male with history of coronary artery disease s/p CABG 03/17/25, HFrEF, DM2, CKD, mood disorder, recent pulmonary embolus, starvation ketosis and known cognitive impairment presents 4/27 with nausea, vomiting and diarrhea.       Goals of care:  -- will consult palliative care to clarify goals of care since family states he is not safe to return home, patient tells me he is interested in hospice but then tells care manager he wants home care  -- prior discharge summary notes he was discharged home on hospice but care manager tells me that never happened and family does confirm that  -- family notes consistent medication noncompliance and concerns for safety at home  -- family also concerned with decision making capacity and requests psychiatry consultation  -- OT recommends TCU, PT assessment pending  -- anticipate difficult dispo unless patient agreeable to TCU since his POA and caregiver cannot manage patient at home  -- will consult hospice to see as well      N/V/D: resolved  -- infectious stool studies negative, can lift enteric precautions  -- continue supportive cares for suspected viral gastroenteritis      JESS on CKD3: likely related to dehydration from poor po intake  -- start IVF      Hyponatremia: hypovolemic confirmed with U Na<20  -- monitor for improvement with IVF       CAD  Troponin elevation:  Likely related to demand ischemia and trop has started to plateau  Denies chest pain  Recent CABG noted but also unclear if patient is compliant with home medications  -- continue home asa, plavix, statin, imdur and coreg  -- stop tele since patient is DNR      DM2:   -- increase home lantus  -- continue high intensity sliding scale insulin and add mealtime coverage at 1:10 ratio        Recent PE: continue DOAC      Mood disorder: continue home prozac      GERD:  "continue home PPI         Diet: Moderate Consistent Carb (60 g CHO per Meal) Diet    DVT Prophylaxis: DOAC  Riojas Catheter: Not present  Lines: None     Cardiac Monitoring: None  Code Status: No CPR- Do NOT Intubate      Clinically Significant Risk Factors Present on Admission         # Hyponatremia: Lowest Na = 130 mmol/L in last 2 days, will monitor as appropriate  # Hypochloremia: Lowest Cl = 93 mmol/L in last 2 days, will monitor as appropriate     # Anion Gap Metabolic Acidosis: Highest Anion Gap = 25 mmol/L in last 2 days, will monitor and treat as appropriate     # Drug Induced Coagulation Defect: home medication list includes an anticoagulant medication  # Drug Induced Platelet Defect: home medication list includes an antiplatelet medication   # Hypertension: Noted on problem list  # Chronic heart failure with reduced ejection fraction: last echo with EF <40%         # DMII: A1C = 7.2 % (Ref range: <5.7 %) within past 6 months   # Overweight: Estimated body mass index is 26.35 kg/m  as calculated from the following:    Height as of this encounter: 1.727 m (5' 8\").    Weight as of this encounter: 78.6 kg (173 lb 4.5 oz).         # Financial/Environmental Concerns: none   # History of CABG: noted on surgical history       Disposition Plan   Medically Ready for Discharge: Anticipated in 2-4 Days      Ru King DO  Hospitalist Service  [facility]  Securely message with [employer] (more info)  Text page via [employer] Paging/Directory   ______________________________________________________________________    Interval History   NAD, denies any specific complaints    Physical Exam   Vital Signs: Temp: 97.3  F (36.3  C) Temp src: Oral BP: 131/69 Pulse: 108   Resp: 16 SpO2: 97 % O2 Device: None (Room air)    Weight: 173 lbs 4.5 oz  General: NAD  RESPIRATORY: Breathing nonlabored  CARDIOVASCULAR: No le edema bilat.   NEUROLOGIC: Alert, speech clear         >50 MINUTES SPENT BY ME on the date " of service doing chart review, history, exam, documentation & further activities per the note.  Data

## 2025-04-30 ENCOUNTER — APPOINTMENT (OUTPATIENT)
Dept: PHYSICAL THERAPY | Facility: HOSPITAL | Age: 75
End: 2025-04-30
Attending: INTERNAL MEDICINE
Payer: COMMERCIAL

## 2025-04-30 LAB
ANION GAP SERPL CALCULATED.3IONS-SCNC: 8 MMOL/L (ref 7–15)
ATRIAL RATE - MUSE: 90 BPM
BUN SERPL-MCNC: 45 MG/DL (ref 8–23)
CALCIUM SERPL-MCNC: 8.5 MG/DL (ref 8.8–10.4)
CHLORIDE SERPL-SCNC: 106 MMOL/L (ref 98–107)
CREAT SERPL-MCNC: 1.76 MG/DL (ref 0.67–1.17)
DIASTOLIC BLOOD PRESSURE - MUSE: NORMAL MMHG
EGFRCR SERPLBLD CKD-EPI 2021: 40 ML/MIN/1.73M2
GLUCOSE BLDC GLUCOMTR-MCNC: 100 MG/DL (ref 70–99)
GLUCOSE BLDC GLUCOMTR-MCNC: 113 MG/DL (ref 70–99)
GLUCOSE BLDC GLUCOMTR-MCNC: 120 MG/DL (ref 70–99)
GLUCOSE BLDC GLUCOMTR-MCNC: 72 MG/DL (ref 70–99)
GLUCOSE SERPL-MCNC: 113 MG/DL (ref 70–99)
HCO3 SERPL-SCNC: 24 MMOL/L (ref 22–29)
HOLD SPECIMEN: NORMAL
INTERPRETATION ECG - MUSE: NORMAL
P AXIS - MUSE: 46 DEGREES
POTASSIUM SERPL-SCNC: 3.5 MMOL/L (ref 3.4–5.3)
PR INTERVAL - MUSE: 160 MS
QRS DURATION - MUSE: 152 MS
QT - MUSE: 444 MS
QTC - MUSE: 543 MS
R AXIS - MUSE: 52 DEGREES
SODIUM SERPL-SCNC: 138 MMOL/L (ref 135–145)
SYSTOLIC BLOOD PRESSURE - MUSE: NORMAL MMHG
T AXIS - MUSE: 107 DEGREES
VENTRICULAR RATE- MUSE: 90 BPM

## 2025-04-30 PROCEDURE — 82565 ASSAY OF CREATININE: CPT | Performed by: INTERNAL MEDICINE

## 2025-04-30 PROCEDURE — 93010 ELECTROCARDIOGRAM REPORT: CPT | Performed by: STUDENT IN AN ORGANIZED HEALTH CARE EDUCATION/TRAINING PROGRAM

## 2025-04-30 PROCEDURE — 93005 ELECTROCARDIOGRAM TRACING: CPT

## 2025-04-30 PROCEDURE — 250N000013 HC RX MED GY IP 250 OP 250 PS 637: Performed by: INTERNAL MEDICINE

## 2025-04-30 PROCEDURE — 97162 PT EVAL MOD COMPLEX 30 MIN: CPT | Mod: GP

## 2025-04-30 PROCEDURE — 36415 COLL VENOUS BLD VENIPUNCTURE: CPT | Performed by: INTERNAL MEDICINE

## 2025-04-30 PROCEDURE — G0463 HOSPITAL OUTPT CLINIC VISIT: HCPCS

## 2025-04-30 PROCEDURE — 99232 SBSQ HOSP IP/OBS MODERATE 35: CPT | Performed by: NURSE PRACTITIONER

## 2025-04-30 PROCEDURE — 120N000004 HC R&B MS OVERFLOW

## 2025-04-30 PROCEDURE — 258N000003 HC RX IP 258 OP 636: Performed by: INTERNAL MEDICINE

## 2025-04-30 PROCEDURE — 99233 SBSQ HOSP IP/OBS HIGH 50: CPT | Performed by: INTERNAL MEDICINE

## 2025-04-30 RX ADMIN — CLOPIDOGREL 75 MG: 75 TABLET ORAL at 08:46

## 2025-04-30 RX ADMIN — APIXABAN 5 MG: 5 TABLET, FILM COATED ORAL at 20:32

## 2025-04-30 RX ADMIN — SODIUM CHLORIDE: 0.9 INJECTION, SOLUTION INTRAVENOUS at 04:53

## 2025-04-30 RX ADMIN — SIMVASTATIN 40 MG: 10 TABLET, FILM COATED ORAL at 20:32

## 2025-04-30 RX ADMIN — ASPIRIN 81 MG: 81 TABLET, COATED ORAL at 08:46

## 2025-04-30 RX ADMIN — ISOSORBIDE MONONITRATE 30 MG: 30 TABLET, EXTENDED RELEASE ORAL at 20:32

## 2025-04-30 RX ADMIN — FLUOXETINE HYDROCHLORIDE 20 MG: 20 CAPSULE ORAL at 08:45

## 2025-04-30 RX ADMIN — PANTOPRAZOLE SODIUM 40 MG: 40 TABLET, DELAYED RELEASE ORAL at 05:53

## 2025-04-30 RX ADMIN — ACETAMINOPHEN 650 MG: 325 TABLET ORAL at 05:53

## 2025-04-30 RX ADMIN — CARVEDILOL 12.5 MG: 12.5 TABLET, FILM COATED ORAL at 17:41

## 2025-04-30 RX ADMIN — CARVEDILOL 12.5 MG: 12.5 TABLET, FILM COATED ORAL at 08:45

## 2025-04-30 RX ADMIN — APIXABAN 5 MG: 5 TABLET, FILM COATED ORAL at 08:45

## 2025-04-30 RX ADMIN — PANTOPRAZOLE SODIUM 40 MG: 40 TABLET, DELAYED RELEASE ORAL at 16:14

## 2025-04-30 ASSESSMENT — ACTIVITIES OF DAILY LIVING (ADL)
ADLS_ACUITY_SCORE: 60
ADLS_ACUITY_SCORE: 55
ADLS_ACUITY_SCORE: 60
ADLS_ACUITY_SCORE: 55
ADLS_ACUITY_SCORE: 60
ADLS_ACUITY_SCORE: 55
ADLS_ACUITY_SCORE: 55
ADLS_ACUITY_SCORE: 60
ADLS_ACUITY_SCORE: 60
ADLS_ACUITY_SCORE: 55

## 2025-04-30 NOTE — PROGRESS NOTES
"  PALLIATIVE CARE PROGRESS NOTE  [facility]     Patient Name: Eric Cordoba  Date of Admission: 4/27/2025   Today the patient was seen for: goals of care review, patient/family support     Recommendations & Counseling       GOALS OF CARE:   Life-prolonging with limits : DNR, Do NOT intubate  Facility placement pending  Hospice consult placed, appreciate facilitation by Case management  Goals of care established for discharge to facility with hospice enrollment per chart review.     ADVANCE CARE PLANNING:  No health care directive on file. Per system policy, Surrogate Decision-makers for Patients With Diminished Decision-making Capacity offers guidance on possible decision-makers. Fatimah Cordoba (daughter) has been identified as a surrogate decision maker.   Also noted as significant support is friend Veronique Owens.   Per documentation by Manas Chen dated 4/14/25:  Eric Cordoba has NO KNOWN legal document designating a medical decision maker.   Eric Cordoba has presented an INVALID Health Care Directive dated 2/21/2025. See scanned \"Invalid HCD\" Validation Form for details. Notification letter sent to patient (view in Letters Navigator).   Per system policy, if the patient has been determined by a Physician/APRN/PA-C to lack the capacity to make their own decisions AND there is no legal document naming a decision maker, review the \"Surrogate Decision-makers for Patients With Diminished Decision-making Capacity\" policy for guidance.   For questions contact Manas Chen: [email or web address] or [phone].  There is a POLST form on file, this was reviewed and current.  Code status: No CPR- Do NOT Intubate    MEDICAL MANAGEMENT:   We are not actively managing symptoms at this time.    PSYCHOSOCIAL/SPIRITUAL:  Family : daughter: Fatimah Cordoba, daughter in law: Gege Cordoba, friend: Veronique Owens  [place] community: [facility]     Palliative care will sign off.  Please " re-consult should additional goals of care or symptom management needs arise. Thank you for the consult and allowing us to aid in the care of Eric Cordoba.    These recommendations have been discussed with Primary team, nursing staff, case management.    KYMBERLY Bajwa CNP  [facility], Palliative Care  Securely message with the [employer] (learn more here) or  Text page via [facility] Paging/Directory        Assessment          Eric Cordoba is a 74 year old male with a past medical history of CAD s/p CABG x 4 on 3/17/25, HTN, DM type 2, recent Pulmonary Embolus on Eliquis who presented on 4/28/25 with chest pressure, n/v and diarrhea. Work-up negative for C. Diff.       Today the patient was seen for:  Patient and family support      Interval History:     Multidisciplinary collaboration:  Patient assessed to be lacking capacity by psychiatry. Placement issues that require further discussion by case management.    Patient/family narrative    Met with Italo at the bedside.  He is confused and unable to participate in significant discussion. Denied questions and was concerned with discharge plan.      Unable to reach supports/family for further conversation.     Review of Systems:     Besides above, ROS was reviewed and is unremarkable        Physical Exam:   Temp:  [97.3  F (36.3  C)-97.6  F (36.4  C)] 97.5  F (36.4  C)  Pulse:  [] 95  Resp:  [18-20] 18  BP: (112-131)/(61-82) 131/67  SpO2:  [96 %-97 %] 96 %  175 lbs 14.83 oz    Physical Exam  Cardiovascular:      Rate and Rhythm: Normal rate.      Pulses: Normal pulses.   Pulmonary:      Effort: Pulmonary effort is normal.   Abdominal:      General: Bowel sounds are normal.   Neurological:      Mental Status: He is alert. He is disoriented.   Psychiatric:         Mood and Affect: Affect is labile.         Speech: Speech is tangential.         Cognition and Memory: Cognition is impaired. He exhibits impaired recent memory.           Data Reviewed:      Results for orders placed or performed during the hospital encounter of 04/27/25 (from the past 24 hours)   Glucose by meter   Result Value Ref Range    GLUCOSE BY METER POCT 138 (H) 70 - 99 mg/dL   Glucose by meter   Result Value Ref Range    GLUCOSE BY METER POCT 126 (H) 70 - 99 mg/dL   ECG 12-LEAD WITH MUSE (LHE)   Result Value Ref Range    Systolic Blood Pressure  mmHg    Diastolic Blood Pressure  mmHg    Ventricular Rate 90 BPM    Atrial Rate 90 BPM    NE Interval 160 ms    QRS Duration 152 ms     ms    QTc 543 ms    P Axis 46 degrees    R AXIS 52 degrees    T Axis 107 degrees    Interpretation ECG       Sinus rhythm with marked sinus arrhythmia  Right bundle branch block  Inferior infarct (cited on or before 07-Jan-2025)  Anterior infarct (cited on or before 07-Jan-2025)  Abnormal ECG  When compared with ECG of 27-Apr-2025 21:08,  Premature ventricular complexes are no longer Present  Questionable change in initial forces of Septal leads  Nonspecific T wave abnormality has replaced inverted T waves in Inferior leads  Confirmed by JAIME OCHOA MD LOC:JN ([id number]) on 4/30/2025 7:37:28 AM     Basic metabolic panel   Result Value Ref Range    Sodium 138 135 - 145 mmol/L    Potassium 3.5 3.4 - 5.3 mmol/L    Chloride 106 98 - 107 mmol/L    Carbon Dioxide (CO2) 24 22 - 29 mmol/L    Anion Gap 8 7 - 15 mmol/L    Urea Nitrogen 45.0 (H) 8.0 - 23.0 mg/dL    Creatinine 1.76 (H) 0.67 - 1.17 mg/dL    GFR Estimate 40 (L) >60 mL/min/1.73m2    Calcium 8.5 (L) 8.8 - 10.4 mg/dL    Glucose 113 (H) 70 - 99 mg/dL   Extra Purple Top EDTA (LAB USE ONLY)   Result Value Ref Range    Hold Specimen JIC    Glucose by meter   Result Value Ref Range    GLUCOSE BY METER POCT 120 (H) 70 - 99 mg/dL   Glucose by meter   Result Value Ref Range    GLUCOSE BY METER POCT 100 (H) 70 - 99 mg/dL         Medical Decision Making       35 MINUTES SPENT BY ME on the date of service doing chart review, history, exam, documentation & further  activities per the note.

## 2025-04-30 NOTE — DISCHARGE INSTRUCTIONS
WOC DISCHARGE INSTRUCTIONS:  Gluteal crease wound(s): BID and PRN  Cleanse with normal saline and pat dry  Apply layer of Critic Aid Clear to crease  Do not cover with mepilex

## 2025-04-30 NOTE — PROVIDER NOTIFICATION
RN updated Dr. King of bed sore,  pt's refusal of turning, and refusing to eat this morning.  Woc consult already in.

## 2025-04-30 NOTE — PROGRESS NOTES
"CLINICAL NUTRITION SERVICES - ASSESSMENT NOTE    RECOMMENDATIONS FOR MDs/PROVIDERS TO ORDER:    Registered Dietitian Interventions:  Ensure Plus High Protein-Vanilla @SUP     Future/Additional Recommendations:  Is pt taking supplement or any other documented PO     REASON FOR ASSESSMENT  MST 3 (reported: unsure if recent wt loss [2], decreased appetite [1])    74 year old male with history of coronary artery disease s/p CABG 03/17/25, HFrEF, DM2, CKD, mood disorder, recent pulmonary embolus, starvation ketosis and known cognitive impairment presents 4/27 with nausea, vomiting and diarrhea.      Goals of care:  -- await hospice consultation  -- likely will need hospice placement vs TCU  -- plan meeting with hospice 5/2...    N/V/D: resolved  -- infectious stool studies negative   -- still with no appetite     JESS on CKD3: resolved  Likely related to dehydration from po    Pallative 4/29: \"Regarding his appetite, Veronique states that Italo has not been eating well since his cardiac surgery.  States that he reports lack of appetite.  She requested appetite stimulant if it would be helpful.  \"    INFORMATION OBTAINED  Pt first reported not interested in food at this time.   Said he had a rice krispie bar today (observe pt ordered just this for SUP 4/29)  Asked pt if he takes any nutrition supplements: He said he takes them \"all the time\" at home, likes vanilla, would like one per day.    NUTRITION HISTORY  As above: very little PO intake PTA since surgery 3/17    CURRENT NUTRITION ORDERS  Diet: 60gCHO    CURRENT INTAKE/TOLERANCE  Four documented meals since admit: all recorded zero%.    LABS  Nutrition-relevant labs: Reviewed    MEDICATIONS  Nutrition-relevant medications: Reviewed    ANTHROPOMETRICS  Height: 172.7 cm (5' 8\")  Admission Weight: 78.9 kg (174 lb) (04/27/25 2144)   Most Recent Weight: 79.8 kg (175 lb 14.8 oz) (04/30/25 0300)  IBW: 68.4 kg  BMI: Body mass index is 26.75 kg/m .     Weight History:   04/30/25 : " 79.8 kg (175 lb 14.8 oz)  04/12/25 : 86.4 kg (190 lb 6.4 oz)  04/04/25 : 84.8 kg (187 lb)  04/04/25 : 83 kg (183 lb)  04/03/25 : 87.1 kg (192 lb)  04/01/25 : 87.9 kg (193 lb 12.8 oz) -9.2% w/i 1m (almost 2x SIG)  03/27/25 : 92.1 kg (203 lb)  03/26/25 : 92.3 kg (203 lb 8 oz)  03/13/25 : 92.5 kg (204 lb)  03/03/25 : 93.9 kg (207 lb)  02/24/25 : 94.8 kg (209 lb)    01/13/25 : 94.3 kg (208 lb)  01/07/25 : 94.8 kg (209 lb)  (There is a large time gap between weights for chart review Aug 2021 to Jan 2025)    Dosing Weight: 79.8 kg, based on actual wt    ASSESSED NUTRITION NEEDS  Estimated Energy Needs: 1815 kcals/day ([place] Jeor*1.2)  Justification: Maintenance  Estimated Protein Needs: 96 grams protein/day ( kg*1.2 )  Justification: Maintenance  Estimated Fluid Needs: ~1800 mL/day (1 mL/kcal)  Justification: estimate    SYSTEM AND PHYSICAL FINDINGS    GI symptoms: Reviewed  Skin/wounds: Reviewed    MALNUTRITION  % Intake: </=50% for >/= 1 month (severe) based on chart review of those who know pt providing feedback on PO since surgery 3/17/25, corroborated by wt loss  % Weight Loss: > 5% in 1 month (severe)   Subcutaneous Fat Loss:  deferred  Muscle Loss: deferred  Fluid Accumulation/Edema: None noted  Malnutrition Diagnosis: Severe malnutrition in the context of social or environmental circumstances  Malnutrition Present on Admission: Yes    NUTRITION DIAGNOSIS  Severe malnutrition in the context of social or environmental circumstances    INTERVENTIONS  Ensure Plus High Protein-Vanilla @SUP  (Unclear if pt will actually take this per documented PO this admit, but was his request)    GOALS  Patient to consume % of nutritionally adequate meal trays TID, or the equivalent with supplements/snacks.     MONITORING/EVALUATION  Progress toward goals will be monitored and evaluated per policy.

## 2025-04-30 NOTE — PLAN OF CARE
Problem: Pain Acute  Goal: Optimal Pain Control and Function  Outcome: Progressing  Intervention: Develop Pain Management Plan  Recent Flowsheet Documentation  Taken 4/30/2025 0553 by Tyesha Parker RN  Pain Management Interventions:   medication (see MAR)   MD notified (comment)  Intervention: Prevent or Manage Pain  Recent Flowsheet Documentation  Taken 4/29/2025 2337 by Tyesha Parker RN  Medication Review/Management: medications reviewed  Taken 4/29/2025 2015 by Tyesha Parker RN  Medication Review/Management: medications reviewed     Problem: Fall Injury Risk  Goal: Absence of Fall and Fall-Related Injury  Outcome: Progressing  Intervention: Identify and Manage Contributors  Recent Flowsheet Documentation  Taken 4/29/2025 2337 by Tyesha Parker RN  Medication Review/Management: medications reviewed  Taken 4/29/2025 2015 by Tyesha Parker RN  Medication Review/Management: medications reviewed  Intervention: Promote Injury-Free Environment  Recent Flowsheet Documentation  Taken 4/29/2025 2337 by Tyesha Parker RN  Safety Promotion/Fall Prevention:   activity supervised   assistive device/personal items within reach   clutter free environment maintained   nonskid shoes/slippers when out of bed   toileting scheduled  Taken 4/29/2025 2015 by Tyesha Parker RN  Safety Promotion/Fall Prevention:   activity supervised   assistive device/personal items within reach   clutter free environment maintained   nonskid shoes/slippers when out of bed   toileting scheduled   Goal Outcome Evaluation:       VSS on RA. Med surg. Pt noted to have irregular heart beat, EKG ordered that showed sinus arrhythmia - Dr Guthrie notified. Pt noted to have pressure sore on inner buttocks stage 2, WOC consult was placed and low air loss bed ordered. Pt reporting 8/10 pain - tylenol given with some effect and pt was agreeable to being turned to side.

## 2025-04-30 NOTE — PROGRESS NOTES
04/30/25 1024   Appointment Info   Signing Clinician's Name / Credentials (PT) Jodee Gaspar,PT   Living Environment   People in Home friend(s)   Current Living Arrangements house   Home Accessibility stairs to enter home;stairs within home   Number of Stairs, Main Entrance 3   Stair Railings, Main Entrance none   Number of Stairs, Within Home, Primary five   Stair Railings, Within Home, Primary   (1 raling)   Self-Care   Equipment Currently Used at Home walker, rolling;shower chair;grab bar, toilet;grab bar, tub/shower  (uses FWW sometimes)   Fall history within last six months yes   Number of times patient has fallen within last six months 1   General Information   Onset of Illness/Injury or Date of Surgery 04/27/25   Referring Physician Dr. Ru King   Patient/Family Therapy Goals Statement (PT) return home   Pertinent History of Current Problem (include personal factors and/or comorbidities that impact the POC) 74 year old male with recent CABG x 4 on 3/17/25 and has HTN and DM type 2, and hx of Pulm embolus on Eliquis -- and now with chest pressure, nausea, vomiting and diarrhea.  He initially discharged home on 3/24/25, but then back from 4/7 to 4/14 with DKA and questionable medical compliance and concern of mild cognitive impairment but he chose to go home, but now reports nausea, vomiting, and reports diarrhea several times today.  He is  and lives alone, except he has a renter in the house who does help him some.  He appears angry and is short with his answers.   Existing Precautions/Restrictions fall;other (see comments)  (assisted fall 4/29)   General Observations pt in bed, pt loy encouragement to participate in PT, visito present and also encouraging pt   Cognition   Affect/Mental Status (Cognition) unable/difficult to assess   Follows Commands (Cognition) follows one-step commands;verbal cues/prompting required   Pain Assessment   Patient Currently in Pain No   Range of Motion  (ROM)   ROM Comment BLE WFL   Strength (Manual Muscle Testing)   Strength (Manual Muscle Testing) Deficits observed during functional mobility   Bed Mobility   Supine-Sit [place] (Bed Mobility) supervision   Impairments Contributing to Impaired Bed Mobility decreased strength   Assistive Device (Bed Mobility) bed rails;other (see comments)  (HOB elevated)   Comment, (Bed Mobility) modAx1 supine scooting   Transfers   Transfer Safety Concerns Noted decreased weight-shifting ability   Impairments Contributing to Impaired Transfers decreased strength   Comment, (Transfers) sit<<stand x2 with FWW and minAx1- pt reports he cannot walk -he feels too weak   Gait/Stairs (Locomotion)   [place] Level (Gait) contact guard   Assistive Device (Gait) walker, front-wheeled   Distance in Feet (Gait) 4 side steps up EOB   Pattern (Gait) step-to   Deviations/Abnormal Patterns (Gait) weight shifting decreased   Balance   Balance Comments CGA with FWW   Clinical Impression   Criteria for Skilled Therapeutic Intervention Yes, treatment indicated   PT Diagnosis (PT) decreased functional mobility   Influenced by the following impairments decreased strength,balance  and endurence   Functional limitations due to impairments bed mob, transfers . gait and stairs   Clinical Presentation (PT Evaluation Complexity) evolving   Clinical Presentation Rationale presents as medically diagnosed   Clinical Decision Making (Complexity) moderate complexity   Planned Therapy Interventions (PT) bed mobility training;gait training;strengthening;transfer training;stair training   Risk & Benefits of therapy have been explained evaluation/treatment results reviewed;patient   PT Total Evaluation Time   PT Eval, Moderate Complexity Minutes (46496) 20   Physical Therapy Goals   PT Frequency 6x/week   PT Predicted Duration/Target Date for Goal Attainment 05/07/25   PT: Bed Mobility Modified independent;Supine to/from sit   PT: Transfers  Supervision/stand-by assist;Sit to/from stand;Bed to/from chair;Assistive device   PT: Gait Rolling walker;100 feet  (CGA)   PT Discharge Planning   PT Plan recommend w/c follow and doon pants for transfers and gait, LE ex, balance ex  progress to stairs   PT Discharge Recommendation (DC Rec) Transitional Care Facility   PT Rationale for DC Rec pt limited mobility unable to care for self at this time recommend TCU   PT Brief overview of current status bed mob SBA. transfers and 4 sidesteps w/ FWW CGA   PT Total Distance Amb During Session (feet) 0   PT Equipment Needed at Discharge other (see comments)  (has FWW at home)   Physical Therapy Time and Intention   Total Session Time (sum of timed and untimed services) 20

## 2025-04-30 NOTE — PLAN OF CARE
Problem: Hypertension Acute  Goal: Blood Pressure Within Desired Range  Intervention: Normalize Blood Pressure  Recent Flowsheet Documentation  Taken 4/30/2025 1627 by Amira Mahmood, RN  Medication Review/Management: medications reviewed     Problem: Oral Intake Inadequate  Goal: Improved Oral Intake  Outcome: Not Progressing   Goal Outcome Evaluation:  Patient has very poor intake. Refused meals all day per report. Patient BG this afternoon 72. Patient declined ordering dinner. Educated patient on the importance of trying to eat dinner. Patient agreed to eat a vanilla ice cream. Approached patient before the kitchen closed patient refused to order dinner stating he is not nauseated but is not hungry at all. Agreed to have his protein shake and rice crispy. Patient only consumed half his shake and did not open rice crispy bar.   BP elevated given scheduled coreg. Denies pain other than red area on Buttocks.

## 2025-04-30 NOTE — PLAN OF CARE
"Goal Outcome Evaluation:      Plan of Care Reviewed With: patient, family, caregiver    Overall Patient Progress: no changeOverall Patient Progress: no change    Outcome Evaluation: Pt continues on room air.  Pt refusing turning.  Pt refused shower, but agreed to bed bath, gown and underwear change with nurse.  Pt needs linen changed, but pt declined stating, \"later.\"   Pt is intermittantly confused and is forgetful.  Pt wants to go home.  Care giver explained to pt why he is unable to go home.  Pt does not understand.  Pt is med surg.  Hospice to meet with family/caregiver at 2pm on Friday.    "

## 2025-04-30 NOTE — CONSULTS
[facility]  WOC Nurse Inpatient Assessment     Consulted for: buttocks    Summary: 4/30 Patient admitted to spending a lot of time in bed and is having bouts of diarrhea.      WOC nurse follow-up plan: weekly    Patient History (according to provider note(s):      74 year old male with history of coronary artery disease s/p CABG 03/17/25, HFrEF, DM2, CKD, mood disorder, recent pulmonary embolus, starvation ketosis and known cognitive impairment presents 4/27 with nausea, vomiting and diarrhea.     Assessment:      Skin Injury Location: gluteal crease        Last photo: 4/30  Skin injury due to: Moisture associated skin damage (MASD)  Skin history and plan of care:   see chart  Affected area:      Skin assessment: Denudement, Erythema, and Maceration     Measurements (length x width x depth, in cm) 5 cm  x 0.5 cm     Color: normal and consistent with surrounding tissue     Temperature  normal      Drainage: small .      Color: serous     Odor: none  Pain: mild, tender patient said it hurt to turn more than the wound  Pain interventions prior to dressing change: slow and gentle cares   Treatment goal: Heal , Decrease moisture, and Protection  STATUS: initial assessment  Supplies ordered: ordered critic aid clear       Treatment Plan:     Gluteal crease wound(s): BID and PRN  Cleanse with normal saline and pat dry  Apply layer of Critic Aid Clear to crease  Do not cover with mepilex     Orders: Written    RECOMMEND PRIMARY TEAM ORDER: None, at this time  Education provided: importance of repositioning and plan of care  Discussed plan of care with: Patient  Notify WOC if wound(s) deteriorate.  Nursing to notify the Provider(s) and re-consult the WOC Nurse if new skin concern.    DATA:     Current support surface: Standard  Standard gel mattress (Isoflex)  Containment of urine/stool: Incontinence Protocol  BMI: Body mass index is 26.75 kg/m .   Active diet order: Orders Placed This Encounter       Moderate Consistent Carb (60 g CHO per Meal) Diet     Output: I/O last 3 completed shifts:  In: 550 [P.O.:550]  Out: 800 [Urine:800]     Labs:   Recent Labs   Lab 04/27/25 2115   ALBUMIN 4.3   HGB 13.1*   INR 1.29*   WBC 10.7     Pressure injury risk assessment:   Sensory Perception: 3-->slightly limited  Moisture: 3-->occasionally moist  Activity: 3-->walks occasionally  Mobility: 3-->slightly limited  Nutrition: 1-->very poor  Friction and Shear: 2-->potential problem  Braulio Score: 15    LIANG GomesN RN CWOCN  Pager no longer in use, please contact through [employer] [facility]: [facility]

## 2025-04-30 NOTE — PROGRESS NOTES
[facility]    Medicine Progress Note - Hospitalist Service    Date of Admission:  4/27/2025    Assessment & Plan   74 year old male with history of coronary artery disease s/p CABG 03/17/25, HFrEF, DM2, CKD, mood disorder, recent pulmonary embolus, starvation ketosis and known cognitive impairment presents 4/27 with nausea, vomiting and diarrhea.       Goals of care:  -- await hospice consultation  -- likely will need hospice placement vs TCU  -- plan meeting with hospice 5/2  -- PT/OT recommends TCU, patient is wanting to go home   -- anticipate difficult dispo unless patient agreeable to TCU since his POA and caregiver cannot manage patient at home. Hospice placement vs hospice at home might be the best option since patient is not interested in physical rehab  -- psych consulted noted, patient is not decisional for AMA discharge and therefore is holdable here       N/V/D: resolved  -- infectious stool studies negative   -- still with no appetite       JESS on CKD3: resolved  Likely related to dehydration from poor po intake  -- ok to hold further IVF      Hyponatremia: hypovolemic confirmed with U Na<20  -- improved with IVF       CAD  Troponin elevation:  Likely related to demand ischemia and trop has started to plateau  Denies chest pain  Recent CABG noted but also unclear if patient is compliant with home medications  -- continue home asa, plavix, statin, imdur and coreg  -- stop tele since patient is DNR      DM2:   -- continue current lantus  -- continue medium intensity sliding scale insulin and mealtime coverage at 1:10 ratio        Recent PE: continue DOAC      Mood disorder: continue home prozac      GERD: continue home PPI         Diet: Moderate Consistent Carb (60 g CHO per Meal) Diet    DVT Prophylaxis: DOAC  Riojas Catheter: Not present  Lines: None     Cardiac Monitoring: None  Code Status: No CPR- Do NOT Intubate      Clinically Significant Risk Factors         # Hyponatremia:  "Lowest Na = 130 mmol/L in last 2 days, will monitor as appropriate  # Hypochloremia: Lowest Cl = 96 mmol/L in last 2 days, will monitor as appropriate            # Hypertension: Noted on problem list  # Chronic heart failure with reduced ejection fraction: last echo with EF <40%          # DMII: A1C = 7.2 % (Ref range: <5.7 %) within past 6 months   # Overweight: Estimated body mass index is 26.75 kg/m  as calculated from the following:    Height as of this encounter: 1.727 m (5' 8\").    Weight as of this encounter: 79.8 kg (175 lb 14.8 oz)., PRESENT ON ADMISSION       # Financial/Environmental Concerns: none   # History of CABG: noted on surgical history       Disposition Plan   Medically Ready for Discharge: Anticipated in 2-4 Days      Ru King DO  Hospitalist Service  [facility]  Securely message with [employer] (more info)  Text page via [employer] Paging/Directory   ______________________________________________________________________    Interval History   NAD, denies any specific complaints besides wanting to be discharged straight to home    Physical Exam   Vital Signs: Temp: 97.5  F (36.4  C) Temp src: Oral BP: 131/67 Pulse: 95   Resp: 18 SpO2: 96 % O2 Device: None (Room air)    Weight: 175 lbs 14.83 oz  General: NAD  RESPIRATORY: Breathing nonlabored  CARDIOVASCULAR: No le edema bilat.   NEUROLOGIC: Alert, speech clear         >50 MINUTES SPENT BY ME on the date of service doing chart review, history, exam, documentation & further activities per the note.  Data       "

## 2025-04-30 NOTE — PROGRESS NOTES
"Care Management Follow Up    Length of Stay (days): 2    Expected Discharge Date: 05/01/2025     Concerns to be Addressed: Care progression - discharge planning     Patient plan of care discussed at interdisciplinary rounds: Yes    Anticipated Discharge Disposition:  PT/OT rec Transitional Care     Anticipated Discharge Services:  TBD  Anticipated Discharge DME:  [facility]    Patient/family educated on Medicare website which has current facility and service quality ratings:  NA  Education Provided on the Discharge Plan:  Yes per team  Patient/Family in Agreement with the Plan: NA    Referrals Placed by CM/SW:  NA  Private pay costs discussed: Not applicable    Discussed  Partnership in Safe Discharge Planning  document with patient/family: Yes, prem Veronique    Handoff Completed: No, handoff not indicated or clinically appropriate    Additional Information:  Rec'd a message from Emma with [facility], call the number for Veronique and a Vincent answered stating that he was the second contact and he would talk to Veronique on getting a hospice consult set up. Vincent is currently out of town and won't be back until Friday so would like to wait until then. We are waiting to hear from Veronique and will keep you posted     Rec'd another message from Emma, spoke with Veronique. We have a consult set up for Friday at 2pm wherever the patient may be at. It sounds like CM looking for placement for him?    [id number] called Veronique to discuss the above and final discharge placement. Veronique agreed for TCU placement and will call back with more TCU choices.    Next Steps: RNCM to follow for medical progression, recommendations, and final discharge plan.     Eleni Massey RN     Per provider, Dr. King, Veronique is in the room and has questions about cost and services.    Met with patient and Veronique at bedside. Veronique stated, patient absolutely does not want TCU or placement, \"He wants to return home and hire some help.\"   Patient " "wants to return home with private pay services including hospice. [facility] will meet with patient and family Fri at 2 PM for a consult. Veronique is worry there may not be enough help in the home.   Provided them brochures for private pay [facility] personal care and private pay [facility] personal care services. Veronique think this might work to get private pay to cover some of the hours when family is not available. Veronique will check into the pricing and wait to meet with [facility] on Friday to make a final decision.    1515 Called Veronique to update on the TCU and request for more TCU choices. Veronique said to hold off on the TCU searches and plan for either LTC with hospice or home with hospice, \"I need to make a few phone calls to those places and I will have a more definite answer for you by Friday.\"  "

## 2025-05-01 ENCOUNTER — APPOINTMENT (OUTPATIENT)
Dept: RADIOLOGY | Facility: HOSPITAL | Age: 75
End: 2025-05-01
Payer: COMMERCIAL

## 2025-05-01 VITALS
SYSTOLIC BLOOD PRESSURE: 111 MMHG | BODY MASS INDEX: 26.66 KG/M2 | DIASTOLIC BLOOD PRESSURE: 60 MMHG | HEART RATE: 82 BPM | WEIGHT: 175.93 LBS | TEMPERATURE: 97.6 F | OXYGEN SATURATION: 94 % | HEIGHT: 68 IN | RESPIRATION RATE: 16 BRPM

## 2025-05-01 LAB
ANION GAP SERPL CALCULATED.3IONS-SCNC: 14 MMOL/L (ref 7–15)
ANION GAP SERPL CALCULATED.3IONS-SCNC: 6 MMOL/L (ref 7–15)
ATRIAL RATE - MUSE: 97 BPM
BASOPHILS # BLD AUTO: 0 10E3/UL (ref 0–0.2)
BASOPHILS NFR BLD AUTO: 0 %
BUN SERPL-MCNC: 26.6 MG/DL (ref 8–23)
BUN SERPL-MCNC: 32 MG/DL (ref 8–23)
CALCIUM SERPL-MCNC: 8.5 MG/DL (ref 8.8–10.4)
CALCIUM SERPL-MCNC: 9.1 MG/DL (ref 8.8–10.4)
CHLORIDE SERPL-SCNC: 103 MMOL/L (ref 98–107)
CHLORIDE SERPL-SCNC: 108 MMOL/L (ref 98–107)
CREAT SERPL-MCNC: 1.28 MG/DL (ref 0.67–1.17)
CREAT SERPL-MCNC: 1.51 MG/DL (ref 0.67–1.17)
DIASTOLIC BLOOD PRESSURE - MUSE: NORMAL MMHG
EGFRCR SERPLBLD CKD-EPI 2021: 48 ML/MIN/1.73M2
EGFRCR SERPLBLD CKD-EPI 2021: 58 ML/MIN/1.73M2
EOSINOPHIL # BLD AUTO: 0 10E3/UL (ref 0–0.7)
EOSINOPHIL NFR BLD AUTO: 0 %
ERYTHROCYTE [DISTWIDTH] IN BLOOD BY AUTOMATED COUNT: 13.2 % (ref 10–15)
GLUCOSE BLDC GLUCOMTR-MCNC: 137 MG/DL (ref 70–99)
GLUCOSE BLDC GLUCOMTR-MCNC: 196 MG/DL (ref 70–99)
GLUCOSE BLDC GLUCOMTR-MCNC: 198 MG/DL (ref 70–99)
GLUCOSE BLDC GLUCOMTR-MCNC: 210 MG/DL (ref 70–99)
GLUCOSE SERPL-MCNC: 149 MG/DL (ref 70–99)
GLUCOSE SERPL-MCNC: 190 MG/DL (ref 70–99)
HCO3 SERPL-SCNC: 20 MMOL/L (ref 22–29)
HCO3 SERPL-SCNC: 25 MMOL/L (ref 22–29)
HCT VFR BLD AUTO: 35.2 % (ref 40–53)
HGB BLD-MCNC: 12.2 G/DL (ref 13.3–17.7)
HOLD SPECIMEN: NORMAL
IMM GRANULOCYTES # BLD: 0 10E3/UL
IMM GRANULOCYTES NFR BLD: 0 %
INTERPRETATION ECG - MUSE: NORMAL
LIPASE SERPL-CCNC: 71 U/L (ref 13–60)
LYMPHOCYTES # BLD AUTO: 1.1 10E3/UL (ref 0.8–5.3)
LYMPHOCYTES NFR BLD AUTO: 14 %
MAGNESIUM SERPL-MCNC: 2 MG/DL (ref 1.7–2.3)
MAGNESIUM SERPL-MCNC: 2.1 MG/DL (ref 1.7–2.3)
MCH RBC QN AUTO: 31.6 PG (ref 26.5–33)
MCHC RBC AUTO-ENTMCNC: 34.7 G/DL (ref 31.5–36.5)
MCV RBC AUTO: 91 FL (ref 78–100)
MONOCYTES # BLD AUTO: 0.6 10E3/UL (ref 0–1.3)
MONOCYTES NFR BLD AUTO: 8 %
NEUTROPHILS # BLD AUTO: 6.1 10E3/UL (ref 1.6–8.3)
NEUTROPHILS NFR BLD AUTO: 77 %
NRBC # BLD AUTO: 0 10E3/UL
NRBC BLD AUTO-RTO: 0 /100
NT-PROBNP SERPL-MCNC: ABNORMAL PG/ML (ref 0–900)
P AXIS - MUSE: 44 DEGREES
PHOSPHATE SERPL-MCNC: 2.3 MG/DL (ref 2.5–4.5)
PLATELET # BLD AUTO: 265 10E3/UL (ref 150–450)
POTASSIUM SERPL-SCNC: 3.7 MMOL/L (ref 3.4–5.3)
POTASSIUM SERPL-SCNC: 3.9 MMOL/L (ref 3.4–5.3)
PR INTERVAL - MUSE: 156 MS
QRS DURATION - MUSE: 148 MS
QT - MUSE: 444 MS
QTC - MUSE: 563 MS
R AXIS - MUSE: 49 DEGREES
RBC # BLD AUTO: 3.86 10E6/UL (ref 4.4–5.9)
SODIUM SERPL-SCNC: 137 MMOL/L (ref 135–145)
SODIUM SERPL-SCNC: 139 MMOL/L (ref 135–145)
SYSTOLIC BLOOD PRESSURE - MUSE: NORMAL MMHG
T AXIS - MUSE: 51 DEGREES
TROPONIN T SERPL HS-MCNC: 89 NG/L
VENTRICULAR RATE- MUSE: 97 BPM
WBC # BLD AUTO: 7.9 10E3/UL (ref 4–11)

## 2025-05-01 PROCEDURE — 36415 COLL VENOUS BLD VENIPUNCTURE: CPT | Performed by: INTERNAL MEDICINE

## 2025-05-01 PROCEDURE — 80048 BASIC METABOLIC PNL TOTAL CA: CPT | Performed by: INTERNAL MEDICINE

## 2025-05-01 PROCEDURE — 82565 ASSAY OF CREATININE: CPT

## 2025-05-01 PROCEDURE — 250N000013 HC RX MED GY IP 250 OP 250 PS 637: Performed by: INTERNAL MEDICINE

## 2025-05-01 PROCEDURE — 93010 ELECTROCARDIOGRAM REPORT: CPT | Performed by: STUDENT IN AN ORGANIZED HEALTH CARE EDUCATION/TRAINING PROGRAM

## 2025-05-01 PROCEDURE — 83735 ASSAY OF MAGNESIUM: CPT | Performed by: INTERNAL MEDICINE

## 2025-05-01 PROCEDURE — 83880 ASSAY OF NATRIURETIC PEPTIDE: CPT

## 2025-05-01 PROCEDURE — 84484 ASSAY OF TROPONIN QUANT: CPT

## 2025-05-01 PROCEDURE — 250N000009 HC RX 250: Performed by: INTERNAL MEDICINE

## 2025-05-01 PROCEDURE — 120N000004 HC R&B MS OVERFLOW

## 2025-05-01 PROCEDURE — 93005 ELECTROCARDIOGRAM TRACING: CPT

## 2025-05-01 PROCEDURE — 84100 ASSAY OF PHOSPHORUS: CPT

## 2025-05-01 PROCEDURE — 99232 SBSQ HOSP IP/OBS MODERATE 35: CPT | Performed by: INTERNAL MEDICINE

## 2025-05-01 PROCEDURE — 36415 COLL VENOUS BLD VENIPUNCTURE: CPT

## 2025-05-01 PROCEDURE — 250N000011 HC RX IP 250 OP 636: Mod: JZ | Performed by: INTERNAL MEDICINE

## 2025-05-01 PROCEDURE — 250N000011 HC RX IP 250 OP 636

## 2025-05-01 PROCEDURE — 71045 X-RAY EXAM CHEST 1 VIEW: CPT

## 2025-05-01 PROCEDURE — 83690 ASSAY OF LIPASE: CPT | Performed by: INTERNAL MEDICINE

## 2025-05-01 PROCEDURE — 83735 ASSAY OF MAGNESIUM: CPT

## 2025-05-01 PROCEDURE — 250N000011 HC RX IP 250 OP 636: Performed by: INTERNAL MEDICINE

## 2025-05-01 PROCEDURE — 85025 COMPLETE CBC W/AUTO DIFF WBC: CPT

## 2025-05-01 RX ORDER — HYDROMORPHONE HCL IN WATER/PF 6 MG/30 ML
0.2 PATIENT CONTROLLED ANALGESIA SYRINGE INTRAVENOUS ONCE
Status: COMPLETED | OUTPATIENT
Start: 2025-05-01 | End: 2025-05-01

## 2025-05-01 RX ORDER — PROMETHAZINE HYDROCHLORIDE 12.5 MG/1
12.5 TABLET ORAL EVERY 6 HOURS PRN
Status: DISCONTINUED | OUTPATIENT
Start: 2025-05-01 | End: 2025-05-05 | Stop reason: HOSPADM

## 2025-05-01 RX ORDER — NITROGLYCERIN 0.4 MG/1
TABLET SUBLINGUAL
Status: COMPLETED
Start: 2025-05-01 | End: 2025-05-01

## 2025-05-01 RX ORDER — MAGNESIUM HYDROXIDE/ALUMINUM HYDROXICE/SIMETHICONE 120; 1200; 1200 MG/30ML; MG/30ML; MG/30ML
15 SUSPENSION ORAL ONCE
Status: COMPLETED | OUTPATIENT
Start: 2025-05-01 | End: 2025-05-02

## 2025-05-01 RX ORDER — MAGNESIUM HYDROXIDE/ALUMINUM HYDROXICE/SIMETHICONE 120; 1200; 1200 MG/30ML; MG/30ML; MG/30ML
15 SUSPENSION ORAL ONCE
Status: COMPLETED | OUTPATIENT
Start: 2025-05-01 | End: 2025-05-01

## 2025-05-01 RX ORDER — SCOPOLAMINE 1 MG/3D
1 PATCH, EXTENDED RELEASE TRANSDERMAL
Status: DISCONTINUED | OUTPATIENT
Start: 2025-05-01 | End: 2025-05-05 | Stop reason: HOSPADM

## 2025-05-01 RX ORDER — NITROGLYCERIN 0.4 MG/1
0.4 TABLET SUBLINGUAL EVERY 5 MIN PRN
Status: DISCONTINUED | OUTPATIENT
Start: 2025-05-01 | End: 2025-05-05 | Stop reason: HOSPADM

## 2025-05-01 RX ORDER — PROMETHAZINE HYDROCHLORIDE 12.5 MG/1
12.5 TABLET ORAL EVERY 6 HOURS PRN
Status: CANCELLED | OUTPATIENT
Start: 2025-05-01

## 2025-05-01 RX ADMIN — HYDROMORPHONE HYDROCHLORIDE 0.2 MG: 0.2 INJECTION, SOLUTION INTRAMUSCULAR; INTRAVENOUS; SUBCUTANEOUS at 20:47

## 2025-05-01 RX ADMIN — HYDRALAZINE HYDROCHLORIDE 10 MG: 20 INJECTION INTRAMUSCULAR; INTRAVENOUS at 20:53

## 2025-05-01 RX ADMIN — CARVEDILOL 12.5 MG: 12.5 TABLET, FILM COATED ORAL at 09:15

## 2025-05-01 RX ADMIN — FLUOXETINE HYDROCHLORIDE 20 MG: 20 CAPSULE ORAL at 08:58

## 2025-05-01 RX ADMIN — NITROGLYCERIN 0.4 MG: 0.4 TABLET, ORALLY DISINTEGRATING SUBLINGUAL at 20:37

## 2025-05-01 RX ADMIN — ONDANSETRON 4 MG: 2 INJECTION, SOLUTION INTRAMUSCULAR; INTRAVENOUS at 10:06

## 2025-05-01 RX ADMIN — SCOPOLAMINE 1 PATCH: 1.5 PATCH, EXTENDED RELEASE TRANSDERMAL at 11:41

## 2025-05-01 RX ADMIN — CLOPIDOGREL 75 MG: 75 TABLET ORAL at 08:55

## 2025-05-01 RX ADMIN — PANTOPRAZOLE SODIUM 40 MG: 40 TABLET, DELAYED RELEASE ORAL at 08:55

## 2025-05-01 RX ADMIN — PROCHLORPERAZINE EDISYLATE 5 MG: 5 INJECTION INTRAMUSCULAR; INTRAVENOUS at 17:42

## 2025-05-01 RX ADMIN — NITROGLYCERIN 0.4 MG: 0.4 TABLET SUBLINGUAL at 20:37

## 2025-05-01 RX ADMIN — APIXABAN 5 MG: 5 TABLET, FILM COATED ORAL at 08:55

## 2025-05-01 RX ADMIN — HYDRALAZINE HYDROCHLORIDE 10 MG: 20 INJECTION INTRAMUSCULAR; INTRAVENOUS at 16:50

## 2025-05-01 RX ADMIN — ONDANSETRON 4 MG: 2 INJECTION, SOLUTION INTRAMUSCULAR; INTRAVENOUS at 17:21

## 2025-05-01 RX ADMIN — PROCHLORPERAZINE EDISYLATE 5 MG: 5 INJECTION INTRAMUSCULAR; INTRAVENOUS at 10:32

## 2025-05-01 RX ADMIN — PANTOPRAZOLE SODIUM 40 MG: 40 INJECTION, POWDER, FOR SOLUTION INTRAVENOUS at 21:51

## 2025-05-01 ASSESSMENT — ACTIVITIES OF DAILY LIVING (ADL)
ADLS_ACUITY_SCORE: 60
ADLS_ACUITY_SCORE: 59
ADLS_ACUITY_SCORE: 59
ADLS_ACUITY_SCORE: 60
ADLS_ACUITY_SCORE: 59
ADLS_ACUITY_SCORE: 60
ADLS_ACUITY_SCORE: 59
ADLS_ACUITY_SCORE: 60
ADLS_ACUITY_SCORE: 60
ADLS_ACUITY_SCORE: 59
ADLS_ACUITY_SCORE: 60
ADLS_ACUITY_SCORE: 60
ADLS_ACUITY_SCORE: 59
ADLS_ACUITY_SCORE: 60
ADLS_ACUITY_SCORE: 59
ADLS_ACUITY_SCORE: 60

## 2025-05-01 NOTE — PLAN OF CARE
PRN zofran and compazine given x1 for n/v, reports some relief after IV compazine.   PRN IV hydralazine given for /56, MD notified; recheck 143/68.  Refusing fall precautions; refusing non-skid socks/footwear, chair alarm, gaitbelt.  Refused evening oral meds, saying not able to tolerate anything oral. Protonix switched to IV.     AOx4, intermittent confusion; VSS on RA.  Able to make needs known, call light in reach.    Shreya Francis RN          Goal Outcome Evaluation:      Plan of Care Reviewed With: patient    Overall Patient Progress: no changeOverall Patient Progress: no change         Problem: Adult Inpatient Plan of Care  Goal: Plan of Care Review  Description: The Plan of Care Review/Shift note should be completed every shift.  The Outcome Evaluation is a brief statement about your assessment that the patient is improving, declining, or no change.  This information will be displayed automatically on your shiftnote.  Outcome: Progressing  Flowsheets (Taken 5/1/2025 1845)  Plan of Care Reviewed With: patient  Overall Patient Progress: no change  Goal: Absence of Hospital-Acquired Illness or Injury  Intervention: Prevent Skin Injury  Recent Flowsheet Documentation  Taken 5/1/2025 1630 by Shreya Francis RN  Body Position: refuses positioning  Intervention: Prevent and Manage VTE (Venous Thromboembolism) Risk  Recent Flowsheet Documentation  Taken 5/1/2025 1630 by Shreya Francis RN  VTE Prevention/Management: patient refused intervention  Goal: Optimal Comfort and Wellbeing  Intervention: Monitor Pain and Promote Comfort  Recent Flowsheet Documentation  Taken 5/1/2025 1630 by Shreya Francis RN  Pain Management Interventions: medication offered but refused     Problem: Delirium  Goal: Improved Attention and Thought Clarity  Intervention: Maximize Cognitive Function  Recent Flowsheet Documentation  Taken 5/1/2025 1630 by Shreya Francis RN  Reorientation Measures:   clock in view   calendar in view     Problem:  Nausea and Vomiting  Goal: Nausea and Vomiting Relief  Intervention: Prevent and Manage Nausea and Vomiting  Recent Flowsheet Documentation  Taken 5/1/2025 1630 by Shreya Francis RN  Nausea/Vomiting Interventions:   sips of clear liquids given   nausea triggers minimized   cool cloth applied     Problem: Pain Acute  Goal: Optimal Pain Control and Function  Intervention: Develop Pain Management Plan  Recent Flowsheet Documentation  Taken 5/1/2025 1630 by Shreya Francis RN  Pain Management Interventions: medication offered but refused     Problem: Heart Failure  Goal: Optimal Functional Ability  Intervention: Optimize Functional Ability  Recent Flowsheet Documentation  Taken 5/1/2025 1630 by Shreya Francis RN  Activity Management:   up in chair   activity adjusted per tolerance  Goal: Effective Oxygenation and Ventilation  Intervention: Promote Airway Secretion Clearance  Recent Flowsheet Documentation  Taken 5/1/2025 1630 by Shreya Francis RN  Activity Management:   up in chair   activity adjusted per tolerance     Problem: Dysrhythmia  Goal: Normalized Cardiac Rhythm  Intervention: Monitor and Manage Cardiac Rhythm Effect  Recent Flowsheet Documentation  Taken 5/1/2025 1630 by Shreya Francis RN  VTE Prevention/Management: patient refused intervention

## 2025-05-01 NOTE — PROVIDER NOTIFICATION
RN notified MD of pt  with nausea and dry heaves-  Zofran given, no relief of symptoms, compazine iv given.  The nausea happened after pt drank down 2 Ensures.              05/01/25 1024   Gastrointestinal   Gastrointestinal WDL (S)  X;nausea and vomiting   GI Signs/Symptoms (S)  nausea   Nausea/Vomiting   Nausea/Vomiting Interventions (S)  antiemetic   Nausea/Vomiting Signs/Symptoms (S)  nausea intermittent;dry-heaving   Positioning   Body Position refuses positioning     Hannah Stokes RN

## 2025-05-01 NOTE — PLAN OF CARE
Goal Outcome Evaluation:      Plan of Care Reviewed With: patient    Overall Patient Progress: improvingOverall Patient Progress: improving    Outcome Evaluation: Pt continues to be forgetful and intermittently confused.  Pt wants to go home.  Pt on room air.  Pt with nausea and dry heaving today after drinking 2 Ensure drinks.  Pt with improvement after zofran, compazine and scopalamine.  Pt now with some abdominal pain. Pt up to BR and had a small soft brown smear of stool.  Pt voiding.

## 2025-05-01 NOTE — PLAN OF CARE
Problem: Adult Inpatient Plan of Care  Goal: Plan of Care Review  Description: The Plan of Care Review/Shift note should be completed every shift.  The Outcome Evaluation is a brief statement about your assessment that the patient is improving, declining, or no change.  This information will be displayed automatically on your shiftnote.  Outcome: Progressing  Flowsheets (Taken 5/1/2025 0004)  Plan of Care Reviewed With: patient  Overall Patient Progress: improving   Goal Outcome Evaluation:      Plan of Care Reviewed With: patient    Overall Patient Progress: improvingOverall Patient Progress: improving     Patient A/Ox3 disorientated to time, easily redirectable. VSS on RA. Patient denies pain. Patient is able to make needs known, call light is within reach.

## 2025-05-01 NOTE — PROGRESS NOTES
[facility]    Medicine Progress Note - Hospitalist Service    Date of Admission:  4/27/2025    Assessment & Plan   74 year old male with history of coronary artery disease s/p CABG 03/17/25, HFrEF, DM2, CKD, mood disorder, recent pulmonary embolus, starvation ketosis and known cognitive impairment presents 4/27 with nausea, vomiting and diarrhea.       Goals of care:  -- await hospice consultation planned for 5/2  -- likely will need hospice placement vs TCU  -- PT/OT recommends TCU, patient is wanting to go home   -- anticipate difficult dispo unless patient agreeable to TCU since his POA and caregiver cannot manage patient at home. Hospice placement vs hospice at home might be the best option since patient is not interested in physical rehab  -- psych consulted noted, patient is not decisional for AMA discharge and therefore is holdable here       N/V/D: resolved since admission and then had recurrent nausea episode after drinking 2 Ensure drinks  -- infectious stool studies negative   -- supportive cares, try GI cocktail given complaints of GERD  -- if nausea persists, can consider GI consultation if within goals of care      JESS on CKD3: resolved  Likely related to dehydration from poor po intake  -- ok to hold further IVF      Hyponatremia: hypovolemic confirmed with U Na<20  -- resolved with IVF       CAD  Troponin elevation:  Likely related to demand ischemia and trop has started to plateau  Denies chest pain  Recent CABG noted but also unclear if patient is compliant with home medications  -- continue home asa, plavix, statin, imdur and coreg  -- stopped tele since patient is DNR      DM2:   -- continue current lantus  -- continue medium intensity sliding scale insulin and mealtime coverage at 1:10 ratio        Recent PE: continue DOAC      Mood disorder: continue home prozac      GERD: continue home PPI         Diet: Moderate Consistent Carb (60 g CHO per Meal)  "Diet  Snacks/Supplements Adult: Ensure Enlive; With Meals    DVT Prophylaxis: DOAC  Riojas Catheter: Not present  Lines: None     Cardiac Monitoring: None  Code Status: No CPR- Do NOT Intubate      Clinically Significant Risk Factors          # Hyperchloremia: Highest Cl = 108 mmol/L in last 2 days, will monitor as appropriate                # Hypertension: Noted on problem list  # Chronic heart failure with reduced ejection fraction: last echo with EF <40%          # DMII: A1C = 7.2 % (Ref range: <5.7 %) within past 6 months   # Overweight: Estimated body mass index is 26.75 kg/m  as calculated from the following:    Height as of this encounter: 1.727 m (5' 8\").    Weight as of this encounter: 79.8 kg (175 lb 14.8 oz)., PRESENT ON ADMISSION  # Severe Malnutrition: based on nutrition assessment and treatment provided per dietitian's recommendations., PRESENT ON ADMISSION     # Financial/Environmental Concerns: none   # History of CABG: noted on surgical history       Disposition Plan   Medically Ready for Discharge: Anticipated in 2-4 Days      Ru King DO  Hospitalist Service  [facility]  Securely message with [employer] (more info)  Text page via [employer] Paging/Directory   ______________________________________________________________________    Interval History   NAD, denies any specific complaints besides nausea after pushing PO intake this AM    Physical Exam   Vital Signs: Temp: 97.9  F (36.6  C) Temp src: Oral BP: 121/80 Pulse: 82   Resp: 18 SpO2: 97 % O2 Device: None (Room air)    Weight: 175 lbs 14.83 oz  General: NAD  RESPIRATORY: Breathing nonlabored  Abd: soft, slightly tender, nondistended  CARDIOVASCULAR: No le edema bilat.   NEUROLOGIC: Alert, speech clear         >50 MINUTES SPENT BY ME on the date of service doing chart review, history, exam, documentation & further activities per the note.  Data       "

## 2025-05-02 LAB
ATRIAL RATE - MUSE: 102 BPM
ATRIAL RATE - MUSE: 97 BPM
DIASTOLIC BLOOD PRESSURE - MUSE: NORMAL MMHG
DIASTOLIC BLOOD PRESSURE - MUSE: NORMAL MMHG
GLUCOSE BLDC GLUCOMTR-MCNC: 135 MG/DL (ref 70–99)
GLUCOSE BLDC GLUCOMTR-MCNC: 136 MG/DL (ref 70–99)
GLUCOSE BLDC GLUCOMTR-MCNC: 171 MG/DL (ref 70–99)
INTERPRETATION ECG - MUSE: NORMAL
INTERPRETATION ECG - MUSE: NORMAL
MAGNESIUM SERPL-MCNC: 2.1 MG/DL (ref 1.7–2.3)
P AXIS - MUSE: 44 DEGREES
P AXIS - MUSE: 68 DEGREES
PR INTERVAL - MUSE: 156 MS
PR INTERVAL - MUSE: 178 MS
QRS DURATION - MUSE: 148 MS
QRS DURATION - MUSE: 150 MS
QT - MUSE: 420 MS
QT - MUSE: 444 MS
QTC - MUSE: 547 MS
QTC - MUSE: 563 MS
R AXIS - MUSE: 49 DEGREES
R AXIS - MUSE: 63 DEGREES
SYSTOLIC BLOOD PRESSURE - MUSE: NORMAL MMHG
SYSTOLIC BLOOD PRESSURE - MUSE: NORMAL MMHG
T AXIS - MUSE: 32 DEGREES
T AXIS - MUSE: 51 DEGREES
VENTRICULAR RATE- MUSE: 102 BPM
VENTRICULAR RATE- MUSE: 97 BPM

## 2025-05-02 PROCEDURE — 250N000013 HC RX MED GY IP 250 OP 250 PS 637: Performed by: INTERNAL MEDICINE

## 2025-05-02 PROCEDURE — 120N000004 HC R&B MS OVERFLOW

## 2025-05-02 PROCEDURE — 83735 ASSAY OF MAGNESIUM: CPT | Performed by: INTERNAL MEDICINE

## 2025-05-02 PROCEDURE — 36415 COLL VENOUS BLD VENIPUNCTURE: CPT | Performed by: INTERNAL MEDICINE

## 2025-05-02 PROCEDURE — 99232 SBSQ HOSP IP/OBS MODERATE 35: CPT | Performed by: INTERNAL MEDICINE

## 2025-05-02 PROCEDURE — 250N000011 HC RX IP 250 OP 636: Performed by: INTERNAL MEDICINE

## 2025-05-02 PROCEDURE — 250N000011 HC RX IP 250 OP 636

## 2025-05-02 RX ORDER — LORAZEPAM 2 MG/ML
1 INJECTION INTRAMUSCULAR
Status: DISCONTINUED | OUTPATIENT
Start: 2025-05-02 | End: 2025-05-05 | Stop reason: HOSPADM

## 2025-05-02 RX ORDER — MINERAL OIL/HYDROPHIL PETROLAT
OINTMENT (GRAM) TOPICAL
Status: DISCONTINUED | OUTPATIENT
Start: 2025-05-02 | End: 2025-05-05 | Stop reason: HOSPADM

## 2025-05-02 RX ORDER — BISACODYL 10 MG
10 SUPPOSITORY, RECTAL RECTAL
Status: DISCONTINUED | OUTPATIENT
Start: 2025-05-05 | End: 2025-05-05 | Stop reason: HOSPADM

## 2025-05-02 RX ORDER — SENNOSIDES 8.6 MG
1 TABLET ORAL 2 TIMES DAILY PRN
Status: DISCONTINUED | OUTPATIENT
Start: 2025-05-02 | End: 2025-05-05 | Stop reason: HOSPADM

## 2025-05-02 RX ORDER — NALOXONE HYDROCHLORIDE 0.4 MG/ML
0.1 INJECTION, SOLUTION INTRAMUSCULAR; INTRAVENOUS; SUBCUTANEOUS
Status: DISCONTINUED | OUTPATIENT
Start: 2025-05-02 | End: 2025-05-05 | Stop reason: HOSPADM

## 2025-05-02 RX ORDER — LORAZEPAM 1 MG/1
1 TABLET ORAL
Status: DISCONTINUED | OUTPATIENT
Start: 2025-05-02 | End: 2025-05-05 | Stop reason: HOSPADM

## 2025-05-02 RX ORDER — CARBOXYMETHYLCELLULOSE SODIUM 5 MG/ML
1-2 SOLUTION/ DROPS OPHTHALMIC
Status: DISCONTINUED | OUTPATIENT
Start: 2025-05-02 | End: 2025-05-05 | Stop reason: HOSPADM

## 2025-05-02 RX ORDER — NALOXONE HYDROCHLORIDE 0.4 MG/ML
0.2 INJECTION, SOLUTION INTRAMUSCULAR; INTRAVENOUS; SUBCUTANEOUS
Status: DISCONTINUED | OUTPATIENT
Start: 2025-05-02 | End: 2025-05-05 | Stop reason: HOSPADM

## 2025-05-02 RX ADMIN — FLUOXETINE HYDROCHLORIDE 20 MG: 20 CAPSULE ORAL at 09:05

## 2025-05-02 RX ADMIN — APIXABAN 5 MG: 5 TABLET, FILM COATED ORAL at 09:06

## 2025-05-02 RX ADMIN — PANTOPRAZOLE SODIUM 40 MG: 40 INJECTION, POWDER, FOR SOLUTION INTRAVENOUS at 20:43

## 2025-05-02 RX ADMIN — CARVEDILOL 12.5 MG: 12.5 TABLET, FILM COATED ORAL at 09:06

## 2025-05-02 RX ADMIN — ISOSORBIDE MONONITRATE 30 MG: 30 TABLET, EXTENDED RELEASE ORAL at 20:43

## 2025-05-02 RX ADMIN — LORAZEPAM 1 MG: 2 INJECTION INTRAMUSCULAR; INTRAVENOUS at 15:59

## 2025-05-02 RX ADMIN — CLOPIDOGREL 75 MG: 75 TABLET ORAL at 09:06

## 2025-05-02 RX ADMIN — APIXABAN 5 MG: 5 TABLET, FILM COATED ORAL at 20:43

## 2025-05-02 RX ADMIN — SIMVASTATIN 40 MG: 10 TABLET, FILM COATED ORAL at 20:43

## 2025-05-02 RX ADMIN — PANTOPRAZOLE SODIUM 40 MG: 40 INJECTION, POWDER, FOR SOLUTION INTRAVENOUS at 09:06

## 2025-05-02 ASSESSMENT — ACTIVITIES OF DAILY LIVING (ADL)
ADLS_ACUITY_SCORE: 59
ADLS_ACUITY_SCORE: 60
ADLS_ACUITY_SCORE: 59
ADLS_ACUITY_SCORE: 60
ADLS_ACUITY_SCORE: 59

## 2025-05-02 NOTE — PLAN OF CARE
"  Problem: Adult Inpatient Plan of Care  Goal: Plan of Care Review  Description: The Plan of Care Review/Shift note should be completed every shift.  The Outcome Evaluation is a brief statement about your assessment that the patient is improving, declining, or no change.  This information will be displayed automatically on your shiftnote.  Outcome: Progressing  Flowsheets (Taken 5/2/2025 1024)  Plan of Care Reviewed With: patient  Overall Patient Progress: no change  Goal: Patient-Specific Goal (Individualized)  Description: You can add care plan individualizations to a care plan. Examples of Individualization might be:  \"Parent requests to be called daily at 9am for status\", \"I have a hard time hearing out of my right ear\", or \"Do not touch me to wake me up as it startlesme\".  Outcome: Progressing  Goal: Absence of Hospital-Acquired Illness or Injury  Outcome: Progressing  Intervention: Identify and Manage Fall Risk  Recent Flowsheet Documentation  Taken 5/2/2025 0900 by Theodore Deluna RN  Safety Promotion/Fall Prevention:   safety round/check completed   mobility aid in reach   clutter free environment maintained   room door open   toileting scheduled  Intervention: Prevent Skin Injury  Recent Flowsheet Documentation  Taken 5/2/2025 0900 by Theodore Deluna RN  Body Position: weight shifting  Intervention: Prevent Infection  Recent Flowsheet Documentation  Taken 5/2/2025 0900 by Theodore Deluna RN  Infection Prevention: environmental surveillance performed  Goal: Optimal Comfort and Wellbeing  Outcome: Progressing  Intervention: Provide Person-Centered Care  Recent Flowsheet Documentation  Taken 5/2/2025 0900 by Theodore Deluna RN  Trust Relationship/Rapport:   care explained   choices provided  Goal: Readiness for Transition of Care  Outcome: Progressing     Goal Outcome Evaluation:      Plan of Care Reviewed With: patient    Overall Patient Progress: no change    Pt. A&O x3 and confused to situation. Pt. Denies pain, " "numbness, tingling or tenderness. Pt. Has no appetite. Call light within reach. Able to make needs known.     Pt. Had urge to urinate however was not able to produce any. RN attempted to bladder scan however pt. Was uncomfortable and refused bladder scan. RN offered a variety of food and drinks however pt refused.     Notified  of Advance Directive received from family.     1530 - RN was present during conversation with hospice RN and power or attorneys. Pt. Wanted to go comfort care.       1550 - Pt. Transitioned to comfort care and refused medications and interventions for high BP.     1610 - Per pt. \"I don't want vitals or medication. I only want things for pain and nausea\". RN left a note for staff. Gave PRN Lorazepam for nausea     1700 - Lorazepam was effective. Pt. Requests medication when available.   "

## 2025-05-02 NOTE — PROGRESS NOTES
Care Management Follow Up    Length of Stay (days): 4    Expected Discharge Date: 05/03/2025     Concerns to be Addressed: Care progression - discharge planning     Patient plan of care discussed at interdisciplinary rounds: Yes    Anticipated Discharge Disposition:  PT/OT rec Transitional Care     Anticipated Discharge Services:  Home with [facility] and [facility]  Anticipated Discharge DME:  NA    Patient/family educated on Medicare website which has current facility and service quality ratings:  NA  Education Provided on the Discharge Plan:  Yes per team  Patient/Family in Agreement with the Plan: NA    Referrals Placed by CM/SW:  NA  Private pay costs discussed: Not applicable    Discussed  Partnership in Safe Discharge Planning  document with patient/family: Yes, Veronique amaro    Handoff Completed: No, handoff not indicated or clinically appropriate    Additional Information:  1540 per Gabriela with [facility], patient transitioning to comfort care here. The plan is to set up private pay personal services with [facility], then patient will d/c to home with [facility] Mon-Tues depending on when [facility] can start personal care services.     Called and spoke with Veronique and she confirmed the above. Still waiting to get confirmation from [facility].    Message sent to update Dr. Rust    Next Steps: RNCM to follow for medical progression, recommendations, and final discharge plan.     Eleni Massey RN

## 2025-05-02 NOTE — PROGRESS NOTES
CLINICAL NUTRITION SERVICES - BRIEF NOTE     CURRENT NUTRITION ORDERS  Diet: Moderate Consistent Carbohydrate  Snacks/Supplements: Ensure Enlive daily      CURRENT INTAKE/TOLERANCE  Per flowsheets:  4/30: 0% breakfast & lunch, ice cream for dinner, 50% protein shake -only ordered dinner -rice krispie   5/1: 2 Ensure -per RN note nausea and dry heaving after drinking, only ordered breakfast-Ensure  5/2: refused breakfast today     NEW FINDINGS  Met with pt, no appetite, issues with nausea and vomiting. Does not want to receive Ensure any longer. Per family member, initially ate right after surgery but has had hardly anything since that time. Has a patch on behind his ear to help with nausea. Did not want anything to eat.    INTERVENTIONS  Discontinue Ensure supplements    RECOMMENDATIONS TO MD  If wanting aggressive nutrition therapy may need to consider nutrition support.

## 2025-05-02 NOTE — PLAN OF CARE
Problem: Hypertension Acute  Goal: Blood Pressure Within Desired Range  Outcome: Progressing  Intervention: Normalize Blood Pressure  Recent Flowsheet Documentation  Taken 5/2/2025 0027 by Nate Ramos RN  Sensory Stimulation Regulation:   care clustered   lighting decreased  Medication Review/Management: medications reviewed     Problem: Pain Acute  Goal: Optimal Pain Control and Function  Outcome: Progressing  Intervention: Prevent or Manage Pain  Recent Flowsheet Documentation  Taken 5/2/2025 0027 by Nate Ramos RN  Sensory Stimulation Regulation:   care clustered   lighting decreased  Bowel Elimination Promotion: adequate fluid intake promoted  Medication Review/Management: medications reviewed     Problem: Nausea and Vomiting  Goal: Nausea and Vomiting Relief  Outcome: Progressing  Intervention: Prevent and Manage Nausea and Vomiting  Recent Flowsheet Documentation  Taken 5/2/2025 0027 by Nate Ramos RN  Environmental Support: calm environment promoted         Goal Outcome Evaluation:  Patient is aox3. Occasionally forgetful of situation. VSS. On room air. Med-surg. Denied chest pain, dizziness, and n/v overnight. Patient c/o feeling constipated. Last stool 5/1. Passing flatus. Continue to monitor. Patient is assist x1 with a walker and gait belt. Call-light within reach. Bed alarm on.

## 2025-05-02 NOTE — SIGNIFICANT EVENT
2020: /96, recheck 186/87. Pt c/o new 8/10 chest pain and SOB. RRT called. See MD note.       Shreya Francis RN

## 2025-05-02 NOTE — SIGNIFICANT EVENT
Significant Event Note    Time of event: 8:54 PM May 1, 2025    Description of event:  Rapid response called on this patient due to acute chest pain, shortness of breath and worsening nausea.  Notably patient presented for nausea vomiting and diarrhea and poor p.o. intake.  Unknown etiology of GI symptoms.  Patient planning to transition to hospice with consultation tomorrow.  Notably, patient did have a CABG in March 2025 and has had poor p.o. intake and failure to thrive since then.    Patient himself uncomfortable and unable to provide significant history due to pain.  States that he has chest discomfort as well as dyspnea that limits his ability to talk.  Also complains of feeling nauseous and being unable to tolerate p.o. medications.  Feels very thirsty.    Vital signs notable for blood pressure 170s over 80s.  Afebrile.  Pulse in the 90s.  He was placed on nasal cannula but maintains oxygen saturations without the use supplemental oxygen.    ECG notable for right bundle branch block that is demonstrated on prior ECG.  Normal sinus rhythm.  No additional significant findings or evidence of ST elevation.    Patient given a single dose of nitroglycerin without improvement of his pain and without significant drop in blood pressure.    GEN: Fatigued, ill appearing male, in mild distress. Speech limited due to dyspnea and discomfort.   HEENT: Dry mucous membranes.   PULM: Conversational dyspnea. No use of accessory muscles. Clear to ausculation bilaterally. No wheezes or crackles.   CV: Regular rate and rhythm. Normal S1, S2. No rubs, murmurs, or gallops.    ABDOMEN: Normoactive bowel sounds. Non-tender to palpation. Non-distended.    EXTREMITIES:  No clubbing, cyanosis, or edema.    NEURO:  Awake. Moving all extremities.    PSYCH: Anxious appearing.     Plan:  Will plan to rule out ACS given the nature of current symptoms.  May also be due to gas discomfort in the setting of GI illness with nausea vomiting and  diarrhea.  - Obtain labs including CBC, BMP, BN P, troponin, Phos, mag  - Give single dose of IV morphine for discomfort  - Pending BNP, may give IV fluids as patient does appear dry on exam.    Discussed with: bedside nurse    Chrissy Ayala,     Addendum [id number]  BMP notable for improvement in creatinine.  However, BNP greater than 22,000 which is greatly elevated from admission where his BNP was 7000.  Troponin is 89 which is down from admission (120s).  CBC largely unremarkable.  Possible that current symptoms are related to heart failure although patient did not appear hypervolemic on exam.   - Obtain chest x-ray  - Trend troponin

## 2025-05-02 NOTE — PROGRESS NOTES
[facility]    Medicine Progress Note - Hospitalist Service    Date of Admission:  4/27/2025    Assessment & Plan   74 year old male with history of coronary artery disease s/p CABG 03/17/25, HFrEF, DM2, CKD, mood disorder, recent pulmonary embolus, starvation ketosis and known cognitive impairment presents 4/27 with nausea, vomiting and diarrhea.     Hospice care  -- Has decided to go hospice at discharge  -- Will work with social work to get him discharged as soon as possible  -- Per him and family there is no DME he would need at discharge, has a hospital bed at home  -- Lorazepam for anxiety and nausea    N/V/D: resolved since admission and then had recurrent nausea episode after drinking 2 Ensure drinks  -- infectious stool studies negative   -- supportive cares, try GI cocktail given complaints of GERD  -- Continue Zofran and Compazine as needed  -- Lorazepam as above    JESS on CKD3: resolved  Likely related to dehydration from poor po intake  -- ok to hold further IVF    Hyponatremia: hypovolemic confirmed with U Na<20  -- resolved with IVF     CAD  Troponin elevation:  Likely related to demand ischemia and trop has started to plateau  Denies chest pain  Recent CABG noted but also unclear if patient is compliant with home medications  -- continue home asa, plavix, statin, imdur and coreg  -- stopped tele since patient is DNR    DM2:   -- continue current lantus  -- continue medium intensity sliding scale insulin and mealtime coverage at 1:10 ratio    Recent PE: continue DOAC    Mood disorder: continue home prozac    GERD: continue home PPI        Diet: Moderate Consistent Carb (60 g CHO per Meal) Diet    DVT Prophylaxis: DOAC  Riojas Catheter: Not present  Lines: None     Cardiac Monitoring: None  Code Status: No CPR- Do NOT Intubate      Clinically Significant Risk Factors          # Hyperchloremia: Highest Cl = 108 mmol/L in last 2 days, will monitor as appropriate              #  "Hypertension: Noted on problem list  # Chronic heart failure with reduced ejection fraction: last echo with EF <40%          # DMII: A1C = 7.2 % (Ref range: <5.7 %) within past 6 months   # Overweight: Estimated body mass index is 26.56 kg/m  as calculated from the following:    Height as of this encounter: 1.727 m (5' 8\").    Weight as of this encounter: 79.2 kg (174 lb 11.2 oz).   # Severe Malnutrition: based on nutrition assessment and treatment provided per dietitian's recommendations.    # Financial/Environmental Concerns: none   # History of CABG: noted on surgical history       Social Drivers of Health    Tobacco Use: Medium Risk (4/28/2025)    Patient History     Smoking Tobacco Use: Former     Smokeless Tobacco Use: Never   Interpersonal Safety: High Risk (4/28/2025)    Interpersonal Safety     Do you feel physically and emotionally safe where you currently live?: No     Within the past 12 months, have you been hit, slapped, kicked or otherwise physically hurt by someone?: No     Within the past 12 months, have you been humiliated or emotionally abused in other ways by your partner or ex-partner?: No    Received from [employer] & [facility]    Social Connections          Disposition Plan     Medically Ready for Discharge: Ready Now             Jeanne Rust MD  Hospitalist Service  [facility]  Securely message with [employer] (more info)  Text page via [employer] Paging/Directory   ______________________________________________________________________    Interval History   Mr. Cordoba has decided to go comfort care.  His family was meeting with different agencies today.  They will discuss with social work when they have decided on the company.  For now we will help with nausea with lorazepam.  Is having some anxiety as well.  Will need to discuss which medications he wants to continue.  Likely discharge home on hospice tomorrow.  Will not need DME as he has a " hospital bed and at home.  Does not want a bedside commode.  Would welcome going home with a disposable urinal.  He admits to chronic shortness of breath but otherwise no complaints.    Physical Exam   Vital Signs: Temp: 97.2  F (36.2  C) Temp src: Oral BP: (!) 186/93 (RN notified) Pulse: 78   Resp: 18 SpO2: 98 % O2 Device: None (Room air)    Weight: 174 lbs 11.2 oz    General Appearance: Awake, alert, in no acute distress  Respiratory: CTAB, no wheeze  Cardiovascular: RRR  GI: soft, nontender, non distended, normal bowel sounds  Skin: no jaundice, no rash    Medical Decision Making       45 MINUTES SPENT BY ME on the date of service doing chart review, history, exam, documentation & further activities per the note.      Data     I have personally reviewed the following data over the past 24 hrs:    7.9  \   12.2 (L)   / 265     137 103 26.6 (H) /  171 (H)   3.9 20 (L) 1.28 (H) \     Trop: 89 (H) BNP: 22,589 (H)       Imaging results reviewed over the past 24 hrs:   Recent Results (from the past 24 hours)   XR Chest Port 1 View    Narrative    EXAM: XR CHEST PORT 1 VIEW  LOCATION: [facility]  DATE: 5/1/2025    INDICATION: CP, dyspnea  COMPARISON: April 27, 2025, January 7, 2025 x-ray. CT angiogram chest April 7, 2025      Impression    IMPRESSION: The elevated left hemidiaphragm which was not present in January. Similar as on the recent prior exams. There appears to be persistent but not very well demonstrated atelectasis at the left lung base below the level of the diaphragm. Vascular   volume is normal and the right lung is clear. No pneumothorax.

## 2025-05-03 PROCEDURE — 250N000011 HC RX IP 250 OP 636

## 2025-05-03 PROCEDURE — 120N000001 HC R&B MED SURG/OB

## 2025-05-03 PROCEDURE — 250N000011 HC RX IP 250 OP 636: Performed by: INTERNAL MEDICINE

## 2025-05-03 PROCEDURE — 99231 SBSQ HOSP IP/OBS SF/LOW 25: CPT | Performed by: INTERNAL MEDICINE

## 2025-05-03 PROCEDURE — 250N000013 HC RX MED GY IP 250 OP 250 PS 637: Performed by: INTERNAL MEDICINE

## 2025-05-03 RX ADMIN — FLUOXETINE HYDROCHLORIDE 20 MG: 20 CAPSULE ORAL at 08:30

## 2025-05-03 RX ADMIN — PANTOPRAZOLE SODIUM 40 MG: 40 INJECTION, POWDER, FOR SOLUTION INTRAVENOUS at 08:30

## 2025-05-03 RX ADMIN — ONDANSETRON 4 MG: 2 INJECTION, SOLUTION INTRAMUSCULAR; INTRAVENOUS at 03:48

## 2025-05-03 RX ADMIN — CARVEDILOL 12.5 MG: 12.5 TABLET, FILM COATED ORAL at 17:21

## 2025-05-03 RX ADMIN — CLOPIDOGREL 75 MG: 75 TABLET ORAL at 08:30

## 2025-05-03 RX ADMIN — APIXABAN 5 MG: 5 TABLET, FILM COATED ORAL at 20:11

## 2025-05-03 RX ADMIN — APIXABAN 5 MG: 5 TABLET, FILM COATED ORAL at 08:30

## 2025-05-03 RX ADMIN — CARVEDILOL 12.5 MG: 12.5 TABLET, FILM COATED ORAL at 08:30

## 2025-05-03 RX ADMIN — PANTOPRAZOLE SODIUM 40 MG: 40 INJECTION, POWDER, FOR SOLUTION INTRAVENOUS at 20:12

## 2025-05-03 RX ADMIN — ISOSORBIDE MONONITRATE 30 MG: 30 TABLET, EXTENDED RELEASE ORAL at 20:11

## 2025-05-03 RX ADMIN — SIMVASTATIN 40 MG: 10 TABLET, FILM COATED ORAL at 20:12

## 2025-05-03 ASSESSMENT — ACTIVITIES OF DAILY LIVING (ADL)
ADLS_ACUITY_SCORE: 60
ADLS_ACUITY_SCORE: 59
ADLS_ACUITY_SCORE: 60
ADLS_ACUITY_SCORE: 59
ADLS_ACUITY_SCORE: 60
ADLS_ACUITY_SCORE: 59
ADLS_ACUITY_SCORE: 60
ADLS_ACUITY_SCORE: 59
ADLS_ACUITY_SCORE: 60
ADLS_ACUITY_SCORE: 59

## 2025-05-03 NOTE — PROGRESS NOTES
1000 Message relayed from nursing that patient's prem Piper would like . consideration for wheelchair to be ordered for patient on discharge with hospice. Veronique's phone number is [phone]. CM to address when coordinating discharge.    1212 Spoke with prem Piper and with Lyla at [facility]. Plan is for patient to discharge from the hospital tomorrow around 1100. [facility] is ordering medical equipment for patient and is coordinating getting wheelchair that prem is requesting. Family desires to transport. Wheelchair transport offered and cost discussed, prem declines. [facility] plans to meet patient at his home at 1200 for admission on 5/4. [facility] will begin on Monday, 5/5 in a.m.    1513 RN Uzma from [facility] called. Her number is [phone]. She spoke with prem Piper and now Veronique is feeling they will not be ready for patient to return home tomorrow. They prefer patient discharge home on Monday. Veronique further states she thinks patient will not be agreeable to admit onto hospice right away. They prefer to get him settled in at home and then talk to him about hospice. Therefore, [facility] will not plan to admit patient immediately upon returning home.    Updated chart to reflect that patient will discharge home on Monday via family. Anticipate early morning as [facility] will be arriving to the house around 10am.    Krystle East LGSW

## 2025-05-03 NOTE — PROGRESS NOTES
[facility]    Medicine Progress Note - Hospitalist Service    Date of Admission:  4/27/2025    Assessment & Plan   74 year old male with history of coronary artery disease s/p CABG 03/17/25, HFrEF, DM2, CKD, mood disorder, recent pulmonary embolus, starvation ketosis and known cognitive impairment presents 4/27 with nausea, vomiting and diarrhea.     Disposition: plan to discharge on hospice Monday    Hospice care  -- Has decided to go hospice at discharge  -- Will work with social work to get him discharged as soon as possible  -- Lorazepam for anxiety and nausea    N/V/D: resolved since admission and then had recurrent nausea episode after drinking 2 Ensure drinks  -- infectious stool studies negative   -- supportive cares, try GI cocktail given complaints of GERD  -- Continue Zofran and Compazine as needed  -- Lorazepam as above    JESS on CKD3: resolved  Likely related to dehydration from poor po intake  -- ok to hold further IVF    Hyponatremia: hypovolemic confirmed with U Na<20  -- resolved with IVF     CAD  Troponin elevation:  Likely related to demand ischemia and trop has started to plateau  Denies chest pain  Recent CABG noted but also unclear if patient is compliant with home medications  -- continue home asa, plavix, statin, imdur and coreg  -- stopped tele since patient is DNR    DM2:   -- continue current lantus  -- continue medium intensity sliding scale insulin and mealtime coverage at 1:10 ratio    Recent PE: continue DOAC    Mood disorder: continue home prozac    GERD: continue home PPI          Diet: Regular Diet Adult    DVT Prophylaxis: DOAC  Riojas Catheter: Not present  Lines: None     Cardiac Monitoring: None  Code Status: No CPR- Do NOT Intubate      Clinically Significant Risk Factors                   # Hypertension: Noted on problem list  # Chronic heart failure with reduced ejection fraction: last echo with EF <40%          # DMII: A1C = 7.2 % (Ref range: <5.7 %)  "within past 6 months   # Overweight: Estimated body mass index is 26.56 kg/m  as calculated from the following:    Height as of this encounter: 1.727 m (5' 8\").    Weight as of this encounter: 79.2 kg (174 lb 11.2 oz).   # Severe Malnutrition: based on nutrition assessment and treatment provided per dietitian's recommendations.    # Financial/Environmental Concerns: none   # History of CABG: noted on surgical history       Social Drivers of Health    Tobacco Use: Medium Risk (4/28/2025)    Patient History     Smoking Tobacco Use: Former     Smokeless Tobacco Use: Never   Interpersonal Safety: High Risk (4/28/2025)    Interpersonal Safety     Do you feel physically and emotionally safe where you currently live?: No     Within the past 12 months, have you been hit, slapped, kicked or otherwise physically hurt by someone?: No     Within the past 12 months, have you been humiliated or emotionally abused in other ways by your partner or ex-partner?: No    Received from [employer] [facility]    Social Connections          Disposition Plan     Medically Ready for Discharge: Ready Now             Jeanne Rust MD  Hospitalist Service  [facility]  Securely message with [employer] (more info)  Text page via [employer] Paging/Directory   ______________________________________________________________________    Interval History   No acute events overnight.  No new issues.  Planning for discharge on hospice Monday.    Physical Exam   Vital Signs: Temp: 99.3  F (37.4  C) Temp src: Axillary BP: 122/71 Pulse: 65   Resp: 18 SpO2: 96 % O2 Device: None (Room air)    Weight: 174 lbs 11.2 oz    General Appearance: Awake, alert, in no acute distress  Respiratory: CTAB, no wheeze  Cardiovascular: RRR  GI: soft, nontender, non distended, normal bowel sounds  Skin: no jaundice, no rash    Medical Decision Making       30 MINUTES SPENT BY ME on the date of service doing chart review, history, exam, " documentation & further activities per the note.      Data         Imaging results reviewed over the past 24 hrs:   No results found for this or any previous visit (from the past 24 hours).

## 2025-05-03 NOTE — PLAN OF CARE
"  Problem: Adult Inpatient Plan of Care  Goal: Plan of Care Review  Description: The Plan of Care Review/Shift note should be completed every shift.  The Outcome Evaluation is a brief statement about your assessment that the patient is improving, declining, or no change.  This information will be displayed automatically on your shiftnote.  Outcome: Progressing  Flowsheets (Taken 5/3/2025 0947)  Plan of Care Reviewed With: patient  Overall Patient Progress: no change  Goal: Patient-Specific Goal (Individualized)  Description: You can add care plan individualizations to a care plan. Examples of Individualization might be:  \"Parent requests to be called daily at 9am for status\", \"I have a hard time hearing out of my right ear\", or \"Do not touch me to wake me up as it startlesme\".  Outcome: Progressing  Goal: Absence of Hospital-Acquired Illness or Injury  Outcome: Progressing  Intervention: Identify and Manage Fall Risk  Recent Flowsheet Documentation  Taken 5/3/2025 0800 by Theodore Deluna RN  Safety Promotion/Fall Prevention:   activity supervised   assistive device/personal items within reach   clutter free environment maintained  Intervention: Prevent Infection  Recent Flowsheet Documentation  Taken 5/3/2025 0800 by Theodore Deluna RN  Infection Prevention: environmental surveillance performed  Goal: Optimal Comfort and Wellbeing  Outcome: Progressing  Goal: Readiness for Transition of Care  Outcome: Progressing     Goal Outcome Evaluation:      Plan of Care Reviewed With: patient    Overall Patient Progress: no change    Pt. Disoriented to situation. Pt. Agreeable to taking medications this AM. Denies pain. Able to make needs known. Call light within reach.     1230 - Veronique called. They would like to take Italo home tomorrow 5/4 at 3PM. RN notified hospitalist. Hospitalist aware.     1545 - Notified Veronique that Italo will be moved to room 223.    1600 - Gave report to P2 RN. All belongings and medications transferred " to patient.

## 2025-05-03 NOTE — PLAN OF CARE
Goal Outcome Evaluation:      Problem: Delirium  Goal: Improved Attention and Thought Clarity  Outcome: Progressing  Intervention: Maximize Cognitive Function  Recent Flowsheet Documentation  Taken 5/3/2025 0413 by Izzy Prince RN  Sensory Stimulation Regulation: care clustered  Reorientation Measures:   calendar in view   clock in view  Taken 5/2/2025 2100 by Izzy Prince RN  Sensory Stimulation Regulation: care clustered  Reorientation Measures:   calendar in view   clock in view     Problem: Nausea and Vomiting  Goal: Nausea and Vomiting Relief  Outcome: Progressing     Problem: Glycemic Control Impaired  Goal: Blood Glucose Level Within Targeted Range  Outcome: Progressing     Problem: Hypertension Acute  Goal: Blood Pressure Within Desired Range  Outcome: Progressing  Intervention: Normalize Blood Pressure  Recent Flowsheet Documentation  Taken 5/3/2025 0413 by Izzy Prince RN  Sensory Stimulation Regulation: care clustered  Taken 5/2/2025 2100 by Izzy Prince RN  Sensory Stimulation Regulation: care clustered  Medication Review/Management: medications reviewed   Pt alert and oriented x4, forgetful . Vitals stable, no complains of pain. refused to get on recliner this morning, ambulated to bathroom with 2 assist. Very weak, didn't eat anything since start of shift. Able to make needs known, care is ongoing.

## 2025-05-04 VITALS
SYSTOLIC BLOOD PRESSURE: 121 MMHG | DIASTOLIC BLOOD PRESSURE: 66 MMHG | HEIGHT: 68 IN | BODY MASS INDEX: 26.48 KG/M2 | OXYGEN SATURATION: 96 % | HEART RATE: 83 BPM | RESPIRATION RATE: 16 BRPM | WEIGHT: 174.7 LBS | TEMPERATURE: 97.7 F

## 2025-05-04 PROBLEM — R07.89 CHEST PRESSURE: Status: RESOLVED | Noted: 2025-04-28 | Resolved: 2025-05-04

## 2025-05-04 PROBLEM — N17.9 ACUTE KIDNEY INJURY: Status: RESOLVED | Noted: 2025-04-28 | Resolved: 2025-05-04

## 2025-05-04 PROBLEM — R73.9 HYPERGLYCEMIA: Status: RESOLVED | Noted: 2025-04-28 | Resolved: 2025-05-04

## 2025-05-04 PROBLEM — R19.7 DIARRHEA OF PRESUMED INFECTIOUS ORIGIN: Status: RESOLVED | Noted: 2025-04-28 | Resolved: 2025-05-04

## 2025-05-04 PROBLEM — R42 LIGHTHEADEDNESS: Status: RESOLVED | Noted: 2025-04-28 | Resolved: 2025-05-04

## 2025-05-04 PROBLEM — E86.0 DEHYDRATION: Status: RESOLVED | Noted: 2025-04-28 | Resolved: 2025-05-04

## 2025-05-04 PROBLEM — E87.20 METABOLIC ACIDOSIS: Status: RESOLVED | Noted: 2025-04-28 | Resolved: 2025-05-04

## 2025-05-04 LAB
GLUCOSE BLDC GLUCOMTR-MCNC: 107 MG/DL (ref 70–99)
GLUCOSE BLDC GLUCOMTR-MCNC: 136 MG/DL (ref 70–99)
GLUCOSE BLDC GLUCOMTR-MCNC: 140 MG/DL (ref 70–99)
GLUCOSE BLDC GLUCOMTR-MCNC: 82 MG/DL (ref 70–99)

## 2025-05-04 PROCEDURE — 250N000011 HC RX IP 250 OP 636: Performed by: INTERNAL MEDICINE

## 2025-05-04 PROCEDURE — 250N000011 HC RX IP 250 OP 636: Mod: JZ | Performed by: INTERNAL MEDICINE

## 2025-05-04 PROCEDURE — 99231 SBSQ HOSP IP/OBS SF/LOW 25: CPT | Performed by: INTERNAL MEDICINE

## 2025-05-04 PROCEDURE — 250N000009 HC RX 250: Performed by: INTERNAL MEDICINE

## 2025-05-04 PROCEDURE — 250N000011 HC RX IP 250 OP 636

## 2025-05-04 PROCEDURE — 250N000013 HC RX MED GY IP 250 OP 250 PS 637: Performed by: INTERNAL MEDICINE

## 2025-05-04 PROCEDURE — 120N000001 HC R&B MED SURG/OB

## 2025-05-04 RX ORDER — ATROPINE SULFATE 10 MG/ML
1-2 SOLUTION/ DROPS OPHTHALMIC EVERY 4 HOURS PRN
Qty: 5 ML | Refills: 0 | Status: SHIPPED | OUTPATIENT
Start: 2025-05-04

## 2025-05-04 RX ORDER — HALOPERIDOL 2 MG/ML
.5-1 SOLUTION ORAL EVERY 6 HOURS PRN
Qty: 10 ML | Refills: 0 | Status: SHIPPED | OUTPATIENT
Start: 2025-05-04

## 2025-05-04 RX ORDER — SENNOSIDES 8.6 MG/1
1-2 TABLET ORAL 2 TIMES DAILY PRN
Qty: 100 TABLET | Refills: 0 | Status: SHIPPED | OUTPATIENT
Start: 2025-05-04

## 2025-05-04 RX ORDER — PANTOPRAZOLE SODIUM 40 MG/1
40 TABLET, DELAYED RELEASE ORAL 2 TIMES DAILY
Qty: 60 TABLET | Refills: 0 | Status: SHIPPED | OUTPATIENT
Start: 2025-05-04 | End: 2025-06-03

## 2025-05-04 RX ORDER — ACETAMINOPHEN 650 MG/1
650 SUPPOSITORY RECTAL EVERY 4 HOURS PRN
Qty: 4 SUPPOSITORY | Refills: 0 | Status: SHIPPED | OUTPATIENT
Start: 2025-05-04

## 2025-05-04 RX ORDER — TRAZODONE HYDROCHLORIDE 50 MG/1
50 TABLET ORAL
Qty: 3 TABLET | Refills: 0 | Status: SHIPPED | OUTPATIENT
Start: 2025-05-04 | End: 2025-05-07

## 2025-05-04 RX ORDER — MORPHINE SULFATE 100 MG/5ML
2.5-5 SOLUTION ORAL
Qty: 30 ML | Refills: 0 | Status: SHIPPED | OUTPATIENT
Start: 2025-05-04

## 2025-05-04 RX ORDER — BISACODYL 10 MG
10 SUPPOSITORY, RECTAL RECTAL DAILY PRN
Qty: 2 SUPPOSITORY | Refills: 0 | Status: SHIPPED | OUTPATIENT
Start: 2025-05-04

## 2025-05-04 RX ORDER — LORAZEPAM 2 MG/ML
.25-.5 CONCENTRATE ORAL EVERY 4 HOURS PRN
Qty: 30 ML | Refills: 0 | Status: SHIPPED | OUTPATIENT
Start: 2025-05-04

## 2025-05-04 RX ORDER — SCOPOLAMINE 1 MG/3D
1 PATCH, EXTENDED RELEASE TRANSDERMAL
Qty: 1 PATCH | Refills: 0 | Status: SHIPPED | OUTPATIENT
Start: 2025-05-07 | End: 2025-05-10

## 2025-05-04 RX ADMIN — HYDRALAZINE HYDROCHLORIDE 10 MG: 20 INJECTION INTRAMUSCULAR; INTRAVENOUS at 08:31

## 2025-05-04 RX ADMIN — SCOPOLAMINE 1 PATCH: 1.5 PATCH, EXTENDED RELEASE TRANSDERMAL at 12:07

## 2025-05-04 RX ADMIN — CLOPIDOGREL 75 MG: 75 TABLET ORAL at 08:31

## 2025-05-04 RX ADMIN — PANTOPRAZOLE SODIUM 40 MG: 40 INJECTION, POWDER, FOR SOLUTION INTRAVENOUS at 08:31

## 2025-05-04 RX ADMIN — CARVEDILOL 12.5 MG: 12.5 TABLET, FILM COATED ORAL at 08:30

## 2025-05-04 RX ADMIN — LORAZEPAM 1 MG: 2 INJECTION INTRAMUSCULAR; INTRAVENOUS at 10:08

## 2025-05-04 RX ADMIN — FLUOXETINE HYDROCHLORIDE 20 MG: 20 CAPSULE ORAL at 08:31

## 2025-05-04 RX ADMIN — PROCHLORPERAZINE EDISYLATE 5 MG: 5 INJECTION INTRAMUSCULAR; INTRAVENOUS at 08:25

## 2025-05-04 RX ADMIN — APIXABAN 5 MG: 5 TABLET, FILM COATED ORAL at 08:31

## 2025-05-04 RX ADMIN — PANTOPRAZOLE SODIUM 40 MG: 40 INJECTION, POWDER, FOR SOLUTION INTRAVENOUS at 21:52

## 2025-05-04 RX ADMIN — HYDRALAZINE HYDROCHLORIDE 10 MG: 20 INJECTION INTRAMUSCULAR; INTRAVENOUS at 16:10

## 2025-05-04 RX ADMIN — ONDANSETRON 4 MG: 2 INJECTION, SOLUTION INTRAMUSCULAR; INTRAVENOUS at 06:51

## 2025-05-04 ASSESSMENT — ACTIVITIES OF DAILY LIVING (ADL)
ADLS_ACUITY_SCORE: 56
ADLS_ACUITY_SCORE: 60
ADLS_ACUITY_SCORE: 56
ADLS_ACUITY_SCORE: 60
ADLS_ACUITY_SCORE: 56
ADLS_ACUITY_SCORE: 60
ADLS_ACUITY_SCORE: 56

## 2025-05-04 NOTE — PLAN OF CARE
Goal Outcome Evaluation:           Problem: Adult Inpatient Plan of Care  Goal: Optimal Comfort and Wellbeing  Outcome: Progressing  Intervention: Provide Person-Centered Care  Recent Flowsheet Documentation  Taken 5/4/2025 0050 by Camilla Stiles RN  Trust Relationship/Rapport:   care explained   choices provided   Pt denies pain. Pt denies nausea. Disoriented to situation and place. Poor appetite. Assist x1 with walker. Awakenings minimized. Pt got nauseous, 4mg zofran given at 0655   Patient notified.  Red rice yeast added to med list.

## 2025-05-04 NOTE — PLAN OF CARE
Problem: Adult Inpatient Plan of Care  Goal: Optimal Comfort and Wellbeing  Outcome: Progressing  Intervention: Monitor Pain and Promote Comfort  Recent Flowsheet Documentation  Taken 5/4/2025 0830 by Chilango Walden RN  Pain Management Interventions:   rest   quiet environment facilitated   emotional support   distraction   diversional activity provided  Intervention: Provide Person-Centered Care  Recent Flowsheet Documentation  Taken 5/4/2025 1353 by Chilango Walden RN  Trust Relationship/Rapport:   care explained   choices provided   emotional support provided   empathic listening provided     Problem: Nausea and Vomiting  Goal: Nausea and Vomiting Relief  Outcome: Progressing  Intervention: Prevent and Manage Nausea and Vomiting  Recent Flowsheet Documentation  Taken 5/4/2025 1353 by Chilango Walden RN  Nausea/Vomiting Interventions:   antiemetic   stimuli minimized  Environmental Support: calm environment promoted     Problem: Glycemic Control Impaired  Goal: Blood Glucose Level Within Targeted Range  Outcome: Progressing  Intervention: Optimize Glycemic Control  Recent Flowsheet Documentation  Taken 5/4/2025 1353 by Chilango Walden RN  Hyperglycemia Management: blood glucose monitored     Problem: Comorbidity Management  Goal: Blood Glucose Levels Within Targeted Range  Outcome: Progressing  Intervention: Monitor and Manage Glycemia  Recent Flowsheet Documentation  Taken 5/4/2025 1353 by Chilango Walden RN  Medication Review/Management: medications reviewed  Goal: Blood Pressure in Desired Range  Outcome: Progressing  Intervention: Maintain Blood Pressure Management  Recent Flowsheet Documentation  Taken 5/4/2025 1353 by Chilango Walden RN  Medication Review/Management: medications reviewed     Problem: Pain Acute  Goal: Optimal Pain Control and Function  Outcome: Progressing  Intervention: Optimize Psychosocial Wellbeing  Recent Flowsheet Documentation  Taken 5/4/2025 1353 by Chilango Walden RN  Diversional Activities:  television  Intervention: Develop Pain Management Plan  Recent Flowsheet Documentation  Taken 5/4/2025 0830 by Chilango Wladen RN  Pain Management Interventions:   rest   quiet environment facilitated   emotional support   distraction   diversional activity provided  Intervention: Prevent or Manage Pain  Recent Flowsheet Documentation  Taken 5/4/2025 1353 by Chilango Walden RN  Sensory Stimulation Regulation: care clustered  Medication Review/Management: medications reviewed       Goal Outcome Evaluation:      Plan of Care Reviewed With: patient    Overall Patient Progress: no changeOverall Patient Progress: no change    A/Ox2, self and time, disoriented to time and situation. Ax1-2 with walker to BR. Comfort cares. Repositioned every two hours, cooperative. Regular diet. Poor appetite this shift, little to none. C/o nausea all morning, PRN zofran given, unrelieved, PRN compazine given. PRN Lorazepam given and held Lantus, r/t poor po intake. 95% on room air. PIV R LFA CDI, patent. Attempted to void using urinal at bedside with litte to no output. Bladder scanned, 350 ml. Pt denies feeling discomfort or pain. Denies SOB or CP. Scopolamine patch to behind L ear. Wound care completed to gluteal crease, patient tolerated.    Anticipated discharge home on hospice tomorrow at 0900.    Chilango Walden, RN

## 2025-05-04 NOTE — PROGRESS NOTES
[facility]    Medicine Progress Note - Hospitalist Service    Date of Admission:  4/27/2025    Assessment & Plan   74 year old male with history of coronary artery disease s/p CABG 03/17/25, HFrEF, DM2, CKD, mood disorder, recent pulmonary embolus, starvation ketosis and known cognitive impairment presents 4/27 with nausea, vomiting and diarrhea.     Disposition: plan to discharge on hospice Monday    Hospice care  -- Has decided to go hospice at discharge  -- Will work with social work to get him discharged as soon as possible  -- Lorazepam for anxiety and nausea  -- Will discharge with 3 days of hospice medications    N/V/D: resolved since admission and then had recurrent nausea episode after drinking 2 Ensure drinks  -- infectious stool studies negative   -- supportive cares, try GI cocktail given complaints of GERD  -- Continue Zofran and Compazine as needed  -- Lorazepam as above    JESS on CKD3: resolved  Likely related to dehydration from poor po intake  -- ok to hold further IVF    Hyponatremia: hypovolemic confirmed with U Na<20  -- resolved with IVF     CAD  Troponin elevation:  Likely related to demand ischemia and trop has started to plateau  Denies chest pain  Recent CABG noted but also unclear if patient is compliant with home medications  -- continue home asa, plavix, statin, imdur and coreg  -- stopped tele since patient is DNR    DM2:   -- continue current lantus  -- continue medium intensity sliding scale insulin and mealtime coverage at 1:10 ratio    Recent PE: continue DOAC    Mood disorder: continue home prozac    GERD: continue home PPI        Diet: Regular Diet Adult    DVT Prophylaxis: DOAC  Riojas Catheter: Not present  Lines: None     Cardiac Monitoring: None  Code Status: No CPR- Do NOT Intubate      Clinically Significant Risk Factors                   # Hypertension: Noted on problem list  # Chronic heart failure with reduced ejection fraction: last echo with EF  "<40%          # DMII: A1C = 7.2 % (Ref range: <5.7 %) within past 6 months   # Overweight: Estimated body mass index is 26.56 kg/m  as calculated from the following:    Height as of this encounter: 1.727 m (5' 8\").    Weight as of this encounter: 79.2 kg (174 lb 11.2 oz).   # Severe Malnutrition: based on nutrition assessment and treatment provided per dietitian's recommendations.    # Financial/Environmental Concerns: none   # History of CABG: noted on surgical history       Social Drivers of Health    Tobacco Use: Medium Risk (4/28/2025)    Patient History     Smoking Tobacco Use: Former     Smokeless Tobacco Use: Never   Interpersonal Safety: High Risk (4/28/2025)    Interpersonal Safety     Do you feel physically and emotionally safe where you currently live?: No     Within the past 12 months, have you been hit, slapped, kicked or otherwise physically hurt by someone?: No     Within the past 12 months, have you been humiliated or emotionally abused in other ways by your partner or ex-partner?: No    Received from [facility]    Social Connections          Disposition Plan     Medically Ready for Discharge: Anticipated Tomorrow             Jeanne Rust MD  Hospitalist Service  [facility]  Securely message with [employer] (more info)  Text page via [employer] Paging/Directory   ______________________________________________________________________    Interval History   Mr. Cordoba is more lethargic today.  He is refusing to eat food, he is having a decreased appetite.  He is having significant nausea despite Zofran and Compazine, also has lorazepam available.  Plan to discharge tomorrow at 9 AM on hospice with 3 days of hospice medications.    Physical Exam   Vital Signs: Temp: 97.6  F (36.4  C) Temp src: Oral BP: 139/81 Pulse: 94   Resp: 20 SpO2: 95 % O2 Device: None (Room air)    Weight: 174 lbs 11.2 oz    General Appearance: Awake, alert, in no acute " distress  Respiratory: CTAB, no wheeze  Cardiovascular: RRR, no murmur noted  GI: soft, nontender, non distended, normal bowel sounds  Skin: no jaundice, no rash    Medical Decision Making       30 MINUTES SPENT BY ME on the date of service doing chart review, history, exam, documentation & further activities per the note.      Data         Imaging results reviewed over the past 24 hrs:   No results found for this or any previous visit (from the past 24 hours).

## 2025-05-04 NOTE — PROGRESS NOTES
"Care Management Follow Up    Length of Stay (days): 6    Expected Discharge Date: 05/05/2025    Anticipated Discharge Plan:       Transportation: Confirmed Family/friend    PT Recommendations: Transitional Care Facility  OT Recommendations:  Transitional Care Facility (family indicated pt is not caring for self)     Barriers to Discharge: medical stability    Prior Living Situation: house with alone, other (see comments) (friend/care giver lives in basement)    Discussed  Partnership in Safe Discharge Planning  document with patient/family: No     Handoff Completed: No, handoff not indicated or clinically appropriate    Patient/Spokesperson Updated: Yes. Who? Veronique    Additional Information:  CM called pt's prem Piper to confirm discharge plans for tomorrow. Veronique states that family will actually be coming today to  pt. She confirms they feel as though they can care for him at home tonight until [facility] home care can start tomorrow at 10am. She also confirms that she will call [facility] in a couple days, after pt gets \"settled in\", and then will sign him onto services at that time.     CM updated hospitalist that family planning to  pt today. Hospitalist plans to call family and discuss as pt received medication today that made him sleepy and would make transport difficult. Pt also needs meds filled.     1:33 PM  Hospitalist call CM and updated that she spoke with family and they will wait to  pt until tomorrow morning. Meds filled by outpatient pharmacy.       Libra Perkins, [facility]          "

## 2025-05-04 NOTE — PROGRESS NOTES
Physical Therapy Discharge Summary    Reason for therapy discharge:    Patient/family request discontinuation of services.    Progress towards therapy goal(s). See goals on Care Plan in Epic electronic health record for goal details.  Goals not met.  Barriers to achieving goals:   limited tolerance for therapy.    Therapy recommendation(s):    No further therapy is recommended. Plan is to discharge home with hospice services tomorrow.

## 2025-05-04 NOTE — PLAN OF CARE
Problem: Adult Inpatient Plan of Care  Goal: Absence of Hospital-Acquired Illness or Injury  Intervention: Prevent Skin Injury  Recent Flowsheet Documentation  Taken 5/3/2025 2000 by Marly Soni, RN  Body Position: sitting up in bed  Taken 5/3/2025 1700 by Marly Soni, RN  Body Position: sitting up in bed   Goal Outcome Evaluation:      Plan of Care Reviewed With: patient    Pt refused meds at the beginning of the shift but agreed to take meds crushed with ice cream at the end of the shift. Pt refused BG checks and vitals. Unstable and walks with assist of 2 and walker to bathroom and uses urinal. Refused meal orders and ate 2-3 crackers with adequate fluids. Denies pain and agreed to wound care. Encouraged pt to reposition often to help take pressure off wound in sacrum area.

## 2025-05-05 ENCOUNTER — TELEPHONE (OUTPATIENT)
Dept: PHARMACY | Facility: HOSPITAL | Age: 75
End: 2025-05-05
Payer: COMMERCIAL

## 2025-05-05 LAB — GLUCOSE BLDC GLUCOMTR-MCNC: 96 MG/DL (ref 70–99)

## 2025-05-05 ASSESSMENT — ACTIVITIES OF DAILY LIVING (ADL)
ADLS_ACUITY_SCORE: 55
ADLS_ACUITY_SCORE: 56
ADLS_ACUITY_SCORE: 56
ADLS_ACUITY_SCORE: 55

## 2025-05-05 NOTE — PLAN OF CARE
Occupational Therapy Discharge Summary    Reason for therapy discharge:    Discharged to transitional care facility.    Progress towards therapy goal(s). See goals on Care Plan in [facility] electronic health record for goal details.  Goals not met.  Barriers to achieving goals:   discharge from facility.    Therapy recommendation(s):    Continued therapy is recommended.  Rationale/Recommendations:  Pt is going to TCU for further therapy.

## 2025-05-05 NOTE — PLAN OF CARE
Problem: Adult Inpatient Plan of Care  Goal: Optimal Comfort and Wellbeing  Outcome: Progressing  Intervention: Provide Person-Centered Care  Recent Flowsheet Documentation  Taken 5/5/2025 0100 by Camilla Stiles RN  Trust Relationship/Rapport:   care explained   choices provided   Goal Outcome Evaluation:       Pt denies pain. Intermittent nausea, very mild beginning of shift. Awakenings minimized. Refused repositioning. Assist x1 with walker. Uses urinal in bed with assistance, spills sometimes.

## 2025-05-05 NOTE — PLAN OF CARE
Pt discharged home accompanied by his friends.  Plan will be for pt to start hospice services once home.   All questions answered prior to discharge.

## 2025-05-05 NOTE — PLAN OF CARE
Problem: Heart Failure  Goal: Optimal Coping  Intervention: Support Psychosocial Response  Recent Flowsheet Documentation  Taken 5/4/2025 1600 by Marly Soni, RN  Family/Support System Care: involvement promoted   Goal Outcome Evaluation:      Plan of Care Reviewed With: patient    Pt opened to having BG check and tolerated vital checks. PRN hydralazine given for /90. Pt refused meal order and explained he did not have appetite. Offered pt crackers and encouraged fluids. Pt also refused oral meds at the end of the shift but tolerated IV meds. Pt denies pain and slept for most of the evening shift.

## 2025-05-05 NOTE — PROGRESS NOTES
Care Management Discharge Note    Discharge Date: 05/05/2025       Discharge Disposition:  Home    Discharge Services:  private pay home care    Discharge DME:      Discharge Transportation: family or friend will provide    Private pay costs discussed: Not applicable    Does the patient's insurance plan have a 3 day qualifying hospital stay waiver?  Yes     Which insurance plan 3 day waiver is available? Alternative insurance waiver    Will the waiver be used for post-acute placement? No    Education Provided on the Discharge Plan:  Yes  Persons Notified of Discharge Plans: ptVeronique  Patient/Family in Agreement with the Plan: yes    Handoff Referral Completed: No, handoff not indicated or clinically appropriate    Additional Information:  Pt discharging home with private pay home care through [facility]. They will begin services today at 10am. Family will reach out to [facility] when they are ready to sign pt onto services. Medications filled and will be sent with pt at discharge. Family transporting around 9am.     JOSE Bonilla

## 2025-05-05 NOTE — TELEPHONE ENCOUNTER
PA Initiation    Medication: LORAZEPAM 2 MG/ML PO CONC  Insurance Company: [employer] Non-Specialty PA's - Phone [phone] Fax [phone]  Pharmacy Filling the Rx: [place], [place] - [facility]  Filling Pharmacy Phone: [phone]  Filling Pharmacy Fax: [phone]  Start Date: 5/4/2025

## 2025-05-13 NOTE — DISCHARGE SUMMARY
"[facility]  Hospitalist Discharge Summary      Date of Admission:  4/27/2025  Date of Discharge:  5/5/2025  9:30 AM  Discharging Provider: Jeanne Rust MD  Discharge Service: Hospitalist Service    Discharge Diagnoses   Hospice care  Nausea, vomiting, diarrhea  JESS on CKD, resolved  CAD s/p CABG  Hyponatremia, resolved  DM2  Recent PE    Clinically Significant Risk Factors     # DMII: A1C = 7.2 % (Ref range: <5.7 %) within past 6 months  # Overweight: Estimated body mass index is 26.56 kg/m  as calculated from the following:    Height as of this encounter: 1.727 m (5' 8\").    Weight as of this encounter: 79.2 kg (174 lb 11.2 oz).  # Severe Malnutrition: based on nutrition assessment and treatment provided per dietitian's recommendations.      Follow-ups Needed After Discharge   Follow-up Appointments       Hospital Follow-up with Existing Primary Care Provider (PCP)          Schedule Primary Care visit within: 7 Days               Unresulted Labs Ordered in the Past 30 Days of this Admission       No orders found from 3/28/2025 to 4/28/2025.        These results will be followed up by Jeanne Rust    Discharge Disposition   Discharged to home  Condition at discharge: Stable    Hospital Course   Eric Cordoba is a 74 year old male with history of coronary artery disease s/p CABG 03/17/25, HFrEF, DM2, CKD, mood disorder, recent pulmonary embolus, starvation ketosis and known cognitive impairment presents 4/27 with nausea, vomiting and diarrhea. Required multiple medications including lorazepam, ondansetron, and compazine to control nausea. He and his family made the decision to transition to comfort care. He was discharged with an outpatient hospice referral and home health care.      Hospice care  -- Lorazepam for anxiety and nausea  -- discharged with 3 days of hospice medications     N/V/D: resolved since admission and then had recurrent nausea episode after drinking 2 Ensure " drinks  -- infectious stool studies negative   -- supportive cares, try GI cocktail given complaints of GERD  -- Continue Zofran and Compazine as needed  -- Lorazepam as above     JESS on CKD3: resolved  -- Likely related to dehydration from poor po intake     Hyponatremia: hypovolemic confirmed with U Na<20  -- resolved with IVF      CAD  Troponin elevation:  Likely related to demand ischemia and trop has started to plateau  Denies chest pain  Recent CABG noted but also unclear if patient is compliant with home medications  -- continue home asa, plavix, statin, imdur and coreg, can decide to continue when meeting with hospice outpatient     DM2:   -- continue home lantus for now    Recent PE: continue DOAC     Mood disorder: continue home prozac     GERD: continue home PPI    Consultations This Hospital Stay   PHYSICAL THERAPY ADULT IP CONSULT  OCCUPATIONAL THERAPY ADULT IP CONSULT  CARE MANAGEMENT / SOCIAL WORK IP CONSULT  CARE MANAGEMENT / SOCIAL WORK IP CONSULT  CARE MANAGEMENT / SOCIAL WORK IP CONSULT  PSYCHIATRY IP CONSULT  PALLIATIVE CARE ADULT IP CONSULT  OCCUPATIONAL THERAPY ADULT IP CONSULT  SPIRITUAL HEALTH SERVICES IP CONSULT  CARE MANAGEMENT / SOCIAL WORK IP CONSULT  WOUND OSTOMY CONTINENCE NURSE  IP CONSULT  SPIRITUAL HEALTH SERVICES IP CONSULT    Code Status   Prior    Time Spent on this Encounter   I, Jeanne Rust MD, personally saw the patient today and spent greater than 30 minutes discharging this patient.       Jeanne Rust MD  [address] [address]  Phone: [phone]  Fax: [phone]  ______________________________________________________________________    Physical Exam   Vital Signs:                    Weight: 174 lbs 11.2 oz  General Appearance: Awake, alert, in no acute distress  Respiratory: CTAB, no wheeze  Cardiovascular: RRR, no LE edema  GI: soft, nontender, non distended, normal bowel sounds  Skin: no  jaundice, no rash         Primary Care Physician   Abner Elmore    Discharge Orders      Medication Therapy Management Referral      Hospice Referral      Reason for your hospital stay    Nausea, vomiting, diarrhea, coronary artery disease     Activity    Your activity upon discharge: activity as tolerated     Diet    Follow this diet upon discharge:  Regular Diet Adult     Hospital Follow-up with Existing Primary Care Provider (PCP)            Significant Results and Procedures   Most Recent 3 CBC's:  Recent Labs   Lab Test 05/01/25 2038 04/27/25 2115 04/10/25  0717   WBC 7.9 10.7 9.1   HGB 12.2* 13.1* 11.5*   MCV 91 90 96    268 290     Most Recent 3 BMP's:  Recent Labs   Lab Test 05/05/25  0756 05/04/25 2056 05/04/25  1721 05/02/25  0836 05/01/25 2038 05/01/25  0803 05/01/25  0440 04/30/25  0747 04/30/25  0439   NA  --   --   --   --  137  --  139  --  138   POTASSIUM  --   --   --   --  3.9  --  3.7  --  3.5   CHLORIDE  --   --   --   --  103  --  108*  --  106   CO2  --   --   --   --  20*  --  25  --  24   BUN  --   --   --   --  26.6*  --  32.0*  --  45.0*   CR  --   --   --   --  1.28*  --  1.51*  --  1.76*   ANIONGAP  --   --   --   --  14  --  6*  --  8   GAYATHRI  --   --   --   --  9.1  --  8.5*  --  8.5*   GLC 96 136* 140*   < > 190*   < > 149*   < > 113*    < > = values in this interval not displayed.   ,   Results for orders placed or performed during the hospital encounter of 04/27/25   XR Chest Port 1 View    Narrative    EXAM: CHEST SINGLE VIEW PORTABLE  LOCATION: [facility]  DATE: 4/27/2025    INDICATION: Recent coronary artery bypass surgery.  COMPARISON: 4/8/2025.    FINDINGS: Hypoinflated lungs. A few band-like opacities at the left lung base, most likely representing atelectasis. The lungs are otherwise clear. Unchanged cardiac silhouette. Prior median sternotomy.      Impression    IMPRESSION: No convincing evidence of active cardiopulmonary disease.    XR  Chest Port 1 View    Narrative    EXAM: XR CHEST PORT 1 VIEW  LOCATION: [facility]  DATE: 5/1/2025    INDICATION: CP, dyspnea  COMPARISON: April 27, 2025, January 7, 2025 x-ray. CT angiogram chest April 7, 2025      Impression    IMPRESSION: The elevated left hemidiaphragm which was not present in January. Similar as on the recent prior exams. There appears to be persistent but not very well demonstrated atelectasis at the left lung base below the level of the diaphragm. Vascular   volume is normal and the right lung is clear. No pneumothorax.       Discharge Medications   Discharge Medication List as of 5/5/2025  9:13 AM        START taking these medications    Details   acetaminophen (TYLENOL) 650 MG suppository Place 1 suppository (650 mg) rectally every 4 hours as needed for fever., Disp-4 suppository, R-0, E-Prescribe      atropine 1 % ophthalmic solution Take 1-2 drops by mouth, place under tongue or place inside cheek every 4 hours as needed for secretions., Disp-5 mL, R-0, E-Prescribe      bisacodyl (DULCOLAX) 10 MG suppository Place 1 suppository (10 mg) rectally daily as needed for constipation., Disp-2 suppository, R-0, E-Prescribe      haloperidol (HALDOL) 2 MG/ML (HIGH CONC) solution Take 0.25-0.5 mLs (0.5-1 mg) by mouth or place under tongue every 6 hours as needed for agitation or other (nausea)., Disp-10 mL, R-0, E-Prescribe      LORazepam (ATIVAN) 2 MG/ML (HIGH CONC) oral solution Take 0.125-0.25 mLs (0.25-0.5 mg) by mouth or place under tongue every 4 hours as needed for anxiety (restlessness)., Disp-30 mL, R-0, E-Prescribe      morphine sulfate, high concentrate, (ROXANOL-CONCENTRATED) 20 MG/ML concentrated solution Take 0.125-0.25 mLs (2.5-5 mg) by mouth or place under tongue every 2 hours as needed for shortness of breath or pain., Disp-30 mL, R-0, E-PrescribeHospice patient. Dispense in quantities of 30 mL.      scopolamine (TRANSDERM) 1 MG/3DAYS 72 hr patch Place 1  patch over 72 hours onto the skin every 72 hours for 3 days.Disp-1 patch, [id number]      senna (SENNA LAXATIVE) 8.6 MG tablet Take 1-2 tablets by mouth 2 times daily as needed for constipation., Disp-100 tablet, R-0, E-Prescribe      traZODone (DESYREL) 50 MG tablet Take 1 tablet (50 mg) by mouth nightly as needed for sleep., Disp-3 tablet, R-0, E-Prescribe           CONTINUE these medications which have CHANGED    Details   pantoprazole (PROTONIX) 40 MG EC tablet Take 1 tablet (40 mg) by mouth 2 times daily., Disp-60 tablet, R-0, E-Prescribe           CONTINUE these medications which have NOT CHANGED    Details   acetaminophen (TYLENOL) 500 MG tablet Take 1,000 mg by mouth 3 times daily. While awake, Historical      apixaban ANTICOAGULANT (ELIQUIS) 5 MG tablet Take 2 tablets (10 mg) by mouth 2 times daily for 3 days, THEN 1 tablet (5 mg) 2 times daily for 28 days., Disp-68 tablet, R-0, E-Prescribe      carvedilol (COREG) 12.5 MG tablet Take 1 tablet (12.5 mg) by mouth 2 times daily (with meals)., Disp-60 tablet, R-11, E-Prescribe      clopidogrel (PLAVIX) 75 MG tablet Take 1 tablet (75 mg) by mouth daily. Continue for one year postop, then stop and increase aspirin to 162 mg daily indefinitely., Transitional      FLUoxetine 20 MG tablet Take 20 mg by mouth daily., Historical      insulin glargine (LANTUS PEN) 100 UNIT/ML pen Inject 25 Units subcutaneously every morning., No Print OutIf Lantus is not covered by insurance, may substitute Basaglar or Semglee or other insulin glargine product per insurance preference at same dose and frequency.        isosorbide mononitrate (IMDUR) 30 MG 24 hr tablet Take 30 mg by mouth daily., Historical      senna-docusate (SENOKOT-S/PERICOLACE) 8.6-50 MG tablet Take 1 tablet by mouth 2 times daily., Historical      simvastatin (ZOCOR) 40 MG tablet Take 40 mg by mouth daily, Historical           STOP taking these medications       aspirin 81 MG EC tablet Comments:   Reason for  Stopping:         oxyCODONE (ROXICODONE) 5 MG tablet Comments:   Reason for Stopping:             Allergies   Allergies   Allergen Reactions    Lisinopril Cough    Propoxyphene Unknown and Hives

## (undated) DEVICE — BLANKET BAIR ADLT PLYMR 60X36IN 63000

## (undated) DEVICE — RX SURGIFLO HEMOSTATIC MATRIX W/THROMBIN 8ML 2994

## (undated) DEVICE — LEAD PACER MYOCARDIAL BIPOLAR TEMPORARY 53CM 6495F

## (undated) DEVICE — SYR ANGIOGRAPHY MULTIUSE KIT ACIST 014612

## (undated) DEVICE — MANIFOLD KIT ANGIO AUTOMATED 014613

## (undated) DEVICE — CUSTOM PACK CORONARY SAN5BCRHEA

## (undated) DEVICE — CONNECTOR BLAKE DRAIN SGL BCC1

## (undated) DEVICE — HEMOSTASIS BONE OSTENE 2.5G SYNTHETIC 1503832

## (undated) DEVICE — ESU GROUND PAD ADULT REM W/15' CORD E7507DB

## (undated) DEVICE — SU STRATAFIX PDS PLUS 0 CT 45CM SXPP1A406

## (undated) DEVICE — SU MYOSTERNAL WIRE  046-267

## (undated) DEVICE — DEVICE TISSUE STABILIZATION OCTOBASE 28707

## (undated) DEVICE — SU PROLENE 7-0 BV-1DA 4X30" M8703

## (undated) DEVICE — GLOVE BIOGEL 6 LATEX

## (undated) DEVICE — GW .035IN X 260CM WHOLEY FLOPPY TIP WWFS35260

## (undated) DEVICE — RX VISTASEAL FIBRIN SEALANT W/THROMBIN 10ML VST10

## (undated) DEVICE — PACK MAJOR BASIN 673

## (undated) DEVICE — Device

## (undated) DEVICE — SU PDS II 2-0 CT 36"  Z357H

## (undated) DEVICE — CABLE MYO/LEAD PACING WHITE DISP 019-530

## (undated) DEVICE — CANISTER WOUND VAC W/GEL 500ML M8275063/5

## (undated) DEVICE — KIT HAND CONTROL ACIST 014644 AR-P54

## (undated) DEVICE — TUBING INSUFFLATION W/FILTER 28-0207

## (undated) DEVICE — DRSG WOUND VAC SPONGE MED BLACK M8275052/5

## (undated) DEVICE — ELECTRODE DEFIB CADENCE 22550R

## (undated) DEVICE — SU PLEDGET SOFT TFE 3/8"X3/26"X1/16" PCP40

## (undated) DEVICE — CONNECTOR Y 1/2 X 3/8 X 3/8 STRL C440

## (undated) DEVICE — SOL WATER IRRIG 1000ML BOTTLE 2F7114

## (undated) DEVICE — CELLSAVER

## (undated) DEVICE — SU PROLENE 7-0 BV-1DA 30" 8703H

## (undated) DEVICE — PREP CHLORAPREP 26ML TINTED HI-LITE ORANGE 930815

## (undated) DEVICE — SUTURE STRATAFIX PDS 3-0 SH

## (undated) DEVICE — CONNECTOR CARDIO BLAKE DRAIN BCC2

## (undated) DEVICE — PROTECTOR ARM STANDARD ONE STEP 40433 (COI)

## (undated) DEVICE — GLOVE BIOGEL PI SZ 7.0 40870

## (undated) DEVICE — SU PROLENE 5-0 C-1 36" 2ARM MONOFILAMENT M8720

## (undated) DEVICE — SOL NACL 0.9% IRRIG 1000ML BOTTLE 2F7124

## (undated) DEVICE — DRSG DRAIN 4X4" 7086

## (undated) DEVICE — CATH ANGIO INFINITI 3DRC 6FRX100CM 534676T

## (undated) DEVICE — SUCTION DRY CHEST DRAIN OASIS 3600-100

## (undated) DEVICE — CUSTOM PACK CV ST JOES SCV5BCVHEA

## (undated) DEVICE — PITCHER STERILE 1000ML  SSK9004A

## (undated) DEVICE — INTRO MICRO MINI STICK 4FR STIFF NITINOL 45-753

## (undated) DEVICE — GLOVE SURG PI ULTRA TOUCH M SZ 6 LF

## (undated) DEVICE — CATH BALLOON IABP 7.5FRX50ML INTRA-AORTIC 0684-00-0576-01U

## (undated) DEVICE — SU STRATAFIX PDS PLUS 2-0 SPIRAL CT-1 30CM SXPP1B410

## (undated) DEVICE — SU STRATAFIX MONOCRYL 4-0 SPIRAL PS-2 SXMP1B118

## (undated) DEVICE — SHTH INTRO 0.021IN ID 6FR DIA

## (undated) DEVICE — KIT ENDO VASOVIEW HEMOPRO 2 VH-4000

## (undated) DEVICE — SU ETHIBOND 2-0 SHDA 30" X563H

## (undated) DEVICE — CATH TRAY FOLEY SURESTEP 16FR W/TMP PRB STLK LATEX A319416AM

## (undated) DEVICE — DRSG ADAPTIC 3X8" 6113

## (undated) DEVICE — CUSTOM PACK CAB SCV5BCBHEA

## (undated) DEVICE — SU STERNAL WIRE SINGLE #6 046-032

## (undated) DEVICE — SLEEVE TR BAND RADIAL COMPRESSION DEVICE 24CM TRB24-REG

## (undated) DEVICE — SURGICEL POWDER ABSORBABLE HEMOSTAT 3GM 3013SP

## (undated) DEVICE — CATH ANGIO JUDKINS JL3.5 6FRX100CM INFINITI 534618T

## (undated) DEVICE — SU PROLENE 6-0 C-1DA 30" 8706H

## (undated) DEVICE — CATH THORACIC RT ANGLE CLOTSTOP 32FR

## (undated) DEVICE — SU ETHIBOND 0 CT-1 CR 8X18" CX21D

## (undated) DEVICE — SU DERMABOND ADVANCED .7ML DNX12

## (undated) DEVICE — SUTURE PDS 0 27IN CT1 + VIOLET PDP340H

## (undated) DEVICE — CATH ANGIO JUDKINS R4 6FRX100CM INFINITI 534621T

## (undated) DEVICE — SUCTION CANISTER MEDIVAC LINER 3000ML W/LID 65651-530

## (undated) DEVICE — ADH LIQUID MASTISOL TOPICAL VIAL 2-3ML 0523-48

## (undated) RX ORDER — FENTANYL CITRATE 50 UG/ML
INJECTION, SOLUTION INTRAMUSCULAR; INTRAVENOUS
Status: DISPENSED
Start: 2025-02-24

## (undated) RX ORDER — VANCOMYCIN HYDROCHLORIDE 1 G/20ML
INJECTION, POWDER, LYOPHILIZED, FOR SOLUTION INTRAVENOUS
Status: DISPENSED
Start: 2025-03-17

## (undated) RX ORDER — ONDANSETRON 2 MG/ML
INJECTION INTRAMUSCULAR; INTRAVENOUS
Status: DISPENSED
Start: 2025-03-17

## (undated) RX ORDER — FENTANYL CITRATE 50 UG/ML
INJECTION, SOLUTION INTRAMUSCULAR; INTRAVENOUS
Status: DISPENSED
Start: 2025-03-17

## (undated) RX ORDER — CEFAZOLIN SODIUM 1 G/3ML
INJECTION, POWDER, FOR SOLUTION INTRAMUSCULAR; INTRAVENOUS
Status: DISPENSED
Start: 2025-03-17

## (undated) RX ORDER — DIAZEPAM 5 MG/1
TABLET ORAL
Status: DISPENSED
Start: 2025-02-24

## (undated) RX ORDER — LIDOCAINE HYDROCHLORIDE 10 MG/ML
INJECTION, SOLUTION EPIDURAL; INFILTRATION; INTRACAUDAL; PERINEURAL
Status: DISPENSED
Start: 2025-03-17

## (undated) RX ORDER — PROPOFOL 10 MG/ML
INJECTION, EMULSION INTRAVENOUS
Status: DISPENSED
Start: 2025-03-17

## (undated) RX ORDER — ASPIRIN 325 MG
TABLET ORAL
Status: DISPENSED
Start: 2025-02-24